# Patient Record
Sex: MALE | Race: WHITE | NOT HISPANIC OR LATINO | URBAN - METROPOLITAN AREA
[De-identification: names, ages, dates, MRNs, and addresses within clinical notes are randomized per-mention and may not be internally consistent; named-entity substitution may affect disease eponyms.]

---

## 2017-07-08 ENCOUNTER — EMERGENCY (EMERGENCY)
Facility: HOSPITAL | Age: 62
LOS: 1 days | Discharge: HOME | End: 2017-07-08
Attending: EMERGENCY MEDICINE | Admitting: EMERGENCY MEDICINE
Payer: COMMERCIAL

## 2017-07-08 VITALS
HEART RATE: 66 BPM | RESPIRATION RATE: 14 BRPM | OXYGEN SATURATION: 100 % | SYSTOLIC BLOOD PRESSURE: 145 MMHG | DIASTOLIC BLOOD PRESSURE: 83 MMHG | TEMPERATURE: 98 F

## 2017-07-08 VITALS
OXYGEN SATURATION: 98 % | RESPIRATION RATE: 16 BRPM | WEIGHT: 190.04 LBS | TEMPERATURE: 98 F | HEART RATE: 76 BPM | DIASTOLIC BLOOD PRESSURE: 90 MMHG | SYSTOLIC BLOOD PRESSURE: 150 MMHG | HEIGHT: 73 IN

## 2017-07-08 LAB
ALBUMIN SERPL ELPH-MCNC: 3.9 G/DL — SIGNIFICANT CHANGE UP (ref 3.3–5)
ALP SERPL-CCNC: 88 U/L — SIGNIFICANT CHANGE UP (ref 30–120)
ALT FLD-CCNC: 48 U/L DA — SIGNIFICANT CHANGE UP (ref 10–60)
ANION GAP SERPL CALC-SCNC: 10 MMOL/L — SIGNIFICANT CHANGE UP (ref 5–17)
AST SERPL-CCNC: 26 U/L — SIGNIFICANT CHANGE UP (ref 10–40)
BASOPHILS # BLD AUTO: 0.1 K/UL — SIGNIFICANT CHANGE UP (ref 0–0.2)
BASOPHILS NFR BLD AUTO: 1.2 % — SIGNIFICANT CHANGE UP (ref 0–2)
BILIRUB SERPL-MCNC: 0.5 MG/DL — SIGNIFICANT CHANGE UP (ref 0.2–1.2)
BUN SERPL-MCNC: 17 MG/DL — SIGNIFICANT CHANGE UP (ref 7–23)
CALCIUM SERPL-MCNC: 8.9 MG/DL — SIGNIFICANT CHANGE UP (ref 8.4–10.5)
CHLORIDE SERPL-SCNC: 104 MMOL/L — SIGNIFICANT CHANGE UP (ref 96–108)
CO2 SERPL-SCNC: 28 MMOL/L — SIGNIFICANT CHANGE UP (ref 22–31)
CREAT SERPL-MCNC: 1.15 MG/DL — SIGNIFICANT CHANGE UP (ref 0.5–1.3)
EOSINOPHIL # BLD AUTO: 0.3 K/UL — SIGNIFICANT CHANGE UP (ref 0–0.5)
EOSINOPHIL NFR BLD AUTO: 4.6 % — SIGNIFICANT CHANGE UP (ref 0–6)
GLUCOSE SERPL-MCNC: 149 MG/DL — HIGH (ref 70–99)
HCT VFR BLD CALC: 44.2 % — SIGNIFICANT CHANGE UP (ref 39–50)
HGB BLD-MCNC: 14.8 G/DL — SIGNIFICANT CHANGE UP (ref 13–17)
LACTATE SERPL-SCNC: 1.1 MMOL/L — SIGNIFICANT CHANGE UP (ref 0.7–2)
LYMPHOCYTES # BLD AUTO: 2.2 K/UL — SIGNIFICANT CHANGE UP (ref 1–3.3)
LYMPHOCYTES # BLD AUTO: 31.5 % — SIGNIFICANT CHANGE UP (ref 13–44)
MCHC RBC-ENTMCNC: 30.2 PG — SIGNIFICANT CHANGE UP (ref 27–34)
MCHC RBC-ENTMCNC: 33.5 GM/DL — SIGNIFICANT CHANGE UP (ref 32–36)
MCV RBC AUTO: 90 FL — SIGNIFICANT CHANGE UP (ref 80–100)
MONOCYTES # BLD AUTO: 0.6 K/UL — SIGNIFICANT CHANGE UP (ref 0–0.9)
MONOCYTES NFR BLD AUTO: 9.1 % — SIGNIFICANT CHANGE UP (ref 2–14)
NEUTROPHILS # BLD AUTO: 3.8 K/UL — SIGNIFICANT CHANGE UP (ref 1.8–7.4)
NEUTROPHILS NFR BLD AUTO: 53.6 % — SIGNIFICANT CHANGE UP (ref 43–77)
PLATELET # BLD AUTO: 265 K/UL — SIGNIFICANT CHANGE UP (ref 150–400)
POTASSIUM SERPL-MCNC: 4.3 MMOL/L — SIGNIFICANT CHANGE UP (ref 3.5–5.3)
POTASSIUM SERPL-SCNC: 4.3 MMOL/L — SIGNIFICANT CHANGE UP (ref 3.5–5.3)
PROT SERPL-MCNC: 7 G/DL — SIGNIFICANT CHANGE UP (ref 6–8.3)
RBC # BLD: 4.91 M/UL — SIGNIFICANT CHANGE UP (ref 4.2–5.8)
RBC # FLD: 11.6 % — SIGNIFICANT CHANGE UP (ref 10.3–14.5)
SODIUM SERPL-SCNC: 142 MMOL/L — SIGNIFICANT CHANGE UP (ref 135–145)
TROPONIN I SERPL-MCNC: 0 NG/ML — LOW (ref 0.02–0.06)
WBC # BLD: 7.1 K/UL — SIGNIFICANT CHANGE UP (ref 3.8–10.5)
WBC # FLD AUTO: 7.1 K/UL — SIGNIFICANT CHANGE UP (ref 3.8–10.5)

## 2017-07-08 PROCEDURE — 93010 ELECTROCARDIOGRAM REPORT: CPT

## 2017-07-08 PROCEDURE — 70450 CT HEAD/BRAIN W/O DYE: CPT

## 2017-07-08 PROCEDURE — 80053 COMPREHEN METABOLIC PANEL: CPT

## 2017-07-08 PROCEDURE — 83605 ASSAY OF LACTIC ACID: CPT

## 2017-07-08 PROCEDURE — 71020: CPT | Mod: 26

## 2017-07-08 PROCEDURE — 71046 X-RAY EXAM CHEST 2 VIEWS: CPT

## 2017-07-08 PROCEDURE — 70450 CT HEAD/BRAIN W/O DYE: CPT | Mod: 26

## 2017-07-08 PROCEDURE — 99284 EMERGENCY DEPT VISIT MOD MDM: CPT | Mod: 25

## 2017-07-08 PROCEDURE — 93005 ELECTROCARDIOGRAM TRACING: CPT

## 2017-07-08 PROCEDURE — 99284 EMERGENCY DEPT VISIT MOD MDM: CPT

## 2017-07-08 PROCEDURE — 84484 ASSAY OF TROPONIN QUANT: CPT

## 2017-07-08 PROCEDURE — 85027 COMPLETE CBC AUTOMATED: CPT

## 2017-07-08 RX ORDER — SODIUM CHLORIDE 9 MG/ML
1000 INJECTION INTRAMUSCULAR; INTRAVENOUS; SUBCUTANEOUS ONCE
Qty: 0 | Refills: 0 | Status: DISCONTINUED | OUTPATIENT
Start: 2017-07-08 | End: 2017-07-12

## 2017-07-08 NOTE — ED PROVIDER NOTE - OBJECTIVE STATEMENT
61 y/o M with c/o dizziness, myalgias and confusion prior to arrival while driving.  pt states he has had similar episodes in the past and was seen by cardiology with no known cause.  Pt states he was working in his yard this morning, went swimming and then was going to a BBQ when he felt "clammy" and became confused with his directions.  Pt currently asymptomatic, denies chest pain, SOB, nausea, vomiting, HA or weakness.

## 2017-07-08 NOTE — ED ADULT NURSE NOTE - OBJECTIVE STATEMENT
62 year old male presents to ED with CC of feeling disoriented while driving and just not feeling right  states he had to puppm over to the side of the road to regain his bearings.

## 2017-07-24 ENCOUNTER — TRANSCRIPTION ENCOUNTER (OUTPATIENT)
Age: 62
End: 2017-07-24

## 2017-10-02 ENCOUNTER — APPOINTMENT (OUTPATIENT)
Dept: NEUROLOGY | Facility: CLINIC | Age: 62
End: 2017-10-02
Payer: COMMERCIAL

## 2017-10-02 VITALS
BODY MASS INDEX: 28.89 KG/M2 | HEIGHT: 73 IN | WEIGHT: 218 LBS | SYSTOLIC BLOOD PRESSURE: 132 MMHG | HEART RATE: 57 BPM | DIASTOLIC BLOOD PRESSURE: 75 MMHG

## 2017-10-02 VITALS — HEART RATE: 58 BPM | DIASTOLIC BLOOD PRESSURE: 70 MMHG | SYSTOLIC BLOOD PRESSURE: 110 MMHG

## 2017-10-02 PROCEDURE — 99204 OFFICE O/P NEW MOD 45 MIN: CPT

## 2017-10-23 ENCOUNTER — OUTPATIENT (OUTPATIENT)
Dept: OUTPATIENT SERVICES | Facility: HOSPITAL | Age: 62
LOS: 1 days | End: 2017-10-23
Payer: COMMERCIAL

## 2017-10-23 ENCOUNTER — APPOINTMENT (OUTPATIENT)
Dept: MRI IMAGING | Facility: HOSPITAL | Age: 62
End: 2017-10-23
Payer: COMMERCIAL

## 2017-10-23 DIAGNOSIS — I62.00 NONTRAUMATIC SUBDURAL HEMORRHAGE, UNSPECIFIED: ICD-10-CM

## 2017-10-23 DIAGNOSIS — Z00.8 ENCOUNTER FOR OTHER GENERAL EXAMINATION: ICD-10-CM

## 2017-10-23 DIAGNOSIS — G93.89 OTHER SPECIFIED DISORDERS OF BRAIN: ICD-10-CM

## 2017-10-23 PROCEDURE — 70551 MRI BRAIN STEM W/O DYE: CPT | Mod: 26

## 2017-10-23 PROCEDURE — 70551 MRI BRAIN STEM W/O DYE: CPT

## 2017-11-08 ENCOUNTER — APPOINTMENT (OUTPATIENT)
Dept: NEUROLOGY | Facility: CLINIC | Age: 62
End: 2017-11-08
Payer: COMMERCIAL

## 2017-11-08 PROCEDURE — 95886 MUSC TEST DONE W/N TEST COMP: CPT

## 2017-11-08 PROCEDURE — 95911 NRV CNDJ TEST 9-10 STUDIES: CPT

## 2017-11-15 ENCOUNTER — APPOINTMENT (OUTPATIENT)
Dept: ORTHOPEDIC SURGERY | Facility: CLINIC | Age: 62
End: 2017-11-15
Payer: COMMERCIAL

## 2017-11-15 VITALS
SYSTOLIC BLOOD PRESSURE: 118 MMHG | HEIGHT: 73 IN | BODY MASS INDEX: 27.83 KG/M2 | DIASTOLIC BLOOD PRESSURE: 78 MMHG | WEIGHT: 210 LBS | HEART RATE: 62 BPM

## 2017-11-15 PROCEDURE — 72170 X-RAY EXAM OF PELVIS: CPT | Mod: 59

## 2017-11-15 PROCEDURE — 99214 OFFICE O/P EST MOD 30 MIN: CPT

## 2017-11-15 PROCEDURE — 72110 X-RAY EXAM L-2 SPINE 4/>VWS: CPT

## 2017-12-27 ENCOUNTER — APPOINTMENT (OUTPATIENT)
Dept: NEUROLOGY | Facility: CLINIC | Age: 62
End: 2017-12-27
Payer: COMMERCIAL

## 2017-12-27 VITALS
HEIGHT: 73 IN | SYSTOLIC BLOOD PRESSURE: 132 MMHG | DIASTOLIC BLOOD PRESSURE: 72 MMHG | BODY MASS INDEX: 28.49 KG/M2 | WEIGHT: 215 LBS | HEART RATE: 61 BPM

## 2017-12-27 VITALS — SYSTOLIC BLOOD PRESSURE: 120 MMHG | DIASTOLIC BLOOD PRESSURE: 70 MMHG

## 2017-12-27 DIAGNOSIS — R55 SYNCOPE AND COLLAPSE: ICD-10-CM

## 2017-12-27 PROCEDURE — 99214 OFFICE O/P EST MOD 30 MIN: CPT

## 2018-06-15 ENCOUNTER — RESULT REVIEW (OUTPATIENT)
Age: 63
End: 2018-06-15

## 2018-10-03 ENCOUNTER — EMERGENCY (EMERGENCY)
Facility: HOSPITAL | Age: 63
LOS: 1 days | Discharge: HOME | End: 2018-10-03
Attending: EMERGENCY MEDICINE | Admitting: EMERGENCY MEDICINE
Payer: COMMERCIAL

## 2018-10-03 VITALS
DIASTOLIC BLOOD PRESSURE: 69 MMHG | HEART RATE: 60 BPM | SYSTOLIC BLOOD PRESSURE: 121 MMHG | OXYGEN SATURATION: 95 % | TEMPERATURE: 99 F | RESPIRATION RATE: 18 BRPM

## 2018-10-03 VITALS
HEIGHT: 73 IN | WEIGHT: 212.97 LBS | RESPIRATION RATE: 16 BRPM | TEMPERATURE: 99 F | HEART RATE: 63 BPM | SYSTOLIC BLOOD PRESSURE: 157 MMHG | DIASTOLIC BLOOD PRESSURE: 87 MMHG | OXYGEN SATURATION: 100 %

## 2018-10-03 PROCEDURE — 71046 X-RAY EXAM CHEST 2 VIEWS: CPT | Mod: 26

## 2018-10-03 PROCEDURE — 99284 EMERGENCY DEPT VISIT MOD MDM: CPT | Mod: 25

## 2018-10-03 PROCEDURE — 96374 THER/PROPH/DIAG INJ IV PUSH: CPT

## 2018-10-03 PROCEDURE — 99284 EMERGENCY DEPT VISIT MOD MDM: CPT

## 2018-10-03 PROCEDURE — 94640 AIRWAY INHALATION TREATMENT: CPT

## 2018-10-03 PROCEDURE — 71046 X-RAY EXAM CHEST 2 VIEWS: CPT

## 2018-10-03 PROCEDURE — 96375 TX/PRO/DX INJ NEW DRUG ADDON: CPT

## 2018-10-03 RX ORDER — FAMOTIDINE 10 MG/ML
20 INJECTION INTRAVENOUS ONCE
Qty: 0 | Refills: 0 | Status: COMPLETED | OUTPATIENT
Start: 2018-10-03 | End: 2018-10-03

## 2018-10-03 RX ORDER — ATORVASTATIN CALCIUM 80 MG/1
0 TABLET, FILM COATED ORAL
Qty: 0 | Refills: 0 | COMMUNITY

## 2018-10-03 RX ORDER — IPRATROPIUM/ALBUTEROL SULFATE 18-103MCG
3 AEROSOL WITH ADAPTER (GRAM) INHALATION ONCE
Qty: 0 | Refills: 0 | Status: COMPLETED | OUTPATIENT
Start: 2018-10-03 | End: 2018-10-03

## 2018-10-03 RX ORDER — FAMOTIDINE 10 MG/ML
1 INJECTION INTRAVENOUS
Qty: 14 | Refills: 0
Start: 2018-10-03 | End: 2018-10-09

## 2018-10-03 RX ORDER — SODIUM CHLORIDE 9 MG/ML
1000 INJECTION INTRAMUSCULAR; INTRAVENOUS; SUBCUTANEOUS ONCE
Qty: 0 | Refills: 0 | Status: COMPLETED | OUTPATIENT
Start: 2018-10-03 | End: 2018-10-03

## 2018-10-03 RX ORDER — AZITHROMYCIN 500 MG/1
1 TABLET, FILM COATED ORAL
Qty: 5 | Refills: 0
Start: 2018-10-03 | End: 2018-10-07

## 2018-10-03 RX ORDER — SODIUM CHLORIDE 9 MG/ML
3 INJECTION INTRAMUSCULAR; INTRAVENOUS; SUBCUTANEOUS ONCE
Qty: 0 | Refills: 0 | Status: COMPLETED | OUTPATIENT
Start: 2018-10-03 | End: 2018-10-03

## 2018-10-03 RX ORDER — DIPHENHYDRAMINE HCL 50 MG
50 CAPSULE ORAL ONCE
Qty: 0 | Refills: 0 | Status: COMPLETED | OUTPATIENT
Start: 2018-10-03 | End: 2018-10-03

## 2018-10-03 RX ORDER — FLUTICASONE PROPIONATE 50 MCG
1 SPRAY, SUSPENSION NASAL
Qty: 1 | Refills: 0
Start: 2018-10-03 | End: 2018-11-01

## 2018-10-03 RX ORDER — CETIRIZINE HYDROCHLORIDE 10 MG/1
1 TABLET ORAL
Qty: 10 | Refills: 0
Start: 2018-10-03 | End: 2018-10-12

## 2018-10-03 RX ADMIN — Medication 125 MILLIGRAM(S): at 16:00

## 2018-10-03 RX ADMIN — FAMOTIDINE 20 MILLIGRAM(S): 10 INJECTION INTRAVENOUS at 16:00

## 2018-10-03 RX ADMIN — SODIUM CHLORIDE 3 MILLILITER(S): 9 INJECTION INTRAMUSCULAR; INTRAVENOUS; SUBCUTANEOUS at 15:50

## 2018-10-03 RX ADMIN — Medication 3 MILLILITER(S): at 16:29

## 2018-10-03 RX ADMIN — SODIUM CHLORIDE 1000 MILLILITER(S): 9 INJECTION INTRAMUSCULAR; INTRAVENOUS; SUBCUTANEOUS at 16:13

## 2018-10-03 RX ADMIN — Medication 50 MILLIGRAM(S): at 16:00

## 2018-10-03 RX ADMIN — SODIUM CHLORIDE 1000 MILLILITER(S): 9 INJECTION INTRAMUSCULAR; INTRAVENOUS; SUBCUTANEOUS at 17:13

## 2018-10-03 NOTE — ED PROVIDER NOTE - ENMT, MLM
Airway patent, Nasal mucosa clear. Mouth with normal mucosa. Throat has no vesicles, no oropharyngeal exudates and uvula is midline. No tongue swelling noted

## 2018-10-03 NOTE — ED PROVIDER NOTE - NS_ ATTENDINGSCRIBEDETAILS _ED_A_ED_FT
Pt is a 62 yo male who presents to the ED with a cc of allergic reaction.  Pt reports that he followed up with his PMD today for a regular appointment.  He states that he told him he had been experiencing nasal congestion and cough productive of yellow sputum x 2 weeks.  He was placed on po Levaquin. He reports that he took the first pill and went home.  Approx 30 min later he developed diffuse itching and noted hives. He then felt a tightness in his chest with SOB.  He called his PMD and was sent to the ED.  Pt did not take anything for the symptoms but had taken an OTC allergy medication prior

## 2018-10-03 NOTE — ED PROVIDER NOTE - MEDICAL DECISION MAKING DETAILS
Pt is a 62 yo male who presents to the ED with a cc of allergic reaction.  Pt reports that he took Levaquin for the first time for a possible sinus infection and broke out in generalized hives.  Will treat for allergic reaction and monitor.  No evidence of anaphylaxis at this time

## 2018-10-03 NOTE — ED PROVIDER NOTE - PROGRESS NOTE DETAILS
Pt with resolution of symptoms at this time and is stable for discharge home.  Results of chest x-ray provided, all questions answered.  Stable for discharge home with outpatient follow up

## 2018-10-03 NOTE — ED PROVIDER NOTE - OBJECTIVE STATEMENT
62 y/o M pt with hx of HLD presents to the ED c/o allergic reaction today s/p taking Levaquin. Pt states that he went to the pmd for an annual physical and had c/o productive cough (with yellowish sputum) and nasal congestion for 2 weeks. The pmd prescribed Levaquin and he took the Levaquin at home. Approximately 1 hour afterwards he started experiencing a pruritic rash starting in his axillary region, spreading to his upper extremities, chest and abd. Associated with throat closing up sensation and SOB. He took an antihistamine PTA, with mild relief of sx. He states that he has never taken Levaquin in the past, he usually takes Z-Pack. Pt states that other people at home have had similar sx and he has chronic allergy sx. Non-smoker. Allergic to penicillin. Denies fever, chills, nausea, vomiting, or chest pain. No other complaints at this time.

## 2018-10-03 NOTE — ED PROVIDER NOTE - PLAN OF CARE
Return to the ED for any new or worsening symptoms  Take your medication as prescribed  Stop the Levaquin  Pepcid 1 tab 2 times a day   Prednisone 1 tab daily   Zyrtec 1 tab daily   Flonase 1 spray to each nostril daily   Zithromax 1 tab daily   Follow up with your PMD in 2-3 days for a recheck   Advance activity as tolerated

## 2018-10-03 NOTE — ED PROVIDER NOTE - CARE PLAN
Principal Discharge DX:	Allergic reaction  Assessment and plan of treatment:	Return to the ED for any new or worsening symptoms  Take your medication as prescribed  Stop the Levaquin  Pepcid 1 tab 2 times a day   Prednisone 1 tab daily   Zyrtec 1 tab daily   Flonase 1 spray to each nostril daily   Zithromax 1 tab daily   Follow up with your PMD in 2-3 days for a recheck   Advance activity as tolerated  Secondary Diagnosis:	Sinusitis

## 2018-10-03 NOTE — ED ADULT NURSE NOTE - NSIMPLEMENTINTERV_GEN_ALL_ED
Implemented All Universal Safety Interventions:  [place] to call system. Call bell, personal items and telephone within reach. Instruct patient to call for assistance. Room bathroom lighting operational. Non-slip footwear when patient is off stretcher. Physically safe environment: no spills, clutter or unnecessary equipment. Stretcher in lowest position, wheels locked, appropriate side rails in place.

## 2018-10-03 NOTE — ED PROVIDER NOTE - SKIN, MLM
Skin normal color for race, warm, dry and intact. No evidence of rash. Scattered hives noted to upper ext and torso at this time

## 2018-10-03 NOTE — ED ADULT NURSE NOTE - OBJECTIVE STATEMENT
63 yr old male c/o allergic reaction today; pt states he took Levaquin and about 30 min later noticed hives on face, stomach, back, b/l upper extremities and b/l posterior thighs, states he felt like his throat was slightly closing. Pt states he has been feeling cold like symptoms x 2 weeks; say PMD and was placed on Levaquin; pt also states he had the flu shot earlier today as well. Pt states he has never taken Levaquin in the past.

## 2019-05-17 ENCOUNTER — TRANSCRIPTION ENCOUNTER (OUTPATIENT)
Age: 64
End: 2019-05-17

## 2019-07-09 PROBLEM — E78.5 HYPERLIPIDEMIA, UNSPECIFIED: Chronic | Status: ACTIVE | Noted: 2018-10-03

## 2019-07-22 ENCOUNTER — APPOINTMENT (OUTPATIENT)
Dept: ORTHOPEDIC SURGERY | Facility: CLINIC | Age: 64
End: 2019-07-22
Payer: COMMERCIAL

## 2019-07-22 VITALS — HEIGHT: 73 IN | BODY MASS INDEX: 28.49 KG/M2 | WEIGHT: 215 LBS

## 2019-07-22 PROCEDURE — 99213 OFFICE O/P EST LOW 20 MIN: CPT

## 2019-07-22 PROCEDURE — 73562 X-RAY EXAM OF KNEE 3: CPT | Mod: LT

## 2019-07-28 ENCOUNTER — FORM ENCOUNTER (OUTPATIENT)
Age: 64
End: 2019-07-28

## 2019-07-29 ENCOUNTER — APPOINTMENT (OUTPATIENT)
Dept: MRI IMAGING | Facility: CLINIC | Age: 64
End: 2019-07-29

## 2019-07-29 ENCOUNTER — OUTPATIENT (OUTPATIENT)
Dept: OUTPATIENT SERVICES | Facility: HOSPITAL | Age: 64
LOS: 1 days | End: 2019-07-29
Payer: COMMERCIAL

## 2019-07-29 DIAGNOSIS — Z00.8 ENCOUNTER FOR OTHER GENERAL EXAMINATION: ICD-10-CM

## 2019-07-29 PROCEDURE — 73721 MRI JNT OF LWR EXTRE W/O DYE: CPT | Mod: 26,LT

## 2019-07-29 PROCEDURE — 73721 MRI JNT OF LWR EXTRE W/O DYE: CPT

## 2019-07-31 ENCOUNTER — APPOINTMENT (OUTPATIENT)
Dept: ORTHOPEDIC SURGERY | Facility: CLINIC | Age: 64
End: 2019-07-31
Payer: COMMERCIAL

## 2019-07-31 VITALS — BODY MASS INDEX: 28.49 KG/M2 | HEIGHT: 73 IN | WEIGHT: 215 LBS

## 2019-07-31 PROCEDURE — 99213 OFFICE O/P EST LOW 20 MIN: CPT

## 2019-07-31 NOTE — PHYSICAL EXAM
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 [LE] : Sensory: Intact in bilateral lower extremities [Normal RLE] : Right Lower Extremity: No scars, rashes, lesions, ulcers, skin intact [Knee] : patellar 2+ and symmetric bilaterally [Normal LLE] : Left Lower Extremity: No scars, rashes, lesions, ulcers, skin intact [Normal Touch] : sensation intact for touch [Poor Appearance] : well-appearing [Obese] : not obese [Acute Distress] : not in acute distress [Poor Coordination] : normal coordination [Abl Affect] : with normal affect [Disorientation] : oriented x 3 [Normal] : Oriented to person, place, and time, insight and judgement were intact and the affect was normal [de-identified] : Moderate sized effusion is noted when compared to the right knee. [de-identified] : Upon ambulation patient is having difficulty with bearing weight to that left leg.\par Patient is unable to go to full extension of that left knee secondary to pain.\par Negative Fabers test.\par positive pivot of the left knee and positive Steinmann\par Positive palpable tenderness over medial joint space of the left knee. [de-identified] : decreased strength of the left quad. secondary to pain in the knee

## 2019-07-31 NOTE — HISTORY OF PRESENT ILLNESS
[de-identified] : The patient returns through the results of her recent MRI obtained the left knee on 729 2019. Vision states he has less pain is complaining only of some mild tightness behind the medial aspect of the knee. [Pain Location] : pain [] : left knee [___ days] : [unfilled] day(s) ago [Worsening] : worsening [9] : a minimum pain level of 9/10 [10] : a maximum pain level of 10/10 [Constant] : ~He/She~ states the symptoms seem to be constant [Bending] : worsened by bending [Walking] : worsened by walking [Standing] : worsened by standing [Ice] : relieved by ice [NSAIDs] : not relieved by nonsteroidal anti-inflammatory drugs [Rest] : relieved by rest

## 2019-07-31 NOTE — DISCUSSION/SUMMARY
[de-identified] : The patient has a trigger schedule to [place] and is feeling better. Have recommended resting the knee allowing the cartilage and bone contusions to heal. Patient will follow up with us in one month we'll consider either physical therapy or Visco supplementation her return visit.

## 2019-09-04 ENCOUNTER — APPOINTMENT (OUTPATIENT)
Dept: ORTHOPEDIC SURGERY | Facility: CLINIC | Age: 64
End: 2019-09-04
Payer: COMMERCIAL

## 2019-09-04 VITALS — SYSTOLIC BLOOD PRESSURE: 114 MMHG | HEART RATE: 55 BPM | DIASTOLIC BLOOD PRESSURE: 71 MMHG

## 2019-09-04 PROCEDURE — 99213 OFFICE O/P EST LOW 20 MIN: CPT

## 2019-11-01 ENCOUNTER — APPOINTMENT (OUTPATIENT)
Dept: PULMONOLOGY | Facility: CLINIC | Age: 64
End: 2019-11-01

## 2019-11-08 ENCOUNTER — APPOINTMENT (OUTPATIENT)
Dept: PULMONOLOGY | Facility: CLINIC | Age: 64
End: 2019-11-08

## 2020-01-06 ENCOUNTER — APPOINTMENT (OUTPATIENT)
Dept: ORTHOPEDIC SURGERY | Facility: CLINIC | Age: 65
End: 2020-01-06
Payer: COMMERCIAL

## 2020-01-06 VITALS
SYSTOLIC BLOOD PRESSURE: 136 MMHG | HEIGHT: 73 IN | BODY MASS INDEX: 28.49 KG/M2 | WEIGHT: 215 LBS | HEART RATE: 58 BPM | DIASTOLIC BLOOD PRESSURE: 74 MMHG

## 2020-01-06 PROCEDURE — 99213 OFFICE O/P EST LOW 20 MIN: CPT

## 2020-01-11 NOTE — ED PROVIDER NOTE - DR. NAME
report received from nite shift/ pt a&0 3/ family at bedside  offers no complaints at this time/ repeat trop sent off/
Karen

## 2020-05-21 ENCOUNTER — APPOINTMENT (OUTPATIENT)
Dept: GASTROENTEROLOGY | Facility: CLINIC | Age: 65
End: 2020-05-21

## 2020-07-13 ENCOUNTER — APPOINTMENT (OUTPATIENT)
Dept: ORTHOPEDIC SURGERY | Facility: CLINIC | Age: 65
End: 2020-07-13
Payer: COMMERCIAL

## 2020-07-13 VITALS — SYSTOLIC BLOOD PRESSURE: 124 MMHG | DIASTOLIC BLOOD PRESSURE: 77 MMHG | TEMPERATURE: 97.7 F | HEART RATE: 54 BPM

## 2020-07-13 DIAGNOSIS — S83.249A OTHER TEAR OF MEDIAL MENISCUS, CURRENT INJURY, UNSPECIFIED KNEE, INITIAL ENCOUNTER: ICD-10-CM

## 2020-07-13 PROCEDURE — 99213 OFFICE O/P EST LOW 20 MIN: CPT

## 2020-07-30 NOTE — ED PROVIDER NOTE - NS_ATTENDINGSCRIBE_ED_ALL_ED
PRESCRIPTION REFILL POLICY [facility] Statement to Patients April 1, 2014 In an effort to ensure the large volume of patient prescription refills is processed in the most efficient and expeditious manner, we are asking our patients to assist us by calling your Pharmacy for all prescription refills, this will include also your  Mail Order Pharmacy. The pharmacy will contact our office electronically to continue the refill process. Please do not wait until the last minute to call your pharmacy. We need at least 48 hours (2days) to fill prescriptions. We also encourage you to call your pharmacy before going to  your prescription to make sure it is ready. With regard to controlled substance prescription refill requests (narcotic refills) that need to be picked up at our office, we ask your cooperation by providing us with at least 72 hours (3days) notice that you will need a refill. We will not refill narcotic prescription refill requests after 4:00pm on any weekday, Monday through Thursday, or after 2:00pm on Fridays, or on the weekends. We encourage everyone to explore another way of getting your prescription refill request processed using Mobile Messenger, our patient web portal through our electronic medical record system. Mobile Messenger is an efficient and effective way to communicate your medication request directly to the office and  downloadable as an artur on your smart phone . Mobile Messenger also features a review functionality that allows you to view your medication list as well as leave messages for your physician. Are you ready to get connected? If so please review the attatched instructions or speak to any of our staff to get you set up right away! Thank you so much for your cooperation. Should you have any questions please contact our Practice Administrator. The Physicians and Staff,  [facility] I personally performed the service described in the documentation recorded by the scribe in my presence, and it accurately and completely records my words and actions.

## 2021-01-11 ENCOUNTER — APPOINTMENT (OUTPATIENT)
Dept: ORTHOPEDIC SURGERY | Facility: CLINIC | Age: 66
End: 2021-01-11

## 2021-01-18 ENCOUNTER — APPOINTMENT (OUTPATIENT)
Dept: ORTHOPEDIC SURGERY | Facility: CLINIC | Age: 66
End: 2021-01-18
Payer: COMMERCIAL

## 2021-01-18 VITALS — TEMPERATURE: 97.2 F | BODY MASS INDEX: 28.49 KG/M2 | WEIGHT: 215 LBS | HEIGHT: 73 IN

## 2021-01-18 PROCEDURE — 99214 OFFICE O/P EST MOD 30 MIN: CPT

## 2021-01-18 PROCEDURE — 99072 ADDL SUPL MATRL&STAF TM PHE: CPT

## 2021-01-18 PROCEDURE — 72110 X-RAY EXAM L-2 SPINE 4/>VWS: CPT

## 2021-01-27 ENCOUNTER — OUTPATIENT (OUTPATIENT)
Dept: OUTPATIENT SERVICES | Facility: HOSPITAL | Age: 66
LOS: 1 days | End: 2021-01-27
Payer: COMMERCIAL

## 2021-01-27 ENCOUNTER — APPOINTMENT (OUTPATIENT)
Dept: MRI IMAGING | Facility: HOSPITAL | Age: 66
End: 2021-01-27
Payer: COMMERCIAL

## 2021-01-27 DIAGNOSIS — Z00.8 ENCOUNTER FOR OTHER GENERAL EXAMINATION: ICD-10-CM

## 2021-01-27 DIAGNOSIS — M54.16 RADICULOPATHY, LUMBAR REGION: ICD-10-CM

## 2021-01-27 DIAGNOSIS — M51.36 OTHER INTERVERTEBRAL DISC DEGENERATION, LUMBAR REGION: ICD-10-CM

## 2021-01-27 PROCEDURE — 72148 MRI LUMBAR SPINE W/O DYE: CPT | Mod: 26

## 2021-01-27 PROCEDURE — 72148 MRI LUMBAR SPINE W/O DYE: CPT

## 2021-02-01 ENCOUNTER — APPOINTMENT (OUTPATIENT)
Dept: ORTHOPEDIC SURGERY | Facility: CLINIC | Age: 66
End: 2021-02-01

## 2021-02-03 ENCOUNTER — APPOINTMENT (OUTPATIENT)
Dept: ORTHOPEDIC SURGERY | Facility: CLINIC | Age: 66
End: 2021-02-03
Payer: COMMERCIAL

## 2021-02-03 VITALS — TEMPERATURE: 97.2 F

## 2021-02-03 DIAGNOSIS — S32.010A WEDGE COMPRESSION FRACTURE OF FIRST LUMBAR VERTEBRA, INITIAL ENCOUNTER FOR CLOSED FRACTURE: ICD-10-CM

## 2021-02-03 PROCEDURE — 99072 ADDL SUPL MATRL&STAF TM PHE: CPT

## 2021-02-03 PROCEDURE — 99214 OFFICE O/P EST MOD 30 MIN: CPT

## 2021-02-10 ENCOUNTER — NON-APPOINTMENT (OUTPATIENT)
Age: 66
End: 2021-02-10

## 2021-03-02 ENCOUNTER — OUTPATIENT (OUTPATIENT)
Dept: OUTPATIENT SERVICES | Facility: HOSPITAL | Age: 66
LOS: 1 days | End: 2021-03-02
Payer: COMMERCIAL

## 2021-03-02 ENCOUNTER — APPOINTMENT (OUTPATIENT)
Dept: RADIOLOGY | Facility: HOSPITAL | Age: 66
End: 2021-03-02
Payer: COMMERCIAL

## 2021-03-02 DIAGNOSIS — Z00.8 ENCOUNTER FOR OTHER GENERAL EXAMINATION: ICD-10-CM

## 2021-03-02 PROCEDURE — 77080 DXA BONE DENSITY AXIAL: CPT

## 2021-03-02 PROCEDURE — 77080 DXA BONE DENSITY AXIAL: CPT | Mod: 26

## 2021-03-03 ENCOUNTER — APPOINTMENT (OUTPATIENT)
Dept: ORTHOPEDIC SURGERY | Facility: CLINIC | Age: 66
End: 2021-03-03
Payer: COMMERCIAL

## 2021-03-03 PROCEDURE — 72070 X-RAY EXAM THORAC SPINE 2VWS: CPT

## 2021-03-03 PROCEDURE — 99213 OFFICE O/P EST LOW 20 MIN: CPT

## 2021-03-03 PROCEDURE — 99072 ADDL SUPL MATRL&STAF TM PHE: CPT

## 2021-03-30 ENCOUNTER — APPOINTMENT (OUTPATIENT)
Dept: GASTROENTEROLOGY | Facility: CLINIC | Age: 66
End: 2021-03-30
Payer: COMMERCIAL

## 2021-03-30 VITALS
OXYGEN SATURATION: 96 % | HEART RATE: 62 BPM | HEIGHT: 73 IN | WEIGHT: 210 LBS | BODY MASS INDEX: 27.83 KG/M2 | TEMPERATURE: 97.7 F | SYSTOLIC BLOOD PRESSURE: 110 MMHG | DIASTOLIC BLOOD PRESSURE: 70 MMHG

## 2021-03-30 DIAGNOSIS — D12.6 BENIGN NEOPLASM OF COLON, UNSPECIFIED: ICD-10-CM

## 2021-03-30 PROCEDURE — 99072 ADDL SUPL MATRL&STAF TM PHE: CPT

## 2021-03-30 PROCEDURE — 99203 OFFICE O/P NEW LOW 30 MIN: CPT

## 2021-03-30 NOTE — ASSESSMENT
[FreeTextEntry1] : Impression and plan\par \par Patient came to the office today to arrange for colonoscopy. The risks benefits alternatives and limitations were discussed. Further suggestions will follow.

## 2021-03-30 NOTE — HISTORY OF PRESENT ILLNESS
[FreeTextEntry1] : Patient came to the office today after an extended absence to arrange for colonoscopy the patient is feeling well and offers no present complaints.patient has a history of adenomatous polyps last examination was performed about 7 years ago. Patient currently denies nausea vomiting fever chills rectal bleeding or melena

## 2021-04-09 ENCOUNTER — OUTPATIENT (OUTPATIENT)
Dept: OUTPATIENT SERVICES | Facility: HOSPITAL | Age: 66
LOS: 1 days | Discharge: HOME | End: 2021-04-09

## 2021-04-09 DIAGNOSIS — R79.89 OTHER SPECIFIED ABNORMAL FINDINGS OF BLOOD CHEMISTRY: ICD-10-CM

## 2021-04-12 ENCOUNTER — APPOINTMENT (OUTPATIENT)
Dept: HEMATOLOGY ONCOLOGY | Facility: CLINIC | Age: 66
End: 2021-04-12
Payer: COMMERCIAL

## 2021-04-12 VITALS
DIASTOLIC BLOOD PRESSURE: 81 MMHG | HEART RATE: 61 BPM | HEIGHT: 71.97 IN | WEIGHT: 212.08 LBS | SYSTOLIC BLOOD PRESSURE: 129 MMHG | RESPIRATION RATE: 17 BRPM | BODY MASS INDEX: 28.73 KG/M2 | TEMPERATURE: 97.9 F | OXYGEN SATURATION: 99 %

## 2021-04-12 DIAGNOSIS — Z80.1 FAMILY HISTORY OF MALIGNANT NEOPLASM OF TRACHEA, BRONCHUS AND LUNG: ICD-10-CM

## 2021-04-12 DIAGNOSIS — G62.9 POLYNEUROPATHY, UNSPECIFIED: ICD-10-CM

## 2021-04-12 DIAGNOSIS — Z85.828 PERSONAL HISTORY OF OTHER MALIGNANT NEOPLASM OF SKIN: ICD-10-CM

## 2021-04-12 DIAGNOSIS — Z80.0 FAMILY HISTORY OF MALIGNANT NEOPLASM OF DIGESTIVE ORGANS: ICD-10-CM

## 2021-04-12 PROCEDURE — 99072 ADDL SUPL MATRL&STAF TM PHE: CPT

## 2021-04-12 PROCEDURE — 99205 OFFICE O/P NEW HI 60 MIN: CPT

## 2021-04-12 NOTE — REASON FOR VISIT
[Initial Consultation] : an initial consultation for [Spouse] : spouse [FreeTextEntry2] : abnormal SPEP Yes

## 2021-04-12 NOTE — CONSULT LETTER
[Dear  ___] : Dear  [unfilled], [Consult Letter:] : I had the pleasure of evaluating your patient, [unfilled]. [Please see my note below.] : Please see my note below. [Consult Closing:] : Thank you very much for allowing me to participate in the care of this patient.  If you have any questions, please do not hesitate to contact me. [Sincerely,] : Sincerely, [FreeTextEntry3] : Dyana Cheatham MD

## 2021-04-12 NOTE — RESULTS/DATA
[FreeTextEntry1] : 2/2021-MRI-L-spine-IMPRESSION:\par Acute to subacute uncomplicated benign senile appearing wedge compression fracture of T12. 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 IgG 340/ IgGA 23/ IgM 18/Kappa FLC 0.69, Lambda .99.\par ESR, creatinine, calcium WNL.\par UPEP-normal pattern. No B-J protein detected.

## 2021-04-12 NOTE — REVIEW OF SYSTEMS
[Patient Intake Form Reviewed] : Patient intake form was reviewed [Negative] : Allergic/Immunologic [FreeTextEntry2] : feels cold at times [FreeTextEntry9] : occasional back muscle spasms

## 2021-04-12 NOTE — HISTORY OF PRESENT ILLNESS
[de-identified] : 11/2020-Found to have T-12 compression fracture. Saw orthopedist Dr. Candelaria in 1/2021. Referred to endocrinologist Dr. Acosta by Dr. Candelaria, and lab work was done.\par 3/2021-Patient found to have 2 gamma-migrating paraproteins on SPEP.\par \par Now generally feels well-is active, working/golfing. No h/o recurrent fevers or infections. No abnormal bruising or bleeding. Good appetite.\par No pulmonary/GI complaints.\par Had 1/2 COVID vaccines (Moderna)-second is tomorrow.

## 2021-04-13 ENCOUNTER — APPOINTMENT (OUTPATIENT)
Dept: DISASTER EMERGENCY | Facility: OTHER | Age: 66
End: 2021-04-13
Payer: COMMERCIAL

## 2021-04-13 PROCEDURE — 0012A: CPT

## 2021-04-14 LAB
ALBUMIN SERPL ELPH-MCNC: 4.3 G/DL
ALP BLD-CCNC: 70 U/L
ALT SERPL-CCNC: 19 U/L
ANION GAP SERPL CALC-SCNC: 11 MMOL/L
AST SERPL-CCNC: 16 U/L
BASOPHILS # BLD AUTO: 0.03 K/UL
BASOPHILS NFR BLD AUTO: 0.4 %
BILIRUB SERPL-MCNC: 0.3 MG/DL
BUN SERPL-MCNC: 26 MG/DL
CALCIUM SERPL-MCNC: 9.9 MG/DL
CHLORIDE SERPL-SCNC: 104 MMOL/L
CO2 SERPL-SCNC: 27 MMOL/L
CREAT SERPL-MCNC: 1.25 MG/DL
DEPRECATED KAPPA LC FREE/LAMBDA SER: 0.01 RATIO
DEPRECATED KAPPA LC FREE/LAMBDA SER: 0.01 RATIO
EOSINOPHIL # BLD AUTO: 0.18 K/UL
EOSINOPHIL NFR BLD AUTO: 2.3 %
GLUCOSE SERPL-MCNC: 113 MG/DL
HCT VFR BLD CALC: 35.1 %
HGB BLD-MCNC: 11.8 G/DL
IGA SER QL IEP: 16 MG/DL
IGG SER QL IEP: 336 MG/DL
IGM SER QL IEP: 17 MG/DL
IMM GRANULOCYTES NFR BLD AUTO: 0.3 %
INR PPP: 1.02 RATIO
KAPPA LC CSF-MCNC: 61.5 MG/DL
KAPPA LC CSF-MCNC: 61.5 MG/DL
KAPPA LC SERPL-MCNC: 0.63 MG/DL
KAPPA LC SERPL-MCNC: 0.63 MG/DL
LDH SERPL-CCNC: 138 U/L
LYMPHOCYTES # BLD AUTO: 2.07 K/UL
LYMPHOCYTES NFR BLD AUTO: 26.5 %
MAN DIFF?: NORMAL
MCHC RBC-ENTMCNC: 31.7 PG
MCHC RBC-ENTMCNC: 33.6 GM/DL
MCV RBC AUTO: 94.4 FL
MONOCYTES # BLD AUTO: 0.54 K/UL
MONOCYTES NFR BLD AUTO: 6.9 %
NEUTROPHILS # BLD AUTO: 4.96 K/UL
NEUTROPHILS NFR BLD AUTO: 63.6 %
PLATELET # BLD AUTO: 223 K/UL
POTASSIUM SERPL-SCNC: 4.6 MMOL/L
PROT SERPL-MCNC: 6.8 G/DL
PT BLD: 12 SEC
RBC # BLD: 3.72 M/UL
RBC # FLD: 11.9 %
SARS-COV-2 N GENE NPH QL NAA+PROBE: NOT DETECTED
SODIUM SERPL-SCNC: 142 MMOL/L
WBC # FLD AUTO: 7.8 K/UL

## 2021-04-15 LAB
ALBUMIN MFR SERPL ELPH: 58.2 %
ALBUMIN SERPL-MCNC: 4 G/DL
ALBUMIN/GLOB SERPL: 1.4 RATIO
ALPHA1 GLOB MFR SERPL ELPH: 4 %
ALPHA1 GLOB SERPL ELPH-MCNC: 0.3 G/DL
ALPHA2 GLOB MFR SERPL ELPH: 9.7 %
ALPHA2 GLOB SERPL ELPH-MCNC: 0.7 G/DL
B-GLOBULIN MFR SERPL ELPH: 9.5 %
B-GLOBULIN SERPL ELPH-MCNC: 0.6 G/DL
GAMMA GLOB FLD ELPH-MCNC: 1.3 G/DL
GAMMA GLOB MFR SERPL ELPH: 18.6 %
INTERPRETATION SERPL IEP-IMP: NORMAL
M PROTEIN MFR SERPL ELPH: NORMAL
M PROTEIN SPEC IFE-MCNC: NORMAL
MONOCLON BAND OBS SERPL: NORMAL
PROT SERPL-MCNC: 6.8 G/DL

## 2021-04-21 ENCOUNTER — RESULT REVIEW (OUTPATIENT)
Age: 66
End: 2021-04-21

## 2021-04-21 ENCOUNTER — LABORATORY RESULT (OUTPATIENT)
Age: 66
End: 2021-04-21

## 2021-04-21 ENCOUNTER — APPOINTMENT (OUTPATIENT)
Dept: HEMATOLOGY ONCOLOGY | Facility: CLINIC | Age: 66
End: 2021-04-21
Payer: COMMERCIAL

## 2021-04-21 VITALS
TEMPERATURE: 97.7 F | RESPIRATION RATE: 16 BRPM | HEIGHT: 71.65 IN | HEART RATE: 60 BPM | DIASTOLIC BLOOD PRESSURE: 75 MMHG | BODY MASS INDEX: 28.86 KG/M2 | WEIGHT: 210.76 LBS | SYSTOLIC BLOOD PRESSURE: 127 MMHG | OXYGEN SATURATION: 96 %

## 2021-04-21 LAB
BASOPHILS # BLD AUTO: 0.05 K/UL — SIGNIFICANT CHANGE UP (ref 0–0.2)
BASOPHILS NFR BLD AUTO: 0.7 % — SIGNIFICANT CHANGE UP (ref 0–2)
EOSINOPHIL # BLD AUTO: 0.2 K/UL — SIGNIFICANT CHANGE UP (ref 0–0.5)
EOSINOPHIL NFR BLD AUTO: 2.9 % — SIGNIFICANT CHANGE UP (ref 0–6)
HCT VFR BLD CALC: 32.7 % — LOW (ref 39–50)
HGB BLD-MCNC: 11.6 G/DL — LOW (ref 13–17)
IMM GRANULOCYTES NFR BLD AUTO: 0.7 % — SIGNIFICANT CHANGE UP (ref 0–1.5)
LYMPHOCYTES # BLD AUTO: 2.27 K/UL — SIGNIFICANT CHANGE UP (ref 1–3.3)
LYMPHOCYTES # BLD AUTO: 32.8 % — SIGNIFICANT CHANGE UP (ref 13–44)
MCHC RBC-ENTMCNC: 32 PG — SIGNIFICANT CHANGE UP (ref 27–34)
MCHC RBC-ENTMCNC: 35.5 G/DL — SIGNIFICANT CHANGE UP (ref 32–36)
MCV RBC AUTO: 90.3 FL — SIGNIFICANT CHANGE UP (ref 80–100)
MONOCYTES # BLD AUTO: 0.42 K/UL — SIGNIFICANT CHANGE UP (ref 0–0.9)
MONOCYTES NFR BLD AUTO: 6.1 % — SIGNIFICANT CHANGE UP (ref 2–14)
NEUTROPHILS # BLD AUTO: 3.94 K/UL — SIGNIFICANT CHANGE UP (ref 1.8–7.4)
NEUTROPHILS NFR BLD AUTO: 56.8 % — SIGNIFICANT CHANGE UP (ref 43–77)
NRBC # BLD: 0 /100 WBCS — SIGNIFICANT CHANGE UP (ref 0–0)
PLATELET # BLD AUTO: 263 K/UL — SIGNIFICANT CHANGE UP (ref 150–400)
RBC # BLD: 3.62 M/UL — LOW (ref 4.2–5.8)
RBC # FLD: 12 % — SIGNIFICANT CHANGE UP (ref 10.3–14.5)
WBC # BLD: 6.93 K/UL — SIGNIFICANT CHANGE UP (ref 3.8–10.5)
WBC # FLD AUTO: 6.93 K/UL — SIGNIFICANT CHANGE UP (ref 3.8–10.5)

## 2021-04-21 PROCEDURE — 38222 DX BONE MARROW BX & ASPIR: CPT | Mod: LT

## 2021-04-21 PROCEDURE — 99072 ADDL SUPL MATRL&STAF TM PHE: CPT

## 2021-04-21 NOTE — PROCEDURE
[Bone Marrow Biopsy] : bone marrow biopsy [Bone Marrow Aspiration] : bone marrow aspiration  [Patient] : the patient [Verbal Consent Obtained] : verbal consent was obtained prior to the procedure [Patient identification verified] : patient identification verified [Procedure verified and consent obtained] : procedure verified and consent obtained [Laterality verified and correct site marked] : laterality verified and correct site marked [Left] : site: left [Correct positioning] : correct positioning [Prone] : prone [Superior iliac spine was identified] : the superior iliac spine was identified. [The left posterior iliac crest was prepped with betadine and draped, using sterile technique.] : The left posterior iliac crest was prepped with betadine and draped, using sterile technique. [Lidocaine was injected and into the periosteum overlying the site.] : Lidocaine was injected and into the periosteum overlying the site. [Aspirate] : aspirate [Cytogenetics] : cytogenetics [FISH] : FISH [Biopsy] : biopsy [Flow Cytometry] : flow cytometry [] : The patient was instructed to remove the bandage the following AM. The patient may bathe. Acetaminophen may be taken for discomfort, as per package directions.If there are any other problems, the patient was instructed to call the office. The patient verbalized understanding, and is aware of the office contact numbers. [FreeTextEntry1] : 2 migrating paraproteins on SPEP/further plasma cell dyscrasia [FreeTextEntry2] : 8 cc of lidocaine was used for the procedure.\par \par WBC: 6.93\par Hgb: 11.6\par Hct: 32.7\par Plts: 263\par \par Bone marrow aspiration and biopsy were done. Multiple myeloma panel and congo red stain sent

## 2021-04-21 NOTE — REASON FOR VISIT
[Bone Marrow Biopsy] : bone marrow biopsy [Bone Marrow Aspiration] : bone marrow aspiration [FreeTextEntry2] : 2 migrating paraproteins on SPEP/further plasma cell dyscrasia

## 2021-04-28 ENCOUNTER — NON-APPOINTMENT (OUTPATIENT)
Age: 66
End: 2021-04-28

## 2021-04-29 ENCOUNTER — NON-APPOINTMENT (OUTPATIENT)
Age: 66
End: 2021-04-29

## 2021-05-04 ENCOUNTER — APPOINTMENT (OUTPATIENT)
Dept: GASTROENTEROLOGY | Facility: AMBULATORY MEDICAL SERVICES | Age: 66
End: 2021-05-04
Payer: COMMERCIAL

## 2021-05-04 PROCEDURE — 45380 COLONOSCOPY AND BIOPSY: CPT

## 2021-05-05 ENCOUNTER — RESULT REVIEW (OUTPATIENT)
Age: 66
End: 2021-05-05

## 2021-05-05 ENCOUNTER — LABORATORY RESULT (OUTPATIENT)
Age: 66
End: 2021-05-05

## 2021-05-05 ENCOUNTER — APPOINTMENT (OUTPATIENT)
Dept: HEMATOLOGY ONCOLOGY | Facility: CLINIC | Age: 66
End: 2021-05-05
Payer: COMMERCIAL

## 2021-05-05 VITALS
OXYGEN SATURATION: 98 % | WEIGHT: 201.99 LBS | DIASTOLIC BLOOD PRESSURE: 77 MMHG | BODY MASS INDEX: 26.77 KG/M2 | HEART RATE: 62 BPM | SYSTOLIC BLOOD PRESSURE: 124 MMHG | RESPIRATION RATE: 16 BRPM | HEIGHT: 73 IN | TEMPERATURE: 97.2 F

## 2021-05-05 LAB
BASOPHILS # BLD AUTO: 0.04 K/UL — SIGNIFICANT CHANGE UP (ref 0–0.2)
BASOPHILS NFR BLD AUTO: 0.5 % — SIGNIFICANT CHANGE UP (ref 0–2)
EOSINOPHIL # BLD AUTO: 0.2 K/UL — SIGNIFICANT CHANGE UP (ref 0–0.5)
EOSINOPHIL NFR BLD AUTO: 2.6 % — SIGNIFICANT CHANGE UP (ref 0–6)
HCT VFR BLD CALC: 34.5 % — LOW (ref 39–50)
HGB BLD-MCNC: 11.5 G/DL — LOW (ref 13–17)
IMM GRANULOCYTES NFR BLD AUTO: 0.4 % — SIGNIFICANT CHANGE UP (ref 0–1.5)
LYMPHOCYTES # BLD AUTO: 2.46 K/UL — SIGNIFICANT CHANGE UP (ref 1–3.3)
LYMPHOCYTES # BLD AUTO: 32.2 % — SIGNIFICANT CHANGE UP (ref 13–44)
MCHC RBC-ENTMCNC: 31.2 PG — SIGNIFICANT CHANGE UP (ref 27–34)
MCHC RBC-ENTMCNC: 33.3 G/DL — SIGNIFICANT CHANGE UP (ref 32–36)
MCV RBC AUTO: 93.5 FL — SIGNIFICANT CHANGE UP (ref 80–100)
MONOCYTES # BLD AUTO: 0.45 K/UL — SIGNIFICANT CHANGE UP (ref 0–0.9)
MONOCYTES NFR BLD AUTO: 5.9 % — SIGNIFICANT CHANGE UP (ref 2–14)
NEUTROPHILS # BLD AUTO: 4.45 K/UL — SIGNIFICANT CHANGE UP (ref 1.8–7.4)
NEUTROPHILS NFR BLD AUTO: 58.4 % — SIGNIFICANT CHANGE UP (ref 43–77)
NRBC # BLD: 0 /100 WBCS — SIGNIFICANT CHANGE UP (ref 0–0)
PLATELET # BLD AUTO: 240 K/UL — SIGNIFICANT CHANGE UP (ref 150–400)
RBC # BLD: 3.69 M/UL — LOW (ref 4.2–5.8)
RBC # FLD: 12.1 % — SIGNIFICANT CHANGE UP (ref 10.3–14.5)
WBC # BLD: 7.63 K/UL — SIGNIFICANT CHANGE UP (ref 3.8–10.5)
WBC # FLD AUTO: 7.63 K/UL — SIGNIFICANT CHANGE UP (ref 3.8–10.5)

## 2021-05-05 PROCEDURE — 99072 ADDL SUPL MATRL&STAF TM PHE: CPT

## 2021-05-05 PROCEDURE — 99215 OFFICE O/P EST HI 40 MIN: CPT

## 2021-05-06 ENCOUNTER — APPOINTMENT (OUTPATIENT)
Dept: MRI IMAGING | Facility: HOSPITAL | Age: 66
End: 2021-05-06

## 2021-05-06 LAB
ALBUMIN MFR SERPL ELPH: 58 %
ALBUMIN SERPL ELPH-MCNC: 4.6 G/DL
ALBUMIN SERPL-MCNC: 4.2 G/DL
ALBUMIN/GLOB SERPL: 1.4 RATIO
ALP BLD-CCNC: 68 U/L
ALPHA1 GLOB MFR SERPL ELPH: 3.6 %
ALPHA1 GLOB SERPL ELPH-MCNC: 0.3 G/DL
ALPHA2 GLOB MFR SERPL ELPH: 9.2 %
ALPHA2 GLOB SERPL ELPH-MCNC: 0.7 G/DL
ALT SERPL-CCNC: 17 U/L
ANION GAP SERPL CALC-SCNC: 14 MMOL/L
AST SERPL-CCNC: 16 U/L
B-GLOBULIN MFR SERPL ELPH: 9.6 %
B-GLOBULIN SERPL ELPH-MCNC: 0.7 G/DL
B2 MICROGLOB SERPL-MCNC: 3.1 MG/L
BILIRUB SERPL-MCNC: 0.3 MG/DL
BUN SERPL-MCNC: 21 MG/DL
CALCIUM SERPL-MCNC: 9.8 MG/DL
CHLORIDE SERPL-SCNC: 104 MMOL/L
CO2 SERPL-SCNC: 23 MMOL/L
CREAT SERPL-MCNC: 1.1 MG/DL
DEPRECATED KAPPA LC FREE/LAMBDA SER: 0 RATIO
GAMMA GLOB FLD ELPH-MCNC: 1.4 G/DL
GAMMA GLOB MFR SERPL ELPH: 19.6 %
GLUCOSE SERPL-MCNC: 101 MG/DL
HAV IGM SER QL: NONREACTIVE
HBV CORE IGM SER QL: NONREACTIVE
HBV SURFACE AG SER QL: NONREACTIVE
HCV AB SER QL: NONREACTIVE
HCV S/CO RATIO: 0.05 S/CO
IGA SER QL IEP: 21 MG/DL
IGG SER QL IEP: 179 MG/DL
IGM SER QL IEP: 139 MG/DL
INTERPRETATION SERPL IEP-IMP: NORMAL
KAPPA LC CSF-MCNC: 121.04 MG/DL
KAPPA LC SERPL-MCNC: 0.58 MG/DL
LDH SERPL-CCNC: 162 U/L
M PROTEIN MFR SERPL ELPH: NORMAL
M PROTEIN SPEC IFE-MCNC: NORMAL
MONOCLON BAND OBS SERPL: NORMAL
POTASSIUM SERPL-SCNC: 4.7 MMOL/L
PROT SERPL-MCNC: 7.2 G/DL
SODIUM SERPL-SCNC: 141 MMOL/L

## 2021-05-06 NOTE — PHYSICAL EXAM
[Normal] : normal appearance, no rash, nodules, vesicles, ulcers, erythema [de-identified] : BMB site healed.

## 2021-05-06 NOTE — HISTORY OF PRESENT ILLNESS
[de-identified] : 11/2020-Found to have T-12 compression fracture. Saw orthopedist Dr. Candelaria in 1/2021. Referred to endocrinologist Dr. Acosta by Dr. 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  Serum immunofixation showed 1 IgD lambda band and free lambda light chains.  Urine immunofixation negative for monoclonal band.\par 4/29/2021–Bone marrow biopsy and bone marrow aspirate consistent with plasma cell myeloma (greater than 90% involvement).  Myeloma FISH studies showed CCN D1/IGH fusion (27%).  Flow cytometry positive for monotypic plasma cells.  Lymphocyte immunophenotypic findings showed no diagnostic abnormalities.  Cytogenetics with normal male karyotype.  Congo red stain negative.  Iron stains showed iron stores present.  No ring sideroblasts.\par \par \par  [de-identified] : S/P BMB.\par Had colonoscopy yesterday (Dr. Pandey)-[place]-has small polyp removed.\par Now generally feels well-is active, working. No h/o recurrent fevers or infections. No abnormal bruising or bleeding.No pulmonary/GI complaints.

## 2021-05-06 NOTE — CONSULT LETTER
[Dear  ___] : Dear  [unfilled], [Courtesy Letter:] : I had the pleasure of seeing your patient, [unfilled], in my office today. [Please see my note below.] : Please see my note below. [Sincerely,] : Sincerely, [DrVladislav  ___] : Dr. BELTRAN [FreeTextEntry3] : Dyana Cheatham MD

## 2021-05-06 NOTE — ASSESSMENT
[FreeTextEntry1] : Lab work, bone marrow pathology/flow cytometry/cytogenetics/FISH results reviewed.\par Multiple myeloma-2 G-nptwed-ZpA lambda, free lambda light chains.\par Await MRI studies of the spine/pelvis, beta-2 microglobulin, peripheral blood flow cytometry.\par \par With the information which we have, I have reviewed patient's diagnosis, prognosis and treatment options.  Discussed indications for treatment of plasma cell dyscrasias.  With greater than 90% bone marrow involvement, recommend initiation of systemic therapy–alternatives explained.  Patient gave verbal and written consent for VRd regimen–potential side effects explained including but not limited to nausea/vomiting/diarrhea, neurotoxicity, myelosuppression, fluid retention, venous thromboembolic disease, increased risk for infection, rash.\par \par Discussed potential benefits versus side effects (including jaw osteonecrosis) of Xgeva in this situation-patient will obtain dental clearance for this.\par \par Patient to take aspirin 81 mg p.o. daily along with acyclovir prophylaxis.\par \par Discussed potential role for hematopoietic stem cell transplant pending response to initial therapy.  Case discussed with Dr. Naylor (transplant team) today. \par \par Discussed with patient availability for him to obtain second opinion, consider clinical trial should he wish to pursue this.  Patient wishes to proceed as outlined above, at this time.\par \par Patient and his wife were given the opportunity to ask questions.  Their questions have been answered to their apparent satisfaction.

## 2021-05-09 LAB — IGA 24H UR QL IFE: NORMAL

## 2021-05-10 ENCOUNTER — NON-APPOINTMENT (OUTPATIENT)
Age: 66
End: 2021-05-10

## 2021-05-11 ENCOUNTER — OUTPATIENT (OUTPATIENT)
Dept: OUTPATIENT SERVICES | Facility: HOSPITAL | Age: 66
LOS: 1 days | Discharge: HOME | End: 2021-05-11

## 2021-05-11 ENCOUNTER — NON-APPOINTMENT (OUTPATIENT)
Age: 66
End: 2021-05-11

## 2021-05-11 DIAGNOSIS — Z98.890 OTHER SPECIFIED POSTPROCEDURAL STATES: Chronic | ICD-10-CM

## 2021-05-11 DIAGNOSIS — R79.89 OTHER SPECIFIED ABNORMAL FINDINGS OF BLOOD CHEMISTRY: ICD-10-CM

## 2021-05-12 PROBLEM — M51.26 OTHER INTERVERTEBRAL DISC DISPLACEMENT, LUMBAR REGION: Chronic | Status: ACTIVE | Noted: 2021-05-10

## 2021-05-12 PROBLEM — M54.16 RADICULOPATHY, LUMBAR REGION: Chronic | Status: ACTIVE | Noted: 2021-05-10

## 2021-05-12 PROBLEM — S22.080A WEDGE COMPRESSION FRACTURE OF T11-T12 VERTEBRA, INITIAL ENCOUNTER FOR CLOSED FRACTURE: Chronic | Status: ACTIVE | Noted: 2021-05-10

## 2021-05-12 PROBLEM — R06.02 SHORTNESS OF BREATH: Chronic | Status: ACTIVE | Noted: 2021-05-10

## 2021-05-12 PROBLEM — Z85.828 PERSONAL HISTORY OF OTHER MALIGNANT NEOPLASM OF SKIN: Chronic | Status: ACTIVE | Noted: 2021-05-10

## 2021-05-13 ENCOUNTER — APPOINTMENT (OUTPATIENT)
Dept: INFUSION THERAPY | Facility: HOSPITAL | Age: 66
End: 2021-05-13

## 2021-05-13 ENCOUNTER — RESULT REVIEW (OUTPATIENT)
Age: 66
End: 2021-05-13

## 2021-05-13 DIAGNOSIS — C90.00 MULTIPLE MYELOMA NOT HAVING ACHIEVED REMISSION: ICD-10-CM

## 2021-05-13 DIAGNOSIS — Z51.11 ENCOUNTER FOR ANTINEOPLASTIC CHEMOTHERAPY: ICD-10-CM

## 2021-05-13 LAB
BASOPHILS # BLD AUTO: 0.02 K/UL — SIGNIFICANT CHANGE UP (ref 0–0.2)
BASOPHILS NFR BLD AUTO: 0.2 % — SIGNIFICANT CHANGE UP (ref 0–2)
EOSINOPHIL # BLD AUTO: 0 K/UL — SIGNIFICANT CHANGE UP (ref 0–0.5)
EOSINOPHIL NFR BLD AUTO: 0 % — SIGNIFICANT CHANGE UP (ref 0–6)
HCT VFR BLD CALC: 33.8 % — LOW (ref 39–50)
HGB BLD-MCNC: 11.6 G/DL — LOW (ref 13–17)
IMM GRANULOCYTES NFR BLD AUTO: 1 % — SIGNIFICANT CHANGE UP (ref 0–1.5)
LYMPHOCYTES # BLD AUTO: 0.91 K/UL — LOW (ref 1–3.3)
LYMPHOCYTES # BLD AUTO: 11 % — LOW (ref 13–44)
MCHC RBC-ENTMCNC: 31.4 PG — SIGNIFICANT CHANGE UP (ref 27–34)
MCHC RBC-ENTMCNC: 34.3 G/DL — SIGNIFICANT CHANGE UP (ref 32–36)
MCV RBC AUTO: 91.6 FL — SIGNIFICANT CHANGE UP (ref 80–100)
MONOCYTES # BLD AUTO: 0.06 K/UL — SIGNIFICANT CHANGE UP (ref 0–0.9)
MONOCYTES NFR BLD AUTO: 0.7 % — LOW (ref 2–14)
NEUTROPHILS # BLD AUTO: 7.24 K/UL — SIGNIFICANT CHANGE UP (ref 1.8–7.4)
NEUTROPHILS NFR BLD AUTO: 87.1 % — HIGH (ref 43–77)
NRBC # BLD: 0 /100 WBCS — SIGNIFICANT CHANGE UP (ref 0–0)
PLATELET # BLD AUTO: 238 K/UL — SIGNIFICANT CHANGE UP (ref 150–400)
RBC # BLD: 3.69 M/UL — LOW (ref 4.2–5.8)
RBC # FLD: 12.2 % — SIGNIFICANT CHANGE UP (ref 10.3–14.5)
WBC # BLD: 8.31 K/UL — SIGNIFICANT CHANGE UP (ref 3.8–10.5)
WBC # FLD AUTO: 8.31 K/UL — SIGNIFICANT CHANGE UP (ref 3.8–10.5)

## 2021-05-20 ENCOUNTER — APPOINTMENT (OUTPATIENT)
Dept: INFUSION THERAPY | Facility: HOSPITAL | Age: 66
End: 2021-05-20

## 2021-05-20 ENCOUNTER — RESULT REVIEW (OUTPATIENT)
Age: 66
End: 2021-05-20

## 2021-05-20 ENCOUNTER — NON-APPOINTMENT (OUTPATIENT)
Age: 66
End: 2021-05-20

## 2021-05-20 DIAGNOSIS — C79.51 SECONDARY MALIGNANT NEOPLASM OF BONE: ICD-10-CM

## 2021-05-20 LAB
BASOPHILS # BLD AUTO: 0.01 K/UL — SIGNIFICANT CHANGE UP (ref 0–0.2)
BASOPHILS NFR BLD AUTO: 0.2 % — SIGNIFICANT CHANGE UP (ref 0–2)
EOSINOPHIL # BLD AUTO: 0.22 K/UL — SIGNIFICANT CHANGE UP (ref 0–0.5)
EOSINOPHIL NFR BLD AUTO: 4.4 % — SIGNIFICANT CHANGE UP (ref 0–6)
HCT VFR BLD CALC: 31.8 % — LOW (ref 39–50)
HGB BLD-MCNC: 11.1 G/DL — LOW (ref 13–17)
IMM GRANULOCYTES NFR BLD AUTO: 0.4 % — SIGNIFICANT CHANGE UP (ref 0–1.5)
LYMPHOCYTES # BLD AUTO: 1.52 K/UL — SIGNIFICANT CHANGE UP (ref 1–3.3)
LYMPHOCYTES # BLD AUTO: 30.4 % — SIGNIFICANT CHANGE UP (ref 13–44)
MCHC RBC-ENTMCNC: 32 PG — SIGNIFICANT CHANGE UP (ref 27–34)
MCHC RBC-ENTMCNC: 34.9 G/DL — SIGNIFICANT CHANGE UP (ref 32–36)
MCV RBC AUTO: 91.6 FL — SIGNIFICANT CHANGE UP (ref 80–100)
MONOCYTES # BLD AUTO: 0.34 K/UL — SIGNIFICANT CHANGE UP (ref 0–0.9)
MONOCYTES NFR BLD AUTO: 6.8 % — SIGNIFICANT CHANGE UP (ref 2–14)
NEUTROPHILS # BLD AUTO: 2.89 K/UL — SIGNIFICANT CHANGE UP (ref 1.8–7.4)
NEUTROPHILS NFR BLD AUTO: 57.8 % — SIGNIFICANT CHANGE UP (ref 43–77)
NRBC # BLD: 0 /100 WBCS — SIGNIFICANT CHANGE UP (ref 0–0)
PLATELET # BLD AUTO: 204 K/UL — SIGNIFICANT CHANGE UP (ref 150–400)
RBC # BLD: 3.47 M/UL — LOW (ref 4.2–5.8)
RBC # FLD: 12.2 % — SIGNIFICANT CHANGE UP (ref 10.3–14.5)
WBC # BLD: 5 K/UL — SIGNIFICANT CHANGE UP (ref 3.8–10.5)
WBC # FLD AUTO: 5 K/UL — SIGNIFICANT CHANGE UP (ref 3.8–10.5)

## 2021-05-25 ENCOUNTER — APPOINTMENT (OUTPATIENT)
Dept: HEMATOLOGY ONCOLOGY | Facility: CLINIC | Age: 66
End: 2021-05-25
Payer: COMMERCIAL

## 2021-05-25 VITALS
RESPIRATION RATE: 18 BRPM | BODY MASS INDEX: 27.23 KG/M2 | OXYGEN SATURATION: 98 % | DIASTOLIC BLOOD PRESSURE: 69 MMHG | SYSTOLIC BLOOD PRESSURE: 107 MMHG | HEIGHT: 73.39 IN | TEMPERATURE: 97.9 F | WEIGHT: 207.65 LBS | HEART RATE: 110 BPM

## 2021-05-25 PROCEDURE — 99214 OFFICE O/P EST MOD 30 MIN: CPT

## 2021-05-25 PROCEDURE — 99072 ADDL SUPL MATRL&STAF TM PHE: CPT

## 2021-05-25 NOTE — HISTORY OF PRESENT ILLNESS
[de-identified] : 11/2020-Found to have T-12 compression fracture. Saw orthopedist Dr. Candelaria in 1/2021. Referred to endocrinologist Dr. Acosta by Dr. 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  Serum immunofixation showed 1 IgD lambda band and free lambda light chains.  Urine immunofixation negative for monoclonal band.\par 4/29/2021–Bone marrow biopsy and bone marrow aspirate consistent with plasma cell myeloma (greater than 90% involvement).  Myeloma FISH studies showed CCN D1/IGH fusion (27%).  Flow cytometry positive for monotypic plasma cells.  Lymphocyte immunophenotypic findings showed no diagnostic abnormalities.  Cytogenetics with normal male karyotype.  Congo red stain negative.  Iron stains showed iron stores present.  No ring sideroblasts.\par \par \par  [de-identified] : Had transient nausea x 2 days post first chemo-resolved with medication. No problem with second injection.\par Recent transient back pain (post work in the garden) relieved with Tylenol/Naprosyn.\par This week has felt very well.\par Has 1 day left of cycle #1 Revlimid.\par Has appt. with Dr. Jae Tan 6/1/21 at [facility] for opinion. \par Has appt. with BMT MD Dr. Sorenson 6/7/21.\par No h/o recurrent fevers or infections. No abnormal bruising or bleeding.No pulmonary/GI complaints at this time.

## 2021-05-25 NOTE — ASSESSMENT
[FreeTextEntry1] : Multiple myeloma–patient consents to continue treatment as planned-VRd regimen, along with Xgeva-potential side effects reviewed. He will proceed with consultations with BMT physician as well as a second opinion at [facility] [place].\par \par Patient and his wife were given the opportunity to ask questions.  Their questions have been answered to their apparent satisfaction.

## 2021-05-27 ENCOUNTER — RESULT REVIEW (OUTPATIENT)
Age: 66
End: 2021-05-27

## 2021-05-27 ENCOUNTER — APPOINTMENT (OUTPATIENT)
Dept: INFUSION THERAPY | Facility: HOSPITAL | Age: 66
End: 2021-05-27

## 2021-05-27 LAB
BASOPHILS # BLD AUTO: 0.03 K/UL — SIGNIFICANT CHANGE UP (ref 0–0.2)
BASOPHILS NFR BLD AUTO: 0.5 % — SIGNIFICANT CHANGE UP (ref 0–2)
EOSINOPHIL # BLD AUTO: 0.32 K/UL — SIGNIFICANT CHANGE UP (ref 0–0.5)
EOSINOPHIL NFR BLD AUTO: 5.7 % — SIGNIFICANT CHANGE UP (ref 0–6)
HCT VFR BLD CALC: 29.9 % — LOW (ref 39–50)
HGB BLD-MCNC: 10.2 G/DL — LOW (ref 13–17)
IMM GRANULOCYTES NFR BLD AUTO: 1.3 % — SIGNIFICANT CHANGE UP (ref 0–1.5)
LYMPHOCYTES # BLD AUTO: 1.04 K/UL — SIGNIFICANT CHANGE UP (ref 1–3.3)
LYMPHOCYTES # BLD AUTO: 18.6 % — SIGNIFICANT CHANGE UP (ref 13–44)
MCHC RBC-ENTMCNC: 31.7 PG — SIGNIFICANT CHANGE UP (ref 27–34)
MCHC RBC-ENTMCNC: 34.1 G/DL — SIGNIFICANT CHANGE UP (ref 32–36)
MCV RBC AUTO: 92.9 FL — SIGNIFICANT CHANGE UP (ref 80–100)
MONOCYTES # BLD AUTO: 0.59 K/UL — SIGNIFICANT CHANGE UP (ref 0–0.9)
MONOCYTES NFR BLD AUTO: 10.5 % — SIGNIFICANT CHANGE UP (ref 2–14)
NEUTROPHILS # BLD AUTO: 3.55 K/UL — SIGNIFICANT CHANGE UP (ref 1.8–7.4)
NEUTROPHILS NFR BLD AUTO: 63.4 % — SIGNIFICANT CHANGE UP (ref 43–77)
NRBC # BLD: 0 /100 WBCS — SIGNIFICANT CHANGE UP (ref 0–0)
PLATELET # BLD AUTO: 194 K/UL — SIGNIFICANT CHANGE UP (ref 150–400)
RBC # BLD: 3.22 M/UL — LOW (ref 4.2–5.8)
RBC # FLD: 12.4 % — SIGNIFICANT CHANGE UP (ref 10.3–14.5)
WBC # BLD: 5.6 K/UL — SIGNIFICANT CHANGE UP (ref 3.8–10.5)
WBC # FLD AUTO: 5.6 K/UL — SIGNIFICANT CHANGE UP (ref 3.8–10.5)

## 2021-05-30 ENCOUNTER — APPOINTMENT (OUTPATIENT)
Dept: MRI IMAGING | Facility: CLINIC | Age: 66
End: 2021-05-30
Payer: COMMERCIAL

## 2021-05-30 ENCOUNTER — OUTPATIENT (OUTPATIENT)
Dept: OUTPATIENT SERVICES | Facility: HOSPITAL | Age: 66
LOS: 1 days | End: 2021-05-30
Payer: COMMERCIAL

## 2021-05-30 DIAGNOSIS — Z00.8 ENCOUNTER FOR OTHER GENERAL EXAMINATION: ICD-10-CM

## 2021-05-30 DIAGNOSIS — C90.00 MULTIPLE MYELOMA NOT HAVING ACHIEVED REMISSION: ICD-10-CM

## 2021-05-30 DIAGNOSIS — Z98.890 OTHER SPECIFIED POSTPROCEDURAL STATES: Chronic | ICD-10-CM

## 2021-05-30 PROCEDURE — 72156 MRI NECK SPINE W/O & W/DYE: CPT

## 2021-05-30 PROCEDURE — 72156 MRI NECK SPINE W/O & W/DYE: CPT | Mod: 26

## 2021-05-30 PROCEDURE — 72157 MRI CHEST SPINE W/O & W/DYE: CPT | Mod: 26

## 2021-05-30 PROCEDURE — A9585: CPT

## 2021-05-30 PROCEDURE — 72157 MRI CHEST SPINE W/O & W/DYE: CPT

## 2021-06-01 ENCOUNTER — OUTPATIENT (OUTPATIENT)
Dept: OUTPATIENT SERVICES | Facility: HOSPITAL | Age: 66
LOS: 1 days | End: 2021-06-01
Payer: MEDICARE

## 2021-06-01 ENCOUNTER — APPOINTMENT (OUTPATIENT)
Dept: MRI IMAGING | Facility: CLINIC | Age: 66
End: 2021-06-01
Payer: MEDICARE

## 2021-06-01 DIAGNOSIS — Z00.8 ENCOUNTER FOR OTHER GENERAL EXAMINATION: ICD-10-CM

## 2021-06-01 DIAGNOSIS — C90.00 MULTIPLE MYELOMA NOT HAVING ACHIEVED REMISSION: ICD-10-CM

## 2021-06-01 DIAGNOSIS — Z98.890 OTHER SPECIFIED POSTPROCEDURAL STATES: Chronic | ICD-10-CM

## 2021-06-01 PROCEDURE — A9585: CPT

## 2021-06-01 PROCEDURE — 72197 MRI PELVIS W/O & W/DYE: CPT | Mod: 26,MH

## 2021-06-01 PROCEDURE — 72197 MRI PELVIS W/O & W/DYE: CPT

## 2021-06-03 ENCOUNTER — LABORATORY RESULT (OUTPATIENT)
Age: 66
End: 2021-06-03

## 2021-06-03 ENCOUNTER — RESULT REVIEW (OUTPATIENT)
Age: 66
End: 2021-06-03

## 2021-06-03 ENCOUNTER — APPOINTMENT (OUTPATIENT)
Dept: INFUSION THERAPY | Facility: HOSPITAL | Age: 66
End: 2021-06-03

## 2021-06-03 LAB
BASOPHILS # BLD AUTO: 0.13 K/UL — SIGNIFICANT CHANGE UP (ref 0–0.2)
BASOPHILS NFR BLD AUTO: 2.3 % — HIGH (ref 0–2)
EOSINOPHIL # BLD AUTO: 0.29 K/UL — SIGNIFICANT CHANGE UP (ref 0–0.5)
EOSINOPHIL NFR BLD AUTO: 5.2 % — SIGNIFICANT CHANGE UP (ref 0–6)
HCT VFR BLD CALC: 33.8 % — LOW (ref 39–50)
HGB BLD-MCNC: 11.4 G/DL — LOW (ref 13–17)
IMM GRANULOCYTES NFR BLD AUTO: 0.4 % — SIGNIFICANT CHANGE UP (ref 0–1.5)
LYMPHOCYTES # BLD AUTO: 1.04 K/UL — SIGNIFICANT CHANGE UP (ref 1–3.3)
LYMPHOCYTES # BLD AUTO: 18.6 % — SIGNIFICANT CHANGE UP (ref 13–44)
MCHC RBC-ENTMCNC: 32.1 PG — SIGNIFICANT CHANGE UP (ref 27–34)
MCHC RBC-ENTMCNC: 33.7 G/DL — SIGNIFICANT CHANGE UP (ref 32–36)
MCV RBC AUTO: 95.2 FL — SIGNIFICANT CHANGE UP (ref 80–100)
MONOCYTES # BLD AUTO: 0.57 K/UL — SIGNIFICANT CHANGE UP (ref 0–0.9)
MONOCYTES NFR BLD AUTO: 10.2 % — SIGNIFICANT CHANGE UP (ref 2–14)
NEUTROPHILS # BLD AUTO: 3.53 K/UL — SIGNIFICANT CHANGE UP (ref 1.8–7.4)
NEUTROPHILS NFR BLD AUTO: 63.3 % — SIGNIFICANT CHANGE UP (ref 43–77)
NRBC # BLD: 0 /100 WBCS — SIGNIFICANT CHANGE UP (ref 0–0)
PLATELET # BLD AUTO: 275 K/UL — SIGNIFICANT CHANGE UP (ref 150–400)
RBC # BLD: 3.55 M/UL — LOW (ref 4.2–5.8)
RBC # FLD: 12.6 % — SIGNIFICANT CHANGE UP (ref 10.3–14.5)
WBC # BLD: 5.58 K/UL — SIGNIFICANT CHANGE UP (ref 3.8–10.5)
WBC # FLD AUTO: 5.58 K/UL — SIGNIFICANT CHANGE UP (ref 3.8–10.5)

## 2021-06-07 ENCOUNTER — RESULT REVIEW (OUTPATIENT)
Age: 66
End: 2021-06-07

## 2021-06-07 ENCOUNTER — APPOINTMENT (OUTPATIENT)
Dept: HEMATOLOGY ONCOLOGY | Facility: CLINIC | Age: 66
End: 2021-06-07
Payer: MEDICARE

## 2021-06-07 ENCOUNTER — NON-APPOINTMENT (OUTPATIENT)
Age: 66
End: 2021-06-07

## 2021-06-07 VITALS
WEIGHT: 211.64 LBS | HEART RATE: 58 BPM | RESPIRATION RATE: 16 BRPM | OXYGEN SATURATION: 97 % | TEMPERATURE: 97.9 F | HEIGHT: 73.39 IN | BODY MASS INDEX: 27.75 KG/M2 | DIASTOLIC BLOOD PRESSURE: 74 MMHG | SYSTOLIC BLOOD PRESSURE: 137 MMHG

## 2021-06-07 DIAGNOSIS — Z01.818 ENCOUNTER FOR OTHER PREPROCEDURAL EXAMINATION: ICD-10-CM

## 2021-06-07 LAB
BASOPHILS # BLD AUTO: 0.07 K/UL — SIGNIFICANT CHANGE UP (ref 0–0.2)
BASOPHILS NFR BLD AUTO: 1.1 % — SIGNIFICANT CHANGE UP (ref 0–2)
EOSINOPHIL # BLD AUTO: 0.28 K/UL — SIGNIFICANT CHANGE UP (ref 0–0.5)
EOSINOPHIL NFR BLD AUTO: 4.4 % — SIGNIFICANT CHANGE UP (ref 0–6)
HCT VFR BLD CALC: 31.4 % — LOW (ref 39–50)
HGB BLD-MCNC: 10.6 G/DL — LOW (ref 13–17)
IMM GRANULOCYTES NFR BLD AUTO: 0.5 % — SIGNIFICANT CHANGE UP (ref 0–1.5)
LYMPHOCYTES # BLD AUTO: 1.11 K/UL — SIGNIFICANT CHANGE UP (ref 1–3.3)
LYMPHOCYTES # BLD AUTO: 17.5 % — SIGNIFICANT CHANGE UP (ref 13–44)
MCHC RBC-ENTMCNC: 31.8 PG — SIGNIFICANT CHANGE UP (ref 27–34)
MCHC RBC-ENTMCNC: 33.8 G/DL — SIGNIFICANT CHANGE UP (ref 32–36)
MCV RBC AUTO: 94.3 FL — SIGNIFICANT CHANGE UP (ref 80–100)
MONOCYTES # BLD AUTO: 0.29 K/UL — SIGNIFICANT CHANGE UP (ref 0–0.9)
MONOCYTES NFR BLD AUTO: 4.6 % — SIGNIFICANT CHANGE UP (ref 2–14)
NEUTROPHILS # BLD AUTO: 4.57 K/UL — SIGNIFICANT CHANGE UP (ref 1.8–7.4)
NEUTROPHILS NFR BLD AUTO: 71.9 % — SIGNIFICANT CHANGE UP (ref 43–77)
NRBC # BLD: 0 /100 WBCS — SIGNIFICANT CHANGE UP (ref 0–0)
PLATELET # BLD AUTO: 225 K/UL — SIGNIFICANT CHANGE UP (ref 150–400)
RBC # BLD: 3.33 M/UL — LOW (ref 4.2–5.8)
RBC # FLD: 13.2 % — SIGNIFICANT CHANGE UP (ref 10.3–14.5)
WBC # BLD: 6.35 K/UL — SIGNIFICANT CHANGE UP (ref 3.8–10.5)
WBC # FLD AUTO: 6.35 K/UL — SIGNIFICANT CHANGE UP (ref 3.8–10.5)

## 2021-06-07 PROCEDURE — 99072 ADDL SUPL MATRL&STAF TM PHE: CPT

## 2021-06-07 PROCEDURE — 99417 PROLNG OP E/M EACH 15 MIN: CPT

## 2021-06-07 PROCEDURE — 99215 OFFICE O/P EST HI 40 MIN: CPT

## 2021-06-08 NOTE — REVIEW OF SYSTEMS
[Constipation] : constipation [Fever] : no fever [Chills] : no chills [Chest Pain] : no chest pain [Shortness Of Breath] : no shortness of breath [Cough] : no cough [Abdominal Pain] : no abdominal pain [Vomiting] : no vomiting [Diarrhea] : no diarrhea [Skin Rash] : no skin rash [Dizziness] : no dizziness [Fainting] : no fainting [Easy Bleeding] : no tendency for easy bleeding [Easy Bruising] : no tendency for easy bruising [Swollen Glands] : no swollen glands [FreeTextEntry2] : + mild fatigue [FreeTextEntry3] : + blurry vision  [FreeTextEntry4] : no sore throat [FreeTextEntry5] : occasional pedal edema [FreeTextEntry7] : no nausea [FreeTextEntry9] : no bone pain

## 2021-06-08 NOTE — CONSULT LETTER
[Dear  ___] : Dear  [unfilled], [Consult Letter:] : I had the pleasure of evaluating your patient, [unfilled]. [Please see my note below.] : Please see my note below. [Consult Closing:] : Thank you very much for allowing me to participate in the care of this patient.  If you have any questions, please do not hesitate to contact me. [Sincerely,] : Sincerely, [FreeTextEntry3] : \par Pao Sorenson M.D.\par \par  of Medicine\par [employer] [facility] [employer]\par [facility] \par [facility] [place]\par [address] [place] [address] \[place] [address] \par ph: [phone]\par fax: [phone] [FreeTextEntry2] : Dyana Cheatham M.D.\par [facility]\par [address]\par [place], [place]   [address]

## 2021-06-08 NOTE — ASSESSMENT
[FreeTextEntry1] : I had a very lengthy discussion with the patient regarding the diagnosis of multiple myeloma and that myeloma is not a curable disease with standard chemotherapy treatment.  I also discussed that high-dose chemotherapy followed by autologous peripheral stem cell transplant is considered one of the treatment options as part of standard of care for patients less than 70 years of age with newly diagnosed multiple myeloma. I discussed that event-free survival and overall survival are prolonged following high-dose chemotherapy followed by autologous peripheral stem cell transplantation compared with conventional chemotherapy treatment.  I did discuss that with the improved response rate with newer therapies for myeloma there is a question regarding timing of autologous stem cell transplant - early versus delayed.  I did discuss that early autologous peripheral stem cell transplantation may minimize total exposure to chemotherapy and may lead to an improvement in quality of life.  \par \par In addition, comprehensive discussion regarding acquisition of hematopoietic stem cells for autologous transplantation by apheresis following mobilization with Cytoxan chemotherapy plus growth factor versus leukocyte growth factor only took place. This was followed by discussion regarding hospitalization for high-dose chemotherapy with Melphalan followed by autologous stem cell transplantation.  Potential side effects and toxicities of Cytoxan chemotherapy and leukocyte growth factor for mobilization of stem cells as well as subsequent high-dose chemotherapy with Melphalan preparative regimen in the setting of transplant were discussed with review of specific consent forms taking place during consultation visit.  The patient states he has already made a decision to proceed with early autologous peripheral stem cell transplantation for his disease.  He will therefore be scheduled for standard pre-transplant testing.\par \par I have had an extensive discussion with the patient regarding the risks, benefits and alternatives to high-dose chemotherapy and autologous peripheral blood stem cell transplantation.   We discussed stem cell mobilization with Cytoxan 3 g/m2 followed by Neulasta 12 mg versus leukocyte growth factor only.  My goal would be to collect 5 to 10 x 10^6 CD34 cells per kilo.  This would be enough for two transplants if at some point a second transplant be deemed necessary.   A preparative regimen including Melphalan 200 mg/m2 was discussed.  Risks including but not limited to infection, bleeding, end organ damage, secondary malignancy, venoocclusive disease and mortality were discussed. Arrangements will be made for the patient to attend our transplant orientation meeting.   Venoocclusive disease and infection prophylaxis and the use of Kepivance to help prevent mucositis were discussed.  Literature and consents have been provided for review.   \par \par \par

## 2021-06-08 NOTE — HISTORY OF PRESENT ILLNESS
[de-identified] : 11/2020-Found to have T-12 compression fracture. Saw orthopedist Dr. Candelraia in 1/2021. Referred to endocrinologist Dr. Acosta by Dr. 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 Serum immunofixation showed 1 IgD lambda band and free lambda light chains. Urine immunofixation negative for monoclonal band.\par 4/29/2021–Bone marrow biopsy and bone marrow aspirate consistent with plasma cell myeloma (greater than 90% involvement). Myeloma FISH studies showed CCND1/IGH fusion (27%). Flow cytometry positive for monotypic plasma cells. Lymphocyte immunophenotypic findings showed no diagnostic abnormalities. Cytogenetics with normal male karyotype. Congo red stain negative. Iron stains showed iron stores present. No ring sideroblasts.\par 5/13/21- C1D1 RVd\par

## 2021-06-08 NOTE — REASON FOR VISIT
[Initial Consultation] : an initial consultation for [Spouse] : spouse [FreeTextEntry2] : multiple myeloma to discuss high-dose chemotherapy followed by autologous peripheral blood stem cell transplant

## 2021-06-08 NOTE — PHYSICAL EXAM
[Normal] : affect appropriate [de-identified] : anicteric [de-identified] : RRR, normal S1S2 [de-identified] : mild pedal edema [de-identified] : no cervical/SCV adenopathy  [de-identified] : no rash [de-identified] :  A & O x 4

## 2021-06-10 ENCOUNTER — RESULT REVIEW (OUTPATIENT)
Age: 66
End: 2021-06-10

## 2021-06-10 ENCOUNTER — APPOINTMENT (OUTPATIENT)
Dept: INFUSION THERAPY | Facility: HOSPITAL | Age: 66
End: 2021-06-10

## 2021-06-10 LAB
BASOPHILS # BLD AUTO: 0.07 K/UL — SIGNIFICANT CHANGE UP (ref 0–0.2)
BASOPHILS NFR BLD AUTO: 0.8 % — SIGNIFICANT CHANGE UP (ref 0–2)
EOSINOPHIL # BLD AUTO: 0.5 K/UL — SIGNIFICANT CHANGE UP (ref 0–0.5)
EOSINOPHIL NFR BLD AUTO: 5.7 % — SIGNIFICANT CHANGE UP (ref 0–6)
HCT VFR BLD CALC: 32.1 % — LOW (ref 39–50)
HGB BLD-MCNC: 10.9 G/DL — LOW (ref 13–17)
IMM GRANULOCYTES NFR BLD AUTO: 0.5 % — SIGNIFICANT CHANGE UP (ref 0–1.5)
LYMPHOCYTES # BLD AUTO: 0.7 K/UL — LOW (ref 1–3.3)
LYMPHOCYTES # BLD AUTO: 7.9 % — LOW (ref 13–44)
MCHC RBC-ENTMCNC: 31.8 PG — SIGNIFICANT CHANGE UP (ref 27–34)
MCHC RBC-ENTMCNC: 34 G/DL — SIGNIFICANT CHANGE UP (ref 32–36)
MCV RBC AUTO: 93.6 FL — SIGNIFICANT CHANGE UP (ref 80–100)
MONOCYTES # BLD AUTO: 0.28 K/UL — SIGNIFICANT CHANGE UP (ref 0–0.9)
MONOCYTES NFR BLD AUTO: 3.2 % — SIGNIFICANT CHANGE UP (ref 2–14)
NEUTROPHILS # BLD AUTO: 7.25 K/UL — SIGNIFICANT CHANGE UP (ref 1.8–7.4)
NEUTROPHILS NFR BLD AUTO: 81.9 % — HIGH (ref 43–77)
NRBC # BLD: 0 /100 WBCS — SIGNIFICANT CHANGE UP (ref 0–0)
PLATELET # BLD AUTO: 222 K/UL — SIGNIFICANT CHANGE UP (ref 150–400)
RBC # BLD: 3.43 M/UL — LOW (ref 4.2–5.8)
RBC # FLD: 12.7 % — SIGNIFICANT CHANGE UP (ref 10.3–14.5)
WBC # BLD: 8.84 K/UL — SIGNIFICANT CHANGE UP (ref 3.8–10.5)
WBC # FLD AUTO: 8.84 K/UL — SIGNIFICANT CHANGE UP (ref 3.8–10.5)

## 2021-06-16 ENCOUNTER — OUTPATIENT (OUTPATIENT)
Dept: OUTPATIENT SERVICES | Facility: HOSPITAL | Age: 66
LOS: 1 days | Discharge: HOME | End: 2021-06-16

## 2021-06-16 DIAGNOSIS — Z98.890 OTHER SPECIFIED POSTPROCEDURAL STATES: Chronic | ICD-10-CM

## 2021-06-16 DIAGNOSIS — R79.89 OTHER SPECIFIED ABNORMAL FINDINGS OF BLOOD CHEMISTRY: ICD-10-CM

## 2021-06-17 ENCOUNTER — RESULT REVIEW (OUTPATIENT)
Age: 66
End: 2021-06-17

## 2021-06-17 ENCOUNTER — APPOINTMENT (OUTPATIENT)
Dept: HEMATOLOGY ONCOLOGY | Facility: CLINIC | Age: 66
End: 2021-06-17
Payer: MEDICARE

## 2021-06-17 ENCOUNTER — APPOINTMENT (OUTPATIENT)
Dept: INFUSION THERAPY | Facility: HOSPITAL | Age: 66
End: 2021-06-17

## 2021-06-17 VITALS
HEART RATE: 60 BPM | BODY MASS INDEX: 27.77 KG/M2 | SYSTOLIC BLOOD PRESSURE: 120 MMHG | OXYGEN SATURATION: 98 % | HEIGHT: 72.05 IN | TEMPERATURE: 97.5 F | DIASTOLIC BLOOD PRESSURE: 81 MMHG | RESPIRATION RATE: 17 BRPM | WEIGHT: 205.03 LBS

## 2021-06-17 DIAGNOSIS — C79.51 SECONDARY MALIGNANT NEOPLASM OF BONE: ICD-10-CM

## 2021-06-17 DIAGNOSIS — C90.00 MULTIPLE MYELOMA NOT HAVING ACHIEVED REMISSION: ICD-10-CM

## 2021-06-17 LAB
BASOPHILS # BLD AUTO: 0.07 K/UL — SIGNIFICANT CHANGE UP (ref 0–0.2)
BASOPHILS NFR BLD AUTO: 0.9 % — SIGNIFICANT CHANGE UP (ref 0–2)
EOSINOPHIL # BLD AUTO: 0.32 K/UL — SIGNIFICANT CHANGE UP (ref 0–0.5)
EOSINOPHIL NFR BLD AUTO: 4.1 % — SIGNIFICANT CHANGE UP (ref 0–6)
HCT VFR BLD CALC: 32.9 % — LOW (ref 39–50)
HGB BLD-MCNC: 11.4 G/DL — LOW (ref 13–17)
IMM GRANULOCYTES NFR BLD AUTO: 0.6 % — SIGNIFICANT CHANGE UP (ref 0–1.5)
LYMPHOCYTES # BLD AUTO: 0.52 K/UL — LOW (ref 1–3.3)
LYMPHOCYTES # BLD AUTO: 6.7 % — LOW (ref 13–44)
MCHC RBC-ENTMCNC: 31.8 PG — SIGNIFICANT CHANGE UP (ref 27–34)
MCHC RBC-ENTMCNC: 34.7 G/DL — SIGNIFICANT CHANGE UP (ref 32–36)
MCV RBC AUTO: 91.9 FL — SIGNIFICANT CHANGE UP (ref 80–100)
MONOCYTES # BLD AUTO: 0.22 K/UL — SIGNIFICANT CHANGE UP (ref 0–0.9)
MONOCYTES NFR BLD AUTO: 2.8 % — SIGNIFICANT CHANGE UP (ref 2–14)
NEUTROPHILS # BLD AUTO: 6.6 K/UL — SIGNIFICANT CHANGE UP (ref 1.8–7.4)
NEUTROPHILS NFR BLD AUTO: 84.9 % — HIGH (ref 43–77)
NRBC # BLD: 0 /100 WBCS — SIGNIFICANT CHANGE UP (ref 0–0)
PLATELET # BLD AUTO: 210 K/UL — SIGNIFICANT CHANGE UP (ref 150–400)
RBC # BLD: 3.58 M/UL — LOW (ref 4.2–5.8)
RBC # FLD: 12.8 % — SIGNIFICANT CHANGE UP (ref 10.3–14.5)
WBC # BLD: 7.78 K/UL — SIGNIFICANT CHANGE UP (ref 3.8–10.5)
WBC # FLD AUTO: 7.78 K/UL — SIGNIFICANT CHANGE UP (ref 3.8–10.5)

## 2021-06-17 PROCEDURE — 99072 ADDL SUPL MATRL&STAF TM PHE: CPT

## 2021-06-17 PROCEDURE — 99214 OFFICE O/P EST MOD 30 MIN: CPT

## 2021-06-17 NOTE — HISTORY OF PRESENT ILLNESS
[de-identified] : 11/2020-Found to have T-12 compression fracture. Saw orthopedist Dr. Candelaria in 1/2021. Referred to endocrinologist Dr. Acosta by Dr. 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  Serum immunofixation showed 1 IgD lambda band and free lambda light chains.  Urine immunofixation negative for monoclonal band.\par 4/29/2021–Bone marrow biopsy and bone marrow aspirate consistent with plasma cell myeloma (greater than 90% involvement).  Myeloma FISH studies showed CCN D1/IGH fusion (27%).  Flow cytometry positive for monotypic plasma cells.  Lymphocyte immunophenotypic findings showed no diagnostic abnormalities.  Cytogenetics with normal male karyotype.  Congo red stain negative.  Iron stains showed iron stores present.  No ring sideroblasts.\par \par \par  [de-identified] : Wife fell off ladder and fractured a bone in her foot, so daughter Fallon is with patient today.\par Saw Dr. Sorenson-plans SCT after initial course of VRd.\par Completed Cycle #2 Revlimid yesterday-feels he is tolerating well overall, now.\par Occasional muscle spasm lower back. Though, is remaining active, golfing.\par No h/o recurrent fevers or infections. No abnormal bruising or bleeding.No pulmonary/GI complaints at this time.

## 2021-06-17 NOTE — ASSESSMENT
[FreeTextEntry1] : Lab work, MRI C/T spins and pelvis reviewed with patient/daughter (and they were given copy of MRI reports).\par Multiple myeloma–patient consents to continue treatment as planned-VRd regimen, along with Xgeva. He will f/u with BMT team/Dr. Sorenson, with plans for eventual SCT.\par Advised to call if increase in back pain/neurologic symptoms.  \par \par Mild groin LAD noted on MRI-?significance currently-t/c interval f/u imaging (possible PET/CT scan at that point).\par \par Patient and his daughter were given the opportunity to ask questions.  Their questions have been answered to their apparent satisfaction.

## 2021-06-24 ENCOUNTER — RESULT REVIEW (OUTPATIENT)
Age: 66
End: 2021-06-24

## 2021-06-24 ENCOUNTER — APPOINTMENT (OUTPATIENT)
Dept: INFUSION THERAPY | Facility: HOSPITAL | Age: 66
End: 2021-06-24

## 2021-06-24 DIAGNOSIS — Z51.11 ENCOUNTER FOR ANTINEOPLASTIC CHEMOTHERAPY: ICD-10-CM

## 2021-06-24 DIAGNOSIS — R11.2 NAUSEA WITH VOMITING, UNSPECIFIED: ICD-10-CM

## 2021-06-24 LAB
BASOPHILS # BLD AUTO: 0.07 K/UL — SIGNIFICANT CHANGE UP (ref 0–0.2)
BASOPHILS NFR BLD AUTO: 1 % — SIGNIFICANT CHANGE UP (ref 0–2)
EOSINOPHIL # BLD AUTO: 0.02 K/UL — SIGNIFICANT CHANGE UP (ref 0–0.5)
EOSINOPHIL NFR BLD AUTO: 0.3 % — SIGNIFICANT CHANGE UP (ref 0–6)
HCT VFR BLD CALC: 34 % — LOW (ref 39–50)
HGB BLD-MCNC: 11.6 G/DL — LOW (ref 13–17)
IMM GRANULOCYTES NFR BLD AUTO: 1 % — SIGNIFICANT CHANGE UP (ref 0–1.5)
LYMPHOCYTES # BLD AUTO: 0.59 K/UL — LOW (ref 1–3.3)
LYMPHOCYTES # BLD AUTO: 8.7 % — LOW (ref 13–44)
MCHC RBC-ENTMCNC: 31.7 PG — SIGNIFICANT CHANGE UP (ref 27–34)
MCHC RBC-ENTMCNC: 34.1 G/DL — SIGNIFICANT CHANGE UP (ref 32–36)
MCV RBC AUTO: 92.9 FL — SIGNIFICANT CHANGE UP (ref 80–100)
MONOCYTES # BLD AUTO: 0.26 K/UL — SIGNIFICANT CHANGE UP (ref 0–0.9)
MONOCYTES NFR BLD AUTO: 3.8 % — SIGNIFICANT CHANGE UP (ref 2–14)
NEUTROPHILS # BLD AUTO: 5.81 K/UL — SIGNIFICANT CHANGE UP (ref 1.8–7.4)
NEUTROPHILS NFR BLD AUTO: 85.2 % — HIGH (ref 43–77)
NRBC # BLD: 0 /100 WBCS — SIGNIFICANT CHANGE UP (ref 0–0)
PLATELET # BLD AUTO: 236 K/UL — SIGNIFICANT CHANGE UP (ref 150–400)
RBC # BLD: 3.66 M/UL — LOW (ref 4.2–5.8)
RBC # FLD: 13.2 % — SIGNIFICANT CHANGE UP (ref 10.3–14.5)
WBC # BLD: 6.82 K/UL — SIGNIFICANT CHANGE UP (ref 3.8–10.5)
WBC # FLD AUTO: 6.82 K/UL — SIGNIFICANT CHANGE UP (ref 3.8–10.5)

## 2021-07-01 ENCOUNTER — RESULT REVIEW (OUTPATIENT)
Age: 66
End: 2021-07-01

## 2021-07-01 ENCOUNTER — APPOINTMENT (OUTPATIENT)
Dept: INFUSION THERAPY | Facility: HOSPITAL | Age: 66
End: 2021-07-01

## 2021-07-01 LAB
BASOPHILS # BLD AUTO: 0 K/UL — SIGNIFICANT CHANGE UP (ref 0–0.2)
BASOPHILS NFR BLD AUTO: 0 % — SIGNIFICANT CHANGE UP (ref 0–2)
EOSINOPHIL # BLD AUTO: 0 K/UL — SIGNIFICANT CHANGE UP (ref 0–0.5)
EOSINOPHIL NFR BLD AUTO: 0 % — SIGNIFICANT CHANGE UP (ref 0–6)
HCT VFR BLD CALC: 36.9 % — LOW (ref 39–50)
HGB BLD-MCNC: 12.2 G/DL — LOW (ref 13–17)
LYMPHOCYTES # BLD AUTO: 0.66 K/UL — LOW (ref 1–3.3)
LYMPHOCYTES # BLD AUTO: 8 % — LOW (ref 13–44)
MCHC RBC-ENTMCNC: 31 PG — SIGNIFICANT CHANGE UP (ref 27–34)
MCHC RBC-ENTMCNC: 33.1 G/DL — SIGNIFICANT CHANGE UP (ref 32–36)
MCV RBC AUTO: 93.9 FL — SIGNIFICANT CHANGE UP (ref 80–100)
MONOCYTES # BLD AUTO: 0 K/UL — SIGNIFICANT CHANGE UP (ref 0–0.9)
MONOCYTES NFR BLD AUTO: 0 % — LOW (ref 2–14)
NEUTROPHILS # BLD AUTO: 7.63 K/UL — HIGH (ref 1.8–7.4)
NEUTROPHILS NFR BLD AUTO: 92 % — HIGH (ref 43–77)
NRBC # BLD: 0 /100 — SIGNIFICANT CHANGE UP (ref 0–0)
NRBC # BLD: SIGNIFICANT CHANGE UP /100 WBCS (ref 0–0)
PLAT MORPH BLD: NORMAL — SIGNIFICANT CHANGE UP
PLATELET # BLD AUTO: 209 K/UL — SIGNIFICANT CHANGE UP (ref 150–400)
RBC # BLD: 3.93 M/UL — LOW (ref 4.2–5.8)
RBC # FLD: 13.1 % — SIGNIFICANT CHANGE UP (ref 10.3–14.5)
RBC BLD AUTO: SIGNIFICANT CHANGE UP
WBC # BLD: 8.29 K/UL — SIGNIFICANT CHANGE UP (ref 3.8–10.5)
WBC # FLD AUTO: 8.29 K/UL — SIGNIFICANT CHANGE UP (ref 3.8–10.5)

## 2021-07-08 ENCOUNTER — RESULT REVIEW (OUTPATIENT)
Age: 66
End: 2021-07-08

## 2021-07-08 ENCOUNTER — APPOINTMENT (OUTPATIENT)
Dept: INFUSION THERAPY | Facility: HOSPITAL | Age: 66
End: 2021-07-08

## 2021-07-08 LAB
BASOPHILS # BLD AUTO: 0.11 K/UL — SIGNIFICANT CHANGE UP (ref 0–0.2)
BASOPHILS NFR BLD AUTO: 1.4 % — SIGNIFICANT CHANGE UP (ref 0–2)
EOSINOPHIL # BLD AUTO: 0.2 K/UL — SIGNIFICANT CHANGE UP (ref 0–0.5)
EOSINOPHIL NFR BLD AUTO: 2.5 % — SIGNIFICANT CHANGE UP (ref 0–6)
HCT VFR BLD CALC: 37.9 % — LOW (ref 39–50)
HGB BLD-MCNC: 12.4 G/DL — LOW (ref 13–17)
IMM GRANULOCYTES NFR BLD AUTO: 0.6 % — SIGNIFICANT CHANGE UP (ref 0–1.5)
LYMPHOCYTES # BLD AUTO: 0.53 K/UL — LOW (ref 1–3.3)
LYMPHOCYTES # BLD AUTO: 6.6 % — LOW (ref 13–44)
MCHC RBC-ENTMCNC: 30.9 PG — SIGNIFICANT CHANGE UP (ref 27–34)
MCHC RBC-ENTMCNC: 32.7 G/DL — SIGNIFICANT CHANGE UP (ref 32–36)
MCV RBC AUTO: 94.5 FL — SIGNIFICANT CHANGE UP (ref 80–100)
MONOCYTES # BLD AUTO: 0.27 K/UL — SIGNIFICANT CHANGE UP (ref 0–0.9)
MONOCYTES NFR BLD AUTO: 3.4 % — SIGNIFICANT CHANGE UP (ref 2–14)
NEUTROPHILS # BLD AUTO: 6.81 K/UL — SIGNIFICANT CHANGE UP (ref 1.8–7.4)
NEUTROPHILS NFR BLD AUTO: 85.5 % — HIGH (ref 43–77)
NRBC # BLD: 0 /100 WBCS — SIGNIFICANT CHANGE UP (ref 0–0)
PLATELET # BLD AUTO: 184 K/UL — SIGNIFICANT CHANGE UP (ref 150–400)
RBC # BLD: 4.01 M/UL — LOW (ref 4.2–5.8)
RBC # FLD: 13.2 % — SIGNIFICANT CHANGE UP (ref 10.3–14.5)
WBC # BLD: 7.97 K/UL — SIGNIFICANT CHANGE UP (ref 3.8–10.5)
WBC # FLD AUTO: 7.97 K/UL — SIGNIFICANT CHANGE UP (ref 3.8–10.5)

## 2021-07-09 ENCOUNTER — RX RENEWAL (OUTPATIENT)
Age: 66
End: 2021-07-09

## 2021-07-09 LAB
ALBUMIN SERPL ELPH-MCNC: 4.3 G/DL
ALP BLD-CCNC: 84 U/L
ALT SERPL-CCNC: 23 U/L
ANION GAP SERPL CALC-SCNC: 13 MMOL/L
AST SERPL-CCNC: 19 U/L
BILIRUB SERPL-MCNC: 0.4 MG/DL
BUN SERPL-MCNC: 23 MG/DL
CALCIUM SERPL-MCNC: 9.5 MG/DL
CHLORIDE SERPL-SCNC: 105 MMOL/L
CO2 SERPL-SCNC: 24 MMOL/L
CREAT SERPL-MCNC: 1.11 MG/DL
DEPRECATED KAPPA LC FREE/LAMBDA SER: 0.18 RATIO
DEPRECATED KAPPA LC FREE/LAMBDA SER: 0.18 RATIO
GLUCOSE SERPL-MCNC: 111 MG/DL
IGA SER QL IEP: 29 MG/DL
IGG SER QL IEP: 380 MG/DL
IGM SER QL IEP: 30 MG/DL
KAPPA LC CSF-MCNC: 4.95 MG/DL
KAPPA LC CSF-MCNC: 4.95 MG/DL
KAPPA LC SERPL-MCNC: 0.89 MG/DL
KAPPA LC SERPL-MCNC: 0.89 MG/DL
LDH SERPL-CCNC: 210 U/L
POTASSIUM SERPL-SCNC: 4.3 MMOL/L
PROT SERPL-MCNC: 5.9 G/DL
SODIUM SERPL-SCNC: 141 MMOL/L

## 2021-07-14 LAB
ALBUMIN MFR SERPL ELPH: 64.1 %
ALBUMIN SERPL-MCNC: 3.8 G/DL
ALBUMIN/GLOB SERPL: 1.8 RATIO
ALPHA1 GLOB MFR SERPL ELPH: 5 %
ALPHA1 GLOB SERPL ELPH-MCNC: 0.3 G/DL
ALPHA2 GLOB MFR SERPL ELPH: 12.6 %
ALPHA2 GLOB SERPL ELPH-MCNC: 0.7 G/DL
B-GLOBULIN MFR SERPL ELPH: 11.3 %
B-GLOBULIN SERPL ELPH-MCNC: 0.7 G/DL
GAMMA GLOB FLD ELPH-MCNC: 0.4 G/DL
GAMMA GLOB MFR SERPL ELPH: 7 %
INTERPRETATION SERPL IEP-IMP: NORMAL
M PROTEIN MFR SERPL ELPH: 2.4 %
M PROTEIN SPEC IFE-MCNC: NORMAL
MONOCLON BAND OBS SERPL: 0.1 G/DL
PROT SERPL-MCNC: 5.9 G/DL
PROT SERPL-MCNC: 5.9 G/DL

## 2021-07-15 ENCOUNTER — RESULT REVIEW (OUTPATIENT)
Age: 66
End: 2021-07-15

## 2021-07-15 ENCOUNTER — APPOINTMENT (OUTPATIENT)
Dept: INFUSION THERAPY | Facility: HOSPITAL | Age: 66
End: 2021-07-15

## 2021-07-15 LAB
BASOPHILS # BLD AUTO: 0.08 K/UL — SIGNIFICANT CHANGE UP (ref 0–0.2)
BASOPHILS NFR BLD AUTO: 1.2 % — SIGNIFICANT CHANGE UP (ref 0–2)
EOSINOPHIL # BLD AUTO: 0.05 K/UL — SIGNIFICANT CHANGE UP (ref 0–0.5)
EOSINOPHIL NFR BLD AUTO: 0.8 % — SIGNIFICANT CHANGE UP (ref 0–6)
HCT VFR BLD CALC: 37.7 % — LOW (ref 39–50)
HGB BLD-MCNC: 12.6 G/DL — LOW (ref 13–17)
IMM GRANULOCYTES NFR BLD AUTO: 0.6 % — SIGNIFICANT CHANGE UP (ref 0–1.5)
LYMPHOCYTES # BLD AUTO: 0.59 K/UL — LOW (ref 1–3.3)
LYMPHOCYTES # BLD AUTO: 9.1 % — LOW (ref 13–44)
MCHC RBC-ENTMCNC: 30.6 PG — SIGNIFICANT CHANGE UP (ref 27–34)
MCHC RBC-ENTMCNC: 33.4 G/DL — SIGNIFICANT CHANGE UP (ref 32–36)
MCV RBC AUTO: 91.5 FL — SIGNIFICANT CHANGE UP (ref 80–100)
MONOCYTES # BLD AUTO: 0.21 K/UL — SIGNIFICANT CHANGE UP (ref 0–0.9)
MONOCYTES NFR BLD AUTO: 3.2 % — SIGNIFICANT CHANGE UP (ref 2–14)
NEUTROPHILS # BLD AUTO: 5.52 K/UL — SIGNIFICANT CHANGE UP (ref 1.8–7.4)
NEUTROPHILS NFR BLD AUTO: 85.1 % — HIGH (ref 43–77)
NRBC # BLD: 0 /100 WBCS — SIGNIFICANT CHANGE UP (ref 0–0)
PLATELET # BLD AUTO: 238 K/UL — SIGNIFICANT CHANGE UP (ref 150–400)
RBC # BLD: 4.12 M/UL — LOW (ref 4.2–5.8)
RBC # FLD: 13.2 % — SIGNIFICANT CHANGE UP (ref 10.3–14.5)
WBC # BLD: 6.49 K/UL — SIGNIFICANT CHANGE UP (ref 3.8–10.5)
WBC # FLD AUTO: 6.49 K/UL — SIGNIFICANT CHANGE UP (ref 3.8–10.5)

## 2021-07-19 ENCOUNTER — OUTPATIENT (OUTPATIENT)
Dept: OUTPATIENT SERVICES | Facility: HOSPITAL | Age: 66
LOS: 1 days | Discharge: HOME | End: 2021-07-19

## 2021-07-19 DIAGNOSIS — Z98.890 OTHER SPECIFIED POSTPROCEDURAL STATES: Chronic | ICD-10-CM

## 2021-07-19 DIAGNOSIS — R79.89 OTHER SPECIFIED ABNORMAL FINDINGS OF BLOOD CHEMISTRY: ICD-10-CM

## 2021-07-22 ENCOUNTER — RESULT REVIEW (OUTPATIENT)
Age: 66
End: 2021-07-22

## 2021-07-22 ENCOUNTER — APPOINTMENT (OUTPATIENT)
Dept: INFUSION THERAPY | Facility: HOSPITAL | Age: 66
End: 2021-07-22

## 2021-07-22 ENCOUNTER — APPOINTMENT (OUTPATIENT)
Dept: HEMATOLOGY ONCOLOGY | Facility: CLINIC | Age: 66
End: 2021-07-22
Payer: MEDICARE

## 2021-07-22 VITALS
OXYGEN SATURATION: 98 % | SYSTOLIC BLOOD PRESSURE: 123 MMHG | DIASTOLIC BLOOD PRESSURE: 74 MMHG | TEMPERATURE: 97.5 F | WEIGHT: 207.23 LBS | HEIGHT: 72.09 IN | BODY MASS INDEX: 28.07 KG/M2 | RESPIRATION RATE: 16 BRPM | HEART RATE: 60 BPM

## 2021-07-22 DIAGNOSIS — Z51.11 ENCOUNTER FOR ANTINEOPLASTIC CHEMOTHERAPY: ICD-10-CM

## 2021-07-22 DIAGNOSIS — C90.00 MULTIPLE MYELOMA NOT HAVING ACHIEVED REMISSION: ICD-10-CM

## 2021-07-22 DIAGNOSIS — C79.51 SECONDARY MALIGNANT NEOPLASM OF BONE: ICD-10-CM

## 2021-07-22 LAB
BASOPHILS # BLD AUTO: 0.09 K/UL — SIGNIFICANT CHANGE UP (ref 0–0.2)
BASOPHILS NFR BLD AUTO: 1 % — SIGNIFICANT CHANGE UP (ref 0–2)
EOSINOPHIL # BLD AUTO: 0 K/UL — SIGNIFICANT CHANGE UP (ref 0–0.5)
EOSINOPHIL NFR BLD AUTO: 0 % — SIGNIFICANT CHANGE UP (ref 0–6)
HCT VFR BLD CALC: 37.5 % — LOW (ref 39–50)
HGB BLD-MCNC: 12.7 G/DL — LOW (ref 13–17)
LYMPHOCYTES # BLD AUTO: 0.52 K/UL — LOW (ref 1–3.3)
LYMPHOCYTES # BLD AUTO: 6 % — LOW (ref 13–44)
MCHC RBC-ENTMCNC: 30.8 PG — SIGNIFICANT CHANGE UP (ref 27–34)
MCHC RBC-ENTMCNC: 33.9 G/DL — SIGNIFICANT CHANGE UP (ref 32–36)
MCV RBC AUTO: 91 FL — SIGNIFICANT CHANGE UP (ref 80–100)
MONOCYTES # BLD AUTO: 0.17 K/UL — SIGNIFICANT CHANGE UP (ref 0–0.9)
MONOCYTES NFR BLD AUTO: 2 % — SIGNIFICANT CHANGE UP (ref 2–14)
NEUTROPHILS # BLD AUTO: 7.94 K/UL — HIGH (ref 1.8–7.4)
NEUTROPHILS NFR BLD AUTO: 91 % — HIGH (ref 43–77)
NRBC # BLD: 0 /100 — SIGNIFICANT CHANGE UP (ref 0–0)
NRBC # BLD: SIGNIFICANT CHANGE UP /100 WBCS (ref 0–0)
PLAT MORPH BLD: NORMAL — SIGNIFICANT CHANGE UP
PLATELET # BLD AUTO: 198 K/UL — SIGNIFICANT CHANGE UP (ref 150–400)
RBC # BLD: 4.12 M/UL — LOW (ref 4.2–5.8)
RBC # FLD: 13.2 % — SIGNIFICANT CHANGE UP (ref 10.3–14.5)
RBC BLD AUTO: SIGNIFICANT CHANGE UP
WBC # BLD: 8.73 K/UL — SIGNIFICANT CHANGE UP (ref 3.8–10.5)
WBC # FLD AUTO: 8.73 K/UL — SIGNIFICANT CHANGE UP (ref 3.8–10.5)

## 2021-07-22 PROCEDURE — 99072 ADDL SUPL MATRL&STAF TM PHE: CPT

## 2021-07-22 PROCEDURE — 99214 OFFICE O/P EST MOD 30 MIN: CPT

## 2021-07-22 NOTE — HISTORY OF PRESENT ILLNESS
[de-identified] : 11/2020-Found to have T-12 compression fracture. Saw orthopedist Dr. Candelaria in 1/2021. Referred to endocrinologist Dr. Acosta by Dr. 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  Serum immunofixation showed 1 IgD lambda band and free lambda light chains.  Urine immunofixation negative for monoclonal band.\par 4/29/2021–Bone marrow biopsy and bone marrow aspirate consistent with plasma cell myeloma (greater than 90% involvement).  Myeloma FISH studies showed CCN D1/IGH fusion (27%).  Flow cytometry positive for monotypic plasma cells.  Lymphocyte immunophenotypic findings showed no diagnostic abnormalities.  Cytogenetics with normal male karyotype.  Congo red stain negative.  Iron stains showed iron stores present.  No ring sideroblasts.\par \par 5/13/21-Began VRd.\par \par \par  [de-identified] : Feels very well currently.\par Completed Cycle #3 Revlimid-feels he is tolerating well overall, now.\par Is remaining active, golfing.\par No h/o recurrent fevers or infections. No abnormal bruising or bleeding.No pulmonary/GI complaints at this time.\par Considering SCT in the Fall (Dr. 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

## 2021-07-22 NOTE — ASSESSMENT
[FreeTextEntry1] : Lab work reviewed.\par Multiple myeloma–patient consents to continue treatment as planned-VRd regimen, along with Xgeva. He will f/u with BMT team/Dr. Sorenson, with plans for eventual SCT.\par \par Mild groin LAD noted on prior MRI-?significance currently-t/c interval f/u imaging (possible PET/CT scan at that point).\par \par Patient and his wife were given the opportunity to ask questions.  Their questions have been answered to their apparent satisfaction at this time.

## 2021-07-29 ENCOUNTER — RESULT REVIEW (OUTPATIENT)
Age: 66
End: 2021-07-29

## 2021-07-29 ENCOUNTER — APPOINTMENT (OUTPATIENT)
Dept: INFUSION THERAPY | Facility: HOSPITAL | Age: 66
End: 2021-07-29

## 2021-07-29 LAB
BASOPHILS # BLD AUTO: 0 K/UL — SIGNIFICANT CHANGE UP (ref 0–0.2)
BASOPHILS NFR BLD AUTO: 0 % — SIGNIFICANT CHANGE UP (ref 0–2)
EOSINOPHIL # BLD AUTO: 0.25 K/UL — SIGNIFICANT CHANGE UP (ref 0–0.5)
EOSINOPHIL NFR BLD AUTO: 3 % — SIGNIFICANT CHANGE UP (ref 0–6)
HCT VFR BLD CALC: 37.7 % — LOW (ref 39–50)
HGB BLD-MCNC: 12.6 G/DL — LOW (ref 13–17)
LYMPHOCYTES # BLD AUTO: 0.33 K/UL — LOW (ref 1–3.3)
LYMPHOCYTES # BLD AUTO: 4 % — LOW (ref 13–44)
MCHC RBC-ENTMCNC: 31.1 PG — SIGNIFICANT CHANGE UP (ref 27–34)
MCHC RBC-ENTMCNC: 33.4 G/DL — SIGNIFICANT CHANGE UP (ref 32–36)
MCV RBC AUTO: 93.1 FL — SIGNIFICANT CHANGE UP (ref 80–100)
MONOCYTES # BLD AUTO: 0.17 K/UL — SIGNIFICANT CHANGE UP (ref 0–0.9)
MONOCYTES NFR BLD AUTO: 2 % — SIGNIFICANT CHANGE UP (ref 2–14)
NEUTROPHILS # BLD AUTO: 7.57 K/UL — HIGH (ref 1.8–7.4)
NEUTROPHILS NFR BLD AUTO: 91 % — HIGH (ref 43–77)
NRBC # BLD: 0 /100 — SIGNIFICANT CHANGE UP (ref 0–0)
NRBC # BLD: SIGNIFICANT CHANGE UP /100 WBCS (ref 0–0)
PLAT MORPH BLD: NORMAL — SIGNIFICANT CHANGE UP
PLATELET # BLD AUTO: 172 K/UL — SIGNIFICANT CHANGE UP (ref 150–400)
RBC # BLD: 4.05 M/UL — LOW (ref 4.2–5.8)
RBC # FLD: 13.4 % — SIGNIFICANT CHANGE UP (ref 10.3–14.5)
RBC BLD AUTO: SIGNIFICANT CHANGE UP
WBC # BLD: 8.32 K/UL — SIGNIFICANT CHANGE UP (ref 3.8–10.5)
WBC # FLD AUTO: 8.32 K/UL — SIGNIFICANT CHANGE UP (ref 3.8–10.5)

## 2021-07-30 ENCOUNTER — RX RENEWAL (OUTPATIENT)
Age: 66
End: 2021-07-30

## 2021-08-05 ENCOUNTER — RESULT REVIEW (OUTPATIENT)
Age: 66
End: 2021-08-05

## 2021-08-05 ENCOUNTER — APPOINTMENT (OUTPATIENT)
Dept: INFUSION THERAPY | Facility: HOSPITAL | Age: 66
End: 2021-08-05

## 2021-08-05 LAB
BASOPHILS # BLD AUTO: 0.09 K/UL — SIGNIFICANT CHANGE UP (ref 0–0.2)
BASOPHILS NFR BLD AUTO: 1.2 % — SIGNIFICANT CHANGE UP (ref 0–2)
EOSINOPHIL # BLD AUTO: 0.04 K/UL — SIGNIFICANT CHANGE UP (ref 0–0.5)
EOSINOPHIL NFR BLD AUTO: 0.5 % — SIGNIFICANT CHANGE UP (ref 0–6)
HCT VFR BLD CALC: 37.6 % — LOW (ref 39–50)
HGB BLD-MCNC: 12.5 G/DL — LOW (ref 13–17)
IMM GRANULOCYTES NFR BLD AUTO: 0.5 % — SIGNIFICANT CHANGE UP (ref 0–1.5)
LYMPHOCYTES # BLD AUTO: 0.53 K/UL — LOW (ref 1–3.3)
LYMPHOCYTES # BLD AUTO: 7.1 % — LOW (ref 13–44)
MCHC RBC-ENTMCNC: 30.5 PG — SIGNIFICANT CHANGE UP (ref 27–34)
MCHC RBC-ENTMCNC: 33.2 G/DL — SIGNIFICANT CHANGE UP (ref 32–36)
MCV RBC AUTO: 91.7 FL — SIGNIFICANT CHANGE UP (ref 80–100)
MONOCYTES # BLD AUTO: 0.28 K/UL — SIGNIFICANT CHANGE UP (ref 0–0.9)
MONOCYTES NFR BLD AUTO: 3.7 % — SIGNIFICANT CHANGE UP (ref 2–14)
NEUTROPHILS # BLD AUTO: 6.51 K/UL — SIGNIFICANT CHANGE UP (ref 1.8–7.4)
NEUTROPHILS NFR BLD AUTO: 87 % — HIGH (ref 43–77)
NRBC # BLD: 0 /100 WBCS — SIGNIFICANT CHANGE UP (ref 0–0)
PLATELET # BLD AUTO: 231 K/UL — SIGNIFICANT CHANGE UP (ref 150–400)
RBC # BLD: 4.1 M/UL — LOW (ref 4.2–5.8)
RBC # FLD: 13.4 % — SIGNIFICANT CHANGE UP (ref 10.3–14.5)
WBC # BLD: 7.49 K/UL — SIGNIFICANT CHANGE UP (ref 3.8–10.5)
WBC # FLD AUTO: 7.49 K/UL — SIGNIFICANT CHANGE UP (ref 3.8–10.5)

## 2021-08-12 ENCOUNTER — RESULT REVIEW (OUTPATIENT)
Age: 66
End: 2021-08-12

## 2021-08-12 ENCOUNTER — APPOINTMENT (OUTPATIENT)
Dept: INFUSION THERAPY | Facility: HOSPITAL | Age: 66
End: 2021-08-12

## 2021-08-12 LAB
BASOPHILS # BLD AUTO: 0.05 K/UL — SIGNIFICANT CHANGE UP (ref 0–0.2)
BASOPHILS NFR BLD AUTO: 0.5 % — SIGNIFICANT CHANGE UP (ref 0–2)
EOSINOPHIL # BLD AUTO: 0.15 K/UL — SIGNIFICANT CHANGE UP (ref 0–0.5)
EOSINOPHIL NFR BLD AUTO: 1.6 % — SIGNIFICANT CHANGE UP (ref 0–6)
HCT VFR BLD CALC: 38.5 % — LOW (ref 39–50)
HGB BLD-MCNC: 12.8 G/DL — LOW (ref 13–17)
IMM GRANULOCYTES NFR BLD AUTO: 0.9 % — SIGNIFICANT CHANGE UP (ref 0–1.5)
LYMPHOCYTES # BLD AUTO: 0.56 K/UL — LOW (ref 1–3.3)
LYMPHOCYTES # BLD AUTO: 6 % — LOW (ref 13–44)
MCHC RBC-ENTMCNC: 30.4 PG — SIGNIFICANT CHANGE UP (ref 27–34)
MCHC RBC-ENTMCNC: 33.2 G/DL — SIGNIFICANT CHANGE UP (ref 32–36)
MCV RBC AUTO: 91.4 FL — SIGNIFICANT CHANGE UP (ref 80–100)
MONOCYTES # BLD AUTO: 0.17 K/UL — SIGNIFICANT CHANGE UP (ref 0–0.9)
MONOCYTES NFR BLD AUTO: 1.8 % — LOW (ref 2–14)
NEUTROPHILS # BLD AUTO: 8.36 K/UL — HIGH (ref 1.8–7.4)
NEUTROPHILS NFR BLD AUTO: 89.2 % — HIGH (ref 43–77)
NRBC # BLD: 0 /100 WBCS — SIGNIFICANT CHANGE UP (ref 0–0)
PLATELET # BLD AUTO: 190 K/UL — SIGNIFICANT CHANGE UP (ref 150–400)
RBC # BLD: 4.21 M/UL — SIGNIFICANT CHANGE UP (ref 4.2–5.8)
RBC # FLD: 13.7 % — SIGNIFICANT CHANGE UP (ref 10.3–14.5)
WBC # BLD: 9.37 K/UL — SIGNIFICANT CHANGE UP (ref 3.8–10.5)
WBC # FLD AUTO: 9.37 K/UL — SIGNIFICANT CHANGE UP (ref 3.8–10.5)

## 2021-08-17 ENCOUNTER — OUTPATIENT (OUTPATIENT)
Dept: OUTPATIENT SERVICES | Facility: HOSPITAL | Age: 66
LOS: 1 days | Discharge: HOME | End: 2021-08-17

## 2021-08-17 DIAGNOSIS — R79.89 OTHER SPECIFIED ABNORMAL FINDINGS OF BLOOD CHEMISTRY: ICD-10-CM

## 2021-08-17 DIAGNOSIS — Z98.890 OTHER SPECIFIED POSTPROCEDURAL STATES: Chronic | ICD-10-CM

## 2021-08-19 ENCOUNTER — RESULT REVIEW (OUTPATIENT)
Age: 66
End: 2021-08-19

## 2021-08-19 ENCOUNTER — APPOINTMENT (OUTPATIENT)
Dept: HEMATOLOGY ONCOLOGY | Facility: CLINIC | Age: 66
End: 2021-08-19
Payer: MEDICARE

## 2021-08-19 ENCOUNTER — LABORATORY RESULT (OUTPATIENT)
Age: 66
End: 2021-08-19

## 2021-08-19 ENCOUNTER — APPOINTMENT (OUTPATIENT)
Dept: INFUSION THERAPY | Facility: HOSPITAL | Age: 66
End: 2021-08-19

## 2021-08-19 VITALS
HEIGHT: 72.09 IN | WEIGHT: 207.01 LBS | OXYGEN SATURATION: 96 % | SYSTOLIC BLOOD PRESSURE: 120 MMHG | TEMPERATURE: 97 F | BODY MASS INDEX: 28.04 KG/M2 | HEART RATE: 64 BPM | DIASTOLIC BLOOD PRESSURE: 67 MMHG | RESPIRATION RATE: 16 BRPM

## 2021-08-19 DIAGNOSIS — Z51.11 ENCOUNTER FOR ANTINEOPLASTIC CHEMOTHERAPY: ICD-10-CM

## 2021-08-19 DIAGNOSIS — C90.00 MULTIPLE MYELOMA NOT HAVING ACHIEVED REMISSION: ICD-10-CM

## 2021-08-19 LAB
BASOPHILS # BLD AUTO: 0.03 K/UL — SIGNIFICANT CHANGE UP (ref 0–0.2)
BASOPHILS NFR BLD AUTO: 0.4 % — SIGNIFICANT CHANGE UP (ref 0–2)
EOSINOPHIL # BLD AUTO: 0.01 K/UL — SIGNIFICANT CHANGE UP (ref 0–0.5)
EOSINOPHIL NFR BLD AUTO: 0.1 % — SIGNIFICANT CHANGE UP (ref 0–6)
HCT VFR BLD CALC: 39.6 % — SIGNIFICANT CHANGE UP (ref 39–50)
HGB BLD-MCNC: 12.7 G/DL — LOW (ref 13–17)
IMM GRANULOCYTES NFR BLD AUTO: 0.7 % — SIGNIFICANT CHANGE UP (ref 0–1.5)
LYMPHOCYTES # BLD AUTO: 0.45 K/UL — LOW (ref 1–3.3)
LYMPHOCYTES # BLD AUTO: 5.5 % — LOW (ref 13–44)
MCHC RBC-ENTMCNC: 30.4 PG — SIGNIFICANT CHANGE UP (ref 27–34)
MCHC RBC-ENTMCNC: 32.1 G/DL — SIGNIFICANT CHANGE UP (ref 32–36)
MCV RBC AUTO: 94.7 FL — SIGNIFICANT CHANGE UP (ref 80–100)
MONOCYTES # BLD AUTO: 0.1 K/UL — SIGNIFICANT CHANGE UP (ref 0–0.9)
MONOCYTES NFR BLD AUTO: 1.2 % — LOW (ref 2–14)
NEUTROPHILS # BLD AUTO: 7.55 K/UL — HIGH (ref 1.8–7.4)
NEUTROPHILS NFR BLD AUTO: 92.1 % — HIGH (ref 43–77)
NRBC # BLD: 0 /100 WBCS — SIGNIFICANT CHANGE UP (ref 0–0)
PLATELET # BLD AUTO: 186 K/UL — SIGNIFICANT CHANGE UP (ref 150–400)
RBC # BLD: 4.18 M/UL — LOW (ref 4.2–5.8)
RBC # FLD: 13.6 % — SIGNIFICANT CHANGE UP (ref 10.3–14.5)
WBC # BLD: 8.2 K/UL — SIGNIFICANT CHANGE UP (ref 3.8–10.5)
WBC # FLD AUTO: 8.2 K/UL — SIGNIFICANT CHANGE UP (ref 3.8–10.5)

## 2021-08-19 PROCEDURE — 99214 OFFICE O/P EST MOD 30 MIN: CPT

## 2021-08-19 NOTE — HISTORY OF PRESENT ILLNESS
[de-identified] : 11/2020-Found to have T-12 compression fracture. Saw orthopedist Dr. Candelaria in 1/2021. Referred to endocrinologist Dr. Acosta by Dr. 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  Serum immunofixation showed 1 IgD lambda band and free lambda light chains.  Urine immunofixation negative for monoclonal band.\par 4/29/2021–Bone marrow biopsy and bone marrow aspirate consistent with plasma cell myeloma (greater than 90% involvement).  Myeloma FISH studies showed CCN D1/IGH fusion (27%).  Flow cytometry positive for monotypic plasma cells.  Lymphocyte immunophenotypic findings showed no diagnostic abnormalities.  Cytogenetics with normal male karyotype.  Congo red stain negative.  Iron stains showed iron stores present.  No ring sideroblasts.\par \par 5/13/21-Began VRd.\par \par \par  [de-identified] : +URI with congested cough with clear phlegm production. No associated fevers, SOB.\par Completed Cycle #4 Revlimid-feels he is tolerating well overall.\par Is remaining active, golfing.\par No abnormal bruising or bleeding.No GI/bony complaints at this time.\par Considering SCT in the Fall (Dr. Sorenson)-has f/u appt. planned with Dr. 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

## 2021-08-19 NOTE — ASSESSMENT
[FreeTextEntry1] : Lab work reviewed.\par Multiple myeloma–patient consents to continue treatment as planned-VRd regimen, along with Xgeva. He will f/u with BMT team/Dr. Sorenson, with plans for SCT.\par \par Mild groin LAD noted on prior MRI-?significance currently-t/c interval f/u imaging (possible PET/CT scan at that point), prior to SCT.\par \par Patient and his wife were given the opportunity to ask questions.  Their questions have been answered to their apparent satisfaction at this time.

## 2021-08-20 ENCOUNTER — RX RENEWAL (OUTPATIENT)
Age: 66
End: 2021-08-20

## 2021-08-20 LAB
ALBUMIN SERPL ELPH-MCNC: 4.2 G/DL
ALP BLD-CCNC: 82 U/L
ALT SERPL-CCNC: 26 U/L
ANION GAP SERPL CALC-SCNC: 9 MMOL/L
AST SERPL-CCNC: 16 U/L
BILIRUB SERPL-MCNC: 0.2 MG/DL
BUN SERPL-MCNC: 19 MG/DL
CALCIUM SERPL-MCNC: 9.4 MG/DL
CHLORIDE SERPL-SCNC: 105 MMOL/L
CO2 SERPL-SCNC: 26 MMOL/L
CREAT SERPL-MCNC: 0.92 MG/DL
DEPRECATED KAPPA LC FREE/LAMBDA SER: 0.31 RATIO
DEPRECATED KAPPA LC FREE/LAMBDA SER: 0.31 RATIO
GLUCOSE SERPL-MCNC: 166 MG/DL
IGA SER QL IEP: 38 MG/DL
IGG SER QL IEP: 379 MG/DL
IGM SER QL IEP: 30 MG/DL
KAPPA LC CSF-MCNC: 2.48 MG/DL
KAPPA LC CSF-MCNC: 2.48 MG/DL
KAPPA LC SERPL-MCNC: 0.78 MG/DL
KAPPA LC SERPL-MCNC: 0.78 MG/DL
LDH SERPL-CCNC: 197 U/L
POTASSIUM SERPL-SCNC: 4.7 MMOL/L
PROT SERPL-MCNC: 5.7 G/DL
SODIUM SERPL-SCNC: 141 MMOL/L

## 2021-08-26 ENCOUNTER — RESULT REVIEW (OUTPATIENT)
Age: 66
End: 2021-08-26

## 2021-08-26 ENCOUNTER — APPOINTMENT (OUTPATIENT)
Dept: INFUSION THERAPY | Facility: HOSPITAL | Age: 66
End: 2021-08-26

## 2021-08-26 LAB
BASOPHILS # BLD AUTO: 0.08 K/UL — SIGNIFICANT CHANGE UP (ref 0–0.2)
BASOPHILS NFR BLD AUTO: 1 % — SIGNIFICANT CHANGE UP (ref 0–2)
EOSINOPHIL # BLD AUTO: 0.08 K/UL — SIGNIFICANT CHANGE UP (ref 0–0.5)
EOSINOPHIL NFR BLD AUTO: 1 % — SIGNIFICANT CHANGE UP (ref 0–6)
HCT VFR BLD CALC: 39.1 % — SIGNIFICANT CHANGE UP (ref 39–50)
HGB BLD-MCNC: 13.1 G/DL — SIGNIFICANT CHANGE UP (ref 13–17)
IMM GRANULOCYTES NFR BLD AUTO: 0.6 % — SIGNIFICANT CHANGE UP (ref 0–1.5)
LYMPHOCYTES # BLD AUTO: 0.74 K/UL — LOW (ref 1–3.3)
LYMPHOCYTES # BLD AUTO: 9.1 % — LOW (ref 13–44)
MCHC RBC-ENTMCNC: 30.4 PG — SIGNIFICANT CHANGE UP (ref 27–34)
MCHC RBC-ENTMCNC: 33.5 G/DL — SIGNIFICANT CHANGE UP (ref 32–36)
MCV RBC AUTO: 90.7 FL — SIGNIFICANT CHANGE UP (ref 80–100)
MONOCYTES # BLD AUTO: 0.43 K/UL — SIGNIFICANT CHANGE UP (ref 0–0.9)
MONOCYTES NFR BLD AUTO: 5.3 % — SIGNIFICANT CHANGE UP (ref 2–14)
NEUTROPHILS # BLD AUTO: 6.73 K/UL — SIGNIFICANT CHANGE UP (ref 1.8–7.4)
NEUTROPHILS NFR BLD AUTO: 83 % — HIGH (ref 43–77)
NRBC # BLD: 0 /100 WBCS — SIGNIFICANT CHANGE UP (ref 0–0)
PLATELET # BLD AUTO: 209 K/UL — SIGNIFICANT CHANGE UP (ref 150–400)
RBC # BLD: 4.31 M/UL — SIGNIFICANT CHANGE UP (ref 4.2–5.8)
RBC # FLD: 13.9 % — SIGNIFICANT CHANGE UP (ref 10.3–14.5)
WBC # BLD: 8.11 K/UL — SIGNIFICANT CHANGE UP (ref 3.8–10.5)
WBC # FLD AUTO: 8.11 K/UL — SIGNIFICANT CHANGE UP (ref 3.8–10.5)

## 2021-08-27 LAB
ALBUMIN MFR SERPL ELPH: 63 %
ALBUMIN SERPL-MCNC: 3.6 G/DL
ALBUMIN/GLOB SERPL: 1.7 RATIO
ALPHA1 GLOB MFR SERPL ELPH: 5.7 %
ALPHA1 GLOB SERPL ELPH-MCNC: 0.3 G/DL
ALPHA2 GLOB MFR SERPL ELPH: 13.4 %
ALPHA2 GLOB SERPL ELPH-MCNC: 0.8 G/DL
B-GLOBULIN MFR SERPL ELPH: 11.8 %
B-GLOBULIN SERPL ELPH-MCNC: 0.7 G/DL
GAMMA GLOB FLD ELPH-MCNC: 0.3 G/DL
GAMMA GLOB MFR SERPL ELPH: 6.1 %
INTERPRETATION SERPL IEP-IMP: NORMAL
M PROTEIN MFR SERPL ELPH: NORMAL
MONOCLON BAND OBS SERPL: NORMAL
PROT SERPL-MCNC: 5.7 G/DL
PROT SERPL-MCNC: 5.7 G/DL

## 2021-08-30 ENCOUNTER — APPOINTMENT (OUTPATIENT)
Dept: HEMATOLOGY ONCOLOGY | Facility: CLINIC | Age: 66
End: 2021-08-30
Payer: MEDICARE

## 2021-08-30 DIAGNOSIS — Z01.818 ENCOUNTER FOR OTHER PREPROCEDURAL EXAMINATION: ICD-10-CM

## 2021-08-30 PROCEDURE — 99214 OFFICE O/P EST MOD 30 MIN: CPT | Mod: 95

## 2021-08-30 NOTE — ASSESSMENT
[FreeTextEntry1] : I previously had a very lengthy discussion with the patient regarding the diagnosis of multiple myeloma and that myeloma is not a curable disease with standard chemotherapy treatment.  I also discussed that high-dose chemotherapy followed by autologous peripheral stem cell transplant is considered one of the treatment options as part of standard of care for patients less than 70 years of age with newly diagnosed multiple myeloma. I discussed that event-free survival and overall survival are prolonged following high-dose chemotherapy followed by autologous peripheral stem cell transplantation compared with conventional chemotherapy treatment.  I did discuss that with the improved response rate with newer therapies for myeloma there is a question regarding timing of autologous stem cell transplant - early versus delayed.  I did discuss that early autologous peripheral stem cell transplantation may minimize total exposure to chemotherapy and may lead to an improvement in quality of life.  \par \par In addition, comprehensive discussion regarding acquisition of hematopoietic stem cells for autologous transplantation by apheresis following mobilization with Cytoxan chemotherapy plus growth factor versus leukocyte growth factor only took place. This was followed by discussion regarding hospitalization for high-dose chemotherapy with Melphalan followed by autologous stem cell transplantation.  Potential side effects and toxicities of Cytoxan chemotherapy and leukocyte growth factor for mobilization of stem cells as well as subsequent high-dose chemotherapy with Melphalan preparative regimen in the setting of transplant were discussed with review of specific consent forms taking place during consultation visit.  The patient states he has already made a decision to proceed with early autologous peripheral stem cell transplantation for his disease.  He will therefore be scheduled for standard pre-transplant testing now.  The patient is very concerned about the delta variant of COVID-19 and asked to discuss isolation/restriction policies post transplant, which was done.\par \par I have had an extensive discussion with the patient regarding the risks, benefits and alternatives to high-dose chemotherapy and autologous peripheral blood stem cell transplantation.   We discussed stem cell mobilization with Cytoxan 3 g/m2 followed by Neulasta 12 mg versus leukocyte growth factor only.  My goal would be to collect 5 to 10 x 10^6 CD34 cells per kilo.  This would be enough for two transplants if at some point a second transplant be deemed necessary.   A preparative regimen including Melphalan 200 mg/m2 was discussed.  Risks including but not limited to infection, bleeding, end organ damage, secondary malignancy, venoocclusive disease and mortality were discussed. Arrangements will be made for the patient to attend our transplant orientation meeting.   Venoocclusive disease and infection prophylaxis and the use of Kepivance to help prevent mucositis were discussed.  Literature and consents have been provided for review during initial consult visit.   \par \par I confirmed patient's name and  at beginning of consult visit. Verbal consent for Telehealth visit given on 21 at 11:54 am by Jony Julio, davis. Visit start time- 11:52 am- visit end time- 12:28 pm. Total visit time- 36 minutes. \par \par \par

## 2021-08-30 NOTE — REASON FOR VISIT
[Follow-Up Visit] : a follow-up visit for [FreeTextEntry2] : multiple myeloma to re-discuss high-dose chemotherapy followed by autologous peripheral blood stem cell transplant

## 2021-08-30 NOTE — CONSULT LETTER
[Dear  ___] : Dear  [unfilled], [Consult Letter:] : I had the pleasure of evaluating your patient, [unfilled]. [Please see my note below.] : Please see my note below. [Consult Closing:] : Thank you very much for allowing me to participate in the care of this patient.  If you have any questions, please do not hesitate to contact me. [Sincerely,] : Sincerely, [FreeTextEntry2] : Dyana Cheatham M.D.\par [facility]\par [address]\[place]   [address] [FreeTextEntry3] : \par Pao Sorenson M.D.\par \par  of Medicine\par [employer]\par [facility] \par [facility]\par [address] [place] [address] \[place] [address] \par ph: [phone]\par fax: [phone]

## 2021-08-30 NOTE — HISTORY OF PRESENT ILLNESS
[Home] : at home, [unfilled] , at the time of the visit. [Medical Office: ([facility])___] : at the medical office located in  [Spouse] : spouse [Verbal consent obtained from patient] : the patient, [unfilled] [de-identified] : 11/2020-Found to have T-12 compression fracture. Saw orthopedist Dr. Candelaria in 1/2021. Referred to endocrinologist Dr. Acosta by Dr. 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 Serum immunofixation showed 1 IgD lambda band and free lambda light chains. Urine immunofixation negative for monoclonal band.\par 4/29/2021–Bone marrow biopsy and bone marrow aspirate consistent with plasma cell myeloma (greater than 90% involvement). Myeloma FISH studies showed CCND1/IGH fusion (27%). Flow cytometry positive for monotypic plasma cells. Lymphocyte immunophenotypic findings showed no diagnostic abnormalities. Cytogenetics with normal male karyotype. Congo red stain negative. Iron stains showed iron stores present. No ring sideroblasts.\par 5/13/21- C1D1 RVd\par  [de-identified] : Since initial [facility] transplant consult visit on 6/7/21, he had recent hematology office visit on 8/19/21, Cycle #5 Day#15 Velcade/Decadron. Cycle #5 Revlimid as planned. He describes moderate fatigue, occasional blurry vision, insomnia with Decadron. He denies fever, chills, cough, dyspnea. He received the COVID-19 vaccine(Moderna) on 3/16, 4/13/21; he has booster vaccine scheduled for tomorrow. The patient is very concerned about the delta variant of COVID-19 and asked to discuss isolation/restriction policies.

## 2021-08-30 NOTE — REVIEW OF SYSTEMS
[Fever] : no fever [Chills] : no chills [Mucosal Pain] : no mucosal pain [Chest Pain] : no chest pain [Shortness Of Breath] : no shortness of breath [Cough] : no cough [Abdominal Pain] : no abdominal pain [Vomiting] : no vomiting [Constipation] : no constipation [Diarrhea] : no diarrhea [Skin Rash] : no skin rash [Dizziness] : no dizziness [Fainting] : no fainting [Easy Bleeding] : no tendency for easy bleeding [Easy Bruising] : no tendency for easy bruising [FreeTextEntry2] : + moderate fatigue [FreeTextEntry3] : + blurry vision(to see Optho in early September [FreeTextEntry4] : no sore throat [FreeTextEntry5] : occasional pedal edema [FreeTextEntry7] : no nausea [FreeTextEntry9] : no bone pain [de-identified] : no paresthesias

## 2021-09-02 ENCOUNTER — RESULT REVIEW (OUTPATIENT)
Age: 66
End: 2021-09-02

## 2021-09-02 ENCOUNTER — APPOINTMENT (OUTPATIENT)
Dept: INFUSION THERAPY | Facility: HOSPITAL | Age: 66
End: 2021-09-02

## 2021-09-02 LAB
BASOPHILS # BLD AUTO: 0.05 K/UL — SIGNIFICANT CHANGE UP (ref 0–0.2)
BASOPHILS NFR BLD AUTO: 0.6 % — SIGNIFICANT CHANGE UP (ref 0–2)
EOSINOPHIL # BLD AUTO: 0.12 K/UL — SIGNIFICANT CHANGE UP (ref 0–0.5)
EOSINOPHIL NFR BLD AUTO: 1.5 % — SIGNIFICANT CHANGE UP (ref 0–6)
HCT VFR BLD CALC: 39.5 % — SIGNIFICANT CHANGE UP (ref 39–50)
HGB BLD-MCNC: 13.2 G/DL — SIGNIFICANT CHANGE UP (ref 13–17)
IMM GRANULOCYTES NFR BLD AUTO: 1.5 % — SIGNIFICANT CHANGE UP (ref 0–1.5)
LYMPHOCYTES # BLD AUTO: 0.59 K/UL — LOW (ref 1–3.3)
LYMPHOCYTES # BLD AUTO: 7.6 % — LOW (ref 13–44)
MCHC RBC-ENTMCNC: 30.2 PG — SIGNIFICANT CHANGE UP (ref 27–34)
MCHC RBC-ENTMCNC: 33.4 G/DL — SIGNIFICANT CHANGE UP (ref 32–36)
MCV RBC AUTO: 90.4 FL — SIGNIFICANT CHANGE UP (ref 80–100)
MONOCYTES # BLD AUTO: 0.17 K/UL — SIGNIFICANT CHANGE UP (ref 0–0.9)
MONOCYTES NFR BLD AUTO: 2.2 % — SIGNIFICANT CHANGE UP (ref 2–14)
NEUTROPHILS # BLD AUTO: 6.75 K/UL — SIGNIFICANT CHANGE UP (ref 1.8–7.4)
NEUTROPHILS NFR BLD AUTO: 86.6 % — HIGH (ref 43–77)
NRBC # BLD: 0 /100 WBCS — SIGNIFICANT CHANGE UP (ref 0–0)
PLATELET # BLD AUTO: 185 K/UL — SIGNIFICANT CHANGE UP (ref 150–400)
RBC # BLD: 4.37 M/UL — SIGNIFICANT CHANGE UP (ref 4.2–5.8)
RBC # FLD: 13.9 % — SIGNIFICANT CHANGE UP (ref 10.3–14.5)
WBC # BLD: 7.8 K/UL — SIGNIFICANT CHANGE UP (ref 3.8–10.5)
WBC # FLD AUTO: 7.8 K/UL — SIGNIFICANT CHANGE UP (ref 3.8–10.5)

## 2021-09-09 ENCOUNTER — RESULT REVIEW (OUTPATIENT)
Age: 66
End: 2021-09-09

## 2021-09-09 ENCOUNTER — APPOINTMENT (OUTPATIENT)
Dept: INFUSION THERAPY | Facility: HOSPITAL | Age: 66
End: 2021-09-09

## 2021-09-09 LAB
BASOPHILS # BLD AUTO: 0.04 K/UL — SIGNIFICANT CHANGE UP (ref 0–0.2)
BASOPHILS NFR BLD AUTO: 0.5 % — SIGNIFICANT CHANGE UP (ref 0–2)
EOSINOPHIL # BLD AUTO: 0.11 K/UL — SIGNIFICANT CHANGE UP (ref 0–0.5)
EOSINOPHIL NFR BLD AUTO: 1.2 % — SIGNIFICANT CHANGE UP (ref 0–6)
HCT VFR BLD CALC: 39.5 % — SIGNIFICANT CHANGE UP (ref 39–50)
HGB BLD-MCNC: 13.2 G/DL — SIGNIFICANT CHANGE UP (ref 13–17)
IMM GRANULOCYTES NFR BLD AUTO: 1.5 % — SIGNIFICANT CHANGE UP (ref 0–1.5)
LYMPHOCYTES # BLD AUTO: 0.64 K/UL — LOW (ref 1–3.3)
LYMPHOCYTES # BLD AUTO: 7.3 % — LOW (ref 13–44)
MCHC RBC-ENTMCNC: 30.5 PG — SIGNIFICANT CHANGE UP (ref 27–34)
MCHC RBC-ENTMCNC: 33.4 G/DL — SIGNIFICANT CHANGE UP (ref 32–36)
MCV RBC AUTO: 91.2 FL — SIGNIFICANT CHANGE UP (ref 80–100)
MONOCYTES # BLD AUTO: 0.35 K/UL — SIGNIFICANT CHANGE UP (ref 0–0.9)
MONOCYTES NFR BLD AUTO: 4 % — SIGNIFICANT CHANGE UP (ref 2–14)
NEUTROPHILS # BLD AUTO: 7.55 K/UL — HIGH (ref 1.8–7.4)
NEUTROPHILS NFR BLD AUTO: 85.5 % — HIGH (ref 43–77)
NRBC # BLD: 0 /100 WBCS — SIGNIFICANT CHANGE UP (ref 0–0)
PLATELET # BLD AUTO: 168 K/UL — SIGNIFICANT CHANGE UP (ref 150–400)
RBC # BLD: 4.33 M/UL — SIGNIFICANT CHANGE UP (ref 4.2–5.8)
RBC # FLD: 14 % — SIGNIFICANT CHANGE UP (ref 10.3–14.5)
WBC # BLD: 8.82 K/UL — SIGNIFICANT CHANGE UP (ref 3.8–10.5)
WBC # FLD AUTO: 8.82 K/UL — SIGNIFICANT CHANGE UP (ref 3.8–10.5)

## 2021-09-10 ENCOUNTER — RX RENEWAL (OUTPATIENT)
Age: 66
End: 2021-09-10

## 2021-09-13 ENCOUNTER — APPOINTMENT (OUTPATIENT)
Dept: ORTHOPEDIC SURGERY | Facility: CLINIC | Age: 66
End: 2021-09-13
Payer: MEDICARE

## 2021-09-13 VITALS
DIASTOLIC BLOOD PRESSURE: 92 MMHG | HEART RATE: 54 BPM | HEIGHT: 72 IN | SYSTOLIC BLOOD PRESSURE: 172 MMHG | BODY MASS INDEX: 27.77 KG/M2 | WEIGHT: 205 LBS

## 2021-09-13 PROCEDURE — 99214 OFFICE O/P EST MOD 30 MIN: CPT

## 2021-09-16 ENCOUNTER — RESULT REVIEW (OUTPATIENT)
Age: 66
End: 2021-09-16

## 2021-09-16 ENCOUNTER — APPOINTMENT (OUTPATIENT)
Dept: HEMATOLOGY ONCOLOGY | Facility: CLINIC | Age: 66
End: 2021-09-16
Payer: MEDICARE

## 2021-09-16 ENCOUNTER — APPOINTMENT (OUTPATIENT)
Dept: INFUSION THERAPY | Facility: HOSPITAL | Age: 66
End: 2021-09-16

## 2021-09-16 VITALS
SYSTOLIC BLOOD PRESSURE: 123 MMHG | DIASTOLIC BLOOD PRESSURE: 73 MMHG | HEIGHT: 72 IN | RESPIRATION RATE: 16 BRPM | TEMPERATURE: 97.3 F | HEART RATE: 61 BPM | BODY MASS INDEX: 28.31 KG/M2 | WEIGHT: 209 LBS | OXYGEN SATURATION: 97 %

## 2021-09-16 DIAGNOSIS — C79.51 SECONDARY MALIGNANT NEOPLASM OF BONE: ICD-10-CM

## 2021-09-16 LAB
BASOPHILS # BLD AUTO: 0.01 K/UL — SIGNIFICANT CHANGE UP (ref 0–0.2)
BASOPHILS NFR BLD AUTO: 0.1 % — SIGNIFICANT CHANGE UP (ref 0–2)
EOSINOPHIL # BLD AUTO: 0 K/UL — SIGNIFICANT CHANGE UP (ref 0–0.5)
EOSINOPHIL NFR BLD AUTO: 0 % — SIGNIFICANT CHANGE UP (ref 0–6)
HCT VFR BLD CALC: 39.7 % — SIGNIFICANT CHANGE UP (ref 39–50)
HGB BLD-MCNC: 13.3 G/DL — SIGNIFICANT CHANGE UP (ref 13–17)
IMM GRANULOCYTES NFR BLD AUTO: 0.9 % — SIGNIFICANT CHANGE UP (ref 0–1.5)
LYMPHOCYTES # BLD AUTO: 0.47 K/UL — LOW (ref 1–3.3)
LYMPHOCYTES # BLD AUTO: 6.7 % — LOW (ref 13–44)
MCHC RBC-ENTMCNC: 30.3 PG — SIGNIFICANT CHANGE UP (ref 27–34)
MCHC RBC-ENTMCNC: 33.5 G/DL — SIGNIFICANT CHANGE UP (ref 32–36)
MCV RBC AUTO: 90.4 FL — SIGNIFICANT CHANGE UP (ref 80–100)
MONOCYTES # BLD AUTO: 0.2 K/UL — SIGNIFICANT CHANGE UP (ref 0–0.9)
MONOCYTES NFR BLD AUTO: 2.9 % — SIGNIFICANT CHANGE UP (ref 2–14)
NEUTROPHILS # BLD AUTO: 6.23 K/UL — SIGNIFICANT CHANGE UP (ref 1.8–7.4)
NEUTROPHILS NFR BLD AUTO: 89.4 % — HIGH (ref 43–77)
NRBC # BLD: 0 /100 WBCS — SIGNIFICANT CHANGE UP (ref 0–0)
PLATELET # BLD AUTO: 185 K/UL — SIGNIFICANT CHANGE UP (ref 150–400)
RBC # BLD: 4.39 M/UL — SIGNIFICANT CHANGE UP (ref 4.2–5.8)
RBC # FLD: 14.3 % — SIGNIFICANT CHANGE UP (ref 10.3–14.5)
WBC # BLD: 6.97 K/UL — SIGNIFICANT CHANGE UP (ref 3.8–10.5)
WBC # FLD AUTO: 6.97 K/UL — SIGNIFICANT CHANGE UP (ref 3.8–10.5)

## 2021-09-16 PROCEDURE — 99215 OFFICE O/P EST HI 40 MIN: CPT

## 2021-09-16 NOTE — HISTORY OF PRESENT ILLNESS
[de-identified] : 11/2020-Found to have T-12 compression fracture. Saw orthopedist Dr. Candelaria in 1/2021. Referred to endocrinologist Dr. Acosta by Dr. 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  Serum immunofixation showed 1 IgD lambda band and free lambda light chains.  Urine immunofixation negative for monoclonal band.\par 4/29/2021–Bone marrow biopsy and bone marrow aspirate consistent with plasma cell myeloma (greater than 90% involvement).  Myeloma FISH studies showed CCN D1/IGH fusion (27%).  Flow cytometry positive for monotypic plasma cells.  Lymphocyte immunophenotypic findings showed no diagnostic abnormalities.  Cytogenetics with normal male karyotype.  Congo red stain negative.  Iron stains showed iron stores present.  No ring sideroblasts.\par \par 5/13/21-Began VRd.\par \par \par  [de-identified] : Saw orthopedist in f/u for T 12 compression fracture-PT prescribed.\par Saw Dr. Sorenson-preparation in progress for PBSCT. Pre-testing set up for 9/28/21.\par Started Cycle #6 Revlimid.\par +Intermittent blurry vision and loss of depth perception. 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

## 2021-09-16 NOTE — PHYSICAL EXAM
[Normal] : affect appropriate [de-identified] : non-toxic appearing [de-identified] : no gross focal motor deficits

## 2021-09-16 NOTE — ASSESSMENT
[FreeTextEntry1] : Lab work, Dr. Candelaria's 9/13/21 note and Dr. Sorenson's 8/30/21 note reviewed.\par Multiple myeloma–patient consents to continue treatment as planned-VRd regimen, along with Xgeva. He will continue f/u with BMT team/Dr. Sorenson, with plans for SCT.\par Case d/w Dr. Sorenson today. We will hold further VRd post this cycle.\par \par Mild groin LAD noted on prior MRI-?significance currently-no current c/o. \par \par Intermittent visual symptoms-not clear to be ?med related. Recommend Brain MRI, and patient to see ophthalmologist as soon as possibe.\par \par Patient and his wife were given the opportunity to ask questions.  Their questions have been answered to their apparent satisfaction at this time.

## 2021-09-17 LAB
ALBUMIN SERPL ELPH-MCNC: 4.3 G/DL
ALP BLD-CCNC: 79 U/L
ALT SERPL-CCNC: 22 U/L
ANION GAP SERPL CALC-SCNC: 17 MMOL/L
AST SERPL-CCNC: 14 U/L
BILIRUB SERPL-MCNC: 0.4 MG/DL
BUN SERPL-MCNC: 18 MG/DL
CALCIUM SERPL-MCNC: 9.3 MG/DL
CHLORIDE SERPL-SCNC: 102 MMOL/L
CO2 SERPL-SCNC: 22 MMOL/L
CREAT SERPL-MCNC: 0.87 MG/DL
DEPRECATED KAPPA LC FREE/LAMBDA SER: 0.24 RATIO
DEPRECATED KAPPA LC FREE/LAMBDA SER: 0.24 RATIO
GLUCOSE SERPL-MCNC: 179 MG/DL
IGA SER QL IEP: 46 MG/DL
IGG SER QL IEP: 384 MG/DL
IGM SER QL IEP: 45 MG/DL
KAPPA LC CSF-MCNC: 2.25 MG/DL
KAPPA LC CSF-MCNC: 2.25 MG/DL
KAPPA LC SERPL-MCNC: 0.53 MG/DL
KAPPA LC SERPL-MCNC: 0.53 MG/DL
LDH SERPL-CCNC: 237 U/L
POTASSIUM SERPL-SCNC: 4.7 MMOL/L
PROT SERPL-MCNC: 6 G/DL
SODIUM SERPL-SCNC: 140 MMOL/L

## 2021-09-20 ENCOUNTER — OUTPATIENT (OUTPATIENT)
Dept: OUTPATIENT SERVICES | Facility: HOSPITAL | Age: 66
LOS: 1 days | Discharge: HOME | End: 2021-09-20

## 2021-09-20 DIAGNOSIS — Z98.890 OTHER SPECIFIED POSTPROCEDURAL STATES: Chronic | ICD-10-CM

## 2021-09-20 DIAGNOSIS — R79.89 OTHER SPECIFIED ABNORMAL FINDINGS OF BLOOD CHEMISTRY: ICD-10-CM

## 2021-09-23 ENCOUNTER — RESULT REVIEW (OUTPATIENT)
Age: 66
End: 2021-09-23

## 2021-09-23 ENCOUNTER — APPOINTMENT (OUTPATIENT)
Dept: INFUSION THERAPY | Facility: HOSPITAL | Age: 66
End: 2021-09-23

## 2021-09-23 DIAGNOSIS — C90.00 MULTIPLE MYELOMA NOT HAVING ACHIEVED REMISSION: ICD-10-CM

## 2021-09-23 DIAGNOSIS — Z51.11 ENCOUNTER FOR ANTINEOPLASTIC CHEMOTHERAPY: ICD-10-CM

## 2021-09-23 LAB
BASOPHILS # BLD AUTO: 0.08 K/UL — SIGNIFICANT CHANGE UP (ref 0–0.2)
BASOPHILS NFR BLD AUTO: 1.2 % — SIGNIFICANT CHANGE UP (ref 0–2)
EOSINOPHIL # BLD AUTO: 0.39 K/UL — SIGNIFICANT CHANGE UP (ref 0–0.5)
EOSINOPHIL NFR BLD AUTO: 5.8 % — SIGNIFICANT CHANGE UP (ref 0–6)
HCT VFR BLD CALC: 36.9 % — LOW (ref 39–50)
HGB BLD-MCNC: 12.5 G/DL — LOW (ref 13–17)
IMM GRANULOCYTES NFR BLD AUTO: 1.6 % — HIGH (ref 0–1.5)
LYMPHOCYTES # BLD AUTO: 1.05 K/UL — SIGNIFICANT CHANGE UP (ref 1–3.3)
LYMPHOCYTES # BLD AUTO: 15.6 % — SIGNIFICANT CHANGE UP (ref 13–44)
MCHC RBC-ENTMCNC: 30.6 PG — SIGNIFICANT CHANGE UP (ref 27–34)
MCHC RBC-ENTMCNC: 33.9 G/DL — SIGNIFICANT CHANGE UP (ref 32–36)
MCV RBC AUTO: 90.2 FL — SIGNIFICANT CHANGE UP (ref 80–100)
MONOCYTES # BLD AUTO: 0.47 K/UL — SIGNIFICANT CHANGE UP (ref 0–0.9)
MONOCYTES NFR BLD AUTO: 7 % — SIGNIFICANT CHANGE UP (ref 2–14)
NEUTROPHILS # BLD AUTO: 4.64 K/UL — SIGNIFICANT CHANGE UP (ref 1.8–7.4)
NEUTROPHILS NFR BLD AUTO: 68.8 % — SIGNIFICANT CHANGE UP (ref 43–77)
NRBC # BLD: 0 /100 WBCS — SIGNIFICANT CHANGE UP (ref 0–0)
PLATELET # BLD AUTO: 183 K/UL — SIGNIFICANT CHANGE UP (ref 150–400)
RBC # BLD: 4.09 M/UL — LOW (ref 4.2–5.8)
RBC # FLD: 14.6 % — HIGH (ref 10.3–14.5)
WBC # BLD: 6.74 K/UL — SIGNIFICANT CHANGE UP (ref 3.8–10.5)
WBC # FLD AUTO: 6.74 K/UL — SIGNIFICANT CHANGE UP (ref 3.8–10.5)

## 2021-09-24 LAB
ALBUMIN MFR SERPL ELPH: 64 %
ALBUMIN SERPL-MCNC: 3.8 G/DL
ALBUMIN/GLOB SERPL: 1.7 RATIO
ALPHA1 GLOB MFR SERPL ELPH: 5.1 %
ALPHA1 GLOB SERPL ELPH-MCNC: 0.3 G/DL
ALPHA2 GLOB MFR SERPL ELPH: 12.6 %
ALPHA2 GLOB SERPL ELPH-MCNC: 0.8 G/DL
B-GLOBULIN MFR SERPL ELPH: 12.1 %
B-GLOBULIN SERPL ELPH-MCNC: 0.7 G/DL
GAMMA GLOB FLD ELPH-MCNC: 0.4 G/DL
GAMMA GLOB MFR SERPL ELPH: 6.2 %
INTERPRETATION SERPL IEP-IMP: NORMAL
M PROTEIN MFR SERPL ELPH: NORMAL
M PROTEIN SPEC IFE-MCNC: NORMAL
MONOCLON BAND OBS SERPL: NORMAL
PROT SERPL-MCNC: 6 G/DL
PROT SERPL-MCNC: 6 G/DL

## 2021-09-27 ENCOUNTER — OUTPATIENT (OUTPATIENT)
Dept: OUTPATIENT SERVICES | Facility: HOSPITAL | Age: 66
LOS: 1 days | End: 2021-09-27

## 2021-09-27 DIAGNOSIS — C90.00 MULTIPLE MYELOMA NOT HAVING ACHIEVED REMISSION: ICD-10-CM

## 2021-09-27 DIAGNOSIS — Z98.890 OTHER SPECIFIED POSTPROCEDURAL STATES: Chronic | ICD-10-CM

## 2021-09-28 ENCOUNTER — LABORATORY RESULT (OUTPATIENT)
Age: 66
End: 2021-09-28

## 2021-09-28 ENCOUNTER — RESULT REVIEW (OUTPATIENT)
Age: 66
End: 2021-09-28

## 2021-09-28 ENCOUNTER — OUTPATIENT (OUTPATIENT)
Dept: OUTPATIENT SERVICES | Facility: HOSPITAL | Age: 66
LOS: 1 days | End: 2021-09-28
Payer: MEDICARE

## 2021-09-28 ENCOUNTER — APPOINTMENT (OUTPATIENT)
Dept: PULMONOLOGY | Facility: CLINIC | Age: 66
End: 2021-09-28
Payer: MEDICARE

## 2021-09-28 ENCOUNTER — APPOINTMENT (OUTPATIENT)
Dept: NUCLEAR MEDICINE | Facility: HOSPITAL | Age: 66
End: 2021-09-28

## 2021-09-28 ENCOUNTER — APPOINTMENT (OUTPATIENT)
Dept: CV DIAGNOSITCS | Facility: HOSPITAL | Age: 66
End: 2021-09-28

## 2021-09-28 ENCOUNTER — APPOINTMENT (OUTPATIENT)
Dept: HEMATOLOGY ONCOLOGY | Facility: CLINIC | Age: 66
End: 2021-09-28

## 2021-09-28 DIAGNOSIS — C90.00 MULTIPLE MYELOMA NOT HAVING ACHIEVED REMISSION: ICD-10-CM

## 2021-09-28 DIAGNOSIS — Z98.890 OTHER SPECIFIED POSTPROCEDURAL STATES: Chronic | ICD-10-CM

## 2021-09-28 LAB
BASOPHILS # BLD AUTO: 0.07 K/UL — SIGNIFICANT CHANGE UP (ref 0–0.2)
BASOPHILS NFR BLD AUTO: 0.9 % — SIGNIFICANT CHANGE UP (ref 0–2)
EOSINOPHIL # BLD AUTO: 0.23 K/UL — SIGNIFICANT CHANGE UP (ref 0–0.5)
EOSINOPHIL NFR BLD AUTO: 3 % — SIGNIFICANT CHANGE UP (ref 0–6)
HCT VFR BLD CALC: 38.6 % — LOW (ref 39–50)
HGB BLD-MCNC: 12.7 G/DL — LOW (ref 13–17)
IMM GRANULOCYTES NFR BLD AUTO: 0.8 % — SIGNIFICANT CHANGE UP (ref 0–1.5)
LYMPHOCYTES # BLD AUTO: 1.01 K/UL — SIGNIFICANT CHANGE UP (ref 1–3.3)
LYMPHOCYTES # BLD AUTO: 13.2 % — SIGNIFICANT CHANGE UP (ref 13–44)
MCHC RBC-ENTMCNC: 30.5 PG — SIGNIFICANT CHANGE UP (ref 27–34)
MCHC RBC-ENTMCNC: 32.9 G/DL — SIGNIFICANT CHANGE UP (ref 32–36)
MCV RBC AUTO: 92.6 FL — SIGNIFICANT CHANGE UP (ref 80–100)
MONOCYTES # BLD AUTO: 1.08 K/UL — HIGH (ref 0–0.9)
MONOCYTES NFR BLD AUTO: 14.1 % — HIGH (ref 2–14)
NEUTROPHILS # BLD AUTO: 5.22 K/UL — SIGNIFICANT CHANGE UP (ref 1.8–7.4)
NEUTROPHILS NFR BLD AUTO: 68 % — SIGNIFICANT CHANGE UP (ref 43–77)
NRBC # BLD: 0 /100 WBCS — SIGNIFICANT CHANGE UP (ref 0–0)
PLATELET # BLD AUTO: 155 K/UL — SIGNIFICANT CHANGE UP (ref 150–400)
RBC # BLD: 4.17 M/UL — LOW (ref 4.2–5.8)
RBC # FLD: 14.7 % — HIGH (ref 10.3–14.5)
SARS-COV-2 N GENE NPH QL NAA+PROBE: NOT DETECTED
WBC # BLD: 7.67 K/UL — SIGNIFICANT CHANGE UP (ref 3.8–10.5)
WBC # FLD AUTO: 7.67 K/UL — SIGNIFICANT CHANGE UP (ref 3.8–10.5)

## 2021-09-28 PROCEDURE — ZZZZZ: CPT

## 2021-09-28 PROCEDURE — 94726 PLETHYSMOGRAPHY LUNG VOLUMES: CPT

## 2021-09-28 PROCEDURE — 94010 BREATHING CAPACITY TEST: CPT

## 2021-09-28 PROCEDURE — 93306 TTE W/DOPPLER COMPLETE: CPT | Mod: 26

## 2021-09-28 PROCEDURE — 70220 X-RAY EXAM OF SINUSES: CPT | Mod: 26

## 2021-09-28 PROCEDURE — 82803 BLOOD GASES ANY COMBINATION: CPT

## 2021-09-28 PROCEDURE — 93356 MYOCRD STRAIN IMG SPCKL TRCK: CPT

## 2021-09-28 PROCEDURE — 94729 DIFFUSING CAPACITY: CPT

## 2021-09-28 PROCEDURE — 36600 WITHDRAWAL OF ARTERIAL BLOOD: CPT | Mod: 59

## 2021-09-28 PROCEDURE — 70220 X-RAY EXAM OF SINUSES: CPT

## 2021-09-28 PROCEDURE — 78472 GATED HEART PLANAR SINGLE: CPT

## 2021-09-28 PROCEDURE — A9560: CPT

## 2021-09-28 PROCEDURE — 93306 TTE W/DOPPLER COMPLETE: CPT

## 2021-09-28 PROCEDURE — 78472 GATED HEART PLANAR SINGLE: CPT | Mod: 26,MH

## 2021-09-30 ENCOUNTER — RESULT REVIEW (OUTPATIENT)
Age: 66
End: 2021-09-30

## 2021-09-30 ENCOUNTER — APPOINTMENT (OUTPATIENT)
Dept: INFUSION THERAPY | Facility: HOSPITAL | Age: 66
End: 2021-09-30

## 2021-09-30 ENCOUNTER — NON-APPOINTMENT (OUTPATIENT)
Age: 66
End: 2021-09-30

## 2021-09-30 LAB
BASOPHILS # BLD AUTO: 0.02 K/UL — SIGNIFICANT CHANGE UP (ref 0–0.2)
BASOPHILS NFR BLD AUTO: 0.3 % — SIGNIFICANT CHANGE UP (ref 0–2)
EOSINOPHIL # BLD AUTO: 0 K/UL — SIGNIFICANT CHANGE UP (ref 0–0.5)
EOSINOPHIL NFR BLD AUTO: 0 % — SIGNIFICANT CHANGE UP (ref 0–6)
HCT VFR BLD CALC: 39.8 % — SIGNIFICANT CHANGE UP (ref 39–50)
HGB BLD-MCNC: 13.5 G/DL — SIGNIFICANT CHANGE UP (ref 13–17)
IMM GRANULOCYTES NFR BLD AUTO: 1 % — SIGNIFICANT CHANGE UP (ref 0–1.5)
LYMPHOCYTES # BLD AUTO: 0.46 K/UL — LOW (ref 1–3.3)
LYMPHOCYTES # BLD AUTO: 5.9 % — LOW (ref 13–44)
MCHC RBC-ENTMCNC: 30.1 PG — SIGNIFICANT CHANGE UP (ref 27–34)
MCHC RBC-ENTMCNC: 33.9 G/DL — SIGNIFICANT CHANGE UP (ref 32–36)
MCV RBC AUTO: 88.8 FL — SIGNIFICANT CHANGE UP (ref 80–100)
MONOCYTES # BLD AUTO: 0.09 K/UL — SIGNIFICANT CHANGE UP (ref 0–0.9)
MONOCYTES NFR BLD AUTO: 1.2 % — LOW (ref 2–14)
NEUTROPHILS # BLD AUTO: 7.11 K/UL — SIGNIFICANT CHANGE UP (ref 1.8–7.4)
NEUTROPHILS NFR BLD AUTO: 91.6 % — HIGH (ref 43–77)
NRBC # BLD: 0 /100 WBCS — SIGNIFICANT CHANGE UP (ref 0–0)
PLATELET # BLD AUTO: 162 K/UL — SIGNIFICANT CHANGE UP (ref 150–400)
RBC # BLD: 4.48 M/UL — SIGNIFICANT CHANGE UP (ref 4.2–5.8)
RBC # FLD: 14.6 % — HIGH (ref 10.3–14.5)
WBC # BLD: 7.76 K/UL — SIGNIFICANT CHANGE UP (ref 3.8–10.5)
WBC # FLD AUTO: 7.76 K/UL — SIGNIFICANT CHANGE UP (ref 3.8–10.5)

## 2021-10-01 ENCOUNTER — APPOINTMENT (OUTPATIENT)
Dept: MRI IMAGING | Facility: CLINIC | Age: 66
End: 2021-10-01

## 2021-10-04 ENCOUNTER — NON-APPOINTMENT (OUTPATIENT)
Age: 66
End: 2021-10-04

## 2021-10-05 ENCOUNTER — RX RENEWAL (OUTPATIENT)
Age: 66
End: 2021-10-05

## 2021-10-06 ENCOUNTER — APPOINTMENT (OUTPATIENT)
Dept: HEMATOLOGY ONCOLOGY | Facility: CLINIC | Age: 66
End: 2021-10-06
Payer: MEDICARE

## 2021-10-06 VITALS
BODY MASS INDEX: 28.37 KG/M2 | WEIGHT: 209.44 LBS | TEMPERATURE: 97.1 F | DIASTOLIC BLOOD PRESSURE: 84 MMHG | SYSTOLIC BLOOD PRESSURE: 138 MMHG | OXYGEN SATURATION: 97 % | HEART RATE: 62 BPM | HEIGHT: 72.05 IN | RESPIRATION RATE: 16 BRPM

## 2021-10-06 DIAGNOSIS — R42 DIZZINESS AND GIDDINESS: ICD-10-CM

## 2021-10-06 DIAGNOSIS — Z87.898 PERSONAL HISTORY OF OTHER SPECIFIED CONDITIONS: ICD-10-CM

## 2021-10-06 DIAGNOSIS — R06.00 DYSPNEA, UNSPECIFIED: ICD-10-CM

## 2021-10-06 PROCEDURE — 99215 OFFICE O/P EST HI 40 MIN: CPT

## 2021-10-06 RX ORDER — METHYLPREDNISOLONE 4 MG/1
4 TABLET ORAL
Qty: 1 | Refills: 0 | Status: DISCONTINUED | COMMUNITY
Start: 2021-01-18 | End: 2021-10-06

## 2021-10-06 RX ORDER — LENALIDOMIDE 25 MG/1
25 CAPSULE ORAL
Qty: 14 | Refills: 0 | Status: DISCONTINUED | COMMUNITY
Start: 2021-05-11 | End: 2021-10-06

## 2021-10-06 RX ORDER — DICLOFENAC SODIUM 75 MG/1
75 TABLET, DELAYED RELEASE ORAL
Qty: 60 | Refills: 0 | Status: DISCONTINUED | COMMUNITY
Start: 2019-07-22 | End: 2021-10-06

## 2021-10-06 RX ORDER — DEXAMETHASONE 4 MG/1
4 TABLET ORAL
Qty: 40 | Refills: 3 | Status: DISCONTINUED | COMMUNITY
Start: 2021-05-05 | End: 2021-10-06

## 2021-10-06 RX ORDER — SODIUM SULFATE, POTASSIUM SULFATE, MAGNESIUM SULFATE 17.5; 3.13; 1.6 G/ML; G/ML; G/ML
17.5-3.13-1.6 SOLUTION, CONCENTRATE ORAL
Qty: 1 | Refills: 0 | Status: DISCONTINUED | COMMUNITY
Start: 2021-03-30 | End: 2021-10-06

## 2021-10-19 ENCOUNTER — APPOINTMENT (OUTPATIENT)
Dept: HEMATOLOGY ONCOLOGY | Facility: CLINIC | Age: 66
End: 2021-10-19
Payer: MEDICARE

## 2021-10-19 ENCOUNTER — RESULT REVIEW (OUTPATIENT)
Age: 66
End: 2021-10-19

## 2021-10-19 VITALS
HEART RATE: 59 BPM | DIASTOLIC BLOOD PRESSURE: 79 MMHG | BODY MASS INDEX: 28.82 KG/M2 | HEIGHT: 72.05 IN | RESPIRATION RATE: 17 BRPM | SYSTOLIC BLOOD PRESSURE: 133 MMHG | TEMPERATURE: 97.3 F | OXYGEN SATURATION: 98 % | WEIGHT: 212.75 LBS

## 2021-10-19 LAB
BASOPHILS # BLD AUTO: 0.08 K/UL — SIGNIFICANT CHANGE UP (ref 0–0.2)
BASOPHILS NFR BLD AUTO: 1.6 % — SIGNIFICANT CHANGE UP (ref 0–2)
EOSINOPHIL # BLD AUTO: 0.13 K/UL — SIGNIFICANT CHANGE UP (ref 0–0.5)
EOSINOPHIL NFR BLD AUTO: 2.5 % — SIGNIFICANT CHANGE UP (ref 0–6)
HCT VFR BLD CALC: 38.9 % — LOW (ref 39–50)
HGB BLD-MCNC: 13.2 G/DL — SIGNIFICANT CHANGE UP (ref 13–17)
IMM GRANULOCYTES NFR BLD AUTO: 0.4 % — SIGNIFICANT CHANGE UP (ref 0–1.5)
LYMPHOCYTES # BLD AUTO: 1.46 K/UL — SIGNIFICANT CHANGE UP (ref 1–3.3)
LYMPHOCYTES # BLD AUTO: 28.6 % — SIGNIFICANT CHANGE UP (ref 13–44)
MCHC RBC-ENTMCNC: 31 PG — SIGNIFICANT CHANGE UP (ref 27–34)
MCHC RBC-ENTMCNC: 33.9 G/DL — SIGNIFICANT CHANGE UP (ref 32–36)
MCV RBC AUTO: 91.3 FL — SIGNIFICANT CHANGE UP (ref 80–100)
MONOCYTES # BLD AUTO: 0.55 K/UL — SIGNIFICANT CHANGE UP (ref 0–0.9)
MONOCYTES NFR BLD AUTO: 10.8 % — SIGNIFICANT CHANGE UP (ref 2–14)
NEUTROPHILS # BLD AUTO: 2.86 K/UL — SIGNIFICANT CHANGE UP (ref 1.8–7.4)
NEUTROPHILS NFR BLD AUTO: 56.1 % — SIGNIFICANT CHANGE UP (ref 43–77)
NRBC # BLD: 0 /100 WBCS — SIGNIFICANT CHANGE UP (ref 0–0)
PLATELET # BLD AUTO: 205 K/UL — SIGNIFICANT CHANGE UP (ref 150–400)
RBC # BLD: 4.26 M/UL — SIGNIFICANT CHANGE UP (ref 4.2–5.8)
RBC # FLD: 14.6 % — HIGH (ref 10.3–14.5)
WBC # BLD: 5.1 K/UL — SIGNIFICANT CHANGE UP (ref 3.8–10.5)
WBC # FLD AUTO: 5.1 K/UL — SIGNIFICANT CHANGE UP (ref 3.8–10.5)

## 2021-10-19 PROCEDURE — 99213 OFFICE O/P EST LOW 20 MIN: CPT

## 2021-10-19 RX ORDER — SODIUM FLUORIDE 6 MG/ML
1.1 PASTE DENTAL
Qty: 100 | Refills: 0 | Status: ACTIVE | COMMUNITY
Start: 2021-10-06

## 2021-10-19 NOTE — PHYSICAL EXAM
[Normal] : affect appropriate [de-identified] : non-toxic appearing [de-identified] : no gross focal motor deficits

## 2021-10-19 NOTE — HISTORY OF PRESENT ILLNESS
[de-identified] : 11/2020-Found to have T-12 compression fracture. Saw orthopedist Dr. Candelaria in 1/2021. Referred to endocrinologist Dr. Acosta by Dr. 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  Serum immunofixation showed 1 IgD lambda band and free lambda light chains.  Urine immunofixation negative for monoclonal band.\par 4/29/2021–Bone marrow biopsy and bone marrow aspirate consistent with plasma cell myeloma (greater than 90% involvement).  Myeloma FISH studies showed CCN D1/IGH fusion (27%).  Flow cytometry positive for monotypic plasma cells.  Lymphocyte immunophenotypic findings showed no diagnostic abnormalities.  Cytogenetics with normal male karyotype.  Congo red stain negative.  Iron stains showed iron stores present.  No ring sideroblasts.\par \par VRd-5/13/21-9/30/21.\par \par \par  [de-identified] : Feeling very well.\par Saw Dr. Sorenson-preparation in progress for PBSCT.\par Completed Cycle #6 Revlimid.\par Going to [place] next week. 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

## 2021-10-19 NOTE — ASSESSMENT
[FreeTextEntry1] : Lab work and Dr. Sorenson's 10/6/21 note, brain MRI results reviewed.\par Multiple myeloma–s/p 6 cycles VRd regimen, along with Xgeva. He will continue f/u with BMT team/Dr. Sorenson, with plans for SCT.\par \par Mild groin LAD noted on prior MRI-?significance currently-no current c/o, and unable to appreciate LAD on exam today-follow clinically. \par \par Patient and his wife were given the opportunity to ask questions.  Their questions have been answered to their apparent satisfaction at this time. They are appreciative of care.

## 2021-10-20 LAB
ALBUMIN SERPL ELPH-MCNC: 4.3 G/DL
ALP BLD-CCNC: 69 U/L
ALT SERPL-CCNC: 24 U/L
ANION GAP SERPL CALC-SCNC: 11 MMOL/L
AST SERPL-CCNC: 16 U/L
BILIRUB SERPL-MCNC: 0.3 MG/DL
BUN SERPL-MCNC: 21 MG/DL
CALCIUM SERPL-MCNC: 9.8 MG/DL
CHLORIDE SERPL-SCNC: 105 MMOL/L
CO2 SERPL-SCNC: 25 MMOL/L
CREAT SERPL-MCNC: 0.99 MG/DL
DEPRECATED KAPPA LC FREE/LAMBDA SER: 0.16 RATIO
DEPRECATED KAPPA LC FREE/LAMBDA SER: 0.16 RATIO
GLUCOSE SERPL-MCNC: 101 MG/DL
IGA SER QL IEP: 43 MG/DL
IGG SER QL IEP: 370 MG/DL
IGM SER QL IEP: 50 MG/DL
KAPPA LC CSF-MCNC: 3.8 MG/DL
KAPPA LC CSF-MCNC: 3.8 MG/DL
KAPPA LC SERPL-MCNC: 0.61 MG/DL
KAPPA LC SERPL-MCNC: 0.61 MG/DL
POTASSIUM SERPL-SCNC: 4.5 MMOL/L
PROT SERPL-MCNC: 5.9 G/DL
SODIUM SERPL-SCNC: 141 MMOL/L

## 2021-10-21 ENCOUNTER — OUTPATIENT (OUTPATIENT)
Dept: OUTPATIENT SERVICES | Facility: HOSPITAL | Age: 66
LOS: 1 days | Discharge: HOME | End: 2021-10-21

## 2021-10-21 DIAGNOSIS — R79.89 OTHER SPECIFIED ABNORMAL FINDINGS OF BLOOD CHEMISTRY: ICD-10-CM

## 2021-10-21 DIAGNOSIS — Z98.890 OTHER SPECIFIED POSTPROCEDURAL STATES: Chronic | ICD-10-CM

## 2021-10-22 ENCOUNTER — APPOINTMENT (OUTPATIENT)
Dept: HEMATOLOGY ONCOLOGY | Facility: CLINIC | Age: 66
End: 2021-10-22
Payer: MEDICARE

## 2021-10-22 VITALS
DIASTOLIC BLOOD PRESSURE: 78 MMHG | HEIGHT: 72.05 IN | OXYGEN SATURATION: 96 % | RESPIRATION RATE: 16 BRPM | SYSTOLIC BLOOD PRESSURE: 129 MMHG | BODY MASS INDEX: 29.08 KG/M2 | TEMPERATURE: 97.2 F | HEART RATE: 57 BPM | WEIGHT: 214.73 LBS

## 2021-10-22 PROCEDURE — 99215 OFFICE O/P EST HI 40 MIN: CPT

## 2021-10-22 NOTE — PHYSICAL EXAM
[Normal] : no peripheral adenopathy appreciated [de-identified] : anicteric [de-identified] : RRR, normal S1S2 [de-identified] : mild edema bilateral ankles

## 2021-10-22 NOTE — REASON FOR VISIT
[Follow-Up Visit] : a follow-up visit for [Spouse] : spouse [FreeTextEntry2] : Pre stem cell transpalnt Zarxio teaching

## 2021-10-22 NOTE — CONSULT LETTER
[Dear  ___] : Dear  [unfilled], [Consult Letter:] : I had the pleasure of evaluating your patient, [unfilled]. [Please see my note below.] : Please see my note below. [Consult Closing:] : Thank you very much for allowing me to participate in the care of this patient.  If you have any questions, please do not hesitate to contact me. [Sincerely,] : Sincerely, [FreeTextEntry2] : Dyana Cheatham M.D.\par [facility]\par [address]\par [place]   [address] [FreeTextEntry3] : \par Pao Sorenson M.D.\par \par  of Medicine\par [employer] [employer]\par [facility] \par [facility]\par [address] [place] [address] \[place] [address] \par ph: [phone]\par fax: [phone]

## 2021-10-22 NOTE — ASSESSMENT
[FreeTextEntry1] : On 10/22//21,Mr. Julio was seen for a teaching visit pre stem cell Mobilization.\par Nori will receive Zarxio 960mcg subcutaneous daily 10/28/21-11/1/21\par Explained to patient how to administer Zarxio.Most common side effects explained.\par \par Shiley catheter and stem cell collection has been scheduled on 11/1/21 at [facility]\par Nori will have COVID -19 PCR testing on 10/30/21\par \par Recommended Nori  to take Claritin daily for 5 days starting 10/28/21\par May take Tylenol PRN for pain or fever\par Avoid Advil, Aleve or Aspirin\par Encouraged to increase calcium in diet over the next few days\par Questions and concerns addressed\par Reassurance provided\par \par

## 2021-10-22 NOTE — HISTORY OF PRESENT ILLNESS
[de-identified] : 11/2020-Found to have T-12 compression fracture. Saw orthopedist Dr. Candelaria in 1/2021. Referred to endocrinologist Dr. Acosta by Dr. 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 Serum immunofixation showed 1 IgD lambda band and free lambda light chains. Urine immunofixation negative for monoclonal band.\par 4/29/2021–Bone marrow biopsy and bone marrow aspirate consistent with plasma cell myeloma (greater than 90% involvement). Myeloma FISH studies showed CCND1/IGH fusion (27%). Flow cytometry positive for monotypic plasma cells. Lymphocyte immunophenotypic findings showed no diagnostic abnormalities. Cytogenetics with normal male karyotype. Congo red stain negative. Iron stains showed iron stores present. No ring sideroblasts.\par 5/13/21- C1D1 RVd\par  [de-identified] : Since initial HPC transplant consult visit on 6/7/21, he had recent hematology office visit on 8/19/21, Cycle #5 Day#15 Velcade/Decadron. Cycle #5 Revlimid as planned. He describes moderate fatigue, occasional blurry vision, insomnia with Decadron. He denies fever, chills, cough, dyspnea. He received the COVID-19 vaccine(Moderna) on 3/16, 4/13/21; he has booster vaccine on 8/31/21. The patient is very concerned about the delta variant of COVID-19 and asked to discuss isolation/restriction policies. \par \par 10/6/21:\par He completed cycle 6 RVD on 9/30/21. He has moderate fatigue and good appetite. He has occasional blurry vision and was evaluated by Optho with normal evaluation. He had MRI Brain with alton(9/29/21 at [facility])- negative. He denies fever,  chills, cough, shortness of breath. \par \par 10/22/21:\par Mr. Julio is here today pre Auto Stem Cell Transplant for [facility].He is accompanied by his spouse.Overall well with no acute concerns.Denies fever, chills, nausea, vomiting, diarrhea, chest pain, SOB or any signs of active bleeding.

## 2021-10-22 NOTE — REVIEW OF SYSTEMS
[Lower Ext Edema] : lower extremity edema [Vomiting] : vomiting [Diarrhea] : diarrhea [Dizziness] : dizziness [Negative] : Heme/Lymph [Fever] : no fever [Chills] : no chills [Night Sweats] : no night sweats [Eye Pain] : no eye pain [Recent Change In Weight] : ~T no recent weight change [Red Eyes] : eyes not red [Dry Eyes] : no dryness of the eyes [Mucosal Pain] : no mucosal pain [Chest Pain] : no chest pain [Palpitations] : no palpitations [Leg Claudication] : no intermittent leg claudication [FreeTextEntry2] : + moderate fatigue [FreeTextEntry3] : + blurry vision( seen by Ophthalmology on 10/5/21) [FreeTextEntry9] : no bone pain [FreeTextEntry5] : occasional ankle edema

## 2021-10-23 LAB
ALBUMIN MFR SERPL ELPH: 65.8 %
ALBUMIN SERPL-MCNC: 3.9 G/DL
ALBUMIN/GLOB SERPL: 2 RATIO
ALPHA1 GLOB MFR SERPL ELPH: 4.6 %
ALPHA1 GLOB SERPL ELPH-MCNC: 0.3 G/DL
ALPHA2 GLOB MFR SERPL ELPH: 11.5 %
ALPHA2 GLOB SERPL ELPH-MCNC: 0.7 G/DL
B-GLOBULIN MFR SERPL ELPH: 11.5 %
B-GLOBULIN SERPL ELPH-MCNC: 0.7 G/DL
GAMMA GLOB FLD ELPH-MCNC: 0.4 G/DL
GAMMA GLOB MFR SERPL ELPH: 6.6 %
INTERPRETATION SERPL IEP-IMP: NORMAL
M PROTEIN MFR SERPL ELPH: 2.4 %
M PROTEIN SPEC IFE-MCNC: NORMAL
MONOCLON BAND OBS SERPL: 0.1 G/DL
PROT SERPL-MCNC: 5.9 G/DL
PROT SERPL-MCNC: 5.9 G/DL

## 2021-10-25 NOTE — REASON FOR VISIT
[Follow-Up Visit] : a follow-up visit for [Spouse] : spouse [FreeTextEntry2] : multiple myeloma to re-discuss high-dose chemotherapy followed by autologous peripheral blood stem cell transplant

## 2021-10-25 NOTE — ASSESSMENT
[FreeTextEntry1] : I previously had a very lengthy discussion with the patient regarding the diagnosis of multiple myeloma and that myeloma is not a curable disease with standard chemotherapy treatment.  I also discussed that high-dose chemotherapy followed by autologous peripheral stem cell transplant is considered one of the treatment options as part of standard of care for patients less than 70 years of age with newly diagnosed multiple myeloma. I discussed that event-free survival and overall survival are prolonged following high-dose chemotherapy followed by autologous peripheral stem cell transplantation compared with conventional chemotherapy treatment.  I did discuss that with the improved response rate with newer therapies for myeloma there is a question regarding timing of autologous stem cell transplant - early versus delayed.  I did discuss that early autologous peripheral stem cell transplantation may minimize total exposure to chemotherapy and may lead to an improvement in quality of life.  \par \par In addition, comprehensive discussion regarding acquisition of hematopoietic stem cells for autologous transplantation by apheresis following mobilization with Cytoxan chemotherapy plus growth factor versus leukocyte growth factor only took place. This was followed by discussion regarding hospitalization for high-dose chemotherapy with Melphalan followed by autologous stem cell transplantation.  Potential side effects and toxicities of Cytoxan chemotherapy and leukocyte growth factor for mobilization of stem cells as well as subsequent high-dose chemotherapy with Melphalan preparative regimen in the setting of transplant were discussed with review of specific consent forms taking place during consultation visit.  The patient states he has already made a decision to proceed with early autologous peripheral stem cell transplantation for his disease.  He will therefore be scheduled for standard pre-transplant testing now.  The patient is very concerned about the delta variant of COVID-19 and asked to discuss isolation/restriction policies post transplant, which was done.\par \par I have had an extensive discussion with the patient regarding the risks, benefits and alternatives to high-dose chemotherapy and autologous peripheral blood stem cell transplantation.   We discussed stem cell mobilization with Cytoxan 3 g/m2 followed by Neulasta 12 mg versus leukocyte growth factor only.  My goal would be to collect 5 to 10 x 10^6 CD34 cells per kilo.  This would be enough for two transplants if at some point a second transplant be deemed necessary.   A preparative regimen including Melphalan 200 mg/m2 was discussed.  Risks including but not limited to infection, bleeding, end organ damage, secondary malignancy, venoocclusive disease and mortality were discussed. Arrangements will be made for the patient to attend our transplant orientation meeting.   Venoocclusive disease and infection prophylaxis and the use of Kepivance to help prevent mucositis were discussed.  Literature and consents have been provided for review during initial consult visit. \par \par 10/6/21:  \par I reviewed pre-transplant testing results(from 9/28/21) with patient today. \par Decision made to mobilize peripheral stem cells with leukocyte growth factor\par Begin Zarxio 960mcg subcutaneous daily 10/28/21-11/1/21\par Shiley catheter placement on 11/1/21\par Begin peripheral blood stem cell collection on 11/1/21\par Avoid aspirin and NSAID's starting 10/28/21\par Tylenol and Claritin for pain control during Zarxio, as directed\par Drink plenty of water daily\par Call the office for any questions or problems\par \par \par \par \par \par

## 2021-10-25 NOTE — CONSULT LETTER
[Dear  ___] : Dear  [unfilled], [Consult Letter:] : I had the pleasure of evaluating your patient, [unfilled]. [Please see my note below.] : Please see my note below. [Consult Closing:] : Thank you very much for allowing me to participate in the care of this patient.  If you have any questions, please do not hesitate to contact me. [Sincerely,] : Sincerely, [FreeTextEntry2] : Dyana Cheatham M.D.\par [facility]\par [address]\par [place], [place]   [address] [FreeTextEntry3] : \par Pao Sorenson M.D.\par \par  of Medicine\par [employer] [facility] [employer]\par [facility] \par [facility]\par [address] [place] [address] \[place] [address] \par ph: [phone]\par fax: [phone]

## 2021-10-25 NOTE — HISTORY OF PRESENT ILLNESS
[de-identified] : 11/2020-Found to have T-12 compression fracture. Saw orthopedist Dr. Candelaria in 1/2021. Referred to endocrinologist Dr. Acosta by Dr. 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 Serum immunofixation showed 1 IgD lambda band and free lambda light chains. Urine immunofixation negative for monoclonal band.\par 4/29/2021–Bone marrow biopsy and bone marrow aspirate consistent with plasma cell myeloma (greater than 90% involvement). Myeloma FISH studies showed CCND1/IGH fusion (27%). Flow cytometry positive for monotypic plasma cells. Lymphocyte immunophenotypic findings showed no diagnostic abnormalities. Cytogenetics with normal male karyotype. Congo red stain negative. Iron stains showed iron stores present. No ring sideroblasts.\par 5/13/21- C1D1 RVd\par  [de-identified] : Since initial HPC transplant consult visit on 6/7/21, he had recent hematology office visit on 8/19/21, Cycle #5 Day#15 Velcade/Decadron. Cycle #5 Revlimid as planned. He describes moderate fatigue, occasional blurry vision, insomnia with Decadron. He denies fever, chills, cough, dyspnea. He received the COVID-19 vaccine(Moderna) on 3/16, 4/13/21; he has booster vaccine on 8/31/21. The patient is very concerned about the delta variant of COVID-19 and asked to discuss isolation/restriction policies. \par \par 10/6/21:\par He completed cycle 6 RVD on 9/30/21. He has moderate fatigue and good appetite. He has occasional blurry vision and was evaluated by Optho with normal evaluation. He had MRI Brain with alton(9/29/21 at [facility])- negative. He denies fever,  chills, cough, shortness of breath. \par

## 2021-10-25 NOTE — REVIEW OF SYSTEMS
[Fever] : no fever [Chills] : no chills [Mucosal Pain] : no mucosal pain [Chest Pain] : no chest pain [Shortness Of Breath] : no shortness of breath [Cough] : no cough [Abdominal Pain] : no abdominal pain [Vomiting] : no vomiting [Constipation] : no constipation [Diarrhea] : no diarrhea [Skin Rash] : no skin rash [Dizziness] : no dizziness [Fainting] : no fainting [Easy Bleeding] : no tendency for easy bleeding [Easy Bruising] : no tendency for easy bruising [FreeTextEntry2] : + moderate fatigue [FreeTextEntry3] : + blurry vision( seen by Optho on 10/5/21) [FreeTextEntry4] : no sore throat [FreeTextEntry5] : occasional ankle edema [FreeTextEntry7] : no nausea [FreeTextEntry9] : no bone pain [de-identified] : no paresthesias

## 2021-10-25 NOTE — PHYSICAL EXAM
[Normal] : affect appropriate [de-identified] : anicteric [de-identified] : RRR, normal S1S2 [de-identified] : mild edema bilateral ankles [de-identified] : no cervical/SCV adenopathy [de-identified] : no rash [de-identified] : A & O x 4

## 2021-10-26 ENCOUNTER — NON-APPOINTMENT (OUTPATIENT)
Age: 66
End: 2021-10-26

## 2021-11-01 ENCOUNTER — RESULT REVIEW (OUTPATIENT)
Age: 66
End: 2021-11-01

## 2021-11-01 ENCOUNTER — APPOINTMENT (OUTPATIENT)
Dept: INFUSION THERAPY | Facility: HOSPITAL | Age: 66
End: 2021-11-01

## 2021-11-01 ENCOUNTER — OUTPATIENT (OUTPATIENT)
Dept: OUTPATIENT SERVICES | Facility: HOSPITAL | Age: 66
LOS: 1 days | End: 2021-11-01
Payer: MEDICARE

## 2021-11-01 VITALS — WEIGHT: 210.1 LBS | HEIGHT: 73 IN

## 2021-11-01 DIAGNOSIS — C90.00 MULTIPLE MYELOMA NOT HAVING ACHIEVED REMISSION: ICD-10-CM

## 2021-11-01 DIAGNOSIS — Z98.890 OTHER SPECIFIED POSTPROCEDURAL STATES: Chronic | ICD-10-CM

## 2021-11-01 DIAGNOSIS — Z01.818 ENCOUNTER FOR OTHER PREPROCEDURAL EXAMINATION: ICD-10-CM

## 2021-11-01 LAB
CA-I BLD-SCNC: 1.21 MMOL/L — SIGNIFICANT CHANGE UP (ref 1.15–1.33)
CD34 CELLS # FLD: 14 /UL — SIGNIFICANT CHANGE UP
CD34 VIABILITY: 94 % — SIGNIFICANT CHANGE UP
EOSINOPHIL NFR BLD AUTO: 3 — SIGNIFICANT CHANGE UP
HCT VFR BLD CALC: 39.8 % — SIGNIFICANT CHANGE UP (ref 39–50)
HGB BLD-MCNC: 13.2 G/DL — SIGNIFICANT CHANGE UP (ref 13–17)
LYMPHOCYTES # BLD AUTO: 13 % — SIGNIFICANT CHANGE UP (ref 13–44)
MCHC RBC-ENTMCNC: 30.2 PG — SIGNIFICANT CHANGE UP (ref 27–34)
MCHC RBC-ENTMCNC: 33.2 GM/DL — SIGNIFICANT CHANGE UP (ref 32–36)
MCV RBC AUTO: 91.1 FL — SIGNIFICANT CHANGE UP (ref 80–100)
METAMYELOCYTES # FLD: 2 % — HIGH (ref 0–0)
MONOCYTES NFR BLD AUTO: 5 % — SIGNIFICANT CHANGE UP (ref 2–14)
NEUTROPHILS NFR BLD AUTO: 71 % — SIGNIFICANT CHANGE UP (ref 43–77)
NEUTS BAND # BLD: 6 % — SIGNIFICANT CHANGE UP (ref 0–8)
NRBC # BLD: 0 /100 WBCS — SIGNIFICANT CHANGE UP (ref 0–0)
PLAT MORPH BLD: NORMAL — SIGNIFICANT CHANGE UP
PLATELET # BLD AUTO: 195 K/UL — SIGNIFICANT CHANGE UP (ref 150–400)
RBC # BLD: 4.37 M/UL — SIGNIFICANT CHANGE UP (ref 4.2–5.8)
RBC # FLD: 15.3 % — HIGH (ref 10.3–14.5)
RBC BLD AUTO: SIGNIFICANT CHANGE UP
SARS-COV-2 N GENE NPH QL NAA+PROBE: NOT DETECTED
WBC # BLD: 26.47 K/UL — HIGH (ref 3.8–10.5)
WBC # FLD AUTO: 26.47 K/UL — HIGH (ref 3.8–10.5)

## 2021-11-01 PROCEDURE — 76937 US GUIDE VASCULAR ACCESS: CPT | Mod: 26

## 2021-11-01 PROCEDURE — 36556 INSERT NON-TUNNEL CV CATH: CPT

## 2021-11-01 PROCEDURE — 77001 FLUOROGUIDE FOR VEIN DEVICE: CPT | Mod: 26

## 2021-11-01 PROCEDURE — 38206 HARVEST AUTO STEM CELLS: CPT

## 2021-11-01 NOTE — ASU DISCHARGE PLAN (ADULT/PEDIATRIC) - ASU DC SPECIAL INSTRUCTIONSFT
Discharge Instructions- Non-Tunneled Central Venous Catheter Placement:    - You have had a non-tunneled central venous catheter placed in your neck  - The catheter is ready for use.  - You may shower as long as the catheter and dressing remains dry.  -  No soaking or swimming until the catheter is removed or when the site is completely healed.  - Keep the area covered and dry for as long as the catheter remains in. It may be removed and changed by a nurse as needed.  - Do not perform any heavy lifting or put tension on the area for the next week or until the site is healed.  - You may resume your normal diet.  - You may resume your normal medications however you should wait 48 hours before restarting aspirin, plavix, or blood thinners.  - It is normal to experience some pain over the site for the next few days. You may take apply ice to the area (20 minutes on, 20 minutes off) and take Tylenol for that pain. Do not take more frequently than every 6 hours and do not exceed more than 3000mg of Tylenol in a 24 hour period.    Discharge Instructions for when the catheter gets removed:    - You have had a non-tunneled central venous catheter removed from your neck  - You may shower in 48 hours. No soaking or swimming until the site is completely healed.  - Keep the area covered and dry for the next 48 hours.  - Do not perform any heavy lifting for the next few days or until the site is healed.  - You may resume your normal diet.  - It is normal to experience some pain over the site for the next few days. You may take apply ice to the area (20 minutes on, 20 minutes off) and take Tylenol for that pain. Do not take more frequently than every 6 hours and do not exceed more than 3000mg of Tylenol in a 24 hour period.    For both placement and removal instructions:   Notify your primary physician and/or Interventional Radiology IMMEDIATELY if you experience any of the following       - Fever of 101F or 38C       - Chills or Rigors/ Shakes       - Swelling and/or Redness in the area around the port       - Worsening Pain       - Blood soaked bandages or worsening bleeding       - Lightheadedness and/or dizziness upon standing       - Chest Pain/ Tightness       - Shortness of Breath       - Difficulty walking    If you have a problem that you believe requires IMMEDIATE attention, please go to your NEAREST Emergency Room. If you believe your problem can safely wait until you speak to a physician, please call Interventional Radiology for any concerns.    During Normal Weekday Business Hours- You can contact the Interventional Radiology department during normal business hours via telephone.  During Evenings and Weekends- If you need to contact Interventional Radiology during off hours, do so by calling the hospital and requesting to be connected to the Interventional Radiologist on call.

## 2021-11-01 NOTE — PRE PROCEDURE NOTE - PRE PROCEDURE EVALUATION
Interventional Radiology    HPI: 66y Male with MM requiring venous access for SCT presents to IR for non-tunneled HD catheter placement.    Allergies:   Levaquin (Flushing; Rash; Hives)  Omnicef (Unknown)  penicillin (Hives)      Exam  General: No acute distress  Chest: Non labored breathing  Abdomen: Non-distended  Extremities: No swelling, warm    Plan: 66y Male presents for non-tunneled HD catheter placement  -Risks/Benefits/alternatives explained with the patient and witnessed informed consent obtained.

## 2021-11-01 NOTE — ASU DISCHARGE PLAN (ADULT/PEDIATRIC) - NURSING INSTRUCTIONS
Please feel free to contact us at [phone] if any questions or concerns arise. After 6PM on Monday through Friday (and weekends and holidays) please call [phone] and ask for the radiology resident on call to be paged..

## 2021-11-01 NOTE — ASU PATIENT PROFILE, ADULT - NSICDXPASTMEDICALHX_GEN_ALL_CORE_FT
PAST MEDICAL HISTORY:  Acute lumbar radiculopathy     History of basal cell carcinoma     Hyperlipidemia     Lumbar herniated disc     Shortness of breath on exertion     T12 compression fracture

## 2021-11-02 ENCOUNTER — OUTPATIENT (OUTPATIENT)
Dept: OUTPATIENT SERVICES | Facility: HOSPITAL | Age: 66
LOS: 1 days | End: 2021-11-02
Payer: MEDICARE

## 2021-11-02 ENCOUNTER — NON-APPOINTMENT (OUTPATIENT)
Age: 66
End: 2021-11-02

## 2021-11-02 ENCOUNTER — APPOINTMENT (OUTPATIENT)
Dept: INFUSION THERAPY | Facility: HOSPITAL | Age: 66
End: 2021-11-02

## 2021-11-02 DIAGNOSIS — C90.00 MULTIPLE MYELOMA NOT HAVING ACHIEVED REMISSION: ICD-10-CM

## 2021-11-02 DIAGNOSIS — Z98.890 OTHER SPECIFIED POSTPROCEDURAL STATES: Chronic | ICD-10-CM

## 2021-11-02 DIAGNOSIS — Z51.89 ENCOUNTER FOR OTHER SPECIFIED AFTERCARE: ICD-10-CM

## 2021-11-02 LAB
CA-I BLD-SCNC: 1.26 MMOL/L — SIGNIFICANT CHANGE UP (ref 1.15–1.33)
CD34 CELLS # FLD: 46 /UL — SIGNIFICANT CHANGE UP
CD34 VIABILITY: 91 % — SIGNIFICANT CHANGE UP
HCT VFR BLD CALC: 38 % — LOW (ref 39–50)
HGB BLD-MCNC: 12.8 G/DL — LOW (ref 13–17)
LYMPHOCYTES # BLD AUTO: 0.5 K/UL — LOW (ref 1–3.3)
LYMPHOCYTES # BLD AUTO: 1 % — LOW (ref 13–44)
MCHC RBC-ENTMCNC: 30.8 PG — SIGNIFICANT CHANGE UP (ref 27–34)
MCHC RBC-ENTMCNC: 33.7 GM/DL — SIGNIFICANT CHANGE UP (ref 32–36)
MCV RBC AUTO: 91.6 FL — SIGNIFICANT CHANGE UP (ref 80–100)
METAMYELOCYTES # FLD: 2 % — HIGH (ref 0–0)
MONOCYTES # BLD AUTO: 2.8 K/UL — HIGH (ref 0–0.9)
MONOCYTES NFR BLD AUTO: 6 % — SIGNIFICANT CHANGE UP (ref 2–14)
MYELOCYTES NFR BLD: 2 % — HIGH (ref 0–0)
NEUTROPHILS # BLD AUTO: 42.4 K/UL — HIGH (ref 1.8–7.4)
NEUTROPHILS NFR BLD AUTO: 79 % — HIGH (ref 43–77)
NEUTS BAND # BLD: 10 % — HIGH (ref 0–8)
NRBC # BLD: 0 /100 WBCS — SIGNIFICANT CHANGE UP (ref 0–0)
PLAT MORPH BLD: NORMAL — SIGNIFICANT CHANGE UP
PLATELET # BLD AUTO: 115 K/UL — LOW (ref 150–400)
RBC # BLD: 4.15 M/UL — LOW (ref 4.2–5.8)
RBC # FLD: 15.4 % — HIGH (ref 10.3–14.5)
RBC BLD AUTO: SIGNIFICANT CHANGE UP
WBC # BLD: 47.71 K/UL — CRITICAL HIGH (ref 3.8–10.5)
WBC # FLD AUTO: 47.71 K/UL — CRITICAL HIGH (ref 3.8–10.5)

## 2021-11-02 PROCEDURE — 85027 COMPLETE CBC AUTOMATED: CPT

## 2021-11-02 PROCEDURE — 38206 HARVEST AUTO STEM CELLS: CPT

## 2021-11-02 PROCEDURE — 85007 BL SMEAR W/DIFF WBC COUNT: CPT

## 2021-11-02 PROCEDURE — 86367 STEM CELLS TOTAL COUNT: CPT

## 2021-11-02 PROCEDURE — 82330 ASSAY OF CALCIUM: CPT

## 2021-11-03 ENCOUNTER — OUTPATIENT (OUTPATIENT)
Dept: OUTPATIENT SERVICES | Facility: HOSPITAL | Age: 66
LOS: 1 days | End: 2021-11-03
Payer: MEDICARE

## 2021-11-03 DIAGNOSIS — Z98.890 OTHER SPECIFIED POSTPROCEDURAL STATES: Chronic | ICD-10-CM

## 2021-11-03 DIAGNOSIS — C90.00 MULTIPLE MYELOMA NOT HAVING ACHIEVED REMISSION: ICD-10-CM

## 2021-11-03 LAB
BASOPHILS NFR BLD AUTO: 1 % — SIGNIFICANT CHANGE UP (ref 0–2)
CA-I BLD-SCNC: 1.18 MMOL/L — SIGNIFICANT CHANGE UP (ref 1.15–1.33)
CD34 CELLS # FLD: 30 /UL — SIGNIFICANT CHANGE UP
CD34 VIABILITY: 96 % — SIGNIFICANT CHANGE UP
EOSINOPHIL NFR BLD AUTO: 1 — SIGNIFICANT CHANGE UP
GRAM STN FLD: SIGNIFICANT CHANGE UP
GRAM STN FLD: SIGNIFICANT CHANGE UP
HCT VFR BLD CALC: 37.4 % — LOW (ref 39–50)
HGB BLD-MCNC: 12.7 G/DL — LOW (ref 13–17)
LYMPHOCYTES # BLD AUTO: 6 % — LOW (ref 13–44)
MCHC RBC-ENTMCNC: 31.1 PG — SIGNIFICANT CHANGE UP (ref 27–34)
MCHC RBC-ENTMCNC: 34 GM/DL — SIGNIFICANT CHANGE UP (ref 32–36)
MCV RBC AUTO: 91.7 FL — SIGNIFICANT CHANGE UP (ref 80–100)
MONOCYTES NFR BLD AUTO: 3 % — SIGNIFICANT CHANGE UP (ref 2–14)
NEUTROPHILS NFR BLD AUTO: 77 % — SIGNIFICANT CHANGE UP (ref 43–77)
NEUTS BAND # BLD: 12 % — HIGH (ref 0–8)
NRBC # BLD: 0 /100 WBCS — SIGNIFICANT CHANGE UP (ref 0–0)
PLAT MORPH BLD: NORMAL — SIGNIFICANT CHANGE UP
PLATELET # BLD AUTO: 90 K/UL — LOW (ref 150–400)
RBC # BLD: 4.08 M/UL — LOW (ref 4.2–5.8)
RBC # FLD: 15 % — HIGH (ref 10.3–14.5)
RBC BLD AUTO: SIGNIFICANT CHANGE UP
WBC # BLD: 56.26 K/UL — CRITICAL HIGH (ref 3.8–10.5)
WBC # FLD AUTO: 56.26 K/UL — CRITICAL HIGH (ref 3.8–10.5)

## 2021-11-03 PROCEDURE — 85007 BL SMEAR W/DIFF WBC COUNT: CPT

## 2021-11-03 PROCEDURE — P9047: CPT

## 2021-11-03 PROCEDURE — 82330 ASSAY OF CALCIUM: CPT

## 2021-11-03 PROCEDURE — 77001 FLUOROGUIDE FOR VEIN DEVICE: CPT

## 2021-11-03 PROCEDURE — 85027 COMPLETE CBC AUTOMATED: CPT

## 2021-11-03 PROCEDURE — 36556 INSERT NON-TUNNEL CV CATH: CPT

## 2021-11-03 PROCEDURE — 38206 HARVEST AUTO STEM CELLS: CPT

## 2021-11-03 PROCEDURE — 86900 BLOOD TYPING SEROLOGIC ABO: CPT

## 2021-11-03 PROCEDURE — 76937 US GUIDE VASCULAR ACCESS: CPT

## 2021-11-03 PROCEDURE — 86850 RBC ANTIBODY SCREEN: CPT

## 2021-11-03 PROCEDURE — C1752: CPT

## 2021-11-03 PROCEDURE — 86367 STEM CELLS TOTAL COUNT: CPT

## 2021-11-03 PROCEDURE — 86901 BLOOD TYPING SEROLOGIC RH(D): CPT

## 2021-11-03 PROCEDURE — C1769: CPT

## 2021-11-03 PROCEDURE — C1894: CPT

## 2021-11-04 ENCOUNTER — NON-APPOINTMENT (OUTPATIENT)
Age: 66
End: 2021-11-04

## 2021-11-09 ENCOUNTER — APPOINTMENT (OUTPATIENT)
Dept: HEMATOLOGY ONCOLOGY | Facility: CLINIC | Age: 66
End: 2021-11-09

## 2021-11-11 DIAGNOSIS — Z45.2 ENCOUNTER FOR ADJUSTMENT AND MANAGEMENT OF VASCULAR ACCESS DEVICE: ICD-10-CM

## 2021-11-11 DIAGNOSIS — C90.00 MULTIPLE MYELOMA NOT HAVING ACHIEVED REMISSION: ICD-10-CM

## 2021-11-12 ENCOUNTER — RESULT REVIEW (OUTPATIENT)
Age: 66
End: 2021-11-12

## 2021-11-12 ENCOUNTER — LABORATORY RESULT (OUTPATIENT)
Age: 66
End: 2021-11-12

## 2021-11-12 ENCOUNTER — APPOINTMENT (OUTPATIENT)
Dept: INFUSION THERAPY | Facility: HOSPITAL | Age: 66
End: 2021-11-12

## 2021-11-12 LAB
BASOPHILS # BLD AUTO: 0.06 K/UL — SIGNIFICANT CHANGE UP (ref 0–0.2)
BASOPHILS NFR BLD AUTO: 1.4 % — SIGNIFICANT CHANGE UP (ref 0–2)
EOSINOPHIL # BLD AUTO: 0.13 K/UL — SIGNIFICANT CHANGE UP (ref 0–0.5)
EOSINOPHIL NFR BLD AUTO: 3 % — SIGNIFICANT CHANGE UP (ref 0–6)
HCT VFR BLD CALC: 36.8 % — LOW (ref 39–50)
HGB BLD-MCNC: 12.3 G/DL — LOW (ref 13–17)
IMM GRANULOCYTES NFR BLD AUTO: 0.7 % — SIGNIFICANT CHANGE UP (ref 0–1.5)
LYMPHOCYTES # BLD AUTO: 0.69 K/UL — LOW (ref 1–3.3)
LYMPHOCYTES # BLD AUTO: 16 % — SIGNIFICANT CHANGE UP (ref 13–44)
MCHC RBC-ENTMCNC: 31.2 PG — SIGNIFICANT CHANGE UP (ref 27–34)
MCHC RBC-ENTMCNC: 33.4 G/DL — SIGNIFICANT CHANGE UP (ref 32–36)
MCV RBC AUTO: 93.4 FL — SIGNIFICANT CHANGE UP (ref 80–100)
MONOCYTES # BLD AUTO: 0.59 K/UL — SIGNIFICANT CHANGE UP (ref 0–0.9)
MONOCYTES NFR BLD AUTO: 13.7 % — SIGNIFICANT CHANGE UP (ref 2–14)
NEUTROPHILS # BLD AUTO: 2.82 K/UL — SIGNIFICANT CHANGE UP (ref 1.8–7.4)
NEUTROPHILS NFR BLD AUTO: 65.2 % — SIGNIFICANT CHANGE UP (ref 43–77)
NRBC # BLD: 0 /100 WBCS — SIGNIFICANT CHANGE UP (ref 0–0)
PLATELET # BLD AUTO: 292 K/UL — SIGNIFICANT CHANGE UP (ref 150–400)
RBC # BLD: 3.94 M/UL — LOW (ref 4.2–5.8)
RBC # FLD: 15.2 % — HIGH (ref 10.3–14.5)
WBC # BLD: 4.32 K/UL — SIGNIFICANT CHANGE UP (ref 3.8–10.5)
WBC # FLD AUTO: 4.32 K/UL — SIGNIFICANT CHANGE UP (ref 3.8–10.5)

## 2021-11-13 ENCOUNTER — APPOINTMENT (OUTPATIENT)
Dept: INFUSION THERAPY | Facility: HOSPITAL | Age: 66
End: 2021-11-13

## 2021-11-15 ENCOUNTER — NON-APPOINTMENT (OUTPATIENT)
Age: 66
End: 2021-11-15

## 2021-11-15 ENCOUNTER — INPATIENT (INPATIENT)
Facility: HOSPITAL | Age: 66
LOS: 21 days | Discharge: HOME HEALTH | DRG: 16 | End: 2021-12-07
Attending: INTERNAL MEDICINE | Admitting: INTERNAL MEDICINE
Payer: MEDICARE

## 2021-11-15 VITALS
SYSTOLIC BLOOD PRESSURE: 148 MMHG | OXYGEN SATURATION: 97 % | HEART RATE: 66 BPM | DIASTOLIC BLOOD PRESSURE: 81 MMHG | WEIGHT: 216.93 LBS | HEIGHT: 72.05 IN | TEMPERATURE: 99 F | RESPIRATION RATE: 20 BRPM

## 2021-11-15 DIAGNOSIS — Z94.84 STEM CELLS TRANSPLANT STATUS: ICD-10-CM

## 2021-11-15 DIAGNOSIS — C90.00 MULTIPLE MYELOMA NOT HAVING ACHIEVED REMISSION: ICD-10-CM

## 2021-11-15 DIAGNOSIS — Z98.890 OTHER SPECIFIED POSTPROCEDURAL STATES: Chronic | ICD-10-CM

## 2021-11-15 DIAGNOSIS — Z29.9 ENCOUNTER FOR PROPHYLACTIC MEASURES, UNSPECIFIED: ICD-10-CM

## 2021-11-15 LAB
ALBUMIN SERPL ELPH-MCNC: 4.2 G/DL — SIGNIFICANT CHANGE UP (ref 3.3–5)
ALP SERPL-CCNC: 85 U/L — SIGNIFICANT CHANGE UP (ref 40–120)
ALT FLD-CCNC: 19 U/L — SIGNIFICANT CHANGE UP (ref 10–45)
ANION GAP SERPL CALC-SCNC: 12 MMOL/L — SIGNIFICANT CHANGE UP (ref 5–17)
APPEARANCE UR: CLEAR — SIGNIFICANT CHANGE UP
APTT BLD: 33.7 SEC — SIGNIFICANT CHANGE UP (ref 27.5–35.5)
AST SERPL-CCNC: 16 U/L — SIGNIFICANT CHANGE UP (ref 10–40)
BASOPHILS # BLD AUTO: 0.06 K/UL — SIGNIFICANT CHANGE UP (ref 0–0.2)
BASOPHILS NFR BLD AUTO: 1.4 % — SIGNIFICANT CHANGE UP (ref 0–2)
BILIRUB DIRECT SERPL-MCNC: <0.1 MG/DL — SIGNIFICANT CHANGE UP (ref 0–0.2)
BILIRUB INDIRECT FLD-MCNC: >0.2 MG/DL — SIGNIFICANT CHANGE UP (ref 0.2–1)
BILIRUB SERPL-MCNC: 0.3 MG/DL — SIGNIFICANT CHANGE UP (ref 0.2–1.2)
BILIRUB UR-MCNC: NEGATIVE — SIGNIFICANT CHANGE UP
BLD GP AB SCN SERPL QL: NEGATIVE — SIGNIFICANT CHANGE UP
BUN SERPL-MCNC: 18 MG/DL — SIGNIFICANT CHANGE UP (ref 7–23)
CALCIUM SERPL-MCNC: 8.7 MG/DL — SIGNIFICANT CHANGE UP (ref 8.4–10.5)
CHLORIDE SERPL-SCNC: 107 MMOL/L — SIGNIFICANT CHANGE UP (ref 96–108)
CO2 SERPL-SCNC: 23 MMOL/L — SIGNIFICANT CHANGE UP (ref 22–31)
COLOR SPEC: SIGNIFICANT CHANGE UP
COVID-19 SPIKE DOMAIN AB INTERP: POSITIVE
COVID-19 SPIKE DOMAIN ANTIBODY RESULT: >250 U/ML — HIGH
CREAT SERPL-MCNC: 0.88 MG/DL — SIGNIFICANT CHANGE UP (ref 0.5–1.3)
CULTURE RESULTS: SIGNIFICANT CHANGE UP
DIFF PNL FLD: NEGATIVE — SIGNIFICANT CHANGE UP
EOSINOPHIL # BLD AUTO: 0.11 K/UL — SIGNIFICANT CHANGE UP (ref 0–0.5)
EOSINOPHIL NFR BLD AUTO: 2.6 % — SIGNIFICANT CHANGE UP (ref 0–6)
FIBRINOGEN PPP-MCNC: 412 MG/DL — SIGNIFICANT CHANGE UP (ref 290–520)
GLUCOSE SERPL-MCNC: 95 MG/DL — SIGNIFICANT CHANGE UP (ref 70–99)
GLUCOSE UR QL: NEGATIVE — SIGNIFICANT CHANGE UP
HCT VFR BLD CALC: 35.5 % — LOW (ref 39–50)
HGB BLD-MCNC: 11.8 G/DL — LOW (ref 13–17)
IMM GRANULOCYTES NFR BLD AUTO: 0.5 % — SIGNIFICANT CHANGE UP (ref 0–1.5)
INR BLD: 0.97 RATIO — SIGNIFICANT CHANGE UP (ref 0.88–1.16)
KETONES UR-MCNC: NEGATIVE — SIGNIFICANT CHANGE UP
LDH SERPL L TO P-CCNC: 166 U/L — SIGNIFICANT CHANGE UP (ref 50–242)
LEUKOCYTE ESTERASE UR-ACNC: NEGATIVE — SIGNIFICANT CHANGE UP
LYMPHOCYTES # BLD AUTO: 0.57 K/UL — LOW (ref 1–3.3)
LYMPHOCYTES # BLD AUTO: 13.3 % — SIGNIFICANT CHANGE UP (ref 13–44)
MAGNESIUM SERPL-MCNC: 1.9 MG/DL — SIGNIFICANT CHANGE UP (ref 1.6–2.6)
MCHC RBC-ENTMCNC: 31.7 PG — SIGNIFICANT CHANGE UP (ref 27–34)
MCHC RBC-ENTMCNC: 33.2 GM/DL — SIGNIFICANT CHANGE UP (ref 32–36)
MCV RBC AUTO: 95.4 FL — SIGNIFICANT CHANGE UP (ref 80–100)
MONOCYTES # BLD AUTO: 0.67 K/UL — SIGNIFICANT CHANGE UP (ref 0–0.9)
MONOCYTES NFR BLD AUTO: 15.7 % — HIGH (ref 2–14)
NEUTROPHILS # BLD AUTO: 2.85 K/UL — SIGNIFICANT CHANGE UP (ref 1.8–7.4)
NEUTROPHILS NFR BLD AUTO: 66.5 % — SIGNIFICANT CHANGE UP (ref 43–77)
NITRITE UR-MCNC: NEGATIVE — SIGNIFICANT CHANGE UP
NRBC # BLD: 0 /100 WBCS — SIGNIFICANT CHANGE UP (ref 0–0)
PH UR: 6.5 — SIGNIFICANT CHANGE UP (ref 5–8)
PHOSPHATE SERPL-MCNC: 2.1 MG/DL — LOW (ref 2.5–4.5)
PLATELET # BLD AUTO: 297 K/UL — SIGNIFICANT CHANGE UP (ref 150–400)
POTASSIUM SERPL-MCNC: 3.4 MMOL/L — LOW (ref 3.5–5.3)
POTASSIUM SERPL-SCNC: 3.4 MMOL/L — LOW (ref 3.5–5.3)
PROT SERPL-MCNC: 5.8 G/DL — LOW (ref 6–8.3)
PROT UR-MCNC: NEGATIVE — SIGNIFICANT CHANGE UP
PROTHROM AB SERPL-ACNC: 11.7 SEC — SIGNIFICANT CHANGE UP (ref 10.6–13.6)
RBC # BLD: 3.72 M/UL — LOW (ref 4.2–5.8)
RBC # BLD: 3.72 M/UL — LOW (ref 4.2–5.8)
RBC # FLD: 15.5 % — HIGH (ref 10.3–14.5)
RETICS #: 63.6 K/UL — SIGNIFICANT CHANGE UP (ref 25–125)
RETICS/RBC NFR: 1.7 % — SIGNIFICANT CHANGE UP (ref 0.5–2.5)
RH IG SCN BLD-IMP: NEGATIVE — SIGNIFICANT CHANGE UP
SARS-COV-2 IGG+IGM SERPL QL IA: >250 U/ML — HIGH
SARS-COV-2 IGG+IGM SERPL QL IA: POSITIVE
SODIUM SERPL-SCNC: 142 MMOL/L — SIGNIFICANT CHANGE UP (ref 135–145)
SP GR SPEC: 1.01 — SIGNIFICANT CHANGE UP (ref 1.01–1.02)
SPECIMEN SOURCE: SIGNIFICANT CHANGE UP
UROBILINOGEN FLD QL: NEGATIVE — SIGNIFICANT CHANGE UP
WBC # BLD: 4.28 K/UL — SIGNIFICANT CHANGE UP (ref 3.8–10.5)
WBC # FLD AUTO: 4.28 K/UL — SIGNIFICANT CHANGE UP (ref 3.8–10.5)

## 2021-11-15 PROCEDURE — 76937 US GUIDE VASCULAR ACCESS: CPT | Mod: 26

## 2021-11-15 PROCEDURE — 93010 ELECTROCARDIOGRAM REPORT: CPT

## 2021-11-15 PROCEDURE — 36556 INSERT NON-TUNNEL CV CATH: CPT

## 2021-11-15 PROCEDURE — 99291 CRITICAL CARE FIRST HOUR: CPT

## 2021-11-15 PROCEDURE — 77001 FLUOROGUIDE FOR VEIN DEVICE: CPT | Mod: 26

## 2021-11-15 RX ORDER — POTASSIUM CHLORIDE 20 MEQ
20 PACKET (EA) ORAL
Refills: 0 | Status: COMPLETED | OUTPATIENT
Start: 2021-11-15 | End: 2021-11-15

## 2021-11-15 RX ORDER — SODIUM CHLORIDE 9 MG/ML
1000 INJECTION INTRAMUSCULAR; INTRAVENOUS; SUBCUTANEOUS
Refills: 0 | Status: DISCONTINUED | OUTPATIENT
Start: 2021-11-15 | End: 2021-11-24

## 2021-11-15 RX ORDER — ONDANSETRON 8 MG/1
8 TABLET, FILM COATED ORAL EVERY 8 HOURS
Refills: 0 | Status: COMPLETED | OUTPATIENT
Start: 2021-11-15 | End: 2021-11-18

## 2021-11-15 RX ORDER — APREPITANT 80 MG/1
80 CAPSULE ORAL EVERY 24 HOURS
Refills: 0 | Status: COMPLETED | OUTPATIENT
Start: 2021-11-16 | End: 2021-11-19

## 2021-11-15 RX ORDER — FUROSEMIDE 40 MG
20 TABLET ORAL EVERY 24 HOURS
Refills: 0 | Status: DISCONTINUED | OUTPATIENT
Start: 2021-11-15 | End: 2021-11-25

## 2021-11-15 RX ORDER — METOCLOPRAMIDE HCL 10 MG
10 TABLET ORAL EVERY 6 HOURS
Refills: 0 | Status: DISCONTINUED | OUTPATIENT
Start: 2021-11-15 | End: 2021-11-30

## 2021-11-15 RX ORDER — MELPHALAN HYDROCHLORIDE 50 MG
204 KIT INTRAVENOUS DAILY
Refills: 0 | Status: COMPLETED | OUTPATIENT
Start: 2021-11-15 | End: 2021-11-16

## 2021-11-15 RX ORDER — FLUCONAZOLE 150 MG/1
400 TABLET ORAL DAILY
Refills: 0 | Status: DISCONTINUED | OUTPATIENT
Start: 2021-11-15 | End: 2021-11-23

## 2021-11-15 RX ORDER — ACETAMINOPHEN 500 MG
650 TABLET ORAL EVERY 6 HOURS
Refills: 0 | Status: DISCONTINUED | OUTPATIENT
Start: 2021-11-15 | End: 2021-11-23

## 2021-11-15 RX ORDER — APREPITANT 80 MG/1
125 CAPSULE ORAL ONCE
Refills: 0 | Status: COMPLETED | OUTPATIENT
Start: 2021-11-15 | End: 2021-11-15

## 2021-11-15 RX ORDER — BACITRACIN ZINC 500 UNIT/G
1 OINTMENT IN PACKET (EA) TOPICAL
Refills: 0 | Status: DISCONTINUED | OUTPATIENT
Start: 2021-11-15 | End: 2021-11-26

## 2021-11-15 RX ORDER — SALIVA SUBSTITUTE COMB NO.11 351 MG
5 POWDER IN PACKET (EA) MUCOUS MEMBRANE
Refills: 0 | Status: DISCONTINUED | OUTPATIENT
Start: 2021-11-15 | End: 2021-12-07

## 2021-11-15 RX ORDER — CHLORHEXIDINE GLUCONATE 213 G/1000ML
1 SOLUTION TOPICAL
Refills: 0 | Status: DISCONTINUED | OUTPATIENT
Start: 2021-11-15 | End: 2021-11-23

## 2021-11-15 RX ORDER — URSODIOL 250 MG/1
300 TABLET, FILM COATED ORAL
Refills: 0 | Status: DISCONTINUED | OUTPATIENT
Start: 2021-11-15 | End: 2021-11-23

## 2021-11-15 RX ORDER — SODIUM CHLORIDE 9 MG/ML
1000 INJECTION INTRAMUSCULAR; INTRAVENOUS; SUBCUTANEOUS
Refills: 0 | Status: DISCONTINUED | OUTPATIENT
Start: 2021-11-15 | End: 2021-11-20

## 2021-11-15 RX ORDER — CLOTRIMAZOLE 10 MG
1 TROCHE MUCOUS MEMBRANE
Refills: 0 | Status: DISCONTINUED | OUTPATIENT
Start: 2021-11-15 | End: 2021-12-07

## 2021-11-15 RX ORDER — PANTOPRAZOLE SODIUM 20 MG/1
40 TABLET, DELAYED RELEASE ORAL
Refills: 0 | Status: DISCONTINUED | OUTPATIENT
Start: 2021-11-15 | End: 2021-11-23

## 2021-11-15 RX ORDER — SENNA PLUS 8.6 MG/1
1 TABLET ORAL AT BEDTIME
Refills: 0 | Status: DISCONTINUED | OUTPATIENT
Start: 2021-11-15 | End: 2021-11-23

## 2021-11-15 RX ORDER — DEXAMETHASONE 0.5 MG/5ML
20 ELIXIR ORAL EVERY 24 HOURS
Refills: 0 | Status: COMPLETED | OUTPATIENT
Start: 2021-11-15 | End: 2021-11-17

## 2021-11-15 RX ORDER — SODIUM BICARBONATE 1 MEQ/ML
10 SYRINGE (ML) INTRAVENOUS
Refills: 0 | Status: DISCONTINUED | OUTPATIENT
Start: 2021-11-15 | End: 2021-11-26

## 2021-11-15 RX ORDER — HEPARIN SODIUM 5000 [USP'U]/ML
410 INJECTION INTRAVENOUS; SUBCUTANEOUS
Qty: 25000 | Refills: 0 | Status: DISCONTINUED | OUTPATIENT
Start: 2021-11-15 | End: 2021-11-24

## 2021-11-15 RX ORDER — SODIUM CHLORIDE 9 MG/ML
10 INJECTION INTRAMUSCULAR; INTRAVENOUS; SUBCUTANEOUS
Refills: 0 | Status: DISCONTINUED | OUTPATIENT
Start: 2021-11-15 | End: 2021-11-23

## 2021-11-15 RX ORDER — ACYCLOVIR SODIUM 500 MG
400 VIAL (EA) INTRAVENOUS EVERY 8 HOURS
Refills: 0 | Status: DISCONTINUED | OUTPATIENT
Start: 2021-11-18 | End: 2021-11-23

## 2021-11-15 RX ORDER — FOLIC ACID 0.8 MG
1 TABLET ORAL DAILY
Refills: 0 | Status: DISCONTINUED | OUTPATIENT
Start: 2021-11-15 | End: 2021-11-23

## 2021-11-15 RX ADMIN — Medication 1 MILLIGRAM(S): at 13:08

## 2021-11-15 RX ADMIN — Medication 1 TABLET(S): at 17:52

## 2021-11-15 RX ADMIN — Medication 110 MILLIGRAM(S): at 19:26

## 2021-11-15 RX ADMIN — Medication 5 MILLILITER(S): at 19:27

## 2021-11-15 RX ADMIN — Medication 1 LOZENGE: at 16:19

## 2021-11-15 RX ADMIN — Medication 50 MILLIEQUIVALENT(S): at 21:57

## 2021-11-15 RX ADMIN — Medication 10 MILLILITER(S): at 19:46

## 2021-11-15 RX ADMIN — Medication 10 MILLILITER(S): at 13:09

## 2021-11-15 RX ADMIN — Medication 5 MILLILITER(S): at 16:19

## 2021-11-15 RX ADMIN — APREPITANT 125 MILLIGRAM(S): 80 CAPSULE ORAL at 18:19

## 2021-11-15 RX ADMIN — SODIUM CHLORIDE 20 MILLILITER(S): 9 INJECTION INTRAMUSCULAR; INTRAVENOUS; SUBCUTANEOUS at 16:18

## 2021-11-15 RX ADMIN — Medication 1 APPLICATION(S): at 18:18

## 2021-11-15 RX ADMIN — Medication 1 LOZENGE: at 19:27

## 2021-11-15 RX ADMIN — Medication 1 LOZENGE: at 13:09

## 2021-11-15 RX ADMIN — Medication 1 TABLET(S): at 13:08

## 2021-11-15 RX ADMIN — Medication 50 MILLIEQUIVALENT(S): at 19:26

## 2021-11-15 RX ADMIN — FLUCONAZOLE 400 MILLIGRAM(S): 150 TABLET ORAL at 13:08

## 2021-11-15 RX ADMIN — URSODIOL 300 MILLIGRAM(S): 250 TABLET, FILM COATED ORAL at 17:52

## 2021-11-15 RX ADMIN — CHLORHEXIDINE GLUCONATE 1 APPLICATION(S): 213 SOLUTION TOPICAL at 19:46

## 2021-11-15 RX ADMIN — ONDANSETRON 8 MILLIGRAM(S): 8 TABLET, FILM COATED ORAL at 21:57

## 2021-11-15 RX ADMIN — Medication 50 MILLIEQUIVALENT(S): at 17:52

## 2021-11-15 RX ADMIN — HEPARIN SODIUM 4.1 UNIT(S)/HR: 5000 INJECTION INTRAVENOUS; SUBCUTANEOUS at 16:26

## 2021-11-15 RX ADMIN — PANTOPRAZOLE SODIUM 40 MILLIGRAM(S): 20 TABLET, DELAYED RELEASE ORAL at 13:09

## 2021-11-15 RX ADMIN — Medication 10 MILLILITER(S): at 16:19

## 2021-11-15 RX ADMIN — MELPHALAN HYDROCHLORIDE 1081.6 MILLIGRAM(S): KIT INTRAVENOUS at 21:17

## 2021-11-15 RX ADMIN — SODIUM CHLORIDE 200 MILLILITER(S): 9 INJECTION INTRAMUSCULAR; INTRAVENOUS; SUBCUTANEOUS at 16:19

## 2021-11-15 NOTE — H&P ADULT - HISTORY OF PRESENT ILLNESS
This is a 66 year old male with multiple myeloma admitted for an autologous pbsct with high dose melphalan prep regimen. Hematologic history as follows: 11/2020 was found to have a T-12 compression fracture. He saw orthopedics 1/2021, and was referred to endocrine. 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 Serum immunofixation showed 1 IgD lambda band and free lambda light chains. Urine immunofixation negative for monoclonal band. 4/29/21 a bone marrow biopsy and aspirate was consistent with plasma cell myeloma (> 90% involvement), flow cytometry positive for monotypic plasma cells. He started cycle 1 RVD on 5/13/21. He completed 6 cycles of RVD 9/30/21. During chemotherapy he reported occasional blurry vision (negative optho workup, negative MRI brain 9/29/21) and moderate fatigue. He has no complaints on admission other than facial erythema, likely related to kepivance.

## 2021-11-15 NOTE — H&P ADULT - ASSESSMENT
66 year old male with recently diagnosed plasma cell myeloma status post RVD x 6, admitted for autologous pbsct with high dose melphalan prep regimen.

## 2021-11-15 NOTE — H&P ADULT - REASON FOR ADMISSION
Autologous peripheral blood stem cell transplant with high dose Melphalan prep regimen for treatment of multiple myeloma

## 2021-11-15 NOTE — H&P ADULT - PROBLEM SELECTOR PLAN 1
1. Admit to BMTU   2. 3 hours prior to Melphalan start hydration: D5NS at 200ml /hr and continue 24 hours post Melphalan infusion  3. Day -4 & day -3- Melphalan 100mg/m2  IV   4. Strict I&O, daily weights, prn diuresis   5. Day -2, day -1 rest days. At 2200 on day – 1, start transplant hydration 1/2NS + 50mEq NaHCO3- (may substitute [id number] if bicarb not available)  6. Day 0 – infuse HPC product. 4 hours after infusion of cells start Zarxio 5 micrograms / kg (actual weight) . Continue through engraftment.   7. On day 0, + 1, + 2-give Kepivance 60micrograms / kg (actual weight)

## 2021-11-15 NOTE — PRE PROCEDURE NOTE - PRE PROCEDURE EVALUATION
Interventional Radiology    HPI: 66y Male with  MM here for non tunneled CVC placement    Allergies: Omnicef (Unknown)  penicillin (Hives)    Medications (Abx/Cardiac/Anticoagulation/Blood Products)    clotrimazole Lozenge: 1 Lozenge Oral (11-15 @ 13:09)  fluconAZOLE   Tablet: 400 milliGRAM(s) Oral (11-15 @ 13:08)    Data:  183  98.4  T(C): 37  HR: 61  BP: 118/72  RR: 18  SpO2: 96%    Exam  General: No acute distress  Chest: Non labored breathing  Abdomen: Non-distended  Extremities: No swelling, warm    -WBC 4.32 / HgB 12.3 / Hct 36.8 / Plt 292    Plan: 66y Male presents for non tunneled CVC placement  -Risks/Benefits/alternatives explained with the patient and/or healthcare proxy and witnessed informed consent obtained.

## 2021-11-16 LAB
ALBUMIN SERPL ELPH-MCNC: 3.9 G/DL — SIGNIFICANT CHANGE UP (ref 3.3–5)
ALP SERPL-CCNC: 80 U/L — SIGNIFICANT CHANGE UP (ref 40–120)
ALT FLD-CCNC: 20 U/L — SIGNIFICANT CHANGE UP (ref 10–45)
ANION GAP SERPL CALC-SCNC: 11 MMOL/L — SIGNIFICANT CHANGE UP (ref 5–17)
AST SERPL-CCNC: 12 U/L — SIGNIFICANT CHANGE UP (ref 10–40)
BASOPHILS # BLD AUTO: 0.03 K/UL — SIGNIFICANT CHANGE UP (ref 0–0.2)
BASOPHILS NFR BLD AUTO: 0.9 % — SIGNIFICANT CHANGE UP (ref 0–2)
BILIRUB SERPL-MCNC: 0.4 MG/DL — SIGNIFICANT CHANGE UP (ref 0.2–1.2)
BUN SERPL-MCNC: 15 MG/DL — SIGNIFICANT CHANGE UP (ref 7–23)
CALCIUM SERPL-MCNC: 8.4 MG/DL — SIGNIFICANT CHANGE UP (ref 8.4–10.5)
CHLORIDE SERPL-SCNC: 110 MMOL/L — HIGH (ref 96–108)
CO2 SERPL-SCNC: 20 MMOL/L — LOW (ref 22–31)
COVID-19 NUCLEOCAPSID GAM AB INTERP: NEGATIVE — SIGNIFICANT CHANGE UP
COVID-19 NUCLEOCAPSID TOTAL GAM ANTIBODY RESULT: 0.07 INDEX — SIGNIFICANT CHANGE UP
CREAT SERPL-MCNC: 0.89 MG/DL — SIGNIFICANT CHANGE UP (ref 0.5–1.3)
EOSINOPHIL # BLD AUTO: 0.02 K/UL — SIGNIFICANT CHANGE UP (ref 0–0.5)
EOSINOPHIL NFR BLD AUTO: 0.8 % — SIGNIFICANT CHANGE UP (ref 0–6)
GLUCOSE SERPL-MCNC: 179 MG/DL — HIGH (ref 70–99)
HCT VFR BLD CALC: 36.4 % — LOW (ref 39–50)
HCV AB S/CO SERPL IA: 0.04 S/CO — SIGNIFICANT CHANGE UP (ref 0–0.99)
HCV AB SERPL-IMP: SIGNIFICANT CHANGE UP
HGB BLD-MCNC: 11.9 G/DL — LOW (ref 13–17)
IGA FLD-MCNC: 51 MG/DL — LOW (ref 84–499)
IGD SER-MCNC: 21 MG/DL — HIGH
IGG FLD-MCNC: 379 MG/DL — LOW (ref 610–1660)
IGM SERPL-MCNC: 48 MG/DL — SIGNIFICANT CHANGE UP (ref 35–242)
KAPPA LC SER QL IFE: 0.62 MG/DL — SIGNIFICANT CHANGE UP (ref 0.33–1.94)
KAPPA/LAMBDA FREE LIGHT CHAIN RATIO, SERUM: 0.1 RATIO — LOW (ref 0.26–1.65)
LAMBDA LC SER QL IFE: 6.52 MG/DL — HIGH (ref 0.57–2.63)
LDH SERPL L TO P-CCNC: 153 U/L — SIGNIFICANT CHANGE UP (ref 50–242)
LYMPHOCYTES # BLD AUTO: 0.17 K/UL — LOW (ref 1–3.3)
LYMPHOCYTES # BLD AUTO: 6.1 % — LOW (ref 13–44)
MAGNESIUM SERPL-MCNC: 1.8 MG/DL — SIGNIFICANT CHANGE UP (ref 1.6–2.6)
MANUAL SMEAR VERIFICATION: SIGNIFICANT CHANGE UP
MCHC RBC-ENTMCNC: 31.2 PG — SIGNIFICANT CHANGE UP (ref 27–34)
MCHC RBC-ENTMCNC: 32.7 GM/DL — SIGNIFICANT CHANGE UP (ref 32–36)
MCV RBC AUTO: 95.5 FL — SIGNIFICANT CHANGE UP (ref 80–100)
MONOCYTES # BLD AUTO: 0 K/UL — SIGNIFICANT CHANGE UP (ref 0–0.9)
MONOCYTES NFR BLD AUTO: 0 % — LOW (ref 2–14)
NEUTROPHILS # BLD AUTO: 2.55 K/UL — SIGNIFICANT CHANGE UP (ref 1.8–7.4)
NEUTROPHILS NFR BLD AUTO: 90.4 % — HIGH (ref 43–77)
NEUTS BAND # BLD: 0.9 % — SIGNIFICANT CHANGE UP (ref 0–8)
PHOSPHATE SERPL-MCNC: 1.7 MG/DL — LOW (ref 2.5–4.5)
PLAT MORPH BLD: NORMAL — SIGNIFICANT CHANGE UP
PLATELET # BLD AUTO: 262 K/UL — SIGNIFICANT CHANGE UP (ref 150–400)
POTASSIUM SERPL-MCNC: 4.5 MMOL/L — SIGNIFICANT CHANGE UP (ref 3.5–5.3)
POTASSIUM SERPL-SCNC: 4.5 MMOL/L — SIGNIFICANT CHANGE UP (ref 3.5–5.3)
PROT SERPL-MCNC: 5.8 G/DL — LOW (ref 6–8.3)
RBC # BLD: 3.81 M/UL — LOW (ref 4.2–5.8)
RBC # FLD: 15.2 % — HIGH (ref 10.3–14.5)
RBC BLD AUTO: SIGNIFICANT CHANGE UP
SARS-COV-2 IGG+IGM SERPL QL IA: 0.07 INDEX — SIGNIFICANT CHANGE UP
SARS-COV-2 IGG+IGM SERPL QL IA: NEGATIVE — SIGNIFICANT CHANGE UP
SODIUM SERPL-SCNC: 141 MMOL/L — SIGNIFICANT CHANGE UP (ref 135–145)
VARIANT LYMPHS # BLD: 0.9 % — SIGNIFICANT CHANGE UP (ref 0–6)
WBC # BLD: 2.79 K/UL — LOW (ref 3.8–10.5)
WBC # FLD AUTO: 2.79 K/UL — LOW (ref 3.8–10.5)

## 2021-11-16 PROCEDURE — 99291 CRITICAL CARE FIRST HOUR: CPT

## 2021-11-16 PROCEDURE — 99232 SBSQ HOSP IP/OBS MODERATE 35: CPT

## 2021-11-16 RX ORDER — FUROSEMIDE 40 MG
40 TABLET ORAL ONCE
Refills: 0 | Status: COMPLETED | OUTPATIENT
Start: 2021-11-16 | End: 2021-11-16

## 2021-11-16 RX ORDER — POTASSIUM CHLORIDE 20 MEQ
20 PACKET (EA) ORAL
Refills: 0 | Status: COMPLETED | OUTPATIENT
Start: 2021-11-16 | End: 2021-11-16

## 2021-11-16 RX ORDER — ACETAMINOPHEN 500 MG
650 TABLET ORAL ONCE
Refills: 0 | Status: COMPLETED | OUTPATIENT
Start: 2021-11-19 | End: 2021-11-19

## 2021-11-16 RX ORDER — HYDROCORTISONE 1 %
1 OINTMENT (GRAM) TOPICAL
Refills: 0 | Status: DISCONTINUED | OUTPATIENT
Start: 2021-11-16 | End: 2021-11-26

## 2021-11-16 RX ORDER — SODIUM CHLORIDE 9 MG/ML
1000 INJECTION, SOLUTION INTRAVENOUS
Refills: 0 | Status: DISCONTINUED | OUTPATIENT
Start: 2021-11-18 | End: 2021-11-20

## 2021-11-16 RX ORDER — FILGRASTIM 480MCG/1.6
480 VIAL (ML) INJECTION EVERY 24 HOURS
Refills: 0 | Status: DISCONTINUED | OUTPATIENT
Start: 2021-11-19 | End: 2021-11-25

## 2021-11-16 RX ORDER — LIDOCAINE AND PRILOCAINE CREAM 25; 25 MG/G; MG/G
1 CREAM TOPICAL DAILY
Refills: 0 | Status: DISCONTINUED | OUTPATIENT
Start: 2021-11-19 | End: 2021-12-01

## 2021-11-16 RX ORDER — HYDROCORTISONE 20 MG
50 TABLET ORAL ONCE
Refills: 0 | Status: COMPLETED | OUTPATIENT
Start: 2021-11-19 | End: 2021-11-19

## 2021-11-16 RX ORDER — SODIUM,POTASSIUM PHOSPHATES 278-250MG
1 POWDER IN PACKET (EA) ORAL
Refills: 0 | Status: COMPLETED | OUTPATIENT
Start: 2021-11-16 | End: 2021-11-18

## 2021-11-16 RX ORDER — DIPHENHYDRAMINE HCL 50 MG
25 CAPSULE ORAL ONCE
Refills: 0 | Status: COMPLETED | OUTPATIENT
Start: 2021-11-19 | End: 2021-11-19

## 2021-11-16 RX ADMIN — Medication 10 MILLILITER(S): at 12:56

## 2021-11-16 RX ADMIN — Medication 1 TABLET(S): at 12:59

## 2021-11-16 RX ADMIN — Medication 10 MILLILITER(S): at 01:00

## 2021-11-16 RX ADMIN — Medication 1 APPLICATION(S): at 17:36

## 2021-11-16 RX ADMIN — Medication 50 MILLIEQUIVALENT(S): at 13:58

## 2021-11-16 RX ADMIN — Medication 5 MILLILITER(S): at 12:55

## 2021-11-16 RX ADMIN — Medication 10 MILLILITER(S): at 16:06

## 2021-11-16 RX ADMIN — Medication 1 APPLICATION(S): at 06:40

## 2021-11-16 RX ADMIN — CHLORHEXIDINE GLUCONATE 1 APPLICATION(S): 213 SOLUTION TOPICAL at 06:40

## 2021-11-16 RX ADMIN — Medication 1 LOZENGE: at 16:06

## 2021-11-16 RX ADMIN — URSODIOL 300 MILLIGRAM(S): 250 TABLET, FILM COATED ORAL at 17:35

## 2021-11-16 RX ADMIN — Medication 5 MILLILITER(S): at 19:49

## 2021-11-16 RX ADMIN — Medication 1 MILLIGRAM(S): at 12:57

## 2021-11-16 RX ADMIN — Medication 1 TABLET(S): at 06:37

## 2021-11-16 RX ADMIN — Medication 50 MILLIEQUIVALENT(S): at 10:54

## 2021-11-16 RX ADMIN — ONDANSETRON 8 MILLIGRAM(S): 8 TABLET, FILM COATED ORAL at 21:47

## 2021-11-16 RX ADMIN — ONDANSETRON 8 MILLIGRAM(S): 8 TABLET, FILM COATED ORAL at 06:38

## 2021-11-16 RX ADMIN — Medication 10 MILLILITER(S): at 08:26

## 2021-11-16 RX ADMIN — Medication 1 TABLET(S): at 12:58

## 2021-11-16 RX ADMIN — Medication 1 LOZENGE: at 00:12

## 2021-11-16 RX ADMIN — Medication 5 MILLILITER(S): at 16:05

## 2021-11-16 RX ADMIN — Medication 1 TABLET(S): at 17:36

## 2021-11-16 RX ADMIN — Medication 5 MILLILITER(S): at 00:12

## 2021-11-16 RX ADMIN — Medication 1 LOZENGE: at 19:50

## 2021-11-16 RX ADMIN — Medication 50 MILLIEQUIVALENT(S): at 08:53

## 2021-11-16 RX ADMIN — Medication 5 MILLILITER(S): at 08:33

## 2021-11-16 RX ADMIN — PANTOPRAZOLE SODIUM 40 MILLIGRAM(S): 20 TABLET, DELAYED RELEASE ORAL at 06:39

## 2021-11-16 RX ADMIN — URSODIOL 300 MILLIGRAM(S): 250 TABLET, FILM COATED ORAL at 06:38

## 2021-11-16 RX ADMIN — Medication 1 LOZENGE: at 12:56

## 2021-11-16 RX ADMIN — MELPHALAN HYDROCHLORIDE 1081.6 MILLIGRAM(S): KIT INTRAVENOUS at 20:57

## 2021-11-16 RX ADMIN — APREPITANT 80 MILLIGRAM(S): 80 CAPSULE ORAL at 12:55

## 2021-11-16 RX ADMIN — ONDANSETRON 8 MILLIGRAM(S): 8 TABLET, FILM COATED ORAL at 13:57

## 2021-11-16 RX ADMIN — FLUCONAZOLE 400 MILLIGRAM(S): 150 TABLET ORAL at 12:56

## 2021-11-16 RX ADMIN — Medication 40 MILLIGRAM(S): at 10:57

## 2021-11-16 RX ADMIN — Medication 110 MILLIGRAM(S): at 18:26

## 2021-11-16 RX ADMIN — Medication 40 MILLIGRAM(S): at 16:05

## 2021-11-16 RX ADMIN — Medication 1 TABLET(S): at 17:35

## 2021-11-16 RX ADMIN — Medication 85 MILLIMOLE(S): at 17:35

## 2021-11-16 RX ADMIN — Medication 10 MILLILITER(S): at 19:55

## 2021-11-16 RX ADMIN — Medication 1 LOZENGE: at 08:26

## 2021-11-16 RX ADMIN — Medication 250 MILLIMOLE(S): at 00:12

## 2021-11-16 NOTE — DIETITIAN INITIAL EVALUATION ADULT. - OTHER INFO
Pt reports decrease in appetite x 1 day, still with good PO intake, ~% of meals consumed. Oral nutritional supplement deferred at this time.    GI: Denies N/V. Last BM was yesterday (11/15), denies diarrhea/constipation. Bowel regimen: Senna.     pounds PTA, endorses weight gain x 6 months (202 pounds 05/2021). Current admission weight of 216.9 pounds (11/15), 222 (11/16). Of note, Pt receiving diuretics and IV fluids in-house - weight changes likely secondary to fluid shifts. Overall indicates Pt is wt stable at this time. RD to continue to monitor weight trends as available/able.

## 2021-11-16 NOTE — DIETITIAN INITIAL EVALUATION ADULT. - ADD RECOMMEND
1) Continue current diet as ordered/tolerated. 2) Continue Glutasolve 1x/day. 3) Continue micronutrient coverage: multivitamin and folic acid. 4) Encourage adequate consumption of meals/supplements to optimize protein-energy intake. 5) Monitor PO intake/tolerance, weights, labs, hydration status, skin integrity

## 2021-11-16 NOTE — PROGRESS NOTE ADULT - ATTENDING COMMENTS
66 year old male with recently diagnosed plasma cell myeloma status post RVD x 6, admitted for autologous pbsct with high dose melphalan prep regimen.   Days - 4 & -3 : 3 hours prior to Melphalan start hydration: D5NS at 200ml /hr and continue 24 hours post Melphalan infusion;   Melphalan 100mg/m2  IV   Strict I&O, daily weights, prn diuresis   Day -2, day -1 rest days. At 2200 on day – 1, start transplant hydration 1/2NS + 50mEq NaHCO3- (may substitute [id number] if bicarb not available)  Day 0 – infuse HPC product. 4 hours after infusion of cells start Zarxio 5 micrograms / kg (actual weight) . Continue through engraftment.   On day 0, + 1, + 2-give Kepivance 60micrograms / kg (actual weight).  VOD prophylaxis - low dose heparin gtt (dosed at 100 units / kg / day), glutamine supplementation, Actigall BID   PCP prophylaxis - Bactrim DS through day -2    Antiviral prophylaxis - Acyclovir 400mg po TID to start day -1   Antifungal prophylaxis- Diflucan 400 mg po daily.  GI prophylaxis - Protonix po QD   Antibacterial prophylaxis - when ANC < 1000, start Cipro 500mg po BID. If becomes febrile, pan cx, CXR and change Cipro to Cefepime 2g IV q 8 hours. Continue until count recovery  Mouth care and skin care as per protocol.

## 2021-11-16 NOTE — DIETITIAN INITIAL EVALUATION ADULT. - PERTINENT LABORATORY DATA
11-16 Na 141 mmol/L Glu 179 mg/dL<H> K+ 4.5 mmol/L Cr 0.89 mg/dL BUN 15 mg/dL Phos 1.7 mg/dL<L> Alb 3.9 g/dL PAB n/a   Hgb 11.9 g/dL<L> Hct 36.4 %<L> HgA1C n/a    Glucose, Serum: 179 mg/dL *H*   24Hr FS:

## 2021-11-16 NOTE — DIETITIAN INITIAL EVALUATION ADULT. - PERTINENT MEDS FT
MEDICATIONS  (STANDING):  acetaminophen     Tablet .. 650 milliGRAM(s) Oral every 6 hours  aprepitant 80 milliGRAM(s) Oral every 24 hours  BACItracin   Ointment 1 Application(s) Topical two times a day  Biotene Dry Mouth Oral Rinse 5 milliLiter(s) Swish and Spit five times a day  chlorhexidine 4% Liquid 1 Application(s) Topical <User Schedule>  clotrimazole Lozenge 1 Lozenge Oral five times a day  dexAMETHasone  IVPB 20 milliGRAM(s) IV Intermittent every 24 hours  fluconAZOLE   Tablet 400 milliGRAM(s) Oral daily  folic acid 1 milliGRAM(s) Oral daily  heparin  Infusion 410 Unit(s)/Hr (4.1 mL/Hr) IV Continuous <Continuous>  hydrocortisone 1% Cream 1 Application(s) Topical two times a day  LORazepam   Injectable 1 milliGRAM(s) IV Push every 24 hours  melphalan IVPB (eMAR) 204 milliGRAM(s) IV Intermittent daily  multivitamin 1 Tablet(s) Oral daily  ondansetron Injectable 8 milliGRAM(s) IV Push every 8 hours  pantoprazole    Tablet 40 milliGRAM(s) Oral before breakfast  potassium phosphate / sodium phosphate Tablet (K-PHOS No. 2) 1 Tablet(s) Oral four times a day  sodium bicarbonate Mouth Rinse 10 milliLiter(s) Swish and Spit five times a day  sodium chloride 0.9%. 1000 milliLiter(s) (20 mL/Hr) IV Continuous <Continuous>  sodium chloride 0.9%. 1000 milliLiter(s) (200 mL/Hr) IV Continuous <Continuous>  sodium phosphate IVPB 30 milliMole(s) IV Intermittent once  trimethoprim  160 mG/sulfamethoxazole 800 mG 1 Tablet(s) Oral every 12 hours  ursodiol Capsule 300 milliGRAM(s) Oral two times a day    MEDICATIONS  (PRN):  acetaminophen     Tablet .. 650 milliGRAM(s) Oral every 6 hours PRN Temp greater or equal to 38C (100.4F), Mild Pain (1 - 3)  furosemide   Injectable 20 milliGRAM(s) IV Push every 24 hours PRN if urine output < 100 ml/hr X 2 hours  LORazepam   Injectable 1 milliGRAM(s) IV Push every 6 hours PRN anxiety / nausea / vomiting  metoclopramide Injectable 10 milliGRAM(s) IV Push every 6 hours PRN Nausea and/or Vomiting  senna 1 Tablet(s) Oral at bedtime PRN Constipation  sodium chloride 0.9% lock flush 10 milliLiter(s) IV Push every 1 hour PRN Pre/post blood products, medications, blood draw, and to maintain line patency

## 2021-11-16 NOTE — DIETITIAN INITIAL EVALUATION ADULT. - CHIEF COMPLAINT
None
Per chart "66 year old male with a history of multiple myeloma s/p RVD x 6, Autologous PBSCT day - 3"

## 2021-11-16 NOTE — DIETITIAN INITIAL EVALUATION ADULT. - ORAL INTAKE PTA/DIET HISTORY
Pt reports good appetite/PO intake, ~% of estimated needs per diet recall. No therapeutic restrictions. NKFA/intolerances reported. Micronutrient supplementation: vitamin D3. Protein-energy supplementation: whey protein 1x/day (stopped PTA). No difficulty chewing/swallowing reported.

## 2021-11-16 NOTE — DIETITIAN INITIAL EVALUATION ADULT. - PERSON TAUGHT/METHOD
Discussed importance of adequate consumption of meals/supplements to optimize protein-energy intake. Encouraged small/frequent meals, nutrient dense snacks, prioritizing protein foods at meal time. Pt advised RD remains available to address GI symptoms./verbal instruction/individual instruction/patient instructed/teach back - (Patient repeats in own words)

## 2021-11-16 NOTE — DIETITIAN INITIAL EVALUATION ADULT. - PROBLEM SELECTOR PLAN 1
1. Admit to BMTU   2. 3 hours prior to Melphalan start hydration: D5NS at 200ml /hr and continue 24 hours post Melphalan infusion  3. Day -4 & day -3- Melphalan 100mg/m2  IV   4. Strict I&O, daily weights, prn diuresis   5. Day -2, day -1 rest days. At 2200 on day – 1, start transplant hydration 1/2NS + 50mEq NaHCO3- (may substitute [id number] if bicarb not available)  6. Day 0 – infuse HPC product. 4 hours after infusion of cells start Zarxio 5 micrograms / kg (actual weight) . Continue through engraftment.   7. On day 0, + 1, + 2-give Kepivance 60micrograms / kg (actual weight)

## 2021-11-16 NOTE — PROGRESS NOTE ADULT - SUBJECTIVE AND OBJECTIVE BOX
HPC Transplant Team                                                      Critical / Counseling Time Provided: 30 minutes                                                                                                                                                        Chief Complaint: Autologous peripheral blood stem cell transplant with high dose Melphalan prep regimen for treatment of multiple myeloma    S: Patient seen and examined with HPC Transplant Team:     All other ROS negative     O: Vitals:   Vital Signs Last 24 Hrs  T(C): 36.6 (2021 06:00), Max: 37.2 (15 Nov 2021 15:45)  T(F): 97.9 (2021 06:00), Max: 99 (15 Nov 2021 15:45)  HR: 62 (2021 06:00) (56 - 66)  BP: 132/80 (2021 06:00) (118/72 - 159/81)  BP(mean): --  RR: 18 (2021 06:00) (18 - 20)  SpO2: 96% (2021 06:00) (95% - 97%)    Admit weight:   Daily Height in cm: 183 (15 Nov 2021 10:28)    Daily Weight in k.4 (15 Nov 2021 09:36)    Intake / Output:   -15 @ 07:01  -  -16 @ 07:00  --------------------------------------------------------  IN: 4476 mL / OUT: 3625 mL / NET: 851 mL          PE:   Oropharynx: no erythema or ulcerations   Oral Mucositis:              -                                          Grade: n/a   CVS: S1, S2 RRR   Lungs: CTA throughout bilaterally   Abdomen: + BS x 4, soft, NT, ND   Extremities: no edema   Gastric Mucositis:       -                                           Grade: n/a  Intestinal Mucositis:     -                                         Grade: n/a   Skin: patchy, erythematous macular rash to face, neck and upper chest post kepivance   TLC: CDI   Neuro: A&O x 3   Pain: 0    Labs:   CBC Full  -  ( 2021 06:53 )  WBC Count : 2.79 K/uL  Hemoglobin : 11.9 g/dL  Hematocrit : 36.4 %  Platelet Count - Automated : 262 K/uL  Mean Cell Volume : 95.5 fl  Mean Cell Hemoglobin : 31.2 pg  Mean Cell Hemoglobin Concentration : 32.7 gm/dL  Auto Neutrophil # : x  Auto Lymphocyte # : x  Auto Monocyte # : x  Auto Eosinophil # : x  Auto Basophil # : x  Auto Neutrophil % : x  Auto Lymphocyte % : x  Auto Monocyte % : x  Auto Eosinophil % : x  Auto Basophil % : x                          11.9   2.79  )-----------( 262      ( 2021 06:53 )             36.4         Meds:   Antimicrobials:   clotrimazole Lozenge 1 Lozenge Oral five times a day  fluconAZOLE   Tablet 400 milliGRAM(s) Oral daily  trimethoprim  160 mG/sulfamethoxazole 800 mG 1 Tablet(s) Oral every 12 hours      Heme / Onc:   heparin  Infusion 410 Unit(s)/Hr IV Continuous <Continuous>  melphalan IVPB (eMAR) 204 milliGRAM(s) IV Intermittent daily      GI:  pantoprazole    Tablet 40 milliGRAM(s) Oral before breakfast  senna 1 Tablet(s) Oral at bedtime PRN  sodium bicarbonate Mouth Rinse 10 milliLiter(s) Swish and Spit five times a day  ursodiol Capsule 300 milliGRAM(s) Oral two times a day      Cardiovascular:   furosemide   Injectable 40 milliGRAM(s) IV Push once  furosemide   Injectable 40 milliGRAM(s) IV Push once  furosemide   Injectable 20 milliGRAM(s) IV Push every 24 hours PRN      Immunologic:       Other medications:   acetaminophen     Tablet .. 650 milliGRAM(s) Oral every 6 hours  aprepitant 80 milliGRAM(s) Oral every 24 hours  BACItracin   Ointment 1 Application(s) Topical two times a day  Biotene Dry Mouth Oral Rinse 5 milliLiter(s) Swish and Spit five times a day  chlorhexidine 4% Liquid 1 Application(s) Topical <User Schedule>  dexAMETHasone  IVPB 20 milliGRAM(s) IV Intermittent every 24 hours  folic acid 1 milliGRAM(s) Oral daily  LORazepam   Injectable 1 milliGRAM(s) IV Push every 24 hours  multivitamin 1 Tablet(s) Oral daily  ondansetron Injectable 8 milliGRAM(s) IV Push every 8 hours  potassium chloride  20 mEq/100 mL IVPB 20 milliEquivalent(s) IV Intermittent every 2 hours  sodium chloride 0.9%. 1000 milliLiter(s) IV Continuous <Continuous>  sodium chloride 0.9%. 1000 milliLiter(s) IV Continuous <Continuous>      PRN:   acetaminophen     Tablet .. 650 milliGRAM(s) Oral every 6 hours PRN  furosemide   Injectable 20 milliGRAM(s) IV Push every 24 hours PRN  LORazepam   Injectable 1 milliGRAM(s) IV Push every 6 hours PRN  metoclopramide Injectable 10 milliGRAM(s) IV Push every 6 hours PRN  senna 1 Tablet(s) Oral at bedtime PRN  sodium chloride 0.9% lock flush 10 milliLiter(s) IV Push every 1 hour PRN      A/P:   66 year old male with a history of multiple myeloma s/p RVD x 6   Pre :  Autologous PBSCT day - 3  - melphalan ; continue melphalan hydration for 24 hours post infusion of last dose   Strict I&O, daily weights, prn diuresis   [  1. Infectious Disease:   clotrimazole Lozenge 1 Lozenge Oral five times a day  fluconAZOLE   Tablet 400 milliGRAM(s) Oral daily  trimethoprim  160 mG/sulfamethoxazole 800 mG 1 Tablet(s) Oral every 12 hours - give through day - 2   Start Acyclovir 400mg po TID on day -1   When ANC < 500, start cipro 500mg po BID     2. VOD Prophylaxis: Actigall, Glutamine, Heparin (dosed at 100 units / kg / day)     3. GI Prophylaxis:    pantoprazole    Tablet 40 milliGRAM(s) Oral before breakfast    4. Mouthcare - NS / NaHCO3 rinses, Mycelex, Biotene; Skin care     5. GVHD prophylaxis - n/a     6. Transfuse & replete electrolytes prn   potassium chloride  20 mEq/100 mL IVPB 20 milliEquivalent(s) IV Intermittent every 2 hours    7. IV hydration, daily weights, strict I&O, prn diuresis   Lasix 40mg Iv x 1 0730  Lasix 40mg IV x 1 1600    8. PO intake as tolerated, nutrition follow up as needed, MVI, folic acid     9. Antiemetics, anti-diarrhea medications:   LORazepam   Injectable 1 milliGRAM(s) IV Push every 6 hours PRN  metoclopramide Injectable 10 milliGRAM(s) IV Push every 6 hours PRN  ondansetron Injectable 8 milliGRAM(s) IV Push every 8 hours  dexAMETHasone  IVPB 20 milliGRAM(s) IV Intermittent every 24 hours  aprepitant 80 milliGRAM(s) Oral every 24 hours    10. OOB as tolerated, physical therapy consult if needed     11. Monitor coags / fibrinogen 2x week, vitamin K as needed     12. Monitor closely for clinical changes, monitor for fevers     13. Emotional support provided, plan of care discussed and questions addressed     14. Patient education done regarding plan of care, restrictions and discharge planning     15. Continue regular social work input     I have written the above note for Dr. Naylor who performed service with me in the room.   Terrie Barr NP-C ([phone])    I have seen and examined patient with NP, I agree with above note as scribed.                    HPC Transplant Team                                                      Critical / Counseling Time Provided: 30 minutes                                                                                                                                                        Chief Complaint: Autologous peripheral blood stem cell transplant with high dose Melphalan prep regimen for treatment of multiple myeloma    S: Patient seen and examined with HPC Transplant Team:     All other ROS negative     O: Vitals:   Vital Signs Last 24 Hrs  T(C): 36.6 (2021 06:00), Max: 37.2 (15 Nov 2021 15:45)  T(F): 97.9 (2021 06:00), Max: 99 (15 Nov 2021 15:45)  HR: 62 (2021 06:00) (56 - 66)  BP: 132/80 (2021 06:00) (118/72 - 159/81)  BP(mean): --  RR: 18 (2021 06:00) (18 - 20)  SpO2: 96% (2021 06:00) (95% - 97%)    Admit weight:   Daily Height in cm: 183 (15 Nov 2021 10:28)    Daily Weight in k.4 (15 Nov 2021 09:36)    Intake / Output:   -15 @ 07:01  -  -16 @ 07:00  --------------------------------------------------------  IN: 4476 mL / OUT: 3625 mL / NET: 851 mL          PE:   Oropharynx: no erythema or ulcerations   Oral Mucositis:              -                                          Grade: n/a   CVS: S1, S2 RRR   Lungs: CTA throughout bilaterally   Abdomen: + BS x 4, soft, NT, ND   Extremities: no edema   Gastric Mucositis:       -                                           Grade: n/a  Intestinal Mucositis:     -                                         Grade: n/a   Skin: patchy, erythematous macular rash to face, neck and upper chest post kepivance   TLC: CDI   Neuro: A&O x 3   Pain: 0    Labs:   CBC Full  -  ( 2021 06:53 )  WBC Count : 2.79 K/uL  Hemoglobin : 11.9 g/dL  Hematocrit : 36.4 %  Platelet Count - Automated : 262 K/uL  Mean Cell Volume : 95.5 fl  Mean Cell Hemoglobin : 31.2 pg  Mean Cell Hemoglobin Concentration : 32.7 gm/dL  Auto Neutrophil # : x  Auto Lymphocyte # : x  Auto Monocyte # : x  Auto Eosinophil # : x  Auto Basophil # : x  Auto Neutrophil % : x  Auto Lymphocyte % : x  Auto Monocyte % : x  Auto Eosinophil % : x  Auto Basophil % : x                          11.9   2.79  )-----------( 262      ( 2021 06:53 )             36.4     -16    141  |  110<H>  |  15  ----------------------------<  179<H>  4.5   |  20<L>  |  0.89    Ca    8.4      2021 06:53  Phos  1.7       Mg     1.8         TPro  5.8<L>  /  Alb  3.9  /  TBili  0.4  /  DBili  x   /  AST  12  /  ALT  20  /  AlkPhos  80          Meds:   Antimicrobials:   clotrimazole Lozenge 1 Lozenge Oral five times a day  fluconAZOLE   Tablet 400 milliGRAM(s) Oral daily  trimethoprim  160 mG/sulfamethoxazole 800 mG 1 Tablet(s) Oral every 12 hours      Heme / Onc:   heparin  Infusion 410 Unit(s)/Hr IV Continuous <Continuous>  melphalan IVPB (eMAR) 204 milliGRAM(s) IV Intermittent daily      GI:  pantoprazole    Tablet 40 milliGRAM(s) Oral before breakfast  senna 1 Tablet(s) Oral at bedtime PRN  sodium bicarbonate Mouth Rinse 10 milliLiter(s) Swish and Spit five times a day  ursodiol Capsule 300 milliGRAM(s) Oral two times a day      Cardiovascular:   furosemide   Injectable 40 milliGRAM(s) IV Push once  furosemide   Injectable 40 milliGRAM(s) IV Push once  furosemide   Injectable 20 milliGRAM(s) IV Push every 24 hours PRN      Immunologic:       Other medications:   acetaminophen     Tablet .. 650 milliGRAM(s) Oral every 6 hours  aprepitant 80 milliGRAM(s) Oral every 24 hours  BACItracin   Ointment 1 Application(s) Topical two times a day  Biotene Dry Mouth Oral Rinse 5 milliLiter(s) Swish and Spit five times a day  chlorhexidine 4% Liquid 1 Application(s) Topical <User Schedule>  dexAMETHasone  IVPB 20 milliGRAM(s) IV Intermittent every 24 hours  folic acid 1 milliGRAM(s) Oral daily  LORazepam   Injectable 1 milliGRAM(s) IV Push every 24 hours  multivitamin 1 Tablet(s) Oral daily  ondansetron Injectable 8 milliGRAM(s) IV Push every 8 hours  potassium chloride  20 mEq/100 mL IVPB 20 milliEquivalent(s) IV Intermittent every 2 hours  sodium chloride 0.9%. 1000 milliLiter(s) IV Continuous <Continuous>  sodium chloride 0.9%. 1000 milliLiter(s) IV Continuous <Continuous>      PRN:   acetaminophen     Tablet .. 650 milliGRAM(s) Oral every 6 hours PRN  furosemide   Injectable 20 milliGRAM(s) IV Push every 24 hours PRN  LORazepam   Injectable 1 milliGRAM(s) IV Push every 6 hours PRN  metoclopramide Injectable 10 milliGRAM(s) IV Push every 6 hours PRN  senna 1 Tablet(s) Oral at bedtime PRN  sodium chloride 0.9% lock flush 10 milliLiter(s) IV Push every 1 hour PRN      A/P:   66 year old male with a history of multiple myeloma s/p RVD x 6   Pre :  Autologous PBSCT day - 3  - melphalan /; continue melphalan hydration for 24 hours post infusion of last dose   Strict I&O, daily weights, prn diuresis   [  1. Infectious Disease:   clotrimazole Lozenge 1 Lozenge Oral five times a day  fluconAZOLE   Tablet 400 milliGRAM(s) Oral daily  trimethoprim  160 mG/sulfamethoxazole 800 mG 1 Tablet(s) Oral every 12 hours - give through day - 2   Start Acyclovir 400mg po TID on day -1   When ANC < 500, start cipro 500mg po BID     2. VOD Prophylaxis: Actigall, Glutamine, Heparin (dosed at 100 units / kg / day)     3. GI Prophylaxis:    pantoprazole    Tablet 40 milliGRAM(s) Oral before breakfast    4. Mouthcare - NS / NaHCO3 rinses, Mycelex, Biotene; Skin care     5. GVHD prophylaxis - n/a     6. Transfuse & replete electrolytes prn   potassium chloride  20 mEq/100 mL IVPB 20 milliEquivalent(s) IV Intermittent every 2 hours  sodium phos 30mmol IV x 1   kphos #2 1 tab 4x / day x 8 doses     7. IV hydration, daily weights, strict I&O, prn diuresis   Lasix 40mg Iv x 1 0730  Lasix 40mg IV x 1 1600    8. PO intake as tolerated, nutrition follow up as needed, MVI, folic acid     9. Antiemetics, anti-diarrhea medications:   LORazepam   Injectable 1 milliGRAM(s) IV Push every 6 hours PRN  metoclopramide Injectable 10 milliGRAM(s) IV Push every 6 hours PRN  ondansetron Injectable 8 milliGRAM(s) IV Push every 8 hours  dexAMETHasone  IVPB 20 milliGRAM(s) IV Intermittent every 24 hours  aprepitant 80 milliGRAM(s) Oral every 24 hours    10. OOB as tolerated, physical therapy consult if needed     11. Monitor coags / fibrinogen 2x week, vitamin K as needed     12. Monitor closely for clinical changes, monitor for fevers     13. Emotional support provided, plan of care discussed and questions addressed     14. Patient education done regarding plan of care, restrictions and discharge planning     15. Continue regular social work input     I have written the above note for Dr. Naylor who performed service with me in the room.   Terrie Barr NP-C ([phone])    I have seen and examined patient with NP, I agree with above note as scribed.                    [facility] Transplant Team                                                      Critical / Counseling Time Provided: 30 minutes                                                                                                                                                        Chief Complaint: Autologous peripheral blood stem cell transplant with high dose Melphalan prep regimen for treatment of multiple myeloma    S: Patient seen and examined with HPC Transplant Team:     All other ROS negative     O: Vitals:   Vital Signs Last 24 Hrs  T(C): 36.6 (2021 06:00), Max: 37.2 (15 Nov 2021 15:45)  T(F): 97.9 (2021 06:00), Max: 99 (15 Nov 2021 15:45)  HR: 62 (2021 06:00) (56 - 66)  BP: 132/80 (2021 06:00) (118/72 - 159/81)  BP(mean): --  RR: 18 (2021 06:00) (18 - 20)  SpO2: 96% (2021 06:00) (95% - 97%)    Admit weight:   Daily Height in cm: 183 (15 Nov 2021 10:28)    Daily Weight in k.4 (15 Nov 2021 09:36)    Intake / Output:   -15 @ 07:01  -  -16 @ 07:00  --------------------------------------------------------  IN: 4476 mL / OUT: 3625 mL / NET: 851 mL          PE:   Oropharynx: no erythema or ulcerations   Oral Mucositis:              -                                          Grade: n/a   CVS: S1, S2 RRR   Lungs: CTA throughout bilaterally   Abdomen: + BS x 4, soft, NT, ND   Extremities: no edema   Gastric Mucositis:       -                                           Grade: n/a  Intestinal Mucositis:     -                                         Grade: n/a   Skin: patchy, erythematous macular rash to face, neck and upper chest post kepivance   TLC: CDI   Neuro: A&O x 3   Pain: 0    Labs:   CBC Full  -  ( 2021 06:53 )  WBC Count : 2.79 K/uL  Hemoglobin : 11.9 g/dL  Hematocrit : 36.4 %  Platelet Count - Automated : 262 K/uL  Mean Cell Volume : 95.5 fl  Mean Cell Hemoglobin : 31.2 pg  Mean Cell Hemoglobin Concentration : 32.7 gm/dL  Auto Neutrophil # : x  Auto Lymphocyte # : x  Auto Monocyte # : x  Auto Eosinophil # : x  Auto Basophil # : x  Auto Neutrophil % : x  Auto Lymphocyte % : x  Auto Monocyte % : x  Auto Eosinophil % : x  Auto Basophil % : x                          11.9   2.79  )-----------( 262      ( 2021 06:53 )             36.4     -16    141  |  110<H>  |  15  ----------------------------<  179<H>  4.5   |  20<L>  |  0.89    Ca    8.4      2021 06:53  Phos  1.7       Mg     1.8         TPro  5.8<L>  /  Alb  3.9  /  TBili  0.4  /  DBili  x   /  AST  12  /  ALT  20  /  AlkPhos  80          Meds:   Antimicrobials:   clotrimazole Lozenge 1 Lozenge Oral five times a day  fluconAZOLE   Tablet 400 milliGRAM(s) Oral daily  trimethoprim  160 mG/sulfamethoxazole 800 mG 1 Tablet(s) Oral every 12 hours      Heme / Onc:   heparin  Infusion 410 Unit(s)/Hr IV Continuous <Continuous>  melphalan IVPB (eMAR) 204 milliGRAM(s) IV Intermittent daily      GI:  pantoprazole    Tablet 40 milliGRAM(s) Oral before breakfast  senna 1 Tablet(s) Oral at bedtime PRN  sodium bicarbonate Mouth Rinse 10 milliLiter(s) Swish and Spit five times a day  ursodiol Capsule 300 milliGRAM(s) Oral two times a day      Cardiovascular:   furosemide   Injectable 40 milliGRAM(s) IV Push once  furosemide   Injectable 40 milliGRAM(s) IV Push once  furosemide   Injectable 20 milliGRAM(s) IV Push every 24 hours PRN      Immunologic:       Other medications:   acetaminophen     Tablet .. 650 milliGRAM(s) Oral every 6 hours  aprepitant 80 milliGRAM(s) Oral every 24 hours  BACItracin   Ointment 1 Application(s) Topical two times a day  Biotene Dry Mouth Oral Rinse 5 milliLiter(s) Swish and Spit five times a day  chlorhexidine 4% Liquid 1 Application(s) Topical <User Schedule>  dexAMETHasone  IVPB 20 milliGRAM(s) IV Intermittent every 24 hours  folic acid 1 milliGRAM(s) Oral daily  LORazepam   Injectable 1 milliGRAM(s) IV Push every 24 hours  multivitamin 1 Tablet(s) Oral daily  ondansetron Injectable 8 milliGRAM(s) IV Push every 8 hours  potassium chloride  20 mEq/100 mL IVPB 20 milliEquivalent(s) IV Intermittent every 2 hours  sodium chloride 0.9%. 1000 milliLiter(s) IV Continuous <Continuous>  sodium chloride 0.9%. 1000 milliLiter(s) IV Continuous <Continuous>      PRN:   acetaminophen     Tablet .. 650 milliGRAM(s) Oral every 6 hours PRN  furosemide   Injectable 20 milliGRAM(s) IV Push every 24 hours PRN  LORazepam   Injectable 1 milliGRAM(s) IV Push every 6 hours PRN  metoclopramide Injectable 10 milliGRAM(s) IV Push every 6 hours PRN  senna 1 Tablet(s) Oral at bedtime PRN  sodium chloride 0.9% lock flush 10 milliLiter(s) IV Push every 1 hour PRN      A/P:   66 year old male with a history of multiple myeloma s/p RVD x 6   Pre :  Autologous PBSCT day - 3  - melphalan /; continue melphalan hydration for 24 hours post infusion of last dose   Strict I&O, daily weights, prn diuresis     1. Infectious Disease:   clotrimazole Lozenge 1 Lozenge Oral five times a day  fluconAZOLE   Tablet 400 milliGRAM(s) Oral daily  trimethoprim  160 mG/sulfamethoxazole 800 mG 1 Tablet(s) Oral every 12 hours - give through day - 2   Start Acyclovir 400mg po TID on day -1   When ANC < 500, start cipro 500mg po BID     2. VOD Prophylaxis: Actigall, Glutamine, Heparin (dosed at 100 units / kg / day)     3. GI Prophylaxis:    pantoprazole    Tablet 40 milliGRAM(s) Oral before breakfast    4. Mouthcare - NS / NaHCO3 rinses, Mycelex, Biotene; Skin care     5. GVHD prophylaxis - n/a     6. Transfuse & replete electrolytes prn   potassium chloride  20 mEq/100 mL IVPB 20 milliEquivalent(s) IV Intermittent every 2 hours  sodium phos 30mmol IV x 1   kphos #2 1 tab 4x / day x 8 doses     7. IV hydration, daily weights, strict I&O, prn diuresis   Lasix 40mg IV x 1 0730  Lasix 40mg IV x 1 1600    8. PO intake as tolerated, nutrition follow up as needed, MVI, folic acid     9. Antiemetics, anti-diarrhea medications:   LORazepam   Injectable 1 milliGRAM(s) IV Push every 6 hours PRN  metoclopramide Injectable 10 milliGRAM(s) IV Push every 6 hours PRN  ondansetron Injectable 8 milliGRAM(s) IV Push every 8 hours  dexAMETHasone  IVPB 20 milliGRAM(s) IV Intermittent every 24 hours  aprepitant 80 milliGRAM(s) Oral every 24 hours    10. OOB as tolerated, physical therapy consult if needed     11. Monitor coags / fibrinogen 2x week, vitamin K as needed     12. Monitor closely for clinical changes, monitor for fevers     13. Emotional support provided, plan of care discussed and questions addressed     14. Patient education done regarding plan of care, restrictions and discharge planning     15. Continue regular social work input     I have written the above note for Dr. Naylor who performed service with me in the room.   Terrie Barr NP-C ([phone])    I have seen and examined patient with NP, I agree with above note as scribed.                    [facility]PC Transplant Team                                                      Critical / Counseling Time Provided: 30 minutes                                                                                                                                                        Chief Complaint: Autologous peripheral blood stem cell transplant with high dose Melphalan prep regimen for treatment of multiple myeloma    S: Patient seen and examined with HPC Transplant Team:   +generalized weakness     All other ROS negative     O: Vitals:   Vital Signs Last 24 Hrs  T(C): 36.6 (16 Nov 2021 06:00), Max: 37.2 (15 Nov 2021 15:45)  T(F): 97.9 (16 Nov 2021 06:00), Max: 99 (15 Nov 2021 15:45)  HR: 62 (16 Nov 2021 06:00) (56 - 66)  BP: 132/80 (16 Nov 2021 06:00) (118/72 - 159/81)  BP(mean): --  RR: 18 (16 Nov 2021 06:00) (18 - 20)  SpO2: 96% (16 Nov 2021 06:00) (95% - 97%)    Admit weight: 98 kg   Daily Height in cm: 183 (15 Nov 2021 10:28)    Today's weight 100.7 kg     Intake / Output:   11-15 @ 07:01  -  11-16 @ 07:00  --------------------------------------------------------  IN: 4476 mL / OUT: 3625 mL / NET: 851 mL          PE:   Oropharynx: no erythema or ulcerations   Oral Mucositis:              -                                          Grade: n/a   CVS: S1, S2 RRR   Lungs: CTA throughout bilaterally   Abdomen: + BS x 4, soft, NT, ND   Extremities: no edema   Gastric Mucositis:       -                                           Grade: n/a  Intestinal Mucositis:     -                                         Grade: n/a   Skin: patchy, erythematous macular rash to face, neck and upper chest post kepivance   TLC: CDI   Neuro: A&O x 3   Pain: 0    Labs:   CBC Full  -  ( 16 Nov 2021 06:53 )  WBC Count : 2.79 K/uL  Hemoglobin : 11.9 g/dL  Hematocrit : 36.4 %  Platelet Count - Automated : 262 K/uL  Mean Cell Volume : 95.5 fl  Mean Cell Hemoglobin : 31.2 pg  Mean Cell Hemoglobin Concentration : 32.7 gm/dL  Auto Neutrophil # : x  Auto Lymphocyte # : x  Auto Monocyte # : x  Auto Eosinophil # : x  Auto Basophil # : x  Auto Neutrophil % : x  Auto Lymphocyte % : x  Auto Monocyte % : x  Auto Eosinophil % : x  Auto Basophil % : x                          11.9   2.79  )-----------( 262      ( 16 Nov 2021 06:53 )             36.4     11-16    141  |  110<H>  |  15  ----------------------------<  179<H>  4.5   |  20<L>  |  0.89    Ca    8.4      16 Nov 2021 06:53  Phos  1.7     11-16  Mg     1.8     11-16    TPro  5.8<L>  /  Alb  3.9  /  TBili  0.4  /  DBili  x   /  AST  12  /  ALT  20  /  AlkPhos  80  11-16        Meds:   Antimicrobials:   clotrimazole Lozenge 1 Lozenge Oral five times a day  fluconAZOLE   Tablet 400 milliGRAM(s) Oral daily  trimethoprim  160 mG/sulfamethoxazole 800 mG 1 Tablet(s) Oral every 12 hours      Heme / Onc:   heparin  Infusion 410 Unit(s)/Hr IV Continuous <Continuous>  melphalan IVPB (eMAR) 204 milliGRAM(s) IV Intermittent daily      GI:  pantoprazole    Tablet 40 milliGRAM(s) Oral before breakfast  senna 1 Tablet(s) Oral at bedtime PRN  sodium bicarbonate Mouth Rinse 10 milliLiter(s) Swish and Spit five times a day  ursodiol Capsule 300 milliGRAM(s) Oral two times a day      Cardiovascular:   furosemide   Injectable 40 milliGRAM(s) IV Push once  furosemide   Injectable 40 milliGRAM(s) IV Push once  furosemide   Injectable 20 milliGRAM(s) IV Push every 24 hours PRN      Immunologic:       Other medications:   acetaminophen     Tablet .. 650 milliGRAM(s) Oral every 6 hours  aprepitant 80 milliGRAM(s) Oral every 24 hours  BACItracin   Ointment 1 Application(s) Topical two times a day  Biotene Dry Mouth Oral Rinse 5 milliLiter(s) Swish and Spit five times a day  chlorhexidine 4% Liquid 1 Application(s) Topical <User Schedule>  dexAMETHasone  IVPB 20 milliGRAM(s) IV Intermittent every 24 hours  folic acid 1 milliGRAM(s) Oral daily  LORazepam   Injectable 1 milliGRAM(s) IV Push every 24 hours  multivitamin 1 Tablet(s) Oral daily  ondansetron Injectable 8 milliGRAM(s) IV Push every 8 hours  potassium chloride  20 mEq/100 mL IVPB 20 milliEquivalent(s) IV Intermittent every 2 hours  sodium chloride 0.9%. 1000 milliLiter(s) IV Continuous <Continuous>  sodium chloride 0.9%. 1000 milliLiter(s) IV Continuous <Continuous>      PRN:   acetaminophen     Tablet .. 650 milliGRAM(s) Oral every 6 hours PRN  furosemide   Injectable 20 milliGRAM(s) IV Push every 24 hours PRN  LORazepam   Injectable 1 milliGRAM(s) IV Push every 6 hours PRN  metoclopramide Injectable 10 milliGRAM(s) IV Push every 6 hours PRN  senna 1 Tablet(s) Oral at bedtime PRN  sodium chloride 0.9% lock flush 10 milliLiter(s) IV Push every 1 hour PRN      A/P:   66 year old male with a history of multiple myeloma s/p RVD x 6   Pre :  Autologous PBSCT day - 3  11/16- melphalan 2/2; continue melphalan hydration for 24 hours post infusion of last dose   Strict I&O, daily weights, prn diuresis     1. Infectious Disease:   clotrimazole Lozenge 1 Lozenge Oral five times a day  fluconAZOLE   Tablet 400 milliGRAM(s) Oral daily  trimethoprim  160 mG/sulfamethoxazole 800 mG 1 Tablet(s) Oral every 12 hours - give through day - 2   Start Acyclovir 400mg po TID on day -1   When ANC < 500, start cipro 500mg po BID     2. VOD Prophylaxis: Actigall, Glutamine, Heparin (dosed at 100 units / kg / day)     3. GI Prophylaxis:    pantoprazole    Tablet 40 milliGRAM(s) Oral before breakfast    4. Mouthcare - NS / NaHCO3 rinses, Mycelex, Biotene; Skin care     5. GVHD prophylaxis - n/a     6. Transfuse & replete electrolytes prn   potassium chloride  20 mEq/100 mL IVPB 20 milliEquivalent(s) IV Intermittent every 2 hours  sodium phos 30mmol IV x 1   kphos #2 1 tab 4x / day x 8 doses     7. IV hydration, daily weights, strict I&O, prn diuresis   Lasix 40mg IV x 1 0730  Lasix 40mg IV x 1 1600    8. PO intake as tolerated, nutrition follow up as needed, MVI, folic acid     9. Antiemetics, anti-diarrhea medications:   LORazepam   Injectable 1 milliGRAM(s) IV Push every 6 hours PRN  metoclopramide Injectable 10 milliGRAM(s) IV Push every 6 hours PRN  ondansetron Injectable 8 milliGRAM(s) IV Push every 8 hours  dexAMETHasone  IVPB 20 milliGRAM(s) IV Intermittent every 24 hours  aprepitant 80 milliGRAM(s) Oral every 24 hours    10. OOB as tolerated, physical therapy consult if needed     11. Monitor coags / fibrinogen 2x week, vitamin K as needed     12. Monitor closely for clinical changes, monitor for fevers     13. Emotional support provided, plan of care discussed and questions addressed     14. Patient education done regarding plan of care, restrictions and discharge planning     15. Continue regular social work input     I have written the above note for Dr. Naylor who performed service with me in the room.   Terrie Barr NP-C ([phone])    I have seen and examined patient with NP, I agree with above note as scribed.

## 2021-11-17 LAB
ALBUMIN SERPL ELPH-MCNC: 3.6 G/DL — SIGNIFICANT CHANGE UP (ref 3.3–5)
ALP SERPL-CCNC: 68 U/L — SIGNIFICANT CHANGE UP (ref 40–120)
ALT FLD-CCNC: 18 U/L — SIGNIFICANT CHANGE UP (ref 10–45)
ANION GAP SERPL CALC-SCNC: 12 MMOL/L — SIGNIFICANT CHANGE UP (ref 5–17)
AST SERPL-CCNC: 11 U/L — SIGNIFICANT CHANGE UP (ref 10–40)
BASOPHILS # BLD AUTO: 0 K/UL — SIGNIFICANT CHANGE UP (ref 0–0.2)
BASOPHILS NFR BLD AUTO: 0 % — SIGNIFICANT CHANGE UP (ref 0–2)
BILIRUB DIRECT SERPL-MCNC: 0.1 MG/DL — SIGNIFICANT CHANGE UP (ref 0–0.2)
BILIRUB INDIRECT FLD-MCNC: 0.6 MG/DL — SIGNIFICANT CHANGE UP (ref 0.2–1)
BILIRUB SERPL-MCNC: 0.7 MG/DL — SIGNIFICANT CHANGE UP (ref 0.2–1.2)
BLD GP AB SCN SERPL QL: NEGATIVE — SIGNIFICANT CHANGE UP
BUN SERPL-MCNC: 22 MG/DL — SIGNIFICANT CHANGE UP (ref 7–23)
CALCIUM SERPL-MCNC: 7.6 MG/DL — LOW (ref 8.4–10.5)
CHLORIDE SERPL-SCNC: 108 MMOL/L — SIGNIFICANT CHANGE UP (ref 96–108)
CO2 SERPL-SCNC: 20 MMOL/L — LOW (ref 22–31)
CREAT SERPL-MCNC: 0.99 MG/DL — SIGNIFICANT CHANGE UP (ref 0.5–1.3)
EOSINOPHIL # BLD AUTO: 0 K/UL — SIGNIFICANT CHANGE UP (ref 0–0.5)
EOSINOPHIL NFR BLD AUTO: 0 % — SIGNIFICANT CHANGE UP (ref 0–6)
GLUCOSE SERPL-MCNC: 155 MG/DL — HIGH (ref 70–99)
HCT VFR BLD CALC: 34.3 % — LOW (ref 39–50)
HGB BLD-MCNC: 11.1 G/DL — LOW (ref 13–17)
IMM GRANULOCYTES NFR BLD AUTO: 0.4 % — SIGNIFICANT CHANGE UP (ref 0–1.5)
LDH SERPL L TO P-CCNC: 145 U/L — SIGNIFICANT CHANGE UP (ref 50–242)
LYMPHOCYTES # BLD AUTO: 0.16 K/UL — LOW (ref 1–3.3)
LYMPHOCYTES # BLD AUTO: 3.4 % — LOW (ref 13–44)
MAGNESIUM SERPL-MCNC: 1.7 MG/DL — SIGNIFICANT CHANGE UP (ref 1.6–2.6)
MCHC RBC-ENTMCNC: 30.8 PG — SIGNIFICANT CHANGE UP (ref 27–34)
MCHC RBC-ENTMCNC: 32.4 GM/DL — SIGNIFICANT CHANGE UP (ref 32–36)
MCV RBC AUTO: 95.3 FL — SIGNIFICANT CHANGE UP (ref 80–100)
MONOCYTES # BLD AUTO: 0.16 K/UL — SIGNIFICANT CHANGE UP (ref 0–0.9)
MONOCYTES NFR BLD AUTO: 3.4 % — SIGNIFICANT CHANGE UP (ref 2–14)
NEUTROPHILS # BLD AUTO: 4.35 K/UL — SIGNIFICANT CHANGE UP (ref 1.8–7.4)
NEUTROPHILS NFR BLD AUTO: 92.8 % — HIGH (ref 43–77)
NRBC # BLD: 0 /100 WBCS — SIGNIFICANT CHANGE UP (ref 0–0)
PHOSPHATE SERPL-MCNC: 3 MG/DL — SIGNIFICANT CHANGE UP (ref 2.5–4.5)
PLATELET # BLD AUTO: 234 K/UL — SIGNIFICANT CHANGE UP (ref 150–400)
POTASSIUM SERPL-MCNC: 4 MMOL/L — SIGNIFICANT CHANGE UP (ref 3.5–5.3)
POTASSIUM SERPL-SCNC: 4 MMOL/L — SIGNIFICANT CHANGE UP (ref 3.5–5.3)
PROT SERPL-MCNC: 5.3 G/DL — LOW (ref 6–8.3)
RBC # BLD: 3.6 M/UL — LOW (ref 4.2–5.8)
RBC # FLD: 15.3 % — HIGH (ref 10.3–14.5)
RH IG SCN BLD-IMP: NEGATIVE — SIGNIFICANT CHANGE UP
SODIUM SERPL-SCNC: 140 MMOL/L — SIGNIFICANT CHANGE UP (ref 135–145)
WBC # BLD: 4.69 K/UL — SIGNIFICANT CHANGE UP (ref 3.8–10.5)
WBC # FLD AUTO: 4.69 K/UL — SIGNIFICANT CHANGE UP (ref 3.8–10.5)

## 2021-11-17 PROCEDURE — 99291 CRITICAL CARE FIRST HOUR: CPT

## 2021-11-17 PROCEDURE — 99232 SBSQ HOSP IP/OBS MODERATE 35: CPT

## 2021-11-17 RX ORDER — FUROSEMIDE 40 MG
40 TABLET ORAL ONCE
Refills: 0 | Status: COMPLETED | OUTPATIENT
Start: 2021-11-17 | End: 2021-11-17

## 2021-11-17 RX ORDER — POLYETHYLENE GLYCOL 3350 17 G/17G
17 POWDER, FOR SOLUTION ORAL DAILY
Refills: 0 | Status: DISCONTINUED | OUTPATIENT
Start: 2021-11-17 | End: 2021-11-23

## 2021-11-17 RX ADMIN — Medication 650 MILLIGRAM(S): at 06:50

## 2021-11-17 RX ADMIN — Medication 10 MILLILITER(S): at 20:39

## 2021-11-17 RX ADMIN — Medication 1 LOZENGE: at 08:28

## 2021-11-17 RX ADMIN — Medication 1 LOZENGE: at 11:59

## 2021-11-17 RX ADMIN — CHLORHEXIDINE GLUCONATE 1 APPLICATION(S): 213 SOLUTION TOPICAL at 06:06

## 2021-11-17 RX ADMIN — Medication 5 MILLILITER(S): at 11:59

## 2021-11-17 RX ADMIN — ONDANSETRON 8 MILLIGRAM(S): 8 TABLET, FILM COATED ORAL at 13:52

## 2021-11-17 RX ADMIN — Medication 5 MILLILITER(S): at 16:41

## 2021-11-17 RX ADMIN — URSODIOL 300 MILLIGRAM(S): 250 TABLET, FILM COATED ORAL at 06:49

## 2021-11-17 RX ADMIN — ONDANSETRON 8 MILLIGRAM(S): 8 TABLET, FILM COATED ORAL at 07:12

## 2021-11-17 RX ADMIN — ONDANSETRON 8 MILLIGRAM(S): 8 TABLET, FILM COATED ORAL at 21:48

## 2021-11-17 RX ADMIN — Medication 1 TABLET(S): at 17:03

## 2021-11-17 RX ADMIN — SENNA PLUS 1 TABLET(S): 8.6 TABLET ORAL at 09:12

## 2021-11-17 RX ADMIN — Medication 10 MILLILITER(S): at 11:59

## 2021-11-17 RX ADMIN — Medication 1 APPLICATION(S): at 17:03

## 2021-11-17 RX ADMIN — Medication 1 LOZENGE: at 20:15

## 2021-11-17 RX ADMIN — Medication 1 TABLET(S): at 06:49

## 2021-11-17 RX ADMIN — Medication 10 MILLILITER(S): at 16:41

## 2021-11-17 RX ADMIN — Medication 1 MILLIGRAM(S): at 11:58

## 2021-11-17 RX ADMIN — Medication 10 MILLILITER(S): at 01:35

## 2021-11-17 RX ADMIN — Medication 1 MILLIGRAM(S): at 21:47

## 2021-11-17 RX ADMIN — Medication 1 TABLET(S): at 01:35

## 2021-11-17 RX ADMIN — Medication 5 MILLILITER(S): at 00:08

## 2021-11-17 RX ADMIN — Medication 40 MILLIGRAM(S): at 08:28

## 2021-11-17 RX ADMIN — Medication 5 MILLILITER(S): at 08:28

## 2021-11-17 RX ADMIN — Medication 1 APPLICATION(S): at 06:51

## 2021-11-17 RX ADMIN — Medication 1 LOZENGE: at 16:41

## 2021-11-17 RX ADMIN — Medication 5 MILLILITER(S): at 20:15

## 2021-11-17 RX ADMIN — FLUCONAZOLE 400 MILLIGRAM(S): 150 TABLET ORAL at 11:58

## 2021-11-17 RX ADMIN — Medication 10 MILLILITER(S): at 08:28

## 2021-11-17 RX ADMIN — Medication 1 TABLET(S): at 06:50

## 2021-11-17 RX ADMIN — PANTOPRAZOLE SODIUM 40 MILLIGRAM(S): 20 TABLET, DELAYED RELEASE ORAL at 06:49

## 2021-11-17 RX ADMIN — Medication 1 APPLICATION(S): at 06:06

## 2021-11-17 RX ADMIN — APREPITANT 80 MILLIGRAM(S): 80 CAPSULE ORAL at 12:00

## 2021-11-17 RX ADMIN — Medication 1 LOZENGE: at 00:08

## 2021-11-17 RX ADMIN — URSODIOL 300 MILLIGRAM(S): 250 TABLET, FILM COATED ORAL at 17:03

## 2021-11-17 RX ADMIN — Medication 110 MILLIGRAM(S): at 17:05

## 2021-11-17 RX ADMIN — Medication 1 APPLICATION(S): at 17:04

## 2021-11-17 RX ADMIN — Medication 1 TABLET(S): at 11:58

## 2021-11-17 RX ADMIN — Medication 650 MILLIGRAM(S): at 07:07

## 2021-11-17 RX ADMIN — Medication 1 TABLET(S): at 12:02

## 2021-11-17 NOTE — PROGRESS NOTE ADULT - SUBJECTIVE AND OBJECTIVE BOX
[facility] Transplant Team                                                      Critical / Counseling Time Provided: 30 minutes                                                                                                                                                        Chief Complaint: Autologous peripheral blood stem cell transplant with high dose Melphalan prep regimen for treatment of multiple myeloma    S: Patient seen and examined with [facility] Transplant Team:       O: Vitals:   Vital Signs Last 24 Hrs  T(C): 36.6 (2021 06:18), Max: 37 (2021 09:46)  T(F): 97.9 (2021 06:18), Max: 98.6 (2021 09:46)  HR: 76 (2021 06:18) (73 - 100)  BP: 136/56 (2021 06:18) (125/88 - 148/77)  BP(mean): --  RR: 18 (2021 06:18) (18 - 18)  SpO2: 96% (2021 06:18) (95% - 96%)    Admit weight: 98.4kg   Daily Weight in k.7 (2021 09:46)  Today's weight:     Intake / Output:   -16 @ 07:01  -  17 @ 07:00  --------------------------------------------------------  IN: 5686 mL / OUT: 7500 mL / NET: -1814 mL      PE:   Oropharynx: no erythema or ulcerations   Oral Mucositis:              -                                          Grade: n/a   CVS: S1, S2 RRR   Lungs: CTA throughout bilaterally   Abdomen: + BS x 4, soft, NT, ND   Extremities: no edema   Gastric Mucositis:       -                                           Grade: n/a  Intestinal Mucositis:     -                                         Grade: n/a   Skin: patchy, erythematous macular rash to face, neck and upper chest post kepivance   TLC: CDI   Neuro: A&O x 3   Pain: 0      Labs:       Meds:   Antimicrobials:   clotrimazole Lozenge 1 Lozenge Oral five times a day  fluconAZOLE   Tablet 400 milliGRAM(s) Oral daily  trimethoprim  160 mG/sulfamethoxazole 800 mG 1 Tablet(s) Oral every 12 hours      Heme / Onc:   heparin  Infusion 410 Unit(s)/Hr IV Continuous <Continuous>      GI:  pantoprazole    Tablet 40 milliGRAM(s) Oral before breakfast  senna 1 Tablet(s) Oral at bedtime PRN  sodium bicarbonate Mouth Rinse 10 milliLiter(s) Swish and Spit five times a day  ursodiol Capsule 300 milliGRAM(s) Oral two times a day      Cardiovascular:   furosemide   Injectable 20 milliGRAM(s) IV Push every 24 hours PRN  furosemide   Injectable 40 milliGRAM(s) IV Push once      Immunologic:       Other medications:   acetaminophen     Tablet .. 650 milliGRAM(s) Oral every 6 hours  aprepitant 80 milliGRAM(s) Oral every 24 hours  BACItracin   Ointment 1 Application(s) Topical two times a day  Biotene Dry Mouth Oral Rinse 5 milliLiter(s) Swish and Spit five times a day  chlorhexidine 4% Liquid 1 Application(s) Topical <User Schedule>  dexAMETHasone  IVPB 20 milliGRAM(s) IV Intermittent every 24 hours  folic acid 1 milliGRAM(s) Oral daily  hydrocortisone 1% Cream 1 Application(s) Topical two times a day  LORazepam   Injectable 1 milliGRAM(s) IV Push every 24 hours  multivitamin 1 Tablet(s) Oral daily  ondansetron Injectable 8 milliGRAM(s) IV Push every 8 hours  potassium phosphate / sodium phosphate Tablet (K-PHOS No. 2) 1 Tablet(s) Oral four times a day  sodium chloride 0.9%. 1000 milliLiter(s) IV Continuous <Continuous>  sodium chloride 0.9%. 1000 milliLiter(s) IV Continuous <Continuous>      PRN:   acetaminophen     Tablet .. 650 milliGRAM(s) Oral every 6 hours PRN  furosemide   Injectable 20 milliGRAM(s) IV Push every 24 hours PRN  LORazepam   Injectable 1 milliGRAM(s) IV Push every 6 hours PRN  metoclopramide Injectable 10 milliGRAM(s) IV Push every 6 hours PRN  senna 1 Tablet(s) Oral at bedtime PRN  sodium chloride 0.9% lock flush 10 milliLiter(s) IV Push every 1 hour PRN    A/P:   66 year old male with a history of multiple myeloma s/p RVD x 6   Pre :  Autologous PBSCT day - 2  - melphalan ; continue melphalan hydration for 24 hours post infusion of last dose   Strict I&O, daily weights, prn diuresis   - rest day today     1. Infectious Disease:   clotrimazole Lozenge 1 Lozenge Oral five times a day  fluconAZOLE   Tablet 400 milliGRAM(s) Oral daily  trimethoprim  160 mG/sulfamethoxazole 800 mG 1 Tablet(s) Oral every 12 hours    2. VOD Prophylaxis: Actigall, Glutamine, Heparin (dosed at 100 units / kg / day)     3. GI Prophylaxis: pantoprazole    Tablet 40 milliGRAM(s) Oral before breakfast    4. Mouthcare - NS / NaHCO3 rinses, Mycelex, Biotene; Skin care     5. GVHD prophylaxis - n/a     6. Transfuse & replete electrolytes prn   potassium phosphate / sodium phosphate Tablet (K-PHOS No. 2) 1 Tablet(s) Oral four times a day    7. IV hydration, daily weights, strict I&O, prn diuresis   Lasix 40mg IV x 1     8. PO intake as tolerated, nutrition follow up as needed, MVI, folic acid     9. Antiemetics, anti-diarrhea medications:   LORazepam   Injectable 1 milliGRAM(s) IV Push every 6 hours PRN  metoclopramide Injectable 10 milliGRAM(s) IV Push every 6 hours PRN  ondansetron Injectable 8 milliGRAM(s) IV Push every 8 hours  aprepitant 80 milliGRAM(s) Oral every 24 hours  BACItracin   Ointment 1 Application(s) Topical two times a day  dexAMETHasone  IVPB 20 milliGRAM(s) IV Intermittent every 24 hours    10. OOB as tolerated, physical therapy consult if needed     11. Monitor coags / fibrinogen 2x week, vitamin K as needed     12. Monitor closely for clinical changes, monitor for fevers     13. Emotional support provided, plan of care discussed and questions addressed     14. Patient education done regarding plan of care, restrictions and discharge planning     15. Continue regular social work input     I have written the above note for Dr. Naylor who performed service with me in the room.   Terrie Barr NP-C ([phone])    I have seen and examined patient with NP, I agree with above note as scribed.                    [facility] Transplant Team                                                      Critical / Counseling Time Provided: 30 minutes                                                                                                                                                        Chief Complaint: Autologous peripheral blood stem cell transplant with high dose Melphalan prep regimen for treatment of multiple myeloma    S: Patient seen and examined with [facility] Transplant Team:       O: Vitals:   Vital Signs Last 24 Hrs  T(C): 36.6 (2021 06:18), Max: 37 (2021 09:46)  T(F): 97.9 (2021 06:18), Max: 98.6 (2021 09:46)  HR: 76 (2021 06:18) (73 - 100)  BP: 136/56 (2021 06:18) (125/88 - 148/77)  BP(mean): --  RR: 18 (2021 06:18) (18 - 18)  SpO2: 96% (2021 06:18) (95% - 96%)    Admit weight: 98.4kg   Daily Weight in k.7 (2021 09:46)  Today's weight:     Intake / Output:   16 @ 07:01  -   @ 07:00  --------------------------------------------------------  IN: 5686 mL / OUT: 7500 mL / NET: -1814 mL      PE:   Oropharynx: no erythema or ulcerations   Oral Mucositis:              -                                          Grade: n/a   CVS: S1, S2 RRR   Lungs: CTA throughout bilaterally   Abdomen: + BS x 4, soft, NT, ND   Extremities: no edema   Gastric Mucositis:       -                                           Grade: n/a  Intestinal Mucositis:     -                                         Grade: n/a   Skin: patchy, erythematous macular rash to face, neck and upper chest post kepivance   TLC: CDI   Neuro: A&O x 3   Pain: 0      Labs:                         11.1   4.69  )-----------( 234      ( 2021 07:26 )             34.3         140  |  108  |  22  ----------------------------<  155<H>  4.0   |  20<L>  |  0.99    Ca    7.6<L>      2021 07:23  Phos  3.0     11-17  Mg     1.7         TPro  5.3<L>  /  Alb  3.6  /  TBili  0.7  /  DBili  0.1  /  AST  11  /  ALT  18  /  AlkPhos  68  -        Meds:   Antimicrobials:   clotrimazole Lozenge 1 Lozenge Oral five times a day  fluconAZOLE   Tablet 400 milliGRAM(s) Oral daily  trimethoprim  160 mG/sulfamethoxazole 800 mG 1 Tablet(s) Oral every 12 hours      Heme / Onc:   heparin  Infusion 410 Unit(s)/Hr IV Continuous <Continuous>      GI:  pantoprazole    Tablet 40 milliGRAM(s) Oral before breakfast  senna 1 Tablet(s) Oral at bedtime PRN  sodium bicarbonate Mouth Rinse 10 milliLiter(s) Swish and Spit five times a day  ursodiol Capsule 300 milliGRAM(s) Oral two times a day      Cardiovascular:   furosemide   Injectable 20 milliGRAM(s) IV Push every 24 hours PRN  furosemide   Injectable 40 milliGRAM(s) IV Push once      Immunologic:       Other medications:   acetaminophen     Tablet .. 650 milliGRAM(s) Oral every 6 hours  aprepitant 80 milliGRAM(s) Oral every 24 hours  BACItracin   Ointment 1 Application(s) Topical two times a day  Biotene Dry Mouth Oral Rinse 5 milliLiter(s) Swish and Spit five times a day  chlorhexidine 4% Liquid 1 Application(s) Topical <User Schedule>  dexAMETHasone  IVPB 20 milliGRAM(s) IV Intermittent every 24 hours  folic acid 1 milliGRAM(s) Oral daily  hydrocortisone 1% Cream 1 Application(s) Topical two times a day  LORazepam   Injectable 1 milliGRAM(s) IV Push every 24 hours  multivitamin 1 Tablet(s) Oral daily  ondansetron Injectable 8 milliGRAM(s) IV Push every 8 hours  potassium phosphate / sodium phosphate Tablet (K-PHOS No. 2) 1 Tablet(s) Oral four times a day  sodium chloride 0.9%. 1000 milliLiter(s) IV Continuous <Continuous>  sodium chloride 0.9%. 1000 milliLiter(s) IV Continuous <Continuous>      PRN:   acetaminophen     Tablet .. 650 milliGRAM(s) Oral every 6 hours PRN  furosemide   Injectable 20 milliGRAM(s) IV Push every 24 hours PRN  LORazepam   Injectable 1 milliGRAM(s) IV Push every 6 hours PRN  metoclopramide Injectable 10 milliGRAM(s) IV Push every 6 hours PRN  senna 1 Tablet(s) Oral at bedtime PRN  sodium chloride 0.9% lock flush 10 milliLiter(s) IV Push every 1 hour PRN    A/P:   66 year old male with a history of multiple myeloma s/p RVD x 6   Pre :  Autologous PBSCT day - 2  - melphalan ; continue melphalan hydration for 24 hours post infusion of last dose   Strict I&O, daily weights, prn diuresis   - rest day today     1. Infectious Disease:   clotrimazole Lozenge 1 Lozenge Oral five times a day  fluconAZOLE   Tablet 400 milliGRAM(s) Oral daily  trimethoprim  160 mG/sulfamethoxazole 800 mG 1 Tablet(s) Oral every 12 hours    2. VOD Prophylaxis: Actigall, Glutamine, Heparin (dosed at 100 units / kg / day)     3. GI Prophylaxis: pantoprazole    Tablet 40 milliGRAM(s) Oral before breakfast    4. Mouthcare - NS / NaHCO3 rinses, Mycelex, Biotene; Skin care     5. GVHD prophylaxis - n/a     6. Transfuse & replete electrolytes prn   potassium phosphate / sodium phosphate Tablet (K-PHOS No. 2) 1 Tablet(s) Oral four times a day    7. IV hydration, daily weights, strict I&O, prn diuresis   Lasix 40mg IV x 1     8. PO intake as tolerated, nutrition follow up as needed, MVI, folic acid     9. Antiemetics, anti-diarrhea medications:   LORazepam   Injectable 1 milliGRAM(s) IV Push every 6 hours PRN  metoclopramide Injectable 10 milliGRAM(s) IV Push every 6 hours PRN  ondansetron Injectable 8 milliGRAM(s) IV Push every 8 hours  aprepitant 80 milliGRAM(s) Oral every 24 hours  BACItracin   Ointment 1 Application(s) Topical two times a day  dexAMETHasone  IVPB 20 milliGRAM(s) IV Intermittent every 24 hours    10. OOB as tolerated, physical therapy consult if needed     11. Monitor coags / fibrinogen 2x week, vitamin K as needed     12. Monitor closely for clinical changes, monitor for fevers     13. Emotional support provided, plan of care discussed and questions addressed     14. Patient education done regarding plan of care, restrictions and discharge planning     15. Continue regular social work input     I have written the above note for Dr. Naylor who performed service with me in the room.   Terrie Barr NP-C ([phone])    I have seen and examined patient with NP, I agree with above note as scribed.                    [facility] Transplant Team                                                      Critical / Counseling Time Provided: 30 minutes                                                                                                                                                        Chief Complaint: Autologous peripheral blood stem cell transplant with high dose Melphalan prep regimen for treatment of multiple myeloma    S: Patient seen and examined with [facility] Transplant Team:   +generalized weakness  +    O: Vitals:   Vital Signs Last 24 Hrs  T(C): 36.6 (2021 06:18), Max: 37 (2021 09:46)  T(F): 97.9 (2021 06:18), Max: 98.6 (2021 09:46)  HR: 76 (2021 06:18) (73 - 100)  BP: 136/56 (2021 06:18) (125/88 - 148/77)  BP(mean): --  RR: 18 (2021 06:18) (18 - 18)  SpO2: 96% (2021 06:18) (95% - 96%)    Admit weight: 98.4kg   Daily Weight in k.7 (2021 09:46)  Today's weight:     Intake / Output:    @ 07:01  -   @ 07:00  --------------------------------------------------------  IN: 5686 mL / OUT: 7500 mL / NET: -1814 mL      PE:   Oropharynx: no erythema or ulcerations   Oral Mucositis:              -                                          Grade: n/a   CVS: S1, S2 RRR   Lungs: CTA throughout bilaterally   Abdomen: + BS x 4, soft, NT, ND   Extremities: no edema   Gastric Mucositis:       -                                           Grade: n/a  Intestinal Mucositis:     -                                         Grade: n/a   Skin: patchy, erythematous macular rash to face, neck and upper chest post kepivance   TLC: CDI   Neuro: A&O x 3   Pain: 0      Labs:                         11.1   4.69  )-----------( 234      ( 2021 07:26 )             34.3         140  |  108  |  22  ----------------------------<  155<H>  4.0   |  20<L>  |  0.99    Ca    7.6<L>      2021 07:23  Phos  3.0     11-  Mg     1.7         TPro  5.3<L>  /  Alb  3.6  /  TBili  0.7  /  DBili  0.1  /  AST  11  /  ALT  18  /  AlkPhos  68  -        Meds:   Antimicrobials:   clotrimazole Lozenge 1 Lozenge Oral five times a day  fluconAZOLE   Tablet 400 milliGRAM(s) Oral daily  trimethoprim  160 mG/sulfamethoxazole 800 mG 1 Tablet(s) Oral every 12 hours      Heme / Onc:   heparin  Infusion 410 Unit(s)/Hr IV Continuous <Continuous>      GI:  pantoprazole    Tablet 40 milliGRAM(s) Oral before breakfast  senna 1 Tablet(s) Oral at bedtime PRN  sodium bicarbonate Mouth Rinse 10 milliLiter(s) Swish and Spit five times a day  ursodiol Capsule 300 milliGRAM(s) Oral two times a day      Cardiovascular:   furosemide   Injectable 20 milliGRAM(s) IV Push every 24 hours PRN  furosemide   Injectable 40 milliGRAM(s) IV Push once      Immunologic:       Other medications:   acetaminophen     Tablet .. 650 milliGRAM(s) Oral every 6 hours  aprepitant 80 milliGRAM(s) Oral every 24 hours  BACItracin   Ointment 1 Application(s) Topical two times a day  Biotene Dry Mouth Oral Rinse 5 milliLiter(s) Swish and Spit five times a day  chlorhexidine 4% Liquid 1 Application(s) Topical <User Schedule>  dexAMETHasone  IVPB 20 milliGRAM(s) IV Intermittent every 24 hours  folic acid 1 milliGRAM(s) Oral daily  hydrocortisone 1% Cream 1 Application(s) Topical two times a day  LORazepam   Injectable 1 milliGRAM(s) IV Push every 24 hours  multivitamin 1 Tablet(s) Oral daily  ondansetron Injectable 8 milliGRAM(s) IV Push every 8 hours  potassium phosphate / sodium phosphate Tablet (K-PHOS No. 2) 1 Tablet(s) Oral four times a day  sodium chloride 0.9%. 1000 milliLiter(s) IV Continuous <Continuous>  sodium chloride 0.9%. 1000 milliLiter(s) IV Continuous <Continuous>      PRN:   acetaminophen     Tablet .. 650 milliGRAM(s) Oral every 6 hours PRN  furosemide   Injectable 20 milliGRAM(s) IV Push every 24 hours PRN  LORazepam   Injectable 1 milliGRAM(s) IV Push every 6 hours PRN  metoclopramide Injectable 10 milliGRAM(s) IV Push every 6 hours PRN  senna 1 Tablet(s) Oral at bedtime PRN  sodium chloride 0.9% lock flush 10 milliLiter(s) IV Push every 1 hour PRN    A/P:   66 year old male with a history of multiple myeloma s/p RVD x 6   Pre :  Autologous PBSCT day - 2  - melphalan ; continue melphalan hydration for 24 hours post infusion of last dose   Strict I&O, daily weights, prn diuresis   - rest day today     1. Infectious Disease:   clotrimazole Lozenge 1 Lozenge Oral five times a day  fluconAZOLE   Tablet 400 milliGRAM(s) Oral daily  trimethoprim  160 mG/sulfamethoxazole 800 mG 1 Tablet(s) Oral every 12 hours    2. VOD Prophylaxis: Actigall, Glutamine, Heparin (dosed at 100 units / kg / day)     3. GI Prophylaxis: pantoprazole    Tablet 40 milliGRAM(s) Oral before breakfast    4. Mouthcare - NS / NaHCO3 rinses, Mycelex, Biotene; Skin care     5. GVHD prophylaxis - n/a     6. Transfuse & replete electrolytes prn   potassium phosphate / sodium phosphate Tablet (K-PHOS No. 2) 1 Tablet(s) Oral four times a day    7. IV hydration, daily weights, strict I&O, prn diuresis   Lasix 40mg IV x 1     8. PO intake as tolerated, nutrition follow up as needed, MVI, folic acid     9. Antiemetics, anti-diarrhea medications:   LORazepam   Injectable 1 milliGRAM(s) IV Push every 6 hours PRN  metoclopramide Injectable 10 milliGRAM(s) IV Push every 6 hours PRN  ondansetron Injectable 8 milliGRAM(s) IV Push every 8 hours  aprepitant 80 milliGRAM(s) Oral every 24 hours  BACItracin   Ointment 1 Application(s) Topical two times a day  dexAMETHasone  IVPB 20 milliGRAM(s) IV Intermittent every 24 hours    10. OOB as tolerated, physical therapy consult if needed     11. Monitor coags / fibrinogen 2x week, vitamin K as needed     12. Monitor closely for clinical changes, monitor for fevers     13. Emotional support provided, plan of care discussed and questions addressed     14. Patient education done regarding plan of care, restrictions and discharge planning     15. Continue regular social work input     I have written the above note for Dr. Naylor who performed service with me in the room.   Terrie Barr NP-C ([phone])    I have seen and examined patient with NP, I agree with above note as scribed.                    [facility] Transplant Team                                                      Critical / Counseling Time Provided: 30 minutes                                                                                                                                                        Chief Complaint: Autologous peripheral blood stem cell transplant with high dose Melphalan prep regimen for treatment of multiple myeloma    S: Patient seen and examined with [facility] Transplant Team:   +generalized weakness  +DI rash     O: Vitals:   Vital Signs Last 24 Hrs  T(C): 36.6 (2021 06:18), Max: 37 (2021 09:46)  T(F): 97.9 (2021 06:18), Max: 98.6 (2021 09:46)  HR: 76 (2021 06:18) (73 - 100)  BP: 136/56 (2021 06:18) (125/88 - 148/77)  BP(mean): --  RR: 18 (2021 06:18) (18 - 18)  SpO2: 96% (2021 06:18) (95% - 96%)    Admit weight: 98.4kg   Daily Weight in k.7 (2021 09:46)  Today's weight:     Intake / Output:   16 @ 07:01  -   @ 07:00  --------------------------------------------------------  IN: 5686 mL / OUT: 7500 mL / NET: -1814 mL      PE:   Oropharynx: no erythema or ulcerations   Oral Mucositis:              -                                          Grade: n/a   CVS: S1, S2 RRR   Lungs: CTA throughout bilaterally   Abdomen: + BS x 4, soft, NT, ND   Extremities: no edema   Gastric Mucositis:       -                                           Grade: n/a  Intestinal Mucositis:     -                                         Grade: n/a   Skin: patchy, erythematous macular rash to face, neck and upper chest post kepivance   TLC: CDI   Neuro: A&O x 3   Pain: 0      Labs:                         11.1   4.69  )-----------( 234      ( 2021 07:26 )             34.3         140  |  108  |  22  ----------------------------<  155<H>  4.0   |  20<L>  |  0.99    Ca    7.6<L>      2021 07:23  Phos  3.0     11-  Mg     1.7         TPro  5.3<L>  /  Alb  3.6  /  TBili  0.7  /  DBili  0.1  /  AST  11  /  ALT  18  /  AlkPhos  68  -        Meds:   Antimicrobials:   clotrimazole Lozenge 1 Lozenge Oral five times a day  fluconAZOLE   Tablet 400 milliGRAM(s) Oral daily  trimethoprim  160 mG/sulfamethoxazole 800 mG 1 Tablet(s) Oral every 12 hours      Heme / Onc:   heparin  Infusion 410 Unit(s)/Hr IV Continuous <Continuous>      GI:  pantoprazole    Tablet 40 milliGRAM(s) Oral before breakfast  senna 1 Tablet(s) Oral at bedtime PRN  sodium bicarbonate Mouth Rinse 10 milliLiter(s) Swish and Spit five times a day  ursodiol Capsule 300 milliGRAM(s) Oral two times a day      Cardiovascular:   furosemide   Injectable 20 milliGRAM(s) IV Push every 24 hours PRN  furosemide   Injectable 40 milliGRAM(s) IV Push once      Immunologic:       Other medications:   acetaminophen     Tablet .. 650 milliGRAM(s) Oral every 6 hours  aprepitant 80 milliGRAM(s) Oral every 24 hours  BACItracin   Ointment 1 Application(s) Topical two times a day  Biotene Dry Mouth Oral Rinse 5 milliLiter(s) Swish and Spit five times a day  chlorhexidine 4% Liquid 1 Application(s) Topical <User Schedule>  dexAMETHasone  IVPB 20 milliGRAM(s) IV Intermittent every 24 hours  folic acid 1 milliGRAM(s) Oral daily  hydrocortisone 1% Cream 1 Application(s) Topical two times a day  LORazepam   Injectable 1 milliGRAM(s) IV Push every 24 hours  multivitamin 1 Tablet(s) Oral daily  ondansetron Injectable 8 milliGRAM(s) IV Push every 8 hours  potassium phosphate / sodium phosphate Tablet (K-PHOS No. 2) 1 Tablet(s) Oral four times a day  sodium chloride 0.9%. 1000 milliLiter(s) IV Continuous <Continuous>  sodium chloride 0.9%. 1000 milliLiter(s) IV Continuous <Continuous>      PRN:   acetaminophen     Tablet .. 650 milliGRAM(s) Oral every 6 hours PRN  furosemide   Injectable 20 milliGRAM(s) IV Push every 24 hours PRN  LORazepam   Injectable 1 milliGRAM(s) IV Push every 6 hours PRN  metoclopramide Injectable 10 milliGRAM(s) IV Push every 6 hours PRN  senna 1 Tablet(s) Oral at bedtime PRN  sodium chloride 0.9% lock flush 10 milliLiter(s) IV Push every 1 hour PRN    A/P:   66 year old male with a history of multiple myeloma s/p RVD x 6   Pre :  Autologous PBSCT day - 2  - melphalan ; continue melphalan hydration for 24 hours post infusion of last dose   Strict I&O, daily weights, prn diuresis   - rest day today     1. Infectious Disease:   clotrimazole Lozenge 1 Lozenge Oral five times a day  fluconAZOLE   Tablet 400 milliGRAM(s) Oral daily  trimethoprim  160 mG/sulfamethoxazole 800 mG 1 Tablet(s) Oral every 12 hours    2. VOD Prophylaxis: Actigall, Glutamine, Heparin (dosed at 100 units / kg / day)     3. GI Prophylaxis: pantoprazole    Tablet 40 milliGRAM(s) Oral before breakfast    4. Mouthcare - NS / NaHCO3 rinses, Mycelex, Biotene; Skin care     5. GVHD prophylaxis - n/a     6. Transfuse & replete electrolytes prn   potassium phosphate / sodium phosphate Tablet (K-PHOS No. 2) 1 Tablet(s) Oral four times a day    7. IV hydration, daily weights, strict I&O, prn diuresis   Lasix 40mg IV x 1     8. PO intake as tolerated, nutrition follow up as needed, MVI, folic acid     9. Antiemetics, anti-diarrhea medications:   LORazepam   Injectable 1 milliGRAM(s) IV Push every 6 hours PRN  metoclopramide Injectable 10 milliGRAM(s) IV Push every 6 hours PRN  ondansetron Injectable 8 milliGRAM(s) IV Push every 8 hours  aprepitant 80 milliGRAM(s) Oral every 24 hours  BACItracin   Ointment 1 Application(s) Topical two times a day  dexAMETHasone  IVPB 20 milliGRAM(s) IV Intermittent every 24 hours    10. OOB as tolerated, physical therapy consult if needed     11. Monitor coags / fibrinogen 2x week, vitamin K as needed     12. Monitor closely for clinical changes, monitor for fevers     13. Emotional support provided, plan of care discussed and questions addressed     14. Patient education done regarding plan of care, restrictions and discharge planning     15. Continue regular social work input     I have written the above note for Dr. Naylor who performed service with me in the room.   Terrie Barr NP-C ([phone])    I have seen and examined patient with NP, I agree with above note as scribed.

## 2021-11-17 NOTE — PROGRESS NOTE ADULT - ATTENDING COMMENTS
66 year old male with recently diagnosed plasma cell myeloma status post RVD x 6, admitted for autologous pbsct with high dose melphalan prep regimen.   Days - 4 & -3 : 3 hours prior to Melphalan start hydration: D5NS at 200ml /hr and continue 24 hours post Melphalan infusion;   Melphalan 100mg/m2  IV   Strict I&O, daily weights, prn diuresis   Day -2, day -1 rest days. At 2200 on day – 1, start transplant hydration 1/2NS + 50mEq NaHCO3- (may substitute [id number] if bicarb not available)  Day 0 – infuse HPC product. 4 hours after infusion of cells start Zarxio 5 micrograms / kg (actual weight) . Continue through engraftment.   On day 0, + 1, + 2-give Kepivance 60micrograms / kg (actual weight).  VOD prophylaxis - low dose heparin gtt (dosed at 100 units / kg / day), glutamine supplementation, Actigall BID   PCP prophylaxis - Bactrim DS through day -2    Antiviral prophylaxis - Acyclovir 400mg po TID to start day -1   Antifungal prophylaxis- Diflucan 400 mg po daily.  GI prophylaxis - Protonix po QD   Antibacterial prophylaxis - when ANC < 1000, start Cipro 500mg po BID. If becomes febrile, pan cx, CXR and change Cipro to Cefepime 2g IV q 8 hours. Continue until count recovery  Mouth care and skin care as per protocol.

## 2021-11-18 LAB
ALBUMIN SERPL ELPH-MCNC: 3.9 G/DL — SIGNIFICANT CHANGE UP (ref 3.3–5)
ALP SERPL-CCNC: 60 U/L — SIGNIFICANT CHANGE UP (ref 40–120)
ALT FLD-CCNC: 15 U/L — SIGNIFICANT CHANGE UP (ref 10–45)
ANION GAP SERPL CALC-SCNC: 12 MMOL/L — SIGNIFICANT CHANGE UP (ref 5–17)
AST SERPL-CCNC: 12 U/L — SIGNIFICANT CHANGE UP (ref 10–40)
BASOPHILS # BLD AUTO: 0 K/UL — SIGNIFICANT CHANGE UP (ref 0–0.2)
BASOPHILS NFR BLD AUTO: 0 % — SIGNIFICANT CHANGE UP (ref 0–2)
BILIRUB SERPL-MCNC: 0.8 MG/DL — SIGNIFICANT CHANGE UP (ref 0.2–1.2)
BUN SERPL-MCNC: 26 MG/DL — HIGH (ref 7–23)
CALCIUM SERPL-MCNC: 7.4 MG/DL — LOW (ref 8.4–10.5)
CHLORIDE SERPL-SCNC: 109 MMOL/L — HIGH (ref 96–108)
CO2 SERPL-SCNC: 19 MMOL/L — LOW (ref 22–31)
CREAT SERPL-MCNC: 1.02 MG/DL — SIGNIFICANT CHANGE UP (ref 0.5–1.3)
CULTURE RESULTS: SIGNIFICANT CHANGE UP
CULTURE RESULTS: SIGNIFICANT CHANGE UP
EOSINOPHIL # BLD AUTO: 0 K/UL — SIGNIFICANT CHANGE UP (ref 0–0.5)
EOSINOPHIL NFR BLD AUTO: 0 % — SIGNIFICANT CHANGE UP (ref 0–6)
G6PD RBC-CCNC: 15.4 U/G HGB — SIGNIFICANT CHANGE UP (ref 7–20.5)
GLUCOSE SERPL-MCNC: 152 MG/DL — HIGH (ref 70–99)
HCT VFR BLD CALC: 33.2 % — LOW (ref 39–50)
HGB BLD-MCNC: 11.1 G/DL — LOW (ref 13–17)
IGE SERPL-ACNC: 10 KU/L — SIGNIFICANT CHANGE UP
IMM GRANULOCYTES NFR BLD AUTO: 0.9 % — SIGNIFICANT CHANGE UP (ref 0–1.5)
LDH SERPL L TO P-CCNC: 142 U/L — SIGNIFICANT CHANGE UP (ref 50–242)
LYMPHOCYTES # BLD AUTO: 0.07 K/UL — LOW (ref 1–3.3)
LYMPHOCYTES # BLD AUTO: 1.2 % — LOW (ref 13–44)
MAGNESIUM SERPL-MCNC: 1.9 MG/DL — SIGNIFICANT CHANGE UP (ref 1.6–2.6)
MCHC RBC-ENTMCNC: 31.4 PG — SIGNIFICANT CHANGE UP (ref 27–34)
MCHC RBC-ENTMCNC: 33.4 GM/DL — SIGNIFICANT CHANGE UP (ref 32–36)
MCV RBC AUTO: 94.1 FL — SIGNIFICANT CHANGE UP (ref 80–100)
MONOCYTES # BLD AUTO: 0.12 K/UL — SIGNIFICANT CHANGE UP (ref 0–0.9)
MONOCYTES NFR BLD AUTO: 2.1 % — SIGNIFICANT CHANGE UP (ref 2–14)
NEUTROPHILS # BLD AUTO: 5.61 K/UL — SIGNIFICANT CHANGE UP (ref 1.8–7.4)
NEUTROPHILS NFR BLD AUTO: 95.8 % — HIGH (ref 43–77)
NRBC # BLD: 0 /100 WBCS — SIGNIFICANT CHANGE UP (ref 0–0)
PHOSPHATE SERPL-MCNC: 3.1 MG/DL — SIGNIFICANT CHANGE UP (ref 2.5–4.5)
PLATELET # BLD AUTO: 219 K/UL — SIGNIFICANT CHANGE UP (ref 150–400)
POTASSIUM SERPL-MCNC: 4 MMOL/L — SIGNIFICANT CHANGE UP (ref 3.5–5.3)
POTASSIUM SERPL-SCNC: 4 MMOL/L — SIGNIFICANT CHANGE UP (ref 3.5–5.3)
PROT SERPL-MCNC: 5.2 G/DL — LOW (ref 6–8.3)
RBC # BLD: 3.53 M/UL — LOW (ref 4.2–5.8)
RBC # FLD: 15.5 % — HIGH (ref 10.3–14.5)
SODIUM SERPL-SCNC: 140 MMOL/L — SIGNIFICANT CHANGE UP (ref 135–145)
SPECIMEN SOURCE: SIGNIFICANT CHANGE UP
SPECIMEN SOURCE: SIGNIFICANT CHANGE UP
WBC # BLD: 5.85 K/UL — SIGNIFICANT CHANGE UP (ref 3.8–10.5)
WBC # FLD AUTO: 5.85 K/UL — SIGNIFICANT CHANGE UP (ref 3.8–10.5)

## 2021-11-18 PROCEDURE — 99232 SBSQ HOSP IP/OBS MODERATE 35: CPT

## 2021-11-18 PROCEDURE — 99291 CRITICAL CARE FIRST HOUR: CPT

## 2021-11-18 RX ORDER — CALCIUM GLUCONATE 100 MG/ML
2 VIAL (ML) INTRAVENOUS ONCE
Refills: 0 | Status: COMPLETED | OUTPATIENT
Start: 2021-11-18 | End: 2021-11-18

## 2021-11-18 RX ADMIN — CHLORHEXIDINE GLUCONATE 1 APPLICATION(S): 213 SOLUTION TOPICAL at 06:55

## 2021-11-18 RX ADMIN — Medication 1 TABLET(S): at 00:06

## 2021-11-18 RX ADMIN — Medication 10 MILLILITER(S): at 20:11

## 2021-11-18 RX ADMIN — Medication 10 MILLILITER(S): at 00:06

## 2021-11-18 RX ADMIN — Medication 400 MILLIGRAM(S): at 06:29

## 2021-11-18 RX ADMIN — SENNA PLUS 1 TABLET(S): 8.6 TABLET ORAL at 17:42

## 2021-11-18 RX ADMIN — Medication 1 TABLET(S): at 06:29

## 2021-11-18 RX ADMIN — Medication 400 MILLIGRAM(S): at 13:03

## 2021-11-18 RX ADMIN — ONDANSETRON 8 MILLIGRAM(S): 8 TABLET, FILM COATED ORAL at 13:03

## 2021-11-18 RX ADMIN — Medication 1 MILLIGRAM(S): at 11:06

## 2021-11-18 RX ADMIN — POLYETHYLENE GLYCOL 3350 17 GRAM(S): 17 POWDER, FOR SOLUTION ORAL at 11:06

## 2021-11-18 RX ADMIN — URSODIOL 300 MILLIGRAM(S): 250 TABLET, FILM COATED ORAL at 17:35

## 2021-11-18 RX ADMIN — Medication 5 MILLILITER(S): at 15:16

## 2021-11-18 RX ADMIN — Medication 5 MILLILITER(S): at 20:11

## 2021-11-18 RX ADMIN — Medication 200 GRAM(S): at 07:50

## 2021-11-18 RX ADMIN — Medication 5 MILLILITER(S): at 07:50

## 2021-11-18 RX ADMIN — Medication 400 MILLIGRAM(S): at 21:34

## 2021-11-18 RX ADMIN — Medication 1 APPLICATION(S): at 06:55

## 2021-11-18 RX ADMIN — Medication 1 APPLICATION(S): at 17:35

## 2021-11-18 RX ADMIN — SODIUM CHLORIDE 150 MILLILITER(S): 9 INJECTION, SOLUTION INTRAVENOUS at 22:06

## 2021-11-18 RX ADMIN — Medication 1 LOZENGE: at 15:16

## 2021-11-18 RX ADMIN — Medication 5 MILLILITER(S): at 00:05

## 2021-11-18 RX ADMIN — Medication 1 TABLET(S): at 11:06

## 2021-11-18 RX ADMIN — PANTOPRAZOLE SODIUM 40 MILLIGRAM(S): 20 TABLET, DELAYED RELEASE ORAL at 06:29

## 2021-11-18 RX ADMIN — Medication 1 LOZENGE: at 20:11

## 2021-11-18 RX ADMIN — ONDANSETRON 8 MILLIGRAM(S): 8 TABLET, FILM COATED ORAL at 06:29

## 2021-11-18 RX ADMIN — APREPITANT 80 MILLIGRAM(S): 80 CAPSULE ORAL at 11:12

## 2021-11-18 RX ADMIN — FLUCONAZOLE 400 MILLIGRAM(S): 150 TABLET ORAL at 11:06

## 2021-11-18 RX ADMIN — Medication 10 MILLILITER(S): at 15:16

## 2021-11-18 RX ADMIN — URSODIOL 300 MILLIGRAM(S): 250 TABLET, FILM COATED ORAL at 06:29

## 2021-11-18 RX ADMIN — Medication 5 MILLILITER(S): at 11:13

## 2021-11-18 RX ADMIN — Medication 1 LOZENGE: at 11:13

## 2021-11-18 RX ADMIN — Medication 10 MILLILITER(S): at 11:13

## 2021-11-18 RX ADMIN — Medication 10 MILLILITER(S): at 07:50

## 2021-11-18 RX ADMIN — Medication 1 LOZENGE: at 07:50

## 2021-11-18 RX ADMIN — Medication 1 LOZENGE: at 00:05

## 2021-11-18 NOTE — PROGRESS NOTE ADULT - SUBJECTIVE AND OBJECTIVE BOX
HPC Transplant Team                                                      Critical / Counseling Time Provided: 30 minutes                                                                                                                                                        Chief Complaint: Autologous peripheral blood stem cell transplant with high dose Melphalan prep regimen for treatment of multiple myeloma    S: Patient seen and examined with HPC Transplant Team:       O: Vitals:   Vital Signs Last 24 Hrs  T(C): 36.8 (2021 06:00), Max: 36.8 (2021 06:00)  T(F): 98.2 (2021 06:00), Max: 98.2 (2021 06:00)  HR: 72 (2021 06:00) (70 - 88)  BP: 136/78 (2021 06:00) (136/78 - 152/86)  BP(mean): --  RR: 18 (2021 06:00) (18 - 18)  SpO2: 95% (2021 06:00) (94% - 96%)    Admit weight: 98.4kg   Daily Weight in k.3 (2021 10:06)  Today's weight:     Intake / Output:   -17 @ 07:01  -  -18 @ 07:00  --------------------------------------------------------  IN: 3609.3 mL / OUT: 4350 mL / NET: -740.7 mL      PE:   Oropharynx: no erythema or ulcerations   Oral Mucositis:              -                                          Grade: n/a   CVS: S1, S2 RRR   Lungs: CTA throughout bilaterally   Abdomen: + BS x 4, soft, NT, ND   Extremities: no edema   Gastric Mucositis:       -                                           Grade: n/a  Intestinal Mucositis:     -                                         Grade: n/a   Skin: patchy, erythematous macular rash to face, neck and upper chest post kepivance   TLC: CDI   Neuro: A&O x 3   Pain: 0        Labs:   CBC Full  -  ( 2021 06:40 )  WBC Count : 5.85 K/uL  Hemoglobin : 11.1 g/dL  Hematocrit : 33.2 %  Platelet Count - Automated : 219 K/uL  Mean Cell Volume : 94.1 fl  Mean Cell Hemoglobin : 31.4 pg  Mean Cell Hemoglobin Concentration : 33.4 gm/dL  Auto Neutrophil # : 5.61 K/uL  Auto Lymphocyte # : 0.07 K/uL  Auto Monocyte # : 0.12 K/uL  Auto Eosinophil # : 0.00 K/uL  Auto Basophil # : 0.00 K/uL  Auto Neutrophil % : 95.8 %  Auto Lymphocyte % : 1.2 %  Auto Monocyte % : 2.1 %  Auto Eosinophil % : 0.0 %  Auto Basophil % : 0.0 %                          11.1   5.85  )-----------( 219      ( 2021 06:40 )             33.2         140  |  109<H>  |  26<H>  ----------------------------<  152<H>  4.0   |  19<L>  |  1.02    Ca    7.4<L>      2021 06:40  Phos  3.1       Mg     1.9         TPro  5.2<L>  /  Alb  3.9  /  TBili  0.8  /  DBili  x   /  AST  12  /  ALT  15  /  AlkPhos  60        LIVER FUNCTIONS - ( 2021 06:40 )  Alb: 3.9 g/dL / Pro: 5.2 g/dL / ALK PHOS: 60 U/L / ALT: 15 U/L / AST: 12 U/L / GGT: x           Lactate Dehydrogenase, Serum: 142 U/L ( @ 06:40)      Meds:   Antimicrobials:   acyclovir   Oral Tab/Cap 400 milliGRAM(s) Oral every 8 hours  clotrimazole Lozenge 1 Lozenge Oral five times a day  fluconAZOLE   Tablet 400 milliGRAM(s) Oral daily      Heme / Onc:   heparin  Infusion 410 Unit(s)/Hr IV Continuous <Continuous>      GI:  pantoprazole    Tablet 40 milliGRAM(s) Oral before breakfast  polyethylene glycol 3350 17 Gram(s) Oral daily PRN  senna 1 Tablet(s) Oral at bedtime PRN  sodium bicarbonate Mouth Rinse 10 milliLiter(s) Swish and Spit five times a day  ursodiol Capsule 300 milliGRAM(s) Oral two times a day      Cardiovascular:   furosemide   Injectable 20 milliGRAM(s) IV Push every 24 hours PRN      Immunologic:       Other medications:   acetaminophen     Tablet .. 650 milliGRAM(s) Oral every 6 hours  aprepitant 80 milliGRAM(s) Oral every 24 hours  BACItracin   Ointment 1 Application(s) Topical two times a day  Biotene Dry Mouth Oral Rinse 5 milliLiter(s) Swish and Spit five times a day  calcium gluconate IVPB 2 Gram(s) IV Intermittent once  chlorhexidine 4% Liquid 1 Application(s) Topical <User Schedule>  folic acid 1 milliGRAM(s) Oral daily  hydrocortisone 1% Cream 1 Application(s) Topical two times a day  LORazepam   Injectable 1 milliGRAM(s) IV Push every 24 hours  multivitamin 1 Tablet(s) Oral daily  ondansetron Injectable 8 milliGRAM(s) IV Push every 8 hours  sodium chloride 0.45% 1000 milliLiter(s) IV Continuous <Continuous>  sodium chloride 0.9%. 1000 milliLiter(s) IV Continuous <Continuous>  sodium chloride 0.9%. 1000 milliLiter(s) IV Continuous <Continuous>      PRN:   acetaminophen     Tablet .. 650 milliGRAM(s) Oral every 6 hours PRN  furosemide   Injectable 20 milliGRAM(s) IV Push every 24 hours PRN  LORazepam   Injectable 1 milliGRAM(s) IV Push every 6 hours PRN  metoclopramide Injectable 10 milliGRAM(s) IV Push every 6 hours PRN  polyethylene glycol 3350 17 Gram(s) Oral daily PRN  senna 1 Tablet(s) Oral at bedtime PRN  sodium chloride 0.9% lock flush 10 milliLiter(s) IV Push every 1 hour PRN    A/P:   66 year old male with a history of multiple myeloma s/p RVD x 6   Pre :  Autologous PBSCT day -  1  - melphalan ; continue melphalan hydration for 24 hours post infusion of last dose   Strict I&O, daily weights, prn diuresis   - rest day today   - rest day today     1. Infectious Disease:   acyclovir   Oral Tab/Cap 400 milliGRAM(s) Oral every 8 hours  clotrimazole Lozenge 1 Lozenge Oral five times a day  fluconAZOLE   Tablet 400 milliGRAM(s) Oral daily    2. VOD Prophylaxis: Actigall, Glutamine, Heparin (dosed at 100 units / kg / day)     3. GI Prophylaxis:   pantoprazole    Tablet 40 milliGRAM(s) Oral before breakfast    4. Mouthcare - NS / NaHCO3 rinses, Mycelex, Biotene; Skin care     5. GVHD prophylaxis - n/a     6. Transfuse & replete electrolytes prn   calcium gluconate 2g IV x 1 - (corrected serum Ca 7.5)    7. IV hydration, daily weights, strict I&O, prn diuresis     8. PO intake as tolerated, nutrition follow up as needed, MVI, folic acid     9. Antiemetics, anti-diarrhea medications:   LORazepam   Injectable 1 milliGRAM(s) IV Push every 6 hours PRN  metoclopramide Injectable 10 milliGRAM(s) IV Push every 6 hours PRN  ondansetron Injectable 8 milliGRAM(s) IV Push every 8 hours  aprepitant 80 milliGRAM(s) Oral every 24 hours    10. OOB as tolerated, physical therapy consult if needed     11. Monitor coags / fibrinogen 2x week, vitamin K as needed     12. Monitor closely for clinical changes, monitor for fevers     13. Emotional support provided, plan of care discussed and questions addressed     14. Patient education done regarding plan of care, restrictions and discharge planning     15. Continue regular social work input     I have written the above note for Dr. Naylor who performed service with me in the room.   Terrie Barr NP-C ([phone])    I have seen and examined patient with NP, I agree with above note as scribed.                    HPC Transplant Team                                                      Critical / Counseling Time Provided: 30 minutes                                                                                                                                                        Chief Complaint: Autologous peripheral blood stem cell transplant with high dose Melphalan prep regimen for treatment of multiple myeloma    S: Patient seen and examined with HPC Transplant Team:   +generalized weakness  + poor appetite       O: Vitals:   Vital Signs Last 24 Hrs  T(C): 36.8 (2021 06:00), Max: 36.8 (2021 06:00)  T(F): 98.2 (2021 06:00), Max: 98.2 (2021 06:00)  HR: 72 (2021 06:00) (70 - 88)  BP: 136/78 (2021 06:00) (136/78 - 152/86)  BP(mean): --  RR: 18 (2021 06:00) (18 - 18)  SpO2: 95% (2021 06:00) (94% - 96%)    Admit weight: 98.4kg   Daily Weight in k.3 (2021 10:06)  Today's weight: 99 kg     Intake / Output:    @ 07:01  -   @ 07:00  --------------------------------------------------------  IN: 3609.3 mL / OUT: 4350 mL / NET: -740.7 mL      PE:   Oropharynx: no erythema or ulcerations   Oral Mucositis:              -                                          Grade: n/a   CVS: S1, S2 RRR   Lungs: CTA throughout bilaterally   Abdomen: + BS x 4, soft, NT, ND   Extremities: no edema   Gastric Mucositis:       -                                           Grade: n/a  Intestinal Mucositis:     -                                         Grade: n/a   Skin: patchy, erythematous macular rash to face, neck and upper chest post kepivance   TLC: CDI   Neuro: A&O x 3   Pain: 0        Labs:   CBC Full  -  ( 2021 06:40 )  WBC Count : 5.85 K/uL  Hemoglobin : 11.1 g/dL  Hematocrit : 33.2 %  Platelet Count - Automated : 219 K/uL  Mean Cell Volume : 94.1 fl  Mean Cell Hemoglobin : 31.4 pg  Mean Cell Hemoglobin Concentration : 33.4 gm/dL  Auto Neutrophil # : 5.61 K/uL  Auto Lymphocyte # : 0.07 K/uL  Auto Monocyte # : 0.12 K/uL  Auto Eosinophil # : 0.00 K/uL  Auto Basophil # : 0.00 K/uL  Auto Neutrophil % : 95.8 %  Auto Lymphocyte % : 1.2 %  Auto Monocyte % : 2.1 %  Auto Eosinophil % : 0.0 %  Auto Basophil % : 0.0 %                          11.1   5.85  )-----------( 219      ( 2021 06:40 )             33.2         140  |  109<H>  |  26<H>  ----------------------------<  152<H>  4.0   |  19<L>  |  1.02    Ca    7.4<L>      2021 06:40  Phos  3.1     -  Mg     1.9         TPro  5.2<L>  /  Alb  3.9  /  TBili  0.8  /  DBili  x   /  AST  12  /  ALT  15  /  AlkPhos  60        LIVER FUNCTIONS - ( 2021 06:40 )  Alb: 3.9 g/dL / Pro: 5.2 g/dL / ALK PHOS: 60 U/L / ALT: 15 U/L / AST: 12 U/L / GGT: x           Lactate Dehydrogenase, Serum: 142 U/L ( @ 06:40)      Meds:   Antimicrobials:   acyclovir   Oral Tab/Cap 400 milliGRAM(s) Oral every 8 hours  clotrimazole Lozenge 1 Lozenge Oral five times a day  fluconAZOLE   Tablet 400 milliGRAM(s) Oral daily      Heme / Onc:   heparin  Infusion 410 Unit(s)/Hr IV Continuous <Continuous>      GI:  pantoprazole    Tablet 40 milliGRAM(s) Oral before breakfast  polyethylene glycol 3350 17 Gram(s) Oral daily PRN  senna 1 Tablet(s) Oral at bedtime PRN  sodium bicarbonate Mouth Rinse 10 milliLiter(s) Swish and Spit five times a day  ursodiol Capsule 300 milliGRAM(s) Oral two times a day      Cardiovascular:   furosemide   Injectable 20 milliGRAM(s) IV Push every 24 hours PRN      Immunologic:       Other medications:   acetaminophen     Tablet .. 650 milliGRAM(s) Oral every 6 hours  aprepitant 80 milliGRAM(s) Oral every 24 hours  BACItracin   Ointment 1 Application(s) Topical two times a day  Biotene Dry Mouth Oral Rinse 5 milliLiter(s) Swish and Spit five times a day  calcium gluconate IVPB 2 Gram(s) IV Intermittent once  chlorhexidine 4% Liquid 1 Application(s) Topical <User Schedule>  folic acid 1 milliGRAM(s) Oral daily  hydrocortisone 1% Cream 1 Application(s) Topical two times a day  LORazepam   Injectable 1 milliGRAM(s) IV Push every 24 hours  multivitamin 1 Tablet(s) Oral daily  ondansetron Injectable 8 milliGRAM(s) IV Push every 8 hours  sodium chloride 0.45% 1000 milliLiter(s) IV Continuous <Continuous>  sodium chloride 0.9%. 1000 milliLiter(s) IV Continuous <Continuous>  sodium chloride 0.9%. 1000 milliLiter(s) IV Continuous <Continuous>      PRN:   acetaminophen     Tablet .. 650 milliGRAM(s) Oral every 6 hours PRN  furosemide   Injectable 20 milliGRAM(s) IV Push every 24 hours PRN  LORazepam   Injectable 1 milliGRAM(s) IV Push every 6 hours PRN  metoclopramide Injectable 10 milliGRAM(s) IV Push every 6 hours PRN  polyethylene glycol 3350 17 Gram(s) Oral daily PRN  senna 1 Tablet(s) Oral at bedtime PRN  sodium chloride 0.9% lock flush 10 milliLiter(s) IV Push every 1 hour PRN    A/P:   66 year old male with a history of multiple myeloma s/p RVD x 6   Pre :  Autologous PBSCT day -  1  - melphalan ; continue melphalan hydration for 24 hours post infusion of last dose   Strict I&O, daily weights, prn diuresis   - rest day today   - rest day today     1. Infectious Disease:   acyclovir   Oral Tab/Cap 400 milliGRAM(s) Oral every 8 hours  clotrimazole Lozenge 1 Lozenge Oral five times a day  fluconAZOLE   Tablet 400 milliGRAM(s) Oral daily    2. VOD Prophylaxis: Actigall, Glutamine, Heparin (dosed at 100 units / kg / day)     3. GI Prophylaxis:   pantoprazole    Tablet 40 milliGRAM(s) Oral before breakfast    4. Mouthcare - NS / NaHCO3 rinses, Mycelex, Biotene; Skin care     5. GVHD prophylaxis - n/a     6. Transfuse & replete electrolytes prn   calcium gluconate 2g IV x 1 - (corrected serum Ca 7.5)    7. IV hydration, daily weights, strict I&O, prn diuresis     8. PO intake as tolerated, nutrition follow up as needed, MVI, folic acid     9. Antiemetics, anti-diarrhea medications:   LORazepam   Injectable 1 milliGRAM(s) IV Push every 6 hours PRN  metoclopramide Injectable 10 milliGRAM(s) IV Push every 6 hours PRN  ondansetron Injectable 8 milliGRAM(s) IV Push every 8 hours  aprepitant 80 milliGRAM(s) Oral every 24 hours    10. OOB as tolerated, physical therapy consult if needed     11. Monitor coags / fibrinogen 2x week, vitamin K as needed     12. Monitor closely for clinical changes, monitor for fevers     13. Emotional support provided, plan of care discussed and questions addressed     14. Patient education done regarding plan of care, restrictions and discharge planning     15. Continue regular social work input     I have written the above note for Dr. Naylor who performed service with me in the room.   Terrie Barr NP-C ([phone])    I have seen and examined patient with NP, I agree with above note as scribed.

## 2021-11-18 NOTE — PROGRESS NOTE ADULT - ATTENDING COMMENTS
66 year old male with recently diagnosed plasma cell myeloma status post RVD x 6, admitted for autologous pbsct with high dose melphalan prep regimen.   Days - 4 & -3 : 3 hours prior to Melphalan start hydration: D5NS at 200ml /hr and continue 24 hours post Melphalan infusion;   Melphalan 100mg/m2  IV   Strict I&O, daily weights, prn diuresis   Day -2, day -1 rest days. At 2200 on day – 1, start transplant hydration 1/2NS + 50mEq NaHCO3- (may substitute [id number] if bicarb not available)  Day 0 – infuse HPC product. 4 hours after infusion of cells start Zarxio 5 micrograms / kg (actual weight) . Continue through engraftment.   On day 0, + 1, + 2-give Kepivance 60micrograms / kg (actual weight).  VOD prophylaxis - low dose heparin gtt (dosed at 100 units / kg / day), glutamine supplementation, Actigall BID   PCP prophylaxis - Bactrim DS through day -2    Antiviral prophylaxis - Acyclovir 400mg po TID to start day -1   Antifungal prophylaxis- Diflucan 400 mg po daily.  GI prophylaxis - Protonix po QD   Antibacterial prophylaxis - when ANC < 1000, start Cipro 500mg po BID. If becomes febrile, pan cx, CXR and change Cipro to Cefepime 2g IV q 8 hours. Continue until count recovery  Mouth care and skin care as per protocol. 66 year old male with recently diagnosed plasma cell myeloma status post RVD x 6, admitted for autologous pbsct with high dose melphalan prep regimen.  Day -1 today  Days - 4 & -3 : 3 hours prior to Melphalan start hydration: D5NS at 200ml /hr and continue 24 hours post Melphalan infusion;   Melphalan 100mg/m2  IV   Strict I&O, daily weights, prn diuresis   Day -2, day -1 rest days. At 2200 on day – 1, start transplant hydration 1/2NS + 50mEq NaHCO3- (may substitute [id number] if bicarb not available)  Day 0 – infuse HPC product. 4 hours after infusion of cells start Zarxio 5 micrograms / kg (actual weight) . Continue through engraftment.   On day 0, + 1, + 2-give Kepivance 60micrograms / kg (actual weight).  VOD prophylaxis - low dose heparin gtt (dosed at 100 units / kg / day), glutamine supplementation, Actigall BID   PCP prophylaxis - Bactrim DS through day -2    Antiviral prophylaxis - Acyclovir 400mg po TID to start day -1   Antifungal prophylaxis- Diflucan 400 mg po daily.  GI prophylaxis - Protonix po QD   Antibacterial prophylaxis - when ANC < 1000, start Cipro 500mg po BID. If becomes febrile, pan cx, CXR and change Cipro to Cefepime 2g IV q 8 hours. Continue until count recovery  Mouth care and skin care as per protocol.

## 2021-11-19 DIAGNOSIS — Z51.5 ENCOUNTER FOR PALLIATIVE CARE: ICD-10-CM

## 2021-11-19 DIAGNOSIS — R53.81 OTHER MALAISE: ICD-10-CM

## 2021-11-19 DIAGNOSIS — C90.00 MULTIPLE MYELOMA NOT HAVING ACHIEVED REMISSION: ICD-10-CM

## 2021-11-19 DIAGNOSIS — R63.0 ANOREXIA: ICD-10-CM

## 2021-11-19 DIAGNOSIS — R43.2 PARAGEUSIA: ICD-10-CM

## 2021-11-19 LAB
ALBUMIN SERPL ELPH-MCNC: 3.6 G/DL — SIGNIFICANT CHANGE UP (ref 3.3–5)
ALP SERPL-CCNC: 57 U/L — SIGNIFICANT CHANGE UP (ref 40–120)
ALT FLD-CCNC: 12 U/L — SIGNIFICANT CHANGE UP (ref 10–45)
ANION GAP SERPL CALC-SCNC: 10 MMOL/L — SIGNIFICANT CHANGE UP (ref 5–17)
APPEARANCE UR: CLEAR — SIGNIFICANT CHANGE UP
AST SERPL-CCNC: 10 U/L — SIGNIFICANT CHANGE UP (ref 10–40)
BACTERIA # UR AUTO: NEGATIVE — SIGNIFICANT CHANGE UP
BASOPHILS # BLD AUTO: 0.01 K/UL — SIGNIFICANT CHANGE UP (ref 0–0.2)
BASOPHILS NFR BLD AUTO: 0.1 % — SIGNIFICANT CHANGE UP (ref 0–2)
BILIRUB DIRECT SERPL-MCNC: 0.2 MG/DL — SIGNIFICANT CHANGE UP (ref 0–0.2)
BILIRUB INDIRECT FLD-MCNC: 0.7 MG/DL — SIGNIFICANT CHANGE UP (ref 0.2–1)
BILIRUB SERPL-MCNC: 0.9 MG/DL — SIGNIFICANT CHANGE UP (ref 0.2–1.2)
BILIRUB UR-MCNC: NEGATIVE — SIGNIFICANT CHANGE UP
BLD GP AB SCN SERPL QL: NEGATIVE — SIGNIFICANT CHANGE UP
BUN SERPL-MCNC: 22 MG/DL — SIGNIFICANT CHANGE UP (ref 7–23)
CALCIUM SERPL-MCNC: 7.6 MG/DL — LOW (ref 8.4–10.5)
CHLORIDE SERPL-SCNC: 107 MMOL/L — SIGNIFICANT CHANGE UP (ref 96–108)
CO2 SERPL-SCNC: 21 MMOL/L — LOW (ref 22–31)
COLOR SPEC: SIGNIFICANT CHANGE UP
CREAT SERPL-MCNC: 0.89 MG/DL — SIGNIFICANT CHANGE UP (ref 0.5–1.3)
DIFF PNL FLD: NEGATIVE — SIGNIFICANT CHANGE UP
EOSINOPHIL # BLD AUTO: 0 K/UL — SIGNIFICANT CHANGE UP (ref 0–0.5)
EOSINOPHIL NFR BLD AUTO: 0 % — SIGNIFICANT CHANGE UP (ref 0–6)
EPI CELLS # UR: 2 /HPF — SIGNIFICANT CHANGE UP
GLUCOSE BLDC GLUCOMTR-MCNC: 138 MG/DL — HIGH (ref 70–99)
GLUCOSE SERPL-MCNC: 114 MG/DL — HIGH (ref 70–99)
GLUCOSE UR QL: NEGATIVE — SIGNIFICANT CHANGE UP
HCT VFR BLD CALC: 31.9 % — LOW (ref 39–50)
HGB BLD-MCNC: 10.6 G/DL — LOW (ref 13–17)
HYALINE CASTS # UR AUTO: 1 /LPF — SIGNIFICANT CHANGE UP (ref 0–2)
IMM GRANULOCYTES NFR BLD AUTO: 1.1 % — SIGNIFICANT CHANGE UP (ref 0–1.5)
KETONES UR-MCNC: NEGATIVE — SIGNIFICANT CHANGE UP
LDH SERPL L TO P-CCNC: 137 U/L — SIGNIFICANT CHANGE UP (ref 50–242)
LEUKOCYTE ESTERASE UR-ACNC: NEGATIVE — SIGNIFICANT CHANGE UP
LYMPHOCYTES # BLD AUTO: 0.03 K/UL — LOW (ref 1–3.3)
LYMPHOCYTES # BLD AUTO: 0.2 % — LOW (ref 13–44)
MAGNESIUM SERPL-MCNC: 1.8 MG/DL — SIGNIFICANT CHANGE UP (ref 1.6–2.6)
MCHC RBC-ENTMCNC: 31.5 PG — SIGNIFICANT CHANGE UP (ref 27–34)
MCHC RBC-ENTMCNC: 33.2 GM/DL — SIGNIFICANT CHANGE UP (ref 32–36)
MCV RBC AUTO: 94.7 FL — SIGNIFICANT CHANGE UP (ref 80–100)
MONOCYTES # BLD AUTO: 0.06 K/UL — SIGNIFICANT CHANGE UP (ref 0–0.9)
MONOCYTES NFR BLD AUTO: 0.5 % — LOW (ref 2–14)
NEUTROPHILS # BLD AUTO: 12 K/UL — HIGH (ref 1.8–7.4)
NEUTROPHILS NFR BLD AUTO: 98.1 % — HIGH (ref 43–77)
NITRITE UR-MCNC: NEGATIVE — SIGNIFICANT CHANGE UP
NRBC # BLD: 0 /100 WBCS — SIGNIFICANT CHANGE UP (ref 0–0)
PH UR: 6.5 — SIGNIFICANT CHANGE UP (ref 5–8)
PHOSPHATE SERPL-MCNC: 2.3 MG/DL — LOW (ref 2.5–4.5)
PLATELET # BLD AUTO: 183 K/UL — SIGNIFICANT CHANGE UP (ref 150–400)
POTASSIUM SERPL-MCNC: 3.8 MMOL/L — SIGNIFICANT CHANGE UP (ref 3.5–5.3)
POTASSIUM SERPL-SCNC: 3.8 MMOL/L — SIGNIFICANT CHANGE UP (ref 3.5–5.3)
PROT SERPL-MCNC: 5 G/DL — LOW (ref 6–8.3)
PROT UR-MCNC: ABNORMAL
RBC # BLD: 3.37 M/UL — LOW (ref 4.2–5.8)
RBC # FLD: 15.5 % — HIGH (ref 10.3–14.5)
RBC CASTS # UR COMP ASSIST: 2 /HPF — SIGNIFICANT CHANGE UP (ref 0–4)
RH IG SCN BLD-IMP: NEGATIVE — SIGNIFICANT CHANGE UP
SODIUM SERPL-SCNC: 138 MMOL/L — SIGNIFICANT CHANGE UP (ref 135–145)
SP GR SPEC: 1.02 — SIGNIFICANT CHANGE UP (ref 1.01–1.02)
UROBILINOGEN FLD QL: NEGATIVE — SIGNIFICANT CHANGE UP
WBC # BLD: 12.23 K/UL — HIGH (ref 3.8–10.5)
WBC # FLD AUTO: 12.23 K/UL — HIGH (ref 3.8–10.5)
WBC UR QL: 5 /HPF — SIGNIFICANT CHANGE UP (ref 0–5)

## 2021-11-19 PROCEDURE — 74018 RADEX ABDOMEN 1 VIEW: CPT | Mod: 26

## 2021-11-19 PROCEDURE — 99223 1ST HOSP IP/OBS HIGH 75: CPT

## 2021-11-19 PROCEDURE — 38241 TRANSPLT AUTOL HCT/DONOR: CPT

## 2021-11-19 PROCEDURE — 93010 ELECTROCARDIOGRAM REPORT: CPT

## 2021-11-19 RX ORDER — FUROSEMIDE 40 MG
40 TABLET ORAL ONCE
Refills: 0 | Status: DISCONTINUED | OUTPATIENT
Start: 2021-11-19 | End: 2021-11-19

## 2021-11-19 RX ORDER — POTASSIUM CHLORIDE 20 MEQ
20 PACKET (EA) ORAL
Refills: 0 | Status: COMPLETED | OUTPATIENT
Start: 2021-11-19 | End: 2021-11-19

## 2021-11-19 RX ORDER — PALIFERMIN 6.25 MG
5880 VIAL (EA) INTRAVENOUS EVERY 24 HOURS
Refills: 0 | Status: COMPLETED | OUTPATIENT
Start: 2021-11-19 | End: 2021-11-21

## 2021-11-19 RX ORDER — POTASSIUM PHOSPHATE, MONOBASIC POTASSIUM PHOSPHATE, DIBASIC 236; 224 MG/ML; MG/ML
15 INJECTION, SOLUTION INTRAVENOUS ONCE
Refills: 0 | Status: COMPLETED | OUTPATIENT
Start: 2021-11-19 | End: 2021-11-19

## 2021-11-19 RX ADMIN — Medication 1 MILLIGRAM(S): at 11:44

## 2021-11-19 RX ADMIN — Medication 400 MILLIGRAM(S): at 05:34

## 2021-11-19 RX ADMIN — SODIUM CHLORIDE 150 MILLILITER(S): 9 INJECTION, SOLUTION INTRAVENOUS at 05:33

## 2021-11-19 RX ADMIN — Medication 1 APPLICATION(S): at 05:35

## 2021-11-19 RX ADMIN — Medication 650 MILLIGRAM(S): at 12:57

## 2021-11-19 RX ADMIN — Medication 1 APPLICATION(S): at 18:07

## 2021-11-19 RX ADMIN — Medication 50 MILLIGRAM(S): at 12:57

## 2021-11-19 RX ADMIN — URSODIOL 300 MILLIGRAM(S): 250 TABLET, FILM COATED ORAL at 17:45

## 2021-11-19 RX ADMIN — Medication 10 MILLIGRAM(S): at 13:08

## 2021-11-19 RX ADMIN — Medication 5 MILLILITER(S): at 08:35

## 2021-11-19 RX ADMIN — Medication 10 MILLILITER(S): at 11:43

## 2021-11-19 RX ADMIN — Medication 50 MILLIEQUIVALENT(S): at 12:58

## 2021-11-19 RX ADMIN — Medication 400 MILLIGRAM(S): at 21:59

## 2021-11-19 RX ADMIN — Medication 1 APPLICATION(S): at 17:48

## 2021-11-19 RX ADMIN — Medication 1 LOZENGE: at 15:13

## 2021-11-19 RX ADMIN — Medication 10 MILLILITER(S): at 08:35

## 2021-11-19 RX ADMIN — HEPARIN SODIUM 4.1 UNIT(S)/HR: 5000 INJECTION INTRAVENOUS; SUBCUTANEOUS at 12:55

## 2021-11-19 RX ADMIN — Medication 10 MILLILITER(S): at 00:56

## 2021-11-19 RX ADMIN — CHLORHEXIDINE GLUCONATE 1 APPLICATION(S): 213 SOLUTION TOPICAL at 11:43

## 2021-11-19 RX ADMIN — PANTOPRAZOLE SODIUM 40 MILLIGRAM(S): 20 TABLET, DELAYED RELEASE ORAL at 08:43

## 2021-11-19 RX ADMIN — APREPITANT 80 MILLIGRAM(S): 80 CAPSULE ORAL at 11:46

## 2021-11-19 RX ADMIN — Medication 50 MILLIEQUIVALENT(S): at 15:15

## 2021-11-19 RX ADMIN — HEPARIN SODIUM 4.1 UNIT(S)/HR: 5000 INJECTION INTRAVENOUS; SUBCUTANEOUS at 05:35

## 2021-11-19 RX ADMIN — Medication 1 LOZENGE: at 11:44

## 2021-11-19 RX ADMIN — URSODIOL 300 MILLIGRAM(S): 250 TABLET, FILM COATED ORAL at 05:34

## 2021-11-19 RX ADMIN — LIDOCAINE AND PRILOCAINE CREAM 1 APPLICATION(S): 25; 25 CREAM TOPICAL at 13:30

## 2021-11-19 RX ADMIN — Medication 480 MICROGRAM(S): at 17:43

## 2021-11-19 RX ADMIN — Medication 10 MILLIGRAM(S): at 14:19

## 2021-11-19 RX ADMIN — Medication 10 MILLILITER(S): at 20:45

## 2021-11-19 RX ADMIN — Medication 50 MILLIEQUIVALENT(S): at 11:43

## 2021-11-19 RX ADMIN — POTASSIUM PHOSPHATE, MONOBASIC POTASSIUM PHOSPHATE, DIBASIC 62.5 MILLIMOLE(S): 236; 224 INJECTION, SOLUTION INTRAVENOUS at 08:56

## 2021-11-19 RX ADMIN — Medication 5 MILLILITER(S): at 15:13

## 2021-11-19 RX ADMIN — Medication 400 MILLIGRAM(S): at 13:15

## 2021-11-19 RX ADMIN — Medication 10 MILLILITER(S): at 15:13

## 2021-11-19 RX ADMIN — Medication 5 MILLILITER(S): at 20:45

## 2021-11-19 RX ADMIN — Medication 5880 MICROGRAM(S): at 17:43

## 2021-11-19 RX ADMIN — Medication 1 LOZENGE: at 08:34

## 2021-11-19 RX ADMIN — Medication 5 MILLILITER(S): at 11:42

## 2021-11-19 RX ADMIN — Medication 5 MILLILITER(S): at 00:56

## 2021-11-19 RX ADMIN — Medication 1 TABLET(S): at 11:44

## 2021-11-19 RX ADMIN — Medication 1 LOZENGE: at 20:45

## 2021-11-19 RX ADMIN — FLUCONAZOLE 400 MILLIGRAM(S): 150 TABLET ORAL at 11:44

## 2021-11-19 RX ADMIN — Medication 1 LOZENGE: at 00:56

## 2021-11-19 RX ADMIN — SODIUM CHLORIDE 20 MILLILITER(S): 9 INJECTION INTRAMUSCULAR; INTRAVENOUS; SUBCUTANEOUS at 05:35

## 2021-11-19 NOTE — CONSULT NOTE ADULT - NSCONSULTADDITIONALINFOA_GEN_ALL_CORE
Palliative Global Assessment  A review of the paper chart has been conducted  HCP form present:               Yes and valid []               Yes but invalid []                No []   MOLST present:                   Yes and valid []               Yes but invalid []                No []  Incapacity form present:   Yes and valid []                Yes but invalid []                No []                 N/A []  Living Will:                            Yes and valid []                Yes but invalid []                No []      Family Health Care Decision Act Surrogate Decision Maker Hierarchy  [facility] Guardian-->Spouse or domestic partner--> Adult child-->Parent-->Sibling--> Close friend      Contacts listed in the paper or electronic chart  Name  Relationship  Number(s)  Translation Required:  Spouse or Partner:  Family unit:  Family history of hematologic malignancy:  Residence: [ ] Apartment   [] House  [ ] Other  Degree of functionality in the home:  Performance Status (PPSv2):  BMI:BMI (kg/m2): 29.4 (11-15-21 @ 10:28)  Engagement in the home:  Employment: [ ] Currently employed [ ] Exited workforce due to illness  [ ] Retired prior to illness  [ ] No/distant history of employment   Support network (degree):  ---Family:        [ ] Low  [ ] Moderate  [ ] High  ---Neighbors: [ ] Low  [ ] Moderate  [ ] High  ---Social:         [ ] Low  [ ] Moderate  [ ] High  ---Spiritual:    [ ] Low  [ ] Moderate  [ ] High  Financial concerns:  Coping Strategies:  Caregiver burden/fatigue/needs:  Grief identified: [] Yes [] No  Medical communication and decision making preferences:

## 2021-11-19 NOTE — CHART NOTE - NSCHARTNOTEFT_GEN_A_CORE
After pre-medication  received 251 mL of, Autologous mobilized, plasma reduced, pooled, thawed, washes HCP apheresis for  over approximately 1 hr. Cell counts as follows  Total MNC( x10^8/kg)=7.22  CD34+cells ( x10^6 kg)=4.58  Cell Viability (%)= 90  Mr. Julio tolerated the infusion well with no adverse resections noted.    Vale Ugarte PA-C  Stem cell transplant   [phone]

## 2021-11-19 NOTE — CONSULT NOTE ADULT - PROBLEM SELECTOR RECOMMENDATION 9
Condition:  Previous transplant:  Active treatment:  Transplant Type:  Transplant Day:  Clinical impact on complexity: Likely due to chemo  Nutrition involvement  Faring well with ensure

## 2021-11-19 NOTE — CONSULT NOTE ADULT - SUBJECTIVE AND OBJECTIVE BOX
HPI:  This is a 66 year old male with multiple myeloma admitted for an autologous pbsct with high dose melphalan prep regimen. Hematologic history as follows: 2020 was found to have a T-12 compression fracture. He saw orthopedics 2021, and was referred to endocrine. 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 Serum immunofixation showed 1 IgD lambda band and free lambda light chains. Urine immunofixation negative for monoclonal band. 21 a bone marrow biopsy and aspirate was consistent with plasma cell myeloma (> 90% involvement), flow cytometry positive for monotypic plasma cells. He started cycle 1 RVD on 21. He completed 6 cycles of RVD 21. During chemotherapy he reported occasional blurry vision (negative optho workup, negative MRI brain 21) and moderate fatigue. He has no complaints on admission other than facial erythema, likely related to kepivance.  (15 Nov 2021 13:09)    PERTINENT PM/SXH:   No pertinent past medical history    Hyperlipidemia    Shortness of breath on exertion    Lumbar herniated disc    T12 compression fracture    Acute lumbar radiculopathy    History of basal cell carcinoma      No significant past surgical history    History of surgery on wrist      FAMILY HISTORY:  No pertinent family history in first degree relatives      ITEMS NOT CHECKED ARE NOT PRESENT    SOCIAL HISTORY:   Significant other/partner[ ]  Children[ ]  Rastafarian/Spirituality:  Substance hx:  [ ]   Tobacco hx:  [ ]   Alcohol hx: [ ]   Home Opioid hx:  [ ] I-Stop Reference No:  Living Situation: [ ]Home  [ ]Long term care  [ ]Rehab [ ]Other    ADVANCE DIRECTIVES:    DNR  MOLST  [ ]  Living Will  [ ]   DECISION MAKER(s):  [ ] Health Care Proxy(s)  [ ] Surrogate(s)  [ ] Guardian           Name(s): Phone Number(s):    BASELINE (I)ADL(s) (prior to admission):  Boyd: [ ]Total  [ ] Moderate [ ]Dependent    Allergies    Omnicef (Unknown)  penicillin (Hives)    Intolerances    MEDICATIONS  (STANDING):  acetaminophen     Tablet .. 650 milliGRAM(s) Oral every 6 hours  acetaminophen     Tablet .. 650 milliGRAM(s) Oral once  acyclovir   Oral Tab/Cap 400 milliGRAM(s) Oral every 8 hours  aprepitant 80 milliGRAM(s) Oral every 24 hours  BACItracin   Ointment 1 Application(s) Topical two times a day  Biotene Dry Mouth Oral Rinse 5 milliLiter(s) Swish and Spit five times a day  chlorhexidine 4% Liquid 1 Application(s) Topical <User Schedule>  clotrimazole Lozenge 1 Lozenge Oral five times a day  diphenhydrAMINE Injectable 25 milliGRAM(s) IV Push once  filgrastim-sndz (ZARXIO) Injectable 480 MICROGram(s) SubCutaneous every 24 hours  fluconAZOLE   Tablet 400 milliGRAM(s) Oral daily  folic acid 1 milliGRAM(s) Oral daily  furosemide   Injectable 40 milliGRAM(s) IV Push once  furosemide   Injectable 40 milliGRAM(s) IV Push once  heparin  Infusion 410 Unit(s)/Hr (4.1 mL/Hr) IV Continuous <Continuous>  hydrocortisone 1% Cream 1 Application(s) Topical two times a day  hydrocortisone sodium succinate Injectable 50 milliGRAM(s) IV Push once  lidocaine/prilocaine Cream 1 Application(s) Topical daily  LORazepam   Injectable 1 milliGRAM(s) IV Push every 24 hours  multivitamin 1 Tablet(s) Oral daily  palifermin Injectable  (eMAR) 5880 MICROGram(s) IV Push every 24 hours  pantoprazole    Tablet 40 milliGRAM(s) Oral before breakfast  potassium chloride  20 mEq/100 mL IVPB 20 milliEquivalent(s) IV Intermittent every 2 hours  sodium bicarbonate Mouth Rinse 10 milliLiter(s) Swish and Spit five times a day  sodium chloride 0.45% 1000 milliLiter(s) (150 mL/Hr) IV Continuous <Continuous>  sodium chloride 0.9%. 1000 milliLiter(s) (20 mL/Hr) IV Continuous <Continuous>  sodium chloride 0.9%. 1000 milliLiter(s) (200 mL/Hr) IV Continuous <Continuous>  ursodiol Capsule 300 milliGRAM(s) Oral two times a day    MEDICATIONS  (PRN):  acetaminophen     Tablet .. 650 milliGRAM(s) Oral every 6 hours PRN Temp greater or equal to 38C (100.4F), Mild Pain (1 - 3)  furosemide   Injectable 20 milliGRAM(s) IV Push every 24 hours PRN if urine output < 100 ml/hr X 2 hours  LORazepam   Injectable 1 milliGRAM(s) IV Push every 6 hours PRN anxiety / nausea / vomiting  metoclopramide Injectable 10 milliGRAM(s) IV Push every 6 hours PRN Nausea and/or Vomiting  polyethylene glycol 3350 17 Gram(s) Oral daily PRN Constipation  senna 1 Tablet(s) Oral at bedtime PRN Constipation  sodium chloride 0.9% lock flush 10 milliLiter(s) IV Push every 1 hour PRN Pre/post blood products, medications, blood draw, and to maintain line patency    PRESENT SYMPTOMS: [ ]Unable to obtain due to poor mentation   Source if other than patient:  [ ]Family   [ ]Team [ ] Inferred/Assessed    Pain: [ ]yes [ ]no  QOL impact -   Location -                    Aggravating factors -  Quality -  Radiation -  Timing-  Severity (0-10 scale):  Minimal acceptable level (0-10 scale):     PAIN AD Score:     [email or web address] (press ctrl +  left click to view)    [ ] Inferred Symptoms    Fatigue:                             [ ] None  [ ]Mild [ ]Moderate [ ]Severe  Drowsiness:                      [ ] None  [ ]Mild [ ]Moderate [ ]Severe  Nausea:                             [ ] None  [ ]Mild [ ]Moderate [ ]Severe  Dysgeusia:                         [ ] None  [ ]Mild [ ]Moderate [ ]Severe  Loss of appetite:              [ ] None  [ ]Mild [ ]Moderate [ ]Severe  Constipation:                    [ ] None  [ ]Mild [ ]Moderate [ ]Severe  Dyspnea:                           [ ] None  [ ]Mild [ ]Moderate [ ]Severe  Anxiety:                             [ ] None  [ ]Mild [ ]Moderate [ ]Severe  Depression:                      [ ] None  [ ]Mild [ ]Moderate [ ]Severe  Cognitive changes:          [ ] None  [ ]Mild [ ]Moderate [ ]Severe  Insomnia      :                    [ ] None  [ ]Mild [ ]Moderate [ ]Severe  Isolation:                           [ ] None  [ ]Mild [ ]Moderate [ ]Severe  Loneliness:                        [ ] None  [ ]Mild [ ]Moderate [ ]Severe  Lack of   Wellbeing:                        [ ] None  [ ]Mild [ ]Moderate [ ]Severe    Other Symptoms:  [ ]All other review of systems negative     Palliative Performance Status Version 2:         %    http://npcrc.org/files/news/palliative_performance_scale_ppsv2.pdf  PHYSICAL EXAM:  Vital Signs Last 24 Hrs  T(C): 36.7 (2021 06:00), Max: 37 (2021 19:30)  T(F): 98 (2021 06:00), Max: 98.6 (2021 19:30)  HR: 70 (2021 06:00) (69 - 78)  BP: 146/81 (2021 06:00) (146/76 - 153/83)  BP(mean): --  RR: 18 (2021 06:00) (18 - 18)  SpO2: 97% (2021 06:00) (95% - 98%) I&O's Summary    2021 07:01  -  2021 07:00  --------------------------------------------------------  IN: 2691.2 mL / OUT: 2500 mL / NET: 191.2 mL    2021 07:01  -  2021 09:23  --------------------------------------------------------  IN: 556.7 mL / OUT: 0 mL / NET: 556.7 mL      GENERAL:  [ ]Alert  [ ]Oriented x   [ ]Lethargic  [ ]Cachexia  [ ]Unarousable  [ ]Verbal  [ ]Non-Verbal [ ] Engaging   [  ] Confused  [  ] No distress  Behavioral:   [ ] Anxiety  [ ] Delirium [ ] Agitation [ ] Calm   [  ] Restless [ ] Other  HEENT:  [ ]Normal   [ ]Dry mouth   [ ]ET Tube/Trach  [ ]Oral lesions   [ ] NGT  [ ] Mucositis [ ] Oral  Bleeding  PULMONARY:   [ ]Clear [ ]Tachypnea  [ ]Audible excessive secretions [  ] No tachypnea  [ ]Rhonchi        [ ]Right [ ]Left [ ]Bilateral  [ ]Crackles        [ ]Right [ ]Left [ ]Bilateral  [ ]Wheezing     [ ]Right [ ]Left [ ]Bilateral  [ ]Diminished breath sounds [ ]right [ ]left [ ]bilateral  CARDIOVASCULAR:    [ ]Regular [ ]Irregular [ ]Tachy  [ ]Jose [ ]Murmur [ ] JVD  GASTROINTESTINAL:  [ ]Soft  [ ]Distended   [ ]+BS  [ ]Non tender [ ]Tender  [ ]PEG [ ]OGT/ NGT  Last BM:   GENITOURINARY:  [ ]Normal [ ] Incontinent   [ ]Oliguria/Anuria   [ ]Ortega  MUSCULOSKELETAL:   [ ]Normal   [ ]Weakness  [ ]Bed/Wheelchair bound [ ]Edema  NEUROLOGIC:   [ ]No focal deficits  [ ]Cognitive impairment  [ ]Dysphagia [ ]Dysarthria [ ]Paresis [ ]Other   SKIN:   [ ]Normal    [ ]Rash  [ ]Pressure ulcer(s)   [  ] Lesion   [    ]  Wounds       Present on admission [ ]y [ ]n    CRITICAL CARE:  [ ] Shock Present  [ ]Septic [ ]Cardiogenic [ ]Neurologic [ ]Hypovolemic  [ ]  Vasopressors [ ]  Inotropes   [ ]Respiratory failure present [ ]Mechanical ventilation [ ]Non-invasive ventilatory support [ ]High flow  [ ]Acute  [ ]Chronic [ ]Hypoxic  [ ]Hypercarbic [ ]Other  [ ]Other organ failure     LABS:                        10.6   12.23 )-----------( 183      ( 2021 07:21 )             31.9       138  |  107  |  22  ----------------------------<  114<H>  3.8   |  21<L>  |  0.89    Ca    7.6<L>      2021 07:21  Phos  2.3       Mg     1.8         TPro  5.0<L>  /  Alb  3.6  /  TBili  0.9  /  DBili  0.2  /  AST  10  /  ALT  12  /  AlkPhos  57        Urinalysis Basic - ( 2021 01:08 )    Color: Light Yellow / Appearance: Clear / S.024 / pH: x  Gluc: x / Ketone: Negative  / Bili: Negative / Urobili: Negative   Blood: x / Protein: Trace / Nitrite: Negative   Leuk Esterase: Negative / RBC: 2 /hpf / WBC 5 /HPF   Sq Epi: x / Non Sq Epi: 2 /hpf / Bacteria: Negative      RADIOLOGY & ADDITIONAL STUDIES:    PROTEIN CALORIE MALNUTRITION PRESENT: [ ]mild [ ]moderate [ ]severe [ ]underweight [ ]morbid obesity  https://www.andeal.org/vault/8611/web/files/ONC/Table_Clinical%20Characteristics%20to%20Document%20Malnutrition-White%20JV%20et%20al%502305.pdf    Height (cm): 183 (11-15-21 @ 10:28), 183 (21 @ 16:35), 185.4 (21 @ 08:43)  Weight (kg): 98.4 (11-15-21 @ 10:28), 94.8 (21 @ 16:35), 95.3 (21 @ :43)  BMI (kg/m2): 29.4 (11-15-21 @ 10:28), 28.3 (21 @ 16:35), 27.7 (21 @ :43)    [ ]PPSV2 < or = to 30% [ ]significant weight loss  [ ]poor nutritional intake  [ ]anasarca      [ ]Artificial Nutrition      REFERRALS:   [ ]Chaplaincy  [ ]Hospice  [ ]Child Life  [ ]Social Work  [ ]Case management [ ]Holistic Therapy     Goals of Care Document:  HPI:  This is a 66 year old male with multiple myeloma admitted for an autologous pbsct with high dose melphalan prep regimen. Hematologic history as follows: 2020 was found to have a T-12 compression fracture. He saw orthopedics 2021, and was referred to endocrine. 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 Serum immunofixation showed 1 IgD lambda band and free lambda light chains. Urine immunofixation negative for monoclonal band. 21 a bone marrow biopsy and aspirate was consistent with plasma cell myeloma (> 90% involvement), flow cytometry positive for monotypic plasma cells. He started cycle 1 RVD on 21. He completed 6 cycles of RVD 21. During chemotherapy he reported occasional blurry vision (negative optho workup, negative MRI brain 21) and moderate fatigue. He has no complaints on admission other than facial erythema, likely related to kepivance.  (15 Nov 2021 13:09)    Syncopal episode prior to my arrival      PERTINENT PM/SXH:   No pertinent past medical history    Hyperlipidemia    Shortness of breath on exertion    Lumbar herniated disc    T12 compression fracture    Acute lumbar radiculopathy    History of basal cell carcinoma      No significant past surgical history    History of surgery on wrist      FAMILY HISTORY:  No pertinent family history in first degree relatives      ITEMS NOT CHECKED ARE NOT PRESENT    SOCIAL HISTORY:   Significant other/partner[ ]  Children[x ]  Islam/Spirituality:  Substance hx:  [ ]   Tobacco hx:  [ ]   Alcohol hx: [ ]   Home Opioid hx:  [ ] I-Stop Reference No:  Living Situation: [ x]Home  [ ]Long term care  [ ]Rehab [ ]Other    ADVANCE DIRECTIVES:    DNR  MOLST  [ ]  Living Will  [ ]   DECISION MAKER(s):  [ ] Health Care Proxy(s)  [x ] Surrogate(s)  [ ] Guardian           Name(s): Phone Number(s):    BASELINE (I)ADL(s) (prior to admission):  Murray: [ ]Total  [ ] Moderate [ ]Dependent    Allergies    Omnicef (Unknown)  penicillin (Hives)    Intolerances    MEDICATIONS  (STANDING):  acetaminophen     Tablet .. 650 milliGRAM(s) Oral every 6 hours  acyclovir   Oral Tab/Cap 400 milliGRAM(s) Oral every 8 hours  BACItracin   Ointment 1 Application(s) Topical two times a day  Biotene Dry Mouth Oral Rinse 5 milliLiter(s) Swish and Spit five times a day  chlorhexidine 4% Liquid 1 Application(s) Topical <User Schedule>  clotrimazole Lozenge 1 Lozenge Oral five times a day  diphenhydrAMINE Injectable 25 milliGRAM(s) IV Push once  filgrastim-sndz (ZARXIO) Injectable 480 MICROGram(s) SubCutaneous every 24 hours  fluconAZOLE   Tablet 400 milliGRAM(s) Oral daily  folic acid 1 milliGRAM(s) Oral daily  heparin  Infusion 410 Unit(s)/Hr (4.1 mL/Hr) IV Continuous <Continuous>  hydrocortisone 1% Cream 1 Application(s) Topical two times a day  lidocaine/prilocaine Cream 1 Application(s) Topical daily  LORazepam   Injectable 1 milliGRAM(s) IV Push every 24 hours  multivitamin 1 Tablet(s) Oral daily  palifermin Injectable  (eMAR) 5880 MICROGram(s) IV Push every 24 hours  pantoprazole    Tablet 40 milliGRAM(s) Oral before breakfast  potassium chloride  20 mEq/100 mL IVPB 20 milliEquivalent(s) IV Intermittent every 2 hours  potassium phosphate IVPB 15 milliMole(s) IV Intermittent once  sodium bicarbonate Mouth Rinse 10 milliLiter(s) Swish and Spit five times a day  sodium chloride 0.45% 1000 milliLiter(s) (150 mL/Hr) IV Continuous <Continuous>  sodium chloride 0.9%. 1000 milliLiter(s) (20 mL/Hr) IV Continuous <Continuous>  sodium chloride 0.9%. 1000 milliLiter(s) (200 mL/Hr) IV Continuous <Continuous>  ursodiol Capsule 300 milliGRAM(s) Oral two times a day    MEDICATIONS  (PRN):  acetaminophen     Tablet .. 650 milliGRAM(s) Oral every 6 hours PRN Temp greater or equal to 38C (100.4F), Mild Pain (1 - 3)  furosemide   Injectable 20 milliGRAM(s) IV Push every 24 hours PRN if urine output < 100 ml/hr X 2 hours  LORazepam   Injectable 1 milliGRAM(s) IV Push every 6 hours PRN anxiety / nausea / vomiting  metoclopramide Injectable 10 milliGRAM(s) IV Push every 6 hours PRN Nausea and/or Vomiting  polyethylene glycol 3350 17 Gram(s) Oral daily PRN Constipation  senna 1 Tablet(s) Oral at bedtime PRN Constipation  sodium chloride 0.9% lock flush 10 milliLiter(s) IV Push every 1 hour PRN Pre/post blood products, medications, blood draw, and to maintain line patency    PRESENT SYMPTOMS: [ ]Unable to obtain due to poor mentation   Source if other than patient:  [ ]Family   [ ]Team     Pain: [ ]yes [ x]no  QOL impact -   Location -                    Aggravating factors -  Quality -  Radiation -  Timing-  Severity (0-10 scale):  Minimal acceptable level (0-10 scale):     PAIN AD Score:     [email or web address] (press ctrl +  left click to view)    Dyspnea:                           [ ]Mild [ ]Moderate [ ]Severe  Anxiety:                             [ ]Mild [ ]Moderate [ ]Severe  Fatigue:                             [ ]Mild [ ]Moderate [ ]Severe  Nausea:                             [ ]Mild [ ]Moderate [ ]Severe  Loss of appetite:              [x ]Mild [ ]Moderate [ ]Severe  Constipation:                    [x ]xMild [ ]Moderate [ ]Severe    Other Symptoms:  [x ]All other review of systems negative     Palliative Performance Status Version 2:      40   %    http://npcrc.org/files/news/palliative_performance_scale_ppsv2.pdf  PHYSICAL EXAM:  Vital Signs Last 24 Hrs  T(C): 37.5 (2021 14:20), Max: 37.5 (2021 14:20)  T(F): 99.5 (2021 14:20), Max: 99.5 (2021 14:20)  HR: 62 (2021 14:20) (57 - 78)  BP: 127/73 (2021 14:20) (97/50 - 153/83)  BP(mean): --  RR: 18 (2021 14:20) (16 - 18)  SpO2: 98% (2021 14:20) (95% - 98%) I&O's Summary    2021 07:01  -  2021 07:00  --------------------------------------------------------  IN: 2691.2 mL / OUT: 2500 mL / NET: 191.2 mL    2021 07:01  -  2021 14:25  --------------------------------------------------------  IN: 2301.3 mL / OUT: 2500 mL / NET: -198.7 mL      GENERAL:  [x ]Alert  [x ]Oriented x  3  [ ]Lethargic  [ ]Cachexia  [ ]Unarousable  [ ]Verbal  [ ]Non-Verbal  Behavioral:   [ ] Anxiety  [ ] Delirium [ ] Agitation [x ] Other Calm  HEENT:  [x ]Normal   [ ]Dry mouth   [ ]ET Tube/Trach  [ ]Oral lesions  PULMONARY:   [x ]Clear [ ]Tachypnea  [ ]Audible excessive secretions   [ ]Rhonchi        [ ]Right [ ]Left [ ]Bilateral  [ ]Crackles        [ ]Right [ ]Left [ ]Bilateral  [ ]Wheezing     [ ]Right [ ]Left [ ]Bilatera  [ ]Diminished breath sounds [ ]right [ ]left [ ]bilateral  CARDIOVASCULAR:    [ x]Regular [ ]Irregular [ ]Tachy  [ ]Jose [ ]Murmur [ ]Other  GASTROINTESTINAL:  [ x]Soft  [ ]Distended   [ ]+BS  [x ]Non tender [ ]Tender  [ ]PEG [ ]OGT/ NGT  Last BM:   GENITOURINARY:  [ x]Normal [ ] Incontinent   [ ]Oliguria/Anuria   [ ]Ortega  MUSCULOSKELETAL:   [x ]Normal   [ ]Weakness  [ ]Bed/Wheelchair bound [ ]Edema  NEUROLOGIC:   [ x]No focal deficits  [ ]Cognitive impairment  [ ]Dysphagia [ ]Dysarthria [ ]Paresis [ ]Other   SKIN:   [x ]Normal    [ ]Rash  [ ]Pressure ulcer(s)       Present on admission [ ]y [ ]n    CRITICAL CARE:  [ ] Shock Present  [ ]Septic [ ]Cardiogenic [ ]Neurologic [ ]Hypovolemic  [ ]  Vasopressors [ ]  Inotropes   [ ]Respiratory failure present [ ]Mechanical ventilation [ ]Non-invasive ventilatory support [ ]High flow  [ ]Acute  [ ]Chronic [ ]Hypoxic  [ ]Hypercarbic [ ]Other  [ ]Other organ failure     LABS:                        10.6   12.23 )-----------( 183      ( 2021 07:21 )             31.9       138  |  107  |  22  ----------------------------<  114<H>  3.8   |  21<L>  |  0.89    Ca    7.6<L>      2021 07:21  Phos  2.3       Mg     1.8         TPro  5.0<L>  /  Alb  3.6  /  TBili  0.9  /  DBili  0.2  /  AST  10  /  ALT  12  /  AlkPhos  57        Urinalysis Basic - ( 2021 01:08 )    Color: Light Yellow / Appearance: Clear / S.024 / pH: x  Gluc: x / Ketone: Negative  / Bili: Negative / Urobili: Negative   Blood: x / Protein: Trace / Nitrite: Negative   Leuk Esterase: Negative / RBC: 2 /hpf / WBC 5 /HPF   Sq Epi: x / Non Sq Epi: 2 /hpf / Bacteria: Negative      RADIOLOGY & ADDITIONAL STUDIES:    PROTEIN CALORIE MALNUTRITION PRESENT: [ ]mild [ ]moderate [ ]severe [ ]underweight [ ]morbid obesity  https://www.andeal.org/vault/2440/web/files/ONC/Table_Clinical%20Characteristics%20to%20Document%20Malnutrition-White%20JV%20et%20al%769764.pdf    Height (cm): 183 (11-15-21 @ 10:28), 183 (21 @ 16:35), 185.4 (21 @ 08:43)  Weight (kg): 98.4 (11-15-21 @ 10:28), 94.8 (21 @ 16:35), 95.3 (21 @ 08:43)  BMI (kg/m2): 29.4 (11-15-21 @ 10:28), 28.3 (21 @ 16:35), 27.7 (21 @ 08:43)    [ ]PPSV2 < or = to 30% [ ]significant weight loss  [ ]poor nutritional intake  [ ]anasarca      [ ]Artificial Nutrition      REFERRALS:   [ ]Chaplaincy  [ ]Hospice  [ ]Child Life  [ ]Social Work  [ ]Case management [ ]Holistic Therapy     Goals of Care Document:

## 2021-11-19 NOTE — PROGRESS NOTE ADULT - SUBJECTIVE AND OBJECTIVE BOX
HPC Transplant Team                                                      Critical / Counseling Time Provided: 30 minutes                                                                                                                                                        Chief Complaint: Autologous peripheral blood stem cell transplant with high dose Melphalan prep regimen for treatment of multiple myeloma    S: Patient seen and examined with HPC Transplant Team:       O: Vitals:   Vital Signs Last 24 Hrs  T(C): 36.7 (2021 06:00), Max: 37 (2021 19:30)  T(F): 98 (2021 06:00), Max: 98.6 (2021 19:30)  HR: 70 (2021 06:00) (69 - 78)  BP: 146/81 (2021 06:00) (146/76 - 153/83)  BP(mean): --  RR: 18 (2021 06:00) (18 - 18)  SpO2: 97% (2021 06:00) (95% - 98%)    Admit weight: 98.4kg   Daily Weight in k.2 (2021 09:25)  Today's weight:     Intake / Output:   -18 @ 07:01  -   @ 07:00  --------------------------------------------------------  IN: 2691.2 mL / OUT: 2500 mL / NET: 191.2 mL    PE:   Oropharynx: no erythema or ulcerations   Oral Mucositis:              -                                          Grade: n/a   CVS: S1, S2 RRR   Lungs: CTA throughout bilaterally   Abdomen: + BS x 4, soft, NT, ND   Extremities: no edema   Gastric Mucositis:       -                                           Grade: n/a  Intestinal Mucositis:     -                                         Grade: n/a   Skin: patchy, erythematous macular rash to face, neck and upper chest post kepivance   TLC: CDI   Neuro: A&O x 3   Pain: 0      Labs:   CBC Full  -  ( 2021 07:21 )  WBC Count : 12.23 K/uL  Hemoglobin : 10.6 g/dL  Hematocrit : 31.9 %  Platelet Count - Automated : 183 K/uL  Mean Cell Volume : 94.7 fl  Mean Cell Hemoglobin : 31.5 pg  Mean Cell Hemoglobin Concentration : 33.2 gm/dL  Auto Neutrophil # : 12.00 K/uL  Auto Lymphocyte # : 0.03 K/uL  Auto Monocyte # : 0.06 K/uL  Auto Eosinophil # : 0.00 K/uL  Auto Basophil # : 0.01 K/uL  Auto Neutrophil % : 98.1 %  Auto Lymphocyte % : 0.2 %  Auto Monocyte % : 0.5 %  Auto Eosinophil % : 0.0 %  Auto Basophil % : 0.1 %                          10.6   12.23 )-----------( 183      ( 2021 07:21 )             31.9         138  |  107  |  22  ----------------------------<  114<H>  3.8   |  21<L>  |  0.89    Ca    7.6<L>      2021 07:21  Phos  2.3       Mg     1.8         TPro  5.0<L>  /  Alb  3.6  /  TBili  0.9  /  DBili  0.2  /  AST  10  /  ALT  12  /  AlkPhos  57        LIVER FUNCTIONS - ( 2021 07:21 )  Alb: 3.6 g/dL / Pro: 5.0 g/dL / ALK PHOS: 57 U/L / ALT: 12 U/L / AST: 10 U/L / GGT: x           Lactate Dehydrogenase, Serum: 137 U/L ( @ 07:21)      Meds:   Antimicrobials:   acyclovir   Oral Tab/Cap 400 milliGRAM(s) Oral every 8 hours  clotrimazole Lozenge 1 Lozenge Oral five times a day  fluconAZOLE   Tablet 400 milliGRAM(s) Oral daily      Heme / Onc:   heparin  Infusion 410 Unit(s)/Hr IV Continuous <Continuous>      GI:  pantoprazole    Tablet 40 milliGRAM(s) Oral before breakfast  polyethylene glycol 3350 17 Gram(s) Oral daily PRN  senna 1 Tablet(s) Oral at bedtime PRN  sodium bicarbonate Mouth Rinse 10 milliLiter(s) Swish and Spit five times a day  ursodiol Capsule 300 milliGRAM(s) Oral two times a day      Cardiovascular:   furosemide   Injectable 20 milliGRAM(s) IV Push every 24 hours PRN      Immunologic:   filgrastim-sndz (ZARXIO) Injectable 480 MICROGram(s) SubCutaneous every 24 hours      Other medications:   acetaminophen     Tablet .. 650 milliGRAM(s) Oral every 6 hours  acetaminophen     Tablet .. 650 milliGRAM(s) Oral once  aprepitant 80 milliGRAM(s) Oral every 24 hours  BACItracin   Ointment 1 Application(s) Topical two times a day  Biotene Dry Mouth Oral Rinse 5 milliLiter(s) Swish and Spit five times a day  chlorhexidine 4% Liquid 1 Application(s) Topical <User Schedule>  diphenhydrAMINE Injectable 25 milliGRAM(s) IV Push once  folic acid 1 milliGRAM(s) Oral daily  hydrocortisone 1% Cream 1 Application(s) Topical two times a day  hydrocortisone sodium succinate Injectable 50 milliGRAM(s) IV Push once  lidocaine/prilocaine Cream 1 Application(s) Topical daily  LORazepam   Injectable 1 milliGRAM(s) IV Push every 24 hours  multivitamin 1 Tablet(s) Oral daily  potassium phosphate IVPB 15 milliMole(s) IV Intermittent once  sodium chloride 0.45% 1000 milliLiter(s) IV Continuous <Continuous>  sodium chloride 0.9%. 1000 milliLiter(s) IV Continuous <Continuous>  sodium chloride 0.9%. 1000 milliLiter(s) IV Continuous <Continuous>      PRN:   acetaminophen     Tablet .. 650 milliGRAM(s) Oral every 6 hours PRN  furosemide   Injectable 20 milliGRAM(s) IV Push every 24 hours PRN  LORazepam   Injectable 1 milliGRAM(s) IV Push every 6 hours PRN  metoclopramide Injectable 10 milliGRAM(s) IV Push every 6 hours PRN  polyethylene glycol 3350 17 Gram(s) Oral daily PRN  senna 1 Tablet(s) Oral at bedtime PRN  sodium chloride 0.9% lock flush 10 milliLiter(s) IV Push every 1 hour PRN    A/P:   66 year old male with a history of multiple myeloma s/p RVD x 6   Pre :  Autologous PBSCT day 0  - melphalan /; continue melphalan hydration for 24 hours post infusion of last dose   Strict I&O, daily weights, prn diuresis   - rest day today   - rest day today   - HPC transplant today; continue transplant hydration for 24 hours post infusion of cells. Suprapubic pain this am. UA pending, will send culture, BK virus. _    1. Infectious Disease:   acyclovir   Oral Tab/Cap 400 milliGRAM(s) Oral every 8 hours  clotrimazole Lozenge 1 Lozenge Oral five times a day  fluconAZOLE   Tablet 400 milliGRAM(s) Oral daily    2. VOD Prophylaxis: Actigall, Glutamine, Heparin (dosed at 100 units / kg / day)     3. GI Prophylaxis:   pantoprazole    Tablet 40 milliGRAM(s) Oral before breakfast    4. Mouthcare - NS / NaHCO3 rinses, Mycelex, Biotene; Skin care     5. GVHD prophylaxis - n/a     6. Transfuse & replete electrolytes prn   potassium phosphate IVPB 15 milliMole(s) IV Intermittent once  KCl 20mEq IV x 3     7. IV hydration, daily weights, strict I&O, prn diuresis   Lasix 40mg IV x 1 0900  Lasix 40mg IV x 1 1600    8. PO intake as tolerated, nutrition follow up as needed, MVI, folic acid     9. Antiemetics, anti-diarrhea medications:   LORazepam   Injectable 1 milliGRAM(s) IV Push every 6 hours PRN  metoclopramide Injectable 10 milliGRAM(s) IV Push every 6 hours PRN  aprepitant 80 milliGRAM(s) Oral every 24 hours    10. OOB as tolerated, physical therapy consult if needed     11. Monitor coags / fibrinogen 2x week, vitamin K as needed     12. Monitor closely for clinical changes, monitor for fevers     13. Emotional support provided, plan of care discussed and questions addressed     14. Patient education done regarding chemotherapy prep, plan of care, restrictions and discharge planning     15. Continue regular social work input     I have written the above note for Dr. Naylor who performed service with me in the room.   Terrie Barr NP-C ([phone])    I have seen and examined patient with NP, I agree with above note as scribed.                    HPC Transplant Team                                                      Critical / Counseling Time Provided: 30 minutes                                                                                                                                                        Chief Complaint: Autologous peripheral blood stem cell transplant with high dose Melphalan prep regimen for treatment of multiple myeloma    S: Patient seen and examined with HPC Transplant Team:   +constipation  +generalized weakness  +poor po intake  +episode of syncope in AM      O: Vitals:   Vital Signs Last 24 Hrs  T(C): 36.7 (2021 06:00), Max: 37 (2021 19:30)  T(F): 98 (2021 06:00), Max: 98.6 (2021 19:30)  HR: 70 (2021 06:00) (69 - 78)  BP: 146/81 (2021 06:00) (146/76 - 153/83)  BP(mean): --  RR: 18 (2021 06:00) (18 - 18)  SpO2: 97% (2021 06:00) (95% - 98%)    Admit weight: 98.4kg   Daily Weight in k.2 (2021 09:25)  Today's weight: 98.6 kg     Intake / Output:    @ 07:01  -   @ 07:00  --------------------------------------------------------  IN: 2691.2 mL / OUT: 2500 mL / NET: 191.2 mL    PE:   Oropharynx: no erythema or ulcerations   Oral Mucositis:              -                                          Grade: n/a   CVS: S1, S2 RRR   Lungs: CTA throughout bilaterally   Abdomen: + BS x 4, soft, NT, ND   Extremities: no edema   Gastric Mucositis:       -                                           Grade: n/a  Intestinal Mucositis:     -                                         Grade: n/a   Skin: patchy, erythematous macular rash to face, neck and upper chest post kepivance   TLC: CDI   Neuro: A&O x 3   Pain: 0      Labs:   CBC Full  -  ( 2021 07:21 )  WBC Count : 12.23 K/uL  Hemoglobin : 10.6 g/dL  Hematocrit : 31.9 %  Platelet Count - Automated : 183 K/uL  Mean Cell Volume : 94.7 fl  Mean Cell Hemoglobin : 31.5 pg  Mean Cell Hemoglobin Concentration : 33.2 gm/dL  Auto Neutrophil # : 12.00 K/uL  Auto Lymphocyte # : 0.03 K/uL  Auto Monocyte # : 0.06 K/uL  Auto Eosinophil # : 0.00 K/uL  Auto Basophil # : 0.01 K/uL  Auto Neutrophil % : 98.1 %  Auto Lymphocyte % : 0.2 %  Auto Monocyte % : 0.5 %  Auto Eosinophil % : 0.0 %  Auto Basophil % : 0.1 %                          10.6   12.23 )-----------( 183      ( 2021 07:21 )             31.9         138  |  107  |  22  ----------------------------<  114<H>  3.8   |  21<L>  |  0.89    Ca    7.6<L>      2021 07:21  Phos  2.3       Mg     1.8         TPro  5.0<L>  /  Alb  3.6  /  TBili  0.9  /  DBili  0.2  /  AST  10  /  ALT  12  /  AlkPhos  57        LIVER FUNCTIONS - ( 2021 07:21 )  Alb: 3.6 g/dL / Pro: 5.0 g/dL / ALK PHOS: 57 U/L / ALT: 12 U/L / AST: 10 U/L / GGT: x           Lactate Dehydrogenase, Serum: 137 U/L ( @ 07:21)      Meds:   Antimicrobials:   acyclovir   Oral Tab/Cap 400 milliGRAM(s) Oral every 8 hours  clotrimazole Lozenge 1 Lozenge Oral five times a day  fluconAZOLE   Tablet 400 milliGRAM(s) Oral daily      Heme / Onc:   heparin  Infusion 410 Unit(s)/Hr IV Continuous <Continuous>      GI:  pantoprazole    Tablet 40 milliGRAM(s) Oral before breakfast  polyethylene glycol 3350 17 Gram(s) Oral daily PRN  senna 1 Tablet(s) Oral at bedtime PRN  sodium bicarbonate Mouth Rinse 10 milliLiter(s) Swish and Spit five times a day  ursodiol Capsule 300 milliGRAM(s) Oral two times a day      Cardiovascular:   furosemide   Injectable 20 milliGRAM(s) IV Push every 24 hours PRN      Immunologic:   filgrastim-sndz (ZARXIO) Injectable 480 MICROGram(s) SubCutaneous every 24 hours      Other medications:   acetaminophen     Tablet .. 650 milliGRAM(s) Oral every 6 hours  acetaminophen     Tablet .. 650 milliGRAM(s) Oral once  aprepitant 80 milliGRAM(s) Oral every 24 hours  BACItracin   Ointment 1 Application(s) Topical two times a day  Biotene Dry Mouth Oral Rinse 5 milliLiter(s) Swish and Spit five times a day  chlorhexidine 4% Liquid 1 Application(s) Topical <User Schedule>  diphenhydrAMINE Injectable 25 milliGRAM(s) IV Push once  folic acid 1 milliGRAM(s) Oral daily  hydrocortisone 1% Cream 1 Application(s) Topical two times a day  hydrocortisone sodium succinate Injectable 50 milliGRAM(s) IV Push once  lidocaine/prilocaine Cream 1 Application(s) Topical daily  LORazepam   Injectable 1 milliGRAM(s) IV Push every 24 hours  multivitamin 1 Tablet(s) Oral daily  potassium phosphate IVPB 15 milliMole(s) IV Intermittent once  sodium chloride 0.45% 1000 milliLiter(s) IV Continuous <Continuous>  sodium chloride 0.9%. 1000 milliLiter(s) IV Continuous <Continuous>  sodium chloride 0.9%. 1000 milliLiter(s) IV Continuous <Continuous>      PRN:   acetaminophen     Tablet .. 650 milliGRAM(s) Oral every 6 hours PRN  furosemide   Injectable 20 milliGRAM(s) IV Push every 24 hours PRN  LORazepam   Injectable 1 milliGRAM(s) IV Push every 6 hours PRN  metoclopramide Injectable 10 milliGRAM(s) IV Push every 6 hours PRN  polyethylene glycol 3350 17 Gram(s) Oral daily PRN  senna 1 Tablet(s) Oral at bedtime PRN  sodium chloride 0.9% lock flush 10 milliLiter(s) IV Push every 1 hour PRN    A/P:   66 year old male with a history of multiple myeloma s/p RVD x 6   Pre :  Autologous PBSCT day 0  - melphalan /; continue melphalan hydration for 24 hours post infusion of last dose   Strict I&O, daily weights, prn diuresis   - rest day today   - rest day today   - HPC transplant today; continue transplant hydration for 24 hours post infusion of cells. Suprapubic pain this am.  pending, will send culture, BK virus. AXR   -vasovegal episode in AM: will monitor tele for 24hrs     1. Infectious Disease:   acyclovir   Oral Tab/Cap 400 milliGRAM(s) Oral every 8 hours  clotrimazole Lozenge 1 Lozenge Oral five times a day  fluconAZOLE   Tablet 400 milliGRAM(s) Oral daily    2. VOD Prophylaxis: Actigall, Glutamine, Heparin (dosed at 100 units / kg / day)     3. GI Prophylaxis:   pantoprazole    Tablet 40 milliGRAM(s) Oral before breakfast    4. Mouthcare - NS / NaHCO3 rinses, Mycelex, Biotene; Skin care     5. GVHD prophylaxis - n/a     6. Transfuse & replete electrolytes prn   potassium phosphate IVPB 15 milliMole(s) IV Intermittent once  KCl 20mEq IV x 3     7. IV hydration, daily weights, strict I&O, prn diuresis   Lasix 40mg IV x 1 0900    8. PO intake as tolerated, nutrition follow up as needed, MVI, folic acid     9. Antiemetics, anti-diarrhea medications:   LORazepam   Injectable 1 milliGRAM(s) IV Push every 6 hours PRN  metoclopramide Injectable 10 milliGRAM(s) IV Push every 6 hours PRN  aprepitant 80 milliGRAM(s) Oral every 24 hours    10. OOB as tolerated, physical therapy consult if needed     11. Monitor coags / fibrinogen 2x week, vitamin K as needed     12. Monitor closely for clinical changes, monitor for fevers     13. Emotional support provided, plan of care discussed and questions addressed     14. Patient education done regarding chemotherapy prep, plan of care, restrictions and discharge planning     15. Continue regular social work input     I have written the above note for Dr. Naylor who performed service with me in the room.   Terrie Barr NP-C ([phone])    I have seen and examined patient with NP, I agree with above note as scribed.                    HPC Transplant Team                                                      Critical / Counseling Time Provided: 30 minutes                                                                                                                                                        Chief Complaint: Autologous peripheral blood stem cell transplant with high dose Melphalan prep regimen for treatment of multiple myeloma    S: Patient seen and examined with HPC Transplant Team:   +constipation  +generalized weakness  +poor po intake  +episode of syncope in AM      O: Vitals:   Vital Signs Last 24 Hrs  T(C): 36.7 (2021 06:00), Max: 37 (2021 19:30)  T(F): 98 (2021 06:00), Max: 98.6 (2021 19:30)  HR: 70 (2021 06:00) (69 - 78)  BP: 146/81 (2021 06:00) (146/76 - 153/83)  BP(mean): --  RR: 18 (2021 06:00) (18 - 18)  SpO2: 97% (2021 06:00) (95% - 98%)    Admit weight: 98.4kg   Daily Weight in k.2 (2021 09:25)  Today's weight: 98.6 kg     Intake / Output:    @ 07:01  -   @ 07:00  --------------------------------------------------------  IN: 2691.2 mL / OUT: 2500 mL / NET: 191.2 mL    PE:   Oropharynx: no erythema or ulcerations   Oral Mucositis:              -                                          Grade: n/a   CVS: S1, S2 RRR   Lungs: CTA throughout bilaterally   Abdomen: + BS x 4, soft, ND +mild TTP RLQ  Extremities: no edema   Gastric Mucositis:       -                                           Grade: n/a  Intestinal Mucositis:     -                                         Grade: n/a   Skin: patchy, erythematous macular rash to face, neck and upper chest post kepivance   TLC: CDI   Neuro: A&O x 3   Pain: 0      Labs:   CBC Full  -  ( 2021 07:21 )  WBC Count : 12.23 K/uL  Hemoglobin : 10.6 g/dL  Hematocrit : 31.9 %  Platelet Count - Automated : 183 K/uL  Mean Cell Volume : 94.7 fl  Mean Cell Hemoglobin : 31.5 pg  Mean Cell Hemoglobin Concentration : 33.2 gm/dL  Auto Neutrophil # : 12.00 K/uL  Auto Lymphocyte # : 0.03 K/uL  Auto Monocyte # : 0.06 K/uL  Auto Eosinophil # : 0.00 K/uL  Auto Basophil # : 0.01 K/uL  Auto Neutrophil % : 98.1 %  Auto Lymphocyte % : 0.2 %  Auto Monocyte % : 0.5 %  Auto Eosinophil % : 0.0 %  Auto Basophil % : 0.1 %                          10.6   12.23 )-----------( 183      ( 2021 07:21 )             31.9         138  |  107  |  22  ----------------------------<  114<H>  3.8   |  21<L>  |  0.89    Ca    7.6<L>      2021 07:21  Phos  2.3       Mg     1.8         TPro  5.0<L>  /  Alb  3.6  /  TBili  0.9  /  DBili  0.2  /  AST  10  /  ALT  12  /  AlkPhos  57        LIVER FUNCTIONS - ( 2021 07:21 )  Alb: 3.6 g/dL / Pro: 5.0 g/dL / ALK PHOS: 57 U/L / ALT: 12 U/L / AST: 10 U/L / GGT: x           Lactate Dehydrogenase, Serum: 137 U/L ( @ 07:21)      Meds:   Antimicrobials:   acyclovir   Oral Tab/Cap 400 milliGRAM(s) Oral every 8 hours  clotrimazole Lozenge 1 Lozenge Oral five times a day  fluconAZOLE   Tablet 400 milliGRAM(s) Oral daily      Heme / Onc:   heparin  Infusion 410 Unit(s)/Hr IV Continuous <Continuous>      GI:  pantoprazole    Tablet 40 milliGRAM(s) Oral before breakfast  polyethylene glycol 3350 17 Gram(s) Oral daily PRN  senna 1 Tablet(s) Oral at bedtime PRN  sodium bicarbonate Mouth Rinse 10 milliLiter(s) Swish and Spit five times a day  ursodiol Capsule 300 milliGRAM(s) Oral two times a day      Cardiovascular:   furosemide   Injectable 20 milliGRAM(s) IV Push every 24 hours PRN      Immunologic:   filgrastim-sndz (ZARXIO) Injectable 480 MICROGram(s) SubCutaneous every 24 hours      Other medications:   acetaminophen     Tablet .. 650 milliGRAM(s) Oral every 6 hours  acetaminophen     Tablet .. 650 milliGRAM(s) Oral once  aprepitant 80 milliGRAM(s) Oral every 24 hours  BACItracin   Ointment 1 Application(s) Topical two times a day  Biotene Dry Mouth Oral Rinse 5 milliLiter(s) Swish and Spit five times a day  chlorhexidine 4% Liquid 1 Application(s) Topical <User Schedule>  diphenhydrAMINE Injectable 25 milliGRAM(s) IV Push once  folic acid 1 milliGRAM(s) Oral daily  hydrocortisone 1% Cream 1 Application(s) Topical two times a day  hydrocortisone sodium succinate Injectable 50 milliGRAM(s) IV Push once  lidocaine/prilocaine Cream 1 Application(s) Topical daily  LORazepam   Injectable 1 milliGRAM(s) IV Push every 24 hours  multivitamin 1 Tablet(s) Oral daily  potassium phosphate IVPB 15 milliMole(s) IV Intermittent once  sodium chloride 0.45% 1000 milliLiter(s) IV Continuous <Continuous>  sodium chloride 0.9%. 1000 milliLiter(s) IV Continuous <Continuous>  sodium chloride 0.9%. 1000 milliLiter(s) IV Continuous <Continuous>      PRN:   acetaminophen     Tablet .. 650 milliGRAM(s) Oral every 6 hours PRN  furosemide   Injectable 20 milliGRAM(s) IV Push every 24 hours PRN  LORazepam   Injectable 1 milliGRAM(s) IV Push every 6 hours PRN  metoclopramide Injectable 10 milliGRAM(s) IV Push every 6 hours PRN  polyethylene glycol 3350 17 Gram(s) Oral daily PRN  senna 1 Tablet(s) Oral at bedtime PRN  sodium chloride 0.9% lock flush 10 milliLiter(s) IV Push every 1 hour PRN    A/P:   66 year old male with a history of multiple myeloma s/p RVD x 6   Pre :  Autologous PBSCT day 0  - melphalan 2/2; continue melphalan hydration for 24 hours post infusion of last dose   Strict I&O, daily weights, prn diuresis   - rest day today   - rest day today   - HPC transplant today; continue transplant hydration for 24 hours post infusion of cells. Suprapubic pain this am. UA pending, will send culture, BK virus. AXR   -vasovegal episode in AM: will monitor tele for 24hrs     1. Infectious Disease:   acyclovir   Oral Tab/Cap 400 milliGRAM(s) Oral every 8 hours  clotrimazole Lozenge 1 Lozenge Oral five times a day  fluconAZOLE   Tablet 400 milliGRAM(s) Oral daily    2. VOD Prophylaxis: Actigall, Glutamine, Heparin (dosed at 100 units / kg / day)     3. GI Prophylaxis:   pantoprazole    Tablet 40 milliGRAM(s) Oral before breakfast    4. Mouthcare - NS / NaHCO3 rinses, Mycelex, Biotene; Skin care     5. GVHD prophylaxis - n/a     6. Transfuse & replete electrolytes prn   potassium phosphate IVPB 15 milliMole(s) IV Intermittent once  KCl 20mEq IV x 3     7. IV hydration, daily weights, strict I&O, prn diuresis   Lasix 40mg IV x 1 0900    8. PO intake as tolerated, nutrition follow up as needed, MVI, folic acid     9. Antiemetics, anti-diarrhea medications:   LORazepam   Injectable 1 milliGRAM(s) IV Push every 6 hours PRN  metoclopramide Injectable 10 milliGRAM(s) IV Push every 6 hours PRN  aprepitant 80 milliGRAM(s) Oral every 24 hours    10. OOB as tolerated, physical therapy consult if needed     11. Monitor coags / fibrinogen 2x week, vitamin K as needed     12. Monitor closely for clinical changes, monitor for fevers     13. Emotional support provided, plan of care discussed and questions addressed     14. Patient education done regarding chemotherapy prep, plan of care, restrictions and discharge planning     15. Continue regular social work input     I have written the above note for Dr. Naylor who performed service with me in the room.   Terrie Barr NP-C ([phone])    I have seen and examined patient with NP, I agree with above note as scribed.

## 2021-11-19 NOTE — CONSULT NOTE ADULT - PROBLEM SELECTOR RECOMMENDATION 2
PPSv2 prior to admission:  Current functional PPSv2:  Nursing care required:  Code status documented:  A review of the paper chart has been conducted  HCP form present:               Yes and valid []               Yes but invalid []                No []   MOLST present:                   Yes and valid []               Yes but invalid []                No []  Incapacity form present:   Yes and valid []                Yes but invalid []                No []                 N/A []  Living Will:                            Yes and valid []                Yes but invalid []                No []      Family Health Care Decision Act (FHCDA) Surrogate Decision Maker Hierarchy in the absence of a health care agent  [facility] Article 81 Guardian-->Spouse or domestic partner--> Adult child-->Parent-->Sibling--> Close friend Related to changes in taste  Encouraged to maximize caloric input

## 2021-11-19 NOTE — CONSULT NOTE ADULT - PROBLEM SELECTOR RECOMMENDATION 4
Actions:  [] Rapport building     [] Symptom assessment    [] Eliciting preferences of goals   [] Prognostic understanding    [] Emotional Support  [] Coping skill development  []  Other  Interdisciplinary Referrals:  Communication:  Documentation Review: [] Primary Team [] Consultants [] Interdisciplinary team  Content: PPSv2 prior to admission:70   Current functional PPSv2: 70  Nursing care required: Assistance with ADLs  Code status documented: Full code  A review of the paper chart has been conducted  HCP form present:               Yes and valid []               Yes but invalid []                No [x]   MOLST present:                   Yes and valid []               Yes but invalid []                No [x]  Incapacity form present:   Yes and valid []                Yes but invalid []                No []                 N/A [x]  Living Will:                            Yes and valid []                Yes but invalid []                No [x]      Family Health Care Decision Act (FHCDA) Surrogate Decision Maker Hierarchy in the absence of a health care agent  [facility] Article 81 Guardian-->Spouse or domestic partner--> Adult child-->Parent-->Sibling--> Close friend

## 2021-11-19 NOTE — CONSULT NOTE ADULT - PROBLEM SELECTOR RECOMMENDATION 5
IV chemotherapy as part of stem cell transplant process  Continue to monitor for known adverse effects/symptoms  Risk of infectious complications given anticipated impact on hematopoietic lineages and resultant immune compromise  PPx/Tx per primary team  Relevant factors:

## 2021-11-19 NOTE — CONSULT NOTE ADULT - PROBLEM SELECTOR RECOMMENDATION 6
Actions:  [x] Rapport building     [x] Symptom assessment    [] Eliciting preferences of goals   [] Prognostic understanding    [] Emotional Support  [] Coping skill development  []  Other  Interdisciplinary Referrals:   Communication: d/w team  Documentation Review: [x] Primary Team [] Consultants [] Interdisciplinary team

## 2021-11-19 NOTE — PROGRESS NOTE ADULT - ATTENDING COMMENTS
66 year old male with recently diagnosed plasma cell myeloma status post RVD x 6, admitted for autologous pbsct with high dose melphalan prep regimen.  Day 0   Days - 4 & -3 : 3 hours prior to Melphalan start hydration: D5NS at 200ml /hr and continue 24 hours post Melphalan infusion;   Melphalan 100mg/m2  IV   Strict I&O, daily weights, prn diuresis   Day -2, day -1 rest days. At 2200 on day – 1, start transplant hydration 1/2NS + 50mEq NaHCO3- (may substitute [id number] if bicarb not available)  Day 0 – infuse HPC product. 4 hours after infusion of cells start Zarxio 5 micrograms / kg (actual weight) . Continue through engraftment.   On day 0, + 1, + 2-give Kepivance 60micrograms / kg (actual weight).  VOD prophylaxis - low dose heparin gtt (dosed at 100 units / kg / day), glutamine supplementation, Actigall BID   PCP prophylaxis - Bactrim DS through day -2    Antiviral prophylaxis - Acyclovir 400mg po TID to start day -1   Antifungal prophylaxis- Diflucan 400 mg po daily.  GI prophylaxis - Protonix po QD   Antibacterial prophylaxis - when ANC < 1000, start Cipro 500mg po BID. If becomes febrile, pan cx, CXR and change Cipro to Cefepime 2g IV q 8 hours. Continue until count recovery  Mouth care and skin care as per protocol. 66 year old male with recently diagnosed plasma cell myeloma status post RVD x 6, admitted for autologous pbsct with high dose melphalan prep regimen.  Day 0 ..pt had vasovagal episode this am...not eating well....  Days - 4 & -3 : 3 hours prior to Melphalan start hydration: D5NS at 200ml /hr and continue 24 hours post Melphalan infusion;   Melphalan 100mg/m2  IV   Strict I&O, daily weights, prn diuresis ...hold diuresis for now...tele for 24 hrs  Day -2, day -1 rest days. At 2200 on day – 1, start transplant hydration 1/2NS + 50mEq NaHCO3- (may substitute [id number] if bicarb not available)  Day 0 – infuse HPC product. 4 hours after infusion of cells start Zarxio 5 micrograms / kg (actual weight) . Continue through engraftment.   On day 0, + 1, + 2-give Kepivance 60micrograms / kg (actual weight).  VOD prophylaxis - low dose heparin gtt (dosed at 100 units / kg / day), glutamine supplementation, Actigall BID   PCP prophylaxis - Bactrim DS through day -2    Antiviral prophylaxis - Acyclovir 400mg po TID to start day -1   Antifungal prophylaxis- Diflucan 400 mg po daily.  GI prophylaxis - Protonix po QD   Antibacterial prophylaxis - when ANC < 1000, start Cipro 500mg po BID. If becomes febrile, pan cx, CXR and change Cipro to Cefepime 2g IV q 8 hours. Continue until count recovery  Mouth care and skin care as per protocol.

## 2021-11-19 NOTE — CONSULT NOTE ADULT - PROBLEM SELECTOR RECOMMENDATION 3
IV chemotherapy as part of stem cell transplant process  Continue to monitor for known adverse effects/symptoms  Risk of infectious complications given anticipated impact on hematopoietic lineages and resultant immune compromise  PPx/Tx per primary team  Relevant factors: Condition: Multiple Myeloma  Previous transplant: No  Active treatment: high dose Melphalan prep regimen   Transplant Type: autologous  Transplant Day: 0  Clinical impact on complexity: High

## 2021-11-20 LAB
ALBUMIN SERPL ELPH-MCNC: 3.3 G/DL — SIGNIFICANT CHANGE UP (ref 3.3–5)
ALP SERPL-CCNC: 57 U/L — SIGNIFICANT CHANGE UP (ref 40–120)
ALT FLD-CCNC: 10 U/L — SIGNIFICANT CHANGE UP (ref 10–45)
ANION GAP SERPL CALC-SCNC: 10 MMOL/L — SIGNIFICANT CHANGE UP (ref 5–17)
AST SERPL-CCNC: 12 U/L — SIGNIFICANT CHANGE UP (ref 10–40)
BASOPHILS # BLD AUTO: 0 K/UL — SIGNIFICANT CHANGE UP (ref 0–0.2)
BASOPHILS NFR BLD AUTO: 0 % — SIGNIFICANT CHANGE UP (ref 0–2)
BILIRUB SERPL-MCNC: 0.5 MG/DL — SIGNIFICANT CHANGE UP (ref 0.2–1.2)
BUN SERPL-MCNC: 19 MG/DL — SIGNIFICANT CHANGE UP (ref 7–23)
CALCIUM SERPL-MCNC: 7.6 MG/DL — LOW (ref 8.4–10.5)
CHLORIDE SERPL-SCNC: 106 MMOL/L — SIGNIFICANT CHANGE UP (ref 96–108)
CO2 SERPL-SCNC: 23 MMOL/L — SIGNIFICANT CHANGE UP (ref 22–31)
CREAT SERPL-MCNC: 0.93 MG/DL — SIGNIFICANT CHANGE UP (ref 0.5–1.3)
CULTURE RESULTS: NO GROWTH — SIGNIFICANT CHANGE UP
EOSINOPHIL # BLD AUTO: 0 K/UL — SIGNIFICANT CHANGE UP (ref 0–0.5)
EOSINOPHIL NFR BLD AUTO: 0 % — SIGNIFICANT CHANGE UP (ref 0–6)
GLUCOSE SERPL-MCNC: 109 MG/DL — HIGH (ref 70–99)
HCT VFR BLD CALC: 31.7 % — LOW (ref 39–50)
HGB BLD-MCNC: 10.5 G/DL — LOW (ref 13–17)
LDH SERPL L TO P-CCNC: 203 U/L — SIGNIFICANT CHANGE UP (ref 50–242)
LYMPHOCYTES # BLD AUTO: 0.09 K/UL — LOW (ref 1–3.3)
LYMPHOCYTES # BLD AUTO: 0.9 % — LOW (ref 13–44)
MAGNESIUM SERPL-MCNC: 1.9 MG/DL — SIGNIFICANT CHANGE UP (ref 1.6–2.6)
MANUAL SMEAR VERIFICATION: SIGNIFICANT CHANGE UP
MCHC RBC-ENTMCNC: 31.6 PG — SIGNIFICANT CHANGE UP (ref 27–34)
MCHC RBC-ENTMCNC: 33.1 GM/DL — SIGNIFICANT CHANGE UP (ref 32–36)
MCV RBC AUTO: 95.5 FL — SIGNIFICANT CHANGE UP (ref 80–100)
MONOCYTES # BLD AUTO: 0.09 K/UL — SIGNIFICANT CHANGE UP (ref 0–0.9)
MONOCYTES NFR BLD AUTO: 0.9 % — LOW (ref 2–14)
NEUTROPHILS # BLD AUTO: 9.34 K/UL — HIGH (ref 1.8–7.4)
NEUTROPHILS NFR BLD AUTO: 97.3 % — HIGH (ref 43–77)
NEUTS BAND # BLD: 0.9 % — SIGNIFICANT CHANGE UP (ref 0–8)
PHOSPHATE SERPL-MCNC: 2.1 MG/DL — LOW (ref 2.5–4.5)
PLAT MORPH BLD: NORMAL — SIGNIFICANT CHANGE UP
PLATELET # BLD AUTO: 123 K/UL — LOW (ref 150–400)
POTASSIUM SERPL-MCNC: 3.6 MMOL/L — SIGNIFICANT CHANGE UP (ref 3.5–5.3)
POTASSIUM SERPL-SCNC: 3.6 MMOL/L — SIGNIFICANT CHANGE UP (ref 3.5–5.3)
PROT SERPL-MCNC: 5 G/DL — LOW (ref 6–8.3)
RBC # BLD: 3.32 M/UL — LOW (ref 4.2–5.8)
RBC # FLD: 15.4 % — HIGH (ref 10.3–14.5)
RBC BLD AUTO: SIGNIFICANT CHANGE UP
SODIUM SERPL-SCNC: 139 MMOL/L — SIGNIFICANT CHANGE UP (ref 135–145)
SPECIMEN SOURCE: SIGNIFICANT CHANGE UP
WBC # BLD: 9.51 K/UL — SIGNIFICANT CHANGE UP (ref 3.8–10.5)
WBC # FLD AUTO: 9.51 K/UL — SIGNIFICANT CHANGE UP (ref 3.8–10.5)

## 2021-11-20 PROCEDURE — 99291 CRITICAL CARE FIRST HOUR: CPT

## 2021-11-20 RX ORDER — CALCIUM GLUCONATE 100 MG/ML
1 VIAL (ML) INTRAVENOUS ONCE
Refills: 0 | Status: COMPLETED | OUTPATIENT
Start: 2021-11-20 | End: 2021-11-20

## 2021-11-20 RX ORDER — SIMETHICONE 80 MG/1
80 TABLET, CHEWABLE ORAL EVERY 8 HOURS
Refills: 0 | Status: DISCONTINUED | OUTPATIENT
Start: 2021-11-20 | End: 2021-11-23

## 2021-11-20 RX ORDER — POTASSIUM PHOSPHATE, MONOBASIC POTASSIUM PHOSPHATE, DIBASIC 236; 224 MG/ML; MG/ML
15 INJECTION, SOLUTION INTRAVENOUS ONCE
Refills: 0 | Status: COMPLETED | OUTPATIENT
Start: 2021-11-20 | End: 2021-11-20

## 2021-11-20 RX ORDER — FUROSEMIDE 40 MG
40 TABLET ORAL ONCE
Refills: 0 | Status: COMPLETED | OUTPATIENT
Start: 2021-11-20 | End: 2021-11-20

## 2021-11-20 RX ADMIN — Medication 10 MILLILITER(S): at 20:50

## 2021-11-20 RX ADMIN — Medication 10 MILLILITER(S): at 12:02

## 2021-11-20 RX ADMIN — POTASSIUM PHOSPHATE, MONOBASIC POTASSIUM PHOSPHATE, DIBASIC 62.5 MILLIMOLE(S): 236; 224 INJECTION, SOLUTION INTRAVENOUS at 11:16

## 2021-11-20 RX ADMIN — SODIUM CHLORIDE 20 MILLILITER(S): 9 INJECTION INTRAMUSCULAR; INTRAVENOUS; SUBCUTANEOUS at 05:19

## 2021-11-20 RX ADMIN — Medication 5 MILLILITER(S): at 16:06

## 2021-11-20 RX ADMIN — Medication 400 MILLIGRAM(S): at 21:12

## 2021-11-20 RX ADMIN — Medication 40 MILLIGRAM(S): at 16:42

## 2021-11-20 RX ADMIN — Medication 5 MILLILITER(S): at 20:51

## 2021-11-20 RX ADMIN — URSODIOL 300 MILLIGRAM(S): 250 TABLET, FILM COATED ORAL at 17:51

## 2021-11-20 RX ADMIN — FLUCONAZOLE 400 MILLIGRAM(S): 150 TABLET ORAL at 12:02

## 2021-11-20 RX ADMIN — Medication 400 MILLIGRAM(S): at 05:21

## 2021-11-20 RX ADMIN — Medication 10 MILLILITER(S): at 00:26

## 2021-11-20 RX ADMIN — Medication 480 MICROGRAM(S): at 16:09

## 2021-11-20 RX ADMIN — PANTOPRAZOLE SODIUM 40 MILLIGRAM(S): 20 TABLET, DELAYED RELEASE ORAL at 05:21

## 2021-11-20 RX ADMIN — Medication 1 APPLICATION(S): at 05:20

## 2021-11-20 RX ADMIN — Medication 5 MILLILITER(S): at 08:00

## 2021-11-20 RX ADMIN — CHLORHEXIDINE GLUCONATE 1 APPLICATION(S): 213 SOLUTION TOPICAL at 07:59

## 2021-11-20 RX ADMIN — Medication 1 APPLICATION(S): at 17:51

## 2021-11-20 RX ADMIN — Medication 1 LOZENGE: at 20:51

## 2021-11-20 RX ADMIN — Medication 1 LOZENGE: at 16:06

## 2021-11-20 RX ADMIN — Medication 1 LOZENGE: at 12:01

## 2021-11-20 RX ADMIN — Medication 400 MILLIGRAM(S): at 14:17

## 2021-11-20 RX ADMIN — Medication 1 APPLICATION(S): at 05:33

## 2021-11-20 RX ADMIN — HEPARIN SODIUM 4.1 UNIT(S)/HR: 5000 INJECTION INTRAVENOUS; SUBCUTANEOUS at 17:52

## 2021-11-20 RX ADMIN — Medication 10 MILLILITER(S): at 16:06

## 2021-11-20 RX ADMIN — SODIUM CHLORIDE 20 MILLILITER(S): 9 INJECTION INTRAMUSCULAR; INTRAVENOUS; SUBCUTANEOUS at 17:53

## 2021-11-20 RX ADMIN — Medication 1 LOZENGE: at 08:00

## 2021-11-20 RX ADMIN — Medication 5880 MICROGRAM(S): at 16:05

## 2021-11-20 RX ADMIN — SODIUM CHLORIDE 150 MILLILITER(S): 9 INJECTION, SOLUTION INTRAVENOUS at 05:19

## 2021-11-20 RX ADMIN — Medication 5 MILLILITER(S): at 00:26

## 2021-11-20 RX ADMIN — URSODIOL 300 MILLIGRAM(S): 250 TABLET, FILM COATED ORAL at 05:21

## 2021-11-20 RX ADMIN — Medication 1 LOZENGE: at 00:26

## 2021-11-20 RX ADMIN — Medication 100 GRAM(S): at 10:32

## 2021-11-20 RX ADMIN — SIMETHICONE 80 MILLIGRAM(S): 80 TABLET, CHEWABLE ORAL at 21:12

## 2021-11-20 RX ADMIN — Medication 1 MILLIGRAM(S): at 12:02

## 2021-11-20 RX ADMIN — Medication 10 MILLILITER(S): at 08:00

## 2021-11-20 RX ADMIN — Medication 1 TABLET(S): at 12:03

## 2021-11-20 RX ADMIN — Medication 5 MILLILITER(S): at 12:00

## 2021-11-20 NOTE — PROGRESS NOTE ADULT - ATTENDING COMMENTS
66 year old male with recently diagnosed plasma cell myeloma status post RVD x 6, admitted for autologous pbsct with high dose melphalan prep regimen.  Day 0 ..pt had vasovagal episode this am...not eating well....  Days - 4 & -3 : 3 hours prior to Melphalan start hydration: D5NS at 200ml /hr and continue 24 hours post Melphalan infusion;   Melphalan 100mg/m2  IV   Strict I&O, daily weights, prn diuresis ...hold diuresis for now...tele for 24 hrs  Day -2, day -1 rest days. At 2200 on day – 1, start transplant hydration 1/2NS + 50mEq NaHCO3- (may substitute [id number] if bicarb not available)  Day 0 – infuse HPC product. 4 hours after infusion of cells start Zarxio 5 micrograms / kg (actual weight) . Continue through engraftment.   On day 0, + 1, + 2-give Kepivance 60micrograms / kg (actual weight).  VOD prophylaxis - low dose heparin gtt (dosed at 100 units / kg / day), glutamine supplementation, Actigall BID   PCP prophylaxis - Bactrim DS through day -2    Antiviral prophylaxis - Acyclovir 400mg po TID to start day -1   Antifungal prophylaxis- Diflucan 400 mg po daily.  GI prophylaxis - Protonix po QD   Antibacterial prophylaxis - when ANC < 1000, start Cipro 500mg po BID. If becomes febrile, pan cx, CXR and change Cipro to Cefepime 2g IV q 8 hours. Continue until count recovery  Mouth care and skin care as per protocol. 66 year old male with recently diagnosed plasma cell myeloma status post RVD x 6, admitted for autologous pbsct with high dose melphalan prep regimen.  Day 0(11/19) ..pt had vasovagal episode this am...not eating well....  Days - 4 & -3 : 3 hours prior to Melphalan start hydration: D5NS at 200ml /hr and continue 24 hours post Melphalan infusion;   Melphalan 100mg/m2  IV   Strict I&O, daily weights, prn diuresis ...hold diuresis for now...tele for 24 hrs  Day -2, day -1 rest days. At 2200 on day – 1, start transplant hydration 1/2NS + 50mEq NaHCO3- (may substitute [id number] if bicarb not available)  Day 0 – infuse HPC product. 4 hours after infusion of cells start Zarxio 5 micrograms / kg (actual weight) . Continue through engraftment.   On day 0, + 1, + 2-give Kepivance 60micrograms / kg (actual weight).  VOD prophylaxis - low dose heparin gtt (dosed at 100 units / kg / day), glutamine supplementation, Actigall BID   PCP prophylaxis - Bactrim DS through day -2    Antiviral prophylaxis - Acyclovir 400mg po TID to start day -1   Antifungal prophylaxis- Diflucan 400 mg po daily.  GI prophylaxis - Protonix po QD   Antibacterial prophylaxis - when ANC < 1000, start Cipro 500mg po BID. If becomes febrile, pan cx, CXR and change Cipro to Cefepime 2g IV q 8 hours. Continue until count recovery  Mouth care and skin care as per protocol.    11/20- persistent abd pain > RLQ, was constipated on bowel regimen. Had 2 episodes diarrhea this morning. Monitor abd exam, will order CT A/P if pain worsens.

## 2021-11-20 NOTE — PROGRESS NOTE ADULT - SUBJECTIVE AND OBJECTIVE BOX
[facility] Transplant Team                                                      Critical / Counseling Time Provided: 30 minutes                                                                                                                                                        Chief Complaint: Autologous peripheral blood stem cell transplant with high dose Melphalan prep regimen for treatment of multiple myeloma    S: Patient seen and examined with [facility] Transplant Team:   +constipation  +generalized weakness  +poor po intake    O: Vitals:   Vital Signs Last 24 Hrs  T(C): 37.4 (2021 05:15), Max: 37.8 (2021 21:55)  T(F): 99.3 (2021 05:15), Max: 100 (2021 21:55)  HR: 82 (2021 05:15) (57 - 86)  BP: 138/79 (2021 05:15) (97/50 - 148/87)  RR: 16 (2021 05:15) (16 - 18)  SpO2: 99% (2021 05:15) (94% - 99%)    Admit weight: 98.4 kg  Daily     Daily Weight in k.6 (2021 10:06)    Intake / Output:   - @ 07:01  -   @ 07:00  --------------------------------------------------------  IN: 5261.2 mL / OUT: 4950 mL / NET: 311.2 mL      PE:   Oropharynx: no erythema or ulcerations   Oral Mucositis:              -                                          Grade: n/a   CVS: S1, S2 RRR   Lungs: CTA throughout bilaterally   Abdomen: + BS x 4, soft, ND +mild TTP RLQ  Extremities: no edema   Gastric Mucositis:       -                                           Grade: n/a  Intestinal Mucositis:     -                                         Grade: n/a   Skin: patchy, erythematous macular rash to face, neck and upper chest post kepivance   TLC: CDI   Neuro: A&O x 3   Pain: 0    Labs:   CBC Full  -  ( 2021 06:53 )  WBC Count : x  Hemoglobin : 10.5 g/dL  Hematocrit : 31.7 %  Platelet Count - Automated : 123 K/uL  Mean Cell Volume : 95.5 fl  Mean Cell Hemoglobin : 31.6 pg  Mean Cell Hemoglobin Concentration : 33.1 gm/dL  Auto Neutrophil # : x  Auto Lymphocyte # : x  Auto Monocyte # : x  Auto Eosinophil # : x  Auto Basophil # : x  Auto Neutrophil % : x  Auto Lymphocyte % : x  Auto Monocyte % : x  Auto Eosinophil % : x  Auto Basophil % : x                          10.5   x     )-----------( 123      ( 2021 06:53 )             31.7         138  |  107  |  22  ----------------------------<  114<H>  3.8   |  21<L>  |  0.89    Ca    7.6<L>      2021 07:21  Phos  2.3       Mg     1.8         TPro  5.0<L>  /  Alb  3.6  /  TBili  0.9  /  DBili  0.2  /  AST  10  /  ALT  12  /  AlkPhos  57        LIVER FUNCTIONS - ( 2021 07:21 )  Alb: 3.6 g/dL / Pro: 5.0 g/dL / ALK PHOS: 57 U/L / ALT: 12 U/L / AST: 10 U/L / GGT: x           Lactate Dehydrogenase, Serum: 137 U/L ( @ 07:21)        Cultures:         Radiology:       Meds:   Antimicrobials:   acyclovir   Oral Tab/Cap 400 milliGRAM(s) Oral every 8 hours  clotrimazole Lozenge 1 Lozenge Oral five times a day  fluconAZOLE   Tablet 400 milliGRAM(s) Oral daily      Heme / Onc:   heparin  Infusion 410 Unit(s)/Hr IV Continuous <Continuous>      GI:  pantoprazole    Tablet 40 milliGRAM(s) Oral before breakfast  polyethylene glycol 3350 17 Gram(s) Oral daily PRN  senna 1 Tablet(s) Oral at bedtime PRN  sodium bicarbonate Mouth Rinse 10 milliLiter(s) Swish and Spit five times a day  ursodiol Capsule 300 milliGRAM(s) Oral two times a day      Cardiovascular:   furosemide   Injectable 20 milliGRAM(s) IV Push every 24 hours PRN      Immunologic:   filgrastim-sndz (ZARXIO) Injectable 480 MICROGram(s) SubCutaneous every 24 hours      Other medications:   acetaminophen     Tablet .. 650 milliGRAM(s) Oral every 6 hours  BACItracin   Ointment 1 Application(s) Topical two times a day  Biotene Dry Mouth Oral Rinse 5 milliLiter(s) Swish and Spit five times a day  chlorhexidine 4% Liquid 1 Application(s) Topical <User Schedule>  folic acid 1 milliGRAM(s) Oral daily  hydrocortisone 1% Cream 1 Application(s) Topical two times a day  lidocaine/prilocaine Cream 1 Application(s) Topical daily  LORazepam   Injectable 1 milliGRAM(s) IV Push every 24 hours  multivitamin 1 Tablet(s) Oral daily  palifermin Injectable  (eMAR) 5880 MICROGram(s) IV Push every 24 hours  sodium chloride 0.45% 1000 milliLiter(s) IV Continuous <Continuous>  sodium chloride 0.9%. 1000 milliLiter(s) IV Continuous <Continuous>  sodium chloride 0.9%. 1000 milliLiter(s) IV Continuous <Continuous>      PRN:   acetaminophen     Tablet .. 650 milliGRAM(s) Oral every 6 hours PRN  furosemide   Injectable 20 milliGRAM(s) IV Push every 24 hours PRN  LORazepam   Injectable 1 milliGRAM(s) IV Push every 6 hours PRN  metoclopramide Injectable 10 milliGRAM(s) IV Push every 6 hours PRN  polyethylene glycol 3350 17 Gram(s) Oral daily PRN  senna 1 Tablet(s) Oral at bedtime PRN  sodium chloride 0.9% lock flush 10 milliLiter(s) IV Push every 1 hour PRN      A/P:   66 year old male with a history of multiple myeloma s/p RVD x 6   Post Autologous PBSCT day +1  - melphalan ; continue melphalan hydration for 24 hours post infusion of last dose   Strict I&O, daily weights, prn diuresis   - rest day   - rest day   - HPC transplant today; continue transplant hydration for 24 hours post infusion of cells. Suprapubic pain this am. UA pending, will send culture, BK virus. AXR   -vasovegal episode in AM: will monitor tele for 24hrs     1. Infectious Disease:   acyclovir   Oral Tab/Cap 400 milliGRAM(s) Oral every 8 hours  clotrimazole Lozenge 1 Lozenge Oral five times a day  fluconAZOLE   Tablet 400 milliGRAM(s) Oral daily    2. VOD Prophylaxis: Actigall, Glutamine, Heparin (dosed at 100 units / kg / day)     3. GI Prophylaxis:   pantoprazole    Tablet 40 milliGRAM(s) Oral before breakfast    4. Mouthcare - NS / NaHCO3 rinses, Mycelex, Biotene; Skin care     5. GVHD prophylaxis - n/a     6. Transfuse & replete electrolytes prn   potassium phosphate IVPB 15 milliMole(s) IV Intermittent once  KCl 20mEq IV x 3     7. IV hydration, daily weights, strict I&O, prn diuresis   Lasix 40mg IV x 1 0900    8. PO intake as tolerated, nutrition follow up as needed, MVI, folic acid     9. Antiemetics, anti-diarrhea medications:   LORazepam   Injectable 1 milliGRAM(s) IV Push every 6 hours PRN  metoclopramide Injectable 10 milliGRAM(s) IV Push every 6 hours PRN  aprepitant 80 milliGRAM(s) Oral every 24 hours    10. OOB as tolerated, physical therapy consult if needed     11. Monitor coags / fibrinogen 2x week, vitamin K as needed     12. Monitor closely for clinical changes, monitor for fevers     13. Emotional support provided, plan of care discussed and questions addressed     14. Patient education done regarding chemotherapy prep, plan of care, restrictions and discharge planning     15. Continue regular social work input     I have written the above note for Dr. Sorenson who performed service with me in the room.   Elton Antonio PA-C ([phone])    I have seen and examined patient with PA, I agree with above note as scribed.                    [facility] Transplant Team                                                      Critical / Counseling Time Provided: 30 minutes                                                                                                                                                        Chief Complaint: Autologous peripheral blood stem cell transplant with high dose Melphalan prep regimen for treatment of multiple myeloma    S: Patient seen and examined with [facility] Transplant Team:   + abdominal pain  + previously constipated, now x2 watery diarrhea  + gas pains, RLQ  + rash (kepivance)    O: Vitals:   Vital Signs Last 24 Hrs  T(C): 37.4 (2021 05:15), Max: 37.8 (2021 21:55)  T(F): 99.3 (2021 05:15), Max: 100 (2021 21:55)  HR: 82 (2021 05:15) (57 - 86)  BP: 138/79 (2021 05:15) (97/50 - 148/87)  RR: 16 (2021 05:15) (16 - 18)  SpO2: 99% (2021 05:15) (94% - 99%)    Admit weight: 98.4 kg  Daily Weight in k.6 (2021 10:06)  Daily Weight in k.2 (2021 11:20)    Intake / Output:    @ 07:01  -   @ 07:00  --------------------------------------------------------  IN: 5261.2 mL / OUT: 4950 mL / NET: 311.2 mL      PE:   Oropharynx: no erythema or ulcerations   Oral Mucositis:              -                                          Grade: n/a   CVS: S1, S2 RRR   Lungs: CTA throughout bilaterally   Abdomen: + BS x 4, soft, ND +mild TTP RLQ  Extremities: no edema   Gastric Mucositis:       -                                           Grade: n/a  Intestinal Mucositis:     -                                         Grade: n/a   Skin: patchy, erythematous macular rash to face, neck and upper chest post kepivance   TLC: CDI   Neuro: A&O x 3   Pain: 0    Labs:   CBC Full  -  ( 2021 06:53 )  WBC Count : x  Hemoglobin : 10.5 g/dL  Hematocrit : 31.7 %  Platelet Count - Automated : 123 K/uL  Mean Cell Volume : 95.5 fl  Mean Cell Hemoglobin : 31.6 pg  Mean Cell Hemoglobin Concentration : 33.1 gm/dL  Auto Neutrophil # : x  Auto Lymphocyte # : x  Auto Monocyte # : x  Auto Eosinophil # : x  Auto Basophil # : x  Auto Neutrophil % : x  Auto Lymphocyte % : x  Auto Monocyte % : x  Auto Eosinophil % : x  Auto Basophil % : x                          10.5   9.51   )-----------( 123      ( 2021 06:53 )             31.7     2021 06:51    139    |  106    |  19     ----------------------------<  109    3.6     |  23     |  0.93     Ca    7.6        2021 06:51  Phos  2.1       2021 06:51  Mg     1.9       2021 06:51    TPro  5.0    /  Alb  3.3    /  TBili  0.5    /  DBili  x      /  AST  12     /  ALT  10     /  AlkPhos  57     2021 06:51      LIVER FUNCTIONS - ( 2021 06:51 )  Alb: 3.3 g/dL / Pro: 5.0 g/dL / ALK PHOS: 57 U/L / ALT: 10 U/L / AST: 12 U/L / GGT: x           Urinalysis Basic - ( 2021 01:08 )    Color: Light Yellow / Appearance: Clear / S.024 / pH: x  Gluc: x / Ketone: Negative  / Bili: Negative / Urobili: Negative   Blood: x / Protein: Trace / Nitrite: Negative   Leuk Esterase: Negative / RBC: 2 /hpf / WBC 5 /HPF   Sq Epi: x / Non Sq Epi: 2 /hpf / Bacteria: Negative    Meds:   Antimicrobials:   acyclovir   Oral Tab/Cap 400 milliGRAM(s) Oral every 8 hours  clotrimazole Lozenge 1 Lozenge Oral five times a day  fluconAZOLE   Tablet 400 milliGRAM(s) Oral daily      Heme / Onc:   heparin  Infusion 410 Unit(s)/Hr IV Continuous <Continuous>      GI:  pantoprazole    Tablet 40 milliGRAM(s) Oral before breakfast  polyethylene glycol 3350 17 Gram(s) Oral daily PRN  senna 1 Tablet(s) Oral at bedtime PRN  sodium bicarbonate Mouth Rinse 10 milliLiter(s) Swish and Spit five times a day  ursodiol Capsule 300 milliGRAM(s) Oral two times a day      Cardiovascular:   furosemide   Injectable 20 milliGRAM(s) IV Push every 24 hours PRN      Immunologic:   filgrastim-sndz (ZARXIO) Injectable 480 MICROGram(s) SubCutaneous every 24 hours      Other medications:   acetaminophen     Tablet .. 650 milliGRAM(s) Oral every 6 hours  BACItracin   Ointment 1 Application(s) Topical two times a day  Biotene Dry Mouth Oral Rinse 5 milliLiter(s) Swish and Spit five times a day  chlorhexidine 4% Liquid 1 Application(s) Topical <User Schedule>  folic acid 1 milliGRAM(s) Oral daily  hydrocortisone 1% Cream 1 Application(s) Topical two times a day  lidocaine/prilocaine Cream 1 Application(s) Topical daily  LORazepam   Injectable 1 milliGRAM(s) IV Push every 24 hours  multivitamin 1 Tablet(s) Oral daily  palifermin Injectable  (eMAR) 5880 MICROGram(s) IV Push every 24 hours  sodium chloride 0.45% 1000 milliLiter(s) IV Continuous <Continuous>  sodium chloride 0.9%. 1000 milliLiter(s) IV Continuous <Continuous>  sodium chloride 0.9%. 1000 milliLiter(s) IV Continuous <Continuous>      PRN:   acetaminophen     Tablet .. 650 milliGRAM(s) Oral every 6 hours PRN  furosemide   Injectable 20 milliGRAM(s) IV Push every 24 hours PRN  LORazepam   Injectable 1 milliGRAM(s) IV Push every 6 hours PRN  metoclopramide Injectable 10 milliGRAM(s) IV Push every 6 hours PRN  polyethylene glycol 3350 17 Gram(s) Oral daily PRN  senna 1 Tablet(s) Oral at bedtime PRN  sodium chloride 0.9% lock flush 10 milliLiter(s) IV Push every 1 hour PRN      A/P:   66 year old male with a history of multiple myeloma s/p RVD x 6   Post Autologous PBSCT day +1  - melphalan ; continue melphalan hydration for 24 hours post infusion of last dose   Strict I&O, daily weights, prn diuresis   - rest day   - rest day   - HPC transplant today; continue transplant hydration for 24 hours post infusion of cells. Suprapubic pain this am. UA pending, will send culture, BK virus. AXR   -vasovegal episode in AM: will monitor tele for 24hrs    d/c telemetry. Lasix 40mg IV x today, follow up I&Os, daily weight. monitor rash, receiving 2nd dose Kepivance today    1. Infectious Disease:   acyclovir   Oral Tab/Cap 400 milliGRAM(s) Oral every 8 hours  clotrimazole Lozenge 1 Lozenge Oral five times a day  fluconAZOLE   Tablet 400 milliGRAM(s) Oral daily    2. VOD Prophylaxis: Actigall, Glutamine, Heparin (dosed at 100 units / kg / day)     3. GI Prophylaxis:   pantoprazole    Tablet 40 milliGRAM(s) Oral before breakfast    4. Mouthcare - NS / NaHCO3 rinses, Mycelex, Biotene; Skin care     5. GVHD prophylaxis - n/a     6. Transfuse & replete electrolytes prn   potassium phosphate IVPB 15 milliMole(s) IV Intermittent once  KCl 20mEq IV x 3     7. IV hydration, daily weights, strict I&O, prn diuresis   Lasix 40mg IV x 1 0900    8. PO intake as tolerated, nutrition follow up as needed, MVI, folic acid     9. Antiemetics, anti-diarrhea medications:   LORazepam   Injectable 1 milliGRAM(s) IV Push every 6 hours PRN  metoclopramide Injectable 10 milliGRAM(s) IV Push every 6 hours PRN  aprepitant 80 milliGRAM(s) Oral every 24 hours    10. OOB as tolerated, physical therapy consult if needed     11. Monitor coags / fibrinogen 2x week, vitamin K as needed     12. Monitor closely for clinical changes, monitor for fevers     13. Emotional support provided, plan of care discussed and questions addressed     14. Patient education done regarding chemotherapy prep, plan of care, restrictions and discharge planning     15. Continue regular social work input     I have written the above note for Dr. Sorenson who performed service with me in the room.   Elton Antonio PA-C ([phone])    I have seen and examined patient with PA, I agree with above note as scribed.                    [facility]                                                      Critical / Counseling Time Provided: 30 minutes                                                                                                                                                        Chief Complaint: Autologous peripheral blood stem cell transplant with high dose Melphalan prep regimen for treatment of multiple myeloma    S: Patient seen and examined with [facility] Transplant Team:   + abdominal pain  + previously constipated, now x2 watery diarrhea  + gas pains, RLQ  + rash (kepivance)    O: Vitals:   Vital Signs Last 24 Hrs  T(C): 37.4 (2021 05:15), Max: 37.8 (2021 21:55)  T(F): 99.3 (2021 05:15), Max: 100 (2021 21:55)  HR: 82 (2021 05:15) (57 - 86)  BP: 138/79 (2021 05:15) (97/50 - 148/87)  RR: 16 (2021 05:15) (16 - 18)  SpO2: 99% (2021 05:15) (94% - 99%)    Admit weight: 98.4 kg  Daily Weight in k.6 (2021 10:06)  Daily Weight in k.2 (2021 11:20)    Intake / Output:    @ 07:01  -   @ 07:00  --------------------------------------------------------  IN: 5261.2 mL / OUT: 4950 mL / NET: 311.2 mL      PE:   Oropharynx: no erythema or ulcerations   Oral Mucositis:              -                                          Grade: n/a   CVS: S1, S2 RRR   Lungs: CTA throughout bilaterally   Abdomen: + BS x 4, soft, ND +mild TTP RLQ  Extremities: no edema   Gastric Mucositis:       -                                           Grade: n/a  Intestinal Mucositis:     -                                         Grade: n/a   Skin: patchy, erythematous macular rash to face, neck and upper chest post kepivance   TLC: CDI   Neuro: A&O x 3   Pain: 0    Labs:   CBC Full  -  ( 2021 06:53 )  WBC Count : x  Hemoglobin : 10.5 g/dL  Hematocrit : 31.7 %  Platelet Count - Automated : 123 K/uL  Mean Cell Volume : 95.5 fl  Mean Cell Hemoglobin : 31.6 pg  Mean Cell Hemoglobin Concentration : 33.1 gm/dL  Auto Neutrophil # : x  Auto Lymphocyte # : x  Auto Monocyte # : x  Auto Eosinophil # : x  Auto Basophil # : x  Auto Neutrophil % : x  Auto Lymphocyte % : x  Auto Monocyte % : x  Auto Eosinophil % : x  Auto Basophil % : x                          10.5   9.51   )-----------( 123      ( 2021 06:53 )             31.7     2021 06:51    139    |  106    |  19     ----------------------------<  109    3.6     |  23     |  0.93     Ca    7.6        2021 06:51  Phos  2.1       2021 06:51  Mg     1.9       2021 06:51    TPro  5.0    /  Alb  3.3    /  TBili  0.5    /  DBili  x      /  AST  12     /  ALT  10     /  AlkPhos  57     2021 06:51      LIVER FUNCTIONS - ( 2021 06:51 )  Alb: 3.3 g/dL / Pro: 5.0 g/dL / ALK PHOS: 57 U/L / ALT: 10 U/L / AST: 12 U/L / GGT: x           Urinalysis Basic - ( 2021 01:08 )    Color: Light Yellow / Appearance: Clear / S.024 / pH: x  Gluc: x / Ketone: Negative  / Bili: Negative / Urobili: Negative   Blood: x / Protein: Trace / Nitrite: Negative   Leuk Esterase: Negative / RBC: 2 /hpf / WBC 5 /HPF   Sq Epi: x / Non Sq Epi: 2 /hpf / Bacteria: Negative    Meds:   Antimicrobials:   acyclovir   Oral Tab/Cap 400 milliGRAM(s) Oral every 8 hours  clotrimazole Lozenge 1 Lozenge Oral five times a day  fluconAZOLE   Tablet 400 milliGRAM(s) Oral daily      Heme / Onc:   heparin  Infusion 410 Unit(s)/Hr IV Continuous <Continuous>      GI:  pantoprazole    Tablet 40 milliGRAM(s) Oral before breakfast  polyethylene glycol 3350 17 Gram(s) Oral daily PRN  senna 1 Tablet(s) Oral at bedtime PRN  sodium bicarbonate Mouth Rinse 10 milliLiter(s) Swish and Spit five times a day  ursodiol Capsule 300 milliGRAM(s) Oral two times a day      Cardiovascular:   furosemide   Injectable 20 milliGRAM(s) IV Push every 24 hours PRN      Immunologic:   filgrastim-sndz (ZARXIO) Injectable 480 MICROGram(s) SubCutaneous every 24 hours      Other medications:   acetaminophen     Tablet .. 650 milliGRAM(s) Oral every 6 hours  BACItracin   Ointment 1 Application(s) Topical two times a day  Biotene Dry Mouth Oral Rinse 5 milliLiter(s) Swish and Spit five times a day  chlorhexidine 4% Liquid 1 Application(s) Topical <User Schedule>  folic acid 1 milliGRAM(s) Oral daily  hydrocortisone 1% Cream 1 Application(s) Topical two times a day  lidocaine/prilocaine Cream 1 Application(s) Topical daily  LORazepam   Injectable 1 milliGRAM(s) IV Push every 24 hours  multivitamin 1 Tablet(s) Oral daily  palifermin Injectable  (eMAR) 5880 MICROGram(s) IV Push every 24 hours  sodium chloride 0.45% 1000 milliLiter(s) IV Continuous <Continuous>  sodium chloride 0.9%. 1000 milliLiter(s) IV Continuous <Continuous>  sodium chloride 0.9%. 1000 milliLiter(s) IV Continuous <Continuous>      PRN:   acetaminophen     Tablet .. 650 milliGRAM(s) Oral every 6 hours PRN  furosemide   Injectable 20 milliGRAM(s) IV Push every 24 hours PRN  LORazepam   Injectable 1 milliGRAM(s) IV Push every 6 hours PRN  metoclopramide Injectable 10 milliGRAM(s) IV Push every 6 hours PRN  polyethylene glycol 3350 17 Gram(s) Oral daily PRN  senna 1 Tablet(s) Oral at bedtime PRN  sodium chloride 0.9% lock flush 10 milliLiter(s) IV Push every 1 hour PRN      A/P:   66 year old male with a history of multiple myeloma s/p RVD x 6   Post Autologous PBSCT day +1  - melphalan ; s/p melphalan hydration for 24 hours post infusion of last dose   Strict I&O, daily weights, prn diuresis   - rest day   - rest day   - s/p HPC transplant; completed transplant hydration for 24 hours post infusion of cells. Suprapubic pain in am. UA pending, follow up culture, BK virus. AXR   -vasovegal episode in AM: will monitor tele for 24hrs    d/c telemetry. Lasix 40mg IV x today, follow up I&Os, daily weight. monitor rash, receiving 2nd dose Kepivance today    1. Infectious Disease:   acyclovir   Oral Tab/Cap 400 milliGRAM(s) Oral every 8 hours  clotrimazole Lozenge 1 Lozenge Oral five times a day  fluconAZOLE   Tablet 400 milliGRAM(s) Oral daily    2. VOD Prophylaxis: Actigall, Glutamine, Heparin (dosed at 100 units / kg / day)     3. GI Prophylaxis:   pantoprazole    Tablet 40 milliGRAM(s) Oral before breakfast    4. Mouthcare - NS / NaHCO3 rinses, Mycelex, Biotene; Skin care     5. GVHD prophylaxis - n/a     6. Transfuse & replete electrolytes prn   potassium phosphate IVPB 15 milliMole(s) IV Intermittent once  KCl 20mEq IV x 3     7. IV hydration, daily weights, strict I&O, prn diuresis   Lasix 40mg IV x 1 0900    8. PO intake as tolerated, nutrition follow up as needed, MVI, folic acid     9. Antiemetics, anti-diarrhea medications:   LORazepam   Injectable 1 milliGRAM(s) IV Push every 6 hours PRN  metoclopramide Injectable 10 milliGRAM(s) IV Push every 6 hours PRN  aprepitant 80 milliGRAM(s) Oral every 24 hours    10. OOB as tolerated, physical therapy consult if needed     11. Monitor coags / fibrinogen 2x week, vitamin K as needed     12. Monitor closely for clinical changes, monitor for fevers     13. Emotional support provided, plan of care discussed and questions addressed     14. Patient education done regarding chemotherapy prep, plan of care, restrictions and discharge planning     15. Continue regular social work input     I have written the above note for Dr. Sorenson who performed service with me in the room.   Elton Antonio PA-C ([phone])    I have seen and examined patient with PA, I agree with above note as scribed.

## 2021-11-21 LAB
ALBUMIN SERPL ELPH-MCNC: 3.3 G/DL — SIGNIFICANT CHANGE UP (ref 3.3–5)
ALP SERPL-CCNC: 68 U/L — SIGNIFICANT CHANGE UP (ref 40–120)
ALT FLD-CCNC: 7 U/L — LOW (ref 10–45)
ANION GAP SERPL CALC-SCNC: 10 MMOL/L — SIGNIFICANT CHANGE UP (ref 5–17)
AST SERPL-CCNC: 7 U/L — LOW (ref 10–40)
BASOPHILS # BLD AUTO: 0 K/UL — SIGNIFICANT CHANGE UP (ref 0–0.2)
BASOPHILS NFR BLD AUTO: 0 % — SIGNIFICANT CHANGE UP (ref 0–2)
BILIRUB SERPL-MCNC: 0.6 MG/DL — SIGNIFICANT CHANGE UP (ref 0.2–1.2)
BUN SERPL-MCNC: 19 MG/DL — SIGNIFICANT CHANGE UP (ref 7–23)
CALCIUM SERPL-MCNC: 7.8 MG/DL — LOW (ref 8.4–10.5)
CHLORIDE SERPL-SCNC: 102 MMOL/L — SIGNIFICANT CHANGE UP (ref 96–108)
CO2 SERPL-SCNC: 25 MMOL/L — SIGNIFICANT CHANGE UP (ref 22–31)
CREAT SERPL-MCNC: 0.87 MG/DL — SIGNIFICANT CHANGE UP (ref 0.5–1.3)
EOSINOPHIL # BLD AUTO: 0 K/UL — SIGNIFICANT CHANGE UP (ref 0–0.5)
EOSINOPHIL NFR BLD AUTO: 0 % — SIGNIFICANT CHANGE UP (ref 0–6)
GLUCOSE SERPL-MCNC: 127 MG/DL — HIGH (ref 70–99)
HCT VFR BLD CALC: 33.3 % — LOW (ref 39–50)
HGB BLD-MCNC: 10.8 G/DL — LOW (ref 13–17)
LDH SERPL L TO P-CCNC: 161 U/L — SIGNIFICANT CHANGE UP (ref 50–242)
LYMPHOCYTES # BLD AUTO: 0.07 K/UL — LOW (ref 1–3.3)
LYMPHOCYTES # BLD AUTO: 1.7 % — LOW (ref 13–44)
MAGNESIUM SERPL-MCNC: 2 MG/DL — SIGNIFICANT CHANGE UP (ref 1.6–2.6)
MANUAL SMEAR VERIFICATION: SIGNIFICANT CHANGE UP
MCHC RBC-ENTMCNC: 31 PG — SIGNIFICANT CHANGE UP (ref 27–34)
MCHC RBC-ENTMCNC: 32.4 GM/DL — SIGNIFICANT CHANGE UP (ref 32–36)
MCV RBC AUTO: 95.7 FL — SIGNIFICANT CHANGE UP (ref 80–100)
MONOCYTES # BLD AUTO: 0 K/UL — SIGNIFICANT CHANGE UP (ref 0–0.9)
MONOCYTES NFR BLD AUTO: 0 % — LOW (ref 2–14)
NEUTROPHILS # BLD AUTO: 4 K/UL — SIGNIFICANT CHANGE UP (ref 1.8–7.4)
NEUTROPHILS NFR BLD AUTO: 98.3 % — HIGH (ref 43–77)
PHOSPHATE SERPL-MCNC: 2.9 MG/DL — SIGNIFICANT CHANGE UP (ref 2.5–4.5)
PLAT MORPH BLD: NORMAL — SIGNIFICANT CHANGE UP
PLATELET # BLD AUTO: 105 K/UL — LOW (ref 150–400)
POTASSIUM SERPL-MCNC: 3.5 MMOL/L — SIGNIFICANT CHANGE UP (ref 3.5–5.3)
POTASSIUM SERPL-SCNC: 3.5 MMOL/L — SIGNIFICANT CHANGE UP (ref 3.5–5.3)
PROT SERPL-MCNC: 5.4 G/DL — LOW (ref 6–8.3)
RBC # BLD: 3.48 M/UL — LOW (ref 4.2–5.8)
RBC # FLD: 15.4 % — HIGH (ref 10.3–14.5)
RBC BLD AUTO: SIGNIFICANT CHANGE UP
SODIUM SERPL-SCNC: 137 MMOL/L — SIGNIFICANT CHANGE UP (ref 135–145)
WBC # BLD: 4.07 K/UL — SIGNIFICANT CHANGE UP (ref 3.8–10.5)
WBC # FLD AUTO: 4.07 K/UL — SIGNIFICANT CHANGE UP (ref 3.8–10.5)

## 2021-11-21 PROCEDURE — 99291 CRITICAL CARE FIRST HOUR: CPT

## 2021-11-21 RX ORDER — ONDANSETRON 8 MG/1
8 TABLET, FILM COATED ORAL EVERY 8 HOURS
Refills: 0 | Status: COMPLETED | OUTPATIENT
Start: 2021-11-21 | End: 2021-11-28

## 2021-11-21 RX ORDER — POTASSIUM CHLORIDE 20 MEQ
20 PACKET (EA) ORAL
Refills: 0 | Status: COMPLETED | OUTPATIENT
Start: 2021-11-21 | End: 2021-11-21

## 2021-11-21 RX ORDER — APREPITANT 80 MG/1
80 CAPSULE ORAL DAILY
Refills: 0 | Status: COMPLETED | OUTPATIENT
Start: 2021-11-21 | End: 2021-11-23

## 2021-11-21 RX ADMIN — URSODIOL 300 MILLIGRAM(S): 250 TABLET, FILM COATED ORAL at 17:57

## 2021-11-21 RX ADMIN — SIMETHICONE 80 MILLIGRAM(S): 80 TABLET, CHEWABLE ORAL at 21:55

## 2021-11-21 RX ADMIN — SIMETHICONE 80 MILLIGRAM(S): 80 TABLET, CHEWABLE ORAL at 14:06

## 2021-11-21 RX ADMIN — Medication 5 MILLILITER(S): at 16:11

## 2021-11-21 RX ADMIN — Medication 5 MILLILITER(S): at 00:30

## 2021-11-21 RX ADMIN — Medication 5880 MICROGRAM(S): at 16:10

## 2021-11-21 RX ADMIN — SIMETHICONE 80 MILLIGRAM(S): 80 TABLET, CHEWABLE ORAL at 05:33

## 2021-11-21 RX ADMIN — Medication 5 MILLILITER(S): at 12:00

## 2021-11-21 RX ADMIN — Medication 50 MILLIEQUIVALENT(S): at 14:05

## 2021-11-21 RX ADMIN — Medication 1 LOZENGE: at 08:08

## 2021-11-21 RX ADMIN — Medication 10 MILLILITER(S): at 11:59

## 2021-11-21 RX ADMIN — Medication 480 MICROGRAM(S): at 16:10

## 2021-11-21 RX ADMIN — URSODIOL 300 MILLIGRAM(S): 250 TABLET, FILM COATED ORAL at 05:33

## 2021-11-21 RX ADMIN — HEPARIN SODIUM 4.1 UNIT(S)/HR: 5000 INJECTION INTRAVENOUS; SUBCUTANEOUS at 17:56

## 2021-11-21 RX ADMIN — Medication 1 TABLET(S): at 11:58

## 2021-11-21 RX ADMIN — PANTOPRAZOLE SODIUM 40 MILLIGRAM(S): 20 TABLET, DELAYED RELEASE ORAL at 05:33

## 2021-11-21 RX ADMIN — Medication 10 MILLILITER(S): at 20:29

## 2021-11-21 RX ADMIN — Medication 1 LOZENGE: at 00:30

## 2021-11-21 RX ADMIN — Medication 10 MILLILITER(S): at 00:30

## 2021-11-21 RX ADMIN — Medication 1 APPLICATION(S): at 05:32

## 2021-11-21 RX ADMIN — Medication 1 MILLIGRAM(S): at 11:59

## 2021-11-21 RX ADMIN — Medication 50 MILLIEQUIVALENT(S): at 11:58

## 2021-11-21 RX ADMIN — APREPITANT 80 MILLIGRAM(S): 80 CAPSULE ORAL at 12:01

## 2021-11-21 RX ADMIN — Medication 1 LOZENGE: at 20:29

## 2021-11-21 RX ADMIN — Medication 5 MILLILITER(S): at 20:32

## 2021-11-21 RX ADMIN — Medication 400 MILLIGRAM(S): at 14:06

## 2021-11-21 RX ADMIN — Medication 400 MILLIGRAM(S): at 05:33

## 2021-11-21 RX ADMIN — Medication 1 APPLICATION(S): at 17:56

## 2021-11-21 RX ADMIN — Medication 400 MILLIGRAM(S): at 21:55

## 2021-11-21 RX ADMIN — Medication 10 MILLILITER(S): at 08:08

## 2021-11-21 RX ADMIN — Medication 1 LOZENGE: at 16:11

## 2021-11-21 RX ADMIN — Medication 50 MILLIEQUIVALENT(S): at 16:10

## 2021-11-21 RX ADMIN — SODIUM CHLORIDE 20 MILLILITER(S): 9 INJECTION INTRAMUSCULAR; INTRAVENOUS; SUBCUTANEOUS at 05:31

## 2021-11-21 RX ADMIN — Medication 10 MILLILITER(S): at 16:11

## 2021-11-21 RX ADMIN — Medication 650 MILLIGRAM(S): at 12:06

## 2021-11-21 RX ADMIN — Medication 5 MILLILITER(S): at 08:08

## 2021-11-21 RX ADMIN — Medication 1 APPLICATION(S): at 17:57

## 2021-11-21 RX ADMIN — Medication 650 MILLIGRAM(S): at 12:35

## 2021-11-21 RX ADMIN — Medication 1 LOZENGE: at 12:00

## 2021-11-21 RX ADMIN — FLUCONAZOLE 400 MILLIGRAM(S): 150 TABLET ORAL at 11:59

## 2021-11-21 RX ADMIN — ONDANSETRON 8 MILLIGRAM(S): 8 TABLET, FILM COATED ORAL at 21:54

## 2021-11-21 RX ADMIN — SODIUM CHLORIDE 20 MILLILITER(S): 9 INJECTION INTRAMUSCULAR; INTRAVENOUS; SUBCUTANEOUS at 18:05

## 2021-11-21 RX ADMIN — Medication 1 APPLICATION(S): at 06:18

## 2021-11-21 RX ADMIN — ONDANSETRON 8 MILLIGRAM(S): 8 TABLET, FILM COATED ORAL at 14:05

## 2021-11-21 RX ADMIN — CHLORHEXIDINE GLUCONATE 1 APPLICATION(S): 213 SOLUTION TOPICAL at 08:08

## 2021-11-21 NOTE — PROGRESS NOTE ADULT - ATTENDING COMMENTS
66 year old male with recently diagnosed plasma cell myeloma status post RVD x 6, admitted for autologous pbsct with high dose melphalan prep regimen.  Day 0 ..pt had vasovagal episode this am...not eating well....  Days - 4 & -3 : 3 hours prior to Melphalan start hydration: D5NS at 200ml /hr and continue 24 hours post Melphalan infusion;   Melphalan 100mg/m2  IV   Strict I&O, daily weights, prn diuresis ...hold diuresis for now...tele for 24 hrs  Day -2, day -1 rest days. At 2200 on day – 1, start transplant hydration 1/2NS + 50mEq NaHCO3- (may substitute [id number] if bicarb not available)  Day 0 – infuse HPC product. 4 hours after infusion of cells start Zarxio 5 micrograms / kg (actual weight) . Continue through engraftment.   On day 0, + 1, + 2-give Kepivance 60micrograms / kg (actual weight).  VOD prophylaxis - low dose heparin gtt (dosed at 100 units / kg / day), glutamine supplementation, Actigall BID   PCP prophylaxis - Bactrim DS through day -2    Antiviral prophylaxis - Acyclovir 400mg po TID to start day -1   Antifungal prophylaxis- Diflucan 400 mg po daily.  GI prophylaxis - Protonix po QD   Antibacterial prophylaxis - when ANC < 1000, start Cipro 500mg po BID. If becomes febrile, pan cx, CXR and change Cipro to Cefepime 2g IV q 8 hours. Continue until count recovery  Mouth care and skin care as per protocol. 66 year old male with recently diagnosed plasma cell myeloma status post RVD x 6, admitted for autologous pbsct with high dose melphalan prep regimen.  Day 0(11/19) ..pt had vasovagal episode this am...not eating well....  Days - 4 & -3 : 3 hours prior to Melphalan start hydration: D5NS at 200ml /hr and continue 24 hours post Melphalan infusion;   Melphalan 100mg/m2  IV   Strict I&O, daily weights, prn diuresis ...hold diuresis for now...tele for 24 hrs  Day -2, day -1 rest days. At 2200 on day – 1, start transplant hydration 1/2NS + 50mEq NaHCO3- (may substitute [id number] if bicarb not available)  Day 0 – infuse HPC product. 4 hours after infusion of cells start Zarxio 5 micrograms / kg (actual weight) . Continue through engraftment.   On day 0, + 1, + 2-give Kepivance 60micrograms / kg (actual weight).  VOD prophylaxis - low dose heparin gtt (dosed at 100 units / kg / day), glutamine supplementation, Actigall BID   PCP prophylaxis - Bactrim DS through day -2    Antiviral prophylaxis - Acyclovir 400mg po TID to start day -1   Antifungal prophylaxis- Diflucan 400 mg po daily.  GI prophylaxis - Protonix po QD   Antibacterial prophylaxis - when ANC < 1000, start Cipro 500mg po BID. If becomes febrile, pan cx, CXR and change Cipro to Cefepime 2g IV q 8 hours. Continue until count recovery  Mouth care and skin care as per protocol.    11/20- persistent abd pain > RLQ, was constipated on bowel regimen. Had 2 episodes diarrhea this morning. Monitor abd exam, will order CT A/P if pain worsens. 66 year old male with recently diagnosed plasma cell myeloma status post RVD x 6, admitted for autologous pbsct with high dose melphalan prep regimen.  Day 0(11/19) ..pt had vasovagal episode this am...not eating well....  Days - 4 & -3 : 3 hours prior to Melphalan start hydration: D5NS at 200ml /hr and continue 24 hours post Melphalan infusion;   Melphalan 100mg/m2  IV   Strict I&O, daily weights, prn diuresis ...hold diuresis for now...tele for 24 hrs  Day -2, day -1 rest days. At 2200 on day – 1, start transplant hydration 1/2NS + 50mEq NaHCO3- (may substitute [id number] if bicarb not available)  Day 0 – infuse HPC product. 4 hours after infusion of cells start Zarxio 5 micrograms / kg (actual weight) . Continue through engraftment.   On day 0, + 1, + 2-give Kepivance 60micrograms / kg (actual weight).  VOD prophylaxis - low dose heparin gtt (dosed at 100 units / kg / day), glutamine supplementation, Actigall BID   PCP prophylaxis - Bactrim DS through day -2    Antiviral prophylaxis - Acyclovir 400mg po TID to start day -1   Antifungal prophylaxis- Diflucan 400 mg po daily.  GI prophylaxis - Protonix po QD   Antibacterial prophylaxis - when ANC < 500, start Cipro 500mg po BID. If becomes febrile, pan cx, CXR and change Cipro to Cefepime 2g IV q 8 hours. Continue until count recovery  Mouth care and skin care as per protocol.    11/20- persistent abd pain > RLQ, was constipated on bowel regimen. Had 2 episodes diarrhea this morning. Monitor abd exam, will order CT A/P if pain worsens.

## 2021-11-21 NOTE — PROGRESS NOTE ADULT - SUBJECTIVE AND OBJECTIVE BOX
HPC Transplant Team                                                      Critical / Counseling Time Provided: 30 minutes                                                                                                                                                        Chief Complaint: Autologous peripheral blood stem cell transplant with high dose Melphalan prep regimen for treatment of multiple myeloma    S: Patient seen and examined with \[facility]\"" Transplant Team:   + abdominal pain  + previously constipated, now x2 watery diarrhea  + gas pains, RLQ  + rash (kepivance)    O: Vitals:   Vital Signs Last 24 Hrs  T(C): 36.9 (2021 05:09), Max: 37.4 (2021 13:00)  T(F): 98.5 (2021 05:09), Max: 99.4 (2021 13:00)  HR: 85 (2021 05:09) (77 - 87)  BP: 129/78 (2021 05:09) (128/71 - 150/86)  BP(mean): --  RR: 16 (2021 05:09) (16 - 18)  SpO2: 97% (2021 05:09) (95% - 98%)    Admit weight: 98.4 kg  Daily     Daily Weight in k.2 (2021 11:20)    Intake / Output:    @ 07:01  -   @ 07:00  --------------------------------------------------------  IN: 2515.8 mL / OUT: 3150 mL / NET: -634.2 mL    PE:   Oropharynx: no erythema or ulcerations   Oral Mucositis:              -                                          Grade: n/a   CVS: S1, S2 RRR   Lungs: CTA throughout bilaterally   Abdomen: + BS x 4, soft, ND +mild TTP RLQ  Extremities: no edema   Gastric Mucositis:       -                                           Grade: n/a  Intestinal Mucositis:     -                                         Grade: n/a   Skin: patchy, erythematous macular rash to face, neck and upper chest post kepivance   TLC: CDI   Neuro: A&O x 3   Pain: 0        Labs:   CBC Full  -  ( 2021 06:38 )  WBC Count : 4.07 K/uL  Hemoglobin : 10.8 g/dL  Hematocrit : 33.3 %  Platelet Count - Automated : 105 K/uL  Mean Cell Volume : 95.7 fl  Mean Cell Hemoglobin : 31.0 pg  Mean Cell Hemoglobin Concentration : 32.4 gm/dL  Auto Neutrophil # : x  Auto Lymphocyte # : x  Auto Monocyte # : x  Auto Eosinophil # : x  Auto Basophil # : x  Auto Neutrophil % : x  Auto Lymphocyte % : x  Auto Monocyte % : x  Auto Eosinophil % : x  Auto Basophil % : x                          10.8   4.07  )-----------( 105      ( 2021 06:38 )             33.3         137  |  102  |  19  ----------------------------<  127<H>  3.5   |  25  |  0.87    Ca    7.8<L>      2021 06:38  Phos  2.9       Mg     2.0         TPro  5.4<L>  /  Alb  3.3  /  TBili  0.6  /  DBili  x   /  AST  7<L>  /  ALT  7<L>  /  AlkPhos  68        LIVER FUNCTIONS - ( 2021 06:38 )  Alb: 3.3 g/dL / Pro: 5.4 g/dL / ALK PHOS: 68 U/L / ALT: 7 U/L / AST: 7 U/L / GGT: x           Lactate Dehydrogenase, Serum: 161 U/L ( @ 06:38)        Meds:   Antimicrobials:   acyclovir   Oral Tab/Cap 400 milliGRAM(s) Oral every 8 hours  clotrimazole Lozenge 1 Lozenge Oral five times a day  fluconAZOLE   Tablet 400 milliGRAM(s) Oral daily      Heme / Onc:   heparin  Infusion 410 Unit(s)/Hr IV Continuous <Continuous>      GI:  pantoprazole    Tablet 40 milliGRAM(s) Oral before breakfast  polyethylene glycol 3350 17 Gram(s) Oral daily PRN  senna 1 Tablet(s) Oral at bedtime PRN  simethicone 80 milliGRAM(s) Chew every 8 hours  sodium bicarbonate Mouth Rinse 10 milliLiter(s) Swish and Spit five times a day  ursodiol Capsule 300 milliGRAM(s) Oral two times a day      Cardiovascular:   furosemide   Injectable 20 milliGRAM(s) IV Push every 24 hours PRN      Immunologic:   filgrastim-sndz (ZARXIO) Injectable 480 MICROGram(s) SubCutaneous every 24 hours      Other medications:   acetaminophen     Tablet .. 650 milliGRAM(s) Oral every 6 hours  BACItracin   Ointment 1 Application(s) Topical two times a day  Biotene Dry Mouth Oral Rinse 5 milliLiter(s) Swish and Spit five times a day  chlorhexidine 4% Liquid 1 Application(s) Topical <User Schedule>  folic acid 1 milliGRAM(s) Oral daily  hydrocortisone 1% Cream 1 Application(s) Topical two times a day  lidocaine/prilocaine Cream 1 Application(s) Topical daily  LORazepam   Injectable 1 milliGRAM(s) IV Push every 24 hours  multivitamin 1 Tablet(s) Oral daily  palifermin Injectable  (eMAR) 5880 MICROGram(s) IV Push every 24 hours  sodium chloride 0.9%. 1000 milliLiter(s) IV Continuous <Continuous>      PRN:   acetaminophen     Tablet .. 650 milliGRAM(s) Oral every 6 hours PRN  furosemide   Injectable 20 milliGRAM(s) IV Push every 24 hours PRN  LORazepam   Injectable 1 milliGRAM(s) IV Push every 6 hours PRN  metoclopramide Injectable 10 milliGRAM(s) IV Push every 6 hours PRN  polyethylene glycol 3350 17 Gram(s) Oral daily PRN  senna 1 Tablet(s) Oral at bedtime PRN  sodium chloride 0.9% lock flush 10 milliLiter(s) IV Push every 1 hour PRN      A/P:   66 year old male with a history of multiple myeloma s/p RVD x 6   Post Autologous PBSCT day +2  - melphalan ; s/p melphalan hydration for 24 hours post infusion of last dose   Strict I&O, daily weights, prn diuresis   - rest day   - rest day   - s/p HPC transplant; completed transplant hydration for 24 hours post infusion of cells. Suprapubic pain in am. UA pending, follow up culture, BK virus. AXR   -vasovegal episode in AM: will monitor tele for 24hrs    d/c telemetry. Lasix 40mg IV x today, follow up I&Os, daily weight. monitor rash, receiving 2nd dose Kepivance today    1. Infectious Disease:   acyclovir   Oral Tab/Cap 400 milliGRAM(s) Oral every 8 hours  clotrimazole Lozenge 1 Lozenge Oral five times a day  fluconAZOLE   Tablet 400 milliGRAM(s) Oral daily    2. VOD Prophylaxis: Actigall, Glutamine, Heparin (dosed at 100 units / kg / day)     3. GI Prophylaxis:   pantoprazole    Tablet 40 milliGRAM(s) Oral before breakfast    4. Mouthcare - NS / NaHCO3 rinses, Mycelex, Biotene; Skin care     5. GVHD prophylaxis - n/a     6. Transfuse & replete electrolytes prn   potassium phosphate IVPB 15 milliMole(s) IV Intermittent once  KCl 20mEq IV x 3     7. IV hydration, daily weights, strict I&O, prn diuresis   Lasix 40mg IV x 1 0900    8. PO intake as tolerated, nutrition follow up as needed, MVI, folic acid     9. Antiemetics, anti-diarrhea medications:   LORazepam   Injectable 1 milliGRAM(s) IV Push every 6 hours PRN  metoclopramide Injectable 10 milliGRAM(s) IV Push every 6 hours PRN  aprepitant 80 milliGRAM(s) Oral every 24 hours    10. OOB as tolerated, physical therapy consult if needed     11. Monitor coags / fibrinogen 2x week, vitamin K as needed     12. Monitor closely for clinical changes, monitor for fevers     13. Emotional support provided, plan of care discussed and questions addressed     14. Patient education done regarding chemotherapy prep, plan of care, restrictions and discharge planning     15. Continue regular social work input     I have written the above note for Dr. Sorenson who performed service with me in the room.   Ashley Pappas NP ([phone])    I have seen and examined patient with PA, I agree with above note as scribed.      HPC Transplant Team                                                      Critical / Counseling Time Provided: 30 minutes                                                                                                                                                        Chief Complaint: Autologous peripheral blood stem cell transplant with high dose Melphalan prep regimen for treatment of multiple myeloma    S: Patient seen and examined with [facility] Transplant Team:   + abdominal pressure RLQ  + intermittent nausea  watery diarrhea x2.   + rash (kepivance)    O: Vitals:   Vital Signs Last 24 Hrs  T(C): 36.9 (2021 05:09), Max: 37.4 (2021 13:00)  T(F): 98.5 (2021 05:09), Max: 99.4 (2021 13:00)  HR: 85 (2021 05:09) (77 - 87)  BP: 129/78 (2021 05:09) (128/71 - 150/86)  BP(mean): --  RR: 16 (2021 05:09) (16 - 18)  SpO2: 97% (2021 05:09) (95% - 98%)    Admit weight: 98.4 kg  Daily     Daily Weight in k.2 (2021 11:20)    Intake / Output:    @ 07:01  -   @ 07:00  --------------------------------------------------------  IN: 2515.8 mL / OUT: 3150 mL / NET: -634.2 mL    PE:   Oropharynx: no erythema or ulcerations   Oral Mucositis:              -                                          Grade: n/a   CVS: S1, S2 RRR   Lungs: CTA throughout bilaterally   Abdomen: + BS x 4, soft, ND mild tenderness with palpation RLQ.   Extremities: no edema   Gastric Mucositis:       -                                           Grade: n/a  Intestinal Mucositis:     -                                         Grade: n/a   Skin: patchy, erythematous macular rash to face, neck and upper chest post kepivance   TLC: CDI   Neuro: A&O x 3   Pain: 2/10 right side abd.         Labs:   CBC Full  -  ( 2021 06:38 )  WBC Count : 4.07 K/uL  Hemoglobin : 10.8 g/dL  Hematocrit : 33.3 %  Platelet Count - Automated : 105 K/uL  Mean Cell Volume : 95.7 fl  Mean Cell Hemoglobin : 31.0 pg  Mean Cell Hemoglobin Concentration : 32.4 gm/dL  Auto Neutrophil # : x  Auto Lymphocyte # : x  Auto Monocyte # : x  Auto Eosinophil # : x  Auto Basophil # : x  Auto Neutrophil % : x  Auto Lymphocyte % : x  Auto Monocyte % : x  Auto Eosinophil % : x  Auto Basophil % : x                          10.8   4.07  )-----------( 105      ( 2021 06:38 )             33.3         137  |  102  |  19  ----------------------------<  127<H>  3.5   |  25  |  0.87    Ca    7.8<L>      2021 06:38  Phos  2.9       Mg     2.0         TPro  5.4<L>  /  Alb  3.3  /  TBili  0.6  /  DBili  x   /  AST  7<L>  /  ALT  7<L>  /  AlkPhos  68        LIVER FUNCTIONS - ( 2021 06:38 )  Alb: 3.3 g/dL / Pro: 5.4 g/dL / ALK PHOS: 68 U/L / ALT: 7 U/L / AST: 7 U/L / GGT: x           Lactate Dehydrogenase, Serum: 161 U/L ( @ 06:38)        Meds:   Antimicrobials:   acyclovir   Oral Tab/Cap 400 milliGRAM(s) Oral every 8 hours  clotrimazole Lozenge 1 Lozenge Oral five times a day  fluconAZOLE   Tablet 400 milliGRAM(s) Oral daily      Heme / Onc:   heparin  Infusion 410 Unit(s)/Hr IV Continuous <Continuous>      GI:  pantoprazole    Tablet 40 milliGRAM(s) Oral before breakfast  polyethylene glycol 3350 17 Gram(s) Oral daily PRN  senna 1 Tablet(s) Oral at bedtime PRN  simethicone 80 milliGRAM(s) Chew every 8 hours  sodium bicarbonate Mouth Rinse 10 milliLiter(s) Swish and Spit five times a day  ursodiol Capsule 300 milliGRAM(s) Oral two times a day      Cardiovascular:   furosemide   Injectable 20 milliGRAM(s) IV Push every 24 hours PRN      Immunologic:   filgrastim-sndz (ZARXIO) Injectable 480 MICROGram(s) SubCutaneous every 24 hours      Other medications:   acetaminophen     Tablet .. 650 milliGRAM(s) Oral every 6 hours  BACItracin   Ointment 1 Application(s) Topical two times a day  Biotene Dry Mouth Oral Rinse 5 milliLiter(s) Swish and Spit five times a day  chlorhexidine 4% Liquid 1 Application(s) Topical <User Schedule>  folic acid 1 milliGRAM(s) Oral daily  hydrocortisone 1% Cream 1 Application(s) Topical two times a day  lidocaine/prilocaine Cream 1 Application(s) Topical daily  LORazepam   Injectable 1 milliGRAM(s) IV Push every 24 hours  multivitamin 1 Tablet(s) Oral daily  palifermin Injectable  (eMAR) 5880 MICROGram(s) IV Push every 24 hours  sodium chloride 0.9%. 1000 milliLiter(s) IV Continuous <Continuous>      PRN:   acetaminophen     Tablet .. 650 milliGRAM(s) Oral every 6 hours PRN  furosemide   Injectable 20 milliGRAM(s) IV Push every 24 hours PRN  LORazepam   Injectable 1 milliGRAM(s) IV Push every 6 hours PRN  metoclopramide Injectable 10 milliGRAM(s) IV Push every 6 hours PRN  polyethylene glycol 3350 17 Gram(s) Oral daily PRN  senna 1 Tablet(s) Oral at bedtime PRN  sodium chloride 0.9% lock flush 10 milliLiter(s) IV Push every 1 hour PRN      A/P:   66 year old male with a history of multiple myeloma s/p RVD x 6   Post Autologous PBSCT day +2  - melphalan ; s/p melphalan hydration for 24 hours post infusion of last dose   Strict I&O, daily weights, prn diuresis   - rest day   - rest day   - s/p HPC transplant; completed transplant hydration for 24 hours post infusion of cells. Suprapubic pain in am. UA pending, follow up culture, BK virus. AXR   -vasovegal episode in AM: will monitor tele for 24hrs    d/c telemetry. Lasix 40mg IV x today, follow up I&Os, daily weight. monitor rash, receiving 2nd dose Kepivance today   add Emend for 3 days and change zofran ATC. If worsening abd pain consider CT a/p oral contrast only.       1. Infectious Disease:   acyclovir   Oral Tab/Cap 400 milliGRAM(s) Oral every 8 hours  clotrimazole Lozenge 1 Lozenge Oral five times a day  fluconAZOLE   Tablet 400 milliGRAM(s) Oral daily  Add Cipro when ANC <1000.     2. VOD Prophylaxis: Actigall, Glutamine, Heparin (dosed at 100 units / kg / day)     3. GI Prophylaxis:   pantoprazole    Tablet 40 milliGRAM(s) Oral before breakfast    4. Mouthcare - NS / NaHCO3 rinses, Mycelex, Biotene; Skin care     5. GVHD prophylaxis - n/a     6. Transfuse & replete electrolytes prn   hypokalemia-replace K    7. IV hydration, daily weights, strict I&O, prn diuresis       8. PO intake as tolerated, nutrition follow up as needed, MVI, folic acid     9. Antiemetics, anti-diarrhea medications:   LORazepam   Injectable 1 milliGRAM(s) IV Push every 6 hours PRN  metoclopramide Injectable 10 milliGRAM(s) IV Push every 6 hours PRN  aprepitant 80 milliGRAM(s) Oral every 24 hours    10. OOB as tolerated, physical therapy consult if needed     11. Monitor coags / fibrinogen 2x week, vitamin K as needed     12. Monitor closely for clinical changes, monitor for fevers     13. Emotional support provided, plan of care discussed and questions addressed     14. Patient education done regarding chemotherapy prep, plan of care, restrictions and discharge planning     15. Continue regular social work input     I have written the above note for Dr. Sorenson who performed service with me in the room.   Ashley Pappas NP ([phone])    I have seen and examined patient with PA, I agree with above note as scribed.      HPC Transplant Team                                                      Critical / Counseling Time Provided: 30 minutes                                                                                                                                                        Chief Complaint: Autologous peripheral blood stem cell transplant with high dose Melphalan prep regimen for treatment of multiple myeloma    S: Patient seen and examined with [facility] Transplant Team:   + abdominal pressure RLQ  + intermittent nausea  watery diarrhea x2.   + rash (kepivance)    O: Vitals:   Vital Signs Last 24 Hrs  T(C): 36.9 (2021 05:09), Max: 37.4 (2021 13:00)  T(F): 98.5 (2021 05:09), Max: 99.4 (2021 13:00)  HR: 85 (2021 05:09) (77 - 87)  BP: 129/78 (2021 05:09) (128/71 - 150/86)  BP(mean): --  RR: 16 (2021 05:09) (16 - 18)  SpO2: 97% (2021 05:09) (95% - 98%)    Admit weight: 98.4 kg  Daily   96.9kg  Daily Weight in k.2 (2021 11:20)    Intake / Output:    @ 07:01  -   @ 07:00  --------------------------------------------------------  IN: 2515.8 mL / OUT: 3150 mL / NET: -634.2 mL    PE:   Oropharynx: no erythema or ulcerations   Oral Mucositis:              -                                          Grade: n/a   CVS: S1, S2 RRR   Lungs: CTA throughout bilaterally   Abdomen: + BS x 4, soft, ND mild tenderness with palpation RLQ.   Extremities: no edema   Gastric Mucositis:       -                                           Grade: n/a  Intestinal Mucositis:     -                                         Grade: n/a   Skin: patchy, erythematous macular rash to face, neck and upper chest post kepivance   TLC: CDI   Neuro: A&O x 3   Pain: 2/10 right side abd.         Labs:   CBC Full  -  ( 2021 06:38 )  WBC Count : 4.07 K/uL  Hemoglobin : 10.8 g/dL  Hematocrit : 33.3 %  Platelet Count - Automated : 105 K/uL  Mean Cell Volume : 95.7 fl  Mean Cell Hemoglobin : 31.0 pg  Mean Cell Hemoglobin Concentration : 32.4 gm/dL  Auto Neutrophil # : x  Auto Lymphocyte # : x  Auto Monocyte # : x  Auto Eosinophil # : x  Auto Basophil # : x  Auto Neutrophil % : x  Auto Lymphocyte % : x  Auto Monocyte % : x  Auto Eosinophil % : x  Auto Basophil % : x                          10.8   4.07  )-----------( 105      ( 2021 06:38 )             33.3         137  |  102  |  19  ----------------------------<  127<H>  3.5   |  25  |  0.87    Ca    7.8<L>      2021 06:38  Phos  2.9       Mg     2.0         TPro  5.4<L>  /  Alb  3.3  /  TBili  0.6  /  DBili  x   /  AST  7<L>  /  ALT  7<L>  /  AlkPhos  68        LIVER FUNCTIONS - ( 2021 06:38 )  Alb: 3.3 g/dL / Pro: 5.4 g/dL / ALK PHOS: 68 U/L / ALT: 7 U/L / AST: 7 U/L / GGT: x           Lactate Dehydrogenase, Serum: 161 U/L ( @ 06:38)        Meds:   Antimicrobials:   acyclovir   Oral Tab/Cap 400 milliGRAM(s) Oral every 8 hours  clotrimazole Lozenge 1 Lozenge Oral five times a day  fluconAZOLE   Tablet 400 milliGRAM(s) Oral daily      Heme / Onc:   heparin  Infusion 410 Unit(s)/Hr IV Continuous <Continuous>      GI:  pantoprazole    Tablet 40 milliGRAM(s) Oral before breakfast  polyethylene glycol 3350 17 Gram(s) Oral daily PRN  senna 1 Tablet(s) Oral at bedtime PRN  simethicone 80 milliGRAM(s) Chew every 8 hours  sodium bicarbonate Mouth Rinse 10 milliLiter(s) Swish and Spit five times a day  ursodiol Capsule 300 milliGRAM(s) Oral two times a day      Cardiovascular:   furosemide   Injectable 20 milliGRAM(s) IV Push every 24 hours PRN      Immunologic:   filgrastim-sndz (ZARXIO) Injectable 480 MICROGram(s) SubCutaneous every 24 hours      Other medications:   acetaminophen     Tablet .. 650 milliGRAM(s) Oral every 6 hours  BACItracin   Ointment 1 Application(s) Topical two times a day  Biotene Dry Mouth Oral Rinse 5 milliLiter(s) Swish and Spit five times a day  chlorhexidine 4% Liquid 1 Application(s) Topical <User Schedule>  folic acid 1 milliGRAM(s) Oral daily  hydrocortisone 1% Cream 1 Application(s) Topical two times a day  lidocaine/prilocaine Cream 1 Application(s) Topical daily  LORazepam   Injectable 1 milliGRAM(s) IV Push every 24 hours  multivitamin 1 Tablet(s) Oral daily  palifermin Injectable  (eMAR) 5880 MICROGram(s) IV Push every 24 hours  sodium chloride 0.9%. 1000 milliLiter(s) IV Continuous <Continuous>      PRN:   acetaminophen     Tablet .. 650 milliGRAM(s) Oral every 6 hours PRN  furosemide   Injectable 20 milliGRAM(s) IV Push every 24 hours PRN  LORazepam   Injectable 1 milliGRAM(s) IV Push every 6 hours PRN  metoclopramide Injectable 10 milliGRAM(s) IV Push every 6 hours PRN  polyethylene glycol 3350 17 Gram(s) Oral daily PRN  senna 1 Tablet(s) Oral at bedtime PRN  sodium chloride 0.9% lock flush 10 milliLiter(s) IV Push every 1 hour PRN      A/P:   66 year old male with a history of multiple myeloma s/p RVD x 6   Post Autologous PBSCT day +2  - melphalan ; s/p melphalan hydration for 24 hours post infusion of last dose   Strict I&O, daily weights, prn diuresis   - rest day   - rest day   - s/p HPC transplant; completed transplant hydration for 24 hours post infusion of cells. Suprapubic pain in am. UA pending, follow up culture, BK virus. AXR   -vasovegal episode in AM: will monitor tele for 24hrs    d/c telemetry. Lasix 40mg IV x today, follow up I&Os, daily weight. monitor rash, receiving 2nd dose Kepivance today   add Emend for 3 days and change zofran ATC. If worsening abd pain consider CT a/p oral contrast only.       1. Infectious Disease:   acyclovir   Oral Tab/Cap 400 milliGRAM(s) Oral every 8 hours  clotrimazole Lozenge 1 Lozenge Oral five times a day  fluconAZOLE   Tablet 400 milliGRAM(s) Oral daily  Add Cipro when ANC <1000.     2. VOD Prophylaxis: Actigall, Glutamine, Heparin (dosed at 100 units / kg / day)     3. GI Prophylaxis:   pantoprazole    Tablet 40 milliGRAM(s) Oral before breakfast    4. Mouthcare - NS / NaHCO3 rinses, Mycelex, Biotene; Skin care     5. GVHD prophylaxis - n/a     6. Transfuse & replete electrolytes prn   hypokalemia-replace K    7. IV hydration, daily weights, strict I&O, prn diuresis       8. PO intake as tolerated, nutrition follow up as needed, MVI, folic acid     9. Antiemetics, anti-diarrhea medications:   LORazepam   Injectable 1 milliGRAM(s) IV Push every 6 hours PRN  metoclopramide Injectable 10 milliGRAM(s) IV Push every 6 hours PRN  aprepitant 80 milliGRAM(s) Oral every 24 hours    10. OOB as tolerated, physical therapy consult if needed     11. Monitor coags / fibrinogen 2x week, vitamin K as needed     12. Monitor closely for clinical changes, monitor for fevers     13. Emotional support provided, plan of care discussed and questions addressed     14. Patient education done regarding chemotherapy prep, plan of care, restrictions and discharge planning     15. Continue regular social work input     I have written the above note for Dr. Sorenson who performed service with me in the room.   Ashley Pappas NP ([phone])    I have seen and examined patient with PA, I agree with above note as scribed.      HPC Transplant Team                                                      Critical / Counseling Time Provided: 30 minutes                                                                                                                                                        Chief Complaint: Autologous peripheral blood stem cell transplant with high dose Melphalan prep regimen for treatment of multiple myeloma    S: Patient seen and examined with \[facility]\"" Transplant Team:   + abdominal pressure RLQ  + intermittent nausea  watery diarrhea x2.   + rash (kepivance)    O: Vitals:   Vital Signs Last 24 Hrs  T(C): 36.9 (2021 05:09), Max: 37.4 (2021 13:00)  T(F): 98.5 (2021 05:09), Max: 99.4 (2021 13:00)  HR: 85 (2021 05:09) (77 - 87)  BP: 129/78 (2021 05:09) (128/71 - 150/86)  BP(mean): --  RR: 16 (2021 05:09) (16 - 18)  SpO2: 97% (2021 05:09) (95% - 98%)    Admit weight: 98.4 kg  Daily   96.9kg  Daily Weight in k.2 (2021 11:20)    Intake / Output:    @ 07:01  -   @ 07:00  --------------------------------------------------------  IN: 2515.8 mL / OUT: 3150 mL / NET: -634.2 mL    PE:   Oropharynx: no erythema or ulcerations   Oral Mucositis:              -                                          Grade: n/a   CVS: S1, S2 RRR   Lungs: CTA throughout bilaterally   Abdomen: + BS x 4, soft, ND mild tenderness with palpation RLQ.   Extremities: no edema   Gastric Mucositis:       -                                           Grade: n/a  Intestinal Mucositis:     -                                         Grade: n/a   Skin: patchy, erythematous maculopapular rash to face, neck and upper chest and back post Kepivance   TLC: CDI   Neuro: A&O x 3   Pain: 2/10 right side abd.         Labs:   CBC Full  -  ( 2021 06:38 )  WBC Count : 4.07 K/uL  Hemoglobin : 10.8 g/dL  Hematocrit : 33.3 %  Platelet Count - Automated : 105 K/uL  Mean Cell Volume : 95.7 fl  Mean Cell Hemoglobin : 31.0 pg  Mean Cell Hemoglobin Concentration : 32.4 gm/dL  Auto Neutrophil # : x  Auto Lymphocyte # : x  Auto Monocyte # : x  Auto Eosinophil # : x  Auto Basophil # : x  Auto Neutrophil % : x  Auto Lymphocyte % : x  Auto Monocyte % : x  Auto Eosinophil % : x  Auto Basophil % : x                          10.8   4.07  )-----------( 105      ( 2021 06:38 )             33.3         137  |  102  |  19  ----------------------------<  127<H>  3.5   |  25  |  0.87    Ca    7.8<L>      2021 06:38  Phos  2.9       Mg     2.0         TPro  5.4<L>  /  Alb  3.3  /  TBili  0.6  /  DBili  x   /  AST  7<L>  /  ALT  7<L>  /  AlkPhos  68        LIVER FUNCTIONS - ( 2021 06:38 )  Alb: 3.3 g/dL / Pro: 5.4 g/dL / ALK PHOS: 68 U/L / ALT: 7 U/L / AST: 7 U/L / GGT: x           Lactate Dehydrogenase, Serum: 161 U/L ( @ 06:38)        Meds:   Antimicrobials:   acyclovir   Oral Tab/Cap 400 milliGRAM(s) Oral every 8 hours  clotrimazole Lozenge 1 Lozenge Oral five times a day  fluconAZOLE   Tablet 400 milliGRAM(s) Oral daily      Heme / Onc:   heparin  Infusion 410 Unit(s)/Hr IV Continuous <Continuous>      GI:  pantoprazole    Tablet 40 milliGRAM(s) Oral before breakfast  polyethylene glycol 3350 17 Gram(s) Oral daily PRN  senna 1 Tablet(s) Oral at bedtime PRN  simethicone 80 milliGRAM(s) Chew every 8 hours  sodium bicarbonate Mouth Rinse 10 milliLiter(s) Swish and Spit five times a day  ursodiol Capsule 300 milliGRAM(s) Oral two times a day      Cardiovascular:   furosemide   Injectable 20 milliGRAM(s) IV Push every 24 hours PRN      Immunologic:   filgrastim-sndz (ZARXIO) Injectable 480 MICROGram(s) SubCutaneous every 24 hours      Other medications:   acetaminophen     Tablet .. 650 milliGRAM(s) Oral every 6 hours  BACItracin   Ointment 1 Application(s) Topical two times a day  Biotene Dry Mouth Oral Rinse 5 milliLiter(s) Swish and Spit five times a day  chlorhexidine 4% Liquid 1 Application(s) Topical <User Schedule>  folic acid 1 milliGRAM(s) Oral daily  hydrocortisone 1% Cream 1 Application(s) Topical two times a day  lidocaine/prilocaine Cream 1 Application(s) Topical daily  LORazepam   Injectable 1 milliGRAM(s) IV Push every 24 hours  multivitamin 1 Tablet(s) Oral daily  palifermin Injectable  (eMAR) 5880 MICROGram(s) IV Push every 24 hours  sodium chloride 0.9%. 1000 milliLiter(s) IV Continuous <Continuous>      PRN:   acetaminophen     Tablet .. 650 milliGRAM(s) Oral every 6 hours PRN  furosemide   Injectable 20 milliGRAM(s) IV Push every 24 hours PRN  LORazepam   Injectable 1 milliGRAM(s) IV Push every 6 hours PRN  metoclopramide Injectable 10 milliGRAM(s) IV Push every 6 hours PRN  polyethylene glycol 3350 17 Gram(s) Oral daily PRN  senna 1 Tablet(s) Oral at bedtime PRN  sodium chloride 0.9% lock flush 10 milliLiter(s) IV Push every 1 hour PRN      A/P:   66 year old male with a history of multiple myeloma s/p RVD x 6   Post Autologous PBSCT day +2  - melphalan ; s/p melphalan hydration for 24 hours post infusion of last dose   Strict I&O, daily weights, prn diuresis   - rest day   - rest day   - s/p HPC transplant; completed transplant hydration for 24 hours post infusion of cells. Suprapubic pain in am. UA pending, follow up culture, BK virus. AXR   -vasovegal episode in AM: will monitor tele for 24hrs    d/c telemetry. Lasix 40mg IV x today, follow up I&Os, daily weight. monitor rash, receiving 2nd dose Kepivance today   add Emend for 3 days and change zofran ATC. If worsening abd pain consider CT a/p oral contrast only.       1. Infectious Disease:   acyclovir   Oral Tab/Cap 400 milliGRAM(s) Oral every 8 hours  clotrimazole Lozenge 1 Lozenge Oral five times a day  fluconAZOLE   Tablet 400 milliGRAM(s) Oral daily  Add Cipro when ANC <1000.     2. VOD Prophylaxis: Actigall, Glutamine, Heparin (dosed at 100 units / kg / day)     3. GI Prophylaxis:   pantoprazole    Tablet 40 milliGRAM(s) Oral before breakfast    4. Mouthcare - NS / NaHCO3 rinses, Mycelex, Biotene; Skin care     5. GVHD prophylaxis - n/a     6. Transfuse & replete electrolytes prn   hypokalemia-replace K    7. IV hydration, daily weights, strict I&O, prn diuresis       8. PO intake as tolerated, nutrition follow up as needed, MVI, folic acid     9. Antiemetics, anti-diarrhea medications:   LORazepam   Injectable 1 milliGRAM(s) IV Push every 6 hours PRN  metoclopramide Injectable 10 milliGRAM(s) IV Push every 6 hours PRN  aprepitant 80 milliGRAM(s) Oral every 24 hours    10. OOB as tolerated, physical therapy consult if needed     11. Monitor coags / fibrinogen 2x week, vitamin K as needed     12. Monitor closely for clinical changes, monitor for fevers     13. Emotional support provided, plan of care discussed and questions addressed     14. Patient education done regarding chemotherapy prep, plan of care, restrictions and discharge planning     15. Continue regular social work input     I have written the above note for Dr. Sorenson who performed service with me in the room.   Ashley Pappas NP ([phone])    I have seen and examined patient with PA, I agree with above note as scribed.

## 2021-11-22 LAB
ALBUMIN SERPL ELPH-MCNC: 3.2 G/DL — LOW (ref 3.3–5)
ALP SERPL-CCNC: 69 U/L — SIGNIFICANT CHANGE UP (ref 40–120)
ALT FLD-CCNC: 6 U/L — LOW (ref 10–45)
ANION GAP SERPL CALC-SCNC: 10 MMOL/L — SIGNIFICANT CHANGE UP (ref 5–17)
AST SERPL-CCNC: 7 U/L — LOW (ref 10–40)
BILIRUB DIRECT SERPL-MCNC: 0.2 MG/DL — SIGNIFICANT CHANGE UP (ref 0–0.2)
BILIRUB INDIRECT FLD-MCNC: 0.4 MG/DL — SIGNIFICANT CHANGE UP (ref 0.2–1)
BILIRUB SERPL-MCNC: 0.6 MG/DL — SIGNIFICANT CHANGE UP (ref 0.2–1.2)
BLD GP AB SCN SERPL QL: NEGATIVE — SIGNIFICANT CHANGE UP
BUN SERPL-MCNC: 22 MG/DL — SIGNIFICANT CHANGE UP (ref 7–23)
CALCIUM SERPL-MCNC: 7.9 MG/DL — LOW (ref 8.4–10.5)
CHLORIDE SERPL-SCNC: 106 MMOL/L — SIGNIFICANT CHANGE UP (ref 96–108)
CO2 SERPL-SCNC: 22 MMOL/L — SIGNIFICANT CHANGE UP (ref 22–31)
CREAT SERPL-MCNC: 0.8 MG/DL — SIGNIFICANT CHANGE UP (ref 0.5–1.3)
GLUCOSE SERPL-MCNC: 119 MG/DL — HIGH (ref 70–99)
HCT VFR BLD CALC: 32.6 % — LOW (ref 39–50)
HGB BLD-MCNC: 10.7 G/DL — LOW (ref 13–17)
LDH SERPL L TO P-CCNC: 136 U/L — SIGNIFICANT CHANGE UP (ref 50–242)
MAGNESIUM SERPL-MCNC: 2.1 MG/DL — SIGNIFICANT CHANGE UP (ref 1.6–2.6)
MCHC RBC-ENTMCNC: 31.5 PG — SIGNIFICANT CHANGE UP (ref 27–34)
MCHC RBC-ENTMCNC: 32.8 GM/DL — SIGNIFICANT CHANGE UP (ref 32–36)
MCV RBC AUTO: 95.9 FL — SIGNIFICANT CHANGE UP (ref 80–100)
NRBC # BLD: 0 /100 WBCS — SIGNIFICANT CHANGE UP (ref 0–0)
PHOSPHATE SERPL-MCNC: 2.2 MG/DL — LOW (ref 2.5–4.5)
PLATELET # BLD AUTO: 65 K/UL — LOW (ref 150–400)
POTASSIUM SERPL-MCNC: 3.9 MMOL/L — SIGNIFICANT CHANGE UP (ref 3.5–5.3)
POTASSIUM SERPL-SCNC: 3.9 MMOL/L — SIGNIFICANT CHANGE UP (ref 3.5–5.3)
PROT SERPL-MCNC: 5.4 G/DL — LOW (ref 6–8.3)
RBC # BLD: 3.4 M/UL — LOW (ref 4.2–5.8)
RBC # FLD: 15.2 % — HIGH (ref 10.3–14.5)
RH IG SCN BLD-IMP: NEGATIVE — SIGNIFICANT CHANGE UP
SODIUM SERPL-SCNC: 138 MMOL/L — SIGNIFICANT CHANGE UP (ref 135–145)
WBC # BLD: 0.49 K/UL — CRITICAL LOW (ref 3.8–10.5)
WBC # FLD AUTO: 0.49 K/UL — CRITICAL LOW (ref 3.8–10.5)

## 2021-11-22 PROCEDURE — 38240 TRANSPLT ALLO HCT/DONOR: CPT

## 2021-11-22 PROCEDURE — 99233 SBSQ HOSP IP/OBS HIGH 50: CPT

## 2021-11-22 RX ORDER — CIPROFLOXACIN LACTATE 400MG/40ML
500 VIAL (ML) INTRAVENOUS EVERY 12 HOURS
Refills: 0 | Status: DISCONTINUED | OUTPATIENT
Start: 2021-11-22 | End: 2021-11-23

## 2021-11-22 RX ORDER — POTASSIUM PHOSPHATE, MONOBASIC POTASSIUM PHOSPHATE, DIBASIC 236; 224 MG/ML; MG/ML
15 INJECTION, SOLUTION INTRAVENOUS ONCE
Refills: 0 | Status: COMPLETED | OUTPATIENT
Start: 2021-11-22 | End: 2021-11-22

## 2021-11-22 RX ORDER — DRONABINOL 2.5 MG
5 CAPSULE ORAL
Refills: 0 | Status: DISCONTINUED | OUTPATIENT
Start: 2021-11-22 | End: 2021-11-23

## 2021-11-22 RX ADMIN — Medication 1 TABLET(S): at 11:40

## 2021-11-22 RX ADMIN — ONDANSETRON 8 MILLIGRAM(S): 8 TABLET, FILM COATED ORAL at 05:16

## 2021-11-22 RX ADMIN — Medication 1 MILLIGRAM(S): at 11:41

## 2021-11-22 RX ADMIN — SIMETHICONE 80 MILLIGRAM(S): 80 TABLET, CHEWABLE ORAL at 05:15

## 2021-11-22 RX ADMIN — Medication 10 MILLILITER(S): at 17:22

## 2021-11-22 RX ADMIN — URSODIOL 300 MILLIGRAM(S): 250 TABLET, FILM COATED ORAL at 17:25

## 2021-11-22 RX ADMIN — Medication 5 MILLILITER(S): at 17:22

## 2021-11-22 RX ADMIN — Medication 400 MILLIGRAM(S): at 05:16

## 2021-11-22 RX ADMIN — Medication 10 MILLILITER(S): at 08:20

## 2021-11-22 RX ADMIN — URSODIOL 300 MILLIGRAM(S): 250 TABLET, FILM COATED ORAL at 05:16

## 2021-11-22 RX ADMIN — Medication 5 MILLILITER(S): at 00:54

## 2021-11-22 RX ADMIN — Medication 1 LOZENGE: at 17:23

## 2021-11-22 RX ADMIN — Medication 5 MILLILITER(S): at 08:19

## 2021-11-22 RX ADMIN — Medication 400 MILLIGRAM(S): at 22:36

## 2021-11-22 RX ADMIN — Medication 400 MILLIGRAM(S): at 14:21

## 2021-11-22 RX ADMIN — Medication 1 APPLICATION(S): at 17:27

## 2021-11-22 RX ADMIN — Medication 1 LOZENGE: at 21:13

## 2021-11-22 RX ADMIN — Medication 10 MILLILITER(S): at 21:13

## 2021-11-22 RX ADMIN — Medication 1 APPLICATION(S): at 05:15

## 2021-11-22 RX ADMIN — SIMETHICONE 80 MILLIGRAM(S): 80 TABLET, CHEWABLE ORAL at 14:21

## 2021-11-22 RX ADMIN — Medication 1 LOZENGE: at 00:56

## 2021-11-22 RX ADMIN — ONDANSETRON 8 MILLIGRAM(S): 8 TABLET, FILM COATED ORAL at 22:36

## 2021-11-22 RX ADMIN — HEPARIN SODIUM 4.1 UNIT(S)/HR: 5000 INJECTION INTRAVENOUS; SUBCUTANEOUS at 17:23

## 2021-11-22 RX ADMIN — SODIUM CHLORIDE 20 MILLILITER(S): 9 INJECTION INTRAMUSCULAR; INTRAVENOUS; SUBCUTANEOUS at 05:16

## 2021-11-22 RX ADMIN — SIMETHICONE 80 MILLIGRAM(S): 80 TABLET, CHEWABLE ORAL at 22:36

## 2021-11-22 RX ADMIN — Medication 500 MILLIGRAM(S): at 17:26

## 2021-11-22 RX ADMIN — Medication 1 LOZENGE: at 11:41

## 2021-11-22 RX ADMIN — Medication 10 MILLILITER(S): at 00:54

## 2021-11-22 RX ADMIN — Medication 5 MILLILITER(S): at 11:41

## 2021-11-22 RX ADMIN — SODIUM CHLORIDE 20 MILLILITER(S): 9 INJECTION INTRAMUSCULAR; INTRAVENOUS; SUBCUTANEOUS at 17:27

## 2021-11-22 RX ADMIN — CHLORHEXIDINE GLUCONATE 1 APPLICATION(S): 213 SOLUTION TOPICAL at 17:28

## 2021-11-22 RX ADMIN — ONDANSETRON 8 MILLIGRAM(S): 8 TABLET, FILM COATED ORAL at 14:14

## 2021-11-22 RX ADMIN — Medication 10 MILLILITER(S): at 11:41

## 2021-11-22 RX ADMIN — POTASSIUM PHOSPHATE, MONOBASIC POTASSIUM PHOSPHATE, DIBASIC 62.5 MILLIMOLE(S): 236; 224 INJECTION, SOLUTION INTRAVENOUS at 10:18

## 2021-11-22 RX ADMIN — Medication 5 MILLIGRAM(S): at 17:26

## 2021-11-22 RX ADMIN — PANTOPRAZOLE SODIUM 40 MILLIGRAM(S): 20 TABLET, DELAYED RELEASE ORAL at 05:16

## 2021-11-22 RX ADMIN — FLUCONAZOLE 400 MILLIGRAM(S): 150 TABLET ORAL at 11:41

## 2021-11-22 RX ADMIN — Medication 480 MICROGRAM(S): at 17:24

## 2021-11-22 RX ADMIN — Medication 1 LOZENGE: at 08:20

## 2021-11-22 RX ADMIN — Medication 5 MILLILITER(S): at 21:13

## 2021-11-22 RX ADMIN — APREPITANT 80 MILLIGRAM(S): 80 CAPSULE ORAL at 11:40

## 2021-11-22 RX ADMIN — Medication 1 APPLICATION(S): at 17:24

## 2021-11-22 NOTE — PROGRESS NOTE ADULT - PROBLEM SELECTOR PLAN 4
Recommendation: PPSv2 prior to admission:70   Current functional PPSv2: 70  Nursing care required: Assistance with ADLs  Code status documented: Full code  A review of the paper chart has been conducted  HCP form present:               Yes and valid []               Yes but invalid []                No [x]   MOLST present:                   Yes and valid []               Yes but invalid []                No [x]  Incapacity form present:   Yes and valid []                Yes but invalid []                No []                 N/A [x]  Living Will:                            Yes and valid []                Yes but invalid []                No [x]      Family Health Care Decision Act (FHCDA) Surrogate Decision Maker Hierarchy in the absence of a health care agent  [facility] Article 81 Guardian-->Spouse or domestic partner--> Adult child-->Parent-->Sibling--> Close friend.

## 2021-11-22 NOTE — PROGRESS NOTE ADULT - SUBJECTIVE AND OBJECTIVE BOX
HPC Transplant Team                                                      Critical / Counseling Time Provided: 30 minutes                                                                                                                                                        Chief Complaint: Autologous peripheral blood stem cell transplant with high dose Melphalan prep regimen for treatment of multiple myeloma    S: Patient seen and examined with [facility] Transplant Team:   + abdominal pressure RLQ  + intermittent nausea  watery diarrhea x2.   + rash (kepivance)    O: Vitals:   Vital Signs Last 24 Hrs  T(C): 37 (2021 08:23), Max: 37.3 (2021 21:07)  T(F): 98.6 (2021 08:23), Max: 99.2 (2021 21:07)  HR: 85 (2021 08:23) (69 - 85)  BP: 121/78 (2021 08:23) (117/75 - 145/86)  BP(mean): --  RR: 18 (2021 08:23) (16 - 18)  SpO2: 95% (2021 08:23) (95% - 99%)    Admit weight: 98.4 kg  Daily     Daily Weight in k.9 (2021 10:00)    Intake / Output:    @ 07:  -   @ 07:00  --------------------------------------------------------  IN: 1101.1 mL / OUT: 1700 mL / NET: -598.9 mL     @ 07: @ 09:08  --------------------------------------------------------  IN: 70 mL / OUT: 200 mL / NET: -130 mL      PE:   Oropharynx: no erythema or ulcerations   Oral Mucositis:              -                                          Grade: n/a   CVS: S1, S2 RRR   Lungs: CTA throughout bilaterally   Abdomen: + BS x 4, soft, ND mild tenderness with palpation RLQ.   Extremities: no edema   Gastric Mucositis:       -                                           Grade: n/a  Intestinal Mucositis:     -                                         Grade: n/a   Skin: patchy, erythematous maculopapular rash to face, neck and upper chest and back post Kepivance   TLC: CDI   Neuro: A&O x 3   Pain: 2/10 right side abd.           Labs:   CBC Full  -  ( 2021 07:24 )  WBC Count : 0.49 K/uL  Hemoglobin : 10.7 g/dL  Hematocrit : 32.6 %  Platelet Count - Automated : 65 K/uL  Mean Cell Volume : 95.9 fl  Mean Cell Hemoglobin : 31.5 pg  Mean Cell Hemoglobin Concentration : 32.8 gm/dL  Auto Neutrophil # : x  Auto Lymphocyte # : x  Auto Monocyte # : x  Auto Eosinophil # : x  Auto Basophil # : x  Auto Neutrophil % : x  Auto Lymphocyte % : x  Auto Monocyte % : x  Auto Eosinophil % : x  Auto Basophil % : x                          10.7   0.49  )-----------( 65       ( 2021 07:24 )             32.6         138  |  106  |  22  ----------------------------<  119<H>  3.9   |  22  |  0.80    Ca    7.9<L>      2021 07:24  Phos  2.2       Mg     2.1         TPro  5.4<L>  /  Alb  3.2<L>  /  TBili  0.6  /  DBili  0.2  /  AST  7<L>  /  ALT  6<L>  /  AlkPhos  69        LIVER FUNCTIONS - ( 2021 07:24 )  Alb: 3.2 g/dL / Pro: 5.4 g/dL / ALK PHOS: 69 U/L / ALT: 6 U/L / AST: 7 U/L / GGT: x           Lactate Dehydrogenase, Serum: 136 U/L ( @ 07:24)          Radiology:     < from: Xray Abdomen 1 View PORTABLE -Urgent (Xray Abdomen 1 View PORTABLE -Urgent .) (21 @ 11:02) >  IMPRESSION:    Nonobstructive bowel gas pattern without evidence of free air.    < end of copied text >    Meds:   Antimicrobials:   acyclovir   Oral Tab/Cap 400 milliGRAM(s) Oral every 8 hours  ciprofloxacin     Tablet 500 milliGRAM(s) Oral every 12 hours  clotrimazole Lozenge 1 Lozenge Oral five times a day  fluconAZOLE   Tablet 400 milliGRAM(s) Oral daily      Heme / Onc:   heparin  Infusion 410 Unit(s)/Hr IV Continuous <Continuous>      GI:  pantoprazole    Tablet 40 milliGRAM(s) Oral before breakfast  polyethylene glycol 3350 17 Gram(s) Oral daily PRN  senna 1 Tablet(s) Oral at bedtime PRN  simethicone 80 milliGRAM(s) Chew every 8 hours  sodium bicarbonate Mouth Rinse 10 milliLiter(s) Swish and Spit five times a day  ursodiol Capsule 300 milliGRAM(s) Oral two times a day      Cardiovascular:   furosemide   Injectable 20 milliGRAM(s) IV Push every 24 hours PRN      Immunologic:   filgrastim-sndz (ZARXIO) Injectable 480 MICROGram(s) SubCutaneous every 24 hours      Other medications:   acetaminophen     Tablet .. 650 milliGRAM(s) Oral every 6 hours  aprepitant 80 milliGRAM(s) Oral daily  BACItracin   Ointment 1 Application(s) Topical two times a day  Biotene Dry Mouth Oral Rinse 5 milliLiter(s) Swish and Spit five times a day  chlorhexidine 4% Liquid 1 Application(s) Topical <User Schedule>  folic acid 1 milliGRAM(s) Oral daily  hydrocortisone 1% Cream 1 Application(s) Topical two times a day  lidocaine/prilocaine Cream 1 Application(s) Topical daily  LORazepam   Injectable 1 milliGRAM(s) IV Push every 24 hours  multivitamin 1 Tablet(s) Oral daily  ondansetron Injectable 8 milliGRAM(s) IV Push every 8 hours  potassium phosphate IVPB 15 milliMole(s) IV Intermittent once  sodium chloride 0.9%. 1000 milliLiter(s) IV Continuous <Continuous>      PRN:   acetaminophen     Tablet .. 650 milliGRAM(s) Oral every 6 hours PRN  furosemide   Injectable 20 milliGRAM(s) IV Push every 24 hours PRN  LORazepam   Injectable 1 milliGRAM(s) IV Push every 6 hours PRN  metoclopramide Injectable 10 milliGRAM(s) IV Push every 6 hours PRN  polyethylene glycol 3350 17 Gram(s) Oral daily PRN  senna 1 Tablet(s) Oral at bedtime PRN  sodium chloride 0.9% lock flush 10 milliLiter(s) IV Push every 1 hour PRN        A/P:   66 year old male with a history of multiple myeloma s/p RVD x 6   Post Autologous PBSCT day +3  - melphalan /; s/p melphalan hydration for 24 hours post infusion of last dose   Strict I&O, daily weights, prn diuresis   - rest day   - rest day   - s/p HPC transplant; completed transplant hydration for 24 hours post infusion of cells. Suprapubic pain in am. UA pending, follow up culture, BK virus. AXR   -vasovegal episode in AM: will monitor tele for 24hrs    d/c telemetry. Lasix 40mg IV x today, follow up I&Os, daily weight. monitor rash, receiving 2nd dose Kepivance today   add Emend for 3 days and change zofran ATC. If worsening abd pain consider CT a/p oral contrast only.   - Neutropenic started on cipor once febrile will pancx and switch cipro to cefepime 2gQ8       1. Infectious Disease:   acyclovir   Oral Tab/Cap 400 milliGRAM(s) Oral every 8 hours  clotrimazole Lozenge 1 Lozenge Oral five times a day  fluconAZOLE   Tablet 400 milliGRAM(s) Oral daily  Add Cipro when ANC <1000.     2. VOD Prophylaxis: Actigall, Glutamine, Heparin (dosed at 100 units / kg / day)     3. GI Prophylaxis:   pantoprazole    Tablet 40 milliGRAM(s) Oral before breakfast    4. Mouthcare - NS / NaHCO3 rinses, Mycelex, Biotene; Skin care     5. GVHD prophylaxis - n/a     6. Transfuse & replete electrolytes prn   KPhose 15 millimols x1     7. IV hydration, daily weights, strict I&O, prn diuresis       8. PO intake as tolerated, nutrition follow up as needed, MVI, folic acid     9. Antiemetics, anti-diarrhea medications:   LORazepam   Injectable 1 milliGRAM(s) IV Push every 6 hours PRN  metoclopramide Injectable 10 milliGRAM(s) IV Push every 6 hours PRN  aprepitant 80 milliGRAM(s) Oral every 24 hours    10. OOB as tolerated, physical therapy consult if needed     11. Monitor coags / fibrinogen 2x week, vitamin K as needed     12. Monitor closely for clinical changes, monitor for fevers     13. Emotional support provided, plan of care discussed and questions addressed     14. Patient education done regarding chemotherapy prep, plan of care, restrictions and discharge planning     15. Continue regular social work input     I have written the above note for Dr. Lozada who performed service with me in the room.   Vale Ugarte PA-C ([phone])    I have seen and examined patient with PA, I agree with above note as scribed.                    HPC Transplant Team                                                      Critical / Counseling Time Provided: 30 minutes                                                                                                                                                        Chief Complaint: Autologous peripheral blood stem cell transplant with high dose Melphalan prep regimen for treatment of multiple myeloma    S: Patient seen and examined with [facility] Transplant Team:   + abdominal pressure RLQ  + intermittent nausea  + rash (kepivance)    O: Vitals:   Vital Signs Last 24 Hrs  T(C): 37 (2021 08:23), Max: 37.3 (2021 21:07)  T(F): 98.6 (2021 08:23), Max: 99.2 (2021 21:07)  HR: 85 (2021 08:23) (69 - 85)  BP: 121/78 (2021 08:23) (117/75 - 145/86)  BP(mean): --  RR: 18 (2021 08:23) (16 - 18)  SpO2: 95% (2021 08:23) (95% - 99%)    Admit weight: 98.4 kg  Daily : 95 kg   Daily Weight in k.9 (2021 10:00)    Intake / Output:    @ 07:  -   @ 07:00  --------------------------------------------------------  IN: 1101.1 mL / OUT: 1700 mL / NET: -598.9 mL     @ 07: @ 09:08  --------------------------------------------------------  IN: 70 mL / OUT: 200 mL / NET: -130 mL      PE:   Oropharynx: no erythema or ulcerations   Oral Mucositis:              -                                          Grade: n/a   CVS: S1, S2 RRR   Lungs: CTA throughout bilaterally   Abdomen: + BS x 4, soft, ND mild tenderness with palpation RLQ.   Extremities: no edema   Gastric Mucositis:       -                                           Grade: n/a  Intestinal Mucositis:     -                                         Grade: n/a   Skin: patchy, erythematous maculopapular rash to face, neck and upper chest and back post Kepivance   TLC: CDI   Neuro: A&O x 3   Pain: 2/10 right side abd.           Labs:   CBC Full  -  ( 2021 07:24 )  WBC Count : 0.49 K/uL  Hemoglobin : 10.7 g/dL  Hematocrit : 32.6 %  Platelet Count - Automated : 65 K/uL  Mean Cell Volume : 95.9 fl  Mean Cell Hemoglobin : 31.5 pg  Mean Cell Hemoglobin Concentration : 32.8 gm/dL  Auto Neutrophil # : x  Auto Lymphocyte # : x  Auto Monocyte # : x  Auto Eosinophil # : x  Auto Basophil # : x  Auto Neutrophil % : x  Auto Lymphocyte % : x  Auto Monocyte % : x  Auto Eosinophil % : x  Auto Basophil % : x                          10.7   0.49  )-----------( 65       ( 2021 07:24 )             32.6         138  |  106  |  22  ----------------------------<  119<H>  3.9   |  22  |  0.80    Ca    7.9<L>      2021 07:24  Phos  2.2       Mg     2.1         TPro  5.4<L>  /  Alb  3.2<L>  /  TBili  0.6  /  DBili  0.2  /  AST  7<L>  /  ALT  6<L>  /  AlkPhos  69        LIVER FUNCTIONS - ( 2021 07:24 )  Alb: 3.2 g/dL / Pro: 5.4 g/dL / ALK PHOS: 69 U/L / ALT: 6 U/L / AST: 7 U/L / GGT: x           Lactate Dehydrogenase, Serum: 136 U/L ( @ 07:24)          Radiology:     < from: Xray Abdomen 1 View PORTABLE -Urgent (Xray Abdomen 1 View PORTABLE -Urgent .) (21 @ 11:02) >  IMPRESSION:    Nonobstructive bowel gas pattern without evidence of free air.    < end of copied text >    Meds:   Antimicrobials:   acyclovir   Oral Tab/Cap 400 milliGRAM(s) Oral every 8 hours  ciprofloxacin     Tablet 500 milliGRAM(s) Oral every 12 hours  clotrimazole Lozenge 1 Lozenge Oral five times a day  fluconAZOLE   Tablet 400 milliGRAM(s) Oral daily      Heme / Onc:   heparin  Infusion 410 Unit(s)/Hr IV Continuous <Continuous>      GI:  pantoprazole    Tablet 40 milliGRAM(s) Oral before breakfast  polyethylene glycol 3350 17 Gram(s) Oral daily PRN  senna 1 Tablet(s) Oral at bedtime PRN  simethicone 80 milliGRAM(s) Chew every 8 hours  sodium bicarbonate Mouth Rinse 10 milliLiter(s) Swish and Spit five times a day  ursodiol Capsule 300 milliGRAM(s) Oral two times a day      Cardiovascular:   furosemide   Injectable 20 milliGRAM(s) IV Push every 24 hours PRN      Immunologic:   filgrastim-sndz (ZARXIO) Injectable 480 MICROGram(s) SubCutaneous every 24 hours      Other medications:   acetaminophen     Tablet .. 650 milliGRAM(s) Oral every 6 hours  aprepitant 80 milliGRAM(s) Oral daily  BACItracin   Ointment 1 Application(s) Topical two times a day  Biotene Dry Mouth Oral Rinse 5 milliLiter(s) Swish and Spit five times a day  chlorhexidine 4% Liquid 1 Application(s) Topical <User Schedule>  folic acid 1 milliGRAM(s) Oral daily  hydrocortisone 1% Cream 1 Application(s) Topical two times a day  lidocaine/prilocaine Cream 1 Application(s) Topical daily  LORazepam   Injectable 1 milliGRAM(s) IV Push every 24 hours  multivitamin 1 Tablet(s) Oral daily  ondansetron Injectable 8 milliGRAM(s) IV Push every 8 hours  potassium phosphate IVPB 15 milliMole(s) IV Intermittent once  sodium chloride 0.9%. 1000 milliLiter(s) IV Continuous <Continuous>      PRN:   acetaminophen     Tablet .. 650 milliGRAM(s) Oral every 6 hours PRN  furosemide   Injectable 20 milliGRAM(s) IV Push every 24 hours PRN  LORazepam   Injectable 1 milliGRAM(s) IV Push every 6 hours PRN  metoclopramide Injectable 10 milliGRAM(s) IV Push every 6 hours PRN  polyethylene glycol 3350 17 Gram(s) Oral daily PRN  senna 1 Tablet(s) Oral at bedtime PRN  sodium chloride 0.9% lock flush 10 milliLiter(s) IV Push every 1 hour PRN        A/P:   66 year old male with a history of multiple myeloma s/p RVD x 6   Post Autologous PBSCT day +3  - melphalan 2/2; s/p melphalan hydration for 24 hours post infusion of last dose   Strict I&O, daily weights, prn diuresis   - rest day   - rest day   - s/p HPC transplant; completed transplant hydration for 24 hours post infusion of cells. Suprapubic pain in am. UA pending, follow up culture, BK virus. AXR   -vasovegal episode in AM: will monitor tele for 24hrs    d/c telemetry. Lasix 40mg IV x today, follow up I&Os, daily weight. monitor rash, receiving 2nd dose Kepivance today   add Emend for 3 days and change zofran ATC. If worsening abd pain consider CT a/p oral contrast only.   - Neutropenic started on cipor once febrile will pancx and switch cipro to  Aztronam 2g Q8       1. Infectious Disease:   acyclovir   Oral Tab/Cap 400 milliGRAM(s) Oral every 8 hours  clotrimazole Lozenge 1 Lozenge Oral five times a day  fluconAZOLE   Tablet 400 milliGRAM(s) Oral daily      2. VOD Prophylaxis: Actigall, Glutamine, Heparin (dosed at 100 units / kg / day)     3. GI Prophylaxis:   pantoprazole    Tablet 40 milliGRAM(s) Oral before breakfast    4. Mouthcare - NS / NaHCO3 rinses, Mycelex, Biotene; Skin care     5. GVHD prophylaxis - n/a     6. Transfuse & replete electrolytes prn   KPhose 15 millimols x1     7. IV hydration, daily weights, strict I&O, prn diuresis       8. PO intake as tolerated, nutrition follow up as needed, MVI, folic acid     9. Antiemetics, anti-diarrhea medications:   LORazepam   Injectable 1 milliGRAM(s) IV Push every 6 hours PRN  metoclopramide Injectable 10 milliGRAM(s) IV Push every 6 hours PRN  aprepitant 80 milliGRAM(s) Oral every 24 hours    10. OOB as tolerated, physical therapy consult if needed     11. Monitor coags / fibrinogen 2x week, vitamin K as needed     12. Monitor closely for clinical changes, monitor for fevers     13. Emotional support provided, plan of care discussed and questions addressed     14. Patient education done regarding chemotherapy prep, plan of care, restrictions and discharge planning     15. Continue regular social work input     I have written the above note for Dr. Lozada who performed service with me in the room.   Vale Ugarte PA-C ([phone])    I have seen and examined patient with PA, I agree with above note as scribed.

## 2021-11-22 NOTE — PROGRESS NOTE ADULT - ATTENDING COMMENTS
66 year old male with recently diagnosed plasma cell myeloma status post RVD x 6, admitted for autologous pbsct with high dose melphalan prep regimen.  Day 0(11/19) ..pt had vasovagal episode this am...not eating well....  Days - 4 & -3 : 3 hours prior to Melphalan start hydration: D5NS at 200ml /hr and continue 24 hours post Melphalan infusion;   Melphalan 100mg/m2  IV   Strict I&O, daily weights, prn diuresis ...hold diuresis for now...tele for 24 hrs  Day -2, day -1 rest days. At 2200 on day – 1, start transplant hydration 1/2NS + 50mEq NaHCO3- (may substitute [id number] if bicarb not available)  Day 0 – infuse HPC product. 4 hours after infusion of cells start Zarxio 5 micrograms / kg (actual weight) . Continue through engraftment.   On day 0, + 1, + 2-give Kepivance 60micrograms / kg (actual weight).  VOD prophylaxis - low dose heparin gtt (dosed at 100 units / kg / day), glutamine supplementation, Actigall BID   PCP prophylaxis - Bactrim DS through day -2    Antiviral prophylaxis - Acyclovir 400mg po TID to start day -1   Antifungal prophylaxis- Diflucan 400 mg po daily.  GI prophylaxis - Protonix po QD   Antibacterial prophylaxis - when ANC < 500, start Cipro 500mg po BID. If becomes febrile, pan cx, CXR and change Cipro to Cefepime 2g IV q 8 hours. Continue until count recovery  Mouth care and skin care as per protocol.    11/20- persistent abd pain > RLQ, was constipated on bowel regimen. Had 2 episodes diarrhea this morning. Monitor abd exam, will order CT A/P if pain worsens. 66 year old male with recently diagnosed plasma cell myeloma status post RVD x 6, admitted for autologous pbsct with high dose melphalan prep regimen. Transplant day 11/19/21. Complicated by vasovagal episode, now resolved.   Days - 4 & -3 : 3 hours prior to Melphalan start hydration: D5NS at 200ml /hr and continue 24 hours post Melphalan infusion;   Melphalan 100mg/m2  IV   Strict I&O, daily weights, prn diuresis ...hold diuresis for now...tele for 24 hrs  Day -2, day -1 rest days. At 2200 on day – 1, start transplant hydration 1/2NS + 50mEq NaHCO3- (may substitute [id number] if bicarb not available)  Day 0 – infuse HPC product. 4 hours after infusion of cells start Zarxio 5 micrograms / kg (actual weight) . Continue through engraftment.   On day 0, + 1, + 2-give Kepivance 60micrograms / kg (actual weight).  VOD prophylaxis - low dose heparin gtt (dosed at 100 units / kg / day), glutamine supplementation, Actigall BID   PCP prophylaxis - Bactrim DS through day -2    Antiviral prophylaxis - Acyclovir 400mg po TID to start day -1   Antifungal prophylaxis- Diflucan 400 mg po daily.  GI prophylaxis - Protonix po QD   Antibacterial prophylaxis - when ANC < 500, start Cipro 500mg po BID. If becomes febrile, pan cx, CXR and change Cipro to Cefepime 2g IV q 8 hours. Continue until count recovery  Mouth care and skin care as per protocol.    11/22- Today is day +3, doing okay, counts dropping.

## 2021-11-22 NOTE — PROGRESS NOTE ADULT - SUBJECTIVE AND OBJECTIVE BOX
HPI:  This is a 66 year old male with multiple myeloma admitted for an autologous pbsct with high dose melphalan prep regimen. Hematologic history as follows: 2020 was found to have a T-12 compression fracture. He saw orthopedics 2021, and was referred to endocrine. 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 Serum immunofixation showed 1 IgD lambda band and free lambda light chains. Urine immunofixation negative for monoclonal band. 21 a bone marrow biopsy and aspirate was consistent with plasma cell myeloma (> 90% involvement), flow cytometry positive for monotypic plasma cells. He started cycle 1 RVD on 21. He completed 6 cycles of RVD 21. During chemotherapy he reported occasional blurry vision (negative optho workup, negative MRI brain 21) and moderate fatigue. He has no complaints on admission other than facial erythema, likely related to kepivance.  (15 Nov 2021 13:09)      Overnight events: No further syncope events  Staff reports: n/a  Patient: He feels worsening of dysgeusia and anorexia, which is likely due to kepivance. 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 Rash from Kepivance+  PRN use: n/a        RECENT VITALS/LABS/MEDICATIONS/ASSAYS     21 @ 15:56    T(C): 37.1 (21 @ 13:20), Max: 37.3 (21 @ 21:07)  HR: 86 (21 @ 13:20) (78 - 86)  BP: 113/61 (21 @ 13:20) (113/61 - 145/86)  RR: 18 (21 @ 13:20) (16 - 18)  SpO2: 96% (21 @ 13:20) (95% - 99%)  Wt(kg): --      2021 07:01  -  2021 07:00  --------------------------------------------------------  IN:    Heparin: 93.1 mL    Oral Fluid: 360 mL    sodium chloride 0.9%: 648 mL  Total IN: 1101.1 mL    OUT:    Voided (mL): 1700 mL  Total OUT: 1700 mL    Total NET: -598.9 mL      2021 07:  -  2021 15:56  --------------------------------------------------------  IN:    Heparin: 35 mL    Oral Fluid: 240 mL    sodium chloride 0.9%: 325 mL  Total IN: 600 mL    OUT:    Voided (mL): 200 mL  Total OUT: 200 mL    Total NET: 400 mL           @ 07: @ 07:00  --------------------------------------------------------  IN: 1101.1 mL / OUT: 1700 mL / NET: -598.9 mL     @ 07: @ 15:56  --------------------------------------------------------  IN: 600 mL / OUT: 200 mL / NET: 400 mL      CAPILLARY BLOOD GLUCOSE            acetaminophen     Tablet .. 650 milliGRAM(s) Oral every 6 hours PRN  acetaminophen     Tablet .. 650 milliGRAM(s) Oral every 6 hours  acyclovir   Oral Tab/Cap 400 milliGRAM(s) Oral every 8 hours  aprepitant 80 milliGRAM(s) Oral daily  BACItracin   Ointment 1 Application(s) Topical two times a day  Biotene Dry Mouth Oral Rinse 5 milliLiter(s) Swish and Spit five times a day  chlorhexidine 4% Liquid 1 Application(s) Topical <User Schedule>  ciprofloxacin     Tablet 500 milliGRAM(s) Oral every 12 hours  clotrimazole Lozenge 1 Lozenge Oral five times a day  filgrastim-sndz (ZARXIO) Injectable 480 MICROGram(s) SubCutaneous every 24 hours  fluconAZOLE   Tablet 400 milliGRAM(s) Oral daily  folic acid 1 milliGRAM(s) Oral daily  furosemide   Injectable 20 milliGRAM(s) IV Push every 24 hours PRN  heparin  Infusion 410 Unit(s)/Hr IV Continuous <Continuous>  hydrocortisone 1% Cream 1 Application(s) Topical two times a day  lidocaine/prilocaine Cream 1 Application(s) Topical daily  LORazepam   Injectable 1 milliGRAM(s) IV Push every 24 hours  LORazepam   Injectable 1 milliGRAM(s) IV Push every 6 hours PRN  metoclopramide Injectable 10 milliGRAM(s) IV Push every 6 hours PRN  multivitamin 1 Tablet(s) Oral daily  ondansetron Injectable 8 milliGRAM(s) IV Push every 8 hours  pantoprazole    Tablet 40 milliGRAM(s) Oral before breakfast  polyethylene glycol 3350 17 Gram(s) Oral daily PRN  senna 1 Tablet(s) Oral at bedtime PRN  simethicone 80 milliGRAM(s) Chew every 8 hours  sodium bicarbonate Mouth Rinse 10 milliLiter(s) Swish and Spit five times a day  sodium chloride 0.9% lock flush 10 milliLiter(s) IV Push every 1 hour PRN  sodium chloride 0.9%. 1000 milliLiter(s) IV Continuous <Continuous>  ursodiol Capsule 300 milliGRAM(s) Oral two times a day              138  |  106  |  22  ----------------------------<  119<H>  3.9   |  22  |  0.80    Ca    7.9<L>      2021 07:24  Phos  2.2       Mg     2.1         TPro  5.4<L>  /  Alb  3.2<L>  /  TBili  0.6  /  DBili  0.2  /  AST  7<L>  /  ALT  6<L>  /  AlkPhos  69        Procalc  BNP  ABG                          10.7   0.49  )-----------( 65       ( 2021 07:24 )             32.6           blood and urine cultures            PERTINENT PM/SXH:   No pertinent past medical history    Hyperlipidemia    Shortness of breath on exertion    Lumbar herniated disc    T12 compression fracture    Acute lumbar radiculopathy    History of basal cell carcinoma      No significant past surgical history    History of surgery on wrist      FAMILY HISTORY:  No pertinent family history in first degree relatives      ITEMS NOT CHECKED ARE NOT PRESENT    SOCIAL HISTORY:   Significant other/partner[ ]  Children[x ]  Cheondoism/Spirituality:  Substance hx:  [ ]   Tobacco hx:  [ ]   Alcohol hx: [ ]   Home Opioid hx:  [ ] I-Stop Reference No:  Living Situation: [ x]Home  [ ]Long term care  [ ]Rehab [ ]Other    ADVANCE DIRECTIVES:    DNR  MOLST  [ ]  Living Will  [ ]   DECISION MAKER(s):  [ ] Health Care Proxy(s)  [x ] Surrogate(s)  [ ] Guardian           Name(s): Phone Number(s):    BASELINE (I)ADL(s) (prior to admission):  Kimberly: [ ]Total  [ ] Moderate [ ]Dependent    Allergies    Omnicef (Unknown)  penicillin (Hives)    Intolerances    MEDICATIONS  (STANDING):   sodium chloride 0.9% lock flush 10 milliLiter(s) IV Push every 1 hour PRN Pre/post blood products, medications, blood draw, and to maintain line patency    PRESENT SYMPTOMS: [ ]Unable to obtain due to poor mentation   Source if other than patient:  [ ]Family   [ ]Team     Pain: [ ]yes [ x]no  QOL impact -   Location -                    Aggravating factors -  Quality -  Radiation -  Timing-  Severity (0-10 scale):  Minimal acceptable level (0-10 scale):     PAIN AD Score:     http://geriatrictoolkit.[email or web address]/[email or web address] (press ctrl +  left click to view)    Dyspnea:                           [ ]Mild [ ]Moderate [ ]Severe  Anxiety:                             [ ]Mild [ ]Moderate [ ]Severe  Fatigue:                             [ ]Mild [ ]Moderate [ ]Severe  Nausea:                             [ ]Mild [x ]Moderate [ ]Severe  Loss of appetite:              [  ]Mild [x ]Moderate [ ]Severe  Constipation:                    [   Mild [ ]Moderate [ ]Severe    Other Symptoms:  [x ]All other review of systems negative     Palliative Performance Status Version 2:      40   %    http://npcrc.org/files/news/palliative_performance_scale_ppsv2.pdf  PHYSICAL EXAM:       GENERAL:  [x ]Alert  [x ]Oriented x  3  [ ]Lethargic  [ ]Cachexia  [ ]Unarousable  [ ]Verbal  [ ]Non-Verbal  Behavioral:   [ ] Anxiety  [ ] Delirium [ ] Agitation [x ] Other Calm  HEENT:  [x ]Normal   [ ]Dry mouth   [ ]ET Tube/Trach  [ ]Oral lesions  PULMONARY:   [x ]Clear [ ]Tachypnea  [ ]Audible excessive secretions   [ ]Rhonchi        [ ]Right [ ]Left [ ]Bilateral  [ ]Crackles        [ ]Right [ ]Left [ ]Bilateral  [ ]Wheezing     [ ]Right [ ]Left [ ]Bilatera  [ ]Diminished breath sounds [ ]right [ ]left [ ]bilateral  CARDIOVASCULAR:    [ x]Regular [ ]Irregular [ ]Tachy  [ ]Jose [ ]Murmur [ ]Other  GASTROINTESTINAL:  [ x]Soft  [ ]Distended   [ ]+BS  [x ]Non tender [ ]Tender  [ ]PEG [ ]OGT/ NGT  Last BM:   GENITOURINARY:  [ x]Normal [ ] Incontinent   [ ]Oliguria/Anuria   [ ]Ortega  MUSCULOSKELETAL:   [x ]Normal   [ ]Weakness  [ ]Bed/Wheelchair bound [ ]Edema  NEUROLOGIC:   [ x]No focal deficits  [ ]Cognitive impairment  [ ]Dysphagia [ ]Dysarthria [ ]Paresis [ ]Other   SKIN:   [x ]Normal    [ ]Rash  [ ]Pressure ulcer(s)       Present on admission [ ]y [ ]n    CRITICAL CARE:  [ ] Shock Present  [ ]Septic [ ]Cardiogenic [ ]Neurologic [ ]Hypovolemic  [ ]  Vasopressors [ ]  Inotropes   [ ]Respiratory failure present [ ]Mechanical ventilation [ ]Non-invasive ventilatory support [ ]High flow  [ ]Acute  [ ]Chronic [ ]Hypoxic  [ ]Hypercarbic [ ]Other  [ ]Other organ failure     LABS:                        10.6   12.23 )-----------( 183      ( 2021 07:21 )             31.9       138  |  107  |  22  ----------------------------<  114<H>  3.8   |  21<L>  |  0.89    Ca    7.6<L>      2021 07:21  Phos  2.3       Mg     1.8         TPro  5.0<L>  /  Alb  3.6  /  TBili  0.9  /  DBili  0.2  /  AST  10  /  ALT  12  /  AlkPhos  57        Urinalysis Basic - ( 2021 01:08 )    Color: Light Yellow / Appearance: Clear / S.024 / pH: x  Gluc: x / Ketone: Negative  / Bili: Negative / Urobili: Negative   Blood: x / Protein: Trace / Nitrite: Negative   Leuk Esterase: Negative / RBC: 2 /hpf / WBC 5 /HPF   Sq Epi: x / Non Sq Epi: 2 /hpf / Bacteria: Negative      RADIOLOGY & ADDITIONAL STUDIES:    PROTEIN CALORIE MALNUTRITION PRESENT: [ ]mild [ ]moderate [ ]severe [ ]underweight [ ]morbid obesity  https://www.andeal.org/vault/2440/web/files/ONC/Table_Clinical%20Characteristics%20to%20Document%20Malnutrition-White%20JV%20et%20al%2020.pdf    Height (cm): 183 (11-15-21 @ 10:28), 183 (21 @ 16:35), 185.4 (21 @ 08:43)  Weight (kg): 98.4 (11-15-21 @ 10:28), 94.8 (21 @ 16:35), 95.3 (21 @ 08:43)  BMI (kg/m2): 29.4 (11-15-21 @ 10:28), 28.3 (21 @ 16:35), 27.7 (21 @ :43)    [ ]PPSV2 < or = to 30% [ ]significant weight loss  [ ]poor nutritional intake  [ ]anasarca      [ ]Artificial Nutrition      REFERRALS:   [ ]Chaplaincy  [ ]Hospice  [ ]Child Life  [ ]Social Work  [ ]Case management [ ]Holistic Therapy     Goals of Care Document:

## 2021-11-22 NOTE — PROGRESS NOTE ADULT - PROBLEM SELECTOR PLAN 5
Recommendation: IV chemotherapy as part of stem cell transplant process  Continue to monitor for known adverse effects/symptoms  Risk of infectious complications given anticipated impact on hematopoietic lineages and resultant immune compromise  PPx/Tx per primary team  Relevant factors: pancytopenia

## 2021-11-22 NOTE — PROGRESS NOTE ADULT - PROBLEM SELECTOR PLAN 3
Multiple myeloma.  Condition: Multiple Myeloma  Previous transplant: No  Active treatment: high dose Melphalan prep regimen   Transplant Type: autologous  Transplant Day: + 3  Clinical impact on complexity: High.

## 2021-11-22 NOTE — PROGRESS NOTE ADULT - PROBLEM SELECTOR PLAN 2
Likely due to changes in taste +/- constipation  BM yesterday, monitor stool count  Encouraged to maximize caloric input but will try to augment with dronabinol 5 mg bid

## 2021-11-22 NOTE — PROGRESS NOTE ADULT - PROBLEM SELECTOR PLAN 6
Recommendation: Actions:  [x] Rapport building     [x] Symptom assessment    [] Eliciting preferences of goals   [] Prognostic understanding    [] Emotional Support  [] Coping skill development  []  Other  Interdisciplinary Referrals:   Communication: d/w team  Documentation Review: [x] Primary Team [] Consultants [] Interdisciplinary team.

## 2021-11-23 LAB
ALBUMIN SERPL ELPH-MCNC: 3 G/DL — LOW (ref 3.3–5)
ALBUMIN SERPL ELPH-MCNC: 3.3 G/DL — SIGNIFICANT CHANGE UP (ref 3.3–5)
ALP SERPL-CCNC: 73 U/L — SIGNIFICANT CHANGE UP (ref 40–120)
ALP SERPL-CCNC: 79 U/L — SIGNIFICANT CHANGE UP (ref 40–120)
ALT FLD-CCNC: 19 U/L — SIGNIFICANT CHANGE UP (ref 10–45)
ALT FLD-CCNC: 6 U/L — LOW (ref 10–45)
ANION GAP SERPL CALC-SCNC: 11 MMOL/L — SIGNIFICANT CHANGE UP (ref 5–17)
ANION GAP SERPL CALC-SCNC: 11 MMOL/L — SIGNIFICANT CHANGE UP (ref 5–17)
APTT BLD: 36.4 SEC — HIGH (ref 27.5–35.5)
AST SERPL-CCNC: 18 U/L — SIGNIFICANT CHANGE UP (ref 10–40)
AST SERPL-CCNC: 7 U/L — LOW (ref 10–40)
BASE EXCESS BLDV CALC-SCNC: -6.7 MMOL/L — LOW (ref -2–2)
BILIRUB SERPL-MCNC: 0.4 MG/DL — SIGNIFICANT CHANGE UP (ref 0.2–1.2)
BILIRUB SERPL-MCNC: 0.6 MG/DL — SIGNIFICANT CHANGE UP (ref 0.2–1.2)
BUN SERPL-MCNC: 24 MG/DL — HIGH (ref 7–23)
BUN SERPL-MCNC: 25 MG/DL — HIGH (ref 7–23)
CA-I SERPL-SCNC: 1.15 MMOL/L — SIGNIFICANT CHANGE UP (ref 1.15–1.33)
CALCIUM SERPL-MCNC: 7.9 MG/DL — LOW (ref 8.4–10.5)
CALCIUM SERPL-MCNC: 8.5 MG/DL — SIGNIFICANT CHANGE UP (ref 8.4–10.5)
CHLORIDE BLDV-SCNC: 104 MMOL/L — SIGNIFICANT CHANGE UP (ref 96–108)
CHLORIDE SERPL-SCNC: 102 MMOL/L — SIGNIFICANT CHANGE UP (ref 96–108)
CHLORIDE SERPL-SCNC: 106 MMOL/L — SIGNIFICANT CHANGE UP (ref 96–108)
CK MB CFR SERPL CALC: 1 NG/ML — SIGNIFICANT CHANGE UP (ref 0–6.7)
CK MB CFR SERPL CALC: 6.8 NG/ML — HIGH (ref 0–6.7)
CK SERPL-CCNC: 45 U/L — SIGNIFICANT CHANGE UP (ref 30–200)
CO2 BLDV-SCNC: 21 MMOL/L — LOW (ref 22–26)
CO2 SERPL-SCNC: 19 MMOL/L — LOW (ref 22–31)
CO2 SERPL-SCNC: 23 MMOL/L — SIGNIFICANT CHANGE UP (ref 22–31)
CREAT SERPL-MCNC: 0.79 MG/DL — SIGNIFICANT CHANGE UP (ref 0.5–1.3)
CREAT SERPL-MCNC: 0.85 MG/DL — SIGNIFICANT CHANGE UP (ref 0.5–1.3)
GAS PNL BLDV: 135 MMOL/L — LOW (ref 136–145)
GAS PNL BLDV: SIGNIFICANT CHANGE UP
GAS PNL BLDV: SIGNIFICANT CHANGE UP
GLUCOSE BLDC GLUCOMTR-MCNC: 244 MG/DL — HIGH (ref 70–99)
GLUCOSE BLDV-MCNC: 308 MG/DL — HIGH (ref 70–99)
GLUCOSE SERPL-MCNC: 130 MG/DL — HIGH (ref 70–99)
GLUCOSE SERPL-MCNC: 132 MG/DL — HIGH (ref 70–99)
HCO3 BLDV-SCNC: 20 MMOL/L — LOW (ref 22–29)
HCT VFR BLD CALC: 29.8 % — LOW (ref 39–50)
HCT VFR BLD CALC: 30.8 % — LOW (ref 39–50)
HCT VFR BLDA CALC: 31 % — LOW (ref 39–51)
HGB BLD CALC-MCNC: 10.3 G/DL — LOW (ref 12.6–17.4)
HGB BLD-MCNC: 10.1 G/DL — LOW (ref 13–17)
HGB BLD-MCNC: 9.7 G/DL — LOW (ref 13–17)
INR BLD: 1.12 RATIO — SIGNIFICANT CHANGE UP (ref 0.88–1.16)
LACTATE BLDV-MCNC: 6 MMOL/L — CRITICAL HIGH (ref 0.7–2)
LACTATE SERPL-SCNC: 0.9 MMOL/L — SIGNIFICANT CHANGE UP (ref 0.7–2)
LACTATE SERPL-SCNC: 1.5 MMOL/L — SIGNIFICANT CHANGE UP (ref 0.7–2)
LDH SERPL L TO P-CCNC: 128 U/L — SIGNIFICANT CHANGE UP (ref 50–242)
MAGNESIUM SERPL-MCNC: 1.9 MG/DL — SIGNIFICANT CHANGE UP (ref 1.6–2.6)
MAGNESIUM SERPL-MCNC: 1.9 MG/DL — SIGNIFICANT CHANGE UP (ref 1.6–2.6)
MCHC RBC-ENTMCNC: 31.2 PG — SIGNIFICANT CHANGE UP (ref 27–34)
MCHC RBC-ENTMCNC: 31.5 PG — SIGNIFICANT CHANGE UP (ref 27–34)
MCHC RBC-ENTMCNC: 32.6 GM/DL — SIGNIFICANT CHANGE UP (ref 32–36)
MCHC RBC-ENTMCNC: 32.8 GM/DL — SIGNIFICANT CHANGE UP (ref 32–36)
MCV RBC AUTO: 95.8 FL — SIGNIFICANT CHANGE UP (ref 80–100)
MCV RBC AUTO: 96 FL — SIGNIFICANT CHANGE UP (ref 80–100)
MISCELLANEOUS TEST NAME: SIGNIFICANT CHANGE UP
MISCELLANEOUS, NORMALS: SIGNIFICANT CHANGE UP
MISCELLANEOUS, RESULT: SIGNIFICANT CHANGE UP
MISCELLANEOUS, RESULT: SIGNIFICANT CHANGE UP
NRBC # BLD: 0 /100 WBCS — SIGNIFICANT CHANGE UP (ref 0–0)
NRBC # BLD: 33 /100 WBCS — HIGH (ref 0–0)
OTHER CELLS CSF MANUAL: 9 ML/DL — LOW (ref 18–22)
PCO2 BLDV: 42 MMHG — SIGNIFICANT CHANGE UP (ref 42–55)
PH BLDV: 7.28 — LOW (ref 7.32–7.43)
PHOSPHATE SERPL-MCNC: 2.3 MG/DL — LOW (ref 2.5–4.5)
PHOSPHATE SERPL-MCNC: 2.8 MG/DL — SIGNIFICANT CHANGE UP (ref 2.5–4.5)
PLATELET # BLD AUTO: 29 K/UL — LOW (ref 150–400)
PLATELET # BLD AUTO: 37 K/UL — LOW (ref 150–400)
PO2 BLDV: 41 MMHG — SIGNIFICANT CHANGE UP (ref 25–45)
POTASSIUM BLDV-SCNC: 3.7 MMOL/L — SIGNIFICANT CHANGE UP (ref 3.5–5.1)
POTASSIUM SERPL-MCNC: 3.8 MMOL/L — SIGNIFICANT CHANGE UP (ref 3.5–5.3)
POTASSIUM SERPL-MCNC: 3.9 MMOL/L — SIGNIFICANT CHANGE UP (ref 3.5–5.3)
POTASSIUM SERPL-SCNC: 3.8 MMOL/L — SIGNIFICANT CHANGE UP (ref 3.5–5.3)
POTASSIUM SERPL-SCNC: 3.9 MMOL/L — SIGNIFICANT CHANGE UP (ref 3.5–5.3)
PROT SERPL-MCNC: 5.3 G/DL — LOW (ref 6–8.3)
PROT SERPL-MCNC: 5.4 G/DL — LOW (ref 6–8.3)
PROTHROM AB SERPL-ACNC: 13.4 SEC — SIGNIFICANT CHANGE UP (ref 10.6–13.6)
RBC # BLD: 3.11 M/UL — LOW (ref 4.2–5.8)
RBC # BLD: 3.21 M/UL — LOW (ref 4.2–5.8)
RBC # FLD: 14.7 % — HIGH (ref 10.3–14.5)
RBC # FLD: 14.8 % — HIGH (ref 10.3–14.5)
SAO2 % BLDV: 63.8 % — LOW (ref 67–88)
SODIUM SERPL-SCNC: 136 MMOL/L — SIGNIFICANT CHANGE UP (ref 135–145)
SODIUM SERPL-SCNC: 136 MMOL/L — SIGNIFICANT CHANGE UP (ref 135–145)
TROPONIN T, HIGH SENSITIVITY RESULT: 246 NG/L — HIGH (ref 0–51)
TROPONIN T, HIGH SENSITIVITY RESULT: 346 NG/L — HIGH (ref 0–51)
TROPONIN T, HIGH SENSITIVITY RESULT: 8 NG/L — SIGNIFICANT CHANGE UP (ref 0–51)
WBC # BLD: <0.1 K/UL — CRITICAL LOW (ref 3.8–10.5)
WBC # BLD: <0.1 K/UL — CRITICAL LOW (ref 3.8–10.5)
WBC # FLD AUTO: <0.1 K/UL — CRITICAL LOW (ref 3.8–10.5)
WBC # FLD AUTO: <0.1 K/UL — CRITICAL LOW (ref 3.8–10.5)

## 2021-11-23 PROCEDURE — 93010 ELECTROCARDIOGRAM REPORT: CPT

## 2021-11-23 PROCEDURE — 93306 TTE W/DOPPLER COMPLETE: CPT | Mod: 26

## 2021-11-23 PROCEDURE — 99223 1ST HOSP IP/OBS HIGH 75: CPT

## 2021-11-23 PROCEDURE — 74177 CT ABD & PELVIS W/CONTRAST: CPT | Mod: 26

## 2021-11-23 PROCEDURE — 71045 X-RAY EXAM CHEST 1 VIEW: CPT | Mod: 26

## 2021-11-23 PROCEDURE — 99291 CRITICAL CARE FIRST HOUR: CPT | Mod: 25

## 2021-11-23 PROCEDURE — 38240 TRANSPLT ALLO HCT/DONOR: CPT

## 2021-11-23 PROCEDURE — 93010 ELECTROCARDIOGRAM REPORT: CPT | Mod: 76,77

## 2021-11-23 RX ORDER — FLUCONAZOLE 150 MG/1
400 TABLET ORAL EVERY 24 HOURS
Refills: 0 | Status: DISCONTINUED | OUTPATIENT
Start: 2021-11-23 | End: 2021-12-01

## 2021-11-23 RX ORDER — ASPIRIN/CALCIUM CARB/MAGNESIUM 324 MG
162 TABLET ORAL ONCE
Refills: 0 | Status: COMPLETED | OUTPATIENT
Start: 2021-11-23 | End: 2021-11-23

## 2021-11-23 RX ORDER — AZTREONAM 2 G
VIAL (EA) INJECTION
Refills: 0 | Status: DISCONTINUED | OUTPATIENT
Start: 2021-11-23 | End: 2021-11-23

## 2021-11-23 RX ORDER — MAGNESIUM SULFATE 500 MG/ML
2 VIAL (ML) INJECTION ONCE
Refills: 0 | Status: COMPLETED | OUTPATIENT
Start: 2021-11-23 | End: 2021-11-23

## 2021-11-23 RX ORDER — AZTREONAM 2 G
2000 VIAL (EA) INJECTION ONCE
Refills: 0 | Status: DISCONTINUED | OUTPATIENT
Start: 2021-11-23 | End: 2021-11-23

## 2021-11-23 RX ORDER — METOPROLOL TARTRATE 50 MG
2.5 TABLET ORAL EVERY 6 HOURS
Refills: 0 | Status: DISCONTINUED | OUTPATIENT
Start: 2021-11-23 | End: 2021-11-23

## 2021-11-23 RX ORDER — VANCOMYCIN HCL 1 G
1000 VIAL (EA) INTRAVENOUS ONCE
Refills: 0 | Status: COMPLETED | OUTPATIENT
Start: 2021-11-23 | End: 2021-11-23

## 2021-11-23 RX ORDER — METRONIDAZOLE 500 MG
500 TABLET ORAL EVERY 8 HOURS
Refills: 0 | Status: DISCONTINUED | OUTPATIENT
Start: 2021-11-23 | End: 2021-11-23

## 2021-11-23 RX ORDER — AZTREONAM 2 G
2000 VIAL (EA) INJECTION EVERY 8 HOURS
Refills: 0 | Status: DISCONTINUED | OUTPATIENT
Start: 2021-11-23 | End: 2021-11-23

## 2021-11-23 RX ORDER — MEROPENEM 1 G/30ML
1000 INJECTION INTRAVENOUS EVERY 8 HOURS
Refills: 0 | Status: COMPLETED | OUTPATIENT
Start: 2021-11-23 | End: 2021-11-30

## 2021-11-23 RX ORDER — METOPROLOL TARTRATE 50 MG
2.5 TABLET ORAL EVERY 6 HOURS
Refills: 0 | Status: DISCONTINUED | OUTPATIENT
Start: 2021-11-24 | End: 2021-11-28

## 2021-11-23 RX ORDER — ACYCLOVIR SODIUM 500 MG
490 VIAL (EA) INTRAVENOUS EVERY 8 HOURS
Refills: 0 | Status: DISCONTINUED | OUTPATIENT
Start: 2021-11-23 | End: 2021-12-01

## 2021-11-23 RX ORDER — ACETAMINOPHEN 500 MG
650 TABLET ORAL EVERY 6 HOURS
Refills: 0 | Status: DISCONTINUED | OUTPATIENT
Start: 2021-11-23 | End: 2021-11-25

## 2021-11-23 RX ORDER — POTASSIUM CHLORIDE 20 MEQ
20 PACKET (EA) ORAL ONCE
Refills: 0 | Status: COMPLETED | OUTPATIENT
Start: 2021-11-23 | End: 2021-11-24

## 2021-11-23 RX ORDER — CHLORHEXIDINE GLUCONATE 213 G/1000ML
1 SOLUTION TOPICAL
Refills: 0 | Status: DISCONTINUED | OUTPATIENT
Start: 2021-11-24 | End: 2021-12-07

## 2021-11-23 RX ORDER — AZTREONAM 2 G
2000 VIAL (EA) INJECTION ONCE
Refills: 0 | Status: COMPLETED | OUTPATIENT
Start: 2021-11-23 | End: 2021-11-23

## 2021-11-23 RX ORDER — PANTOPRAZOLE SODIUM 20 MG/1
40 TABLET, DELAYED RELEASE ORAL DAILY
Refills: 0 | Status: DISCONTINUED | OUTPATIENT
Start: 2021-11-23 | End: 2021-11-28

## 2021-11-23 RX ORDER — SODIUM CHLORIDE 9 MG/ML
1000 INJECTION INTRAMUSCULAR; INTRAVENOUS; SUBCUTANEOUS
Refills: 0 | Status: COMPLETED | OUTPATIENT
Start: 2021-11-23 | End: 2021-11-23

## 2021-11-23 RX ORDER — ACETAMINOPHEN 500 MG
1000 TABLET ORAL ONCE
Refills: 0 | Status: COMPLETED | OUTPATIENT
Start: 2021-11-23 | End: 2021-11-23

## 2021-11-23 RX ADMIN — HEPARIN SODIUM 4.1 UNIT(S)/HR: 5000 INJECTION INTRAVENOUS; SUBCUTANEOUS at 21:46

## 2021-11-23 RX ADMIN — MEROPENEM 100 MILLIGRAM(S): 1 INJECTION INTRAVENOUS at 16:47

## 2021-11-23 RX ADMIN — ONDANSETRON 8 MILLIGRAM(S): 8 TABLET, FILM COATED ORAL at 21:32

## 2021-11-23 RX ADMIN — SIMETHICONE 80 MILLIGRAM(S): 80 TABLET, CHEWABLE ORAL at 05:53

## 2021-11-23 RX ADMIN — SODIUM CHLORIDE 2000 MILLILITER(S): 9 INJECTION INTRAMUSCULAR; INTRAVENOUS; SUBCUTANEOUS at 08:47

## 2021-11-23 RX ADMIN — SODIUM CHLORIDE 75 MILLILITER(S): 9 INJECTION INTRAMUSCULAR; INTRAVENOUS; SUBCUTANEOUS at 08:37

## 2021-11-23 RX ADMIN — FLUCONAZOLE 100 MILLIGRAM(S): 150 TABLET ORAL at 16:47

## 2021-11-23 RX ADMIN — URSODIOL 300 MILLIGRAM(S): 250 TABLET, FILM COATED ORAL at 05:53

## 2021-11-23 RX ADMIN — SODIUM CHLORIDE 2000 MILLILITER(S): 9 INJECTION INTRAMUSCULAR; INTRAVENOUS; SUBCUTANEOUS at 07:15

## 2021-11-23 RX ADMIN — HEPARIN SODIUM 4.1 UNIT(S)/HR: 5000 INJECTION INTRAVENOUS; SUBCUTANEOUS at 16:49

## 2021-11-23 RX ADMIN — Medication 5 MILLILITER(S): at 11:54

## 2021-11-23 RX ADMIN — SODIUM CHLORIDE 75 MILLILITER(S): 9 INJECTION INTRAMUSCULAR; INTRAVENOUS; SUBCUTANEOUS at 16:49

## 2021-11-23 RX ADMIN — SODIUM CHLORIDE 75 MILLILITER(S): 9 INJECTION INTRAMUSCULAR; INTRAVENOUS; SUBCUTANEOUS at 21:39

## 2021-11-23 RX ADMIN — Medication 10 MILLILITER(S): at 00:52

## 2021-11-23 RX ADMIN — ONDANSETRON 8 MILLIGRAM(S): 8 TABLET, FILM COATED ORAL at 05:53

## 2021-11-23 RX ADMIN — Medication 100 MILLIGRAM(S): at 16:47

## 2021-11-23 RX ADMIN — APREPITANT 80 MILLIGRAM(S): 80 CAPSULE ORAL at 11:55

## 2021-11-23 RX ADMIN — Medication 100 MILLIGRAM(S): at 08:29

## 2021-11-23 RX ADMIN — Medication 5 MILLIGRAM(S): at 05:52

## 2021-11-23 RX ADMIN — Medication 10 MILLILITER(S): at 17:48

## 2021-11-23 RX ADMIN — Medication 10 MILLILITER(S): at 11:54

## 2021-11-23 RX ADMIN — Medication 1 APPLICATION(S): at 05:52

## 2021-11-23 RX ADMIN — Medication 1 LOZENGE: at 00:53

## 2021-11-23 RX ADMIN — Medication 400 MILLIGRAM(S): at 07:30

## 2021-11-23 RX ADMIN — CHLORHEXIDINE GLUCONATE 1 APPLICATION(S): 213 SOLUTION TOPICAL at 11:55

## 2021-11-23 RX ADMIN — Medication 1 LOZENGE: at 21:56

## 2021-11-23 RX ADMIN — Medication 1 MILLIGRAM(S): at 11:54

## 2021-11-23 RX ADMIN — LIDOCAINE AND PRILOCAINE CREAM 1 APPLICATION(S): 25; 25 CREAM TOPICAL at 21:37

## 2021-11-23 RX ADMIN — Medication 1 LOZENGE: at 11:54

## 2021-11-23 RX ADMIN — Medication 50 GRAM(S): at 21:36

## 2021-11-23 RX ADMIN — Medication 400 MILLIGRAM(S): at 05:53

## 2021-11-23 RX ADMIN — Medication 1 APPLICATION(S): at 16:57

## 2021-11-23 RX ADMIN — Medication 5 MILLILITER(S): at 00:53

## 2021-11-23 RX ADMIN — Medication 10 MILLILITER(S): at 21:54

## 2021-11-23 RX ADMIN — SODIUM CHLORIDE 2000 MILLILITER(S): 9 INJECTION INTRAMUSCULAR; INTRAVENOUS; SUBCUTANEOUS at 07:45

## 2021-11-23 RX ADMIN — Medication 1 APPLICATION(S): at 17:48

## 2021-11-23 RX ADMIN — Medication 5 MILLILITER(S): at 21:53

## 2021-11-23 RX ADMIN — PANTOPRAZOLE SODIUM 40 MILLIGRAM(S): 20 TABLET, DELAYED RELEASE ORAL at 05:53

## 2021-11-23 RX ADMIN — FLUCONAZOLE 400 MILLIGRAM(S): 150 TABLET ORAL at 11:55

## 2021-11-23 RX ADMIN — ONDANSETRON 8 MILLIGRAM(S): 8 TABLET, FILM COATED ORAL at 15:17

## 2021-11-23 RX ADMIN — Medication 250 MILLIGRAM(S): at 07:30

## 2021-11-23 RX ADMIN — Medication 500 MILLIGRAM(S): at 05:53

## 2021-11-23 RX ADMIN — Medication 5 MILLILITER(S): at 08:12

## 2021-11-23 RX ADMIN — Medication 100 MILLIGRAM(S): at 08:41

## 2021-11-23 RX ADMIN — Medication 1 TABLET(S): at 11:55

## 2021-11-23 RX ADMIN — Medication 162 MILLIGRAM(S): at 21:37

## 2021-11-23 RX ADMIN — Medication 5 MILLILITER(S): at 17:48

## 2021-11-23 RX ADMIN — Medication 109.8 MILLIGRAM(S): at 21:50

## 2021-11-23 RX ADMIN — Medication 480 MICROGRAM(S): at 21:33

## 2021-11-23 NOTE — CONSULT NOTE ADULT - ASSESSMENT
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 On AM of 11/23 patient developed neutropenic sepsis with RRT for vasovagal episode had CTAP showing pneumoperitoneum and terminal ileitis    Plan:  - transfer to SICU under Dr. Alaniz for serial abdominal exams and resuscitation  - patient non peritoneal, however unreliable in the setting of immunosupression  - patient high risk to undergo surgery, will monitor clinically in ICU   - IV antibiotics    discussed with Dr. Corbin Suarez, PGY2  Acute Care Surgery  [phone]

## 2021-11-23 NOTE — CONSULT NOTE ADULT - ASSESSMENT
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 On AM of 11/23 patient developed neutropenic sepsis with RRT for vasovagal episode had CTAP showing pneumoperitoneum and terminal ileitis. Admitted to SICU for HD monitoring and serial abdominal exams.    PLAN:    NEURO:  Pain control with acetaminophen, dilaudid PRN  No other active issues    RESPIRATORY:   No active issues  Saturating well on 2L NC    CARDIOVASCULAR:  Troponemia 8-->346  Cardiology c/s  f/u TTE  lopressor 2.5 q6h    GI/NUTRITION:  Pneumoperitoneum and terminal ileitis  nonop management per sx  NPO  serial abdominal exams    GENITOURINARY/RENAL:  No active issues  Monitor UOP  F/u UA    HEMATOLOGIC:  MM s/p autologous SCT  zarxio qd per heme  appreciate heme/onc recs  low dose hep gtt for VOD ppx s/p SCT per heme/onc    INFECTIOUS DISEASE:  neutropenic  rodrigo 1g q8h empirically for suspected intrabdominal infx i/s/o pneumoperitoneum  fluc/acyclovir for ppx s/p SCT  ID following    ENDOCRINE:  No active issues    DISPO:  SICU

## 2021-11-23 NOTE — CONSULT NOTE ADULT - SUBJECTIVE AND OBJECTIVE BOX
Patient is a 66y old  Male who presents with a chief complaint of Autologous peripheral blood stem cell transplant with high dose Melphalan prep regimen for treatment of multiple myeloma (23 Nov 2021 09:58)      HPI:  This is a 66 year old male with multiple myeloma admitted for an autologous pbsct with high dose melphalan prep regimen. Hematologic history as follows: 11/2020 was found to have a T-12 compression fracture. He saw orthopedics 1/2021, and was referred to endocrine. 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 Serum immunofixation showed 1 IgD lambda band and free lambda light chains. Urine immunofixation negative for monoclonal band. 4/29/21 a bone marrow biopsy and aspirate was consistent with plasma cell myeloma (> 90% involvement), flow cytometry positive for monotypic plasma cells. He started cycle 1 RVD on 5/13/21. He completed 6 cycles of RVD 9/30/21. During chemotherapy he reported occasional blurry vision (negative optho workup, negative MRI brain 9/29/21) and moderate fatigue. He has no complaints on admission other than facial erythema, likely related to kepivance.  (15 Nov 2021 13:09)     Antibiotics:  Fluconazole: 11/15 -->   PO Bactrim: 11/15 --> 11/17  Ciprofloxacin: 11/22 --> 11/23  Vancomycin: 11/23  Metronidazole: 11/23  Aztreonam: 11/23      prior hospital charts reviewed [  ]  primary team notes reviewed [  ]  other consultant notes reviewed [  ]    PAST MEDICAL & SURGICAL HISTORY:  Hyperlipidemia    Shortness of breath on exertion    Lumbar herniated disc    T12 compression fracture    Acute lumbar radiculopathy    History of basal cell carcinoma    History of surgery on wrist        Allergies  Omnicef (Unknown)  penicillin (Hives)    ANTIMICROBIALS (past 90 days)  MEDICATIONS  (STANDING):  acyclovir   Oral Tab/Cap   400 milliGRAM(s) Oral (11-23-21 @ 05:53)   400 milliGRAM(s) Oral (11-22-21 @ 22:36)   400 milliGRAM(s) Oral (11-22-21 @ 14:21)   400 milliGRAM(s) Oral (11-22-21 @ 05:16)   400 milliGRAM(s) Oral (11-21-21 @ 21:55)   400 milliGRAM(s) Oral (11-21-21 @ 14:06)   400 milliGRAM(s) Oral (11-21-21 @ 05:33)   400 milliGRAM(s) Oral (11-20-21 @ 21:12)   400 milliGRAM(s) Oral (11-20-21 @ 14:17)   400 milliGRAM(s) Oral (11-20-21 @ 05:21)   400 milliGRAM(s) Oral (11-19-21 @ 21:59)   400 milliGRAM(s) Oral (11-19-21 @ 13:15)   400 milliGRAM(s) Oral (11-19-21 @ 05:34)   400 milliGRAM(s) Oral (11-18-21 @ 21:34)   400 milliGRAM(s) Oral (11-18-21 @ 13:03)   400 milliGRAM(s) Oral (11-18-21 @ 06:29)    aztreonam  IVPB   100 mL/Hr IV Intermittent (11-23-21 @ 08:29)    ciprofloxacin     Tablet   500 milliGRAM(s) Oral (11-23-21 @ 05:53)   500 milliGRAM(s) Oral (11-22-21 @ 17:26)    clotrimazole Lozenge   1 Lozenge Oral (11-23-21 @ 11:54)   1 Lozenge Oral (11-23-21 @ 00:53)   1 Lozenge Oral (11-22-21 @ 21:13)   1 Lozenge Oral (11-22-21 @ 17:23)   1 Lozenge Oral (11-22-21 @ 11:41)   1 Lozenge Oral (11-22-21 @ 08:20)   1 Lozenge Oral (11-22-21 @ 00:56)   1 Lozenge Oral (11-21-21 @ 20:29)   1 Lozenge Oral (11-21-21 @ 16:11)   1 Lozenge Oral (11-21-21 @ 12:00)   1 Lozenge Oral (11-21-21 @ 08:08)   1 Lozenge Oral (11-21-21 @ 00:30)   1 Lozenge Oral (11-20-21 @ 20:51)   1 Lozenge Oral (11-20-21 @ 16:06)   1 Lozenge Oral (11-20-21 @ 12:01)   1 Lozenge Oral (11-20-21 @ 08:00)   1 Lozenge Oral (11-20-21 @ 00:26)   1 Lozenge Oral (11-19-21 @ 20:45)   1 Lozenge Oral (11-19-21 @ 15:13)   1 Lozenge Oral (11-19-21 @ 11:44)   1 Lozenge Oral (11-19-21 @ 08:34)   1 Lozenge Oral (11-19-21 @ 00:56)   1 Lozenge Oral (11-18-21 @ 20:11)   1 Lozenge Oral (11-18-21 @ 15:16)   1 Lozenge Oral (11-18-21 @ 11:13)   1 Lozenge Oral (11-18-21 @ 07:50)   1 Lozenge Oral (11-18-21 @ 00:05)   1 Lozenge Oral (11-17-21 @ 20:15)   1 Lozenge Oral (11-17-21 @ 16:41)   1 Lozenge Oral (11-17-21 @ 11:59)   1 Lozenge Oral (11-17-21 @ 08:28)   1 Lozenge Oral (11-17-21 @ 00:08)   1 Lozenge Oral (11-16-21 @ 19:50)   1 Lozenge Oral (11-16-21 @ 16:06)   1 Lozenge Oral (11-16-21 @ 12:56)   1 Lozenge Oral (11-16-21 @ 08:26)   1 Lozenge Oral (11-16-21 @ 00:12)   1 Lozenge Oral (11-15-21 @ 19:27)   1 Lozenge Oral (11-15-21 @ 16:19)   1 Lozenge Oral (11-15-21 @ 13:09)    fluconAZOLE   Tablet   400 milliGRAM(s) Oral (11-23-21 @ 11:55)   400 milliGRAM(s) Oral (11-22-21 @ 11:41)   400 milliGRAM(s) Oral (11-21-21 @ 11:59)   400 milliGRAM(s) Oral (11-20-21 @ 12:02)   400 milliGRAM(s) Oral (11-19-21 @ 11:44)   400 milliGRAM(s) Oral (11-18-21 @ 11:06)   400 milliGRAM(s) Oral (11-17-21 @ 11:58)   400 milliGRAM(s) Oral (11-16-21 @ 12:56)   400 milliGRAM(s) Oral (11-15-21 @ 13:08)    metroNIDAZOLE  IVPB   100 mL/Hr IV Intermittent (11-23-21 @ 08:41)    trimethoprim  160 mG/sulfamethoxazole 800 mG   1 Tablet(s) Oral (11-17-21 @ 17:03)   1 Tablet(s) Oral (11-17-21 @ 06:49)   1 Tablet(s) Oral (11-16-21 @ 17:35)   1 Tablet(s) Oral (11-16-21 @ 06:37)   1 Tablet(s) Oral (11-15-21 @ 17:52)    vancomycin  IVPB   250 mL/Hr IV Intermittent (11-23-21 @ 07:30)      ANTIMICROBIALS:    acyclovir IVPB 490 every 8 hours  clotrimazole Lozenge 1 five times a day  fluconAZOLE IVPB 400 every 24 hours  meropenem  IVPB 1000 every 8 hours  metroNIDAZOLE  IVPB 500 every 8 hours    OTHER MEDS: MEDICATIONS  (STANDING):  acetaminophen     Tablet .. 650 every 6 hours PRN  acetaminophen     Tablet .. 650 every 6 hours  dronabinol 5 two times a day  filgrastim-sndz (ZARXIO) Injectable 480 every 24 hours  furosemide   Injectable 20 every 24 hours PRN  heparin  Infusion 410 <Continuous>  LORazepam   Injectable 1 every 24 hours  metoclopramide Injectable 10 every 6 hours PRN  ondansetron Injectable 8 every 8 hours  pantoprazole  Injectable 40 daily  polyethylene glycol 3350 17 daily PRN  senna 1 at bedtime PRN  simethicone 80 every 8 hours  sodium bicarbonate Mouth Rinse 10 five times a day  ursodiol Capsule 300 two times a day    SOCIAL HISTORY:   hx smoking  non-smoker    FAMILY HISTORY:  No pertinent family history in first degree relatives      REVIEW OF SYSTEMS  [  ] ROS unobtainable because:    [  ] All other systems negative except as noted below:	    Constitutional:  [ ] fever [ ] chills  [ ] weight loss  [ ] weakness  Skin:  [ ] rash [ ] phlebitis	  Eyes: [ ] icterus [ ] pain  [ ] discharge	  ENMT: [ ] sore throat  [ ] thrush [ ] ulcers [ ] exudates  Respiratory: [ ] dyspnea [ ] hemoptysis [ ] cough [ ] sputum	  Cardiovascular:  [ ] chest pain [ ] palpitations [ ] edema	  Gastrointestinal:  [ ] nausea [ ] vomiting [ ] diarrhea [ ] constipation [ ] pain	  Genitourinary:  [ ] dysuria [ ] frequency [ ] hematuria [ ] discharge [ ] flank pain  [ ] incontinence  Musculoskeletal:  [ ] myalgias [ ] arthralgias [ ] arthritis  [ ] back pain  Neurological:  [ ] headache [ ] seizures  [ ] confusion/altered mental status  Psychiatric:  [ ] anxiety [ ] depression	  Hematology/Lymphatics:  [ ] lymphadenopathy  Endocrine:  [ ] adrenal [ ] thyroid  Allergic/Immunologic:	 [ ] transplant [ ] seasonal    Vital Signs Last 24 Hrs  T(F): 99.2 (11-23-21 @ 16:00), Max: 100 (11-19-21 @ 21:55)  Vital Signs Last 24 Hrs  HR: 103 (11-23-21 @ 16:00) (84 - 106)  BP: 136/74 (11-23-21 @ 16:00) (88/55 - 151/94)  RR: 17 (11-23-21 @ 16:00)  SpO2: 97% (11-23-21 @ 16:00) (96% - 100%)  Wt(kg): --    PHYSICAL EXAMINATION:  General: Alert and Awake, NAD  HEENT: PERRL, EOMI, No subconjunctival hemorrhages, Oropharynx Clear, MMM  Neck: Supple, No ALEXX  Cardiac: RRR, No M/R/G  Resp: CTAB, No Wh/Rh/Ra  Abdomen: NBS, NT/ND, No HSM, No rigidity or guarding  MSK: No LE edema. No stigmata of IE. No evidence of phlebitis. No evidence of synovitis.  : No haile  Skin: No rashes or lesions. Skin is warm and dry to the touch.   Neuro: Alert and Awake. CN 2-12 Grossly intact. Moves all four extremities spontaneously.  Psych: Calm, Pleasant, Cooperative                          10.1   <0.10 )-----------( 37       ( 23 Nov 2021 07:21 )             30.8     11-23    136  |  102  |  25<H>  ----------------------------<  130<H>  3.9   |  23  |  0.79    Ca    8.5      23 Nov 2021 07:20  Phos  2.8     11-23  Mg     1.9     11-23    TPro  5.3<L>  /  Alb  3.3  /  TBili  0.6  /  DBili  x   /  AST  7<L>  /  ALT  6<L>  /  AlkPhos  73  11-23    MICROBIOLOGY:  Culture - Sterility Check (collected 19 Nov 2021 16:21)  Source: STER [id number]  Preliminary Report (21 Nov 2021 18:52):    No growth    Culture - Urine (collected 19 Nov 2021 12:26)  Source: Clean Catch Clean Catch (Midstream)  Final Report (20 Nov 2021 08:41):    No growth                    RADIOLOGY:    <The imaging below has been reviewed and visualized by me independently. Findings as detailed in report below>    EXAM:  CT ABDOMEN AND PELVIS IC                        PROCEDURE DATE:  11/23/2021    Terminal ileitis with associated ileus versus low-grade obstruction. Pockets of free air in the right lower quadrant adjacent to the terminal ileum concerning for bowel perforation. 2.6 cm loculated fluid adjacent to the terminal ileum and surrounding peritoneal enhancement suggestive of developing peritonitis. Distant free air in the right upper quadrant.    Sigmoid colon adjacent to the thickened terminal ileum is also markedly inflamed, with also notable diverticulosis. Sigmoid colitis/diverticulitis can also be considered on the differential versus primary terminal ileitis with reactive inflammation of the sigmoid colon.    Mild inflammation of the right colon and appendix may be reactive in nature. The appendix at the base and mid segment is nondistended. The tip of the appendix is not distinctly visualized adjacent to the inflamed terminal ileum.     Patient is a 66y old  Male who presents with a chief complaint of Autologous peripheral blood stem cell transplant with high dose Melphalan prep regimen for treatment of multiple myeloma (23 Nov 2021 09:58)      HPI:    66 year old male with multiple myeloma admitted for an autologous pbsct with high dose melphalan prep regimen. Hematologic history as follows: 11/2020 was found to have a T-12 compression fracture. He saw orthopedics 1/2021, and was referred to endocrine. 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 Serum immunofixation showed 1 IgD lambda band and free lambda light chains. Urine immunofixation negative for monoclonal band. 4/29/21 a bone marrow biopsy and aspirate was consistent with plasma cell myeloma (> 90% involvement), flow cytometry positive for monotypic plasma cells. He started cycle 1 RVD on 5/13/21. He completed 6 cycles of RVD 9/30/21. During chemotherapy he reported occasional blurry vision (negative optho workup, negative MRI brain 9/29/21) and moderate fatigue. He has no complaints on admission other than facial erythema, likely related to kepivance.  (15 Nov 2021 13:09)    Noted to be hypotensive and encephalopathic on AM of 11/23. CT A/P (11/23) with findings concerning for ileitis and Pockets of free air in the right lower quadrant adjacent to the terminal ileum concerning for bowel perforation and 2.6 cm loculated fluid adjacent to the terminal ileum and surrounding peritoneal enhancement suggestive of developing peritonitis.     Antibiotics:  Fluconazole: 11/15 -->   PO Bactrim: 11/15 --> 11/17  Ciprofloxacin: 11/22 --> 11/23  Vancomycin: 11/23  Metronidazole: 11/23  Aztreonam: 11/23      prior hospital charts reviewed [  x]  primary team notes reviewed [ x ]  other consultant notes reviewed [ x ]    PAST MEDICAL & SURGICAL HISTORY:  Hyperlipidemia    Shortness of breath on exertion    Lumbar herniated disc    T12 compression fracture    Acute lumbar radiculopathy    History of basal cell carcinoma    History of surgery on wrist        Allergies  Omnicef (Unknown)  penicillin (Hives)    ANTIMICROBIALS (past 90 days)  MEDICATIONS  (STANDING):  acyclovir   Oral Tab/Cap   400 milliGRAM(s) Oral (11-23-21 @ 05:53)   400 milliGRAM(s) Oral (11-22-21 @ 22:36)   400 milliGRAM(s) Oral (11-22-21 @ 14:21)   400 milliGRAM(s) Oral (11-22-21 @ 05:16)   400 milliGRAM(s) Oral (11-21-21 @ 21:55)   400 milliGRAM(s) Oral (11-21-21 @ 14:06)   400 milliGRAM(s) Oral (11-21-21 @ 05:33)   400 milliGRAM(s) Oral (11-20-21 @ 21:12)   400 milliGRAM(s) Oral (11-20-21 @ 14:17)   400 milliGRAM(s) Oral (11-20-21 @ 05:21)   400 milliGRAM(s) Oral (11-19-21 @ 21:59)   400 milliGRAM(s) Oral (11-19-21 @ 13:15)   400 milliGRAM(s) Oral (11-19-21 @ 05:34)   400 milliGRAM(s) Oral (11-18-21 @ 21:34)   400 milliGRAM(s) Oral (11-18-21 @ 13:03)   400 milliGRAM(s) Oral (11-18-21 @ 06:29)    aztreonam  IVPB   100 mL/Hr IV Intermittent (11-23-21 @ 08:29)    ciprofloxacin     Tablet   500 milliGRAM(s) Oral (11-23-21 @ 05:53)   500 milliGRAM(s) Oral (11-22-21 @ 17:26)    clotrimazole Lozenge   1 Lozenge Oral (11-23-21 @ 11:54)   1 Lozenge Oral (11-23-21 @ 00:53)   1 Lozenge Oral (11-22-21 @ 21:13)   1 Lozenge Oral (11-22-21 @ 17:23)   1 Lozenge Oral (11-22-21 @ 11:41)   1 Lozenge Oral (11-22-21 @ 08:20)   1 Lozenge Oral (11-22-21 @ 00:56)   1 Lozenge Oral (11-21-21 @ 20:29)   1 Lozenge Oral (11-21-21 @ 16:11)   1 Lozenge Oral (11-21-21 @ 12:00)   1 Lozenge Oral (11-21-21 @ 08:08)   1 Lozenge Oral (11-21-21 @ 00:30)   1 Lozenge Oral (11-20-21 @ 20:51)   1 Lozenge Oral (11-20-21 @ 16:06)   1 Lozenge Oral (11-20-21 @ 12:01)   1 Lozenge Oral (11-20-21 @ 08:00)   1 Lozenge Oral (11-20-21 @ 00:26)   1 Lozenge Oral (11-19-21 @ 20:45)   1 Lozenge Oral (11-19-21 @ 15:13)   1 Lozenge Oral (11-19-21 @ 11:44)   1 Lozenge Oral (11-19-21 @ 08:34)   1 Lozenge Oral (11-19-21 @ 00:56)   1 Lozenge Oral (11-18-21 @ 20:11)   1 Lozenge Oral (11-18-21 @ 15:16)   1 Lozenge Oral (11-18-21 @ 11:13)   1 Lozenge Oral (11-18-21 @ 07:50)   1 Lozenge Oral (11-18-21 @ 00:05)   1 Lozenge Oral (11-17-21 @ 20:15)   1 Lozenge Oral (11-17-21 @ 16:41)   1 Lozenge Oral (11-17-21 @ 11:59)   1 Lozenge Oral (11-17-21 @ 08:28)   1 Lozenge Oral (11-17-21 @ 00:08)   1 Lozenge Oral (11-16-21 @ 19:50)   1 Lozenge Oral (11-16-21 @ 16:06)   1 Lozenge Oral (11-16-21 @ 12:56)   1 Lozenge Oral (11-16-21 @ 08:26)   1 Lozenge Oral (11-16-21 @ 00:12)   1 Lozenge Oral (11-15-21 @ 19:27)   1 Lozenge Oral (11-15-21 @ 16:19)   1 Lozenge Oral (11-15-21 @ 13:09)    fluconAZOLE   Tablet   400 milliGRAM(s) Oral (11-23-21 @ 11:55)   400 milliGRAM(s) Oral (11-22-21 @ 11:41)   400 milliGRAM(s) Oral (11-21-21 @ 11:59)   400 milliGRAM(s) Oral (11-20-21 @ 12:02)   400 milliGRAM(s) Oral (11-19-21 @ 11:44)   400 milliGRAM(s) Oral (11-18-21 @ 11:06)   400 milliGRAM(s) Oral (11-17-21 @ 11:58)   400 milliGRAM(s) Oral (11-16-21 @ 12:56)   400 milliGRAM(s) Oral (11-15-21 @ 13:08)    metroNIDAZOLE  IVPB   100 mL/Hr IV Intermittent (11-23-21 @ 08:41)    trimethoprim  160 mG/sulfamethoxazole 800 mG   1 Tablet(s) Oral (11-17-21 @ 17:03)   1 Tablet(s) Oral (11-17-21 @ 06:49)   1 Tablet(s) Oral (11-16-21 @ 17:35)   1 Tablet(s) Oral (11-16-21 @ 06:37)   1 Tablet(s) Oral (11-15-21 @ 17:52)    vancomycin  IVPB   250 mL/Hr IV Intermittent (11-23-21 @ 07:30)      ANTIMICROBIALS:    acyclovir IVPB 490 every 8 hours  clotrimazole Lozenge 1 five times a day  fluconAZOLE IVPB 400 every 24 hours  meropenem  IVPB 1000 every 8 hours  metroNIDAZOLE  IVPB 500 every 8 hours    OTHER MEDS: MEDICATIONS  (STANDING):  acetaminophen     Tablet .. 650 every 6 hours PRN  acetaminophen     Tablet .. 650 every 6 hours  dronabinol 5 two times a day  filgrastim-sndz (ZARXIO) Injectable 480 every 24 hours  furosemide   Injectable 20 every 24 hours PRN  heparin  Infusion 410 <Continuous>  LORazepam   Injectable 1 every 24 hours  metoclopramide Injectable 10 every 6 hours PRN  ondansetron Injectable 8 every 8 hours  pantoprazole  Injectable 40 daily  polyethylene glycol 3350 17 daily PRN  senna 1 at bedtime PRN  simethicone 80 every 8 hours  sodium bicarbonate Mouth Rinse 10 five times a day  ursodiol Capsule 300 two times a day    SOCIAL HISTORY:  No smoking. No EtOH use.     FAMILY HISTORY:  No pertinent family history in first degree relatives      REVIEW OF SYSTEMS  [  ] ROS unobtainable because:    [ x] All other systems negative except as noted below:	    Constitutional:  [ ] fever [ ] chills  [ ] weight loss  [ ] weakness  Skin:  [ ] rash [ ] phlebitis	  Eyes: [ ] icterus [ ] pain  [ ] discharge	  ENMT: [ ] sore throat  [ ] thrush [ ] ulcers [ ] exudates  Respiratory: [ ] dyspnea [ ] hemoptysis [ ] cough [ ] sputum	  Cardiovascular:  [ ] chest pain [ ] palpitations [ ] edema	  Gastrointestinal:  [ ] nausea [ ] vomiting [ ] diarrhea [ ] constipation [x ] pain	  Genitourinary:  [ ] dysuria [ ] frequency [ ] hematuria [ ] discharge [ ] flank pain  [ ] incontinence  Musculoskeletal:  [ ] myalgias [ ] arthralgias [ ] arthritis  [ ] back pain  Neurological:  [ ] headache [ ] seizures  [ ] confusion/altered mental status  Psychiatric:  [ ] anxiety [ ] depression	  Hematology/Lymphatics:  [ ] lymphadenopathy  Endocrine:  [ ] adrenal [ ] thyroid  Allergic/Immunologic:	 [ ] transplant [ ] seasonal    Vital Signs Last 24 Hrs  T(F): 99.2 (11-23-21 @ 16:00), Max: 100 (11-19-21 @ 21:55)  Vital Signs Last 24 Hrs  HR: 103 (11-23-21 @ 16:00) (84 - 106)  BP: 136/74 (11-23-21 @ 16:00) (88/55 - 151/94)  RR: 17 (11-23-21 @ 16:00)  SpO2: 97% (11-23-21 @ 16:00) (96% - 100%)  Wt(kg): --    PHYSICAL EXAMINATION:  General: Alert and Awake, NAD  HEENT: PERRL, EOMI  Neck: Supple, No ALEXX  Cardiac: RRR, No M/R/G  Resp: CTAB, No Wh/Rh/Ra  Abdomen: NBS, NT/ND, No HSM, No rigidity or guarding  MSK: No LE edema. No stigmata of IE. No evidence of phlebitis. No evidence of synovitis.  : No haile  Skin: No rashes or lesions. Skin is warm and dry to the touch.   Neuro: Alert and Awake. CN 2-12 Grossly intact. Moves all four extremities spontaneously.  Psych: Calm, Pleasant, Cooperative  Chest: R Chest Central Line (No surrounding erythema, drainage or tenderness to palpation)                          10.1   <0.10 )-----------( 37       ( 23 Nov 2021 07:21 )             30.8     11-23    136  |  102  |  25<H>  ----------------------------<  130<H>  3.9   |  23  |  0.79    Ca    8.5      23 Nov 2021 07:20  Phos  2.8     11-23  Mg     1.9     11-23    TPro  5.3<L>  /  Alb  3.3  /  TBili  0.6  /  DBili  x   /  AST  7<L>  /  ALT  6<L>  /  AlkPhos  73  11-23    MICROBIOLOGY:  Culture - Sterility Check (collected 19 Nov 2021 16:21)  Source: STER [id number]  Preliminary Report (21 Nov 2021 18:52):    No growth    Culture - Urine (collected 19 Nov 2021 12:26)  Source: Clean Catch Clean Catch (Midstream)  Final Report (20 Nov 2021 08:41):    No growth      RADIOLOGY:    <The imaging below has been reviewed and visualized by me independently. Findings as detailed in report below>    EXAM:  CT ABDOMEN AND PELVIS IC                        PROCEDURE DATE:  11/23/2021    Terminal ileitis with associated ileus versus low-grade obstruction. Pockets of free air in the right lower quadrant adjacent to the terminal ileum concerning for bowel perforation. 2.6 cm loculated fluid adjacent to the terminal ileum and surrounding peritoneal enhancement suggestive of developing peritonitis. Distant free air in the right upper quadrant.    Sigmoid colon adjacent to the thickened terminal ileum is also markedly inflamed, with also notable diverticulosis. Sigmoid colitis/diverticulitis can also be considered on the differential versus primary terminal ileitis with reactive inflammation of the sigmoid colon.    Mild inflammation of the right colon and appendix may be reactive in nature. The appendix at the base and mid segment is nondistended. The tip of the appendix is not distinctly visualized adjacent to the inflamed terminal ileum.

## 2021-11-23 NOTE — CONSULT NOTE ADULT - ASSESSMENT
66 year old male with multiple myeloma admitted for an autologous pbsct with high dose melphalan prep regimen.     s/p [facility]PC Transplant on 11/19    Noted to be hypotensive and encephalopathic on AM of 11/23.     CT A/P (11/23) with findings concerning for ileitis and Pockets of free air in the right lower quadrant adjacent to the terminal ileum concerning for bowel perforation and 2.6 cm loculated fluid adjacent to the terminal ileum and surrounding peritoneal enhancement suggestive of developing peritonitis.    Antibiotics:  Fluconazole: 11/15 -->   PO Bactrim: 11/15 --> 11/17  Ciprofloxacin: 11/22 --> 11/23  Vancomycin: 11/23  Metronidazole: 11/23  Aztreonam: 11/23    #Bowel Perforation, Peritonitis, Abdominal Fluid Collection, Hypotension, Pancytopenia, Penicillin Allergy  --Recommend discontinuing Metronidazole (do not need double anaerobic coverage)  --Recommend Surgery Evaluation  --Continue Meropenem 1g IV Q8H (monitor for s/s of allergic reaction but low suspicion for reaction)  --Continue Fluconazole 400 mg PO/IV Q24H (treatment given small bowel perforation and for prophylaxis for SCT)  --Continue to follow CBC with diff  --Continue to follow transaminases  --Continue to follow temperature curve  --Follow up on preliminary blood cultures    #s/p Stem Cell Transplant  --Continue Acyclovir prophylaxis; transitioned to IV - would maintain IVF hydration to decrease risk of crystal induced kidney injury  --Will require PCP prophylaxis - can monitor off for now. If PO access not possible may require IV Bactrim    I will continue to follow. Please feel free to contact me with any further questions.    Kris Rothman M.D.  [facility] Division of Infectious Disease  8AM-5PM: Pager Number [phone]  After Hours (or if no response): Please contact the Infectious Diseases Office at [phone]     The above assessment and plan were discussed with [id number] team

## 2021-11-23 NOTE — CONSULT NOTE ADULT - SUBJECTIVE AND OBJECTIVE BOX
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 On AM of 11/23 patient developed neutropenic sepsis with RRT for vasovagal episode had CTAP showing pneumoperitoneum and terminal ileitis. Admitted to SICU for HD monitoring and serial abdominal exams.    PAST MEDICAL HISTORY: No pertinent past medical history    Hyperlipidemia    Shortness of breath on exertion    Lumbar herniated disc    T12 compression fracture    Acute lumbar radiculopathy    History of basal cell carcinoma        PAST SURGICAL HISTORY: No significant past surgical history    History of surgery on wrist        FAMILY HISTORY: No pertinent family history in first degree relatives        SOCIAL HISTORY:    CODE STATUS:     HOME MEDICATIONS:    ALLERGIES: Omnicef (Unknown)  penicillin (Hives)      VITAL SIGNS:  ICU Vital Signs Last 24 Hrs  T(C): 37.3 (23 Nov 2021 16:00), Max: 37.3 (22 Nov 2021 17:18)  T(F): 99.2 (23 Nov 2021 16:00), Max: 99.2 (22 Nov 2021 17:18)  HR: 103 (23 Nov 2021 16:00) (84 - 106)  BP: 136/74 (23 Nov 2021 16:00) (88/55 - 151/94)  BP(mean): 94 (23 Nov 2021 16:00) (66 - 94)  ABP: --  ABP(mean): --  RR: 17 (23 Nov 2021 16:00) (17 - 22)  SpO2: 97% (23 Nov 2021 16:00) (96% - 100%)      NEURO  Exam: Alert and oriented x4  acetaminophen     Tablet .. 650 milliGRAM(s) Oral every 6 hours PRN Temp greater or equal to 38C (100.4F), Mild Pain (1 - 3)  acetaminophen     Tablet .. 650 milliGRAM(s) Oral every 6 hours  dronabinol 5 milliGRAM(s) Oral two times a day  LORazepam   Injectable 1 milliGRAM(s) IV Push every 24 hours  metoclopramide Injectable 10 milliGRAM(s) IV Push every 6 hours PRN Nausea and/or Vomiting  ondansetron Injectable 8 milliGRAM(s) IV Push every 8 hours      RESPIRATORY  Saturating well on 2L NC, no increased WOB, CTAB  ABG - ( 23 Nov 2021 14:49 )  pH: x     /  pCO2: x     /  pO2: x     / HCO3: x     / Base Excess: x     /  SaO2: x       Lactate: 0.9      CARDIOVASCULAR  VBG - ( 23 Nov 2021 07:29 )  pH: 7.28  /  pCO2: 42    /  pO2: 41    / HCO3: 20    / Base Excess: -6.7  /  SaO2: 63.8   Lactate: 6.0      Exam: Exam: Regular rate and rhythm, no M/R/G  furosemide   Injectable 20 milliGRAM(s) IV Push every 24 hours PRN if urine output < 100 ml/hr X 2 hours      GI/NUTRITION  Exam: mildly distended abdomen, TTP in RLQ, no rebound or guarding  Diet: NPO  pantoprazole  Injectable 40 milliGRAM(s) IV Push daily  polyethylene glycol 3350 17 Gram(s) Oral daily PRN Constipation  senna 1 Tablet(s) Oral at bedtime PRN Constipation  simethicone 80 milliGRAM(s) Chew every 8 hours  sodium bicarbonate Mouth Rinse 10 milliLiter(s) Swish and Spit five times a day  ursodiol Capsule 300 milliGRAM(s) Oral two times a day      GENITOURINARY/RENAL  folic acid 1 milliGRAM(s) Oral daily  multivitamin 1 Tablet(s) Oral daily  sodium chloride 0.9% lock flush 10 milliLiter(s) IV Push every 1 hour PRN Pre/post blood products, medications, blood draw, and to maintain line patency  sodium chloride 0.9%. 1000 milliLiter(s) IV Continuous <Continuous>      11-22 @ 07:01  -  11-23 @ 07:00  --------------------------------------------------------  IN:    Heparin: 99.4 mL    Oral Fluid: 660 mL    sodium chloride 0.9%: 622 mL  Total IN: 1381.4 mL    OUT:    Voided (mL): 500 mL  Total OUT: 500 mL    Total NET: 881.4 mL      11-23 @ 07:01  -  11-23 @ 16:40  --------------------------------------------------------  IN:    Heparin: 12.3 mL    Oral Fluid: 180 mL    sodium chloride 0.9%: 1948 mL    Sodium Chloride 0.9% Bolus: 2000 mL  Total IN: 4140.3 mL    OUT:    Voided (mL): 375 mL  Total OUT: 375 mL    Total NET: 3765.3 mL    11-23    136  |  102  |  25<H>  ----------------------------<  130<H>  3.9   |  23  |  0.79    Ca    8.5      23 Nov 2021 07:20  Phos  2.8     11-23  Mg     1.9     11-23    TPro  5.3<L>  /  Alb  3.3  /  TBili  0.6  /  DBili  x   /  AST  7<L>  /  ALT  6<L>  /  AlkPhos  73  11-23    [ ] Ortega catheter, indication: urine output monitoring in critically ill patient    HEMATOLOGIC  [ ] VTE Prophylaxis:  heparin  Infusion 410 Unit(s)/Hr IV Continuous <Continuous>                          10.1   <0.10 )-----------( 37       ( 23 Nov 2021 07:21 )             30.8       Transfusion: [ ] PRBC	[ ] Platelets	[ ] FFP	[ ] Cryoprecipitate      INFECTIOUS DISEASES  acyclovir IVPB 490 milliGRAM(s) IV Intermittent every 8 hours  clotrimazole Lozenge 1 Lozenge Oral five times a day  filgrastim-sndz (ZARXIO) Injectable 480 MICROGram(s) SubCutaneous every 24 hours  fluconAZOLE IVPB 400 milliGRAM(s) IV Intermittent every 24 hours  meropenem  IVPB 1000 milliGRAM(s) IV Intermittent every 8 hours  metroNIDAZOLE  IVPB 500 milliGRAM(s) IV Intermittent every 8 hours    RECENT CULTURES:  Specimen Source: STER [id number]  Date/Time: 11-19 @ 16:21  Culture Results:   No growth  Gram Stain: --  Organism: --  Specimen Source: Clean Catch Clean Catch (Midstream)  Date/Time: 11-19 @ 12:26  Culture Results:   No growth  Gram Stain: --  Organism: --    ENDOCRINE    CAPILLARY BLOOD GLUCOSE    POCT Blood Glucose.: 244 mg/dL (23 Nov 2021 07:13)    PATIENT CARE ACCESS DEVICES:  [ ] Peripheral IV  [ ] Central Venous Line	[ ] R	[ ] L	[ ] IJ	[ ] Fem	[ ] SC	Placed:   [ ] Arterial Line		[ ] R	[ ] L	[ ] Fem	[ ] Rad	[ ] Ax	Placed:   [ ] PICC:					[ ] Mediport  [ ] Urinary Catheter, Date Placed:   [x] Necessity of urinary, arterial, and venous catheters discussed    OTHER MEDICATIONS: BACItracin   Ointment 1 Application(s) Topical two times a day  Biotene Dry Mouth Oral Rinse 5 milliLiter(s) Swish and Spit five times a day  chlorhexidine 4% Liquid 1 Application(s) Topical <User Schedule>  hydrocortisone 1% Cream 1 Application(s) Topical two times a day  lidocaine/prilocaine Cream 1 Application(s) Topical daily      IMAGING STUDIES:

## 2021-11-23 NOTE — SEPSIS NOTE - NSSUBJFT_GEN_A_CORE
66 year old male with a history of multiple myeloma s/p RVD x 6.Post Autologous PBSCT day +4. RRT called for AMS and hypotension. SBP 88, tachypneic, satting well on NRB, tachycardic 100-110s, axillary temp 98.7 upon arrival.

## 2021-11-23 NOTE — CONSULT NOTE ADULT - SUBJECTIVE AND OBJECTIVE BOX
Raffaele Anderson MD  Cardiology Fellow  [phone]  All Cardiology service information can be found 24/7 on [email or web address], password: [email or web address]    Patient seen and evaluated at bedside    Chief Complaint:    HPI:  This is a 66 year old male with multiple myeloma admitted for an autologous pbsct with high dose melphalan prep regimen. Hematologic history as follows: 11/2020 was found to have a T-12 compression fracture. He saw orthopedics 1/2021, and was referred to endocrine. 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 Serum immunofixation showed 1 IgD lambda band and free lambda light chains. Urine immunofixation negative for monoclonal band. 4/29/21 a bone marrow biopsy and aspirate was consistent with plasma cell myeloma (> 90% involvement), flow cytometry positive for monotypic plasma cells. He started cycle 1 RVD on 5/13/21. He completed 6 cycles of RVD 9/30/21. During chemotherapy he reported occasional blurry vision (negative optho workup, negative MRI brain 9/29/21) and moderate fatigue. He has no complaints on admission other than facial erythema, likely related to kepivance.  (15 Nov 2021 13:09)    Summary from heme onc:   Post Autologous PBSCT day +4  11/16- melphalan 2/2; s/p melphalan hydration for 24 hours post infusion of last dose   Strict I&O, daily weights, prn diuresis   11/17- rest day   11/18- rest day   11/19- s/p HPC transplant; completed transplant hydration for 24 hours post infusion of cells. Suprapubic pain in am. UA pending, follow up culture, BK virus. AXR   11/19-vasovegal episode in AM: will monitor tele for 24hrs   11/20 d/c telemetry. Lasix 40mg IV x today, follow up I&Os, daily weight. monitor rash, receiving 2nd dose Kepivance today  11/21 add Emend for 3 days and change zofran ATC. If worsening abd pain consider CT a/p oral contrast only.   11/22- Neutropenic started on cipro once febrile will pancx and switch cipro to  Aztronam 2g Q8   11/23- AMS this am, agonal breathing, diaphoretic, cool and clammy - responsive to sternal rub. Hypotensive, hypoxic. Placed on NRB, RRT called. Labs sent. Lactate send. PCN allergy - started on Aztreonam 2g IV q 8 hoursm Flagyl 500mg IV TID for anaerobic coverage given abdominal pain and distension, Vancomycin 1g IV x 1. CT A/P pending for 12:30 11/23/21. CE with new TWI in V2-V6 - likely demand ischemia. Repeat CE and lactate at 2pm.   11/23- CT A/P with evidence of free air. Surgical team consulted- transferred to 8ICU. ID consult called as per Dr. Sorenson     Cardiac history:   11/23: Patient had a rapid called for AMS, hypotension, tachycardia to 100-110s, had ekg found to have new t-wave inversions. Patient noted to have diarrhea that day as well. Patient did have improvement of mental status with fluids. Labs significant for elevated Lactate of 6. Received total of 3L of fluids. Patient does have neutropenia and is currently on ppx. Patient transferred to sicu for peumoperitonem and terminal ileitis. Patient had new troponin elevation for which cardiology was consulted.       PMHx:   No pertinent past medical history    Hyperlipidemia    Shortness of breath on exertion    Lumbar herniated disc    T12 compression fracture    Acute lumbar radiculopathy    History of basal cell carcinoma        PSHx:   No significant past surgical history    History of surgery on wrist        Allergies:  Omnicef (Unknown)  penicillin (Hives)      Home Meds:  · 	atorvastatin 20 mg oral tablet: 1 tab(s) orally once a day  · 	Vascepa 1 g oral capsule: 2 cap(s) orally 2 times a day  · 	acyclovir 400 mg oral tablet: 1 tab(s) orally 2 times a day  · 	atorvastatin 20 mg oral tablet: 1 tab(s) orally once a day      Current Medications:   acetaminophen     Tablet .. 650 milliGRAM(s) Oral every 6 hours PRN  acetaminophen     Tablet .. 650 milliGRAM(s) Oral every 6 hours  acyclovir IVPB 490 milliGRAM(s) IV Intermittent every 8 hours  BACItracin   Ointment 1 Application(s) Topical two times a day  Biotene Dry Mouth Oral Rinse 5 milliLiter(s) Swish and Spit five times a day  chlorhexidine 4% Liquid 1 Application(s) Topical <User Schedule>  clotrimazole Lozenge 1 Lozenge Oral five times a day  dronabinol 5 milliGRAM(s) Oral two times a day  filgrastim-sndz (ZARXIO) Injectable 480 MICROGram(s) SubCutaneous every 24 hours  fluconAZOLE IVPB 400 milliGRAM(s) IV Intermittent every 24 hours  folic acid 1 milliGRAM(s) Oral daily  furosemide   Injectable 20 milliGRAM(s) IV Push every 24 hours PRN  heparin  Infusion 410 Unit(s)/Hr IV Continuous <Continuous>  hydrocortisone 1% Cream 1 Application(s) Topical two times a day  lidocaine/prilocaine Cream 1 Application(s) Topical daily  meropenem  IVPB 1000 milliGRAM(s) IV Intermittent every 8 hours  metoclopramide Injectable 10 milliGRAM(s) IV Push every 6 hours PRN  multivitamin 1 Tablet(s) Oral daily  ondansetron Injectable 8 milliGRAM(s) IV Push every 8 hours  pantoprazole  Injectable 40 milliGRAM(s) IV Push daily  polyethylene glycol 3350 17 Gram(s) Oral daily PRN  senna 1 Tablet(s) Oral at bedtime PRN  simethicone 80 milliGRAM(s) Chew every 8 hours  sodium bicarbonate Mouth Rinse 10 milliLiter(s) Swish and Spit five times a day  sodium chloride 0.9% lock flush 10 milliLiter(s) IV Push every 1 hour PRN  sodium chloride 0.9%. 1000 milliLiter(s) IV Continuous <Continuous>  ursodiol Capsule 300 milliGRAM(s) Oral two times a day      FAMILY HISTORY:  No pertinent family history in first degree relatives        Social History:  Non smoker, non drinker     REVIEW OF SYSTEMS:  CONSTITUTIONAL: No weakness, fevers or chills  EYES/ENT: No visual changes;  No dysphagia  NECK: No pain or stiffness  RESPIRATORY: No cough, wheezing, hemoptysis; No shortness of breath  CARDIOVASCULAR: No chest pain or palpitations; No lower extremity edema  GASTROINTESTINAL: No abdominal or epigastric pain. No nausea, vomiting, or hematemesis; No diarrhea or constipation. No melena or hematochezia.  BACK: No back pain  GENITOURINARY: No dysuria, frequency or hematuria  NEUROLOGICAL: No numbness or weakness  SKIN: No itching, burning, rashes, or lesions   All other review of systems is negative unless indicated above.    Physical Exam:  T(F): 99.2 (11-23), Max: 99.2 (11-23)  HR: 105 (11-23) (84 - 106)  BP: 112/66 (11-23) (88/55 - 136/74)  RR: 14 (11-23)  SpO2: 94% (11-23)  GENERAL: No acute distress, well-developed  HEAD:  Atraumatic, Normocephalic  ENT: EOMI, PERRLA, conjunctiva and sclera clear, Neck supple, No JVD, moist mucosa  CHEST/LUNG: Clear to auscultation bilaterally; No wheeze, equal breath sounds bilaterally   BACK: No spinal tenderness  HEART: Regular rate and rhythm; No murmurs, rubs, or gallops  ABDOMEN: Soft, Nontender, Nondistended; Bowel sounds present  EXTREMITIES:  No clubbing, cyanosis, or edema  PSYCH: Nl behavior, nl affect  NEUROLOGY: AAOx3, non-focal, cranial nerves intact  SKIN: Normal color, No rashes or lesions  LINES:    Cardiovascular Diagnostic Testing:    ECG: Personally reviewed:    Echo:   9/2021  Conclusions:  1. Normal left ventricular systolic function. No segmental  wall motion abnormalities.  2. Normal diastolic function  3. Normal right ventricular size and function.  4. Estimated pulmonary artery systolic pressure equals 37  mm Hg, assuming right atrial pressure equals 8 mm Hg,  consistent with borderline pulmonary pressures.  ------------------------------------------------------------------------  Confirmed on  9/28/2021 - 14:02:33 by JANNIE Waddell  ------------------------------------------------------------------------      Stress Testing:    Cath:    Imaging:    EXAM:  CT ABDOMEN AND PELVIS IC                        PROCEDURE DATE:  11/23/2021    Terminal ileitis with associated ileus versus low-grade obstruction. Pockets of free air in the right lower quadrant adjacent to the terminal ileum concerning for bowel perforation. 2.6 cm loculated fluid adjacent to the terminal ileum and surrounding peritoneal enhancement suggestive of developing peritonitis. Distant free air in the right upper quadrant.    Sigmoid colon adjacent to the thickened terminal ileum is also markedly inflamed, with also notable diverticulosis. Sigmoid colitis/diverticulitis can also be considered on the differential versus primary terminal ileitis with reactive inflammation of the sigmoid colon.    Mild inflammation of the right colon and appendix may be reactive in nature. The appendix at the base and mid segment is nondistended. The tip of the appendix is not distinctly visualized adjacent to the inflamed terminal ileum.    CXR: Personally reviewed    Labs: Personally reviewed                        10.1   <0.10 )-----------( 37       ( 23 Nov 2021 07:21 )             30.8     11-23    136  |  102  |  25<H>  ----------------------------<  130<H>  3.9   |  23  |  0.79    Ca    8.5      23 Nov 2021 07:20  Phos  2.8     11-23  Mg     1.9     11-23    TPro  5.3<L>  /  Alb  3.3  /  TBili  0.6  /  DBili  x   /  AST  7<L>  /  ALT  6<L>  /  AlkPhos  73  11-23        CARDIAC MARKERS ( 23 Nov 2021 14:49 )  346 ng/L / x     / x     / 45 U/L / x     / 6.8 ng/mL  CARDIAC MARKERS ( 23 Nov 2021 07:20 )  8 ng/L / x     / x     / x     / x     / 1.0 ng/mL

## 2021-11-23 NOTE — SEPSIS NOTE - ASSESSMENT
TO BE COMPLETED WITHIN 6 HOURS OF INITIAL ASSESSMENT:    For use in patients that have 2 sepsis criteria and new organ dysfunction   •	New or increased oxygen requirement  •	Creatinine >2mg/dL  •	Bilirubin>2mg/dL  •	Platelet <100,000/mm3  •	INR >1.5, PTT>60  •	Lactate >2    If patient persistent hypotension (SBP<90) or any lactate >4 then provider evaluation (Physician/PA/NP) within 30 minutes of bolus completion is required.    Vital Signs Last 24 Hrs  T(C): 37.3 (23 Nov 2021 05:30), Max: 37.3 (22 Nov 2021 17:18)  T(F): 99.1 (23 Nov 2021 05:30), Max: 99.2 (22 Nov 2021 17:18)  HR: 86 (23 Nov 2021 05:30) (84 - 87)  BP: 121/78 (23 Nov 2021 05:30) (113/61 - 151/94)  BP(mean): --  RR: 20 (23 Nov 2021 05:30) (18 - 20)  SpO2: 99% (23 Nov 2021 05:30) (95% - 99%)    11-22-21 @ 07:01  -  11-23-21 @ 07:00  --------------------------------------------------------  IN: 1381.4 mL / OUT: 500 mL / NET: 881.4 mL    11-23-21 @ 07:01  -  11-23-21 @ 08:06  --------------------------------------------------------  IN: 0 mL / OUT: 375 mL / NET: -375 mL      Labs:  23 Nov 2021 07:20    136    |  102    |  25     ----------------------------<  130    3.9     |  23     |  0.79     Ca    8.5        23 Nov 2021 07:20  Phos  2.8       23 Nov 2021 07:20  Mg     1.9       23 Nov 2021 07:20    TPro  5.3    /  Alb  3.3    /  TBili  0.6    /  DBili  x      /  AST  7      /  ALT  6      /  AlkPhos  73     23 Nov 2021 07:20                          10.1   <0.10 )-----------( 37       ( 23 Nov 2021 07:21 )             30.8       Lactate:    [X] I have re-evaluated the patient's fluid status and reviewed vital signs. Clinical evaluation demonstrates an appropriate response to fluid resuscitation, with subsequent plan as follows:    Patient received : 1L during the rapid and will additional 2L then    Plan (orders must be placed in EMR):     [X  ]  Check Repeat Lactate   [  ]  No change in current plan  [  ]  Start Vasopressors:  [  ]  Repeat Fluid Bolus:  [ X ] other: repeat EKG    Care Discussed with Consultants/Other Providers [X ] YES  [ ] NO

## 2021-11-23 NOTE — RAPID RESPONSE TEAM SUMMARY - NSSITUATIONBACKGROUNDRRT_GEN_ALL_CORE
66 year old male with a history of multiple myeloma s/p RVD x 6.Post Autologous PBSCT day +4. RRT called for AMS and hypotension. SBP 88, tachypneic, satting well on NRB, tachycardic 100-110s, axillary temp 98.7 upon arrival. EKG showing new T wave inversion in anterior and lateral leads, new from prior. Per primary team at bedside, patient was not responding earlier. IV fluid 1L bolus. IV tylenol given neutropenic. Mental status slowly improving. c/o abd pain. Pt also has diarrhea. VBG, lactate, cardiac enzyme, cbc, cmp, cultures were sent. Lactate 6. Vanc and aztronam x1 ordered. SBP improved to 95, mapping 72. Mental status improved and more responsive and answering quesitons appropriately. Unclear if patient was ever hypoxic. Oxygen was able to slowing to wean down to baseline. Primary team at bedside. F/u labs. Will need a total of 3L fluid per sepsis protocol and to repeat EKG to eval for resolution of T wave inversions. Consider c.diff, CT A&P to eval for infection.

## 2021-11-23 NOTE — PROGRESS NOTE ADULT - ATTENDING COMMENTS
66 year old male with recently diagnosed plasma cell myeloma status post RVD x 6, admitted for autologous pbsct with high dose melphalan prep regimen. Transplant day 11/19/21. Complicated by vasovagal episode, now resolved.   Days - 4 & -3 : 3 hours prior to Melphalan start hydration: D5NS at 200ml /hr and continue 24 hours post Melphalan infusion;   Melphalan 100mg/m2  IV   Strict I&O, daily weights, prn diuresis ...hold diuresis for now...tele for 24 hrs  Day -2, day -1 rest days. At 2200 on day – 1, start transplant hydration 1/2NS + 50mEq NaHCO3- (may substitute [id number] if bicarb not available)  Day 0 – infuse HPC product. 4 hours after infusion of cells start Zarxio 5 micrograms / kg (actual weight) . Continue through engraftment.   On day 0, + 1, + 2-give Kepivance 60micrograms / kg (actual weight).  VOD prophylaxis - low dose heparin gtt (dosed at 100 units / kg / day), glutamine supplementation, Actigall BID   PCP prophylaxis - Bactrim DS through day -2    Antiviral prophylaxis - Acyclovir 400mg po TID to start day -1   Antifungal prophylaxis- Diflucan 400 mg po daily.  GI prophylaxis - Protonix po QD   Antibacterial prophylaxis - when ANC < 500, start Cipro 500mg po BID. If becomes febrile, pan cx, CXR and change Cipro to Cefepime 2g IV q 8 hours. Continue until count recovery  Mouth care and skin care as per protocol.    11/22- Today is day +3, doing okay, counts dropping. 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 He is now admitted for an autoSCT with high dose melphalan prep regimen. Transplant day 11/19/21. Hospital course has been complicated by vasovagal episodes on 11/19/21 and 11/23/21. On 11/23/21 developed neutropenic sepsis, improved with fluid resuscitation.     PE:   VSS  Gen: A/O x 3, NAD, tired  RESP: NC on 2L, CTA, no wheeze no rhonchi  CV: S1, S2 RRR  Abd: +RLQ pain on palp, soft, mildly distended, + hyperactive BS  LE: no edema  Neuro: CNII-XII intact, no focal deficits  Psych: appropriate    MM  -dx 4/2021  -s/p RVD x 6 (5/2021 - 9/2021) -->   -admitted for autoSCT with Ivett 100mg/M2 on 11/19/21  - 4 hours after infusion of cells start Zarxio 5 micrograms / kg (actual weight) . Continue through engraftment.   Today is day + 4    ID  BACTERIAL:  Neutropenic sepsis (11/23/21) started on aztreonam (11/23 - ) and vanco (11/23 - )  -f/u cultures, CT abd, IVF, monitor lactate    VIRAL: c/w Acyclovir 400mg po TID  FUNGAL: c/w Diflucan 400 mg po daily.  PCP prophylaxis: c/w Bactrim DS     HEME  -keep Hb >7  -keep plt >10    GI:  Abd pain - RLQ - f/u CT abd/pelvis with IV contrast  Diarrhea - f/u stool cx  VOD prophylaxis - low dose heparin gtt (dosed at 100 units / kg / day), glutamine supplementation, Actigall BID   GI prophylaxis - Protonix po QD   Mouth care and skin care as per protocol.    The time was used discussed with patient, family, primary team, consultants, and acute management.

## 2021-11-23 NOTE — CHART NOTE - NSCHARTNOTEFT_GEN_A_CORE
Brief Chart note for elevated troponin, patient with private cardiologist     This is a 66 year old male with multiple myeloma admitted for an autologous pbsct with high dose melphalan prep regimen. Hematologic history as follows: 11/2020 was found to have a T-12 compression fracture. He saw orthopedics 1/2021, and was referred to endocrine. 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 Serum immunofixation showed 1 IgD lambda band and free lambda light chains. Urine immunofixation negative for monoclonal band. 4/29/21 a bone marrow biopsy and aspirate was consistent with plasma cell myeloma (> 90% involvement), flow cytometry positive for monotypic plasma cells. He started cycle 1 RVD on 5/13/21. He completed 6 cycles of RVD 9/30/21. During chemotherapy he reported occasional blurry vision (negative optho workup, negative MRI brain 9/29/21) and moderate fatigue. He has no complaints on admission other than facial erythema, likely related to kepivance.  (15 Nov 2021 13:09)    Summary from heme onc:   Post Autologous PBSCT day +4  11/16- melphalan 2/2; s/p melphalan hydration for 24 hours post infusion of last dose   Strict I&O, daily weights, prn diuresis   11/17- rest day   11/18- rest day   11/19- s/p HPC transplant; completed transplant hydration for 24 hours post infusion of cells. Suprapubic pain in am. UA pending, follow up culture, BK virus. AXR   11/19-vasovegal episode in AM: will monitor tele for 24hrs   11/20 d/c telemetry. Lasix 40mg IV x today, follow up I&Os, daily weight. monitor rash, receiving 2nd dose Kepivance today  11/21 add Emend for 3 days and change zofran ATC. If worsening abd pain consider CT a/p oral contrast only.   11/22- Neutropenic started on cipro once febrile will pancx and switch cipro to  Aztronam 2g Q8   11/23- AMS this am, agonal breathing, diaphoretic, cool and clammy - responsive to sternal rub. Hypotensive, hypoxic. Placed on NRB, RRT called. Labs sent. Lactate send. PCN allergy - started on Aztreonam 2g IV q 8 hoursm Flagyl 500mg IV TID for anaerobic coverage given abdominal pain and distension, Vancomycin 1g IV x 1. CT A/P pending for 12:30 11/23/21. CE with new TWI in V2-V6 - likely demand ischemia. Repeat CE and lactate at 2pm.   11/23- CT A/P with evidence of free air. Surgical team consulted- transferred to 8ICU. ID consult called as per Dr. Sorenson     Cardiac history:   11/23: Patient had a rapid called for AMS, hypotension, tachycardia to 100-110s, had ekg found to have new t-wave inversions. Patient noted to have diarrhea that day as well. Patient did have improvement of mental status with fluids. Labs significant for elevated Lactate of 6. Received total of 3L of fluids. Patient does have neutropenia and is currently on ppx. Patient transferred to [facility] for peumoperitonem and terminal ileitis. Patient had new troponin elevation for which cardiology was consulted. Patient reports no chest pain, palpitations, just flush feeling and then passing out (similar to his history of syncope). Reports no chest pain at this time. Patient was using exercise bike his first 2 days of hospitalization.    Massiel;s  106  107/63 (79)  95%     Physical:  Gen: NAD   Cardiac: RRR, No murmurs/ rubs or gallops, does have JVD, No LE edema  Respiratory: CTAB           Recent echo:       Echo:   9/2021  Conclusions:  1. Normal left ventricular systolic function. No segmental  wall motion abnormalities.  2. Normal diastolic function  3. Normal right ventricular size and function.  4. Estimated pulmonary artery systolic pressure equals 37  mm Hg, assuming right atrial pressure equals 8 mm Hg,  consistent with borderline pulmonary pressures.  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 On AM of 11/23 patient developed neutropenic sepsis with RRT for vasovagal episode had CTAP showing pneumoperitoneum and terminal ileitis. Admitted to SICU for HD monitoring and serial abdominal exams. Patient noted to have elevated troponin with new ekg changes in this setting, so significant cardiac history or family history. Does follow with cardiology for history of syncope (non cardiac), stress test ~1 year ago was normal per patient. No chest Pain. Patient now with likely Type II NSTEMI in setting of primary process  (infection, pneumoperitoneum) with hypotension, neutropenia, and lactic acidosis. OF note patient with thrombocytopenia.     Plan:   -Would ideally treat with Heparin and DAPT, but given low platelets would discuss with heme prior to initiating any treatment   -Agree with treating for sepsis   -Agree with trending cardiac enzymes,  EKG, and obtaining echo   -Would contact patient's cardiologist to see patient in house Brief Chart note for elevated troponin, patient with private cardiologist     This is a 66 year old male with multiple myeloma admitted for an autologous pbsct with high dose melphalan prep regimen. Hematologic history as follows: 11/2020 was found to have a T-12 compression fracture. He saw orthopedics 1/2021, and was referred to endocrine. 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 Serum immunofixation showed 1 IgD lambda band and free lambda light chains. Urine immunofixation negative for monoclonal band. 4/29/21 a bone marrow biopsy and aspirate was consistent with plasma cell myeloma (> 90% involvement), flow cytometry positive for monotypic plasma cells. He started cycle 1 RVD on 5/13/21. He completed 6 cycles of RVD 9/30/21. During chemotherapy he reported occasional blurry vision (negative optho workup, negative MRI brain 9/29/21) and moderate fatigue. He has no complaints on admission other than facial erythema, likely related to kepivance.  (15 Nov 2021 13:09)    Summary from heme onc:   Post Autologous PBSCT day +4  11/16- melphalan 2/2; s/p melphalan hydration for 24 hours post infusion of last dose   Strict I&O, daily weights, prn diuresis   11/17- rest day   11/18- rest day   11/19- s/p HPC transplant; completed transplant hydration for 24 hours post infusion of cells. Suprapubic pain in am. UA pending, follow up culture, BK virus. AXR   11/19-vasovegal episode in AM: will monitor tele for 24hrs   11/20 d/c telemetry. Lasix 40mg IV x today, follow up I&Os, daily weight. monitor rash, receiving 2nd dose Kepivance today  11/21 add Emend for 3 days and change zofran ATC. If worsening abd pain consider CT a/p oral contrast only.   11/22- Neutropenic started on cipro once febrile will pancx and switch cipro to  Aztronam 2g Q8   11/23- AMS this am, agonal breathing, diaphoretic, cool and clammy - responsive to sternal rub. Hypotensive, hypoxic. Placed on NRB, RRT called. Labs sent. Lactate send. PCN allergy - started on Aztreonam 2g IV q 8 hoursm Flagyl 500mg IV TID for anaerobic coverage given abdominal pain and distension, Vancomycin 1g IV x 1. CT A/P pending for 12:30 11/23/21. CE with new TWI in V2-V6 - likely demand ischemia. Repeat CE and lactate at 2pm.   11/23- CT A/P with evidence of free air. Surgical team consulted- transferred to 8ICU. ID consult called as per Dr. Sorenson     Cardiac history:   11/23: Patient had a rapid called for AMS, hypotension, tachycardia to 100-110s, had ekg found to have new t-wave inversions. Patient noted to have diarrhea that day as well. Patient did have improvement of mental status with fluids. Labs significant for elevated Lactate of 6. Received total of 3L of fluids. Patient does have neutropenia and is currently on ppx. Patient transferred to [facility] for peumoperitonem and terminal ileitis. Patient had new troponin elevation for which cardiology was consulted. Patient reports no chest pain, palpitations, just flush feeling and then passing out (similar to his history of syncope). Reports no chest pain at this time. Patient was using exercise bike his first 2 days of hospitalization.    Massiel;s  106  107/63 (79)  95%     Physical:  Gen: NAD   Cardiac: RRR, No murmurs/ rubs or gallops, does have JVD, No LE edema  Respiratory: CTAB           Recent echo:       Echo:   9/2021  Conclusions:  1. Normal left ventricular systolic function. No segmental  wall motion abnormalities.  2. Normal diastolic function  3. Normal right ventricular size and function.  4. Estimated pulmonary artery systolic pressure equals 37  mm Hg, assuming right atrial pressure equals 8 mm Hg,  consistent with borderline pulmonary pressures.  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 On AM of 11/23 patient developed neutropenic sepsis with RRT for vasovagal episode had CTAP showing pneumoperitoneum and terminal ileitis. Admitted to SICU for HD monitoring and serial abdominal exams. Patient noted to have elevated troponin with new ekg changes in this setting, so significant cardiac history or family history. Does follow with cardiology for history of syncope (non cardiac), stress test ~1 year ago was normal per patient. No chest Pain. Patient now with likely Type II NSTEMI in setting of primary process  (infection, pneumoperitoneum) with hypotension, neutropenia, and lactic acidosis. OF note patient with thrombocytopenia.     Plan:   -Would ideally treat with Heparin and DAPT, but given low platelets would discuss with heme and patient' cardiologist prior to initiating any treatment   -Agree with treating for sepsis   -Agree with trending cardiac enzymes,  EKG, and obtaining echo   -Would contact patient's cardiologist to see patient in house Brief Chart note for elevated troponin, patient with private cardiologist     This is a 66 year old male with multiple myeloma admitted for an autologous pbsct with high dose melphalan prep regimen. Hematologic history as follows: 11/2020 was found to have a T-12 compression fracture. He saw orthopedics 1/2021, and was referred to endocrine. 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 Serum immunofixation showed 1 IgD lambda band and free lambda light chains. Urine immunofixation negative for monoclonal band. 4/29/21 a bone marrow biopsy and aspirate was consistent with plasma cell myeloma (> 90% involvement), flow cytometry positive for monotypic plasma cells. He started cycle 1 RVD on 5/13/21. He completed 6 cycles of RVD 9/30/21. During chemotherapy he reported occasional blurry vision (negative optho workup, negative MRI brain 9/29/21) and moderate fatigue. He has no complaints on admission other than facial erythema, likely related to kepivance.  (15 Nov 2021 13:09)    Summary from heme onc:   Post Autologous PBSCT day +4  11/16- melphalan 2/2; s/p melphalan hydration for 24 hours post infusion of last dose   Strict I&O, daily weights, prn diuresis   11/17- rest day   11/18- rest day   11/19- s/p HPC transplant; completed transplant hydration for 24 hours post infusion of cells. Suprapubic pain in am. UA pending, follow up culture, BK virus. AXR   11/19-vasovegal episode in AM: will monitor tele for 24hrs   11/20 d/c telemetry. Lasix 40mg IV x today, follow up I&Os, daily weight. monitor rash, receiving 2nd dose Kepivance today  11/21 add Emend for 3 days and change zofran ATC. If worsening abd pain consider CT a/p oral contrast only.   11/22- Neutropenic started on cipro once febrile will pancx and switch cipro to  Aztronam 2g Q8   11/23- AMS this am, agonal breathing, diaphoretic, cool and clammy - responsive to sternal rub. Hypotensive, hypoxic. Placed on NRB, RRT called. Labs sent. Lactate send. PCN allergy - started on Aztreonam 2g IV q 8 hoursm Flagyl 500mg IV TID for anaerobic coverage given abdominal pain and distension, Vancomycin 1g IV x 1. CT A/P pending for 12:30 11/23/21. CE with new TWI in V2-V6 - likely demand ischemia. Repeat CE and lactate at 2pm.   11/23- CT A/P with evidence of free air. Surgical team consulted- transferred to 8ICU. ID consult called as per Dr. Sorenson     Cardiac history:   11/23: Patient had a rapid called for AMS, hypotension, tachycardia to 100-110s, had ekg found to have new t-wave inversions. Patient noted to have diarrhea that day as well. Patient did have improvement of mental status with fluids. Labs significant for elevated Lactate of 6. Received total of 3L of fluids. Patient does have neutropenia and is currently on ppx. Patient transferred to [facility] for peumoperitonem and terminal ileitis. Patient had new troponin elevation for which cardiology was consulted. Patient reports no chest pain, palpitations, just flush feeling and then passing out (similar to his history of syncope). Reports no chest pain at this time. Patient was using exercise bike his first 2 days of hospitalization.    Massiel;s  106  107/63 (79)  95%     Physical:  Gen: NAD   Cardiac: RRR, No murmurs/ rubs or gallops, does have JVD, No LE edema  Respiratory: CTAB           Recent echo:       Echo:   9/2021  Conclusions:  1. Normal left ventricular systolic function. No segmental  wall motion abnormalities.  2. Normal diastolic function  3. Normal right ventricular size and function.  4. Estimated pulmonary artery systolic pressure equals 37  mm Hg, assuming right atrial pressure equals 8 mm Hg,  consistent with borderline pulmonary pressures.  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 On AM of 11/23 patient developed neutropenic sepsis with RRT for vasovagal episode had CTAP showing pneumoperitoneum and terminal ileitis. Admitted to SICU for HD monitoring and serial abdominal exams. Patient noted to have elevated troponin with new ekg changes in this setting, so significant cardiac history or family history. Does follow with cardiology for history of syncope (non cardiac), stress test ~1 year ago was normal per patient. No chest Pain. Patient now with likely Type II NSTEMI in setting of primary process  (infection, pneumoperitoneum) with hypotension, neutropenia, and lactic acidosis. OF note patient with thrombocytopenia.     Plan:   -Would ideally treat with Heparin and DAPT, but given low platelets would discuss with heme and patient' cardiologist prior to initiating any treatment (if would give more platelets, etc..)  -Agree with treating for sepsis   -Agree with trending cardiac enzymes,  EKG, and obtaining echo   -Would contact patient's cardiologist to see patient in house Brief Chart note for elevated troponin, patient with private cardiologist     This is a 66 year old male with multiple myeloma admitted for an autologous pbsct with high dose melphalan prep regimen. Hematologic history as follows: 11/2020 was found to have a T-12 compression fracture. He saw orthopedics 1/2021, and was referred to endocrine. 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 Serum immunofixation showed 1 IgD lambda band and free lambda light chains. Urine immunofixation negative for monoclonal band. 4/29/21 a bone marrow biopsy and aspirate was consistent with plasma cell myeloma (> 90% involvement), flow cytometry positive for monotypic plasma cells. He started cycle 1 RVD on 5/13/21. He completed 6 cycles of RVD 9/30/21. During chemotherapy he reported occasional blurry vision (negative optho workup, negative MRI brain 9/29/21) and moderate fatigue. He has no complaints on admission other than facial erythema, likely related to kepivance.  (15 Nov 2021 13:09)    Summary from heme onc:   Post Autologous PBSCT day +4  11/16- melphalan 2/2; s/p melphalan hydration for 24 hours post infusion of last dose   Strict I&O, daily weights, prn diuresis   11/17- rest day   11/18- rest day   11/19- s/p HPC transplant; completed transplant hydration for 24 hours post infusion of cells. Suprapubic pain in am. UA pending, follow up culture, BK virus. AXR   11/19-vasovegal episode in AM: will monitor tele for 24hrs   11/20 d/c telemetry. Lasix 40mg IV x today, follow up I&Os, daily weight. monitor rash, receiving 2nd dose Kepivance today  11/21 add Emend for 3 days and change zofran ATC. If worsening abd pain consider CT a/p oral contrast only.   11/22- Neutropenic started on cipro once febrile will pancx and switch cipro to  Aztronam 2g Q8   11/23- AMS this am, agonal breathing, diaphoretic, cool and clammy - responsive to sternal rub. Hypotensive, hypoxic. Placed on NRB, RRT called. Labs sent. Lactate send. PCN allergy - started on Aztreonam 2g IV q 8 hoursm Flagyl 500mg IV TID for anaerobic coverage given abdominal pain and distension, Vancomycin 1g IV x 1. CT A/P pending for 12:30 11/23/21. CE with new TWI in V2-V6 - likely demand ischemia. Repeat CE and lactate at 2pm.   11/23- CT A/P with evidence of free air. Surgical team consulted- transferred to 8ICU. ID consult called as per Dr. Sorenson     Cardiac history:   11/23: Patient had a rapid called for AMS, hypotension, tachycardia to 100-110s, had ekg found to have new t-wave inversions. Patient noted to have diarrhea that day as well. Patient did have improvement of mental status with fluids. Labs significant for elevated Lactate of 6. Received total of 3L of fluids. Patient does have neutropenia and is currently on ppx. Patient transferred to [facility] for peumoperitonem and terminal ileitis. Patient had new troponin elevation for which cardiology was consulted. Patient reports no chest pain, palpitations, just flush feeling and then passing out (similar to his history of syncope). Reports no chest pain at this time. Patient was using exercise bike his first 2 days of hospitalization.    Massiel;ls  106  107/63 (79)  95%     Physical:  Gen: NAD   Cardiac: RRR, No murmurs/ rubs or gallops, does have JVD, No LE edema  Respiratory: CTAB           Recent echo:       Echo:   9/2021  Conclusions:  1. Normal left ventricular systolic function. No segmental  wall motion abnormalities.  2. Normal diastolic function  3. Normal right ventricular size and function.  4. Estimated pulmonary artery systolic pressure equals 37  mm Hg, assuming right atrial pressure equals 8 mm Hg,  consistent with borderline pulmonary pressures.  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 On AM of 11/23 patient developed neutropenic sepsis with RRT for vasovagal episode had CTAP showing pneumoperitoneum and terminal ileitis. Admitted to SICU for HD monitoring and serial abdominal exams. Patient noted to have elevated troponin with new ekg changes in this setting, so significant cardiac history or family history. Does follow with cardiology for history of syncope (non cardiac), stress test ~1 year ago was normal per patient. No chest Pain. Patient now with likely Type II NSTEMI v in setting of primary process  (infection, pneumoperitoneum) with hypotension, neutropenia, and lactic acidosis vs PE. OF note patient with severe thrombocytopenia.     Plan:   -Would ideally treat with Heparin and DAPT vs. just heparin, but given low platelets would discuss with heme and patient' cardiologist prior to initiating any treatment (if would give more platelets, etc..)  -Agree with treating for sepsis   -Agree with trending cardiac enzymes,  EKG, and obtaining echo   -Would contact patient's cardiologist to see patient in house

## 2021-11-23 NOTE — CONSULT NOTE ADULT - SUBJECTIVE AND OBJECTIVE BOX
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 On AM of 11/23 patient developed neutropenic sepsis with RRT for vasovagal episode had CTAP showing pneumoperitoneum and terminal ileitis. Patient currently denies headache, dizziness, weakness, fevers, chills, shortness of breath, chest pain, abdominal pain, or nausea/vomiting.    PAST MEDICAL HISTORY: No pertinent past medical history    Hyperlipidemia    Shortness of breath on exertion    Lumbar herniated disc    T12 compression fracture    Acute lumbar radiculopathy    History of basal cell carcinoma        PAST SURGICAL HISTORY: No significant past surgical history    History of surgery on wrist        HOME MEDICATIONS:    ALLERGIES: Omnicef (Unknown)  penicillin (Hives)      FAMILY HISTORY: No pertinent family history in first degree relatives        SOCIAL HISTORY:    REVIEW OF SYSTEMS:    VITAL SIGNS:  ICU Vital Signs Last 24 Hrs  T(C): 37.3 (23 Nov 2021 16:00), Max: 37.3 (23 Nov 2021 05:30)  T(F): 99.2 (23 Nov 2021 16:00), Max: 99.2 (23 Nov 2021 16:00)  HR: 109 (23 Nov 2021 18:00) (84 - 109)  BP: 95/67 (23 Nov 2021 18:00) (88/55 - 136/74)  BP(mean): 78 (23 Nov 2021 18:00) (66 - 94)  ABP: --  ABP(mean): --  RR: 18 (23 Nov 2021 18:00) (14 - 22)  SpO2: 95% (23 Nov 2021 18:00) (93% - 100%)      PHYSICAL EXAMINATION:  General - ill appearing  Neuro - awake, alert, oriented x4, no acute focal deficits  HEENT - normocephalic, PERRL, moist mucous membranes  Lungs - breathing comfortably on room air  Heart - pulse regular  Abdomen - softly distended, nontender  Extremities - all four extremities are warm & pink    LABS:                          9.7    <0.10 )-----------( 29       ( 23 Nov 2021 18:26 )             29.8       11-23    136  |  106  |  24<H>  ----------------------------<  132<H>  3.8   |  19<L>  |  0.85    Ca    7.9<L>      23 Nov 2021 18:26  Phos  2.3     11-23  Mg     1.9     11-23    TPro  5.4<L>  /  Alb  3.0<L>  /  TBili  0.4  /  DBili  x   /  AST  18  /  ALT  19  /  AlkPhos  79  11-23      PT/INR - ( 23 Nov 2021 18:26 )   PT: 13.4 sec;   INR: 1.12 ratio         PTT - ( 23 Nov 2021 18:26 )  PTT:36.4 sec    ABG - ( 23 Nov 2021 18:26 )  pH: x     /  pCO2: x     /  pO2: x     / HCO3: x     / Base Excess: x     /  SaO2: x       Lactate: 1.5                VBG - ( 23 Nov 2021 07:29 )  pH: 7.28  /  pCO2: 42    /  pO2: 41    / HCO3: 20    / Base Excess: -6.7  /  SaO2: 63.8   Lactate: 6.0                RECENT CULTURES:  Specimen Source: STER [id number]  Date/Time: 11-19 @ 16:21  Culture Results:   No growth  Gram Stain: --  Organism: --  Specimen Source: Clean Catch Clean Catch (Midstream)  Date/Time: 11-19 @ 12:26  Culture Results:   No growth  Gram Stain: --  Organism: --      CAPILLARY BLOOD GLUCOSE      POCT Blood Glucose.: 244 mg/dL (23 Nov 2021 07:13)      IMAGING STUDIES:  < from: CT Abdomen and Pelvis w/ IV Cont (11.23.21 @ 13:05) >  INTERPRETATION:  CLINICAL INFORMATION: RLQ pain. Sepsis. Rule out typhlitis. Elevated lactate. Multiple myeloma.    COMPARISON: Abdominal x-ray 11/19/2021. MRI pelvis 6/1/2021. MRI thoracic spine 5/30/2021.    CONTRAST/COMPLICATIONS:  IV Contrast: Omnipaque 350  90 cc administered   10 cc discarded  Oral Contrast: NONE  Complications: None reported at time of study completion    PROCEDURE:  CT of the Abdomen and Pelvis was performed.  Sagittal and coronal reformats were performed.    FINDINGS:    LOWER CHEST: Peripheral reticulation and scarring of the lower thorax. No pleural effusion the visualized thorax. Suggestion of right coronary artery calcification.    LIVER: Enlarged measuring 20 cm in craniocaudal dimension. Main portal vein is patent.  BILE DUCTS: Normal caliber.  GALLBLADDER: Cholelithiasis.  SPLEEN: Normal size.  PANCREAS: No main pancreatic ductal dilatation. No peripancreatic inflammation.  ADRENALS: Unremarkable.  KIDNEYS/URETERS: No hydronephrosis. Left renal cysts, largest of the lower pole measuring 6.7 cm. Additional bilateral renal hypoattenuating foci too small to characterize.    BLADDER: Minimally distended withslight inflammation.  REPRODUCTIVE ORGANS: Slightly heterogeneous attenuation of the prostate with small focal coarse calcification. Prostate measures 4.7 cm transverse in dimension.    BOWEL and PERITONEUM: Limited evaluation due to lack of oral contrast. Stomach is underdistended. There is mid to distal small bowel distention with gradual transition in the right lower quadrant at the terminal ileum. The terminal ileum is markedly thickened and hyperemic in appearance. Small locules of free air are seen adjacent to the terminal ileum, image 115 series 3 with small adjacent loculated fluid measuring 2.6 cm in associated peritoneal enhancement. Distant free air is also noted in the right upper quadrant.    The colon is overall underdistended. Mild inflammation is noted of the right colon. The sigmoid colon with several diverticula is also markedly inflamed particularly adjacent to the terminal ileum, image 115 series 2. Peritoneal enhancement is also seen adjacent to the sigmoid colon, forexample on image 53 series 5.    The appendix is nondistended along the base and mid segment, however demonstrating mild inflammation, possibly reactive in nature. The tip of the appendix is not distinctly visualized adjacent to the inflamed terminalileum.    Additional small fluid in the pelvis.    VESSELS: No aneurysm of the abdominal aorta. Atheromatous change. Central vein patency is not adequately assessed due to timing of contrast.  RETROPERITONEUM/LYMPH NODES: Small volume nodes.  ABDOMINAL WALL: Tiny fat-containing umbilical and inguinal hernias.  BONES: Left pelvis fixation hardware is noted. Heterogeneous appearance of the bones with scattered lytic foci consistent with history of multiple myeloma, better evaluated on prior MRI imaging. Similar involvement of T12 vertebral body with height loss is noted, again better seen on prior thoracic spine MRI from 5/30/2021. Bilateral pars interarticularis defects at L5-S1 with grade 2 anterolisthesis which was noted on prior pelvic MRI from 6/1/2021. Age-indeterminate left lateral eighth rib pathologic fracture. Atrophy of left-sided adductor musculature.    IMPRESSION:    Terminal ileitis with associated ileus versus low-grade obstruction. Pockets of free air in the right lower quadrant adjacent to the terminal ileum concerning for bowel perforation. 2.6 cm loculated fluid adjacent to the terminal ileum and surrounding peritoneal enhancement suggestive of developing peritonitis. Distant free air in the right upper quadrant.    Sigmoid colon adjacent to the thickened terminal ileum is also markedly inflamed, with also notable diverticulosis. Sigmoid colitis/diverticulitis can also be considered on the differential versus primary terminal ileitis with reactive inflammation ofthe sigmoid colon.    Mild inflammation of the right colon and appendix may be reactive in nature. The appendix at the base and mid segment is nondistended. The tip of the appendix is not distinctly visualized adjacent to the inflamed terminal ileum.       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 On AM of 11/23 patient developed neutropenic sepsis with RRT for vasovagal episode had CTAP showing pneumoperitoneum and terminal ileitis. Patient currently denies headache, dizziness, weakness, fevers, chills, shortness of breath, chest pain, abdominal pain, or nausea/vomiting.    PAST MEDICAL HISTORY:    Hyperlipidemia    Shortness of breath on exertion    Lumbar herniated disc    T12 compression fracture    Acute lumbar radiculopathy    History of basal cell carcinoma    Multiple myeloma s/p BMT.        PAST SURGICAL HISTORY: No significant past surgical history    History of surgery on wrist            HOME MEDICATIONS:    ALLERGIES: Omnicef (Unknown)  penicillin (Hives)      FAMILY HISTORY: No pertinent family history in first degree relatives        SOCIAL HISTORY: No history of drug abuse.    REVIEW OF SYSTEMS:    VITAL SIGNS:  ICU Vital Signs Last 24 Hrs  T(C): 37.3 (23 Nov 2021 16:00), Max: 37.3 (23 Nov 2021 05:30)  T(F): 99.2 (23 Nov 2021 16:00), Max: 99.2 (23 Nov 2021 16:00)  HR: 109 (23 Nov 2021 18:00) (84 - 109)  BP: 95/67 (23 Nov 2021 18:00) (88/55 - 136/74)  BP(mean): 78 (23 Nov 2021 18:00) (66 - 94)  ABP: --  ABP(mean): --  RR: 18 (23 Nov 2021 18:00) (14 - 22)  SpO2: 95% (23 Nov 2021 18:00) (93% - 100%)      PHYSICAL EXAMINATION:  General - ill appearing  Neuro - awake, alert, oriented x4, no acute focal deficits  HEENT - normocephalic, PERRL, moist mucous membranes  Lungs - breathing comfortably on room air  Heart - pulse regular  Abdomen - softly distended, nontender  Extremities - all four extremities are warm & pink  Lymphatic hematologic - No bruising.  Skin - No discoloration.    LABS:                          9.7    <0.10 )-----------( 29       ( 23 Nov 2021 18:26 )             29.8       11-23    136  |  106  |  24<H>  ----------------------------<  132<H>  3.8   |  19<L>  |  0.85    Ca    7.9<L>      23 Nov 2021 18:26  Phos  2.3     11-23  Mg     1.9     11-23    TPro  5.4<L>  /  Alb  3.0<L>  /  TBili  0.4  /  DBili  x   /  AST  18  /  ALT  19  /  AlkPhos  79  11-23      PT/INR - ( 23 Nov 2021 18:26 )   PT: 13.4 sec;   INR: 1.12 ratio         PTT - ( 23 Nov 2021 18:26 )  PTT:36.4 sec    ABG - ( 23 Nov 2021 18:26 )  pH: x     /  pCO2: x     /  pO2: x     / HCO3: x     / Base Excess: x     /  SaO2: x       Lactate: 1.5                VBG - ( 23 Nov 2021 07:29 )  pH: 7.28  /  pCO2: 42    /  pO2: 41    / HCO3: 20    / Base Excess: -6.7  /  SaO2: 63.8   Lactate: 6.0                RECENT CULTURES:  Specimen Source: STER [id number]  Date/Time: 11-19 @ 16:21  Culture Results:   No growth  Gram Stain: --  Organism: --  Specimen Source: Clean Catch Clean Catch (Midstream)  Date/Time: 11-19 @ 12:26  Culture Results:   No growth  Gram Stain: --  Organism: --      CAPILLARY BLOOD GLUCOSE      POCT Blood Glucose.: 244 mg/dL (23 Nov 2021 07:13)      IMAGING STUDIES:  < from: CT Abdomen and Pelvis w/ IV Cont (11.23.21 @ 13:05) >  INTERPRETATION:  CLINICAL INFORMATION: RLQ pain. Sepsis. Rule out typhlitis. Elevated lactate. Multiple myeloma.    COMPARISON: Abdominal x-ray 11/19/2021. MRI pelvis 6/1/2021. MRI thoracic spine 5/30/2021.    CONTRAST/COMPLICATIONS:  IV Contrast: Omnipaque 350  90 cc administered   10 cc discarded  Oral Contrast: NONE  Complications: None reported at time of study completion    PROCEDURE:  CT of the Abdomen and Pelvis was performed.  Sagittal and coronal reformats were performed.    FINDINGS:    LOWER CHEST: Peripheral reticulation and scarring of the lower thorax. No pleural effusion the visualized thorax. Suggestion of right coronary artery calcification.    LIVER: Enlarged measuring 20 cm in craniocaudal dimension. Main portal vein is patent.  BILE DUCTS: Normal caliber.  GALLBLADDER: Cholelithiasis.  SPLEEN: Normal size.  PANCREAS: No main pancreatic ductal dilatation. No peripancreatic inflammation.  ADRENALS: Unremarkable.  KIDNEYS/URETERS: No hydronephrosis. Left renal cysts, largest of the lower pole measuring 6.7 cm. Additional bilateral renal hypoattenuating foci too small to characterize.    BLADDER: Minimally distended withslight inflammation.  REPRODUCTIVE ORGANS: Slightly heterogeneous attenuation of the prostate with small focal coarse calcification. Prostate measures 4.7 cm transverse in dimension.    BOWEL and PERITONEUM: Limited evaluation due to lack of oral contrast. Stomach is underdistended. There is mid to distal small bowel distention with gradual transition in the right lower quadrant at the terminal ileum. The terminal ileum is markedly thickened and hyperemic in appearance. Small locules of free air are seen adjacent to the terminal ileum, image 115 series 3 with small adjacent loculated fluid measuring 2.6 cm in associated peritoneal enhancement. Distant free air is also noted in the right upper quadrant.    The colon is overall underdistended. Mild inflammation is noted of the right colon. The sigmoid colon with several diverticula is also markedly inflamed particularly adjacent to the terminal ileum, image 115 series 2. Peritoneal enhancement is also seen adjacent to the sigmoid colon, forexample on image 53 series 5.    The appendix is nondistended along the base and mid segment, however demonstrating mild inflammation, possibly reactive in nature. The tip of the appendix is not distinctly visualized adjacent to the inflamed terminalileum.    Additional small fluid in the pelvis.    VESSELS: No aneurysm of the abdominal aorta. Atheromatous change. Central vein patency is not adequately assessed due to timing of contrast.  RETROPERITONEUM/LYMPH NODES: Small volume nodes.  ABDOMINAL WALL: Tiny fat-containing umbilical and inguinal hernias.  BONES: Left pelvis fixation hardware is noted. Heterogeneous appearance of the bones with scattered lytic foci consistent with history of multiple myeloma, better evaluated on prior MRI imaging. Similar involvement of T12 vertebral body with height loss is noted, again better seen on prior thoracic spine MRI from 5/30/2021. Bilateral pars interarticularis defects at L5-S1 with grade 2 anterolisthesis which was noted on prior pelvic MRI from 6/1/2021. Age-indeterminate left lateral eighth rib pathologic fracture. Atrophy of left-sided adductor musculature.    IMPRESSION:    Terminal ileitis with associated ileus versus low-grade obstruction. Pockets of free air in the right lower quadrant adjacent to the terminal ileum concerning for bowel perforation. 2.6 cm loculated fluid adjacent to the terminal ileum and surrounding peritoneal enhancement suggestive of developing peritonitis. Distant free air in the right upper quadrant.    Sigmoid colon adjacent to the thickened terminal ileum is also markedly inflamed, with also notable diverticulosis. Sigmoid colitis/diverticulitis can also be considered on the differential versus primary terminal ileitis with reactive inflammation ofthe sigmoid colon.    Mild inflammation of the right colon and appendix may be reactive in nature. The appendix at the base and mid segment is nondistended. The tip of the appendix is not distinctly visualized adjacent to the inflamed terminal ileum.

## 2021-11-23 NOTE — PROGRESS NOTE ADULT - SUBJECTIVE AND OBJECTIVE BOX
HPC Transplant Team                                                      Critical / Counseling Time Provided: 30 minutes                                                                                                                                                        Chief Complaint: Autologous peripheral blood stem cell transplant with high dose Melphalan prep regimen for treatment of multiple myeloma    S: Patient seen and examined with HPC Transplant Team:       All other ROS negative     O: Vitals:   Vital Signs Last 24 Hrs  T(C): 36.5 (23 Nov 2021 09:50), Max: 37.3 (22 Nov 2021 17:18)  T(F): 97.7 (23 Nov 2021 09:50), Max: 99.2 (22 Nov 2021 17:18)  HR: 106 (23 Nov 2021 09:50) (84 - 106)  BP: 107/70 (23 Nov 2021 09:50) (88/55 - 151/94)  BP(mean): 66 (23 Nov 2021 08:04) (66 - 66)  RR: 18 (23 Nov 2021 09:50) (18 - 22)  SpO2: 98% (23 Nov 2021 09:50) (96% - 100%)    Admit weight: 98.4kg   Today's weight:    Intake / Output:   11-22 @ 07:01  -  11-23 @ 07:00  --------------------------------------------------------  IN: 1381.4 mL / OUT: 500 mL / NET: 881.4 mL    11-23 @ 07:01 - 11-23 @ 09:59  --------------------------------------------------------  IN: 2468 mL / OUT: 375 mL / NET: 2093 mL    PE:   Oropharynx: no erythema or ulcerations   Oral Mucositis:              -                                          Grade: n/a   CVS: S1, S2 RRR   Lungs: CTA throughout bilaterally   Abdomen: + BS x 4, soft, ND mild tenderness with palpation RLQ.   Extremities: no edema   Gastric Mucositis:       -                                           Grade: n/a  Intestinal Mucositis:     -                                         Grade: n/a   Skin: patchy, erythematous maculopapular rash to face, neck and upper chest and back post Kepivance   TLC: CDI   Neuro: A&O x 3   Pain: 2/10 right side abd.       Labs:   CBC Full  -  ( 23 Nov 2021 07:21 )  WBC Count : <0.10 K/uL  Hemoglobin : 10.1 g/dL  Hematocrit : 30.8 %  Platelet Count - Automated : 37 K/uL  Mean Cell Volume : 96.0 fl  Mean Cell Hemoglobin : 31.5 pg  Mean Cell Hemoglobin Concentration : 32.8 gm/dL  Auto Neutrophil # : x  Auto Lymphocyte # : x  Auto Monocyte # : x  Auto Eosinophil # : x  Auto Basophil # : x  Auto Neutrophil % : x  Auto Lymphocyte % : x  Auto Monocyte % : x  Auto Eosinophil % : x  Auto Basophil % : x                          10.1   <0.10 )-----------( 37       ( 23 Nov 2021 07:21 )             30.8     11-23    136  |  102  |  25<H>  ----------------------------<  130<H>  3.9   |  23  |  0.79    Ca    8.5      23 Nov 2021 07:20  Phos  2.8     11-23  Mg     1.9     11-23    TPro  5.3<L>  /  Alb  3.3  /  TBili  0.6  /  DBili  x   /  AST  7<L>  /  ALT  6<L>  /  AlkPhos  73  11-23      LIVER FUNCTIONS - ( 23 Nov 2021 07:20 )  Alb: 3.3 g/dL / Pro: 5.3 g/dL / ALK PHOS: 73 U/L / ALT: 6 U/L / AST: 7 U/L / GGT: x           Lactate Dehydrogenase, Serum: 128 U/L (11-23 @ 07:20)      Cultures:   pending from 11/23/21      Radiology:   CT A/P pending       Meds:   Antimicrobials:   acyclovir   Oral Tab/Cap 400 milliGRAM(s) Oral every 8 hours  aztreonam  IVPB 2000 milliGRAM(s) IV Intermittent every 8 hours  aztreonam  IVPB      clotrimazole Lozenge 1 Lozenge Oral five times a day  fluconAZOLE   Tablet 400 milliGRAM(s) Oral daily  metroNIDAZOLE  IVPB 500 milliGRAM(s) IV Intermittent every 8 hours      Heme / Onc:   heparin  Infusion 410 Unit(s)/Hr IV Continuous <Continuous>      GI:  pantoprazole    Tablet 40 milliGRAM(s) Oral before breakfast  polyethylene glycol 3350 17 Gram(s) Oral daily PRN  senna 1 Tablet(s) Oral at bedtime PRN  simethicone 80 milliGRAM(s) Chew every 8 hours  sodium bicarbonate Mouth Rinse 10 milliLiter(s) Swish and Spit five times a day  ursodiol Capsule 300 milliGRAM(s) Oral two times a day      Cardiovascular:   furosemide   Injectable 20 milliGRAM(s) IV Push every 24 hours PRN      Immunologic:   filgrastim-sndz (ZARXIO) Injectable 480 MICROGram(s) SubCutaneous every 24 hours      Other medications:   acetaminophen     Tablet .. 650 milliGRAM(s) Oral every 6 hours  aprepitant 80 milliGRAM(s) Oral daily  BACItracin   Ointment 1 Application(s) Topical two times a day  Biotene Dry Mouth Oral Rinse 5 milliLiter(s) Swish and Spit five times a day  chlorhexidine 4% Liquid 1 Application(s) Topical <User Schedule>  dronabinol 5 milliGRAM(s) Oral two times a day  folic acid 1 milliGRAM(s) Oral daily  hydrocortisone 1% Cream 1 Application(s) Topical two times a day  lidocaine/prilocaine Cream 1 Application(s) Topical daily  LORazepam   Injectable 1 milliGRAM(s) IV Push every 24 hours  multivitamin 1 Tablet(s) Oral daily  ondansetron Injectable 8 milliGRAM(s) IV Push every 8 hours  sodium chloride 0.9%. 1000 milliLiter(s) IV Continuous <Continuous>      PRN:   acetaminophen     Tablet .. 650 milliGRAM(s) Oral every 6 hours PRN  furosemide   Injectable 20 milliGRAM(s) IV Push every 24 hours PRN  metoclopramide Injectable 10 milliGRAM(s) IV Push every 6 hours PRN  polyethylene glycol 3350 17 Gram(s) Oral daily PRN  senna 1 Tablet(s) Oral at bedtime PRN  sodium chloride 0.9% lock flush 10 milliLiter(s) IV Push every 1 hour PRN    A/P:   66 year old male with a history of multiple myeloma s/p RVD x 6   Post Autologous PBSCT day +4  11/16- melphalan 2/2; s/p melphalan hydration for 24 hours post infusion of last dose   Strict I&O, daily weights, prn diuresis   11/17- rest day   11/18- rest day   11/19- s/p HPC transplant; completed transplant hydration for 24 hours post infusion of cells. Suprapubic pain in am. UA pending, follow up culture, BK virus. AXR   11/19-vasovegal episode in AM: will monitor tele for 24hrs   11/20 d/c telemetry. Lasix 40mg IV x today, follow up I&Os, daily weight. monitor rash, receiving 2nd dose Kepivance today  11/21 add Emend for 3 days and change zofran ATC. If worsening abd pain consider CT a/p oral contrast only.   11/22- Neutropenic started on cipro once febrile will pancx and switch cipro to  Aztronam 2g Q8   11/23- AMS this am, agonal breathing, diaphoretic, cool and clammy - responsive to sternal rub. Hypotensive, hypoxic. Placed on NRB, RRT called. Labs sent. Lactate send. PCN allergy - started on Aztreonam 2g IV q 8 hoursm Flagyl 500mg IV TID for anaerobic coverage given abdominal pain and distension, Vancomycin 1g IV x 1. CT A/P pending for 12:30 12/23/21. CE with new TWI in V2-V6 - likely demand ischemia. Repeat CE and lactate at 2pm.     1. Infectious Disease:   acyclovir   Oral Tab/Cap 400 milliGRAM(s) Oral every 8 hours  aztreonam  IVPB 2000 milliGRAM(s) IV Intermittent every 8 hours  aztreonam  IVPB      clotrimazole Lozenge 1 Lozenge Oral five times a day  fluconAZOLE   Tablet 400 milliGRAM(s) Oral daily  metroNIDAZOLE  IVPB 500 milliGRAM(s) IV Intermittent every 8 hours    2. VOD Prophylaxis: Actigall, Glutamine, Heparin (dosed at 100 units / kg / day)     3. GI Prophylaxis:    pantoprazole    Tablet 40 milliGRAM(s) Oral before breakfast    4. Mouthcare - NS / NaHCO3 rinses, Mycelex, Biotene; Skin care     5. GVHD prophylaxis - n/a     6. Transfuse & replete electrolytes prn     7. IV hydration, daily weights, strict I&O, prn diuresis     8. PO intake as tolerated, nutrition follow up as needed, MVI, folic acid     9. Antiemetics, anti-diarrhea medications:   LORazepam   Injectable 1 milliGRAM(s) IV Push every 24 hours  ondansetron Injectable 8 milliGRAM(s) IV Push every 8 hours  metoclopramide Injectable 10 milliGRAM(s) IV Push every 6 hours PRN  aprepitant 80 milliGRAM(s) Oral daily    10. OOB as tolerated, physical therapy consult if needed     11. Monitor coags / fibrinogen 2x week, vitamin K as needed     12. Monitor closely for clinical changes, monitor for fevers     13. Emotional support provided, plan of care discussed and questions addressed     14. Patient education done regarding plan of care, restrictions and discharge planning     15. Continue regular social work input     I have written the above note for Dr. Naylor who performed service with me in the room.   Terrie Barr NP-C ([phone])    I have seen and examined patient with NP, I agree with above note as scribed.                    [facility] Transplant Team                                                      Critical / Counseling Time Provided: 30 minutes                                                                                                                                                        Chief Complaint: Autologous peripheral blood stem cell transplant with high dose Melphalan prep regimen for treatment of multiple myeloma    S: Patient seen and examined with [facility] Transplant Team:   +generalized weakness  +abdominal discomfort (RLQ)     All other ROS negative     O: Vitals:   Vital Signs Last 24 Hrs  T(C): 36.5 (23 Nov 2021 09:50), Max: 37.3 (22 Nov 2021 17:18)  T(F): 97.7 (23 Nov 2021 09:50), Max: 99.2 (22 Nov 2021 17:18)  HR: 106 (23 Nov 2021 09:50) (84 - 106)  BP: 107/70 (23 Nov 2021 09:50) (88/55 - 151/94)  BP(mean): 66 (23 Nov 2021 08:04) (66 - 66)  RR: 18 (23 Nov 2021 09:50) (18 - 22)  SpO2: 98% (23 Nov 2021 09:50) (96% - 100%)    Admit weight: 98.4kg   Today's weight: pending     Intake / Output:   11-22 @ 07:01 - 11-23 @ 07:00  --------------------------------------------------------  IN: 1381.4 mL / OUT: 500 mL / NET: 881.4 mL    11-23 @ 07:01 - 11-23 @ 09:59  --------------------------------------------------------  IN: 2468 mL / OUT: 375 mL / NET: 2093 mL    PE:   Oropharynx: no erythema or ulcerations   Oral Mucositis:              -                                          Grade: n/a   CVS: S1, S2 RRR   Lungs: CTA throughout bilaterally   Abdomen: + BS x 4, soft, ND mild tenderness with palpation RLQ.   Extremities: no edema   Gastric Mucositis:       -                                           Grade: n/a  Intestinal Mucositis:     -                                         Grade: n/a   Skin: patchy, erythematous maculopapular rash to face, neck and upper chest and back post Kepivance   TLC: CDI   Neuro: A&O x 3   Pain: 2/10 right side abd.       Labs:   CBC Full  -  ( 23 Nov 2021 07:21 )  WBC Count : <0.10 K/uL  Hemoglobin : 10.1 g/dL  Hematocrit : 30.8 %  Platelet Count - Automated : 37 K/uL  Mean Cell Volume : 96.0 fl  Mean Cell Hemoglobin : 31.5 pg  Mean Cell Hemoglobin Concentration : 32.8 gm/dL  Auto Neutrophil # : x  Auto Lymphocyte # : x  Auto Monocyte # : x  Auto Eosinophil # : x  Auto Basophil # : x  Auto Neutrophil % : x  Auto Lymphocyte % : x  Auto Monocyte % : x  Auto Eosinophil % : x  Auto Basophil % : x                          10.1   <0.10 )-----------( 37       ( 23 Nov 2021 07:21 )             30.8     11-23    136  |  102  |  25<H>  ----------------------------<  130<H>  3.9   |  23  |  0.79    Ca    8.5      23 Nov 2021 07:20  Phos  2.8     11-23  Mg     1.9     11-23    TPro  5.3<L>  /  Alb  3.3  /  TBili  0.6  /  DBili  x   /  AST  7<L>  /  ALT  6<L>  /  AlkPhos  73  11-23      LIVER FUNCTIONS - ( 23 Nov 2021 07:20 )  Alb: 3.3 g/dL / Pro: 5.3 g/dL / ALK PHOS: 73 U/L / ALT: 6 U/L / AST: 7 U/L / GGT: x           Lactate Dehydrogenase, Serum: 128 U/L (11-23 @ 07:20)      Cultures:   pending from 11/23/21      Radiology:   CT A/P pending       Meds:   Antimicrobials:   acyclovir   Oral Tab/Cap 400 milliGRAM(s) Oral every 8 hours  aztreonam  IVPB 2000 milliGRAM(s) IV Intermittent every 8 hours  aztreonam  IVPB      clotrimazole Lozenge 1 Lozenge Oral five times a day  fluconAZOLE   Tablet 400 milliGRAM(s) Oral daily  metroNIDAZOLE  IVPB 500 milliGRAM(s) IV Intermittent every 8 hours      Heme / Onc:   heparin  Infusion 410 Unit(s)/Hr IV Continuous <Continuous>      GI:  pantoprazole    Tablet 40 milliGRAM(s) Oral before breakfast  polyethylene glycol 3350 17 Gram(s) Oral daily PRN  senna 1 Tablet(s) Oral at bedtime PRN  simethicone 80 milliGRAM(s) Chew every 8 hours  sodium bicarbonate Mouth Rinse 10 milliLiter(s) Swish and Spit five times a day  ursodiol Capsule 300 milliGRAM(s) Oral two times a day      Cardiovascular:   furosemide   Injectable 20 milliGRAM(s) IV Push every 24 hours PRN      Immunologic:   filgrastim-sndz (ZARXIO) Injectable 480 MICROGram(s) SubCutaneous every 24 hours      Other medications:   acetaminophen     Tablet .. 650 milliGRAM(s) Oral every 6 hours  aprepitant 80 milliGRAM(s) Oral daily  BACItracin   Ointment 1 Application(s) Topical two times a day  Biotene Dry Mouth Oral Rinse 5 milliLiter(s) Swish and Spit five times a day  chlorhexidine 4% Liquid 1 Application(s) Topical <User Schedule>  dronabinol 5 milliGRAM(s) Oral two times a day  folic acid 1 milliGRAM(s) Oral daily  hydrocortisone 1% Cream 1 Application(s) Topical two times a day  lidocaine/prilocaine Cream 1 Application(s) Topical daily  LORazepam   Injectable 1 milliGRAM(s) IV Push every 24 hours  multivitamin 1 Tablet(s) Oral daily  ondansetron Injectable 8 milliGRAM(s) IV Push every 8 hours  sodium chloride 0.9%. 1000 milliLiter(s) IV Continuous <Continuous>      PRN:   acetaminophen     Tablet .. 650 milliGRAM(s) Oral every 6 hours PRN  furosemide   Injectable 20 milliGRAM(s) IV Push every 24 hours PRN  metoclopramide Injectable 10 milliGRAM(s) IV Push every 6 hours PRN  polyethylene glycol 3350 17 Gram(s) Oral daily PRN  senna 1 Tablet(s) Oral at bedtime PRN  sodium chloride 0.9% lock flush 10 milliLiter(s) IV Push every 1 hour PRN    A/P:   66 year old male with a history of multiple myeloma s/p RVD x 6   Post Autologous PBSCT day +4  11/16- melphalan 2/2; s/p melphalan hydration for 24 hours post infusion of last dose   Strict I&O, daily weights, prn diuresis   11/17- rest day   11/18- rest day   11/19- s/p HPC transplant; completed transplant hydration for 24 hours post infusion of cells. Suprapubic pain in am. UA pending, follow up culture, BK virus. AXR   11/19-vasovegal episode in AM: will monitor tele for 24hrs   11/20 d/c telemetry. Lasix 40mg IV x today, follow up I&Os, daily weight. monitor rash, receiving 2nd dose Kepivance today  11/21 add Emend for 3 days and change zofran ATC. If worsening abd pain consider CT a/p oral contrast only.   11/22- Neutropenic started on cipro once febrile will pancx and switch cipro to  Aztronam 2g Q8   11/23- AMS this am, agonal breathing, diaphoretic, cool and clammy - responsive to sternal rub. Hypotensive, hypoxic. Placed on NRB, RRT called. Labs sent. Lactate send. PCN allergy - started on Aztreonam 2g IV q 8 hoursm Flagyl 500mg IV TID for anaerobic coverage given abdominal pain and distension, Vancomycin 1g IV x 1. CT A/P pending for 12:30 12/23/21. CE with new TWI in V2-V6 - likely demand ischemia. Repeat CE and lactate at 2pm.     1. Infectious Disease:   acyclovir   Oral Tab/Cap 400 milliGRAM(s) Oral every 8 hours  aztreonam  IVPB 2000 milliGRAM(s) IV Intermittent every 8 hours  aztreonam  IVPB      clotrimazole Lozenge 1 Lozenge Oral five times a day  fluconAZOLE   Tablet 400 milliGRAM(s) Oral daily  metroNIDAZOLE  IVPB 500 milliGRAM(s) IV Intermittent every 8 hours    2. VOD Prophylaxis: Actigall, Glutamine, Heparin (dosed at 100 units / kg / day)     3. GI Prophylaxis:    pantoprazole    Tablet 40 milliGRAM(s) Oral before breakfast    4. Mouthcare - NS / NaHCO3 rinses, Mycelex, Biotene; Skin care     5. GVHD prophylaxis - n/a     6. Transfuse & replete electrolytes prn     7. IV hydration, daily weights, strict I&O, prn diuresis     8. PO intake as tolerated, nutrition follow up as needed, MVI, folic acid     9. Antiemetics, anti-diarrhea medications:   LORazepam   Injectable 1 milliGRAM(s) IV Push every 24 hours  ondansetron Injectable 8 milliGRAM(s) IV Push every 8 hours  metoclopramide Injectable 10 milliGRAM(s) IV Push every 6 hours PRN  aprepitant 80 milliGRAM(s) Oral daily    10. OOB as tolerated, physical therapy consult if needed     11. Monitor coags / fibrinogen 2x week, vitamin K as needed     12. Monitor closely for clinical changes, monitor for fevers     13. Emotional support provided, plan of care discussed and questions addressed     14. Patient education done regarding plan of care, restrictions and discharge planning     15. Continue regular social work input     I have written the above note for Dr. Naylor who performed service with me in the room.   Terrie Barr NP-C ([phone])    I have seen and examined patient with NP, I agree with above note as scribed.                    [facility] Transplant Team                                                      Critical / Counseling Time Provided: 30 minutes                                                                                                                                                        Chief Complaint: Autologous peripheral blood stem cell transplant with high dose Melphalan prep regimen for treatment of multiple myeloma    S: Patient seen and examined with [facility] Transplant Team:   +generalized weakness  +abdominal discomfort (RLQ)     All other ROS negative     O: Vitals:   Vital Signs Last 24 Hrs  T(C): 36.5 (23 Nov 2021 09:50), Max: 37.3 (22 Nov 2021 17:18)  T(F): 97.7 (23 Nov 2021 09:50), Max: 99.2 (22 Nov 2021 17:18)  HR: 106 (23 Nov 2021 09:50) (84 - 106)  BP: 107/70 (23 Nov 2021 09:50) (88/55 - 151/94)  BP(mean): 66 (23 Nov 2021 08:04) (66 - 66)  RR: 18 (23 Nov 2021 09:50) (18 - 22)  SpO2: 98% (23 Nov 2021 09:50) (96% - 100%)    Admit weight: 98.4kg   Today's weight: pending     Intake / Output:   11-22 @ 07:01 - 11-23 @ 07:00  --------------------------------------------------------  IN: 1381.4 mL / OUT: 500 mL / NET: 881.4 mL    11-23 @ 07:01 - 11-23 @ 09:59  --------------------------------------------------------  IN: 2468 mL / OUT: 375 mL / NET: 2093 mL    PE:   Oropharynx: no erythema or ulcerations   Oral Mucositis:              -                                          Grade: n/a   CVS: S1, S2 RRR   Lungs: CTA throughout bilaterally   Abdomen: + BS x 4, soft, ND mild tenderness with palpation RLQ.   Extremities: no edema   Gastric Mucositis:       -                                           Grade: n/a  Intestinal Mucositis:     -                                         Grade: n/a   Skin: patchy, erythematous maculopapular rash to face, neck and upper chest and back post Kepivance   TLC: CDI   Neuro: A&O x 3   Pain: 2/10 right side abd.       Labs:   CBC Full  -  ( 23 Nov 2021 07:21 )  WBC Count : <0.10 K/uL  Hemoglobin : 10.1 g/dL  Hematocrit : 30.8 %  Platelet Count - Automated : 37 K/uL  Mean Cell Volume : 96.0 fl  Mean Cell Hemoglobin : 31.5 pg  Mean Cell Hemoglobin Concentration : 32.8 gm/dL  Auto Neutrophil # : x  Auto Lymphocyte # : x  Auto Monocyte # : x  Auto Eosinophil # : x  Auto Basophil # : x  Auto Neutrophil % : x  Auto Lymphocyte % : x  Auto Monocyte % : x  Auto Eosinophil % : x  Auto Basophil % : x                          10.1   <0.10 )-----------( 37       ( 23 Nov 2021 07:21 )             30.8     11-23    136  |  102  |  25<H>  ----------------------------<  130<H>  3.9   |  23  |  0.79    Ca    8.5      23 Nov 2021 07:20  Phos  2.8     11-23  Mg     1.9     11-23    TPro  5.3<L>  /  Alb  3.3  /  TBili  0.6  /  DBili  x   /  AST  7<L>  /  ALT  6<L>  /  AlkPhos  73  11-23      LIVER FUNCTIONS - ( 23 Nov 2021 07:20 )  Alb: 3.3 g/dL / Pro: 5.3 g/dL / ALK PHOS: 73 U/L / ALT: 6 U/L / AST: 7 U/L / GGT: x           Lactate Dehydrogenase, Serum: 128 U/L (11-23 @ 07:20)      Cultures:   pending from 11/23/21      Radiology:   CT A/P pending       Meds:   Antimicrobials:   acyclovir   Oral Tab/Cap 400 milliGRAM(s) Oral every 8 hours  aztreonam  IVPB 2000 milliGRAM(s) IV Intermittent every 8 hours  aztreonam  IVPB      clotrimazole Lozenge 1 Lozenge Oral five times a day  fluconAZOLE   Tablet 400 milliGRAM(s) Oral daily  metroNIDAZOLE  IVPB 500 milliGRAM(s) IV Intermittent every 8 hours      Heme / Onc:   heparin  Infusion 410 Unit(s)/Hr IV Continuous <Continuous>      GI:  pantoprazole    Tablet 40 milliGRAM(s) Oral before breakfast  polyethylene glycol 3350 17 Gram(s) Oral daily PRN  senna 1 Tablet(s) Oral at bedtime PRN  simethicone 80 milliGRAM(s) Chew every 8 hours  sodium bicarbonate Mouth Rinse 10 milliLiter(s) Swish and Spit five times a day  ursodiol Capsule 300 milliGRAM(s) Oral two times a day      Cardiovascular:   furosemide   Injectable 20 milliGRAM(s) IV Push every 24 hours PRN      Immunologic:   filgrastim-sndz (ZARXIO) Injectable 480 MICROGram(s) SubCutaneous every 24 hours      Other medications:   acetaminophen     Tablet .. 650 milliGRAM(s) Oral every 6 hours  aprepitant 80 milliGRAM(s) Oral daily  BACItracin   Ointment 1 Application(s) Topical two times a day  Biotene Dry Mouth Oral Rinse 5 milliLiter(s) Swish and Spit five times a day  chlorhexidine 4% Liquid 1 Application(s) Topical <User Schedule>  dronabinol 5 milliGRAM(s) Oral two times a day  folic acid 1 milliGRAM(s) Oral daily  hydrocortisone 1% Cream 1 Application(s) Topical two times a day  lidocaine/prilocaine Cream 1 Application(s) Topical daily  LORazepam   Injectable 1 milliGRAM(s) IV Push every 24 hours  multivitamin 1 Tablet(s) Oral daily  ondansetron Injectable 8 milliGRAM(s) IV Push every 8 hours  sodium chloride 0.9%. 1000 milliLiter(s) IV Continuous <Continuous>      PRN:   acetaminophen     Tablet .. 650 milliGRAM(s) Oral every 6 hours PRN  furosemide   Injectable 20 milliGRAM(s) IV Push every 24 hours PRN  metoclopramide Injectable 10 milliGRAM(s) IV Push every 6 hours PRN  polyethylene glycol 3350 17 Gram(s) Oral daily PRN  senna 1 Tablet(s) Oral at bedtime PRN  sodium chloride 0.9% lock flush 10 milliLiter(s) IV Push every 1 hour PRN    A/P:   66 year old male with a history of multiple myeloma s/p RVD x 6   Post Autologous PBSCT day +4  11/16- melphalan 2/2; s/p melphalan hydration for 24 hours post infusion of last dose   Strict I&O, daily weights, prn diuresis   11/17- rest day   11/18- rest day   11/19- s/p HPC transplant; completed transplant hydration for 24 hours post infusion of cells. Suprapubic pain in am. UA pending, follow up culture, BK virus. AXR   11/19-vasovegal episode in AM: will monitor tele for 24hrs   11/20 d/c telemetry. Lasix 40mg IV x today, follow up I&Os, daily weight. monitor rash, receiving 2nd dose Kepivance today  11/21 add Emend for 3 days and change zofran ATC. If worsening abd pain consider CT a/p oral contrast only.   11/22- Neutropenic started on cipro once febrile will pancx and switch cipro to  Aztronam 2g Q8   11/23- AMS this am, agonal breathing, diaphoretic, cool and clammy - responsive to sternal rub. Hypotensive, hypoxic. Placed on NRB, RRT called. Labs sent. Lactate send. PCN allergy - started on Aztreonam 2g IV q 8 hoursm Flagyl 500mg IV TID for anaerobic coverage given abdominal pain and distension, Vancomycin 1g IV x 1. CT A/P pending for 12:30 11/23/21. CE with new TWI in V2-V6 - likely demand ischemia. Repeat CE and lactate at 2pm.   11/23- CT A/P with evidence of free air. Surgical team consulted- transferred to 8ICU. ID consult called as per Dr. Sorenson       1. Infectious Disease:   acyclovir   Oral Tab/Cap 400 milliGRAM(s) Oral every 8 hours  aztreonam  IVPB 2000 milliGRAM(s) IV Intermittent every 8 hours  aztreonam  IVPB      clotrimazole Lozenge 1 Lozenge Oral five times a day  fluconAZOLE   Tablet 400 milliGRAM(s) Oral daily  metroNIDAZOLE  IVPB 500 milliGRAM(s) IV Intermittent every 8 hours    2. VOD Prophylaxis: Actigall, Glutamine, Heparin (dosed at 100 units / kg / day)     3. GI Prophylaxis:    pantoprazole    Tablet 40 milliGRAM(s) Oral before breakfast    4. Mouthcare - NS / NaHCO3 rinses, Mycelex, Biotene; Skin care     5. GVHD prophylaxis - n/a     6. Transfuse & replete electrolytes prn     7. IV hydration, daily weights, strict I&O, prn diuresis     8. PO intake as tolerated, nutrition follow up as needed, MVI, folic acid     9. Antiemetics, anti-diarrhea medications:   LORazepam   Injectable 1 milliGRAM(s) IV Push every 24 hours  ondansetron Injectable 8 milliGRAM(s) IV Push every 8 hours  metoclopramide Injectable 10 milliGRAM(s) IV Push every 6 hours PRN  aprepitant 80 milliGRAM(s) Oral daily    10. OOB as tolerated, physical therapy consult if needed     11. Monitor coags / fibrinogen 2x week, vitamin K as needed     12. Monitor closely for clinical changes, monitor for fevers     13. Emotional support provided, plan of care discussed and questions addressed     14. Patient education done regarding plan of care, restrictions and discharge planning     15. Continue regular social work input     I have written the above note for Dr. Naylor who performed service with me in the room.   Terrie Barr NP-C ([phone])    I have seen and examined patient with NP, I agree with above note as scribed.

## 2021-11-23 NOTE — CHART NOTE - NSCHARTNOTEFT_GEN_A_CORE
Dr. Mcadams (Attending Physician)  Submassive PE - after syncope, ECG had new RBBB that resolved and new TWI v3-v5. Echo Revealed RV strain. CTA revealed saddle pulmonary embolism. Was given aspirin for increased trop and started on hep gtt for possible ACS earlier, now increased to therapeutic for PE. PERT team consulted. Trop downtrending, BNP increased slightly. Thrombocytopenic due to recent stem cell transplant so no plans for TPA or other intervention tonight.  Terminal Ileitis, Small Free Air concerning for bowel perforation - monitoring closely, abd soft nontender. on BS abx.  Recent stem cell transplant, leukopenic. on ppx abx.    This patient was critically ill with one or more vital organ systems at a high probability of imminent or life threatening deterioration. Complex decision making was required to assess and treat single or multiple vital organ system failure and/or prevent further life threatening deterioration of the patient’s condition.  All recent labwork and imaging was reviewed.  Total Critical Care Time 45 min

## 2021-11-23 NOTE — CONSULT NOTE ADULT - ATTENDING COMMENTS
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 On AM of 11/23 patient developed neutropenic sepsis with RRT for vasovagal episode had CTAP showing pneumoperitoneum and terminal ileitis.    Serial abd exam  NPO, IVF D5LR  Vasopressor support as needed  Abx changed from Aztreonam to Meropenem, pt tolerated first dose well  Will send stool for c dif  Monitor neutropenia and thrombocytopenia  Ppx abx acyclovir Bactrim and Fluconazole    Discussed with surgery oncology

## 2021-11-23 NOTE — CONSULT NOTE ADULT - ATTENDING COMMENTS
65 yo male with very complex medical history relevant for multiple myeloma s/p autologous stem cell transplant.  - Had a rapid response in the floor, found to have pneumoperitoneum around the terminal ileum and a couple of air droplets around the liver.  - No signs of overt peritonitis, this could be masked due to leukopenia and overall immunosuppression.  - All findings consistent with neutropenic enterocolitis with contained perforation. This is a life threatening condition that requires further evaluation and management in SICU.  - SICU consult for IVF resuscitation and serial labs and abdominal exams.  - Surgery at this point would entail at minimum a right hemicolectomy that has a high mortality rate in this patient.  - I discussed these findings with Mr Julio and his wife. Mr Julio is not interested in any surgical procedures at this time. He will think about it if his condition deteriorates. 65 yo male with very complex medical history relevant for multiple myeloma s/p autologous stem cell transplant.  - Had a rapid response in the floor, found to have pneumoperitoneum around the terminal ileum and a couple of air droplets around the liver.  - No signs of overt peritonitis, this could be masked due to leukopenia and overall immunosuppression.  - All findings consistent with neutropenic enterocolitis with contained perforation. This is a life threatening condition that requires further evaluation and management in SICU. Transfer to ACS service.  - SICU consult for IVF resuscitation and serial labs and abdominal exams.  - Surgery at this point would entail at minimum a right hemicolectomy that has a high mortality rate in this patient.  - I discussed these findings with Mr Julio and his wife. Mr Julio is not interested in any surgical procedures at this time. He will think about it if his condition deteriorates.

## 2021-11-24 LAB
ALBUMIN SERPL ELPH-MCNC: 2.6 G/DL — LOW (ref 3.3–5)
ALP SERPL-CCNC: 70 U/L — SIGNIFICANT CHANGE UP (ref 40–120)
ALT FLD-CCNC: 16 U/L — SIGNIFICANT CHANGE UP (ref 10–45)
ANION GAP SERPL CALC-SCNC: 10 MMOL/L — SIGNIFICANT CHANGE UP (ref 5–17)
ANION GAP SERPL CALC-SCNC: 9 MMOL/L — SIGNIFICANT CHANGE UP (ref 5–17)
APTT BLD: 29.6 SEC — SIGNIFICANT CHANGE UP (ref 27.5–35.5)
APTT BLD: 32.8 SEC — SIGNIFICANT CHANGE UP (ref 27.5–35.5)
APTT BLD: 38.9 SEC — HIGH (ref 27.5–35.5)
AST SERPL-CCNC: 14 U/L — SIGNIFICANT CHANGE UP (ref 10–40)
BILIRUB DIRECT SERPL-MCNC: 0.1 MG/DL — SIGNIFICANT CHANGE UP (ref 0–0.3)
BILIRUB INDIRECT FLD-MCNC: 0.2 MG/DL — SIGNIFICANT CHANGE UP (ref 0.2–1)
BILIRUB SERPL-MCNC: 0.3 MG/DL — SIGNIFICANT CHANGE UP (ref 0.2–1.2)
BKV DNA UR QL NAA+PROBE: SIGNIFICANT CHANGE UP
BUN SERPL-MCNC: 22 MG/DL — SIGNIFICANT CHANGE UP (ref 7–23)
BUN SERPL-MCNC: 24 MG/DL — HIGH (ref 7–23)
CALCIUM SERPL-MCNC: 7.6 MG/DL — LOW (ref 8.4–10.5)
CALCIUM SERPL-MCNC: 7.6 MG/DL — LOW (ref 8.4–10.5)
CHLORIDE SERPL-SCNC: 108 MMOL/L — SIGNIFICANT CHANGE UP (ref 96–108)
CHLORIDE SERPL-SCNC: 109 MMOL/L — HIGH (ref 96–108)
CO2 SERPL-SCNC: 18 MMOL/L — LOW (ref 22–31)
CO2 SERPL-SCNC: 20 MMOL/L — LOW (ref 22–31)
CREAT SERPL-MCNC: 0.83 MG/DL — SIGNIFICANT CHANGE UP (ref 0.5–1.3)
CREAT SERPL-MCNC: 0.87 MG/DL — SIGNIFICANT CHANGE UP (ref 0.5–1.3)
D DIMER BLD IA.RAPID-MCNC: 5533 NG/ML DDU — HIGH
GAS PNL BLDV: SIGNIFICANT CHANGE UP
GLUCOSE SERPL-MCNC: 132 MG/DL — HIGH (ref 70–99)
GLUCOSE SERPL-MCNC: 132 MG/DL — HIGH (ref 70–99)
HCT VFR BLD CALC: 25.6 % — LOW (ref 39–50)
HCT VFR BLD CALC: 28.2 % — LOW (ref 39–50)
HCT VFR BLD CALC: 28.5 % — LOW (ref 39–50)
HEPARINASE TEG R TIME: 9.7 MIN (ref 4.3–8.3)
HGB BLD-MCNC: 8.3 G/DL — LOW (ref 13–17)
HGB BLD-MCNC: 8.9 G/DL — LOW (ref 13–17)
HGB BLD-MCNC: 9.1 G/DL — LOW (ref 13–17)
INR BLD: 1.13 RATIO — SIGNIFICANT CHANGE UP (ref 0.88–1.16)
LDH SERPL L TO P-CCNC: 146 U/L — SIGNIFICANT CHANGE UP (ref 50–242)
LOG10 BK QUANTITATION PCR URINE: 3.87 — SIGNIFICANT CHANGE UP
MAGNESIUM SERPL-MCNC: 2.3 MG/DL — SIGNIFICANT CHANGE UP (ref 1.6–2.6)
MAGNESIUM SERPL-MCNC: 2.3 MG/DL — SIGNIFICANT CHANGE UP (ref 1.6–2.6)
MCHC RBC-ENTMCNC: 31.4 PG — SIGNIFICANT CHANGE UP (ref 27–34)
MCHC RBC-ENTMCNC: 31.6 GM/DL — LOW (ref 32–36)
MCHC RBC-ENTMCNC: 31.6 PG — SIGNIFICANT CHANGE UP (ref 27–34)
MCHC RBC-ENTMCNC: 31.6 PG — SIGNIFICANT CHANGE UP (ref 27–34)
MCHC RBC-ENTMCNC: 31.9 GM/DL — LOW (ref 32–36)
MCHC RBC-ENTMCNC: 32.4 GM/DL — SIGNIFICANT CHANGE UP (ref 32–36)
MCV RBC AUTO: 100 FL — SIGNIFICANT CHANGE UP (ref 80–100)
MCV RBC AUTO: 97.3 FL — SIGNIFICANT CHANGE UP (ref 80–100)
MCV RBC AUTO: 98.3 FL — SIGNIFICANT CHANGE UP (ref 80–100)
NRBC # BLD: 0 /100 WBCS — SIGNIFICANT CHANGE UP (ref 0–0)
NRBC # BLD: 20 /100 WBCS — HIGH (ref 0–0)
NRBC # BLD: 33 /100 WBCS — HIGH (ref 0–0)
NT-PROBNP SERPL-SCNC: 1270 PG/ML — HIGH (ref 0–300)
NT-PROBNP SERPL-SCNC: 2695 PG/ML — HIGH (ref 0–300)
PHOSPHATE SERPL-MCNC: 2.4 MG/DL — LOW (ref 2.5–4.5)
PHOSPHATE SERPL-MCNC: 2.6 MG/DL — SIGNIFICANT CHANGE UP (ref 2.5–4.5)
PLATELET # BLD AUTO: 19 K/UL — CRITICAL LOW (ref 150–400)
PLATELET # BLD AUTO: 22 K/UL — LOW (ref 150–400)
PLATELET # BLD AUTO: 41 K/UL — LOW (ref 150–400)
PLATELET MAPPING ACTF MAX AMPLITUDE: 21.2 MM (ref 2–19)
PLATELET MAPPING ADP MAXIMUM AMPLITUDE: 37.6 MM (ref 45–69)
PLATELET MAPPING ADP PERCENT INHIBITION: 40.4 % (ref 0–17)
PLATELET MAPPING HKH MAXIMUM AMPLITUDE: 48.7 MM (ref 53–68)
POTASSIUM SERPL-MCNC: 3.9 MMOL/L — SIGNIFICANT CHANGE UP (ref 3.5–5.3)
POTASSIUM SERPL-MCNC: 3.9 MMOL/L — SIGNIFICANT CHANGE UP (ref 3.5–5.3)
POTASSIUM SERPL-SCNC: 3.9 MMOL/L — SIGNIFICANT CHANGE UP (ref 3.5–5.3)
POTASSIUM SERPL-SCNC: 3.9 MMOL/L — SIGNIFICANT CHANGE UP (ref 3.5–5.3)
PROT SERPL-MCNC: 4.9 G/DL — LOW (ref 6–8.3)
PROTHROM AB SERPL-ACNC: 13.5 SEC — SIGNIFICANT CHANGE UP (ref 10.6–13.6)
RAPIDTEG MAXIMUM AMPLITUDE: 41.1 MM (ref 52–70)
RBC # BLD: 2.63 M/UL — LOW (ref 4.2–5.8)
RBC # BLD: 2.82 M/UL — LOW (ref 4.2–5.8)
RBC # BLD: 2.9 M/UL — LOW (ref 4.2–5.8)
RBC # FLD: 14.7 % — HIGH (ref 10.3–14.5)
RBC # FLD: 14.8 % — HIGH (ref 10.3–14.5)
RBC # FLD: 14.8 % — HIGH (ref 10.3–14.5)
SODIUM SERPL-SCNC: 137 MMOL/L — SIGNIFICANT CHANGE UP (ref 135–145)
SODIUM SERPL-SCNC: 137 MMOL/L — SIGNIFICANT CHANGE UP (ref 135–145)
T3 SERPL-MCNC: 87 NG/DL — SIGNIFICANT CHANGE UP (ref 80–200)
T4 AB SER-ACNC: 6.8 UG/DL — SIGNIFICANT CHANGE UP (ref 4.6–12)
TEG FUNCTIONAL FIBRINOGEN: 23.3 MM — SIGNIFICANT CHANGE UP (ref 15–32)
TEG MAXIMUM AMPLITUDE: 41.9 MM (ref 52–69)
TEG REACTION TIME: >17 MIN (ref 4.6–9.1)
TROPONIN T, HIGH SENSITIVITY RESULT: 152 NG/L — HIGH (ref 0–51)
TSH SERPL-MCNC: 0.61 UIU/ML — SIGNIFICANT CHANGE UP (ref 0.27–4.2)
WBC # BLD: <0.1 K/UL — CRITICAL LOW (ref 3.8–10.5)
WBC # FLD AUTO: <0.1 K/UL — CRITICAL LOW (ref 3.8–10.5)

## 2021-11-24 PROCEDURE — 93970 EXTREMITY STUDY: CPT | Mod: 26,76

## 2021-11-24 PROCEDURE — 71275 CT ANGIOGRAPHY CHEST: CPT | Mod: 26

## 2021-11-24 PROCEDURE — 37185 PRIM ART M-THRMBC SBSQ VSL: CPT

## 2021-11-24 PROCEDURE — 37184 PRIM ART M-THRMBC 1ST VSL: CPT | Mod: 50

## 2021-11-24 PROCEDURE — 99221 1ST HOSP IP/OBS SF/LOW 40: CPT

## 2021-11-24 PROCEDURE — 36014 PLACE CATHETER IN ARTERY: CPT

## 2021-11-24 PROCEDURE — 38240 TRANSPLT ALLO HCT/DONOR: CPT

## 2021-11-24 PROCEDURE — 51703 INSERT BLADDER CATH COMPLEX: CPT

## 2021-11-24 PROCEDURE — 99223 1ST HOSP IP/OBS HIGH 75: CPT | Mod: 25

## 2021-11-24 PROCEDURE — 75743 ARTERY X-RAYS LUNGS: CPT | Mod: 26,XU

## 2021-11-24 PROCEDURE — ZZZZZ: CPT

## 2021-11-24 RX ORDER — SODIUM CHLORIDE 9 MG/ML
1000 INJECTION, SOLUTION INTRAVENOUS
Refills: 0 | Status: DISCONTINUED | OUTPATIENT
Start: 2021-11-24 | End: 2021-11-29

## 2021-11-24 RX ORDER — HEPARIN SODIUM 5000 [USP'U]/ML
800 INJECTION INTRAVENOUS; SUBCUTANEOUS
Qty: 25000 | Refills: 0 | Status: DISCONTINUED | OUTPATIENT
Start: 2021-11-24 | End: 2021-11-24

## 2021-11-24 RX ORDER — HEPARIN SODIUM 5000 [USP'U]/ML
800 INJECTION INTRAVENOUS; SUBCUTANEOUS
Qty: 25000 | Refills: 0 | Status: DISCONTINUED | OUTPATIENT
Start: 2021-11-24 | End: 2021-11-26

## 2021-11-24 RX ADMIN — Medication 109.8 MILLIGRAM(S): at 13:10

## 2021-11-24 RX ADMIN — CHLORHEXIDINE GLUCONATE 1 APPLICATION(S): 213 SOLUTION TOPICAL at 05:50

## 2021-11-24 RX ADMIN — MEROPENEM 100 MILLIGRAM(S): 1 INJECTION INTRAVENOUS at 18:41

## 2021-11-24 RX ADMIN — Medication 5 MILLILITER(S): at 10:06

## 2021-11-24 RX ADMIN — Medication 2.5 MILLIGRAM(S): at 18:28

## 2021-11-24 RX ADMIN — Medication 250 MILLIMOLE(S): at 01:00

## 2021-11-24 RX ADMIN — HEPARIN SODIUM 13 UNIT(S)/HR: 5000 INJECTION INTRAVENOUS; SUBCUTANEOUS at 20:00

## 2021-11-24 RX ADMIN — Medication 109.8 MILLIGRAM(S): at 22:01

## 2021-11-24 RX ADMIN — ONDANSETRON 8 MILLIGRAM(S): 8 TABLET, FILM COATED ORAL at 05:32

## 2021-11-24 RX ADMIN — Medication 2.5 MILLIGRAM(S): at 23:21

## 2021-11-24 RX ADMIN — Medication 1 LOZENGE: at 05:52

## 2021-11-24 RX ADMIN — Medication 1 APPLICATION(S): at 18:38

## 2021-11-24 RX ADMIN — SODIUM CHLORIDE 75 MILLILITER(S): 9 INJECTION INTRAMUSCULAR; INTRAVENOUS; SUBCUTANEOUS at 08:00

## 2021-11-24 RX ADMIN — Medication 100 MILLIEQUIVALENT(S): at 01:00

## 2021-11-24 RX ADMIN — Medication 2.5 MILLIGRAM(S): at 05:47

## 2021-11-24 RX ADMIN — Medication 1 APPLICATION(S): at 05:49

## 2021-11-24 RX ADMIN — Medication 10 MILLILITER(S): at 10:06

## 2021-11-24 RX ADMIN — SODIUM CHLORIDE 75 MILLILITER(S): 9 INJECTION, SOLUTION INTRAVENOUS at 11:46

## 2021-11-24 RX ADMIN — Medication 250 MILLIMOLE(S): at 02:00

## 2021-11-24 RX ADMIN — HEPARIN SODIUM 13 UNIT(S)/HR: 5000 INJECTION INTRAVENOUS; SUBCUTANEOUS at 13:16

## 2021-11-24 RX ADMIN — ONDANSETRON 8 MILLIGRAM(S): 8 TABLET, FILM COATED ORAL at 13:10

## 2021-11-24 RX ADMIN — Medication 480 MICROGRAM(S): at 18:41

## 2021-11-24 RX ADMIN — MEROPENEM 100 MILLIGRAM(S): 1 INJECTION INTRAVENOUS at 02:00

## 2021-11-24 RX ADMIN — ONDANSETRON 8 MILLIGRAM(S): 8 TABLET, FILM COATED ORAL at 22:01

## 2021-11-24 RX ADMIN — Medication 1 LOZENGE: at 18:28

## 2021-11-24 RX ADMIN — PANTOPRAZOLE SODIUM 40 MILLIGRAM(S): 20 TABLET, DELAYED RELEASE ORAL at 11:46

## 2021-11-24 RX ADMIN — Medication 2.5 MILLIGRAM(S): at 11:46

## 2021-11-24 RX ADMIN — Medication 10 MILLILITER(S): at 05:50

## 2021-11-24 RX ADMIN — Medication 1 APPLICATION(S): at 19:13

## 2021-11-24 RX ADMIN — MEROPENEM 100 MILLIGRAM(S): 1 INJECTION INTRAVENOUS at 08:14

## 2021-11-24 RX ADMIN — FLUCONAZOLE 100 MILLIGRAM(S): 150 TABLET ORAL at 18:41

## 2021-11-24 RX ADMIN — Medication 5 MILLILITER(S): at 05:52

## 2021-11-24 RX ADMIN — HEPARIN SODIUM 11 UNIT(S)/HR: 5000 INJECTION INTRAVENOUS; SUBCUTANEOUS at 08:00

## 2021-11-24 RX ADMIN — Medication 1 LOZENGE: at 10:12

## 2021-11-24 RX ADMIN — Medication 109.8 MILLIGRAM(S): at 05:34

## 2021-11-24 NOTE — PROGRESS NOTE ADULT - ASSESSMENT
66y M with a PMH of IgD lambda MM (t11;14) s/p autologous SCT on 11/19/21 c/b submassive pulmonary embolus and neutropenic terminal ileitis/sigmoid colitis with small pneumoperitoneum concerning for bowel perforation. Sigmoid colitis on imaging likely reactive secondary nearby typhilitis vs. primary sigmoid diverticulitis. Patient clinically stable with distension but otherwise benign abdominal exam, lactate wnl. Patient is high risk operative candidate for any surgical intervention given neutropenia and cardiopulmonary compromise from submassive PE, agree with conservative management.    - No urgent intervention indicated from colorectal standpoint  - NPO  - Agree with IV abx  - Serial abdominal exams  - Care per primary team and SICU    please do not hesitate to reach out with any additional questions or concerns    discussed with fellow on behalf of attending    Deysi Suarez, PGY2  [facility]  [phone]

## 2021-11-24 NOTE — CONSULT NOTE ADULT - ASSESSMENT
66y M with a PMH of IgD lambda MM (t11;14) s/p autologous SCT on 11/19/21 c/b submassive pulmonary embolus and neutropenic terminal ileitis/sigmoid colitis with pneumoperitoneum concerning for bowel perforation. Sigmoid colitis on ex    Patient to be discussed with colorectal fellow, Dr. Staples, on behalf of attending,  ___    Valery Arellano MD  PGY2 Consult Resident  [facility]  [phone]   66y M with a PMH of IgD lambda MM (t11;14) s/p autologous SCT on 11/19/21 c/b submassive pulmonary embolus and neutropenic terminal ileitis/sigmoid colitis with small pneumoperitoneum concerning for bowel perforation. Sigmoid colitis on imaging likely reactive secondary nearby typhilitis vs. primary sigmoid diverticulitis. Patient clinically stable with distension but otherwise benign abdominal exam, lactate wnl. Patient is high risk operative candidate for any surgical intervention given neutropenia and cardiopulmonary compromise from submassive PE, agree with conservative management.    - No urgent intervention indicated from colorectal standpoint  - NPO  - Agree with IV abx  - Serial abdominal exams  - Will continue to follow     Patient discussed with colorectal fellow, Dr. Staples, on behalf of attending, Dr. Lindsey.    Valery Arellano MD  PGY2 Consult Resident  [facility]  [phone]

## 2021-11-24 NOTE — CONSULT NOTE ADULT - ATTENDING COMMENTS
pt denies abd pain    aaox3  abd soft, NT/ND, no peritonitis    WBC <0.1    CT with terminal ileitis and associated cecal and sigmoid inflammation    Typhlitis in setting of neutropenia  no acute colorectal surgical intervention indicated at this point.   NPO, IV abx  care per gen surg and SICU

## 2021-11-24 NOTE — PROGRESS NOTE ADULT - ATTENDING COMMENTS
Events that lead to the patients hospitalization was gone over along with his hospital course.  Patient has had two syncopal events during his hospitalization and was found to have a submassive saddle pulmonary embolism.  He is a high risk individual with increased risk for decompensation.  Findings of TTE, venous dopplers and CTA was gone over with the patient and his family (wife/son who were called on the phone).  Reviewed with the patient and his family what is a submassive segmental pulmonary embolism.  The short and long term complications were gone over.  There is concern that the patient due to concomitant acute issues if he was to become unstable would be at higher risk do to his saddle pulmonary embolism.  The different forms of treatment were gone over that include medical therapy (TPA, heparin), catheter based therapies (mechanical thrombectomy, lytic catheters) and surgery.  The pros/cons and the risks/benefits of the various treatment options were gone over.  The patients clinical condition, imaging studies and prognosis was gone over with the PERT team and the SICU team.  It is recommended that the patient undergo mechanical thrombectomy.  Indications and details of the procedure were reviewed with the patient and his family.  Benefits and risks were gone over.  Risks include but are not limited to infection, bleeding, arrhythmia, TIA/stroke, vascular injury, worsening renal insuffiency, need for urgent surgery and death.  The patient is agreeable to proceed.  Plan for platelets to be given prior to the procedure.      All questions and concerns of the patient and his family were addressed.    EKG, laboratory studies and imaging studies were personally reviewed.    Findings and plan discussed with PERT team (critical care, cardiac surgery and vascular cardiology)

## 2021-11-24 NOTE — PROGRESS NOTE ADULT - SUBJECTIVE AND OBJECTIVE BOX
SURGERY PROGRESS NOTE  Hospital Day #9    SUBJECTIVE  Pt seen and examined at bedside. No complaints.  No acute events overnight.       OBJECTIVE:    PHYSICAL EXAM   General Appearance: Appears well, NAD  Resp: Patent airway, non-labored breathing  CV: RRR  Abdomen: Soft, Nontender, Nondistended    Vital Signs Last 24 Hrs  T(C): 37.2 (24 Nov 2021 11:00), Max: 37.5 (23 Nov 2021 19:00)  T(F): 99 (24 Nov 2021 11:00), Max: 99.5 (23 Nov 2021 19:00)  HR: 95 (24 Nov 2021 14:00) (84 - 109)  BP: 144/66 (24 Nov 2021 14:00) (95/67 - 144/66)  BP(mean): 101 (24 Nov 2021 14:00) (78 - 102)  RR: 15 (24 Nov 2021 14:00) (12 - 20)  SpO2: 97% (24 Nov 2021 14:00) (92% - 100%)    I's & O's    11-23-21 @ 07:01  -  11-24-21 @ 07:00  --------------------------------------------------------  IN:    Heparin: 45.1 mL    Heparin: 56 mL    IV PiggyBack: 900 mL    IV PiggyBack: 350 mL    Oral Fluid: 180 mL    sodium chloride 0.9%: 3073 mL    Sodium Chloride 0.9% Bolus: 2000 mL  Total IN: 6604.1 mL    OUT:    Indwelling Catheter - Urethral (mL): 1000 mL    Voided (mL): 595 mL  Total OUT: 1595 mL    Total NET: 5009.1 mL      11-24-21 @ 07:01  -  11-24-21 @ 15:23  --------------------------------------------------------  IN:    dextrose 5% + lactated ringers: 375 mL    Heparin: 78 mL    IV PiggyBack: 150 mL    Platelets - Single Donor: 225 mL    sodium chloride 0.9%: 150 mL  Total IN: 978 mL    OUT:    Indwelling Catheter - Urethral (mL): 395 mL  Total OUT: 395 mL    Total NET: 583 mL        MEDICATIONS:  DVT PROPHYLAXIS: heparin  Infusion 800 Unit(s)/Hr  GI PROPHYLAXIS: pantoprazole  Injectable 40 milliGRAM(s)     ANTIBIOTICS:   ·	acyclovir IVPB 490 milliGRAM(s)  ·	clotrimazole Lozenge 1 Lozenge  ·	fluconAZOLE IVPB 400 milliGRAM(s)  ·	meropenem  IVPB 1000 milliGRAM(s)      LAB/STUDIES:                        9.1    <0.10 )-----------( 19       ( 24 Nov 2021 06:28 )             28.5       137  |  108  |  22  ----------------------------<  132<H>  3.9   |  20<L>  |  0.83    Ca    7.6<L>      24 Nov 2021 06:28  Phos  2.6     11-24  Mg     2.3     11-24    TPro  4.9<L>  /  Alb  2.6<L>  /  TBili  0.3  /  DBili  0.1  /  AST  14  /  ALT  16  /  AlkPhos  70  11-24    PT/INR - ( 24 Nov 2021 00:32 )   PT: 13.5 sec;   INR: 1.13 ratio    PTT - ( 24 Nov 2021 12:07 )  PTT:38.9 sec    LIVER FUNCTIONS - ( 24 Nov 2021 00:32 )  Alb: 2.6 g/dL / Pro: 4.9 g/dL / ALK PHOS: 70 U/L / ALT: 16 U/L / AST: 14 U/L / GGT: x               MICRO/PATH      Culture - Blood (collected 23 Nov 2021 12:32)  Source: .Blood Blood  Preliminary Report (24 Nov 2021 13:02):    No growth to date.        IMAGING:      CT IMPRESSION:    Terminal ileitis with associated ileus versus low-grade obstruction. Pockets of free air in the right lower quadrant adjacent to the terminal ileum concerning for bowel perforation. 2.6 cm loculated fluid adjacent to the terminal ileum and surrounding peritoneal enhancement suggestive of developing peritonitis. Distant free air in the right upper quadrant.    Sigmoid colon adjacent to the thickened terminal ileum is also markedly inflamed, with also notable diverticulosis. Sigmoid colitis/diverticulitis can also be considered on the differential versus primary terminal ileitis with reactive inflammation ofthe sigmoid colon.    Mild inflammation of the right colon and appendix may be reactive in nature. The appendix at the base and mid segment is nondistended. The tip of the appendix is not distinctly visualized adjacent to the inflamed terminal ileum.

## 2021-11-24 NOTE — PROGRESS NOTE ADULT - SUBJECTIVE AND OBJECTIVE BOX
HISTORY:  This is a 66 year old male with multiple myeloma admitted for an autologous pbsct with high dose melphalan prep regimen. Hematologic history as follows: 11/2020 was found to have a T-12 compression fracture. He saw orthopedics 1/2021, and was referred to endocrine. 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 Serum immunofixation showed 1 IgD lambda band and free lambda light chains. Urine immunofixation negative for monoclonal band. 4/29/21 a bone marrow biopsy and aspirate was consistent with plasma cell myeloma (> 90% involvement), flow cytometry positive for monotypic plasma cells. He started cycle 1 RVD on 5/13/21. He completed 6 cycles of RVD 9/30/21. During chemotherapy he reported occasional blurry vision (negative optho workup, negative MRI brain 9/29/21) and moderate fatigue. He has no complaints on admission other than facial erythema, likely related to kepivance.  (15 Nov 2021 13:09)    24 HOUR EVENTS:  -B/L PE   -Already on heparin drip  NEURO  	  Exam: AxOx3 moving all extremities spontaneously   Meds: acetaminophen     Tablet .. 650 milliGRAM(s) Oral every 6 hours PRN Temp greater or equal to 38C (100.4F), Mild Pain (1 - 3)  metoclopramide Injectable 10 milliGRAM(s) IV Push every 6 hours PRN Nausea and/or Vomiting  ondansetron Injectable 8 milliGRAM(s) IV Push every 8 hours      RESPIRATORY  RR: 14 (11-23-21 @ 23:00) (14 - 22)  SpO2: 92% (11-23-21 @ 23:00) (92% - 100%)  Wt(kg): --  Exam: CTA B/L, no signs of increase work of breathing such as belly breathing   ABG - ( 23 Nov 2021 18:26 )  pH: x     /  pCO2: x     /  pO2: x     / HCO3: x     / Base Excess: x     /  SaO2: x       Lactate: 1.5        CARDIOVASCULAR  HR: 105 (11-23-21 @ 23:00) (84 - 109)  BP: 119/70 (11-23-21 @ 23:00) (88/55 - 136/74)  BP(mean): 89 (11-23-21 @ 23:00) (66 - 94)  ABP: --  ABP(mean): --  Wt(kg): --  CVP(cm H2O): --  VBG - ( 23 Nov 2021 07:29 )  pH: 7.28  /  pCO2: 42    /  pO2: 41    / HCO3: 20    / Base Excess: -6.7  /  SaO2: 63.8   Lactate: 6.0              Lactate, Blood: 1.5 mmol/L (11-23 @ 18:26)    Exam:   Cardiac Rhythm:   Perfusion     [x ]Adequate   [ ]Inadequate  Mentation   [x ]Normal       [ ]Reduced  Extremities  [x ]Warm         [ ]Cool  Volume Status [ ]Hypervolemic [x ]Euvolemic [ ]Hypovolemic  Meds: furosemide   Injectable 20 milliGRAM(s) IV Push every 24 hours PRN if urine output < 100 ml/hr X 2 hours  metoprolol tartrate Injectable 2.5 milliGRAM(s) IV Push every 6 hours      GI/NUTRITION  Exam:  Abdomen non tender, no rebound, no guarding   Diet: NPO  Meds: pantoprazole  Injectable 40 milliGRAM(s) IV Push daily  sodium bicarbonate Mouth Rinse 10 milliLiter(s) Swish and Spit five times a day      GENITOURINARY  I&O's Detail    11-22 @ 07:01  -  11-23 @ 07:00  --------------------------------------------------------  IN:    Heparin: 99.4 mL    Oral Fluid: 660 mL    sodium chloride 0.9%: 622 mL  Total IN: 1381.4 mL    OUT:    Voided (mL): 500 mL  Total OUT: 500 mL    Total NET: 881.4 mL      11-23 @ 07:01  -  11-24 @ 00:29  --------------------------------------------------------  IN:    Heparin: 41 mL    IV PiggyBack: 200 mL    IV PiggyBack: 350 mL    Oral Fluid: 180 mL    sodium chloride 0.9%: 2473 mL    Sodium Chloride 0.9% Bolus: 2000 mL  Total IN: 5244 mL    OUT:    Voided (mL): 595 mL  Total OUT: 595 mL    Total NET: 4649 mL          11-23    136  |  106  |  24<H>  ----------------------------<  132<H>  3.8   |  19<L>  |  0.85    Ca    7.9<L>      23 Nov 2021 18:26  Phos  2.3     11-23  Mg     1.9     11-23    TPro  5.4<L>  /  Alb  3.0<L>  /  TBili  0.4  /  DBili  x   /  AST  18  /  ALT  19  /  AlkPhos  79  11-23    Meds: potassium chloride  20 mEq/100 mL IVPB 20 milliEquivalent(s) IV Intermittent once  sodium chloride 0.9%. 1000 milliLiter(s) IV Continuous <Continuous>  sodium phosphate IVPB 15 milliMole(s) IV Intermittent every 1 hour      HEMATOLOGIC  Meds: heparin  Infusion 410 Unit(s)/Hr IV Continuous <Continuous>                          9.7    <0.10 )-----------( 29       ( 23 Nov 2021 18:26 )             29.8     PT/INR - ( 23 Nov 2021 18:26 )   PT: 13.4 sec;   INR: 1.12 ratio         PTT - ( 23 Nov 2021 18:26 )  PTT:36.4 sec    INFECTIOUS DISEASES  T(C): 37.5 (11-23-21 @ 19:00), Max: 37.5 (11-23-21 @ 19:00)  Wt(kg): --  WBC Count: <0.10 K/uL (11-23 @ 18:26)  WBC Count: <0.10 K/uL (11-23 @ 07:21)    Recent Cultures:  Specimen Source: STER [id number], 11-19 @ 16:21; Results   No growth; Gram Stain: --; Organism: --  Specimen Source: Clean Catch Clean Catch (Midstream), 11-19 @ 12:26; Results   No growth; Gram Stain: --; Organism: --    Meds: acyclovir IVPB 490 milliGRAM(s) IV Intermittent every 8 hours  clotrimazole Lozenge 1 Lozenge Oral five times a day  filgrastim-sndz (ZARXIO) Injectable 480 MICROGram(s) SubCutaneous every 24 hours  fluconAZOLE IVPB 400 milliGRAM(s) IV Intermittent every 24 hours  meropenem  IVPB 1000 milliGRAM(s) IV Intermittent every 8 hours      ENDOCRINE  Capillary Blood Glucose    Meds:     ACCESS DEVICES:  [x ] Peripheral IV  [ ] Central Venous Line		[ ] R	[ ] L	[ ] IJ	[ ] Fem	[ ] SC	Placed:   [ ] Arterial Line			[ ] R	[ ] L	[ ] Fem	[ ] Rad	[ ] Ax	Placed:   [ ] PICC:					[ ] Mediport  [ ] Urinary Catheter, Date Placed:   [ ] Necessity of urinary, arterial, and venous catheters discussed    OTHER MEDICATIONS:  BACItracin   Ointment 1 Application(s) Topical two times a day  Biotene Dry Mouth Oral Rinse 5 milliLiter(s) Swish and Spit five times a day  chlorhexidine 2% Cloths 1 Application(s) Topical <User Schedule>  hydrocortisone 1% Cream 1 Application(s) Topical two times a day  lidocaine/prilocaine Cream 1 Application(s) Topical daily      IMAGING: HISTORY:  This is a 66 year old male with multiple myeloma admitted for an autologous pbsct with high dose melphalan prep regimen. Hematologic history as follows: 11/2020 was found to have a T-12 compression fracture. He saw orthopedics 1/2021, and was referred to endocrine. 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 Serum immunofixation showed 1 IgD lambda band and free lambda light chains. Urine immunofixation negative for monoclonal band. 4/29/21 a bone marrow biopsy and aspirate was consistent with plasma cell myeloma (> 90% involvement), flow cytometry positive for monotypic plasma cells. He started cycle 1 RVD on 5/13/21. He completed 6 cycles of RVD 9/30/21. During chemotherapy he reported occasional blurry vision (negative optho workup, negative MRI brain 9/29/21) and moderate fatigue. He has no complaints on admission other than facial erythema, likely related to kepivance.  (15 Nov 2021 13:09)    24 HOUR EVENTS:  - R heart strain noted on Echo  - CTA with saddle PE -> heparin drip increased to therapeutic level  - [facility] team consulted: no emergent intervention at this time  - 800cc on bladder scan, Ortega inserted by Urology  - CRS consulted    NEURO  Exam: AxOx3 moving all extremities spontaneously   Meds: acetaminophen     Tablet .. 650 milliGRAM(s) Oral every 6 hours PRN Temp greater or equal to 38C (100.4F), Mild Pain (1 - 3)  metoclopramide Injectable 10 milliGRAM(s) IV Push every 6 hours PRN Nausea and/or Vomiting  ondansetron Injectable 8 milliGRAM(s) IV Push every 8 hours      RESPIRATORY  RR: 14 (11-23-21 @ 23:00) (14 - 22)  SpO2: 92% (11-23-21 @ 23:00) (92% - 100%)  Wt(kg): --  Exam: CTA B/L, no signs of increase work of breathing such as belly breathing   ABG - ( 23 Nov 2021 18:26 )  pH: x     /  pCO2: x     /  pO2: x     / HCO3: x     / Base Excess: x     /  SaO2: x       Lactate: 1.5        CARDIOVASCULAR  HR: 105 (11-23-21 @ 23:00) (84 - 109)  BP: 119/70 (11-23-21 @ 23:00) (88/55 - 136/74)  BP(mean): 89 (11-23-21 @ 23:00) (66 - 94)  ABP: --  ABP(mean): --  Wt(kg): --  CVP(cm H2O): --  VBG - ( 23 Nov 2021 07:29 )  pH: 7.28  /  pCO2: 42    /  pO2: 41    / HCO3: 20    / Base Excess: -6.7  /  SaO2: 63.8   Lactate: 6.0            Lactate, Blood: 1.5 mmol/L (11-23 @ 18:26)    Exam: regular rhythm   Cardiac Rhythm: sinus  Perfusion     [x]Adequate   [ ]Inadequate  Mentation   [x]Normal       [ ]Reduced  Extremities  [x]Warm         [ ]Cool  Volume Status [ ]Hypervolemic [x]Euvolemic [ ]Hypovolemic  Meds: furosemide   Injectable 20 milliGRAM(s) IV Push every 24 hours PRN if urine output < 100 ml/hr X 2 hours  metoprolol tartrate Injectable 2.5 milliGRAM(s) IV Push every 6 hours      GI/NUTRITION  Exam:  Abdomen non-tender, no rebound, no guarding   Diet: NPO  Meds: pantoprazole  Injectable 40 milliGRAM(s) IV Push daily  sodium bicarbonate Mouth Rinse 10 milliLiter(s) Swish and Spit five times a day      GENITOURINARY  I&O's Detail    11-22 @ 07:01  -  11-23 @ 07:00  --------------------------------------------------------  IN:    Heparin: 99.4 mL    Oral Fluid: 660 mL    sodium chloride 0.9%: 622 mL  Total IN: 1381.4 mL    OUT:    Voided (mL): 500 mL  Total OUT: 500 mL    Total NET: 881.4 mL      11-23 @ 07:01  -  11-24 @ 00:29  --------------------------------------------------------  IN:    Heparin: 41 mL    IV PiggyBack: 200 mL    IV PiggyBack: 350 mL    Oral Fluid: 180 mL    sodium chloride 0.9%: 2473 mL    Sodium Chloride 0.9% Bolus: 2000 mL  Total IN: 5244 mL    OUT:    Voided (mL): 595 mL  Total OUT: 595 mL    Total NET: 4649 mL          11-23    136  |  106  |  24<H>  ----------------------------<  132<H>  3.8   |  19<L>  |  0.85    Ca    7.9<L>      23 Nov 2021 18:26  Phos  2.3     11-23  Mg     1.9     11-23    TPro  5.4<L>  /  Alb  3.0<L>  /  TBili  0.4  /  DBili  x   /  AST  18  /  ALT  19  /  AlkPhos  79  11-23    Meds: potassium chloride  20 mEq/100 mL IVPB 20 milliEquivalent(s) IV Intermittent once  sodium chloride 0.9%. 1000 milliLiter(s) IV Continuous <Continuous>  sodium phosphate IVPB 15 milliMole(s) IV Intermittent every 1 hour      HEMATOLOGIC  Meds: heparin  Infusion 410 Unit(s)/Hr IV Continuous <Continuous>                          9.7    <0.10 )-----------( 29       ( 23 Nov 2021 18:26 )             29.8     PT/INR - ( 23 Nov 2021 18:26 )   PT: 13.4 sec;   INR: 1.12 ratio         PTT - ( 23 Nov 2021 18:26 )  PTT:36.4 sec    INFECTIOUS DISEASES  T(C): 37.5 (11-23-21 @ 19:00), Max: 37.5 (11-23-21 @ 19:00)  Wt(kg): --  WBC Count: <0.10 K/uL (11-23 @ 18:26)  WBC Count: <0.10 K/uL (11-23 @ 07:21)    Recent Cultures:  Specimen Source: STER [id number], 11-19 @ 16:21; Results   No growth; Gram Stain: --; Organism: --  Specimen Source: Clean Catch Clean Catch (Midstream), 11-19 @ 12:26; Results   No growth; Gram Stain: --; Organism: --    Meds: acyclovir IVPB 490 milliGRAM(s) IV Intermittent every 8 hours  clotrimazole Lozenge 1 Lozenge Oral five times a day  filgrastim-sndz (ZARXIO) Injectable 480 MICROGram(s) SubCutaneous every 24 hours  fluconAZOLE IVPB 400 milliGRAM(s) IV Intermittent every 24 hours  meropenem  IVPB 1000 milliGRAM(s) IV Intermittent every 8 hours      ENDOCRINE  Capillary Blood Glucose        ACCESS DEVICES:  [x] Peripheral IV  [ ] Central Venous Line		[ ] R	[ ] L	[ ] IJ	[ ] Fem	[ ] SC	Placed:   [ ] Arterial Line			[ ] R	[ ] L	[ ] Fem	[ ] Rad	[ ] Ax	Placed:   [ ] PICC:					[ ] Mediport  [ ] Urinary Catheter, Date Placed:   [x] Necessity of urinary, arterial, and venous catheters discussed    OTHER MEDICATIONS:  BACItracin   Ointment 1 Application(s) Topical two times a day  Biotene Dry Mouth Oral Rinse 5 milliLiter(s) Swish and Spit five times a day  chlorhexidine 2% Cloths 1 Application(s) Topical <User Schedule>  hydrocortisone 1% Cream 1 Application(s) Topical two times a day  lidocaine/prilocaine Cream 1 Application(s) Topical daily      IMAGING:  < from: CT Angio Chest PE Protocol w/ IV Cont (11.24.21 @ 00:02) >  ******PRELIMINARY REPORT******          INTERPRETATION:  Extensive pulmonary embolism invovling the right and left pulmonary arteries with extention into the lobar and segmental branches.  There is evidence of right heart strain.    < end of copied text >    < from: Transthoracic Echocardiogram (11.23.21 @ 21:09) >  Conclusions:  1. Normal mitral valve. Mild mitral regurgitation.  2. Septal flattening consistent with right ventricular  overload. Grossly normal left ventricular systolic  function. No segmental wall motion abnormalities.  3. Moderate right atrial enlargement.  4. Right ventricular enlargement with mildly decreased  right ventricular systolic function.  5. Normal tricuspid valve. Moderate tricuspid  regurgitation.  Discussed findings with On-call cardiology Fellow.  *** Compared with echocardiogram of 9/28/2021, no  significant changes noted.    < end of copied text >

## 2021-11-24 NOTE — PROGRESS NOTE ADULT - ATTENDING COMMENTS
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 He is now admitted for an autoSCT with high dose melphalan prep regimen. Transplant day 11/19/21. Hospital course has been complicated by vasovagal episodes on 11/19/21 and 11/23/21. On 11/23/21 developed neutropenic sepsis, improved with fluid resuscitation.     PE:   VSS  Gen: A/O x 3, NAD, tired  RESP: NC on 2L, CTA, no wheeze no rhonchi  CV: S1, S2 RRR  Abd: +RLQ pain on palp, soft, mildly distended, + hyperactive BS  LE: no edema  Neuro: CNII-XII intact, no focal deficits  Psych: appropriate    MM  -dx 4/2021  -s/p RVD x 6 (5/2021 - 9/2021) -->   -admitted for autoSCT with Ivett 100mg/M2 on 11/19/21  - 4 hours after infusion of cells start Zarxio 5 micrograms / kg (actual weight) . Continue through engraftment.   Today is day + 5    ID  BACTERIAL:  Neutropenic sepsis (11/23/21) started on aztreonam (11/23 - ) and vanco (11/23 - )  -f/u cultures, CT abd, IVF, monitor lactate    VIRAL: c/w Acyclovir 400mg po TID  FUNGAL: c/w Diflucan 400 mg po daily.  PCP prophylaxis: c/w Bactrim DS     HEME  -keep Hb >7  -keep plt >10    GI:  Abd pain - RLQ - f/u CT abd/pelvis with IV contrast  Diarrhea - f/u stool cx  VOD prophylaxis - low dose heparin gtt (dosed at 100 units / kg / day), glutamine supplementation, Actigall BID   GI prophylaxis - Protonix po QD   Mouth care and skin care as per protocol.    The time was used discussed with patient, family, primary team, consultants, and acute management. 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 He is now admitted for an autoSCT with high dose melphalan prep regimen. Transplant day 11/19/21. Hospital course has been complicated by vasovagal episodes on 11/19/21 and 11/23/21. On 11/23/21 developed neutropenic sepsis, improved with fluid resuscitation. CT on 11/23/21 showed Terminal ileitis with associated ileus versus low-grade obstruction with free air and possible bowel perforation, surgery consulted and transferred to SICU, managed conservatively for now. Patient was also found to have increasing troponins, echo showed heart strain and CTA chest on 11/24/21 showed a saddle PE. Also LE doppler on 11/24/24 showed b/l DVT. Patient is hemodynamically stable, mentating well.     PE:   VSS  Gen: A/O x 3, NAD, tired  RESP: NC on 2L, CTA, no wheeze no rhonchi  CV: S1, S2 RRR  Abd: +RLQ pain on palp, soft, mildly distended, + hyperactive BS  LE: no edema  Neuro: CNII-XII intact, no focal deficits  Psych: flat affect    MM  -dx 4/2021  -s/p RVD x 6 (5/2021 - 9/2021) -->   -admitted for autoSCT with Ivett 100mg/M2 on 11/19/21  -hold Zarxio for now given acute events   Today is day + 5    ID  BACTERIAL:  Neutropenic sepsis (11/23/21) started on merem (11/23 - ) and vanco x1 (11/23)  VIRAL: c/w Acyclovir 400mg po TID  FUNGAL: c/w Diflucan 400 mg po daily.  PCP prophylaxis: hold for now    HEME  Saddle PE and b/l LE DVT (dx 11/24/21) - start therapeutic anticoagulation  -keep Hb >7  -keep plt >20; keep plt >50 for procedures    GI:  Abd pain / bowel perforation on CT on 11/23/21 - NPO, management as per SICU team   Diarrhea - f/u stool cx  VOD prophylaxis - low dose heparin gtt (dosed at 100 units / kg / day), glutamine supplementation, Actigall BID   GI prophylaxis - Protonix po QD   Mouth care and skin care as per protocol.    The time was used discussed with patient, family, primary team, consultants, and acute management. 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 He is now admitted for an autoSCT with high dose melphalan prep regimen. Transplant day 11/19/21. Hospital course has been complicated by vasovagal episodes on 11/19/21 and 11/23/21. On 11/23/21 developed neutropenic sepsis, improved with fluid resuscitation. CT on 11/23/21 showed Terminal ileitis with associated ileus versus low-grade obstruction with free air and possible bowel perforation, surgery consulted and transferred to SICU, managed conservatively for now. Patient was also found to have increasing troponins, echo showed heart strain and CTA chest on 11/24/21 showed a saddle PE. Also LE doppler on 11/24/24 showed b/l DVT. Patient is hemodynamically stable, mentating well.     PE:   VSS  Gen: A/O x 3, NAD, tired  RESP: NC on 2L, CTA, no wheeze no rhonchi  CV: S1, S2 RRR  Abd: +RLQ pain on palp, soft, mildly distended, + hyperactive BS  LE: no edema  Neuro: CNII-XII intact, no focal deficits  Psych: flat affect    MM  -dx 4/2021  -s/p RVD x 6 (5/2021 - 9/2021) -->   -admitted for autoSCT with Ivett 100mg/M2 on 11/19/21  -hold Zarxio for now given acute events / acute abdomen  Today is day + 5    ID  BACTERIAL:  Neutropenic sepsis (11/23/21) started on merem (11/23 - ) and vanco x1 (11/23)  VIRAL: c/w Acyclovir 400mg po TID  FUNGAL: c/w Diflucan 400 mg po daily.  PCP prophylaxis: hold for now    HEME  Saddle PE and b/l LE DVT (dx 11/24/21) - start therapeutic anticoagulation with heparin IV  -keep Hb >7  -keep plt >20; keep plt >50 for procedures    GI:  Abd pain / bowel perforation on CT on 11/23/21 - NPO, management as per SICU team   Diarrhea - f/u stool cx  VOD prophylaxis - low dose heparin gtt (dosed at 100 units / kg / day), glutamine supplementation, Actigall BID   GI prophylaxis - Protonix po QD   Mouth care and skin care as per protocol.    The time was used discussed with patient, family, primary team, consultants, and acute management.

## 2021-11-24 NOTE — PROGRESS NOTE ADULT - TIME BILLING
education, assessment and coordination fo care.
Total time spent including the following  [x]chart review and documentation  []review of medical literature related to pathogenesis, disease/illness progress, treatment and/or prognosis  []care coordination:  []discussion with the primary team:  []discussion with floor staff:  []discussion with consultant(s):  [x]discussion with the patient, surrogate decision maker, or family: Pt  Time spent: 35 >  min

## 2021-11-24 NOTE — PROCEDURE NOTE - ADDITIONAL PROCEDURE DETAILS
Called by primary team for difficult haile placement in the setting of retention; . Multiple unsuccessful attempts by primary team prior to my arrival.     Using aseptic technique, 20F coude was placed with ease. (+) return of urine. 10cc of sterile water inserted into the balloon. 900cc of clear yellow urine drained. Patient tolerated well.     Haile plan per primary team.

## 2021-11-24 NOTE — CONSULT NOTE ADULT - ASSESSMENT
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 On AM of 11/23 patient developed neutropenic sepsis with RRT for vasovagal episode had CTAP showing pneumoperitoneum and terminal ileitis. Admitted to SICU for HD monitoring and serial abdominal exams. Patient noted to have elevated troponin with new ekg changes in this setting, no significant cardiac history or family history of cardiac disease. Does follow with cardiology for history of syncope (thought to be non cardiac), stress test ~1 year ago was normal per patient. No chest Pain. Patient found to have PE with lactate elevation (now resolved), troponin elevation, tachycardia. Of note he has been hemodynamically stable, on 2L NC saturating 99, HR in low 100s. Hemoglobin and platelets have been steadily declining. Patient also with neutropenia and pneumoperitoneum as well on broad spectrum abx.       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 On AM of 11/23 patient developed neutropenic sepsis with RRT for vasovagal episode had CTAP showing pneumoperitoneum and terminal ileitis. Admitted to SICU for HD monitoring and serial abdominal exams. Patient noted to have elevated troponin with new ekg changes in this setting, no significant cardiac history or family history of cardiac disease. Does follow with cardiology for history of syncope (thought to be non cardiac), stress test ~1 year ago was normal per patient. No chest Pain. Patient found to have PE with lactate elevation (now resolved), troponin elevation, tachycardia. Of note he has been hemodynamically stable, on 2L NC saturating 99, HR in low 100s. Hemoglobin and platelets have been steadily declining. Patient also with neutropenia and pneumoperitoneum as well on broad spectrum abx.       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 On AM of 11/23 patient developed neutropenic sepsis with RRT for vasovagal episode had CTAP showing pneumoperitoneum and terminal ileitis. Admitted to SICU for HD monitoring and serial abdominal exams. Patient noted to have elevated troponin with new ekg changes in this setting, no significant cardiac history or family history of cardiac disease. Does follow with cardiology for history of syncope (thought to be non cardiac in etiology), stress test ~1 year ago was normal per patient. No chest Pain. Patient found to have PE with lactate elevation (now resolved), troponin elevation, tachycardia. Of note he has been hemodynamically stable, on 2L NC saturating 99, HR in low 100s. Hemoglobin and platelets have been steadily declining. Patient also with neutropenia and pneumoperitoneum as well, on broad spectrum abx.       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 On AM of 11/23 patient developed neutropenic sepsis with RRT for vasovagal episode had CTAP showing pneumoperitoneum and terminal ileitis. Admitted to SICU for HD monitoring and serial abdominal exams. Patient noted to have elevated troponin with new ekg changes in this setting, no significant cardiac history or family history of cardiac disease. Does follow with cardiology for history of syncope (thought to be non cardiac in etiology), stress test ~1 year ago was normal per patient. No chest Pain. Patient found to have PE with lactate elevation (now resolved), troponin elevation, tachycardia. Of note he has been hemodynamically stable, on 2L NC saturating 99, HR in low 100s. Hemoglobin and platelets have been steadily declining. Patient also with neutropenia and pneumoperitoneum as well, on broad spectrum abx.       #Submassive Saddle PE: PESI Class IV-V   -Case discussed with PERT team (currently reviewing images/case)   -Active T&S, have platelets on hold   -Trend cardiac enzymes, lactate, BNP  -Patient placed on heparin (would discuss with heme)  -UE/LE duplex (may be candidate for IVC filter pending findings)   -Otherwise agree with broad spectrum abx  -If any change in clinical status please call     Post-round addendum:  Plan:  1. Continue heparin gtt for now, will plan for mechanical thrombectomy to reduce clot burden, possible IVC filter, consent obtained from patient  2. Have active T&S, platelets on hold for possible procedure  3. Repeat lactate, proBNP, hsTroponin  4. Please discuss with Heme/Onc regarding platelet goals with use of therapeutic anticoagulation, with expected pancytopenia in setting of MM/SCT  5. Close telemetry monitoring for changes in vital signs, patient at high risk of decompensation  6. Continue excellent SICU care

## 2021-11-24 NOTE — PROGRESS NOTE ADULT - ASSESSMENT
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 On AM of 11/23 patient developed neutropenic sepsis with RRT for vasovagal episode had CTAP showing pneumoperitoneum and terminal ileitis. Admitted to SICU for HD monitoring and serial abdominal exams.    PLAN:    NEURO:  Pain control with acetaminophen, dilaudid PRN  No other active issues    RESPIRATORY:   B/L PE - Therapeutic heparin   Saturating well on 2L NC    CARDIOVASCULAR:  Troponemia 8-->346  Cardiology c/s  TTE no major changes when compared to prior, no major abnormalities   lopressor 2.5 q6h    GI/NUTRITION:  Pneumoperitoneum and terminal ileitis  nonop management per sx  NPO  serial abdominal exams    GENITOURINARY/RENAL:  No active issues  Monitor UOP  F/u UA    HEMATOLOGIC:  MM s/p autologous SCT  zarxio qd per heme  appreciate heme/onc recs  Therapeutic hep gtt for VOD ppx s/p SCT per heme/onc    INFECTIOUS DISEASE:  neutropenic  rodrigo 1g q8h empirically for suspected intrabdominal infx i/s/o pneumoperitoneum  fluc/acyclovir for ppx s/p SCT  ID following    ENDOCRINE:  No active issues    DISPO:  SICU   ASSESSMENT:  67 YO M with a 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 On AM of 11/23 patient developed neutropenic sepsis with RRT for vasovagal episode had CTAP showing pneumoperitoneum and terminal ileitis. Admitted to SICU for HD monitoring and serial abdominal exams.    PLAN:  NEURO: AOx4  - Pain control with acetaminophen, dilaudid PRN    RESPIRATORY: submassive pulmonary embolus with R heart stain  - Saturating well on 2L NC  - Full strength heparin gtt  - Daily CXR    CARDIOVASCULAR: Type 2 NSTEMI, troponins downtrending. R heart strain on TTE  - 11/23 TTE: EF 70-75%, septal flattening c/w RV overload, RV enlargement and mildly decreased systolic function  - Appreciate Cardiology recs: therapeutic heparin if cleared by Heme/Onc  - Trend troponins, cardiac enzymes per PERC team  - F/u home cardiologist recs  - Lopressor 2.5 Q6H    GI/NUTRITION: Pneumoperitoneum and terminal ileitis  - No urgent surgical intervention per ACS  - NPO  - serial abdominal exams  - f/u CRS recs    GENITOURINARY/RENAL: Urinary retention s/p Ortega insertion by Urology  - Continue Ortega  - Monitor UOP  - Daily BMPs    HEMATOLOGIC: Hx MM s/p autologous SCT 11/19  - Appreciate heme/onc recs: continue Zarizo daily  - Heparin gtt for PE and VOD ppx  - Trend daily CBCs    INFECTIOUS DISEASE: Neutropenia s/p SCT, suspected intrabdominal infxn i/s/o pneumoperitoneum  - Meropenem 1g Q8H empirically  - Continue prophylactic fluconazole/acyclovir per Heme  - f/u Blood Cx  - Appreciate ID recs     ENDOCRINE: No active issues  - f/u TFTs    DISPO: SICU  Code Status: Full Code

## 2021-11-24 NOTE — CONSULT NOTE ADULT - CONSULT REQUESTED DATE/TIME
23-Nov-2021 16:39
23-Nov-2021 20:05
24-Nov-2021 00:42
23-Nov-2021 16:44
23-Nov-2021 17:26
24-Nov-2021 01:22
19-Nov-2021

## 2021-11-24 NOTE — PROGRESS NOTE ADULT - SUBJECTIVE AND OBJECTIVE BOX
Surgery Progress Note    SUBJECTIVE: Pt seen and examined at bedside. Patient comfortable and in no-apparent distress. Now on therapeutic heparin gtt for new saddle PE. Patient reports feeling better this AM. He continues to deny abdominal pain, nausea or vomiting.     Vital Signs Last 24 Hrs  T(C): 37.1 (24 Nov 2021 07:00), Max: 37.5 (23 Nov 2021 19:00)  T(F): 98.7 (24 Nov 2021 07:00), Max: 99.5 (23 Nov 2021 19:00)  HR: 90 (24 Nov 2021 09:00) (84 - 109)  BP: 115/81 (24 Nov 2021 09:00) (95/67 - 138/80)  BP(mean): 95 (24 Nov 2021 09:00) (78 - 102)  RR: 13 (24 Nov 2021 09:00) (12 - 20)  SpO2: 99% (24 Nov 2021 09:00) (92% - 100%)    Physical Exam:  General - appears well, NAD  Neuro - awake, alert, oriented x4, no acute focal deficits  HEENT - normocephalic, PERRL, moist mucous membranes  Lungs - breathing comfortably on room air  Heart - pulse regular  Abdomen - softly distended, nontender  Extremities - all four extremities are warm & pink  Ortega in place draining clear urine    LABS:                        9.1    <0.10 )-----------( 19       ( 24 Nov 2021 06:28 )             28.5     11-24    137  |  108  |  22  ----------------------------<  132<H>  3.9   |  20<L>  |  0.83    Ca    7.6<L>      24 Nov 2021 06:28  Phos  2.6     11-24  Mg     2.3     11-24    TPro  4.9<L>  /  Alb  2.6<L>  /  TBili  0.3  /  DBili  0.1  /  AST  14  /  ALT  16  /  AlkPhos  70  11-24    PT/INR - ( 24 Nov 2021 00:32 )   PT: 13.5 sec;   INR: 1.13 ratio         PTT - ( 24 Nov 2021 06:28 )  PTT:32.8 sec      INs and OUTs:    11-23-21 @ 07:01  -  11-24-21 @ 07:00  --------------------------------------------------------  IN: 6604.1 mL / OUT: 1595 mL / NET: 5009.1 mL    11-24-21 @ 07:01  -  11-24-21 @ 10:12  --------------------------------------------------------  IN: 305 mL / OUT: 110 mL / NET: 195 mL

## 2021-11-24 NOTE — PROGRESS NOTE ADULT - SUBJECTIVE AND OBJECTIVE BOX
HPC Transplant Team                                                      Critical / Counseling Time Provided: 30 minutes                                                                                                                                                        Chief Complaint: Autologous peripheral blood stem cell transplant with high dose Melphalan prep regimen for treatment of multiple myeloma    S: Patient seen and examined with [facility] Transplant Team:   +generalized weakness    O: Vitals:   Vital Signs Last 24 Hrs  T(C): 37.1 (2021 07:00), Max: 37.5 (2021 19:00)  T(F): 98.7 (2021 07:00), Max: 99.5 (2021 19:00)  HR: 91 (2021 10:00) (84 - 109)  BP: 120/74 (2021 10:00) (95/67 - 138/80)  BP(mean): 90 (2021 10:00) (78 - 102)  RR: 14 (2021 10:00) (12 - 20)  SpO2: 97% (2021 10:00) (92% - 100%)    Admit weight: 98.4kg   Daily Weight in k.1 (2021 06:00)    Intake / Output:    @ 07:  -   @ 07:00  --------------------------------------------------------  IN: 6604.1 mL / OUT: 1595 mL / NET: 5009.1 mL     @ 07: @ 11:53  --------------------------------------------------------  IN: 391 mL / OUT: 175 mL / NET: 216 mL        PE:   Oropharynx: + erythema and post Kepivance coating no ulcerations   Oral Mucositis:              +                                         ndGndrndanddndend:nd nd2nd CVS: S1, S2 RRR   Lungs: CTA throughout bilaterally   Abdomen: + BS x 4, soft, ND mild tenderness with palpation RLQ.   Extremities: no edema   Gastric Mucositis:       -                                           Grade: n/a  Intestinal Mucositis:     -                                         Grade: n/a   Skin: patchy, erythematous maculopapular rash to face, neck and upper chest and back post Kepivance   TLC: CDI   Neuro: A&O x 3       Labs:   CBC Full  -  ( 2021 06:28 )  WBC Count : <0.10 K/uL  Hemoglobin : 9.1 g/dL  Hematocrit : 28.5 %  Platelet Count - Automated : 19 K/uL  Mean Cell Volume : 98.3 fl  Mean Cell Hemoglobin : 31.4 pg  Mean Cell Hemoglobin Concentration : 31.9 gm/dL  Auto Neutrophil # : x  Auto Lymphocyte # : x  Auto Monocyte # : x  Auto Eosinophil # : x  Auto Basophil # : x  Auto Neutrophil % : x  Auto Lymphocyte % : x  Auto Monocyte % : x  Auto Eosinophil % : x  Auto Basophil % : x                          9.1    <0.10 )-----------(        ( 2021 06:28 )             28.5         137  |  108  |  22  ----------------------------<  132<H>  3.9   |  20<L>  |  0.83    Ca    7.6<L>      2021 06:28  Phos  2.6       Mg     2.3         TPro  4.9<L>  /  Alb  2.6<L>  /  TBili  0.3  /  DBili  0.1  /  AST  14  /  ALT  16  /  AlkPhos  70      PT/INR - ( 2021 00:32 )   PT: 13.5 sec;   INR: 1.13 ratio         PTT - ( 2021 06:28 )  PTT:32.8 sec  LIVER FUNCTIONS - ( 2021 00:32 )  Alb: 2.6 g/dL / Pro: 4.9 g/dL / ALK PHOS: 70 U/L / ALT: 16 U/L / AST: 14 U/L / GGT: x           Lactate Dehydrogenase, Serum: 146 U/L ( @ 00:32)              Cultures:   - pending       Radiology:   < from: CT Angio Chest PE Protocol w/ IV Cont (21 @ 00:02) >  IMPRESSION:    Saddle embolus with right heart strain.    These results have been discussed with Dr. Arellano on 2021 at 12:47 AM by on-call resident.    Pneumoperitoneum is again noted.    < end of copied text >  < from: CT Abdomen and Pelvis w/ IV Cont (. @ 13:05) >  MPRESSION:    Terminal ileitis with associated ileus versus low-grade obstruction. Pockets of free air in the right lower quadrant adjacent to the terminal ileum concerning for bowel perforation. 2.6 cm loculated fluid adjacent to the terminal ileum and surrounding peritoneal enhancement suggestive of developing peritonitis. Distant free air in the right upper quadrant.    Sigmoid colon adjacent to the thickened terminal ileum is also markedly inflamed, with also notable diverticulosis. Sigmoid colitis/diverticulitis can also be considered on the differential versus primary terminal ileitis with reactive inflammation ofthe sigmoid colon.    Mild inflammation of the right colon and appendix may be reactive in nature. The appendix at the base and mid segment is nondistended. The tip of the appendix is not distinctly visualized adjacent to the inflamed terminal ileum.    < end of copied text >      Meds:   Antimicrobials:   acyclovir IVPB 490 milliGRAM(s) IV Intermittent every 8 hours  clotrimazole Lozenge 1 Lozenge Oral five times a day  fluconAZOLE IVPB 400 milliGRAM(s) IV Intermittent every 24 hours  meropenem  IVPB 1000 milliGRAM(s) IV Intermittent every 8 hours      Heme / Onc:   heparin  Infusion 800 Unit(s)/Hr IV Continuous <Continuous>      GI:  pantoprazole  Injectable 40 milliGRAM(s) IV Push daily  sodium bicarbonate Mouth Rinse 10 milliLiter(s) Swish and Spit five times a day      Cardiovascular:   furosemide   Injectable 20 milliGRAM(s) IV Push every 24 hours PRN  metoprolol tartrate Injectable 2.5 milliGRAM(s) IV Push every 6 hours      Immunologic:   filgrastim-sndz (ZARXIO) Injectable 480 MICROGram(s) SubCutaneous every 24 hours      Other medications:   BACItracin   Ointment 1 Application(s) Topical two times a day  Biotene Dry Mouth Oral Rinse 5 milliLiter(s) Swish and Spit five times a day  chlorhexidine 2% Cloths 1 Application(s) Topical <User Schedule>  dextrose 5% + lactated ringers. 1000 milliLiter(s) IV Continuous <Continuous>  hydrocortisone 1% Cream 1 Application(s) Topical two times a day  lidocaine/prilocaine Cream 1 Application(s) Topical daily  ondansetron Injectable 8 milliGRAM(s) IV Push every 8 hours      PRN:   acetaminophen     Tablet .. 650 milliGRAM(s) Oral every 6 hours PRN  furosemide   Injectable 20 milliGRAM(s) IV Push every 24 hours PRN  metoclopramide Injectable 10 milliGRAM(s) IV Push every 6 hours PRN        A/P:   66 year old male with a history of multiple myeloma s/p RVD x 6   Post Autologous PBSCT day +4  - melphalan /; s/p melphalan hydration for 24 hours post infusion of last dose   Strict I&O, daily weights, prn diuresis   - rest day   - rest day   - s/p HPC transplant; completed transplant hydration for 24 hours post infusion of cells. Suprapubic pain in am. UA pending, follow up culture, BK virus. AXR   -vasovegal episode in AM: will monitor tele for 24hrs    d/c telemetry. Lasix 40mg IV x today, follow up I&Os, daily weight. monitor rash, receiving 2nd dose Kepivance today   add Emend for 3 days and change zofran ATC. If worsening abd pain consider CT a/p oral contrast only.   - Neutropenic started on cipro once febrile will pancx and switch cipro to  Aztronam 2g Q8   - AMS this am, agonal breathing, diaphoretic, cool and clammy - responsive to sternal rub. Hypotensive, hypoxic. Placed on NRB, RRT called. Labs sent. Lactate send. PCN allergy - started on Aztreonam 2g IV q 8 hoursm Flagyl 500mg IV TID for anaerobic coverage given abdominal pain and distension, Vancomycin 1g IV x 1. CT A/P pending for 12:30 21. CE with new TWI in V2-V6 - likely demand ischemia. Repeat CE and lactate at 2pm.   - CT A/P with evidence of free air. Surgical team consulted- transferred to 8ICU for closer monitoring. No ID consult called.  - CT chest + Submassive PE, seen by cards- started on full dose A/C.       1. Infectious Disease:   acyclovir IVPB 490 milliGRAM(s) IV Intermittent every 8 hours  clotrimazole Lozenge 1 Lozenge Oral five times a day  fluconAZOLE IVPB 400 milliGRAM(s) IV Intermittent every 24 hours  meropenem  IVPB 1000 milliGRAM(s) IV Intermittent every 8 hours    2. VOD Prophylaxis: Actigall, Glutamine, Heparin (Full A/C)     3. GI Prophylaxis:    pantoprazole    Tablet 40 milliGRAM(s) Oral before breakfast    4. Mouthcare - NS / NaHCO3 rinses, Mycelex, Biotene; Skin care     5. GVHD prophylaxis - n/a     6. Transfuse & replete electrolytes prn     7. IV hydration, daily weights, strict I&O, prn diuresis     8. PO intake as tolerated, nutrition follow up as needed, MVI, folic acid     9. Antiemetics, anti-diarrhea medications:   LORazepam   Injectable 1 milliGRAM(s) IV Push every 24 hours  ondansetron Injectable 8 milliGRAM(s) IV Push every 8 hours  metoclopramide Injectable 10 milliGRAM(s) IV Push every 6 hours PRN  aprepitant 80 milliGRAM(s) Oral daily    10. OOB as tolerated, physical therapy consult if needed     11. Monitor coags / fibrinogen 2x week, vitamin K as needed     12. Monitor closely for clinical changes, monitor for fevers     13. Emotional support provided, plan of care discussed and questions addressed     14. Patient education done regarding plan of care, restrictions and discharge planning     15. Continue regular social work input     I have written the above note for Dr. Lozada  who performed service with me in the room.   Vale Ugarte PA-C ([phone])    I have seen and examined patient with PA, I agree with above note as scribed.                  HPC Transplant Team                                                      Critical / Counseling Time Provided: 30 minutes                                                                                                                                                        Chief Complaint: Autologous peripheral blood stem cell transplant with high dose Melphalan prep regimen for treatment of multiple myeloma    S: Patient seen and examined with [facility] [place] Transplant Team:   +generalized weakness    O: Vitals:   Vital Signs Last 24 Hrs  T(C): 37.1 (2021 07:00), Max: 37.5 (2021 19:00)  T(F): 98.7 (2021 07:00), Max: 99.5 (2021 19:00)  HR: 91 (2021 10:00) (84 - 109)  BP: 120/74 (2021 10:00) (95/67 - 138/80)  BP(mean): 90 (2021 10:00) (78 - 102)  RR: 14 (2021 10:00) (12 - 20)  SpO2: 97% (2021 10:00) (92% - 100%)    Admit weight: 98.4kg   Daily Weight in k.1 (2021 06:00)    Intake / Output:    @ 07:  -   @ 07:00  --------------------------------------------------------  IN: 6604.1 mL / OUT: 1595 mL / NET: 5009.1 mL     @ 07: @ 11:53  --------------------------------------------------------  IN: 391 mL / OUT: 175 mL / NET: 216 mL        PE:   Oropharynx: + erythema and post Kepivance coating no ulcerations   Oral Mucositis:              +                                         ndGndrndanddndend:nd nd2nd CVS: S1, S2 RRR   Lungs: CTA throughout bilaterally   Abdomen: + BS x 4, soft, ND mild tenderness with palpation RLQ.   Extremities: no edema   Gastric Mucositis:       -                                           Grade: n/a  Intestinal Mucositis:     -                                         Grade: n/a   Skin: patchy, erythematous maculopapular rash to face, neck and upper chest and back post Kepivance   TLC: CDI   Neuro: A&O x 3       Labs:   CBC Full  -  ( 2021 06:28 )  WBC Count : <0.10 K/uL  Hemoglobin : 9.1 g/dL  Hematocrit : 28.5 %  Platelet Count - Automated : 19 K/uL  Mean Cell Volume : 98.3 fl  Mean Cell Hemoglobin : 31.4 pg  Mean Cell Hemoglobin Concentration : 31.9 gm/dL  Auto Neutrophil # : x  Auto Lymphocyte # : x  Auto Monocyte # : x  Auto Eosinophil # : x  Auto Basophil # : x  Auto Neutrophil % : x  Auto Lymphocyte % : x  Auto Monocyte % : x  Auto Eosinophil % : x  Auto Basophil % : x                          9.1    <0.10 )-----------(        ( 2021 06:28 )             28.5         137  |  108  |  22  ----------------------------<  132<H>  3.9   |  20<L>  |  0.83    Ca    7.6<L>      2021 06:28  Phos  2.6       Mg     2.3         TPro  4.9<L>  /  Alb  2.6<L>  /  TBili  0.3  /  DBili  0.1  /  AST  14  /  ALT  16  /  AlkPhos  70      PT/INR - ( 2021 00:32 )   PT: 13.5 sec;   INR: 1.13 ratio         PTT - ( 2021 06:28 )  PTT:32.8 sec  LIVER FUNCTIONS - ( 2021 00:32 )  Alb: 2.6 g/dL / Pro: 4.9 g/dL / ALK PHOS: 70 U/L / ALT: 16 U/L / AST: 14 U/L / GGT: x           Lactate Dehydrogenase, Serum: 146 U/L ( @ 00:32)        Cultures:   - pending       Radiology:   < from: CT Angio Chest PE Protocol w/ IV Cont (21 @ 00:02) >  IMPRESSION:    Saddle embolus with right heart strain.    These results have been discussed with Dr. Arellano on 2021 at 12:47 AM by on-call resident.    Pneumoperitoneum is again noted.    < end of copied text >  < from: CT Abdomen and Pelvis w/ IV Cont (. @ 13:05) >  MPRESSION:    Terminal ileitis with associated ileus versus low-grade obstruction. Pockets of free air in the right lower quadrant adjacent to the terminal ileum concerning for bowel perforation. 2.6 cm loculated fluid adjacent to the terminal ileum and surrounding peritoneal enhancement suggestive of developing peritonitis. Distant free air in the right upper quadrant.    Sigmoid colon adjacent to the thickened terminal ileum is also markedly inflamed, with also notable diverticulosis. Sigmoid colitis/diverticulitis can also be considered on the differential versus primary terminal ileitis with reactive inflammation ofthe sigmoid colon.    Mild inflammation of the right colon and appendix may be reactive in nature. The appendix at the base and mid segment is nondistended. The tip of the appendix is not distinctly visualized adjacent to the inflamed terminal ileum.    < end of copied text >      Meds:   Antimicrobials:   acyclovir IVPB 490 milliGRAM(s) IV Intermittent every 8 hours  clotrimazole Lozenge 1 Lozenge Oral five times a day  fluconAZOLE IVPB 400 milliGRAM(s) IV Intermittent every 24 hours  meropenem  IVPB 1000 milliGRAM(s) IV Intermittent every 8 hours      Heme / Onc:   heparin  Infusion 800 Unit(s)/Hr IV Continuous <Continuous>      GI:  pantoprazole  Injectable 40 milliGRAM(s) IV Push daily  sodium bicarbonate Mouth Rinse 10 milliLiter(s) Swish and Spit five times a day      Cardiovascular:   furosemide   Injectable 20 milliGRAM(s) IV Push every 24 hours PRN  metoprolol tartrate Injectable 2.5 milliGRAM(s) IV Push every 6 hours      Immunologic:   filgrastim-sndz (ZARXIO) Injectable 480 MICROGram(s) SubCutaneous every 24 hours      Other medications:   BACItracin   Ointment 1 Application(s) Topical two times a day  Biotene Dry Mouth Oral Rinse 5 milliLiter(s) Swish and Spit five times a day  chlorhexidine 2% Cloths 1 Application(s) Topical <User Schedule>  dextrose 5% + lactated ringers. 1000 milliLiter(s) IV Continuous <Continuous>  hydrocortisone 1% Cream 1 Application(s) Topical two times a day  lidocaine/prilocaine Cream 1 Application(s) Topical daily  ondansetron Injectable 8 milliGRAM(s) IV Push every 8 hours      PRN:   acetaminophen     Tablet .. 650 milliGRAM(s) Oral every 6 hours PRN  furosemide   Injectable 20 milliGRAM(s) IV Push every 24 hours PRN  metoclopramide Injectable 10 milliGRAM(s) IV Push every 6 hours PRN        A/P:   66 year old male with a history of multiple myeloma s/p RVD x 6   Post Autologous PBSCT day +5  - melphalan /; s/p melphalan hydration for 24 hours post infusion of last dose   Strict I&O, daily weights, prn diuresis   - rest day   - rest day   - s/p HPC transplant; completed transplant hydration for 24 hours post infusion of cells. Suprapubic pain in am. UA pending, follow up culture, BK virus. AXR   -vasovegal episode in AM: will monitor tele for 24hrs    d/c telemetry. Lasix 40mg IV x today, follow up I&Os, daily weight. monitor rash, receiving 2nd dose Kepivance today   add Emend for 3 days and change zofran ATC. If worsening abd pain consider CT a/p oral contrast only.   - Neutropenic started on cipro once febrile will pancx and switch cipro to  Aztronam 2g Q8   - AMS this am, agonal breathing, diaphoretic, cool and clammy - responsive to sternal rub. Hypotensive, hypoxic. Placed on NRB, RRT called. Labs sent. Lactate send. PCN allergy - started on Aztreonam 2g IV q 8 hoursm Flagyl 500mg IV TID for anaerobic coverage given abdominal pain and distension, Vancomycin 1g IV x 1. CT A/P pending for 12:30 21. CE with new TWI in V2-V6 - likely demand ischemia. Repeat CE and lactate at 2pm.   - CT A/P with evidence of free air. Surgical team consulted- transferred to 8ICU for closer monitoring. No ID consult called- ABX broaden.    - CT chest + Submassive PE, seen by cards- started on full dose A/C. doppler + B/L DVT. D/C Zarxio      1. Infectious Disease:   acyclovir IVPB 490 milliGRAM(s) IV Intermittent every 8 hours  clotrimazole Lozenge 1 Lozenge Oral five times a day  fluconAZOLE IVPB 400 milliGRAM(s) IV Intermittent every 24 hours  meropenem  IVPB 1000 milliGRAM(s) IV Intermittent every 8 hours    2. VOD Prophylaxis: Actigall, Glutamine, Heparin (Full A/C)     3. GI Prophylaxis:    pantoprazole    Tablet 40 milliGRAM(s) Oral before breakfast    4. Mouthcare - NS / NaHCO3 rinses, Mycelex, Biotene; Skin care     5. GVHD prophylaxis - n/a     6. Transfuse & replete electrolytes prn   transfuse for PLT less than 10K of if febrile and PLT less than 15K or below 50 with planned surgical intervention hemoglobin less then 7 of symptomatic.    7. IV hydration, daily weights, strict I&O, prn diuresis     8. PO intake as tolerated, nutrition follow up as needed, MVI, folic acid     9. Antiemetics, anti-diarrhea medications:   LORazepam   Injectable 1 milliGRAM(s) IV Push every 24 hours  ondansetron Injectable 8 milliGRAM(s) IV Push every 8 hours  metoclopramide Injectable 10 milliGRAM(s) IV Push every 6 hours PRN  aprepitant 80 milliGRAM(s) Oral daily    10. OOB as tolerated, physical therapy consult if needed     11. Monitor coags / fibrinogen 2x week, vitamin K as needed     12. Monitor closely for clinical changes, monitor for fevers     13. Emotional support provided, plan of care discussed and questions addressed     14. Patient education done regarding plan of care, restrictions and discharge planning     15. Continue regular social work input     I have written the above note for Dr. Lozada  who performed service with me in the room.   Vale Ugarte PA-C ([phone])    I have seen and examined patient with PA, I agree with above note as scribed.                  HPC Transplant Team                                                      Critical / Counseling Time Provided: 30 minutes                                                                                                                                                        Chief Complaint: Autologous peripheral blood stem cell transplant with high dose Melphalan prep regimen for treatment of multiple myeloma    S: Patient seen and examined with [facility] Transplant Team:   +generalized weakness    O: Vitals:   Vital Signs Last 24 Hrs  T(C): 37.1 (2021 07:00), Max: 37.5 (2021 19:00)  T(F): 98.7 (2021 07:00), Max: 99.5 (2021 19:00)  HR: 91 (2021 10:00) (84 - 109)  BP: 120/74 (2021 10:00) (95/67 - 138/80)  BP(mean): 90 (2021 10:00) (78 - 102)  RR: 14 (2021 10:00) (12 - 20)  SpO2: 97% (2021 10:00) (92% - 100%)    Admit weight: 98.4kg   Daily Weight in k.1 (2021 06:00)    Intake / Output:    @ 07:  -   @ 07:00  --------------------------------------------------------  IN: 6604.1 mL / OUT: 1595 mL / NET: 5009.1 mL     @ 07: @ 11:53  --------------------------------------------------------  IN: 391 mL / OUT: 175 mL / NET: 216 mL        PE:   Oropharynx: + erythema and post Kepivance coating no ulcerations   Oral Mucositis:              +                                         ndGndrndanddndend:nd nd2nd CVS: S1, S2 RRR   Lungs: CTA throughout bilaterally   Abdomen: + BS x 4, soft, ND mild tenderness with palpation RLQ.   Extremities: no edema   Gastric Mucositis:       -                                           Grade: n/a  Intestinal Mucositis:     -                                         Grade: n/a   Skin: patchy, erythematous maculopapular rash to face, neck and upper chest and back post Kepivance   TLC: CDI   Neuro: A&O x 3       Labs:   CBC Full  -  ( 2021 06:28 )  WBC Count : <0.10 K/uL  Hemoglobin : 9.1 g/dL  Hematocrit : 28.5 %  Platelet Count - Automated : 19 K/uL  Mean Cell Volume : 98.3 fl  Mean Cell Hemoglobin : 31.4 pg  Mean Cell Hemoglobin Concentration : 31.9 gm/dL  Auto Neutrophil # : x  Auto Lymphocyte # : x  Auto Monocyte # : x  Auto Eosinophil # : x  Auto Basophil # : x  Auto Neutrophil % : x  Auto Lymphocyte % : x  Auto Monocyte % : x  Auto Eosinophil % : x  Auto Basophil % : x                          9.1    <0.10 )-----------(        ( 2021 06:28 )             28.5         137  |  108  |  22  ----------------------------<  132<H>  3.9   |  20<L>  |  0.83    Ca    7.6<L>      2021 06:28  Phos  2.6       Mg     2.3         TPro  4.9<L>  /  Alb  2.6<L>  /  TBili  0.3  /  DBili  0.1  /  AST  14  /  ALT  16  /  AlkPhos  70      PT/INR - ( 2021 00:32 )   PT: 13.5 sec;   INR: 1.13 ratio         PTT - ( 2021 06:28 )  PTT:32.8 sec  LIVER FUNCTIONS - ( 2021 00:32 )  Alb: 2.6 g/dL / Pro: 4.9 g/dL / ALK PHOS: 70 U/L / ALT: 16 U/L / AST: 14 U/L / GGT: x           Lactate Dehydrogenase, Serum: 146 U/L ( @ 00:32)        Cultures:   - pending       Radiology:   < from: CT Angio Chest PE Protocol w/ IV Cont (21 @ 00:02) >  IMPRESSION:    Saddle embolus with right heart strain.    These results have been discussed with Dr. Arellano on 2021 at 12:47 AM by on-call resident.    Pneumoperitoneum is again noted.    < end of copied text >  < from: CT Abdomen and Pelvis w/ IV Cont (. @ 13:05) >  MPRESSION:    Terminal ileitis with associated ileus versus low-grade obstruction. Pockets of free air in the right lower quadrant adjacent to the terminal ileum concerning for bowel perforation. 2.6 cm loculated fluid adjacent to the terminal ileum and surrounding peritoneal enhancement suggestive of developing peritonitis. Distant free air in the right upper quadrant.    Sigmoid colon adjacent to the thickened terminal ileum is also markedly inflamed, with also notable diverticulosis. Sigmoid colitis/diverticulitis can also be considered on the differential versus primary terminal ileitis with reactive inflammation ofthe sigmoid colon.    Mild inflammation of the right colon and appendix may be reactive in nature. The appendix at the base and mid segment is nondistended. The tip of the appendix is not distinctly visualized adjacent to the inflamed terminal ileum.    < end of copied text >      Meds:   Antimicrobials:   acyclovir IVPB 490 milliGRAM(s) IV Intermittent every 8 hours  clotrimazole Lozenge 1 Lozenge Oral five times a day  fluconAZOLE IVPB 400 milliGRAM(s) IV Intermittent every 24 hours  meropenem  IVPB 1000 milliGRAM(s) IV Intermittent every 8 hours      Heme / Onc:   heparin  Infusion 800 Unit(s)/Hr IV Continuous <Continuous>      GI:  pantoprazole  Injectable 40 milliGRAM(s) IV Push daily  sodium bicarbonate Mouth Rinse 10 milliLiter(s) Swish and Spit five times a day      Cardiovascular:   furosemide   Injectable 20 milliGRAM(s) IV Push every 24 hours PRN  metoprolol tartrate Injectable 2.5 milliGRAM(s) IV Push every 6 hours      Immunologic:   filgrastim-sndz (ZARXIO) Injectable 480 MICROGram(s) SubCutaneous every 24 hours      Other medications:   BACItracin   Ointment 1 Application(s) Topical two times a day  Biotene Dry Mouth Oral Rinse 5 milliLiter(s) Swish and Spit five times a day  chlorhexidine 2% Cloths 1 Application(s) Topical <User Schedule>  dextrose 5% + lactated ringers. 1000 milliLiter(s) IV Continuous <Continuous>  hydrocortisone 1% Cream 1 Application(s) Topical two times a day  lidocaine/prilocaine Cream 1 Application(s) Topical daily  ondansetron Injectable 8 milliGRAM(s) IV Push every 8 hours      PRN:   acetaminophen     Tablet .. 650 milliGRAM(s) Oral every 6 hours PRN  furosemide   Injectable 20 milliGRAM(s) IV Push every 24 hours PRN  metoclopramide Injectable 10 milliGRAM(s) IV Push every 6 hours PRN        A/P:   66 year old male with a history of multiple myeloma s/p RVD x 6   Post Autologous PBSCT day +5  - melphalan /; s/p melphalan hydration for 24 hours post infusion of last dose   Strict I&O, daily weights, prn diuresis   - rest day   - rest day   - s/p HPC transplant; completed transplant hydration for 24 hours post infusion of cells. Suprapubic pain in am. UA pending, follow up culture, BK virus. AXR   -vasovegal episode in AM: will monitor tele for 24hrs    d/c telemetry. Lasix 40mg IV x today, follow up I&Os, daily weight. monitor rash, receiving 2nd dose Kepivance today   add Emend for 3 days and change zofran ATC. If worsening abd pain consider CT a/p oral contrast only.   - Neutropenic started on cipro once febrile will pancx and switch cipro to  Aztronam 2g Q8   - AMS this am, agonal breathing, diaphoretic, cool and clammy - responsive to sternal rub. Hypotensive, hypoxic. Placed on NRB, RRT called. Labs sent. Lactate send. PCN allergy - started on Aztreonam 2g IV q 8 hoursm Flagyl 500mg IV TID for anaerobic coverage given abdominal pain and distension, Vancomycin 1g IV x 1. CT A/P pending for 12:30 21. CE with new TWI in V2-V6 - likely demand ischemia. Repeat CE and lactate at 2pm.   - CT A/P with evidence of free air. Surgical team consulted- transferred to 8ICU for closer monitoring. No ID consult called- ABX broaden.    - CT chest + Submassive PE, seen by cards- started on full dose A/C. doppler + B/L DVT. D/C Zarxio      1. Infectious Disease:   acyclovir IVPB 490 milliGRAM(s) IV Intermittent every 8 hours  clotrimazole Lozenge 1 Lozenge Oral five times a day  fluconAZOLE IVPB 400 milliGRAM(s) IV Intermittent every 24 hours  meropenem  IVPB 1000 milliGRAM(s) IV Intermittent every 8 hours    2. VOD Prophylaxis: Actigall, Glutamine, Heparin (Full A/C)     3. GI Prophylaxis:    pantoprazole    Tablet 40 milliGRAM(s) Oral before breakfast    4. Mouthcare - NS / NaHCO3 rinses, Mycelex, Biotene; Skin care     5. GVHD prophylaxis - n/a     6. Transfuse & replete electrolytes prn   transfuse for PLT less than 20K  or below 50 with planned surgical intervention hemoglobin less then 7 of symptomatic.    7. IV hydration, daily weights, strict I&O, prn diuresis     8. PO intake as tolerated, nutrition follow up as needed, MVI, folic acid     9. Antiemetics, anti-diarrhea medications:   LORazepam   Injectable 1 milliGRAM(s) IV Push every 24 hours  ondansetron Injectable 8 milliGRAM(s) IV Push every 8 hours  metoclopramide Injectable 10 milliGRAM(s) IV Push every 6 hours PRN  aprepitant 80 milliGRAM(s) Oral daily    10. OOB as tolerated, physical therapy consult if needed     11. Monitor coags / fibrinogen 2x week, vitamin K as needed     12. Monitor closely for clinical changes, monitor for fevers     13. Emotional support provided, plan of care discussed and questions addressed     14. Patient education done regarding plan of care, restrictions and discharge planning     15. Continue regular social work input     I have written the above note for Dr. Lozada  who performed service with me in the room.   Vale Ugarte PA-C ([phone])    I have seen and examined patient with PA, I agree with above note as scribed.                  HPC Transplant Team                                                      Critical / Counseling Time Provided: 30 minutes                                                                                                                                                        Chief Complaint: Autologous peripheral blood stem cell transplant with high dose Melphalan prep regimen for treatment of multiple myeloma    S: Patient seen and examined with [facility] Transplant Team:   +generalized weakness    O: Vitals:   Vital Signs Last 24 Hrs  T(C): 37.1 (2021 07:00), Max: 37.5 (2021 19:00)  T(F): 98.7 (2021 07:00), Max: 99.5 (2021 19:00)  HR: 91 (2021 10:00) (84 - 109)  BP: 120/74 (2021 10:00) (95/67 - 138/80)  BP(mean): 90 (2021 10:00) (78 - 102)  RR: 14 (2021 10:00) (12 - 20)  SpO2: 97% (2021 10:00) (92% - 100%)    Admit weight: 98.4kg   Daily Weight in k.1 (2021 06:00)    Intake / Output:    @ 07:  -   @ 07:00  --------------------------------------------------------  IN: 6604.1 mL / OUT: 1595 mL / NET: 5009.1 mL     @ 07: @ 11:53  --------------------------------------------------------  IN: 391 mL / OUT: 175 mL / NET: 216 mL        PE:   Oropharynx: + erythema and post Kepivance coating no ulcerations   Oral Mucositis:              +                                         ndGndrndanddndend:nd nd2nd CVS: S1, S2 RRR   Lungs: CTA throughout bilaterally   Abdomen: + BS x 4, soft, ND mild tenderness with palpation RLQ.   Extremities: no edema   Gastric Mucositis:       -                                           Grade: n/a  Intestinal Mucositis:     -                                         Grade: n/a   Skin: patchy, erythematous maculopapular rash to face, neck and upper chest and back post Kepivance   TLC: CDI   Neuro: A&O x 3       Labs:   CBC Full  -  ( 2021 06:28 )  WBC Count : <0.10 K/uL  Hemoglobin : 9.1 g/dL  Hematocrit : 28.5 %  Platelet Count - Automated : 19 K/uL  Mean Cell Volume : 98.3 fl  Mean Cell Hemoglobin : 31.4 pg  Mean Cell Hemoglobin Concentration : 31.9 gm/dL  Auto Neutrophil # : x  Auto Lymphocyte # : x  Auto Monocyte # : x  Auto Eosinophil # : x  Auto Basophil # : x  Auto Neutrophil % : x  Auto Lymphocyte % : x  Auto Monocyte % : x  Auto Eosinophil % : x  Auto Basophil % : x                          9.1    <0.10 )-----------(        ( 2021 06:28 )             28.5         137  |  108  |  22  ----------------------------<  132<H>  3.9   |  20<L>  |  0.83    Ca    7.6<L>      2021 06:28  Phos  2.6       Mg     2.3         TPro  4.9<L>  /  Alb  2.6<L>  /  TBili  0.3  /  DBili  0.1  /  AST  14  /  ALT  16  /  AlkPhos  70      PT/INR - ( 2021 00:32 )   PT: 13.5 sec;   INR: 1.13 ratio         PTT - ( 2021 06:28 )  PTT:32.8 sec  LIVER FUNCTIONS - ( 2021 00:32 )  Alb: 2.6 g/dL / Pro: 4.9 g/dL / ALK PHOS: 70 U/L / ALT: 16 U/L / AST: 14 U/L / GGT: x           Lactate Dehydrogenase, Serum: 146 U/L ( @ 00:32)        Cultures:   - pending       Radiology:   < from: CT Angio Chest PE Protocol w/ IV Cont (21 @ 00:02) >  IMPRESSION:    Saddle embolus with right heart strain.    These results have been discussed with Dr. Arellano on 2021 at 12:47 AM by on-call resident.    Pneumoperitoneum is again noted.    < end of copied text >  < from: CT Abdomen and Pelvis w/ IV Cont (. @ 13:05) >  MPRESSION:    Terminal ileitis with associated ileus versus low-grade obstruction. Pockets of free air in the right lower quadrant adjacent to the terminal ileum concerning for bowel perforation. 2.6 cm loculated fluid adjacent to the terminal ileum and surrounding peritoneal enhancement suggestive of developing peritonitis. Distant free air in the right upper quadrant.    Sigmoid colon adjacent to the thickened terminal ileum is also markedly inflamed, with also notable diverticulosis. Sigmoid colitis/diverticulitis can also be considered on the differential versus primary terminal ileitis with reactive inflammation ofthe sigmoid colon.    Mild inflammation of the right colon and appendix may be reactive in nature. The appendix at the base and mid segment is nondistended. The tip of the appendix is not distinctly visualized adjacent to the inflamed terminal ileum.    < end of copied text >      Meds:   Antimicrobials:   acyclovir IVPB 490 milliGRAM(s) IV Intermittent every 8 hours  clotrimazole Lozenge 1 Lozenge Oral five times a day  fluconAZOLE IVPB 400 milliGRAM(s) IV Intermittent every 24 hours  meropenem  IVPB 1000 milliGRAM(s) IV Intermittent every 8 hours      Heme / Onc:   heparin  Infusion 800 Unit(s)/Hr IV Continuous <Continuous>      GI:  pantoprazole  Injectable 40 milliGRAM(s) IV Push daily  sodium bicarbonate Mouth Rinse 10 milliLiter(s) Swish and Spit five times a day      Cardiovascular:   furosemide   Injectable 20 milliGRAM(s) IV Push every 24 hours PRN  metoprolol tartrate Injectable 2.5 milliGRAM(s) IV Push every 6 hours      Immunologic:   filgrastim-sndz (ZARXIO) Injectable 480 MICROGram(s) SubCutaneous every 24 hours      Other medications:   BACItracin   Ointment 1 Application(s) Topical two times a day  Biotene Dry Mouth Oral Rinse 5 milliLiter(s) Swish and Spit five times a day  chlorhexidine 2% Cloths 1 Application(s) Topical <User Schedule>  dextrose 5% + lactated ringers. 1000 milliLiter(s) IV Continuous <Continuous>  hydrocortisone 1% Cream 1 Application(s) Topical two times a day  lidocaine/prilocaine Cream 1 Application(s) Topical daily  ondansetron Injectable 8 milliGRAM(s) IV Push every 8 hours      PRN:   acetaminophen     Tablet .. 650 milliGRAM(s) Oral every 6 hours PRN  furosemide   Injectable 20 milliGRAM(s) IV Push every 24 hours PRN  metoclopramide Injectable 10 milliGRAM(s) IV Push every 6 hours PRN        A/P:   66 year old male with a history of multiple myeloma s/p RVD x 6   Post Autologous PBSCT day +5  - melphalan /; s/p melphalan hydration for 24 hours post infusion of last dose   Strict I&O, daily weights, prn diuresis   - rest day   - rest day   - s/p HPC transplant; completed transplant hydration for 24 hours post infusion of cells. Suprapubic pain in am. UA pending, follow up culture, BK virus. AXR   -vasovegal episode in AM: will monitor tele for 24hrs    d/c telemetry. Lasix 40mg IV x today, follow up I&Os, daily weight. monitor rash, receiving 2nd dose Kepivance today   add Emend for 3 days and change zofran ATC. If worsening abd pain consider CT a/p oral contrast only.   - Neutropenic started on cipro once febrile will pancx and switch cipro to  Aztronam 2g Q8   - AMS this am, agonal breathing, diaphoretic, cool and clammy - responsive to sternal rub. Hypotensive, hypoxic. Placed on NRB, RRT called. Labs sent. Lactate send. PCN allergy - started on Aztreonam 2g IV q 8 hoursm Flagyl 500mg IV TID for anaerobic coverage given abdominal pain and distension, Vancomycin 1g IV x 1. CT A/P pending for 12:30 21. CE with new TWI in V2-V6 - likely demand ischemia. Repeat CE and lactate at 2pm.   - CT A/P with evidence of free air. Surgical team consulted- transferred to 8ICU for closer monitoring. No ID consult called- ABX broaden.    - CT chest + Submassive PE, seen by cards- started on full dose A/C plan for IVC filter placement with thrombectomy: transfuse PLT to >50K . doppler + B/L DVT. D/C Zarxio      1. Infectious Disease:   acyclovir IVPB 490 milliGRAM(s) IV Intermittent every 8 hours  clotrimazole Lozenge 1 Lozenge Oral five times a day  fluconAZOLE IVPB 400 milliGRAM(s) IV Intermittent every 24 hours  meropenem  IVPB 1000 milliGRAM(s) IV Intermittent every 8 hours    2. VOD Prophylaxis: Actigall, Glutamine, Heparin (Full A/C)     3. GI Prophylaxis:    pantoprazole    Tablet 40 milliGRAM(s) Oral before breakfast    4. Mouthcare - NS / NaHCO3 rinses, Mycelex, Biotene; Skin care     5. GVHD prophylaxis - n/a     6. Transfuse & replete electrolytes prn   transfuse for PLT less than 20K  or below 50 with planned surgical intervention hemoglobin less then 7 of symptomatic.    7. IV hydration, daily weights, strict I&O, prn diuresis     8. PO intake as tolerated, nutrition follow up as needed, MVI, folic acid     9. Antiemetics, anti-diarrhea medications:   LORazepam   Injectable 1 milliGRAM(s) IV Push every 24 hours  ondansetron Injectable 8 milliGRAM(s) IV Push every 8 hours  metoclopramide Injectable 10 milliGRAM(s) IV Push every 6 hours PRN  aprepitant 80 milliGRAM(s) Oral daily    10. OOB as tolerated, physical therapy consult if needed     11. Monitor coags / fibrinogen 2x week, vitamin K as needed     12. Monitor closely for clinical changes, monitor for fevers     13. Emotional support provided, plan of care discussed and questions addressed     14. Patient education done regarding plan of care, restrictions and discharge planning     15. Continue regular social work input     I have written the above note for Dr. Lozada  who performed service with me in the room.   Vale Ugarte PA-C ([phone])    I have seen and examined patient with PA, I agree with above note as scribed.                  HPC Transplant Team                                                      Critical / Counseling Time Provided: 30 minutes                                                                                                                                                        Chief Complaint: Autologous peripheral blood stem cell transplant with high dose Melphalan prep regimen for treatment of multiple myeloma    S: Patient seen and examined with [facility] Transplant Team:   +generalized weakness    O: Vitals:   Vital Signs Last 24 Hrs  T(C): 37.1 (2021 07:00), Max: 37.5 (2021 19:00)  T(F): 98.7 (2021 07:00), Max: 99.5 (2021 19:00)  HR: 91 (2021 10:00) (84 - 109)  BP: 120/74 (2021 10:00) (95/67 - 138/80)  BP(mean): 90 (2021 10:00) (78 - 102)  RR: 14 (2021 10:00) (12 - 20)  SpO2: 97% (2021 10:00) (92% - 100%)    Admit weight: 98.4kg   Daily Weight in k.1 (2021 06:00)    Intake / Output:    @ 07:  -   @ 07:00  --------------------------------------------------------  IN: 6604.1 mL / OUT: 1595 mL / NET: 5009.1 mL     @ 07: @ 11:53  --------------------------------------------------------  IN: 391 mL / OUT: 175 mL / NET: 216 mL        PE:   Oropharynx: + erythema and post Kepivance coating no ulcerations   Oral Mucositis:              +                                         ndGndrndanddndend:nd nd2nd CVS: S1, S2 RRR   Lungs: CTA throughout bilaterally   Abdomen: + BS x 4, soft, ND mild tenderness with palpation RLQ.   Extremities: no edema   Gastric Mucositis:       -                                           Grade: n/a  Intestinal Mucositis:     -                                         Grade: n/a   Skin: patchy, erythematous maculopapular rash to face, neck and upper chest and back post Kepivance   TLC: CDI   Neuro: A&O x 3       Labs:   CBC Full  -  ( 2021 06:28 )  WBC Count : <0.10 K/uL  Hemoglobin : 9.1 g/dL  Hematocrit : 28.5 %  Platelet Count - Automated : 19 K/uL  Mean Cell Volume : 98.3 fl  Mean Cell Hemoglobin : 31.4 pg  Mean Cell Hemoglobin Concentration : 31.9 gm/dL  Auto Neutrophil # : x  Auto Lymphocyte # : x  Auto Monocyte # : x  Auto Eosinophil # : x  Auto Basophil # : x  Auto Neutrophil % : x  Auto Lymphocyte % : x  Auto Monocyte % : x  Auto Eosinophil % : x  Auto Basophil % : x                          9.1    <0.10 )-----------(        ( 2021 06:28 )             28.5         137  |  108  |  22  ----------------------------<  132<H>  3.9   |  20<L>  |  0.83    Ca    7.6<L>      2021 06:28  Phos  2.6       Mg     2.3         TPro  4.9<L>  /  Alb  2.6<L>  /  TBili  0.3  /  DBili  0.1  /  AST  14  /  ALT  16  /  AlkPhos  70      PT/INR - ( 2021 00:32 )   PT: 13.5 sec;   INR: 1.13 ratio         PTT - ( 2021 06:28 )  PTT:32.8 sec  LIVER FUNCTIONS - ( 2021 00:32 )  Alb: 2.6 g/dL / Pro: 4.9 g/dL / ALK PHOS: 70 U/L / ALT: 16 U/L / AST: 14 U/L / GGT: x           Lactate Dehydrogenase, Serum: 146 U/L ( @ 00:32)        Cultures:   - pending       Radiology:   < from: CT Angio Chest PE Protocol w/ IV Cont (21 @ 00:02) >  IMPRESSION:    Saddle embolus with right heart strain.    These results have been discussed with Dr. Arellano on 2021 at 12:47 AM by on-call resident.    Pneumoperitoneum is again noted.    < end of copied text >  < from: CT Abdomen and Pelvis w/ IV Cont (. @ 13:05) >  MPRESSION:    Terminal ileitis with associated ileus versus low-grade obstruction. Pockets of free air in the right lower quadrant adjacent to the terminal ileum concerning for bowel perforation. 2.6 cm loculated fluid adjacent to the terminal ileum and surrounding peritoneal enhancement suggestive of developing peritonitis. Distant free air in the right upper quadrant.    Sigmoid colon adjacent to the thickened terminal ileum is also markedly inflamed, with also notable diverticulosis. Sigmoid colitis/diverticulitis can also be considered on the differential versus primary terminal ileitis with reactive inflammation ofthe sigmoid colon.    Mild inflammation of the right colon and appendix may be reactive in nature. The appendix at the base and mid segment is nondistended. The tip of the appendix is not distinctly visualized adjacent to the inflamed terminal ileum.    < end of copied text >      Meds:   Antimicrobials:   acyclovir IVPB 490 milliGRAM(s) IV Intermittent every 8 hours  clotrimazole Lozenge 1 Lozenge Oral five times a day  fluconAZOLE IVPB 400 milliGRAM(s) IV Intermittent every 24 hours  meropenem  IVPB 1000 milliGRAM(s) IV Intermittent every 8 hours      Heme / Onc:   heparin  Infusion 800 Unit(s)/Hr IV Continuous <Continuous>      GI:  pantoprazole  Injectable 40 milliGRAM(s) IV Push daily  sodium bicarbonate Mouth Rinse 10 milliLiter(s) Swish and Spit five times a day      Cardiovascular:   furosemide   Injectable 20 milliGRAM(s) IV Push every 24 hours PRN  metoprolol tartrate Injectable 2.5 milliGRAM(s) IV Push every 6 hours      Immunologic:   filgrastim-sndz (ZARXIO) Injectable 480 MICROGram(s) SubCutaneous every 24 hours      Other medications:   BACItracin   Ointment 1 Application(s) Topical two times a day  Biotene Dry Mouth Oral Rinse 5 milliLiter(s) Swish and Spit five times a day  chlorhexidine 2% Cloths 1 Application(s) Topical <User Schedule>  dextrose 5% + lactated ringers. 1000 milliLiter(s) IV Continuous <Continuous>  hydrocortisone 1% Cream 1 Application(s) Topical two times a day  lidocaine/prilocaine Cream 1 Application(s) Topical daily  ondansetron Injectable 8 milliGRAM(s) IV Push every 8 hours      PRN:   acetaminophen     Tablet .. 650 milliGRAM(s) Oral every 6 hours PRN  furosemide   Injectable 20 milliGRAM(s) IV Push every 24 hours PRN  metoclopramide Injectable 10 milliGRAM(s) IV Push every 6 hours PRN        A/P:   66 year old male with a history of multiple myeloma s/p RVD x 6   Post Autologous PBSCT day +5  - melphalan /; s/p melphalan hydration for 24 hours post infusion of last dose   Strict I&O, daily weights, prn diuresis   - rest day   - rest day   - s/p HPC transplant; completed transplant hydration for 24 hours post infusion of cells. Suprapubic pain in am. UA pending, follow up culture, BK virus. AXR   -vasovegal episode in AM: will monitor tele for 24hrs    d/c telemetry. Lasix 40mg IV x today, follow up I&Os, daily weight. monitor rash, receiving 2nd dose Kepivance today   add Emend for 3 days and change zofran ATC. If worsening abd pain consider CT a/p oral contrast only.   - Neutropenic started on cipro once febrile will pancx and switch cipro to  Aztronam 2g Q8   - AMS this am, agonal breathing, diaphoretic, cool and clammy - responsive to sternal rub. Hypotensive, hypoxic. Placed on NRB, RRT called. Labs sent. Lactate send. PCN allergy - started on Aztreonam 2g IV q 8 hoursm Flagyl 500mg IV TID for anaerobic coverage given abdominal pain and distension, Vancomycin 1g IV x 1. CT A/P pending for 12:30 21. CE with new TWI in V2-V6 - likely demand ischemia. Repeat CE and lactate at 2pm.   - CT A/P with evidence of free air. Surgical team consulted- transferred to 8ICU for closer monitoring. No ID consult called- ABX broaden.    - CT chest + Submassive PE, seen by cards- started on full dose A/C plan for IVC filter placement with thrombectomy: transfuse PLT to >50K . doppler + B/L DVT. D/C Zarxio      1. Infectious Disease:   acyclovir IVPB 490 milliGRAM(s) IV Intermittent every 8 hours  clotrimazole Lozenge 1 Lozenge Oral five times a day  fluconAZOLE IVPB 400 milliGRAM(s) IV Intermittent every 24 hours  meropenem  IVPB 1000 milliGRAM(s) IV Intermittent every 8 hours    2. VOD Prophylaxis: Actigall, Glutamine, Heparin (Full A/C)     3. GI Prophylaxis:    pantoprazole    Tablet 40 milliGRAM(s) Oral before breakfast    4. Mouthcare - NS / NaHCO3 rinses, Mycelex, Biotene; Skin care     5. GVHD prophylaxis - n/a     6. Transfuse & replete electrolytes prn   transfuse for PLT less than 40 K or below 50 with planned surgical intervention hemoglobin less then 7 of symptomatic.    7. IV hydration, daily weights, strict I&O, prn diuresis     8. PO intake as tolerated, nutrition follow up as needed, MVI, folic acid     9. Antiemetics, anti-diarrhea medications:   LORazepam   Injectable 1 milliGRAM(s) IV Push every 24 hours  ondansetron Injectable 8 milliGRAM(s) IV Push every 8 hours  metoclopramide Injectable 10 milliGRAM(s) IV Push every 6 hours PRN  aprepitant 80 milliGRAM(s) Oral daily    10. OOB as tolerated, physical therapy consult if needed     11. Monitor coags / fibrinogen 2x week, vitamin K as needed     12. Monitor closely for clinical changes, monitor for fevers     13. Emotional support provided, plan of care discussed and questions addressed     14. Patient education done regarding plan of care, restrictions and discharge planning     15. Continue regular social work input     I have written the above note for Dr. Lozada  who performed service with me in the room.   Vale Ugarte PA-C ([phone])    I have seen and examined patient with PA, I agree with above note as scribed.

## 2021-11-24 NOTE — PROGRESS NOTE ADULT - ASSESSMENT
Assessment:  1. Submassive saddle PE  - With high risk features including syncope, R heart strain on TTE, elevated cardiac enzymes  - HR 90-100s, may be masked by metoprolol  2. L DVT  3. Plasma cell myeloma s/p SCT with pancytopenia  4. Neutropenic terminal ileitis/sigmoid colitis    Plan:  1. Continue heparin gtt for now, will discuss with patient re: possible mechanical thrombectomy to reduce clot burden, possible IVC filter  2. Have active T&S, platelets on hold for possible procedure  3. Repeat lactate, proBNP, hsTroponin  4. Please discuss with Heme/Onc regarding platelet goals with use of therapeutic anticoagulation, with expected pancytopenia in setting of MM/SCT  5. Close telemetry monitoring for changes in vital signs, patient at high risk of decompensation  6. Continue excellent SICU care    Thank you    Gabrielle Yuan MD  Cardiology Fellow  [phone]  All Cardiology service information can be found 24/7 on [email or web address], password: [email or web address]

## 2021-11-24 NOTE — CONSULT NOTE ADULT - CONSULT REASON
Elevated troponin
Pneumoperitoneum, hypotension
PE
free air
terminal ileitis, pneumoperitoneum
Neutropenic Sepsis
consulted for symptom management and supportive care during bone marrow transplantation

## 2021-11-24 NOTE — PROVIDER CONTACT NOTE (OTHER) - ACTION/TREATMENT ORDERED:
remote tele and hold lasix
Increase Heparin gtt rate to 1100u/hr; Recheck ptt in 6 hours, as per protocol.

## 2021-11-24 NOTE — PROVIDER CONTACT NOTE (OTHER) - REASON
Discussed lid hygiene, warm compress and eyelid wash.
ptt 32.8
passing out unresponsive  BP97/50 , TEMP36.8, pulse ox 98, hr 60

## 2021-11-24 NOTE — PROGRESS NOTE ADULT - SUBJECTIVE AND OBJECTIVE BOX
Vascular Cardiology  Progress note  DIRECT SERVICE NUMBER: [phone]            EMAIL [email or web address]   OFFICE [phone]    CC:  Syncope    INTERVAL HISTORY: Placed on NC, reports feeling more fatigued today. No further episodes of syncope since yesterday morning, denies chest pain, shortness of breath. Started on heparin gtt overnight, PTT not yet therapeutic     Allergies  Omnicef (Unknown)  penicillin (Hives)    Intolerances    MEDICATIONS:  furosemide   Injectable 20 milliGRAM(s) IV Push every 24 hours PRN  heparin  Infusion 800 Unit(s)/Hr IV Continuous <Continuous>  metoprolol tartrate Injectable 2.5 milliGRAM(s) IV Push every 6 hours  acyclovir IVPB 490 milliGRAM(s) IV Intermittent every 8 hours  clotrimazole Lozenge 1 Lozenge Oral five times a day  fluconAZOLE IVPB 400 milliGRAM(s) IV Intermittent every 24 hours  meropenem  IVPB 1000 milliGRAM(s) IV Intermittent every 8 hours  acetaminophen     Tablet .. 650 milliGRAM(s) Oral every 6 hours PRN  metoclopramide Injectable 10 milliGRAM(s) IV Push every 6 hours PRN  ondansetron Injectable 8 milliGRAM(s) IV Push every 8 hours  pantoprazole  Injectable 40 milliGRAM(s) IV Push daily  sodium bicarbonate Mouth Rinse 10 milliLiter(s) Swish and Spit five times a day  BACItracin   Ointment 1 Application(s) Topical two times a day  Biotene Dry Mouth Oral Rinse 5 milliLiter(s) Swish and Spit five times a day  chlorhexidine 2% Cloths 1 Application(s) Topical <User Schedule>  dextrose 5% + lactated ringers. 1000 milliLiter(s) IV Continuous <Continuous>  filgrastim-sndz (ZARXIO) Injectable 480 MICROGram(s) SubCutaneous every 24 hours  hydrocortisone 1% Cream 1 Application(s) Topical two times a day  lidocaine/prilocaine Cream 1 Application(s) Topical daily    PAST MEDICAL & SURGICAL HISTORY:  Hyperlipidemia    Shortness of breath on exertion    Lumbar herniated disc    T12 compression fracture    Acute lumbar radiculopathy    History of basal cell carcinoma    History of surgery on wrist    FAMILY HISTORY:  No pertinent family history in first degree relatives    SOCIAL HISTORY:  unchanged    REVIEW OF SYSTEMS:  CONSTITUTIONAL: No fever, weight loss; +fatigue  EYES: No eye pain, visual disturbances, or discharge  ENMT:  No difficulty hearing, tinnitus, vertigo; No sinus or throat pain  NECK: No pain or stiffness  RESPIRATORY: No cough, wheezing, chills or hemoptysis; No Shortness of Breath  CARDIOVASCULAR: No chest pain, palpitations, passing out, dizziness, or leg swelling  GASTROINTESTINAL: No abdominal or epigastric pain. No nausea, vomiting, or hematemesis; No diarrhea or constipation. No melena or hematochezia.  GENITOURINARY: No dysuria, frequency, hematuria, or incontinence  NEUROLOGICAL: No headaches, memory loss, loss of strength, numbness, or tremors  SKIN: No itching, burning, rashes, or lesions   LYMPH Nodes: No enlarged glands  ENDOCRINE: No heat or cold intolerance; No hair loss  MUSCULOSKELETAL: No joint pain or swelling; No muscle, back, or extremity pain  PSYCHIATRIC: No depression, anxiety, mood swings, or difficulty sleeping  HEME/LYMPH: No easy bruising, or bleeding gums  ALLERY AND IMMUNOLOGIC: No hives or eczema	    [ x] All others negative	    PHYSICAL EXAM:  T(C): 37.1 (11-24-21 @ 07:00), Max: 37.5 (11-23-21 @ 19:00)  HR: 91 (11-24-21 @ 10:00) (84 - 109)  BP: 120/74 (11-24-21 @ 10:00) (95/67 - 138/80)  RR: 14 (11-24-21 @ 10:00) (12 - 20)  SpO2: 97% (11-24-21 @ 10:00) (92% - 100%)  Wt(kg): --  I&O's Summary    23 Nov 2021 07:01  -  24 Nov 2021 07:00  --------------------------------------------------------  IN: 6604.1 mL / OUT: 1595 mL / NET: 5009.1 mL    24 Nov 2021 07:01  -  24 Nov 2021 11:39  --------------------------------------------------------  IN: 391 mL / OUT: 175 mL / NET: 216 mL    Appearance: Normal	  HEENT:   Normal oral mucosa, EOMI, laying comfortably in bed with nasal cannula	  Cardiovascular: Normal S1 S2, No murmurs, No edema  Respiratory: Lungs clear to auscultation anteriorly	  Psychiatry: A & O x 3, Mood & affect appropriate  Gastrointestinal: Mildly distended  Skin: No rashes, No ecchymoses, No cyanosis	  Neurologic: Non-focal  Extremities: Normal range of motion, No clubbing, cyanosis.  Vascular:   Right DP:  Palpable             Left DP:  Palpable      LABS:	 	    CBC Full  -  ( 24 Nov 2021 06:28 )  WBC Count : <0.10 K/uL  Hemoglobin : 9.1 g/dL  Hematocrit : 28.5 %  Platelet Count - Automated : 19 K/uL  Mean Cell Volume : 98.3 fl  Mean Cell Hemoglobin : 31.4 pg  Mean Cell Hemoglobin Concentration : 31.9 gm/dL  Auto Neutrophil # : x  Auto Lymphocyte # : x  Auto Monocyte # : x  Auto Eosinophil # : x  Auto Basophil # : x  Auto Neutrophil % : x  Auto Lymphocyte % : x  Auto Monocyte % : x  Auto Eosinophil % : x  Auto Basophil % : x    11-24    137  |  108  |  22  ----------------------------<  132<H>  3.9   |  20<L>  |  0.83  11-24    137  |  109<H>  |  24<H>  ----------------------------<  132<H>  3.9   |  18<L>  |  0.87    Ca    7.6<L>      24 Nov 2021 06:28  Ca    7.6<L>      24 Nov 2021 00:32  Phos  2.6     11-24  Phos  2.4     11-24  Mg     2.3     11-24  Mg     2.3     11-24    TPro  4.9<L>  /  Alb  2.6<L>  /  TBili  0.3  /  DBili  0.1  /  AST  14  /  ALT  16  /  AlkPhos  70  11-24  TPro  5.4<L>  /  Alb  3.0<L>  /  TBili  0.4  /  DBili  x   /  AST  18  /  ALT  19  /  AlkPhos  79  11-23

## 2021-11-24 NOTE — CHART NOTE - NSCHARTNOTEFT_GEN_A_CORE
Patient was in the Cath lab at time of my assessment    66 year old male with multiple myeloma admitted for an autologous pbsct with high dose melphalan prep regimen.     s/p HPC Transplant on 11/19    Noted to be hypotensive and encephalopathic on AM of 11/23.     CT A/P (11/23) with findings concerning for ileitis and Pockets of free air in the right lower quadrant adjacent to the terminal ileum concerning for bowel perforation and 2.6 cm loculated fluid adjacent to the terminal ileum and surrounding peritoneal enhancement suggestive of developing peritonitis.    Diagnosed with Saddle PE on 11/24 and required thrombectomy    Was not taken for operative management given high risk in context of pancytopenia and saddle PE    Prognosis is guarded    Antibiotics:  Meropenem: 11/23 -->  Fluconazole: 11/15 -->   PO Bactrim: 11/15 --> 11/17  Ciprofloxacin: 11/22 --> 11/23  Vancomycin: 11/23  Metronidazole: 11/23  Aztreonam: 11/23    #Bowel Perforation, Peritonitis, Abdominal Fluid Collection, Hypotension, Pancytopenia, Penicillin Allergy  --Continue Meropenem 1g IV Q8H (monitor for s/s of allergic reaction but low suspicion for reaction)  --Continue Fluconazole 400 mg PO/IV Q24H (treatment given small bowel perforation and for prophylaxis for SCT)  --Continue to follow CBC with diff  --Continue to follow transaminases  --Continue to follow temperature curve  --Follow up on preliminary blood cultures  --Surgery recommendations noted/appreciated    #s/p Stem Cell Transplant  --Continue Acyclovir prophylaxis; transitioned to IV - would maintain IVF hydration to decrease risk of crystal induced kidney injury  --Will require PCP prophylaxis - can monitor off for now.     I will continue to follow. Please feel free to contact me with any further questions.    Kris Rothman M.D.  [facility] Division of Infectious Disease  8AM-5PM: Pager Number [phone]  After Hours (or if no response): Please contact the Infectious Diseases Office at [phone]     The above assessment and plan were discussed with 8ICU Team

## 2021-11-24 NOTE — PROGRESS NOTE ADULT - ATTENDING COMMENTS
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 On AM of 11/23 patient developed neutropenic sepsis with RRT for vasovagal episode had CTAP showing pneumoperitoneum and terminal ileitis.    Change in EKG with increased Ti, ECHO showed RV strain. Stat CTA done shows saddle embolism with CT evidence of RV strain. Duplex of LE has Rt below knee VT  PERT team called, being evaluated for mechanical thrombectomy and IVC filter placement. Needs platelets transfusion prior to procedure  Abd exam unchanged  NPO, IVF D5LR  Vasopressor support as needed  Abx changed from Aztreonam to Meropenem, pt tolerated first dose well  Monitor neutropenia and thrombocytopenia  Ppx abx acyclovir Bactrim and Fluconazole    Discussed with vascular cardiology surgery oncology

## 2021-11-24 NOTE — CONSULT NOTE ADULT - SUBJECTIVE AND OBJECTIVE BOX
Raffaele Anderson MD  Cardiology Fellow  [phone]  All Cardiology service information can be found 24/7 on [email or web address], password: [email or web address]    Patient seen and evaluated at bedside    PERT TEAM NOTE     Chief Complaint:    HPI:  This is a 66 year old male with multiple myeloma admitted for an autologous pbsct with high dose melphalan prep regimen. Hematologic history as follows: 11/2020 was found to have a T-12 compression fracture. He saw orthopedics 1/2021, and was referred to endocrine. 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 Serum immunofixation showed 1 IgD lambda band and free lambda light chains. Urine immunofixation negative for monoclonal band. 4/29/21 a bone marrow biopsy and aspirate was consistent with plasma cell myeloma (> 90% involvement), flow cytometry positive for monotypic plasma cells. He started cycle 1 RVD on 5/13/21. He completed 6 cycles of RVD 9/30/21. During chemotherapy he reported occasional blurry vision (negative optho workup, negative MRI brain 9/29/21) and moderate fatigue. He has no complaints on admission other than facial erythema, likely related to kepivance.  (15 Nov 2021 13:09)    This is a 66 year old male with multiple myeloma admitted for an autologous pbsct with high dose melphalan prep regimen. Hematologic history as follows: 11/2020 was found to have a T-12 compression fracture. He saw orthopedics 1/2021, and was referred to endocrine. 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 Serum immunofixation showed 1 IgD lambda band and free lambda light chains. Urine immunofixation negative for monoclonal band. 4/29/21 a bone marrow biopsy and aspirate was consistent with plasma cell myeloma (> 90% involvement), flow cytometry positive for monotypic plasma cells. He started cycle 1 RVD on 5/13/21. He completed 6 cycles of RVD 9/30/21. During chemotherapy he reported occasional blurry vision (negative optho workup, negative MRI brain 9/29/21) and moderate fatigue. He has no complaints on admission other than facial erythema, likely related to kepivance.  (15 Nov 2021 13:09)    Summary from heme onc:   Post Autologous PBSCT day +4  11/16- melphalan 2/2; s/p melphalan hydration for 24 hours post infusion of last dose   Strict I&O, daily weights, prn diuresis   11/17- rest day   11/18- rest day   11/19- s/p HPC transplant; completed transplant hydration for 24 hours post infusion of cells. Suprapubic pain in am. UA pending, follow up culture, BK virus. AXR   11/19-vasovegal episode in AM: will monitor tele for 24hrs   11/20 d/c telemetry. Lasix 40mg IV x today, follow up I&Os, daily weight. monitor rash, receiving 2nd dose Kepivance today  11/21 add Emend for 3 days and change zofran ATC. If worsening abd pain consider CT a/p oral contrast only.   11/22- Neutropenic started on cipro once febrile will pancx and switch cipro to  Aztronam 2g Q8   11/23- AMS this am, agonal breathing, diaphoretic, cool and clammy - responsive to sternal rub. Hypotensive, hypoxic. Placed on NRB, RRT called. Labs sent. Lactate send. PCN allergy - started on Aztreonam 2g IV q 8 hoursm Flagyl 500mg IV TID for anaerobic coverage given abdominal pain and distension, Vancomycin 1g IV x 1. CT A/P pending for 12:30 11/23/21. CE with new TWI in V2-V6 - likely demand ischemia. Repeat CE and lactate at 2pm.   11/23- CT A/P with evidence of free air. Surgical team consulted- transferred to 8ICU. ID consult called as per Dr. Sorenson     Cardiac history:   11/23: Patient had a rapid called for AMS, hypotension, tachycardia to 100-110s, had ekg found to have new t-wave inversions. Patient noted to have diarrhea that day as well. Patient did have improvement of mental status with fluids. Labs significant for elevated Lactate of 6. Received total of 3L of fluids. Patient does have neutropenia and is currently on ppx. Patient transferred to sicu for peumoperitonem and terminal ileitis. Patient had new troponin elevation for which cardiology was consulted. Patient reports no chest pain, palpitations, just flush feeling and then passing out (similar to his history of syncope). Reports no chest pain at this time. Patient was using exercise bike his first 2 days of hospitalization.TTE with right sided dysfunction and CT imaging showing saddle PE       PMHx:   No pertinent past medical history    Hyperlipidemia    Shortness of breath on exertion    Lumbar herniated disc    T12 compression fracture    Acute lumbar radiculopathy    History of basal cell carcinoma        PSHx:   No significant past surgical history    History of surgery on wrist        Allergies:  Omnicef (Unknown)  penicillin (Hives)      Home Meds:    Current Medications:   acetaminophen     Tablet .. 650 milliGRAM(s) Oral every 6 hours PRN  acyclovir IVPB 490 milliGRAM(s) IV Intermittent every 8 hours  BACItracin   Ointment 1 Application(s) Topical two times a day  Biotene Dry Mouth Oral Rinse 5 milliLiter(s) Swish and Spit five times a day  chlorhexidine 2% Cloths 1 Application(s) Topical <User Schedule>  clotrimazole Lozenge 1 Lozenge Oral five times a day  filgrastim-sndz (ZARXIO) Injectable 480 MICROGram(s) SubCutaneous every 24 hours  fluconAZOLE IVPB 400 milliGRAM(s) IV Intermittent every 24 hours  furosemide   Injectable 20 milliGRAM(s) IV Push every 24 hours PRN  heparin  Infusion 800 Unit(s)/Hr IV Continuous <Continuous>  hydrocortisone 1% Cream 1 Application(s) Topical two times a day  lidocaine/prilocaine Cream 1 Application(s) Topical daily  meropenem  IVPB 1000 milliGRAM(s) IV Intermittent every 8 hours  metoclopramide Injectable 10 milliGRAM(s) IV Push every 6 hours PRN  metoprolol tartrate Injectable 2.5 milliGRAM(s) IV Push every 6 hours  ondansetron Injectable 8 milliGRAM(s) IV Push every 8 hours  pantoprazole  Injectable 40 milliGRAM(s) IV Push daily  potassium chloride  20 mEq/100 mL IVPB 20 milliEquivalent(s) IV Intermittent once  sodium bicarbonate Mouth Rinse 10 milliLiter(s) Swish and Spit five times a day  sodium chloride 0.9%. 1000 milliLiter(s) IV Continuous <Continuous>  sodium phosphate IVPB 15 milliMole(s) IV Intermittent every 1 hour      FAMILY HISTORY:  No pertinent family history in first degree relatives          REVIEW OF SYSTEMS:  CONSTITUTIONAL: No weakness, fevers or chills  EYES/ENT: No visual changes;  No dysphagia  NECK: No pain or stiffness  RESPIRATORY: No cough, wheezing, hemoptysis; No shortness of breath  CARDIOVASCULAR: No chest pain or palpitations; No lower extremity edema  GASTROINTESTINAL: No abdominal or epigastric pain. No nausea, vomiting, or hematemesis; No diarrhea or constipation. No melena or hematochezia.  BACK: No back pain  GENITOURINARY: No dysuria, frequency or hematuria  NEUROLOGICAL: Syncope   SKIN: No itching, burning, rashes, or lesions   All other review of systems is negative unless indicated above.    Physical Exam:  T(F): 99.5 (11-23), Max: 99.5 (11-23)  HR: 103 (11-24) (86 - 109)  BP: 116/75 (11-24) (88/55 - 136/74)  RR: 14 (11-24)  SpO2: 95% (11-24)  GENERAL: No acute distress, well-developed  HEAD:  Atraumatic, Normocephalic  ENT:  No JVD  CHEST/LUNG: Clear to auscultation bilaterally; No wheeze, equal breath sounds bilaterally   BACK: No spinal tenderness  HEART: tachycardic ; No murmurs, rubs, or gallops  ABDOMEN: Soft, Nontender, Nondistended; Bowel sounds present  EXTREMITIES:  No clubbing, cyanosis, or edema  PSYCH: Nl behavior, nl affect  NEUROLOGY: AAOx3, non-focal, cranial nerves intact  SKIN: Normal color, No rashes or lesions  LINES:    Cardiovascular Diagnostic Testing:    ECG: Initially RBBB with anterolateral STD now improving     Echo: Personally reviewed:  Dimensions:    Normal Values:  LA:     2.9    2.0 - 4.0 cm  Ao:     3.5    2.0 - 3.8 cm  SEPTUM: 0.8    0.6 - 1.2 cm  PWT:    1.1 0.6 - 1.1 cm  LVIDd:  4.7    3.0 - 5.6 cm  LVIDs:  2.8    1.8 - 4.0 cm  Derived variables:  LVMI: 70 g/m2  RWT: 0.46  Fractional short: 40 %  EF (Visual Estimate): 70-75 %  Doppler Peak Velocity (m/sec): AoV=1.1  ------------------------------------------------------------------------  Observations:  Mitral Valve: Normal mitral valve. Mild mitral  regurgitation.  Aortic Valve/Aorta: Normal trileaflet aortic valve. Peak  transaortic valve gradient equals 5 mm Hg. Minimal aortic  regurgitation.  Peak left ventricular outflow tract  gradient equals 3 mm Hg.  Aortic Root: 3.5 cm.  Left Atrium: Normal left atrium.  Left Ventricle: Septal flattening consistent with right  ventricular overload. Grossly normal left ventricular  systolic function. Nosegmental wall motion abnormalities.  Normal left ventricular internal dimensions and wall  thicknesses. Not assessed adequately  Right Heart: Moderate right atrial enlargement. Right  ventricular enlargement with mildly decreased right  ventricular systolic function. Normal tricuspid valve.  Moderate tricuspid regurgitation. Pulmonic valve not well  visualized.  Pericardium/Pleura: Normal pericardium with no pericardial  effusion.  Hemodynamic: Estimated right atrial pressure is 8 mm Hg.  Estimated right ventricular systolic pressure equals 46 mm  Hg, assuming right atrial pressure equals 8 mm Hg,  consistent with mild pulmonary hypertension.  ------------------------------------------------------------------------  Conclusions:  1. Normal mitral valve. Mild mitral regurgitation.  2. Septal flattening consistent with right ventricular  overload. Grossly normal left ventricular systolic  function. No segmental wall motion abnormalities.  3. Moderate right atrial enlargement.  4. Right ventricular enlargement with mildly decreased  right ventricular systolic function.  5. Normal tricuspid valve. Moderate tricuspid  regurgitation.  Discussed findings with On-call cardiology Fellow.      Imaging:    InTERPRETATION:  Extensive pulmonary embolism invovling the right and left pulmonary arteries with extention into the lobar and segmental branches.  There is evidence of right heart strain.  d/w Dr. Arellano  Please follow up official report in am.      Labs: Personally reviewed                        8.9    <0.10 )-----------( 22 ( 24 Nov 2021 00:32 )             28.2     11-24    137  |  109<H>  |  24<H>  ----------------------------<  132<H>  3.9   |  18<L>  |  0.87    Ca    7.6<L>      24 Nov 2021 00:32  Phos  2.4     11-24  Mg     2.3     11-24    TPro  4.9<L>  /  Alb  2.6<L>  /  TBili  0.3  /  DBili  0.1  /  AST  14  /  ALT  16  /  AlkPhos  70  11-24    PT/INR - ( 24 Nov 2021 00:32 )   PT: 13.5 sec;   INR: 1.13 ratio         PTT - ( 24 Nov 2021 00:32 )  PTT:29.6 sec    CARDIAC MARKERS ( 23 Nov 2021 18:26 )  246 ng/L / x     / x     / x     / x     / x      CARDIAC MARKERS ( 23 Nov 2021 14:49 )  346 ng/L / x     / x     / 45 U/L / x     / 6.8 ng/mL  CARDIAC MARKERS ( 23 Nov 2021 07:20 )  8 ng/L / x     / x     / x     / x     / 1.0 ng/mL        Serum Pro-Brain Natriuretic Peptide: 2695 pg/mL (11-24 @ 00:32)  Serum Pro-Brain Natriuretic Peptide: 1270 pg/mL (11-23 @ 18:26)

## 2021-11-24 NOTE — PROGRESS NOTE ADULT - ASSESSMENT
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 On AM of 11/23 patient developed neutropenic sepsis with RRT for vasovagal episode had CTAP showing pneumoperitoneum and terminal ileitis    Plan:  - transfer to SICU under Dr. Alaniz for serial abdominal exams and resuscitation  - patient non peritoneal, however unreliable in the setting of immunosuppression  - patient high risk to undergo surgery, will monitor clinically in ICU   - IV antibiotics      ACS  [phone]

## 2021-11-24 NOTE — CONSULT NOTE ADULT - SUBJECTIVE AND OBJECTIVE BOX
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 On 11/23 AM, pt developed neutropenic sepsis with RRT for vasovagal episode had CTAP showing pneumoperitoneum and terminal ileitis and questionable sigmoid colitis/diverticulitis. CRS consulted for further recommendations.     Patient currently denies headache, dizziness, weakness, fevers, chills, shortness of breath, chest pain, abdominal pain, or nausea/vomiting.       PAST MEDICAL & SURGICAL HISTORY:  Hyperlipidemia    Shortness of breath on exertion    Lumbar herniated disc    T12 compression fracture    Acute lumbar radiculopathy    History of basal cell carcinoma    History of surgery on wrist        MEDICATIONS  (STANDING):  acyclovir IVPB 490 milliGRAM(s) IV Intermittent every 8 hours  BACItracin   Ointment 1 Application(s) Topical two times a day  Biotene Dry Mouth Oral Rinse 5 milliLiter(s) Swish and Spit five times a day  chlorhexidine 2% Cloths 1 Application(s) Topical <User Schedule>  clotrimazole Lozenge 1 Lozenge Oral five times a day  filgrastim-sndz (ZARXIO) Injectable 480 MICROGram(s) SubCutaneous every 24 hours  fluconAZOLE IVPB 400 milliGRAM(s) IV Intermittent every 24 hours  heparin  Infusion 800 Unit(s)/Hr (8 mL/Hr) IV Continuous <Continuous>  hydrocortisone 1% Cream 1 Application(s) Topical two times a day  lidocaine/prilocaine Cream 1 Application(s) Topical daily  meropenem  IVPB 1000 milliGRAM(s) IV Intermittent every 8 hours  metoprolol tartrate Injectable 2.5 milliGRAM(s) IV Push every 6 hours  ondansetron Injectable 8 milliGRAM(s) IV Push every 8 hours  pantoprazole  Injectable 40 milliGRAM(s) IV Push daily  potassium chloride  20 mEq/100 mL IVPB 20 milliEquivalent(s) IV Intermittent once  sodium bicarbonate Mouth Rinse 10 milliLiter(s) Swish and Spit five times a day  sodium chloride 0.9%. 1000 milliLiter(s) (75 mL/Hr) IV Continuous <Continuous>  sodium phosphate IVPB 15 milliMole(s) IV Intermittent every 1 hour    MEDICATIONS  (PRN):  acetaminophen     Tablet .. 650 milliGRAM(s) Oral every 6 hours PRN Temp greater or equal to 38C (100.4F), Mild Pain (1 - 3)  furosemide   Injectable 20 milliGRAM(s) IV Push every 24 hours PRN if urine output < 100 ml/hr X 2 hours  metoclopramide Injectable 10 milliGRAM(s) IV Push every 6 hours PRN Nausea and/or Vomiting      Allergies    Omnicef (Unknown)  penicillin (Hives)    Intolerances        SOCIAL HISTORY:    FAMILY HISTORY:  No pertinent family history in first degree relatives        Physical Exam:  General: NAD, resting comfortably  Neuro: A/O x 3, CNs II-XII grossly intact, normal sensation, no focal deficits  HEENT: NC/AT, EOMI, normal hearing, no oral lesions, no LAD, neck supple  Pulmonary: normal resp effort on 2LNC  Cardiovascular: NSR, no murmurs  Abdominal: softly distended, no rebound tenderness or guarding  Extremities: WWP, normal strength, no clubbing/cyanosis/edema  Pulses: palpable distal pulses      Vital Signs Last 24 Hrs  T(C): 37.5 (23 Nov 2021 19:00), Max: 37.5 (23 Nov 2021 19:00)  T(F): 99.5 (23 Nov 2021 19:00), Max: 99.5 (23 Nov 2021 19:00)  HR: 105 (23 Nov 2021 23:00) (84 - 109)  BP: 119/70 (23 Nov 2021 23:00) (88/55 - 136/74)  BP(mean): 89 (23 Nov 2021 23:00) (66 - 94)  RR: 14 (23 Nov 2021 23:00) (14 - 22)  SpO2: 92% (23 Nov 2021 23:00) (92% - 100%)    I&O's Summary    22 Nov 2021 07:01  -  23 Nov 2021 07:00  --------------------------------------------------------  IN: 1381.4 mL / OUT: 500 mL / NET: 881.4 mL    23 Nov 2021 07:01  -  24 Nov 2021 00:42  --------------------------------------------------------  IN: 5244 mL / OUT: 595 mL / NET: 4649 mL            LABS:                        9.7    <0.10 )-----------( 29       ( 23 Nov 2021 18:26 )             29.8     11-23    136  |  106  |  24<H>  ----------------------------<  132<H>  3.8   |  19<L>  |  0.85    Ca    7.9<L>      23 Nov 2021 18:26  Phos  2.3     11-23  Mg     1.9     11-23    TPro  5.4<L>  /  Alb  3.0<L>  /  TBili  0.4  /  DBili  x   /  AST  18  /  ALT  19  /  AlkPhos  79  11-23    PT/INR - ( 23 Nov 2021 18:26 )   PT: 13.4 sec;   INR: 1.12 ratio         PTT - ( 23 Nov 2021 18:26 )  PTT:36.4 sec    CAPILLARY BLOOD GLUCOSE      POCT Blood Glucose.: 244 mg/dL (23 Nov 2021 07:13)    LIVER FUNCTIONS - ( 23 Nov 2021 18:26 )  Alb: 3.0 g/dL / Pro: 5.4 g/dL / ALK PHOS: 79 U/L / ALT: 19 U/L / AST: 18 U/L / GGT: x             Cultures:      RADIOLOGY & ADDITIONAL STUDIES:  < from: CT Abdomen and Pelvis w/ IV Cont (11.23.21 @ 13:05) >  FINDINGS:    LOWER CHEST: Peripheral reticulation and scarring of the lower thorax. No pleural effusion the visualized thorax. Suggestion of right coronary artery calcification.    LIVER: Enlarged measuring 20 cm in craniocaudal dimension. Main portal vein is patent.  BILE DUCTS: Normal caliber.  GALLBLADDER: Cholelithiasis.  SPLEEN: Normal size.  PANCREAS: No main pancreatic ductal dilatation. No peripancreatic inflammation.  ADRENALS: Unremarkable.  KIDNEYS/URETERS: No hydronephrosis. Left renal cysts, largest of the lower pole measuring 6.7 cm. Additional bilateral renal hypoattenuating foci too small to characterize.    BLADDER: Minimally distended withslight inflammation.  REPRODUCTIVE ORGANS: Slightly heterogeneous attenuation of the prostate with small focal coarse calcification. Prostate measures 4.7 cm transverse in dimension.    BOWEL and PERITONEUM: Limited evaluation due to lack of oral contrast. Stomach is underdistended. There is mid to distal small bowel distention with gradual transition in the right lower quadrant at the terminal ileum. The terminal ileum is markedly thickened and hyperemic in appearance. Small locules of free air are seen adjacent to the terminal ileum, image 115 series 3 with small adjacent loculated fluid measuring 2.6 cm in associated peritoneal enhancement. Distant free air is also noted in the right upper quadrant.    The colon is overall underdistended. Mild inflammation is noted of the right colon. The sigmoid colon with several diverticula is also markedly inflamed particularly adjacent to the terminal ileum, image 115 series 2. Peritoneal enhancement is also seen adjacent to the sigmoid colon, forexample on image 53 series 5.    The appendix is nondistended along the base and mid segment, however demonstrating mild inflammation, possibly reactive in nature. The tip of the appendix is not distinctly visualized adjacent to the inflamed terminalileum.    Additional small fluid in the pelvis.    VESSELS: No aneurysm of the abdominal aorta. Atheromatous change. Central vein patency is not adequately assessed due to timing of contrast.  RETROPERITONEUM/LYMPH NODES: Small volume nodes.  ABDOMINAL WALL: Tiny fat-containing umbilical and inguinal hernias.  BONES: Left pelvis fixation hardware is noted. Heterogeneous appearance of the bones with scattered lytic foci consistent with history of multiple myeloma, better evaluated on prior MRI imaging. Similar involvement of T12 vertebral body with height loss is noted, again better seen on prior thoracic spine MRI from 5/30/2021. Bilateral pars interarticularis defects at L5-S1 with grade 2 anterolisthesis which was noted on prior pelvic MRI from 6/1/2021. Age-indeterminate left lateral eighth rib pathologic fracture. Atrophy of left-sided adductor musculature.    IMPRESSION:    Terminal ileitis with associated ileus versus low-grade obstruction. Pockets of free air in the right lower quadrant adjacent to the terminal ileum concerning for bowel perforation. 2.6 cm loculated fluid adjacent to the terminal ileum and surrounding peritoneal enhancement suggestive of developing peritonitis. Distant free air in the right upper quadrant.    Sigmoid colon adjacent to the thickened terminal ileum is also markedly inflamed, with also notable diverticulosis. Sigmoid colitis/diverticulitis can also be considered on the differential versus primary terminal ileitis with reactive inflammation of the sigmoid colon.    Mild inflammation of the right colon and appendix may be reactive in nature. The appendix at the base and mid segment is nondistended. The tip of the appendix is not distinctly visualized adjacent to the inflamed terminal ileum.    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 On 11/23 AM, pt developed neutropenic sepsis with RRT for vasovagal episode had CTAP showing pneumoperitoneum and terminal ileitis and questionable sigmoid colitis/diverticulitis. CRS consulted for further recommendations.     Patient currently denies headache, dizziness, weakness, fevers, chills, shortness of breath, chest pain, abdominal pain, or nausea/vomiting. Patient reports last bowel movement was yesterday morning non-bloody, does not think he's passed flatus since. Patient's last colonoscopy was April 2021, reports no significant findings.      PAST MEDICAL & SURGICAL HISTORY:  Hyperlipidemia    Shortness of breath on exertion    Lumbar herniated disc    T12 compression fracture    Acute lumbar radiculopathy    History of basal cell carcinoma    History of surgery on wrist        MEDICATIONS  (STANDING):  acyclovir IVPB 490 milliGRAM(s) IV Intermittent every 8 hours  BACItracin   Ointment 1 Application(s) Topical two times a day  Biotene Dry Mouth Oral Rinse 5 milliLiter(s) Swish and Spit five times a day  chlorhexidine 2% Cloths 1 Application(s) Topical <User Schedule>  clotrimazole Lozenge 1 Lozenge Oral five times a day  filgrastim-sndz (ZARXIO) Injectable 480 MICROGram(s) SubCutaneous every 24 hours  fluconAZOLE IVPB 400 milliGRAM(s) IV Intermittent every 24 hours  heparin  Infusion 800 Unit(s)/Hr (8 mL/Hr) IV Continuous <Continuous>  hydrocortisone 1% Cream 1 Application(s) Topical two times a day  lidocaine/prilocaine Cream 1 Application(s) Topical daily  meropenem  IVPB 1000 milliGRAM(s) IV Intermittent every 8 hours  metoprolol tartrate Injectable 2.5 milliGRAM(s) IV Push every 6 hours  ondansetron Injectable 8 milliGRAM(s) IV Push every 8 hours  pantoprazole  Injectable 40 milliGRAM(s) IV Push daily  potassium chloride  20 mEq/100 mL IVPB 20 milliEquivalent(s) IV Intermittent once  sodium bicarbonate Mouth Rinse 10 milliLiter(s) Swish and Spit five times a day  sodium chloride 0.9%. 1000 milliLiter(s) (75 mL/Hr) IV Continuous <Continuous>  sodium phosphate IVPB 15 milliMole(s) IV Intermittent every 1 hour    MEDICATIONS  (PRN):  acetaminophen     Tablet .. 650 milliGRAM(s) Oral every 6 hours PRN Temp greater or equal to 38C (100.4F), Mild Pain (1 - 3)  furosemide   Injectable 20 milliGRAM(s) IV Push every 24 hours PRN if urine output < 100 ml/hr X 2 hours  metoclopramide Injectable 10 milliGRAM(s) IV Push every 6 hours PRN Nausea and/or Vomiting      Allergies    Omnicef (Unknown)  penicillin (Hives)    Intolerances        SOCIAL HISTORY:    FAMILY HISTORY:  No pertinent family history in first degree relatives        Physical Exam:  General: NAD, resting comfortably  Neuro: A/O x 3, CNs II-XII grossly intact, normal sensation, no focal deficits  HEENT: NC/AT, EOMI, normal hearing, no oral lesions, no LAD, neck supple  Pulmonary: normal resp effort on 2LNC  Cardiovascular: NSR, no murmurs  Abdominal: softly distended, no rebound tenderness or guarding  Extremities: WWP, normal strength, no clubbing/cyanosis/edema  Pulses: palpable distal pulses      Vital Signs Last 24 Hrs  T(C): 37.5 (23 Nov 2021 19:00), Max: 37.5 (23 Nov 2021 19:00)  T(F): 99.5 (23 Nov 2021 19:00), Max: 99.5 (23 Nov 2021 19:00)  HR: 105 (23 Nov 2021 23:00) (84 - 109)  BP: 119/70 (23 Nov 2021 23:00) (88/55 - 136/74)  BP(mean): 89 (23 Nov 2021 23:00) (66 - 94)  RR: 14 (23 Nov 2021 23:00) (14 - 22)  SpO2: 92% (23 Nov 2021 23:00) (92% - 100%)    I&O's Summary    22 Nov 2021 07:01  -  23 Nov 2021 07:00  --------------------------------------------------------  IN: 1381.4 mL / OUT: 500 mL / NET: 881.4 mL    23 Nov 2021 07:01  -  24 Nov 2021 00:42  --------------------------------------------------------  IN: 5244 mL / OUT: 595 mL / NET: 4649 mL            LABS:                        9.7    <0.10 )-----------( 29       ( 23 Nov 2021 18:26 )             29.8     11-23    136  |  106  |  24<H>  ----------------------------<  132<H>  3.8   |  19<L>  |  0.85    Ca    7.9<L>      23 Nov 2021 18:26  Phos  2.3     11-23  Mg     1.9     11-23    TPro  5.4<L>  /  Alb  3.0<L>  /  TBili  0.4  /  DBili  x   /  AST  18  /  ALT  19  /  AlkPhos  79  11-23    PT/INR - ( 23 Nov 2021 18:26 )   PT: 13.4 sec;   INR: 1.12 ratio         PTT - ( 23 Nov 2021 18:26 )  PTT:36.4 sec    CAPILLARY BLOOD GLUCOSE      POCT Blood Glucose.: 244 mg/dL (23 Nov 2021 07:13)    LIVER FUNCTIONS - ( 23 Nov 2021 18:26 )  Alb: 3.0 g/dL / Pro: 5.4 g/dL / ALK PHOS: 79 U/L / ALT: 19 U/L / AST: 18 U/L / GGT: x             Cultures:      RADIOLOGY & ADDITIONAL STUDIES:  < from: CT Abdomen and Pelvis w/ IV Cont (11.23.21 @ 13:05) >  FINDINGS:    LOWER CHEST: Peripheral reticulation and scarring of the lower thorax. No pleural effusion the visualized thorax. Suggestion of right coronary artery calcification.    LIVER: Enlarged measuring 20 cm in craniocaudal dimension. Main portal vein is patent.  BILE DUCTS: Normal caliber.  GALLBLADDER: Cholelithiasis.  SPLEEN: Normal size.  PANCREAS: No main pancreatic ductal dilatation. No peripancreatic inflammation.  ADRENALS: Unremarkable.  KIDNEYS/URETERS: No hydronephrosis. Left renal cysts, largest of the lower pole measuring 6.7 cm. Additional bilateral renal hypoattenuating foci too small to characterize.    BLADDER: Minimally distended withslight inflammation.  REPRODUCTIVE ORGANS: Slightly heterogeneous attenuation of the prostate with small focal coarse calcification. Prostate measures 4.7 cm transverse in dimension.    BOWEL and PERITONEUM: Limited evaluation due to lack of oral contrast. Stomach is underdistended. There is mid to distal small bowel distention with gradual transition in the right lower quadrant at the terminal ileum. The terminal ileum is markedly thickened and hyperemic in appearance. Small locules of free air are seen adjacent to the terminal ileum, image 115 series 3 with small adjacent loculated fluid measuring 2.6 cm in associated peritoneal enhancement. Distant free air is also noted in the right upper quadrant.    The colon is overall underdistended. Mild inflammation is noted of the right colon. The sigmoid colon with several diverticula is also markedly inflamed particularly adjacent to the terminal ileum, image 115 series 2. Peritoneal enhancement is also seen adjacent to the sigmoid colon, forexample on image 53 series 5.    The appendix is nondistended along the base and mid segment, however demonstrating mild inflammation, possibly reactive in nature. The tip of the appendix is not distinctly visualized adjacent to the inflamed terminalileum.    Additional small fluid in the pelvis.    VESSELS: No aneurysm of the abdominal aorta. Atheromatous change. Central vein patency is not adequately assessed due to timing of contrast.  RETROPERITONEUM/LYMPH NODES: Small volume nodes.  ABDOMINAL WALL: Tiny fat-containing umbilical and inguinal hernias.  BONES: Left pelvis fixation hardware is noted. Heterogeneous appearance of the bones with scattered lytic foci consistent with history of multiple myeloma, better evaluated on prior MRI imaging. Similar involvement of T12 vertebral body with height loss is noted, again better seen on prior thoracic spine MRI from 5/30/2021. Bilateral pars interarticularis defects at L5-S1 with grade 2 anterolisthesis which was noted on prior pelvic MRI from 6/1/2021. Age-indeterminate left lateral eighth rib pathologic fracture. Atrophy of left-sided adductor musculature.    IMPRESSION:    Terminal ileitis with associated ileus versus low-grade obstruction. Pockets of free air in the right lower quadrant adjacent to the terminal ileum concerning for bowel perforation. 2.6 cm loculated fluid adjacent to the terminal ileum and surrounding peritoneal enhancement suggestive of developing peritonitis. Distant free air in the right upper quadrant.    Sigmoid colon adjacent to the thickened terminal ileum is also markedly inflamed, with also notable diverticulosis. Sigmoid colitis/diverticulitis can also be considered on the differential versus primary terminal ileitis with reactive inflammation of the sigmoid colon.    Mild inflammation of the right colon and appendix may be reactive in nature. The appendix at the base and mid segment is nondistended. The tip of the appendix is not distinctly visualized adjacent to the inflamed terminal ileum.    < end of copied text >

## 2021-11-25 LAB
ALBUMIN SERPL ELPH-MCNC: 2.8 G/DL — LOW (ref 3.3–5)
ALP SERPL-CCNC: 63 U/L — SIGNIFICANT CHANGE UP (ref 40–120)
ALT FLD-CCNC: 13 U/L — SIGNIFICANT CHANGE UP (ref 10–45)
ANION GAP SERPL CALC-SCNC: 10 MMOL/L — SIGNIFICANT CHANGE UP (ref 5–17)
APTT BLD: 40.5 SEC — HIGH (ref 27.5–35.5)
APTT BLD: 61.4 SEC — HIGH (ref 27.5–35.5)
APTT BLD: 70.8 SEC — HIGH (ref 27.5–35.5)
AST SERPL-CCNC: 13 U/L — SIGNIFICANT CHANGE UP (ref 10–40)
BASE EXCESS BLDV CALC-SCNC: -0.5 MMOL/L — SIGNIFICANT CHANGE UP (ref -2–2)
BILIRUB DIRECT SERPL-MCNC: 0.1 MG/DL — SIGNIFICANT CHANGE UP (ref 0–0.3)
BILIRUB INDIRECT FLD-MCNC: 0.1 MG/DL — LOW (ref 0.2–1)
BILIRUB SERPL-MCNC: 0.2 MG/DL — SIGNIFICANT CHANGE UP (ref 0.2–1.2)
BLD GP AB SCN SERPL QL: NEGATIVE — SIGNIFICANT CHANGE UP
BUN SERPL-MCNC: 21 MG/DL — SIGNIFICANT CHANGE UP (ref 7–23)
CA-I SERPL-SCNC: 1.1 MMOL/L — LOW (ref 1.15–1.33)
CALCIUM SERPL-MCNC: 7.2 MG/DL — LOW (ref 8.4–10.5)
CHLORIDE BLDV-SCNC: 112 MMOL/L — HIGH (ref 96–108)
CHLORIDE SERPL-SCNC: 111 MMOL/L — HIGH (ref 96–108)
CK MB CFR SERPL CALC: 3.4 NG/ML — SIGNIFICANT CHANGE UP (ref 0–6.7)
CO2 BLDV-SCNC: 25 MMOL/L — SIGNIFICANT CHANGE UP (ref 22–26)
CO2 SERPL-SCNC: 22 MMOL/L — SIGNIFICANT CHANGE UP (ref 22–31)
CREAT SERPL-MCNC: 0.76 MG/DL — SIGNIFICANT CHANGE UP (ref 0.5–1.3)
GAS PNL BLDV: 140 MMOL/L — SIGNIFICANT CHANGE UP (ref 136–145)
GAS PNL BLDV: SIGNIFICANT CHANGE UP
GAS PNL BLDV: SIGNIFICANT CHANGE UP
GLUCOSE BLDV-MCNC: 130 MG/DL — HIGH (ref 70–99)
GLUCOSE SERPL-MCNC: 127 MG/DL — HIGH (ref 70–99)
HCO3 BLDV-SCNC: 24 MMOL/L — SIGNIFICANT CHANGE UP (ref 22–29)
HCT VFR BLD CALC: 25 % — LOW (ref 39–50)
HCT VFR BLDA CALC: 25 % — LOW (ref 39–51)
HGB BLD CALC-MCNC: 8.3 G/DL — LOW (ref 12.6–17.4)
HGB BLD-MCNC: 8 G/DL — LOW (ref 13–17)
HOROWITZ INDEX BLDV+IHG-RTO: 28 — SIGNIFICANT CHANGE UP
INR BLD: 1.22 RATIO — HIGH (ref 0.88–1.16)
LACTATE BLDV-MCNC: 0.7 MMOL/L — SIGNIFICANT CHANGE UP (ref 0.7–2)
LDH SERPL L TO P-CCNC: 163 U/L — SIGNIFICANT CHANGE UP (ref 50–242)
MAGNESIUM SERPL-MCNC: 2.1 MG/DL — SIGNIFICANT CHANGE UP (ref 1.6–2.6)
MCHC RBC-ENTMCNC: 31.5 PG — SIGNIFICANT CHANGE UP (ref 27–34)
MCHC RBC-ENTMCNC: 32 GM/DL — SIGNIFICANT CHANGE UP (ref 32–36)
MCV RBC AUTO: 98.4 FL — SIGNIFICANT CHANGE UP (ref 80–100)
NRBC # BLD: 0 /100 WBCS — SIGNIFICANT CHANGE UP (ref 0–0)
NT-PROBNP SERPL-SCNC: 2965 PG/ML — HIGH (ref 0–300)
PCO2 BLDV: 39 MMHG — LOW (ref 42–55)
PH BLDV: 7.4 — SIGNIFICANT CHANGE UP (ref 7.32–7.43)
PHOSPHATE SERPL-MCNC: 2 MG/DL — LOW (ref 2.5–4.5)
PLATELET # BLD AUTO: 34 K/UL — LOW (ref 150–400)
PO2 BLDV: 49 MMHG — HIGH (ref 25–45)
POTASSIUM BLDV-SCNC: 3.8 MMOL/L — SIGNIFICANT CHANGE UP (ref 3.5–5.1)
POTASSIUM SERPL-MCNC: 3.7 MMOL/L — SIGNIFICANT CHANGE UP (ref 3.5–5.3)
POTASSIUM SERPL-SCNC: 3.7 MMOL/L — SIGNIFICANT CHANGE UP (ref 3.5–5.3)
PROT SERPL-MCNC: 4.9 G/DL — LOW (ref 6–8.3)
PROTHROM AB SERPL-ACNC: 14.5 SEC — HIGH (ref 10.6–13.6)
RBC # BLD: 2.54 M/UL — LOW (ref 4.2–5.8)
RBC # FLD: 14.6 % — HIGH (ref 10.3–14.5)
RH IG SCN BLD-IMP: NEGATIVE — SIGNIFICANT CHANGE UP
SAO2 % BLDV: 81.3 % — SIGNIFICANT CHANGE UP (ref 67–88)
SODIUM SERPL-SCNC: 143 MMOL/L — SIGNIFICANT CHANGE UP (ref 135–145)
TROPONIN T, HIGH SENSITIVITY RESULT: 104 NG/L — HIGH (ref 0–51)
WBC # BLD: <0.1 K/UL — CRITICAL LOW (ref 3.8–10.5)
WBC # FLD AUTO: <0.1 K/UL — CRITICAL LOW (ref 3.8–10.5)

## 2021-11-25 PROCEDURE — 71045 X-RAY EXAM CHEST 1 VIEW: CPT | Mod: 26

## 2021-11-25 PROCEDURE — 99231 SBSQ HOSP IP/OBS SF/LOW 25: CPT | Mod: GC

## 2021-11-25 PROCEDURE — 99233 SBSQ HOSP IP/OBS HIGH 50: CPT

## 2021-11-25 PROCEDURE — 99232 SBSQ HOSP IP/OBS MODERATE 35: CPT

## 2021-11-25 PROCEDURE — 99233 SBSQ HOSP IP/OBS HIGH 50: CPT | Mod: GC

## 2021-11-25 RX ORDER — HEPARIN SODIUM 5000 [USP'U]/ML
4000 INJECTION INTRAVENOUS; SUBCUTANEOUS ONCE
Refills: 0 | Status: COMPLETED | OUTPATIENT
Start: 2021-11-25 | End: 2021-11-25

## 2021-11-25 RX ORDER — CALCIUM GLUCONATE 100 MG/ML
2 VIAL (ML) INTRAVENOUS ONCE
Refills: 0 | Status: COMPLETED | OUTPATIENT
Start: 2021-11-25 | End: 2021-11-25

## 2021-11-25 RX ORDER — ACETAMINOPHEN 500 MG
1000 TABLET ORAL ONCE
Refills: 0 | Status: COMPLETED | OUTPATIENT
Start: 2021-11-25 | End: 2021-11-25

## 2021-11-25 RX ORDER — POTASSIUM CHLORIDE 20 MEQ
10 PACKET (EA) ORAL
Refills: 0 | Status: COMPLETED | OUTPATIENT
Start: 2021-11-25 | End: 2021-11-25

## 2021-11-25 RX ORDER — POTASSIUM PHOSPHATE, MONOBASIC POTASSIUM PHOSPHATE, DIBASIC 236; 224 MG/ML; MG/ML
15 INJECTION, SOLUTION INTRAVENOUS ONCE
Refills: 0 | Status: COMPLETED | OUTPATIENT
Start: 2021-11-25 | End: 2021-11-25

## 2021-11-25 RX ORDER — BENZOCAINE AND MENTHOL 5; 1 G/100ML; G/100ML
1 LIQUID ORAL
Refills: 0 | Status: COMPLETED | OUTPATIENT
Start: 2021-11-25 | End: 2021-11-28

## 2021-11-25 RX ADMIN — Medication 1 LOZENGE: at 05:28

## 2021-11-25 RX ADMIN — Medication 400 MILLIGRAM(S): at 12:32

## 2021-11-25 RX ADMIN — Medication 109.8 MILLIGRAM(S): at 22:29

## 2021-11-25 RX ADMIN — PANTOPRAZOLE SODIUM 40 MILLIGRAM(S): 20 TABLET, DELAYED RELEASE ORAL at 11:35

## 2021-11-25 RX ADMIN — Medication 5 MILLILITER(S): at 22:31

## 2021-11-25 RX ADMIN — Medication 2.5 MILLIGRAM(S): at 18:14

## 2021-11-25 RX ADMIN — SODIUM CHLORIDE 75 MILLILITER(S): 9 INJECTION, SOLUTION INTRAVENOUS at 05:52

## 2021-11-25 RX ADMIN — Medication 10 MILLILITER(S): at 09:09

## 2021-11-25 RX ADMIN — ONDANSETRON 8 MILLIGRAM(S): 8 TABLET, FILM COATED ORAL at 05:27

## 2021-11-25 RX ADMIN — Medication 5 MILLILITER(S): at 18:15

## 2021-11-25 RX ADMIN — Medication 1 LOZENGE: at 22:31

## 2021-11-25 RX ADMIN — Medication 100 MILLIEQUIVALENT(S): at 04:37

## 2021-11-25 RX ADMIN — ONDANSETRON 8 MILLIGRAM(S): 8 TABLET, FILM COATED ORAL at 14:13

## 2021-11-25 RX ADMIN — Medication 5 MILLILITER(S): at 09:08

## 2021-11-25 RX ADMIN — Medication 1000 MILLIGRAM(S): at 12:47

## 2021-11-25 RX ADMIN — MEROPENEM 100 MILLIGRAM(S): 1 INJECTION INTRAVENOUS at 01:23

## 2021-11-25 RX ADMIN — Medication 109.8 MILLIGRAM(S): at 14:13

## 2021-11-25 RX ADMIN — MEROPENEM 100 MILLIGRAM(S): 1 INJECTION INTRAVENOUS at 09:08

## 2021-11-25 RX ADMIN — Medication 10 MILLILITER(S): at 14:14

## 2021-11-25 RX ADMIN — Medication 10 MILLILITER(S): at 05:28

## 2021-11-25 RX ADMIN — FLUCONAZOLE 100 MILLIGRAM(S): 150 TABLET ORAL at 16:17

## 2021-11-25 RX ADMIN — Medication 109.8 MILLIGRAM(S): at 05:29

## 2021-11-25 RX ADMIN — Medication 1 APPLICATION(S): at 18:17

## 2021-11-25 RX ADMIN — Medication 10 MILLILITER(S): at 18:16

## 2021-11-25 RX ADMIN — HEPARIN SODIUM 4000 UNIT(S): 5000 INJECTION INTRAVENOUS; SUBCUTANEOUS at 03:00

## 2021-11-25 RX ADMIN — Medication 200 GRAM(S): at 02:47

## 2021-11-25 RX ADMIN — Medication 5 MILLILITER(S): at 14:14

## 2021-11-25 RX ADMIN — Medication 1 LOZENGE: at 09:08

## 2021-11-25 RX ADMIN — Medication 2.5 MILLIGRAM(S): at 11:35

## 2021-11-25 RX ADMIN — Medication 2.5 MILLIGRAM(S): at 05:28

## 2021-11-25 RX ADMIN — Medication 5 MILLILITER(S): at 05:27

## 2021-11-25 RX ADMIN — Medication 100 MILLIEQUIVALENT(S): at 03:01

## 2021-11-25 RX ADMIN — HEPARIN SODIUM 15 UNIT(S)/HR: 5000 INJECTION INTRAVENOUS; SUBCUTANEOUS at 03:01

## 2021-11-25 RX ADMIN — Medication 100 MILLIEQUIVALENT(S): at 04:06

## 2021-11-25 RX ADMIN — Medication 1 LOZENGE: at 14:16

## 2021-11-25 RX ADMIN — POTASSIUM PHOSPHATE, MONOBASIC POTASSIUM PHOSPHATE, DIBASIC 62.5 MILLIMOLE(S): 236; 224 INJECTION, SOLUTION INTRAVENOUS at 05:52

## 2021-11-25 RX ADMIN — MEROPENEM 100 MILLIGRAM(S): 1 INJECTION INTRAVENOUS at 16:17

## 2021-11-25 RX ADMIN — SODIUM CHLORIDE 75 MILLILITER(S): 9 INJECTION, SOLUTION INTRAVENOUS at 22:30

## 2021-11-25 RX ADMIN — ONDANSETRON 8 MILLIGRAM(S): 8 TABLET, FILM COATED ORAL at 22:30

## 2021-11-25 RX ADMIN — Medication 10 MILLILITER(S): at 22:37

## 2021-11-25 RX ADMIN — CHLORHEXIDINE GLUCONATE 1 APPLICATION(S): 213 SOLUTION TOPICAL at 05:28

## 2021-11-25 RX ADMIN — Medication 1 LOZENGE: at 18:15

## 2021-11-25 NOTE — PROGRESS NOTE ADULT - ATTENDING COMMENTS
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 On AM of 11/23 patient developed neutropenic sepsis with RRT for vasovagal episode had CTAP showing pneumoperitoneum and terminal ileitis.    S/p mechanical thrombectomy with extraction of large amount of clot removal and decrease in PA pressure.   Continue Heparin drip gtt. HCT thrombocytopenia have been stable  Abd exam unchanged  NPO, IVF D5LR  Abx  Meropenem   Monitor neutropenia and thrombocytopenia  Ppx abx acyclovir Bactrim and Fluconazole

## 2021-11-25 NOTE — PROGRESS NOTE ADULT - SUBJECTIVE AND OBJECTIVE BOX
HPC Transplant Team                                                      Critical / Counseling Time Provided: 30 minutes                                                                                                                                                        Chief Complaint: Autologous peripheral blood stem cell transplant with high dose Melphalan prep regimen for treatment of multiple myeloma    S: Patient seen and examined with HPC Transplant Team:   +generalized weakness      O: Vitals:   Vital Signs Last 24 Hrs  T(C): 37.2 (2021 03:00), Max: 37.3 (2021 00:00)  T(F): 99 (2021 03:00), Max: 99.1 (2021 00:00)  HR: 81 (2021 07:00) (75 - 96)  BP: 140/68 (2021 07:00) (100/50 - 146/68)  BP(mean): 98 (2021 07:00) (71 - 102)  RR: 13 (2021 07:00) (11 - 18)  SpO2: 98% (2021 07:00) (97% - 100%)    Admit weight: 98.4kg   Daily     Daily Weight in k.5 (2021 00:34)    Intake / Output:    @ 07:01  -   @ 07:00  --------------------------------------------------------  IN: 3419 mL / OUT: 1700 mL / NET: 1719 mL      PE:   Oropharynx: + erythema and post Kepivance coating no ulcerations   Oral Mucositis:              +                                         ndGndrndanddndend:nd nd2nd CVS: S1, S2 RRR   Lungs: CTA throughout bilaterally   Abdomen: + BS x 4, soft, ND mild tenderness with palpation RLQ.   Extremities: no edema   Gastric Mucositis:       -                                           Grade: n/a  Intestinal Mucositis:     -                                         Grade: n/a   Skin: patchy, erythematous maculopapular rash to face, neck and upper chest and back post Kepivance   TLC: CDI   Neuro: A&O x 3             Labs:   CBC Full  -  ( 2021 02:17 )  WBC Count : <0.10 K/uL  Hemoglobin : 8.0 g/dL  Hematocrit : 25.0 %  Platelet Count - Automated : 34 K/uL  Mean Cell Volume : 98.4 fl  Mean Cell Hemoglobin : 31.5 pg  Mean Cell Hemoglobin Concentration : 32.0 gm/dL  Auto Neutrophil # : x  Auto Lymphocyte # : x  Auto Monocyte # : x  Auto Eosinophil # : x  Auto Basophil # : x  Auto Neutrophil % : x  Auto Lymphocyte % : x  Auto Monocyte % : x  Auto Eosinophil % : x  Auto Basophil % : x                          8.0    <0.10 )-----------( 34       ( 2021 02:17 )             25.0         143  |  111<H>  |  21  ----------------------------<  127<H>  3.7   |  22  |  0.76    Ca    7.2<L>      2021 02:17  Phos  2.0       Mg     2.1         TPro  4.9<L>  /  Alb  2.8<L>  /  TBili  0.2  /  DBili  0.1  /  AST  13  /  ALT  13  /  AlkPhos  63      PT/INR - ( 2021 00:32 )   PT: 13.5 sec;   INR: 1.13 ratio         PTT - ( 2021 02:17 )  PTT:40.5 sec  LIVER FUNCTIONS - ( 2021 02:17 )  Alb: 2.8 g/dL / Pro: 4.9 g/dL / ALK PHOS: 63 U/L / ALT: 13 U/L / AST: 13 U/L / GGT: x           Lactate Dehydrogenase, Serum: 163 U/L ( @ 02:17)              Cultures:           Radiology:     < from: CT Angio Chest PE Protocol w/ IV Cont (21 @ 00:02) >  IMPRESSION:  Saddle embolus with right heart strain.  These results have been discussed with Dr. Arellano on 2021 at 12:47 AM by on-call resident.  Pneumoperitoneum is again noted.    < from: CT Abdomen and Pelvis w/ IV Cont (21 @ 13:05) >  MPRESSION:  Terminal ileitis with associated ileus versus low-grade obstruction. Pockets of free air in the right lower quadrant adjacent to the terminal ileum concerning for bowel perforation. 2.6 cm loculated fluid adjacent to the terminal ileum and surrounding peritoneal enhancement suggestive of developing peritonitis. Distant free air in the right upper quadrant.  Sigmoid colon adjacent to the thickened terminal ileum is also markedly inflamed, with also notable diverticulosis. Sigmoid colitis/diverticulitis can also be considered on the differential versus primary terminal ileitis with reactive inflammation ofthe sigmoid colon.  Mild inflammation of the right colon and appendix may be reactive in nature. The appendix at the base and mid segment is nondistended. The tip of the appendix is not distinctly visualized adjacent to the inflamed terminal ileum.        Meds:   Antimicrobials:   acyclovir IVPB 490 milliGRAM(s) IV Intermittent every 8 hours  clotrimazole Lozenge 1 Lozenge Oral five times a day  fluconAZOLE IVPB 400 milliGRAM(s) IV Intermittent every 24 hours  meropenem  IVPB 1000 milliGRAM(s) IV Intermittent every 8 hours      Heme / Onc:   heparin  Infusion 800 Unit(s)/Hr IV Continuous <Continuous>      GI:  pantoprazole  Injectable 40 milliGRAM(s) IV Push daily  sodium bicarbonate Mouth Rinse 10 milliLiter(s) Swish and Spit five times a day      Cardiovascular:   metoprolol tartrate Injectable 2.5 milliGRAM(s) IV Push every 6 hours      Immunologic:       Other medications:   BACItracin   Ointment 1 Application(s) Topical two times a day  Biotene Dry Mouth Oral Rinse 5 milliLiter(s) Swish and Spit five times a day  chlorhexidine 2% Cloths 1 Application(s) Topical <User Schedule>  dextrose 5% + lactated ringers. 1000 milliLiter(s) IV Continuous <Continuous>  hydrocortisone 1% Cream 1 Application(s) Topical two times a day  lidocaine/prilocaine Cream 1 Application(s) Topical daily  ondansetron Injectable 8 milliGRAM(s) IV Push every 8 hours      PRN:   acetaminophen     Tablet .. 650 milliGRAM(s) Oral every 6 hours PRN  metoclopramide Injectable 10 milliGRAM(s) IV Push every 6 hours PRN        A/P:   66 year old male with a history of multiple myeloma s/p RVD x 6   Post Autologous PBSCT day +6  - melphalan 2/; s/p melphalan hydration for 24 hours post infusion of last dose   Strict I&O, daily weights, prn diuresis   - rest day   - rest day   - s/p HPC transplant; completed transplant hydration for 24 hours post infusion of cells. Suprapubic pain in am. UA pending, follow up culture, BK virus. AXR   -vasovegal episode in AM: will monitor tele for 24hrs    d/c telemetry. Lasix 40mg IV x today, follow up I&Os, daily weight. monitor rash, receiving 2nd dose Kepivance today   add Emend for 3 days and change zofran ATC. If worsening abd pain consider CT a/p oral contrast only.   - Neutropenic started on cipro once febrile will pancx and switch cipro to  Aztronam 2g Q8   - AMS this am, agonal breathing, diaphoretic, cool and clammy - responsive to sternal rub. Hypotensive, hypoxic. Placed on NRB, RRT called. Labs sent. Lactate send. PCN allergy - started on Aztreonam 2g IV q 8 hoursm Flagyl 500mg IV TID for anaerobic coverage given abdominal pain and distension, Vancomycin 1g IV x 1. CT A/P pending for 12:30 21. CE with new TWI in V2-V6 - likely demand ischemia. Repeat CE and lactate at 2pm.   - CT A/P with evidence of free air. Surgical team consulted- transferred to 8ICU for closer monitoring. No ID consult called- JESUS daniel.    - CT chest + Submassive PE, seen by cards- started on full dose A/C plan for IVC filter placement with thrombectomy: transfuse PLT to >50K . doppler + B/L DVT. D/C Zarxio      1. Infectious Disease:   acyclovir IVPB 490 milliGRAM(s) IV Intermittent every 8 hours  clotrimazole Lozenge 1 Lozenge Oral five times a day  fluconAZOLE IVPB 400 milliGRAM(s) IV Intermittent every 24 hours  meropenem  IVPB 1000 milliGRAM(s) IV Intermittent every 8 hours    2. VOD Prophylaxis: Actigall, Glutamine, Heparin (Full A/C)     3. GI Prophylaxis:    pantoprazole    Tablet 40 milliGRAM(s) Oral before breakfast    4. Mouthcare - NS / NaHCO3 rinses, Mycelex, Biotene; Skin care     5. GVHD prophylaxis - n/a     6. Transfuse & replete electrolytes prn   transfuse for PLT less than 40 K or below 50 with planned surgical intervention hemoglobin less then 7 of symptomatic.    7. IV hydration, daily weights, strict I&O, prn diuresis     8. PO intake as tolerated, nutrition follow up as needed, MVI, folic acid     9. Antiemetics, anti-diarrhea medications:   LORazepam   Injectable 1 milliGRAM(s) IV Push every 24 hours  ondansetron Injectable 8 milliGRAM(s) IV Push every 8 hours  metoclopramide Injectable 10 milliGRAM(s) IV Push every 6 hours PRN  aprepitant 80 milliGRAM(s) Oral daily    10. OOB as tolerated, physical therapy consult if needed     11. Monitor coags / fibrinogen 2x week, vitamin K as needed     12. Monitor closely for clinical changes, monitor for fevers     13. Emotional support provided, plan of care discussed and questions addressed     14. Patient education done regarding plan of care, restrictions and discharge planning     15. Continue regular social work input     I have written the above note for Dr. Mejia  who performed service with me in the room.   Deisy Ramsey NP   ([phone])    I have seen and examined patient with NP, I agree with above note as scribed.                              HPC Transplant Team                                                      Critical / Counseling Time Provided: 30 minutes                                                                                                                                                        Chief Complaint: Autologous peripheral blood stem cell transplant with high dose Melphalan prep regimen for treatment of multiple myeloma    S: Patient seen and examined with HPC Transplant Team:   +generalized weakness      O: Vitals:   Vital Signs Last 24 Hrs  T(C): 37.2 (2021 03:00), Max: 37.3 (2021 00:00)  T(F): 99 (2021 03:00), Max: 99.1 (2021 00:00)  HR: 81 (2021 07:00) (75 - 96)  BP: 140/68 (2021 07:00) (100/50 - 146/68)  BP(mean): 98 (2021 07:00) (71 - 102)  RR: 13 (2021 07:00) (11 - 18)  SpO2: 98% (2021 07:00) (97% - 100%)    Admit weight: 98.4kg   Daily   97.5 today  Daily Weight in k.5 (2021 00:34)    Intake / Output:    @ 07:01  -   @ 07:00  --------------------------------------------------------  IN: 3419 mL / OUT: 1700 mL / NET: 1719 mL      PE:   Oropharynx: + erythema and post Kepivance coating no ulcerations   Oral Mucositis:              +                                         ndGndrndanddndend:nd nd2nd CVS: S1, S2 RRR   Lungs: CTA throughout bilaterally   Abdomen: + BS x 4, soft, ND mild tenderness with palpation RLQ.   Extremities: no edema   Gastric Mucositis:       -                                           Grade: n/a  Intestinal Mucositis:     -                                         Grade: n/a   Skin: patchy, erythematous maculopapular rash to face, neck and upper chest and back post Kepivance   TLC: CDI   Neuro: A&O x 3       LABS:                          8.0    <0.10 )-----------( 34       ( 2021 02:17 )             25.0         Mean Cell Volume : 98.4 fl  Mean Cell Hemoglobin : 31.5 pg  Mean Cell Hemoglobin Concentration : 32.0 gm/dL  Auto Neutrophil # : x  Auto Lymphocyte # : x  Auto Monocyte # : x  Auto Eosinophil # : x  Auto Basophil # : x  Auto Neutrophil % : x  Auto Lymphocyte % : x  Auto Monocyte % : x  Auto Eosinophil % : x  Auto Basophil % : x          143  |  111<H>  |  21  ----------------------------<  127<H>  3.7   |  22  |  0.76    Ca    7.2<L>      2021 02:17  Phos  2.0       Mg     2.1         TPro  4.9<L>  /  Alb  2.8<L>  /  TBili  0.2  /  DBili  0.1  /  AST  13  /  ALT  13  /  AlkPhos  63        PT/INR - ( 2021 00:32 )   PT: 13.5 sec;   INR: 1.13 ratio         PTT - ( 2021 09:32 )  PTT:70.8 sec      Uric Acid --          Cultures:     Culture - Blood (21 @ 12:32)    Specimen Source: .Blood Blood    Culture Results:   No growth to date.        Radiology:     < from: Xray Chest 1 View- PORTABLE-Routine (Xray Chest 1 View- PORTABLE-Routine in AM.) (21 @ 06:50) >  IMPRESSION:  Trace left lower lung subsegmental atelectasis. No focal consolidations.    < from: [facility] Duplex Upper Ext Vein Scan, Bilat (21 @ 11:22) >  IMPRESSION:  No evidence of deep venous thrombosis in either upper extremity.    < from: VA Duplex Lower Ext Vein Scan, Bilat (21 @ 11:22) >  IMPRESSION: There is bilateral acute DVT.  On the right, there is below the knee DVT affecting the posterior tibial, peroneal and soleal veins.  On the left there is acute above and below the DVT the left popliteal vein and posterior tibial peroneal trunk.    < from: CT Angio Chest PE Protocol w/ IV Cont (21 @ 00:02) >  IMPRESSION:  Saddle embolus with right heart strain.  These results have been discussed with Dr. Arellano on 2021 at 12:47 AM by on-call resident.  Pneumoperitoneum is again noted.    < from: CT Abdomen and Pelvis w/ IV Cont (. @ 13:05) >  MPRESSION:  Terminal ileitis with associated ileus versus low-grade obstruction. Pockets of free air in the right lower quadrant adjacent to the terminal ileum concerning for bowel perforation. 2.6 cm loculated fluid adjacent to the terminal ileum and surrounding peritoneal enhancement suggestive of developing peritonitis. Distant free air in the right upper quadrant.  Sigmoid colon adjacent to the thickened terminal ileum is also markedly inflamed, with also notable diverticulosis. Sigmoid colitis/diverticulitis can also be considered on the differential versus primary terminal ileitis with reactive inflammation ofthe sigmoid colon.  Mild inflammation of the right colon and appendix may be reactive in nature. The appendix at the base and mid segment is nondistended. The tip of the appendix is not distinctly visualized adjacent to the inflamed terminal ileum.      Meds:   Antimicrobials:   acyclovir IVPB 490 milliGRAM(s) IV Intermittent every 8 hours  clotrimazole Lozenge 1 Lozenge Oral five times a day  fluconAZOLE IVPB 400 milliGRAM(s) IV Intermittent every 24 hours  meropenem  IVPB 1000 milliGRAM(s) IV Intermittent every 8 hours      Heme / Onc:   heparin  Infusion 800 Unit(s)/Hr IV Continuous <Continuous>      GI:  pantoprazole  Injectable 40 milliGRAM(s) IV Push daily  sodium bicarbonate Mouth Rinse 10 milliLiter(s) Swish and Spit five times a day      Cardiovascular:   metoprolol tartrate Injectable 2.5 milliGRAM(s) IV Push every 6 hours      Other medications:   BACItracin   Ointment 1 Application(s) Topical two times a day  Biotene Dry Mouth Oral Rinse 5 milliLiter(s) Swish and Spit five times a day  chlorhexidine 2% Cloths 1 Application(s) Topical <User Schedule>  dextrose 5% + lactated ringers. 1000 milliLiter(s) IV Continuous <Continuous>  hydrocortisone 1% Cream 1 Application(s) Topical two times a day  lidocaine/prilocaine Cream 1 Application(s) Topical daily  ondansetron Injectable 8 milliGRAM(s) IV Push every 8 hours      PRN:   acetaminophen     Tablet .. 650 milliGRAM(s) Oral every 6 hours PRN  metoclopramide Injectable 10 milliGRAM(s) IV Push every 6 hours PRN      A/P:   66 year old male with a history of multiple myeloma s/p RVD x 6   Post Autologous PBSCT day +6  - melphalan 2/2; s/p melphalan hydration for 24 hours post infusion of last dose   Strict I&O, daily weights, prn diuresis   - rest day   - rest day   - s/p HPC transplant; completed transplant hydration for 24 hours post infusion of cells. Suprapubic pain in am. UA pending, follow up culture, BK virus. AXR   -vasovegal episode in AM: will monitor tele for 24hrs    d/c telemetry. Lasix 40mg IV x today, follow up I&Os, daily weight. monitor rash, receiving 2nd dose Kepivance today   add Emend for 3 days and change zofran ATC. If worsening abd pain consider CT a/p oral contrast only.   - Neutropenic started on cipro once febrile will pancx and switch cipro to  Aztronam 2g Q8   - AMS this am, agonal breathing, diaphoretic, cool and clammy - responsive to sternal rub. Hypotensive, hypoxic. Placed on NRB, RRT called. Labs sent. Lactate send. PCN allergy - started on Aztreonam 2g IV q 8 hoursm Flagyl 500mg IV TID for anaerobic coverage given abdominal pain and distension, Vancomycin 1g IV x 1. CT A/P pending for 12:30 21. CE with new TWI in V2-V6 - likely demand ischemia. Repeat CE and lactate at 2pm.   - CT A/P with Terminal ileitis with associated ileus versus low-grade obstruction. Pockets of free air in the right lower quadrant adjacent to the terminal ileum concerning for bowel perforation. 2.6 cm loculated fluid adjacent to the terminal ileum and surrounding peritoneal enhancement suggestive of developing peritonitis. Surgical team consulted- transferred to 8ICU for closer monitoring.  ID consult called- ABX broaden.    - CT chest angio + Saddle embolus with right heart strain., seen by cards- started on full dose A/C, s/p IVC filter placement with thrombectomy: transfuse PLT to >50K . doppler + B/L DVT. D/C Zarxio        1. Infectious Disease:   acyclovir IVPB 490 milliGRAM(s) IV Intermittent every 8 hours  clotrimazole Lozenge 1 Lozenge Oral five times a day  fluconAZOLE IVPB 400 milliGRAM(s) IV Intermittent every 24 hours  meropenem  IVPB 1000 milliGRAM(s) IV Intermittent every 8 hours    2. VOD Prophylaxis: Actigall, Glutamine, Heparin (Full A/C)     3. GI Prophylaxis:    pantoprazole    Tablet 40 milliGRAM(s) Oral before breakfast    4. Mouthcare - NS / NaHCO3 rinses, Mycelex, Biotene; Skin care     5. GVHD prophylaxis - n/a     6. Transfuse & replete electrolytes prn   transfuse for PLT less than 40 K or below 50 with planned surgical intervention hemoglobin less then 7 of symptomatic.    7. IV hydration, daily weights, strict I&O, prn diuresis     8. PO intake as tolerated, nutrition follow up as needed, MVI, folic acid     9. Antiemetics, anti-diarrhea medications:   LORazepam   Injectable 1 milliGRAM(s) IV Push every 24 hours  ondansetron Injectable 8 milliGRAM(s) IV Push every 8 hours  metoclopramide Injectable 10 milliGRAM(s) IV Push every 6 hours PRN  aprepitant 80 milliGRAM(s) Oral daily    10. OOB as tolerated, physical therapy consult if needed     11. Monitor coags / fibrinogen 2x week, vitamin K as needed     12. Monitor closely for clinical changes, monitor for fevers     13. Emotional support provided, plan of care discussed and questions addressed     14. Patient education done regarding plan of care, restrictions and discharge planning     15. Continue regular social work input     I have written the above note for Dr. Mejia  who performed service with me in the room.   Deisy Ramsey NP   ([phone])    I have seen and examined patient with NP, I agree with above note as scribed.

## 2021-11-25 NOTE — PROGRESS NOTE ADULT - ATTENDING COMMENTS
at this stage is tolerating non operative approach  not peritoneal, would be a poor surgical candidate  focus at this stage is management of P.E.

## 2021-11-25 NOTE — PROGRESS NOTE ADULT - SUBJECTIVE AND OBJECTIVE BOX
HISTORY  66y Male multiple myeloma admitted for an autologous pbsct with high dose melphalan prep regimen. Hematologic history as follows: 11/2020 was found to have a T-12 compression fracture. He saw orthopedics 1/2021, and was referred to endocrine. 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 Serum immunofixation showed 1 IgD lambda band and free lambda light chains. Urine immunofixation negative for monoclonal band. 4/29/21 a bone marrow biopsy and aspirate was consistent with plasma cell myeloma (> 90% involvement), flow cytometry positive for monotypic plasma cells. He started cycle 1 RVD on 5/13/21. He completed 6 cycles of RVD 9/30/21. During chemotherapy he reported occasional blurry vision (negative optho workup, negative MRI brain 9/29/21) and moderate fatigue. He has no complaints on admission other than facial erythema, likely related to kepivance.  (15 Nov 2021 13:09)      24 HOUR EVENTS:  - R heart strain noted on Echo  - CTA with saddle PE -> heparin drip increased to therapeutic level  - PERC team consulted: taken for [facility] thrombectomy 11/24 with clot removal  - 800cc on bladder scan, Ortega inserted by Urology  - CRS consulted    SUBJECTIVE/ROS:  [ ] A ten-point review of systems was otherwise negative except as noted.  [ ] Due to altered mental status/intubation, subjective information were not able to be obtained from the patient. History was obtained, to the extent possible, from review of the chart and collateral sources of information.      NEURO  Exam: awake, alert, oriented  Meds: acetaminophen     Tablet .. 650 milliGRAM(s) Oral every 6 hours PRN Temp greater or equal to 38C (100.4F), Mild Pain (1 - 3)  metoclopramide Injectable 10 milliGRAM(s) IV Push every 6 hours PRN Nausea and/or Vomiting  ondansetron Injectable 8 milliGRAM(s) IV Push every 8 hours    [x] Adequacy of sedation and pain control has been assessed and adjusted      RESPIRATORY  RR: 14 (11-25-21 @ 00:00) (11 - 19)  SpO2: 97% (11-25-21 @ 00:00) (95% - 100%)  Exam: unlabored, clear to auscultation bilaterally  Mechanical Ventilation: none   ABG - ( 23 Nov 2021 18:26 )  pH: x     /  pCO2: x     /  pO2: x     / HCO3: x     / Base Excess: x     /  SaO2: x       Lactate: 1.5    [N/A] Extubation Readiness Assessed  Meds: none       CARDIOVASCULAR  HR: 80 (11-25-21 @ 00:00) (80 - 103)  BP: 124/71 (11-25-21 @ 00:00) (100/50 - 146/68)  BP(mean): 95 (11-25-21 @ 00:00) (71 - 102)  VBG - ( 24 Nov 2021 00:30 )  pH: 7.41  /  pCO2: 35    /  pO2: 49    / HCO3: 22    / Base Excess: -2.1  /  SaO2: 81.7   Lactate: 1.0    Exam: regular rate and rhythm  Cardiac Rhythm: sinus  Perfusion     [x]Adequate   [ ]Inadequate  Mentation   [x]Normal       [ ]Reduced  Extremities  [x]Warm         [ ]Cool  Volume Status [ ]Hypervolemic [x]Euvolemic [ ]Hypovolemic  Meds: furosemide   Injectable 20 milliGRAM(s) IV Push every 24 hours PRN if urine output < 100 ml/hr X 2 hours  metoprolol tartrate Injectable 2.5 milliGRAM(s) IV Push every 6 hours        GI/NUTRITION  Exam: soft, nontender, nondistended  Diet: NPO   Meds: pantoprazole  Injectable 40 milliGRAM(s) IV Push daily  sodium bicarbonate Mouth Rinse 10 milliLiter(s) Swish and Spit five times a day      GENITOURINARY  I&O's Detail    11-23 @ 07:01 - 11-24 @ 07:00  --------------------------------------------------------  IN:    Heparin: 45.1 mL    Heparin: 56 mL    IV PiggyBack: 900 mL    IV PiggyBack: 350 mL    Oral Fluid: 180 mL    sodium chloride 0.9%: 3073 mL    Sodium Chloride 0.9% Bolus: 2000 mL  Total IN: 6604.1 mL    OUT:    Indwelling Catheter - Urethral (mL): 1000 mL    Voided (mL): 595 mL  Total OUT: 1595 mL    Total NET: 5009.1 mL      11-24 @ 07:01  -  11-25 @ 00:27  --------------------------------------------------------  IN:    dextrose 5% + lactated ringers: 825 mL    Heparin: 143 mL    IV PiggyBack: 500 mL    Platelets - Single Donor: 450 mL    sodium chloride 0.9%: 150 mL  Total IN: 2068 mL    OUT:    Indwelling Catheter - Urethral (mL): 1325 mL  Total OUT: 1325 mL    Total NET: 743 mL          11-24    137  |  108  |  22  ----------------------------<  132<H>  3.9   |  20<L>  |  0.83    Ca    7.6<L>      24 Nov 2021 06:28  Phos  2.6     11-24  Mg     2.3     11-24    TPro  4.9<L>  /  Alb  2.6<L>  /  TBili  0.3  /  DBili  0.1  /  AST  14  /  ALT  16  /  AlkPhos  70  11-24    [ ] Ortega catheter, indication: N/A  Meds: dextrose 5% + lactated ringers. 1000 milliLiter(s) IV Continuous <Continuous>        HEMATOLOGIC  Meds: heparin  Infusion 800 Unit(s)/Hr IV Continuous <Continuous>    [x] VTE Prophylaxis                        8.3    <0.10 )-----------( 41       ( 24 Nov 2021 17:58 )             25.6     PT/INR - ( 24 Nov 2021 00:32 )   PT: 13.5 sec;   INR: 1.13 ratio         PTT - ( 24 Nov 2021 12:07 )  PTT:38.9 sec  Transfusion     [ ] PRBC   [ ] Platelets   [ ] FFP   [ ] Cryoprecipitate      INFECTIOUS DISEASES  WBC Count: <0.10 K/uL (11-24 @ 17:58)  WBC Count: <0.10 K/uL (11-24 @ 06:28)  WBC Count: <0.10 K/uL (11-24 @ 00:32)    RECENT CULTURES:  Specimen Source: .Blood Blood  Date/Time: 11-23 @ 12:32  Culture Results:   No growth to date.  Gram Stain: --  Organism: --    Meds: acyclovir IVPB 490 milliGRAM(s) IV Intermittent every 8 hours  clotrimazole Lozenge 1 Lozenge Oral five times a day  filgrastim-sndz (ZARXIO) Injectable 480 MICROGram(s) SubCutaneous every 24 hours  fluconAZOLE IVPB 400 milliGRAM(s) IV Intermittent every 24 hours  meropenem  IVPB 1000 milliGRAM(s) IV Intermittent every 8 hours        ENDOCRINE  CAPILLARY BLOOD GLUCOSE        Meds: none       ACCESS DEVICES:  [x] Peripheral IV  [ ] Central Venous Line	[ ] R	[ ] L	[ ] IJ	[ ] Fem	[ ] SC	Placed:   [ ] Arterial Line		[ ] R	[ ] L	[ ] Fem	[ ] Rad	[ ] Ax	Placed:   [ ] PICC:					[ ] Mediport  [ ] Urinary Catheter, Date Placed:   [x] Necessity of urinary, arterial, and venous catheters discussed    OTHER MEDICATIONS:  BACItracin   Ointment 1 Application(s) Topical two times a day  Biotene Dry Mouth Oral Rinse 5 milliLiter(s) Swish and Spit five times a day  chlorhexidine 2% Cloths 1 Application(s) Topical <User Schedule>  hydrocortisone 1% Cream 1 Application(s) Topical two times a day  lidocaine/prilocaine Cream 1 Application(s) Topical daily      CODE STATUS: full

## 2021-11-25 NOTE — PROGRESS NOTE ADULT - SUBJECTIVE AND OBJECTIVE BOX
Follow Up:  Bowel Perforation    Interval History: afebrile. denies significant abdominal pain.     REVIEW OF SYSTEMS  [  ] ROS unobtainable because:    [ x ] All other systems negative except as noted below    Constitutional:  [ ] fever [ ] chills  [ ] weight loss  [ ] weakness  Skin:  [ ] rash [ ] phlebitis	  Eyes: [ ] icterus [ ] pain  [ ] discharge	  ENMT: [ ] sore throat  [ ] thrush [ ] ulcers [ ] exudates  Respiratory: [ ] dyspnea [ ] hemoptysis [ ] cough [ ] sputum	  Cardiovascular:  [ ] chest pain [ ] palpitations [ ] edema	  Gastrointestinal:  [ ] nausea [ ] vomiting [ ] diarrhea [ ] constipation [ x ] pain	  Genitourinary:  [ ] dysuria [ ] frequency [ ] hematuria [ ] discharge [ ] flank pain  [ ] incontinence  Musculoskeletal:  [ ] myalgias [ ] arthralgias [ ] arthritis  [ ] back pain  Neurological:  [ ] headache [ ] seizures  [ ] confusion/altered mental status    Allergies  Omnicef (Unknown)  penicillin (Hives)        ANTIMICROBIALS:  acyclovir IVPB 490 every 8 hours  clotrimazole Lozenge 1 five times a day  fluconAZOLE IVPB 400 every 24 hours  meropenem  IVPB 1000 every 8 hours      OTHER MEDS:  MEDICATIONS  (STANDING):  heparin  Infusion 800 <Continuous>  metoclopramide Injectable 10 every 6 hours PRN  metoprolol tartrate Injectable 2.5 every 6 hours  ondansetron Injectable 8 every 8 hours  pantoprazole  Injectable 40 daily  sodium bicarbonate Mouth Rinse 10 five times a day      Vital Signs Last 24 Hrs  T(C): 36.7 (25 Nov 2021 08:00), Max: 37.3 (25 Nov 2021 00:00)  T(F): 98.1 (25 Nov 2021 08:00), Max: 99.1 (25 Nov 2021 00:00)  HR: 76 (25 Nov 2021 13:00) (75 - 90)  BP: 156/70 (25 Nov 2021 13:00) (100/50 - 156/70)  BP(mean): 100 (25 Nov 2021 13:00) (71 - 108)  RR: 13 (25 Nov 2021 13:00) (11 - 33)  SpO2: 97% (25 Nov 2021 13:00) (96% - 100%)    PHYSICAL EXAMINATION:  General: Alert and Awake, NAD  Cardiac: RRR, No M/R/G  Resp: CTAB, No Wh/Rh/Ra  Abdomen: NBS, mild diffuse abdominal tenderness. No HSM, No rigidity or guarding  MSK: No LE edema. No Calf tenderness  Skin: No rashes or lesions. Skin is warm and dry to the touch.   Neuro: Alert and Awake. CN 2-12 Grossly intact. Moves all four extremities spontaneously.  Psych: Calm, Pleasant, Cooperative                          8.0    <0.10 )-----------( 34       ( 25 Nov 2021 02:17 )             25.0       11-25    143  |  111<H>  |  21  ----------------------------<  127<H>  3.7   |  22  |  0.76    Ca    7.2<L>      25 Nov 2021 02:17  Phos  2.0     11-25  Mg     2.1     11-25    TPro  4.9<L>  /  Alb  2.8<L>  /  TBili  0.2  /  DBili  0.1  /  AST  13  /  ALT  13  /  AlkPhos  63  11-25          MICROBIOLOGY:  v  .Blood Blood  11-23-21   No growth to date.  --  --      STER [id number]  11-19-21   No growth  --  --      Clean Catch Clean Catch (Midstream)  11-19-21   No growth  --  --      STER [id number]  11-03-21   No growth at 14 days.  --  --      STER [id number]  11-02-21   No growth at 14 days.  --    No organisms seen      STER [id number]  11-01-21   No growth at 14 days.  --  --    RADIOLOGY:    <The imaging below has been reviewed and visualized by me independently. Findings as detailed in report below>    < from: Xray Chest 1 View- PORTABLE-Routine (Xray Chest 1 View- PORTABLE-Routine in AM.) (11.25.21 @ 06:50) >  IMPRESSION:  Trace left lower lung subsegmental atelectasis. No focal consolidations.    < end of copied text >

## 2021-11-25 NOTE — PROGRESS NOTE ADULT - SUBJECTIVE AND OBJECTIVE BOX
)SURGERY PROGRESS NOTE      SUBJECTIVE  Pt seen and examined at bedside. No complaints.  No acute events overnight.       OBJECTIVE:    PHYSICAL EXAM   General Appearance: Appears well, NAD  Resp: Patent airway, non-labored breathing  CV: RRR  Abdomen: Soft, Nontender, Nondistended      Vital Signs Last 24 Hrs  T(C): 37.3 (25 Nov 2021 00:00), Max: 37.3 (25 Nov 2021 00:00)  T(F): 99.1 (25 Nov 2021 00:00), Max: 99.1 (25 Nov 2021 00:00)  HR: 82 (25 Nov 2021 02:00) (80 - 101)  BP: 139/70 (25 Nov 2021 02:00) (100/50 - 146/68)  BP(mean): 96 (25 Nov 2021 02:00) (71 - 102)  RR: 13 (25 Nov 2021 02:00) (11 - 19)  SpO2: 99% (25 Nov 2021 02:00) (97% - 100%      23 Nov 2021 07:01  -  24 Nov 2021 07:00  --------------------------------------------------------  IN:    Heparin: 45.1 mL    Heparin: 56 mL    IV PiggyBack: 900 mL    IV PiggyBack: 350 mL    Oral Fluid: 180 mL    sodium chloride 0.9%: 3073 mL    Sodium Chloride 0.9% Bolus: 2000 mL  Total IN: 6604.1 mL    OUT:    Indwelling Catheter - Urethral (mL): 1000 mL    Voided (mL): 595 mL  Total OUT: 1595 mL    Total NET: 5009.1 mL      24 Nov 2021 07:01  -  25 Nov 2021 02:37  --------------------------------------------------------  IN:    dextrose 5% + lactated ringers: 975 mL    Heparin: 169 mL    IV PiggyBack: 600 mL    Platelets - Single Donor: 450 mL    sodium chloride 0.9%: 150 mL  Total IN: 2344 mL    OUT:    Indwelling Catheter - Urethral (mL): 1450 mL  Total OUT: 1450 mL    Total NET: 894 mL      LABS:  11-23 @ 14:49 Creatine 45 U/L [30 - 200]  cret                        8.3    <0.10 )-----------( 41       ( 24 Nov 2021 17:58 )             25.6     11-24    137  |  108  |  22  ----------------------------<  132<H>  3.9   |  20<L>  |  0.83    Ca    7.6<L>      24 Nov 2021 06:28  Phos  2.6     11-24  Mg     2.3     11-24    TPro  4.9<L>  /  Alb  2.6<L>  /  TBili  0.3  /  DBili  0.1  /  AST  14  /  ALT  16  /  AlkPhos  70  11-24    PT/INR - ( 24 Nov 2021 00:32 )   PT: 13.5 sec;   INR: 1.13 ratio         PTT - ( 24 Nov 2021 12:07 )  PTT:38.9 sec         )SURGERY PROGRESS NOTE      SUBJECTIVE  Pt seen and examined at bedside. No complaints.  No acute events overnight.       24 HOUR EVENTS:  - R heart strain noted on Echo  - CTA with saddle PE -> heparin drip increased to therapeutic level  - PERC team consulted: taken for [facility] thrombectomy 11/24 with clot removal  - 800cc on bladder scan, Ortega inserted by Urology  - CRS consulted      OBJECTIVE:    PHYSICAL EXAM   General Appearance: NAD, alert   Resp: Patent airway, non-labored breathing  CV: RRR  Abdomen: Soft, Nontender, Nondistended      Vital Signs Last 24 Hrs  T(C): 37.3 (25 Nov 2021 00:00), Max: 37.3 (25 Nov 2021 00:00)  T(F): 99.1 (25 Nov 2021 00:00), Max: 99.1 (25 Nov 2021 00:00)  HR: 82 (25 Nov 2021 02:00) (80 - 101)  BP: 139/70 (25 Nov 2021 02:00) (100/50 - 146/68)  BP(mean): 96 (25 Nov 2021 02:00) (71 - 102)  RR: 13 (25 Nov 2021 02:00) (11 - 19)  SpO2: 99% (25 Nov 2021 02:00) (97% - 100%      23 Nov 2021 07:01  -  24 Nov 2021 07:00  --------------------------------------------------------  IN:    Heparin: 45.1 mL    Heparin: 56 mL    IV PiggyBack: 900 mL    IV PiggyBack: 350 mL    Oral Fluid: 180 mL    sodium chloride 0.9%: 3073 mL    Sodium Chloride 0.9% Bolus: 2000 mL  Total IN: 6604.1 mL    OUT:    Indwelling Catheter - Urethral (mL): 1000 mL    Voided (mL): 595 mL  Total OUT: 1595 mL    Total NET: 5009.1 mL      24 Nov 2021 07:01  -  25 Nov 2021 02:37  --------------------------------------------------------  IN:    dextrose 5% + lactated ringers: 975 mL    Heparin: 169 mL    IV PiggyBack: 600 mL    Platelets - Single Donor: 450 mL    sodium chloride 0.9%: 150 mL  Total IN: 2344 mL    OUT:    Indwelling Catheter - Urethral (mL): 1450 mL  Total OUT: 1450 mL    Total NET: 894 mL      LABS:                        8.0    <0.10 )-----------( 34       ( 25 Nov 2021 02:17 )             25.0     143  |  111<H>  |  21  ----------------------------<  127<H>  3.7   |  22  |  0.76    Ca    7.2<L>      25 Nov 2021 02:17  Phos  2.0     11-25  Mg     2.1     11-25    TPro  4.9<L>  /  Alb  2.8<L>  /  TBili  0.2  /  DBili  0.1  /  AST  13  /  ALT  13  /  AlkPhos  63  11-25    PT/INR - ( 24 Nov 2021 00:32 )   PT: 13.5 sec;   INR: 1.13 ratio    PTT - ( 24 Nov 2021 12:07 )  PTT:38.9 sec

## 2021-11-25 NOTE — PROGRESS NOTE ADULT - ASSESSMENT
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 On AM of 11/23 patient developed neutropenic sepsis with RRT for vasovagal episode had CTAP showing pneumoperitoneum and terminal ileitis. Admitted to SICU for HD monitoring and serial abdominal exams.      PLAN:    NEURO: AOx4  - Pain control with acetaminophen, dilaudid PRN    RESPIRATORY: submassive pulmonary embolus with R heart stain  - Saturating well on 2L NC  - Full strength heparin gtt  - Daily CXR    CARDIOVASCULAR: Type 2 NSTEMI, troponins downtrending. R heart strain on TTE  - 11/23 TTE: EF 70-75%, septal flattening c/w RV overload, RV enlargement and mildly decreased systolic function  - Appreciate Cardiology recs: s/p mech thrombectomy  - Trend troponins, cardiac enzymes per PERC team  - F/u add'l cardiologist recs  - Lopressor 2.5 Q6H    GI/NUTRITION: Pneumoperitoneum and terminal ileitis  - No urgent surgical intervention per ACS  - NPO  - serial abdominal exams  - f/u CRS recs    GENITOURINARY/RENAL: Urinary retention s/p Ortega insertion by Urology  - Continue Ortega  - Monitor UOP  - Daily BMPs    HEMATOLOGIC: Hx MM s/p autologous SCT 11/19  - Appreciate heme/onc recs: continue Zarizo daily  - Heparin gtt for PE and VOD ppx  - Trend daily CBCs    INFECTIOUS DISEASE: Neutropenia s/p SCT, suspected intrabdominal infxn i/s/o pneumoperitoneum  - Meropenem 1g Q8H empirically  - Continue prophylactic fluconazole/acyclovir per Heme  - f/u Blood Cx  - Appreciate ID recs     ENDOCRINE: No active issues  - f/u TFTs    DISPO: SICU  Code Status: Full Code

## 2021-11-25 NOTE — PROGRESS NOTE ADULT - ASSESSMENT
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 On AM of 11/23 patient developed neutropenic sepsis with RRT for vasovagal episode had CTAP showing pneumoperitoneum and terminal ileitis    Plan:  - transfer to SICU under Dr. 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 On AM of 11/23 patient developed neutropenic sepsis with RRT for vasovagal episode had CTAP showing pneumoperitoneum and terminal ileitis. Patient transferred to SICU for serial abdominal exams and resuscitation.    PLAN:  - patient non peritoneal, however unreliable in the setting of immunosuppression  - patient high risk to undergo surgery, will monitor clinically in ICU   - IV antibiotics (meropenem/fluconazole/acyclovir)  - NPO/mIVF  - appreciate SICU care      ACS  [phone]

## 2021-11-25 NOTE — PROGRESS NOTE ADULT - ATTENDING COMMENTS
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 He is now admitted for an autoSCT with high dose melphalan prep regimen. Transplant day 11/19/21. Hospital course has been complicated by vasovagal episodes on 11/19/21 and 11/23/21. On 11/23/21 developed neutropenic sepsis, improved with fluid resuscitation. CT on 11/23/21 showed Terminal ileitis with associated ileus versus low-grade obstruction with free air and possible bowel perforation, surgery consulted and transferred to SICU, managed conservatively for now. Patient was also found to have increasing troponins, echo showed heart strain and CTA chest on 11/24/21 showed a saddle PE. Also LE doppler on 11/24/24 showed b/l DVT. Patient is hemodynamically stable, mentating well.     PE:   VSS  Gen: A/O x 3, NAD, tired  RESP: NC on 2L, CTA, no wheeze no rhonchi  CV: S1, S2 RRR  Abd: +RLQ pain on palp, soft, mildly distended, + hyperactive BS  LE: no edema  Neuro: CNII-XII intact, no focal deficits  Psych: flat affect    MM  -dx 4/2021  -s/p RVD x 6 (5/2021 - 9/2021) -->   -admitted for autoSCT with Ivett 100mg/M2 on 11/19/21  -hold Zarxio for now given acute events / acute abdomen  Today is day + 5    ID  BACTERIAL:  Neutropenic sepsis (11/23/21) started on merem (11/23 - ) and vanco x1 (11/23)  VIRAL: c/w Acyclovir 400mg po TID  FUNGAL: c/w Diflucan 400 mg po daily.  PCP prophylaxis: hold for now    HEME  Saddle PE and b/l LE DVT (dx 11/24/21) - start therapeutic anticoagulation with heparin IV  -keep Hb >7  -keep plt >20; keep plt >50 for procedures    GI:  Abd pain / bowel perforation on CT on 11/23/21 - NPO, management as per SICU team   Diarrhea - f/u stool cx  VOD prophylaxis - low dose heparin gtt (dosed at 100 units / kg / day), glutamine supplementation, Actigall BID   GI prophylaxis - Protonix po QD   Mouth care and skin care as per protocol.    The time was used discussed with patient, family, primary team, consultants, and acute management. 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 He is now admitted for an autoSCT with high dose melphalan prep regimen. Transplant day 11/19/21. Hospital course has been complicated by vasovagal episodes on 11/19/21 and 11/23/21. On 11/23/21 developed neutropenic sepsis, improved with fluid resuscitation. CT on 11/23/21 showed Terminal ileitis with associated ileus versus low-grade obstruction with free air and possible bowel perforation, surgery consulted and transferred to SICU, managed conservatively for now. Patient was also found to have increasing troponins, echo showed heart strain and CTA chest on 11/24/21 showed a saddle PE. Also LE doppler on 11/24/24 showed b/l DVT. Patient is hemodynamically stable, mentating well.     PE:   VSS  Gen: A/O x 3, NAD, tired  RESP: CTA, no wheeze no rhonchi  CV: S1, S2 RRR  Abd:  soft, mildly distended, + hyperactive BS  LE: no edema  Neuro: CNII-XII intact, no focal deficits  Psych: flat affect    MM  -dx 4/2021  -s/p RVD x 6 (5/2021 - 9/2021) -->   -admitted for autoSCT with Ivett 100mg/M2 on 11/19/21  -hold Zarxio for now given acute events / acute abdomen  Today is day + 6    ID  BACTERIAL:  Neutropenic sepsis (11/23/21) started on meropenem (11/23 - ) and vanco x1 (11/23)  VIRAL: c/w Acyclovir 400mg po TID  FUNGAL: c/w Diflucan 400 mg po daily.  PCP prophylaxis: hold for now    HEME  Saddle PE and b/l LE DVT (dx 11/24/21) - start therapeutic anticoagulation with heparin IV, s/p mechanical thrombectomy and IVC filter placement on 11/24  -keep Hb >7  -keep plt >20; keep plt >50 for procedures    GI:  Abd pain / bowel perforation on CT on 11/23/21 - NPO, management as per SICU team   Diarrhea - f/u stool cx  VOD prophylaxis - low dose heparin gtt (dosed at 100 units / kg / day), glutamine supplementation, Actigall BID   GI prophylaxis - Protonix po QD   Mouth care and skin care as per protocol.    The time was used discussed with patient, family, primary team, consultants, and acute management.

## 2021-11-25 NOTE — PROGRESS NOTE ADULT - ASSESSMENT
66 year old male with multiple myeloma admitted for an autologous pbsct with high dose melphalan prep regimen.     s/p HPC Transplant on 11/19    Noted to be hypotensive and encephalopathic on AM of 11/23.     CT A/P (11/23) with findings concerning for ileitis and Pockets of free air in the right lower quadrant adjacent to the terminal ileum concerning for bowel perforation and 2.6 cm loculated fluid adjacent to the terminal ileum and surrounding peritoneal enhancement suggestive of developing peritonitis.    Diagnosed with Saddle PE on 11/24 and required thrombectomy    Was not taken for operative management given high risk in context of pancytopenia and saddle PE    Prognosis is guarded    Antibiotics:  Meropenem: 11/23 -->  Fluconazole: 11/15 -->   PO Bactrim: 11/15 --> 11/17  Ciprofloxacin: 11/22 --> 11/23  Vancomycin: 11/23  Metronidazole: 11/23  Aztreonam: 11/23    #Bowel Perforation, Peritonitis, Abdominal Fluid Collection, Hypotension, Pancytopenia, Penicillin Allergy  --Continue Meropenem 1g IV Q8H   --Continue Fluconazole 400 mg PO/IV Q24H (treatment given small bowel perforation and for prophylaxis for SCT)  --Continue to follow CBC with diff  --Continue to follow transaminases  --Continue to follow temperature curve  --Follow up on preliminary blood cultures  --Surgery recommendations noted/appreciated    #s/p Stem Cell Transplant  --Continue Acyclovir prophylaxis; transitioned to IV - would maintain IVF hydration to decrease risk of crystal induced kidney injury  --Will require PCP prophylaxis - can monitor off for now.     I will continue to follow. Please feel free to contact me with any further questions.    Kris Rothman M.D.  [facility] Division of Infectious Disease  8AM-5PM: Pager Number [phone]  After Hours (or if no response): Please contact the Infectious Diseases Office at [phone]

## 2021-11-26 LAB
ALBUMIN SERPL ELPH-MCNC: 2.9 G/DL — LOW (ref 3.3–5)
ALP SERPL-CCNC: 62 U/L — SIGNIFICANT CHANGE UP (ref 40–120)
ALT FLD-CCNC: 15 U/L — SIGNIFICANT CHANGE UP (ref 10–45)
ANION GAP SERPL CALC-SCNC: 9 MMOL/L — SIGNIFICANT CHANGE UP (ref 5–17)
APTT BLD: 41.3 SEC — HIGH (ref 27.5–35.5)
APTT BLD: 43.6 SEC — HIGH (ref 27.5–35.5)
APTT BLD: 57.2 SEC — HIGH (ref 27.5–35.5)
APTT BLD: 57.7 SEC — HIGH (ref 27.5–35.5)
APTT BLD: 58.2 SEC — HIGH (ref 27.5–35.5)
APTT BLD: 62.2 SEC — HIGH (ref 27.5–35.5)
AST SERPL-CCNC: 13 U/L — SIGNIFICANT CHANGE UP (ref 10–40)
BASE EXCESS BLDV CALC-SCNC: -1.6 MMOL/L — SIGNIFICANT CHANGE UP (ref -2–2)
BILIRUB DIRECT SERPL-MCNC: <0.1 MG/DL — SIGNIFICANT CHANGE UP (ref 0–0.3)
BILIRUB INDIRECT FLD-MCNC: >0.1 MG/DL — LOW (ref 0.2–1)
BILIRUB SERPL-MCNC: 0.2 MG/DL — SIGNIFICANT CHANGE UP (ref 0.2–1.2)
BUN SERPL-MCNC: 20 MG/DL — SIGNIFICANT CHANGE UP (ref 7–23)
CA-I SERPL-SCNC: 1.12 MMOL/L — LOW (ref 1.15–1.33)
CALCIUM SERPL-MCNC: 7.5 MG/DL — LOW (ref 8.4–10.5)
CHLORIDE BLDV-SCNC: 112 MMOL/L — HIGH (ref 96–108)
CHLORIDE SERPL-SCNC: 111 MMOL/L — HIGH (ref 96–108)
CK MB CFR SERPL CALC: 1.7 NG/ML — SIGNIFICANT CHANGE UP (ref 0–6.7)
CO2 BLDV-SCNC: 24 MMOL/L — SIGNIFICANT CHANGE UP (ref 22–26)
CO2 SERPL-SCNC: 21 MMOL/L — LOW (ref 22–31)
CREAT SERPL-MCNC: 0.75 MG/DL — SIGNIFICANT CHANGE UP (ref 0.5–1.3)
D DIMER BLD IA.RAPID-MCNC: 1174 NG/ML DDU — HIGH
GAS PNL BLDV: 140 MMOL/L — SIGNIFICANT CHANGE UP (ref 136–145)
GAS PNL BLDV: SIGNIFICANT CHANGE UP
GAS PNL BLDV: SIGNIFICANT CHANGE UP
GLUCOSE BLDV-MCNC: 120 MG/DL — HIGH (ref 70–99)
GLUCOSE SERPL-MCNC: 120 MG/DL — HIGH (ref 70–99)
HCO3 BLDV-SCNC: 23 MMOL/L — SIGNIFICANT CHANGE UP (ref 22–29)
HCT VFR BLD CALC: 24 % — LOW (ref 39–50)
HCT VFR BLD CALC: 24.3 % — LOW (ref 39–50)
HCT VFR BLDA CALC: 24 % — LOW (ref 39–51)
HGB BLD CALC-MCNC: 7.9 G/DL — LOW (ref 12.6–17.4)
HGB BLD-MCNC: 7.9 G/DL — LOW (ref 13–17)
HGB BLD-MCNC: 8 G/DL — LOW (ref 13–17)
HOROWITZ INDEX BLDV+IHG-RTO: 21 — SIGNIFICANT CHANGE UP
INR BLD: 1.18 RATIO — HIGH (ref 0.88–1.16)
LACTATE BLDV-MCNC: 0.6 MMOL/L — LOW (ref 0.7–2)
LACTATE SERPL-SCNC: 1.2 MMOL/L — SIGNIFICANT CHANGE UP (ref 0.7–2)
LDH SERPL L TO P-CCNC: 156 U/L — SIGNIFICANT CHANGE UP (ref 50–242)
LMWH PPP CHRO-ACNC: 0.36 IU/ML — LOW (ref 0.5–1.1)
MAGNESIUM SERPL-MCNC: 1.9 MG/DL — SIGNIFICANT CHANGE UP (ref 1.6–2.6)
MCHC RBC-ENTMCNC: 31.2 PG — SIGNIFICANT CHANGE UP (ref 27–34)
MCHC RBC-ENTMCNC: 32 PG — SIGNIFICANT CHANGE UP (ref 27–34)
MCHC RBC-ENTMCNC: 32.9 GM/DL — SIGNIFICANT CHANGE UP (ref 32–36)
MCHC RBC-ENTMCNC: 32.9 GM/DL — SIGNIFICANT CHANGE UP (ref 32–36)
MCV RBC AUTO: 94.9 FL — SIGNIFICANT CHANGE UP (ref 80–100)
MCV RBC AUTO: 97.2 FL — SIGNIFICANT CHANGE UP (ref 80–100)
NRBC # BLD: 0 /100 WBCS — SIGNIFICANT CHANGE UP (ref 0–0)
NRBC # BLD: 0 /100 WBCS — SIGNIFICANT CHANGE UP (ref 0–0)
NT-PROBNP SERPL-SCNC: 1935 PG/ML — HIGH (ref 0–300)
NT-PROBNP SERPL-SCNC: 2250 PG/ML — HIGH (ref 0–300)
PCO2 BLDV: 36 MMHG — LOW (ref 42–55)
PH BLDV: 7.41 — SIGNIFICANT CHANGE UP (ref 7.32–7.43)
PHOSPHATE SERPL-MCNC: 1.8 MG/DL — LOW (ref 2.5–4.5)
PLATELET # BLD AUTO: 20 K/UL — CRITICAL LOW (ref 150–400)
PLATELET # BLD AUTO: 36 K/UL — LOW (ref 150–400)
PO2 BLDV: 46 MMHG — HIGH (ref 25–45)
POTASSIUM BLDV-SCNC: 3.7 MMOL/L — SIGNIFICANT CHANGE UP (ref 3.5–5.1)
POTASSIUM SERPL-MCNC: 3.7 MMOL/L — SIGNIFICANT CHANGE UP (ref 3.5–5.3)
POTASSIUM SERPL-SCNC: 3.7 MMOL/L — SIGNIFICANT CHANGE UP (ref 3.5–5.3)
PROT SERPL-MCNC: 5 G/DL — LOW (ref 6–8.3)
PROTHROM AB SERPL-ACNC: 14.1 SEC — HIGH (ref 10.6–13.6)
RBC # BLD: 2.5 M/UL — LOW (ref 4.2–5.8)
RBC # BLD: 2.53 M/UL — LOW (ref 4.2–5.8)
RBC # FLD: 14.4 % — SIGNIFICANT CHANGE UP (ref 10.3–14.5)
RBC # FLD: 14.4 % — SIGNIFICANT CHANGE UP (ref 10.3–14.5)
SAO2 % BLDV: 79.9 % — SIGNIFICANT CHANGE UP (ref 67–88)
SODIUM SERPL-SCNC: 141 MMOL/L — SIGNIFICANT CHANGE UP (ref 135–145)
TROPONIN T, HIGH SENSITIVITY RESULT: 47 NG/L — SIGNIFICANT CHANGE UP (ref 0–51)
TROPONIN T, HIGH SENSITIVITY RESULT: 58 NG/L — HIGH (ref 0–51)
WBC # BLD: <0.1 K/UL — CRITICAL LOW (ref 3.8–10.5)
WBC # BLD: <0.1 K/UL — CRITICAL LOW (ref 3.8–10.5)
WBC # FLD AUTO: <0.1 K/UL — CRITICAL LOW (ref 3.8–10.5)
WBC # FLD AUTO: <0.1 K/UL — CRITICAL LOW (ref 3.8–10.5)

## 2021-11-26 PROCEDURE — 93970 EXTREMITY STUDY: CPT | Mod: 26

## 2021-11-26 PROCEDURE — 99232 SBSQ HOSP IP/OBS MODERATE 35: CPT

## 2021-11-26 PROCEDURE — 71045 X-RAY EXAM CHEST 1 VIEW: CPT | Mod: 26

## 2021-11-26 PROCEDURE — 38240 TRANSPLT ALLO HCT/DONOR: CPT

## 2021-11-26 PROCEDURE — 99233 SBSQ HOSP IP/OBS HIGH 50: CPT

## 2021-11-26 RX ORDER — HEPARIN SODIUM 5000 [USP'U]/ML
1600 INJECTION INTRAVENOUS; SUBCUTANEOUS
Qty: 25000 | Refills: 0 | Status: DISCONTINUED | OUTPATIENT
Start: 2021-11-26 | End: 2021-11-28

## 2021-11-26 RX ORDER — MAGNESIUM SULFATE 500 MG/ML
1 VIAL (ML) INJECTION ONCE
Refills: 0 | Status: COMPLETED | OUTPATIENT
Start: 2021-11-26 | End: 2021-11-26

## 2021-11-26 RX ORDER — DIPHENHYDRAMINE HYDROCHLORIDE AND LIDOCAINE HYDROCHLORIDE AND ALUMINUM HYDROXIDE AND MAGNESIUM HYDRO
30 KIT EVERY 6 HOURS
Refills: 0 | Status: DISCONTINUED | OUTPATIENT
Start: 2021-11-26 | End: 2021-11-27

## 2021-11-26 RX ORDER — POTASSIUM CHLORIDE 20 MEQ
10 PACKET (EA) ORAL
Refills: 0 | Status: COMPLETED | OUTPATIENT
Start: 2021-11-26 | End: 2021-11-26

## 2021-11-26 RX ADMIN — SODIUM CHLORIDE 75 MILLILITER(S): 9 INJECTION, SOLUTION INTRAVENOUS at 04:38

## 2021-11-26 RX ADMIN — HEPARIN SODIUM 17 UNIT(S)/HR: 5000 INJECTION INTRAVENOUS; SUBCUTANEOUS at 15:31

## 2021-11-26 RX ADMIN — MEROPENEM 100 MILLIGRAM(S): 1 INJECTION INTRAVENOUS at 08:06

## 2021-11-26 RX ADMIN — PANTOPRAZOLE SODIUM 40 MILLIGRAM(S): 20 TABLET, DELAYED RELEASE ORAL at 13:21

## 2021-11-26 RX ADMIN — DIPHENHYDRAMINE HYDROCHLORIDE AND LIDOCAINE HYDROCHLORIDE AND ALUMINUM HYDROXIDE AND MAGNESIUM HYDRO 30 MILLILITER(S): KIT at 13:24

## 2021-11-26 RX ADMIN — Medication 250 MILLIMOLE(S): at 01:57

## 2021-11-26 RX ADMIN — BENZOCAINE AND MENTHOL 1 LOZENGE: 5; 1 LIQUID ORAL at 18:45

## 2021-11-26 RX ADMIN — BENZOCAINE AND MENTHOL 1 LOZENGE: 5; 1 LIQUID ORAL at 13:20

## 2021-11-26 RX ADMIN — Medication 100 GRAM(S): at 01:56

## 2021-11-26 RX ADMIN — Medication 109.8 MILLIGRAM(S): at 05:02

## 2021-11-26 RX ADMIN — ONDANSETRON 8 MILLIGRAM(S): 8 TABLET, FILM COATED ORAL at 05:02

## 2021-11-26 RX ADMIN — MEROPENEM 100 MILLIGRAM(S): 1 INJECTION INTRAVENOUS at 00:16

## 2021-11-26 RX ADMIN — Medication 5 MILLILITER(S): at 13:36

## 2021-11-26 RX ADMIN — Medication 2.5 MILLIGRAM(S): at 05:01

## 2021-11-26 RX ADMIN — CHLORHEXIDINE GLUCONATE 1 APPLICATION(S): 213 SOLUTION TOPICAL at 05:02

## 2021-11-26 RX ADMIN — Medication 5 MILLILITER(S): at 18:45

## 2021-11-26 RX ADMIN — ONDANSETRON 8 MILLIGRAM(S): 8 TABLET, FILM COATED ORAL at 13:36

## 2021-11-26 RX ADMIN — Medication 2.5 MILLIGRAM(S): at 18:58

## 2021-11-26 RX ADMIN — HEPARIN SODIUM 16 UNIT(S)/HR: 5000 INJECTION INTRAVENOUS; SUBCUTANEOUS at 01:59

## 2021-11-26 RX ADMIN — Medication 10 MILLILITER(S): at 09:13

## 2021-11-26 RX ADMIN — Medication 2.5 MILLIGRAM(S): at 00:16

## 2021-11-26 RX ADMIN — Medication 5 MILLILITER(S): at 05:03

## 2021-11-26 RX ADMIN — Medication 100 MILLIEQUIVALENT(S): at 05:03

## 2021-11-26 RX ADMIN — Medication 1 LOZENGE: at 13:35

## 2021-11-26 RX ADMIN — Medication 1 LOZENGE: at 09:09

## 2021-11-26 RX ADMIN — FLUCONAZOLE 100 MILLIGRAM(S): 150 TABLET ORAL at 16:25

## 2021-11-26 RX ADMIN — Medication 109.8 MILLIGRAM(S): at 21:02

## 2021-11-26 RX ADMIN — Medication 2.5 MILLIGRAM(S): at 13:21

## 2021-11-26 RX ADMIN — DIPHENHYDRAMINE HYDROCHLORIDE AND LIDOCAINE HYDROCHLORIDE AND ALUMINUM HYDROXIDE AND MAGNESIUM HYDRO 30 MILLILITER(S): KIT at 18:44

## 2021-11-26 RX ADMIN — Medication 109.8 MILLIGRAM(S): at 13:35

## 2021-11-26 RX ADMIN — Medication 5 MILLILITER(S): at 09:08

## 2021-11-26 RX ADMIN — BENZOCAINE AND MENTHOL 1 LOZENGE: 5; 1 LIQUID ORAL at 05:01

## 2021-11-26 RX ADMIN — Medication 1 LOZENGE: at 18:46

## 2021-11-26 RX ADMIN — MEROPENEM 100 MILLIGRAM(S): 1 INJECTION INTRAVENOUS at 16:26

## 2021-11-26 RX ADMIN — Medication 100 MILLIEQUIVALENT(S): at 04:38

## 2021-11-26 RX ADMIN — ONDANSETRON 8 MILLIGRAM(S): 8 TABLET, FILM COATED ORAL at 21:02

## 2021-11-26 NOTE — PROGRESS NOTE ADULT - SUBJECTIVE AND OBJECTIVE BOX
Vascular Cardiology  Progress note  DIRECT SERVICE NUMBER: [phone]            EMAIL [email or web address]   OFFICE [phone]    CC:  PE    INTERVAL HISTORY:  Doing well post procedure  R groin stable without any hematoma  Breathing well.  no CP .  Vitals stable  Having diarrhea that started overnight         Allergies    Omnicef (Unknown)  penicillin (Hives)    Intolerances    	        ICU Vital Signs Last 24 Hrs  T(C): 37.8 (26 Nov 2021 07:00), Max: 37.8 (25 Nov 2021 19:00)  T(F): 100 (26 Nov 2021 07:00), Max: 100 (25 Nov 2021 19:00)  HR: 77 (26 Nov 2021 10:00) (70 - 86)  BP: 146/73 (26 Nov 2021 10:00) (133/70 - 156/75)  BP(mean): 104 (26 Nov 2021 10:00) (92 - 108)  ABP: --  ABP(mean): --  RR: 19 (26 Nov 2021 10:00) (12 - 22)  SpO2: 97% (26 Nov 2021 10:00) (95% - 99%)    PAST MEDICAL & SURGICAL HISTORY:  Hyperlipidemia    Shortness of breath on exertion    Lumbar herniated disc    T12 compression fracture    Acute lumbar radiculopathy    History of basal cell carcinoma    History of surgery on wrist        FAMILY HISTORY:  No pertinent family history in first degree relatives        SOCIAL HISTORY:  unchanged    REVIEW OF SYSTEMS:  CONSTITUTIONAL: No fever, weight loss, or fatigue  EYES: No eye pain, visual disturbances, or discharge  ENMT:  No difficulty hearing, tinnitus, vertigo; No sinus or throat pain  NECK: No pain or stiffness  RESPIRATORY: No cough, wheezing, chills or hemoptysis; No Shortness of Breath  CARDIOVASCULAR: No chest pain, palpitations, passing out, dizziness, or leg swelling  GASTROINTESTINAL: Diarrhea  GENITOURINARY: No dysuria, frequency, hematuria, or incontinence  NEUROLOGICAL: No headaches, memory loss, loss of strength, numbness, or tremors  SKIN: No itching, burning, rashes, or lesions   LYMPH Nodes: No enlarged glands  ENDOCRINE: No heat or cold intolerance; No hair loss  MUSCULOSKELETAL: No joint pain or swelling; No muscle, back, or extremity pain  PSYCHIATRIC: No depression, anxiety, mood swings, or difficulty sleeping  HEME/LYMPH: No easy bruising, or bleeding gums  ALLERY AND IMMUNOLOGIC: No hives or eczema	    [ x] All others negative	  [ ] Unable to obtain        ICU Vital Signs Last 24 Hrs  T(C): 37.8 (26 Nov 2021 07:00), Max: 37.8 (25 Nov 2021 19:00)  T(F): 100 (26 Nov 2021 07:00), Max: 100 (25 Nov 2021 19:00)  HR: 77 (26 Nov 2021 10:00) (70 - 86)  BP: 146/73 (26 Nov 2021 10:00) (133/70 - 156/75)  BP(mean): 104 (26 Nov 2021 10:00) (92 - 108)  ABP: --  ABP(mean): --  RR: 19 (26 Nov 2021 10:00) (12 - 22)  SpO2: 97% (26 Nov 2021 10:00) (95% - 99%)        Appearance: Normal	  HEENT:   Normal oral mucosa, PERRL, EOMI	  Carotid:  Right: No bruit    Left:  No bruit  Lymphatic: No lymphadenopathy  Cardiovascular: Normal S1 S2, No JVD, No murmurs, No edema  Respiratory: Lungs clear to auscultation	  Psychiatry: A & O x 3, Mood & affect appropriate  Gastrointestinal:  Soft, Non-tender, + BS	  Skin: No rashes, No ecchymoses, No cyanosis	  Neurologic: Non-focal  Extremities: Normal range of motion, No clubbing, cyanosis.       LABS:	 	    CBC Full  -  ( 26 Nov 2021 00:27 )  WBC Count : <0.10 K/uL  Hemoglobin : 8.0 g/dL  Hematocrit : 24.3 %  Platelet Count - Automated : 20 K/uL  Mean Cell Volume : 97.2 fl  Mean Cell Hemoglobin : 32.0 pg  Mean Cell Hemoglobin Concentration : 32.9 gm/dL  Auto Neutrophil # : x  Auto Lymphocyte # : x  Auto Monocyte # : x  Auto Eosinophil # : x  Auto Basophil # : x  Auto Neutrophil % : x  Auto Lymphocyte % : x  Auto Monocyte % : x  Auto Eosinophil % : x  Auto Basophil % : x    11-26    141  |  111<H>  |  20  ----------------------------<  120<H>  3.7   |  21<L>  |  0.75  11-25    143  |  111<H>  |  21  ----------------------------<  127<H>  3.7   |  22  |  0.76    Ca    7.5<L>      26 Nov 2021 00:27  Ca    7.2<L>      25 Nov 2021 02:17  Phos  1.8     11-26  Phos  2.0     11-25  Mg     1.9     11-26  Mg     2.1     11-25    TPro  5.0<L>  /  Alb  2.9<L>  /  TBili  0.2  /  DBili  <0.1  /  AST  13  /  ALT  15  /  AlkPhos  62  11-26  TPro  4.9<L>  /  Alb  2.8<L>  /  TBili  0.2  /  DBili  0.1  /  AST  13  /  ALT  13  /  AlkPhos  63  11-25          Assessment:  1. Large PE  -s/p mechanical thromectomy  2.  MM  3.  Terminal ileitis        Plan:  1. Continue heparin gtt for now, with caution given platelet count  2.  If a/c needs to be held due to low platelets, then will need to consider IVC filter  3.  Please repeat lower extremity venous duplex today to re-assess DVT status  4.  SICU care for diarrhea  5.  Hopefully his platelet counts start to recover soon         Thank you    Conrad Holley    Vascular Cardiology Service  DIRECT SERVICE NUMBER [phone]  Office [phone]  email:   [email or web address]

## 2021-11-26 NOTE — CHART NOTE - NSCHARTNOTEFT_GEN_A_CORE
Nutrition Follow Up Note  Patient seen for: follow up s/p transfer to SICU  with neutropenic sepsis    Chart reviewed, events noted. 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 On AM of  patient developed neutropenic sepsis with RRT for vasovagal episode had CTAP showing pneumoperitoneum and terminal ileitis. Patient transferred to SICU for serial abdominal exams and resuscitation."    Source: [] Patient       [x] EMR        [] RN        [] Family at bedside       [X] Other: pt asleep in bed at time of visit      Diet Order:   Diet, NPO (21)    Is current diet order appropriate/adequate? [] Yes  [X]  No: NPO as of  (4 days)    PO intake :  Pt previously ordered for Regular diet (11/15-) with Glutasolve and 2 servings Ensure Enlive. Pt noted with throat pain, lesions on palate, per chart.    Nutrition-related concerns:  - s/p autologous peripheral blood stem cell transplant   - CT abdomen (): pneumoperitoneum and terminal ileitis. Per chart "patient high risk to undergo surgery, will monitor clinically in ICU"  - IVF: D5 lactated ringers @ 75 ml/hr    GI:  Last BM  (x2), diarrhea.   Bowel Regimen? [] Yes   [X] No    Weights:   Daily Weight in k.7 (), Weight in k.5 (-), Weight in k.1 (), Weight in k.8 (), Weight in k.6 (-), Weight in k.9 (-), Weight in k.2 ()  DOSIN.4 kg (11/15)  IBW: 80.7 kg    MEDICATIONS  (STANDING):  acyclovir IVPB  clotrimazole Lozenge  dextrose 5% + lactated ringers.  fluconAZOLE IVPB  meropenem  IVPB  metoprolol tartrate Injectable  pantoprazole  Injectable  sodium bicarbonate Mouth Rinse    Pertinent Labs:  @ 00:27: Na 141, BUN 20, Cr 0.75, <H>, K+ 3.7, Phos 1.8<L>, Mg 1.9, Alk Phos 62, ALT/SGPT 15, AST/SGOT 13,     Skin per nursing documentation: no pressure injuries documented  Edema: 1+ left/right foot ()    Estimated Nutrition Needs: based on dosing wt 98.4kg, with consideration for BMI~29  Energy Needs: 5512-8924 calories (25-30 kcal/kg)  Protein Needs: 118-138 grams  Fluid Needs: defer to team    Previous Nutrition Diagnosis: Predicted Inadequate Protein-Energy Intake  Nutrition Diagnosis is: [] ongoing  [] resolved [X] not applicable - advanced below    New Nutrition Diagnosis: Inadequate Protein-Energy Intake related to neutropenic sepsis and altered GI function as evidenced by pt NPO x 4 days, in setting of medical management of pneumoperitoneum and terminal ileitis    Nutrition Care Plan:  [X] In Progress  [] Achieved  [] Not applicable    Nutrition Interventions:     Education Provided:       [] Yes:  [X] No: n/a       Recommendations:      1. Monitor GI function, ability to resume Regular diet  2. Resume 2 servings Ensure Enlive and Glutasolve when diet is advanced  3. Pt at high risk for acute malnutrition; RD will continue to monitor    Monitoring and Evaluation:   Continue to monitor nutritional intake, tolerance to diet prescription, weights, labs, skin integrity      RD remains available upon request and will follow up per protocol  Lamar Self, MS RD CDN [facility] (Pager #[phone]) Nutrition Follow Up Note  Patient seen for: follow up s/p transfer to SICU  with neutropenic sepsis    Chart reviewed, events noted. 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 On AM of  patient developed neutropenic sepsis with RRT for vasovagal episode had CTAP showing pneumoperitoneum and terminal ileitis. Patient transferred to SICU for serial abdominal exams and resuscitation."    Source: [X] Patient       [x] EMR        [] RN        [] Family at bedside       [] Other:       Diet Order:   Diet, NPO (21) --> Clear Liquid Diet    Is current diet order appropriate/adequate? [] Yes  [X]  No: NPO x 3 days, advanced to clear liquids today ()    PO intake :  Pt previously ordered for Regular diet (11/15-) with Glutasolve and 2 servings Ensure Enlive, however pt reports minimal intake secondary to dysgeusia. Pt reports throat pain and mouth sores have improved. Pt declines Ensure Clear, amenable to high protein gelatin; requests PowerAde and Italian ice.    Nutrition-related concerns:  - s/p autologous peripheral blood stem cell transplant   - CT abdomen (): pneumoperitoneum and terminal ileitis. Per chart "patient high risk to undergo surgery, will monitor clinically in ICU"  - IVF: D5 lactated ringers @ 75 ml/hr    GI:  Last BM  (x2), diarrhea.   Bowel Regimen? [] Yes   [X] No    Weights:   Daily Weight in k.7 (), Weight in k.5 (-), Weight in k.1 (-), Weight in k.8 (-), Weight in k.6 (-), Weight in k.9 (-), Weight in k.2 (-)  DOSIN.4 kg (11/15)  IBW: 80.7 kg    MEDICATIONS  (STANDING):  acyclovir IVPB  clotrimazole Lozenge  dextrose 5% + lactated ringers.  fluconAZOLE IVPB  meropenem  IVPB  metoprolol tartrate Injectable  pantoprazole  Injectable  sodium bicarbonate Mouth Rinse    Pertinent Labs:  @ 00:27: Na 141, BUN 20, Cr 0.75, <H>, K+ 3.7, Phos 1.8<L>, Mg 1.9, Alk Phos 62, ALT/SGPT 15, AST/SGOT 13,     Skin per nursing documentation: no pressure injuries documented  Edema: 1+ left/right foot ()    Estimated Nutrition Needs: based on dosing wt 98.4kg, with consideration for BMI~29  Energy Needs: 4267-8833 calories (25-30 kcal/kg)  Protein Needs: 118-138 grams  Fluid Needs: defer to team    Previous Nutrition Diagnosis: Predicted Inadequate Protein-Energy Intake  Nutrition Diagnosis is: [] ongoing  [] resolved [X] not applicable - advanced below    New Nutrition Diagnosis: Inadequate Protein-Energy Intake related to neutropenic sepsis, dysgeusia, and altered GI function, as evidenced by pt NPO x 3 days, in setting of medical management of pneumoperitoneum and terminal ileitis    Nutrition Care Plan:  [X] In Progress  [] Achieved  [] Not applicable    Nutrition Interventions:     Education Provided:       [] Yes:  [X] No: n/a       Recommendations:      1. Advance to Regular diet as tolerated. Food preferences taken to improve po intake on clear liquid diet.  2. Resume 2 servings Ensure Enlive and Glutasolve when diet is advanced  3. Pt at risk for acute malnutrition; RD will continue to monitor    Monitoring and Evaluation:   Continue to monitor nutritional intake, tolerance to diet prescription, weights, labs, skin integrity      RD remains available upon request and will follow up per protocol  Lamar Self, MS RD CDN [facility] (Pager #[phone])

## 2021-11-26 NOTE — PROGRESS NOTE ADULT - SUBJECTIVE AND OBJECTIVE BOX
HPC Transplant Team                                                      Critical / Counseling Time Provided: 30 minutes                                                                                                                                                        Chief Complaint:     S: Patient seen and examined with [facility] Transplant Team:   Denies mouth / tongue / throat pain, dyspnea, cough, nausea, vomiting, diarrhea, abdominal pain     O: Vitals:   Vital Signs Last 24 Hrs  T(C): 37.6 (2021 03:00), Max: 37.8 (2021 11:00)  T(F): 99.7 (2021 03:00), Max: 100 (2021 11:00)  HR: 75 (2021 07:00) (70 - 86)  BP: 134/78 (2021 07:00) (134/72 - 156/75)  BP(mean): 101 (2021 07:00) (92 - 108)  RR: 15 (2021 07:00) (12 - 33)  SpO2: 97% (2021 07:00) (95% - 99%)    Admit weight:   Daily     Daily Weight in k.7 (2021 02:06)    Intake / Output:    @ 07:01  -   @ 07:00  --------------------------------------------------------  IN: 3726 mL / OUT: 1490 mL / NET: 2236 mL          PE:   Oropharynx:   Oral Mucositis:                                                        Grade:   CVS:   Lungs:   Abdomen:  Extremities:   Gastric Mucositis:                                                  Grade:   Intestinal Mucositis:                                              Grade:   Skin:   TLC:   Neuro:   Pain:     Labs:   CBC Full  -  ( 2021 00:27 )  WBC Count : <0.10 K/uL  Hemoglobin : 8.0 g/dL  Hematocrit : 24.3 %  Platelet Count - Automated : 20 K/uL  Mean Cell Volume : 97.2 fl  Mean Cell Hemoglobin : 32.0 pg  Mean Cell Hemoglobin Concentration : 32.9 gm/dL  Auto Neutrophil # : x  Auto Lymphocyte # : x  Auto Monocyte # : x  Auto Eosinophil # : x  Auto Basophil # : x  Auto Neutrophil % : x  Auto Lymphocyte % : x  Auto Monocyte % : x  Auto Eosinophil % : x  Auto Basophil % : x                          8.0    <0.10 )-----------(        ( 2021 00:27 )             24.3         141  |  111<H>  |  20  ----------------------------<  120<H>  3.7   |  21<L>  |  0.75    Ca    7.5<L>      2021 00:27  Phos  1.8       Mg     1.9         TPro  5.0<L>  /  Alb  2.9<L>  /  TBili  0.2  /  DBili  <0.1  /  AST  13  /  ALT  15  /  AlkPhos  62      PT/INR - ( 2021 14:41 )   PT: 14.5 sec;   INR: 1.22 ratio         PTT - ( 2021 01:20 )  PTT:43.6 sec  LIVER FUNCTIONS - ( 2021 00:27 )  Alb: 2.9 g/dL / Pro: 5.0 g/dL / ALK PHOS: 62 U/L / ALT: 15 U/L / AST: 13 U/L / GGT: x           Lactate Dehydrogenase, Serum: 156 U/L ( @ 00:27)          Karnofsky / Lansky Scale:   GVHD:   Skin:   Liver:   Gut:   Overall Grade:       Cultures:         Radiology:       Meds:   Antimicrobials:   acyclovir IVPB 490 milliGRAM(s) IV Intermittent every 8 hours  clotrimazole Lozenge 1 Lozenge Oral five times a day  fluconAZOLE IVPB 400 milliGRAM(s) IV Intermittent every 24 hours  meropenem  IVPB 1000 milliGRAM(s) IV Intermittent every 8 hours      Heme / Onc:   heparin  Infusion 1600 Unit(s)/Hr IV Continuous <Continuous>      GI:  pantoprazole  Injectable 40 milliGRAM(s) IV Push daily  sodium bicarbonate Mouth Rinse 10 milliLiter(s) Swish and Spit five times a day      Cardiovascular:   metoprolol tartrate Injectable 2.5 milliGRAM(s) IV Push every 6 hours      Immunologic:       Other medications:   BACItracin   Ointment 1 Application(s) Topical two times a day  benzocaine 15 mG/menthol 3.6 mG (Sugar-Free) Lozenge 1 Lozenge Oral four times a day  Biotene Dry Mouth Oral Rinse 5 milliLiter(s) Swish and Spit five times a day  chlorhexidine 2% Cloths 1 Application(s) Topical <User Schedule>  dextrose 5% + lactated ringers. 1000 milliLiter(s) IV Continuous <Continuous>  hydrocortisone 1% Cream 1 Application(s) Topical two times a day  lidocaine/prilocaine Cream 1 Application(s) Topical daily  ondansetron Injectable 8 milliGRAM(s) IV Push every 8 hours      PRN:   metoclopramide Injectable 10 milliGRAM(s) IV Push every 6 hours PRN      A/P:   ___ year old ___  with a history of ______________________  Pre / Status Post :  Autologous / Allogeneic PBSCT / BMT day ____________    1. Infectious Disease:   Fluconazole, Acyclovir     2. VOD Prophylaxis: Actigall, Glutamine, Heparin (dosed at 100 units / kg / day)     3. GI Prophylaxis:  Protonix    4. Mouthcare - NS / NaHCO3 rinses, Mycelex, Caphosol, skin care     5. GVHD prophylaxis     6. Transfuse & replete electrolytes prn     7. IV hydration, daily weights, strict I&O, prn diuresis     8. PO intake as tolerated, nutrition follow up as needed, MVI, folic acid     9. Antiemetics, anti-diarrhea medications:   Reglan, Ativan    10. OOB as tolerated, physical therapy consult if needed     11. Monitor coags / fibrinogen 2x week, vitamin K as needed     12. Monitor closely for clinical changes, monitor for fevers     13. Emotional support provided, plan of care discussed with patient and family, questions addressed     14. Patient education done regarding chemotherapy prep, plan of care, restrictions and discharge planning     15. Continue regular social work input     I have written the above note for Dr. Justice who performed service with me in the room.   Anika Christiansen  NP-C ([phone])    I have seen and examined patient with NP, I agree with above note as scribed.                    HPC Transplant Team                                                      Critical / Counseling Time Provided: 30 minutes                                                                                                                                                        Chief Complaint: Autologous peripheral blood stem cell transplant with high dose Melphalan prep regimen for treatment of multiple myeloma    S: Patient seen and examined with HPC Transplant Team:   Over all feeling better.  +generalized weakness    O: Vitals:   Vital Signs Last 24 Hrs  T(C): 37.6 (2021 03:00), Max: 37.8 (2021 11:00)  T(F): 99.7 (2021 03:00), Max: 100 (2021 11:00)  HR: 75 (2021 07:00) (70 - 86)  BP: 134/78 (2021 07:00) (134/72 - 156/75)  BP(mean): 101 (2021 07:00) (92 - 108)  RR: 15 (2021 07:00) (12 - 33)  SpO2: 97% (2021 07:00) (95% - 99%)    Admit weight: 98.4kg   Daily   97.5 today  Daily Weight in k.5 (2021 00:34)    Intake / Output:    @ 07:01  -   @ 07:00  --------------------------------------------------------  IN: 3726 mL / OUT: 1490 mL / NET: 2236 mL      PE:   Oropharynx: + erythema and post Kepivance coating no ulcerations   Oral Mucositis:              +                                         ndGndrndanddndend:nd nd2nd CVS: S1, S2 RRR   Lungs: CTA throughout bilaterally   Abdomen: + BS x 4, soft, ND mild tenderness with palpation RLQ.   Extremities: no edema   Gastric Mucositis:       -                                           Grade: n/a  Intestinal Mucositis:     -                                         Grade: n/a   Skin: patchy, erythematous maculopapular rash to face, neck and upper chest and back post Kepivance   TLC: CDI   Neuro: A&O x 3     Labs:   CBC Full  -  ( 2021 00:27 )  WBC Count : <0.10 K/uL  Hemoglobin : 8.0 g/dL  Hematocrit : 24.3 %  Platelet Count - Automated : 20 K/uL  Mean Cell Volume : 97.2 fl  Mean Cell Hemoglobin : 32.0 pg  Mean Cell Hemoglobin Concentration : 32.9 gm/dL  Auto Neutrophil # : x  Auto Lymphocyte # : x  Auto Monocyte # : x  Auto Eosinophil # : x  Auto Basophil # : x  Auto Neutrophil % : x  Auto Lymphocyte % : x  Auto Monocyte % : x  Auto Eosinophil % : x  Auto Basophil % : x                          8.0    <0.10 )-----------( 20       ( 2021 00:27 )             24.3         141  |  111<H>  |  20  ----------------------------<  120<H>  3.7   |  21<L>  |  0.75    Ca    7.5<L>      2021 00:27  Phos  1.8       Mg     1.9         TPro  5.0<L>  /  Alb  2.9<L>  /  TBili  0.2  /  DBili  <0.1  /  AST  13  /  ALT  15  /  AlkPhos  62      PT/INR - ( 2021 14:41 )   PT: 14.5 sec;   INR: 1.22 ratio         PTT - ( 2021 01:20 )  PTT:43.6 sec  LIVER FUNCTIONS - ( 2021 00:27 )  Alb: 2.9 g/dL / Pro: 5.0 g/dL / ALK PHOS: 62 U/L / ALT: 15 U/L / AST: 13 U/L / GGT: x           Lactate Dehydrogenase, Serum: 156 U/L ( @ 00:27)    Cultures:   NOne    Radiology:   CXR- Clear lungs.    Meds:   Antimicrobials:   acyclovir IVPB 490 milliGRAM(s) IV Intermittent every 8 hours  clotrimazole Lozenge 1 Lozenge Oral five times a day  fluconAZOLE IVPB 400 milliGRAM(s) IV Intermittent every 24 hours  meropenem  IVPB 1000 milliGRAM(s) IV Intermittent every 8 hours    Heme / Onc:   heparin  Infusion 1600 Unit(s)/Hr IV Continuous <Continuous>    GI:  pantoprazole  Injectable 40 milliGRAM(s) IV Push daily  sodium bicarbonate Mouth Rinse 10 milliLiter(s) Swish and Spit five times a day    Cardiovascular:   metoprolol tartrate Injectable 2.5 milliGRAM(s) IV Push every 6 hours    Other medications:   BACItracin   Ointment 1 Application(s) Topical two times a day  benzocaine 15 mG/menthol 3.6 mG (Sugar-Free) Lozenge 1 Lozenge Oral four times a day  Biotene Dry Mouth Oral Rinse 5 milliLiter(s) Swish and Spit five times a day  chlorhexidine 2% Cloths 1 Application(s) Topical <User Schedule>  dextrose 5% + lactated ringers. 1000 milliLiter(s) IV Continuous <Continuous>  hydrocortisone 1% Cream 1 Application(s) Topical two times a day  lidocaine/prilocaine Cream 1 Application(s) Topical daily  ondansetron Injectable 8 milliGRAM(s) IV Push every 8 hours    PRN:   metoclopramide Injectable 10 milliGRAM(s) IV Push every 6 hours PRN    A/P:   66 year old male with a history of multiple myeloma s/p RVD x 6   Post Autologous PBSCT day + 7  - melphalan ; s/p melphalan hydration for 24 hours post infusion of last dose   Strict I&O, daily weights, prn diuresis   - rest day   - rest day   - s/p HPC transplant; completed transplant hydration for 24 hours post infusion of cells. Suprapubic pain in am. UA pending, follow up culture, BK virus. AXR   -vasovegal episode in AM: will monitor tele for 24hrs    d/c telemetry. Lasix 40mg IV x today, follow up I&Os, daily weight. monitor rash, receiving 2nd dose Kepivance today   add Emend for 3 days and change zofran ATC. If worsening abd pain consider CT a/p oral contrast only.   - Neutropenic started on cipro once febrile will pancx and switch cipro to  Aztronam 2g Q8   - AMS this am, agonal breathing, diaphoretic, cool and clammy - responsive to sternal rub. Hypotensive, hypoxic. Placed on NRB, RRT called. Labs sent. Lactate send. PCN allergy - started on Aztreonam 2g IV q 8 hoursm Flagyl 500mg IV TID for anaerobic coverage given abdominal pain and distension, Vancomycin 1g IV x 1. CT A/P pending for 12:30 21. CE with new TWI in V2-V6 - likely demand ischemia. Repeat CE and lactate at 2pm.   - CT A/P with Terminal ileitis with associated ileus versus low-grade obstruction. Pockets of free air in the right lower quadrant adjacent to the terminal ileum concerning for bowel perforation. 2.6 cm loculated fluid adjacent to the terminal ileum and surrounding peritoneal enhancement suggestive of developing peritonitis. Surgical team consulted- transferred to 8ICU for closer monitoring.  ID consult called- JESUS daniel.    - CT chest angio + Saddle embolus with right heart strain., seen by cards- started on full dose A/C, s/p IVC filter placement with thrombectomy: transfuse PLT to >50K . doppler + B/L DVT. D/C Zarxio    1. Infectious Disease:   acyclovir IVPB 490 milliGRAM(s) IV Intermittent every 8 hours  clotrimazole Lozenge 1 Lozenge Oral five times a day  fluconAZOLE IVPB 400 milliGRAM(s) IV Intermittent every 24 hours  meropenem  IVPB 1000 milliGRAM(s) IV Intermittent every 8 hours    2. VOD Prophylaxis: Actigall, Glutamine, Heparin (Full A/C)     3. GI Prophylaxis:    pantoprazole    Tablet 40 erick GRAM(s) Oral before breakfast    4. Mouth care - NS / NaHCO3 rinses, Mycelex, Biotene; Skin care     5. GVHD prophylaxis - n/a     6. Transfuse & replete electrolytes prn   transfuse for PLT less than 40 K or below 50 with planned surgical intervention hemoglobin less then 7 of symptomatic.    7. IV hydration, daily weights, strict I&O, prn diuresis     8. PO intake as tolerated, nutrition follow up as needed, MVI, folic acid     9. Antiemetics, anti-diarrhea medications:   LORazepam   Injectable 1 milliGRAM(s) IV Push every 24 hours  ondansetron Injectable 8 milliGRAM(s) IV Push every 8 hours  metoclopramide Injectable 10 milliGRAM(s) IV Push every 6 hours PRN  aprepitant 80 milliGRAM(s) Oral daily    10. OOB as tolerated, physical therapy consult if needed     11. Monitor coags / fibrinogen 2x week, vitamin K as needed     12. Monitor closely for clinical changes, monitor for fevers     13. Emotional support provided, plan of care discussed and questions addressed     14. Patient education done regarding plan of care, restrictions and discharge planning     15. Continue regular social work input     I have written the above note for Dr. Justice who performed service with me in the room.   Anika Christiansen  NP-C ([phone])    I have seen and examined patient with NP, I agree with above note as scribed.

## 2021-11-26 NOTE — PROGRESS NOTE ADULT - ATTENDING COMMENTS
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 He is now admitted for an autoSCT with high dose melphalan prep regimen. Transplant day 11/19/21. Hospital course has been complicated by vasovagal episodes on 11/19/21 and 11/23/21. On 11/23/21 developed neutropenic sepsis, improved with fluid resuscitation. CT on 11/23/21 showed Terminal ileitis with associated ileus versus low-grade obstruction with free air and possible bowel perforation, surgery consulted and transferred to SICU, managed conservatively for now. Patient was also found to have increasing troponins, echo showed heart strain and CTA chest on 11/24/21 showed a saddle PE. Also LE doppler on 11/24/24 showed b/l DVT. Patient is hemodynamically stable, mentating well.     PE:   VSS  Gen: A/O x 3, NAD, tired  RESP: CTA, no wheeze no rhonchi  CV: S1, S2 RRR  Abd:  soft, mildly distended, + hyperactive BS  LE: no edema  Neuro: CNII-XII intact, no focal deficits  Psych: flat affect    MM  -dx 4/2021  -s/p RVD x 6 (5/2021 - 9/2021) -->   -admitted for autoSCT with Ivett 100mg/M2 on 11/19/21  -hold Zarxio for now given acute events / acute abdomen  Today is day + 6    ID  BACTERIAL:  Neutropenic sepsis (11/23/21) started on meropenem (11/23 - ) and vanco x1 (11/23)  VIRAL: c/w Acyclovir 400mg po TID  FUNGAL: c/w Diflucan 400 mg po daily.  PCP prophylaxis: hold for now    HEME  Saddle PE and b/l LE DVT (dx 11/24/21) - start therapeutic anticoagulation with heparin IV, s/p mechanical thrombectomy and IVC filter placement on 11/24  -keep Hb >7  -keep plt >20; keep plt >50 for procedures    GI:  Abd pain / bowel perforation on CT on 11/23/21 - NPO, management as per SICU team   Diarrhea - f/u stool cx  VOD prophylaxis - low dose heparin gtt (dosed at 100 units / kg / day), glutamine supplementation, Actigall BID   GI prophylaxis - Protonix po QD   Mouth care and skin care as per protocol.    The time was used discussed with patient, family, primary team, consultants, and acute management. 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 He is now admitted for an autoSCT with high dose melphalan prep regimen. Transplant day 11/19/21. Hospital course has been complicated by vasovagal episodes on 11/19/21 and 11/23/21. On 11/23/21 developed neutropenic sepsis, improved with fluid resuscitation. CT on 11/23/21 showed Terminal ileitis with associated ileus versus low-grade obstruction with free air and possible bowel perforation, surgery consulted and transferred to SICU, managed conservatively for now. Patient was also found to have increasing troponins, echo showed heart strain and CTA chest on 11/24/21 showed a saddle PE s/p thrombectomy (11/24/21). Also LE doppler on 11/24/24 showed b/l DVT. Patient is hemodynamically stable, mentating well.     PE:   VSS  Gen: A/O x 3, NAD, tired  RESP: CTA, no wheeze no rhonchi  CV: S1, S2 RRR  Abd:  soft, mildly distended, + hyperactive BS  LE: no edema  Neuro: CNII-XII intact, no focal deficits  Psych: appropriate    MM  -dx 4/2021  -s/p RVD x 6 (5/2021 - 9/2021) -->   -admitted for autoSCT with Ivett 100mg/M2 on 11/19/21  -hold Zarxio for now given acute events / acute abdomen  Today is day + 7    ID  BACTERIAL:  Neutropenic sepsis (11/23/21) started on meropenem (11/23 - ) and vanco x1 (11/23)  VIRAL: c/w Acyclovir 400mg po TID  FUNGAL: c/w Diflucan 400 mg po daily.  PCP prophylaxis: hold for now    HEME  Saddle PE s/p and b/l LE DVT (dx 11/24/21) - c/w therapeutic anticoagulation with heparin IV, s/p mechanical thrombectomy and IVC filter placement on 11/24/21  -keep Hb >7  -keep plt >40; keep plt >50 for procedures    GI:  Abd pain / bowel perforation on CT on 11/23/21 - tolerating clear liquids, management as per SICU team   Diarrhea - f/u stool cx  VOD prophylaxis - low dose heparin gtt (dosed at 100 units / kg / day), glutamine supplementation, Actigall BID   GI prophylaxis - Protonix po QD   Mouth care and skin care as per protocol.    The time was used discussed with patient, family, primary team, consultants, and acute management.

## 2021-11-26 NOTE — PROGRESS NOTE ADULT - ASSESSMENT
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 On AM of 11/23 patient developed neutropenic sepsis with RRT for vasovagal episode had CTAP showing pneumoperitoneum and terminal ileitis. Patient transferred to SICU for serial abdominal exams and resuscitation.    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 On AM of 11/23 patient developed neutropenic sepsis with RRT for vasovagal episode had CTAP showing pneumoperitoneum and terminal ileitis. Patient transferred to SICU for serial abdominal exams and resuscitation.    - patient non peritoneal, however unreliable in the setting of immunosuppression  - patient high risk to undergo surgery, will monitor clinically in ICU   - IV antibiotics (meropenem/fluconazole/acyclovir)  - NPO/mIVF  - appreciate SICU care      ACS  [id number]

## 2021-11-26 NOTE — PROGRESS NOTE ADULT - ATTENDING COMMENTS
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 On AM of 11/23 patient developed neutropenic sepsis with RRT for vasovagal episode had CTAP showing pneumoperitoneum and terminal ileitis.    Hemodynamically stable  Continue Heparin drip gtt. received platelets for worsening thrombocytopenia. Neutropenia expected to improve in 3-4 days  Abd soft, to start clears  Decrease IVF  Abx  Meropenem   Ppx abx acyclovir Bactrim and Fluconazole  Repeat duplex of LE to assess the need for IVC filter

## 2021-11-26 NOTE — PROGRESS NOTE ADULT - ASSESSMENT
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 On AM of 11/23 patient developed neutropenic sepsis with RRT for vasovagal episode had CTAP showing pneumoperitoneum and terminal ileitis. Admitted to SICU for HD monitoring and serial abdominal exams. Patient noted to have elevated troponin with new ekg changes in this setting, no significant cardiac history or family history of cardiac disease. Does follow with cardiology for history of syncope (thought to be non cardiac in etiology), stress test ~1 year ago was normal per patient. No chest Pain. Patient found to have PE with lactate elevation (now resolved), troponin elevation, tachycardia. Of note he has been hemodynamically stable, on 2L NC saturating 99, HR in low 100s. Hemoglobin and platelets have been steadily declining. Patient also with neutropenia and pneumoperitoneum as well, on broad spectrum abx.       #Submassive Saddle PE: PESI Class IV-V   -Case discussed with PERT team (currently reviewing images/case)   -Active T&S, have platelets on hold   -Trend cardiac enzymes, lactate, BNP  -Patient placed on heparin (would discuss with heme)  -UE/LE duplex (may be candidate for IVC filter pending findings)   -Otherwise agree with broad spectrum abx  -If any change in clinical status please call     Post-round addendum:  Plan:  1. Continue heparin gtt for now, will plan for mechanical thrombectomy to reduce clot burden, possible IVC filter, consent obtained from patient  2. Have active T&S, platelets on hold for possible procedure  3. Repeat lactate, proBNP, hsTroponin  4. Please discuss with Heme/Onc regarding platelet goals with use of therapeutic anticoagulation, with expected pancytopenia in setting of MM/SCT  5. Close telemetry monitoring for changes in vital signs, patient at high risk of decompensation  6. Continue excellent SICU care

## 2021-11-26 NOTE — PROGRESS NOTE ADULT - ASSESSMENT
66 year old male with multiple myeloma admitted for an autologous pbsct with high dose melphalan prep regimen.     s/p HPC Transplant on 11/19    Noted to be hypotensive and encephalopathic on AM of 11/23.     CT A/P (11/23) with findings concerning for ileitis and Pockets of free air in the right lower quadrant adjacent to the terminal ileum concerning for bowel perforation and 2.6 cm loculated fluid adjacent to the terminal ileum and surrounding peritoneal enhancement suggestive of developing peritonitis.    Diagnosed with Saddle PE on 11/24 and required thrombectomy    Was not taken for operative management given high risk in context of pancytopenia and saddle PE    Prognosis is guarded    Antibiotics:  Meropenem: 11/23 -->  Fluconazole: 11/15 -->   PO Bactrim: 11/15 --> 11/17  Ciprofloxacin: 11/22 --> 11/23  Vancomycin: 11/23  Metronidazole: 11/23  Aztreonam: 11/23    #Bowel Perforation, Peritonitis, Abdominal Fluid Collection, Hypotension, Pancytopenia, Penicillin Allergy  --Continue Meropenem 1g IV Q8H   --Continue Fluconazole 400 mg PO/IV Q24H (treatment given small bowel perforation and for prophylaxis for SCT)  --Continue to follow CBC with diff  --Continue to follow transaminases  --Continue to follow temperature curve  --Follow up on preliminary blood cultures  --Surgery recommendations noted/appreciated    #s/p Stem Cell Transplant  --Continue Acyclovir prophylaxis; transitioned to IV - would maintain IVF hydration to decrease risk of crystal induced kidney injury  --Will require PCP prophylaxis - can monitor off for now.     I will continue to follow. Please feel free to contact me with any further questions.    Kris Rothman M.D.  [facility] Division of Infectious Disease  8AM-5PM: Pager Number [phone]  After Hours (or if no response): Please contact the Infectious Diseases Office at [phone]     The above assessment and plan were discussed with Dr Judd Feldman (8ICU)

## 2021-11-26 NOTE — PROGRESS NOTE ADULT - SUBJECTIVE AND OBJECTIVE BOX
Follow Up:  perforation    Interval History: afebrile. denies abdominal pain.     REVIEW OF SYSTEMS  [  ] ROS unobtainable because:    [ x ] All other systems negative except as noted below    Constitutional:  [ ] fever [ ] chills  [ ] weight loss  [ ] weakness  Skin:  [ ] rash [ ] phlebitis	  Eyes: [ ] icterus [ ] pain  [ ] discharge	  ENMT: [ ] sore throat  [ ] thrush [ ] ulcers [ ] exudates  Respiratory: [ ] dyspnea [ ] hemoptysis [ ] cough [ ] sputum	  Cardiovascular:  [ ] chest pain [ ] palpitations [ ] edema	  Gastrointestinal:  [ ] nausea [ ] vomiting [ ] diarrhea [ ] constipation [ ] pain	  Genitourinary:  [ ] dysuria [ ] frequency [ ] hematuria [ ] discharge [ ] flank pain  [ ] incontinence  Musculoskeletal:  [ ] myalgias [ ] arthralgias [ ] arthritis  [ ] back pain  Neurological:  [ ] headache [ ] seizures  [ ] confusion/altered mental status    Allergies  Omnicef (Unknown)  penicillin (Hives)        ANTIMICROBIALS:  acyclovir IVPB 490 every 8 hours  clotrimazole Lozenge 1 five times a day  fluconAZOLE IVPB 400 every 24 hours  meropenem  IVPB 1000 every 8 hours      OTHER MEDS:  MEDICATIONS  (STANDING):  heparin  Infusion 1600 <Continuous>  metoclopramide Injectable 10 every 6 hours PRN  metoprolol tartrate Injectable 2.5 every 6 hours  ondansetron Injectable 8 every 8 hours  pantoprazole  Injectable 40 daily      Vital Signs Last 24 Hrs  T(C): 37 (26 Nov 2021 15:00), Max: 37.8 (25 Nov 2021 23:00)  T(F): 98.6 (26 Nov 2021 15:00), Max: 100 (25 Nov 2021 23:00)  HR: 77 (26 Nov 2021 20:00) (70 - 84)  BP: 151/75 (26 Nov 2021 20:00) (133/70 - 157/76)  BP(mean): 106 (26 Nov 2021 20:00) (96 - 118)  RR: 17 (26 Nov 2021 20:00) (12 - 20)  SpO2: 97% (26 Nov 2021 20:00) (95% - 98%)    PHYSICAL EXAMINATION:  General: Alert and Awake, NAD  Cardiac: RRR, No M/R/G  Resp: CTAB, No Wh/Rh/Ra  Abdomen: NBS, mild diffuse abdominal tenderness. No HSM, No rigidity or guarding  MSK: No LE edema. No Calf tenderness  Skin: No rashes or lesions. Skin is warm and dry to the touch.   Neuro: Alert and Awake. CN 2-12 Grossly intact. Moves all four extremities spontaneously.  Psych: Calm, Pleasant, Cooperative                            7.9    <0.10 )-----------( 36       ( 26 Nov 2021 14:02 )             24.0       11-26    141  |  111<H>  |  20  ----------------------------<  120<H>  3.7   |  21<L>  |  0.75    Ca    7.5<L>      26 Nov 2021 00:27  Phos  1.8     11-26  Mg     1.9     11-26    TPro  5.0<L>  /  Alb  2.9<L>  /  TBili  0.2  /  DBili  <0.1  /  AST  13  /  ALT  15  /  AlkPhos  62  11-26          MICROBIOLOGY:  v  .Blood Blood  11-23-21   No growth to date.  --  --      STER [id number]  11-19-21   No growth  --  --      Clean Catch Clean Catch (Midstream)  11-19-21   No growth  --  --      STER [id number]  11-03-21   No growth at 14 days.  --  --      STER [id number]  11-02-21   No growth at 14 days.  --    No organisms seen      STER [id number]  11-01-21   No growth at 14 days.  --  --    RADIOLOGY:    <The imaging below has been reviewed and visualized by me independently. Findings as detailed in report below>    < from: [facility] Duplex Lower Ext Vein Scan, Bilat (11.26.21 @ 13:33) >    IMPRESSION:  There is persistent bilateral DVT in the below the knee right posterior tibial, peroneal and soleal veins and above the knee in the left popliteal vein and tibial peroneal trunk.  No new DVT is seen.    < end of copied text >

## 2021-11-26 NOTE — PROGRESS NOTE ADULT - SUBJECTIVE AND OBJECTIVE BOX
)SURGERY PROGRESS NOTE      SUBJECTIVE  Pt seen and examined at bedside. No complaints.  No acute events overnight.       24 HOUR EVENTS:        OBJECTIVE:    PHYSICAL EXAM   General Appearance: NAD, alert   Resp: Patent airway, non-labored breathing  CV: RRR  Abdomen: Soft, Nontender, Nondistended       Vital Signs Last 24 Hrs  T(C): 37.8 (25 Nov 2021 23:00), Max: 37.8 (25 Nov 2021 11:00)  T(F): 100 (25 Nov 2021 23:00), Max: 100 (25 Nov 2021 11:00)  HR: 79 (26 Nov 2021 00:00) (75 - 86)  BP: 143/74 (26 Nov 2021 00:00) (128/62 - 156/75)  BP(mean): 102 (26 Nov 2021 00:00) (89 - 108)  RR: 14 (26 Nov 2021 00:00) (12 - 33)  SpO2: 97% (26 Nov 2021 00:00) (95% - 100%)      24 Nov 2021 07:01  -  25 Nov 2021 07:00  --------------------------------------------------------  IN:    dextrose 5% + lactated ringers: 1350 mL    Heparin: 244 mL    IV PiggyBack: 1000 mL    IV PiggyBack: 225 mL    Platelets - Single Donor: 450 mL    sodium chloride 0.9%: 150 mL  Total IN: 3419 mL    OUT:    Indwelling Catheter - Urethral (mL): 1700 mL  Total OUT: 1700 mL    Total NET: 1719 mL      25 Nov 2021 07:01  -  26 Nov 2021 01:03  --------------------------------------------------------  IN:    dextrose 5% + lactated ringers: 1200 mL    Heparin: 255 mL    IV PiggyBack: 385 mL    IV PiggyBack: 350 mL  Total IN: 2190 mL    OUT:    Indwelling Catheter - Urethral (mL): 925 mL  Total OUT: 925 mL    Total NET: 1265 mL        LABS:  cret                        8.0    <0.10 )-----------( 20       ( 26 Nov 2021 00:27 )             24.3     11-25    143  |  111<H>  |  21  ----------------------------<  127<H>  3.7   |  22  |  0.76    Ca    7.2<L>      25 Nov 2021 02:17  Phos  2.0     11-25  Mg     2.1     11-25    TPro  4.9<L>  /  Alb  2.8<L>  /  TBili  0.2  /  DBili  0.1  /  AST  13  /  ALT  13  /  AlkPhos  63  11-25    PT/INR - ( 25 Nov 2021 14:41 )   PT: 14.5 sec;   INR: 1.22 ratio         PTT - ( 26 Nov 2021 00:27 )  PTT:41.3 sec           SURGERY PROGRESS NOTE    Pt seen and examined at bedside. No complaints.  No acute events overnight.     PHYSICAL EXAM   General Appearance: NAD, alert   Resp: Patent airway, non-labored breathing  CV: RRR  Abdomen: Soft, Nontender, Nondistended     Vital Signs Last 24 Hrs  T(C): 37.8 (25 Nov 2021 23:00), Max: 37.8 (25 Nov 2021 11:00)  T(F): 100 (25 Nov 2021 23:00), Max: 100 (25 Nov 2021 11:00)  HR: 79 (26 Nov 2021 00:00) (75 - 86)  BP: 143/74 (26 Nov 2021 00:00) (128/62 - 156/75)  BP(mean): 102 (26 Nov 2021 00:00) (89 - 108)  RR: 14 (26 Nov 2021 00:00) (12 - 33)  SpO2: 97% (26 Nov 2021 00:00) (95% - 100%)    24 Nov 2021 07:01  -  25 Nov 2021 07:00  --------------------------------------------------------  IN:    dextrose 5% + lactated ringers: 1350 mL    Heparin: 244 mL    IV PiggyBack: 1000 mL    IV PiggyBack: 225 mL    Platelets - Single Donor: 450 mL    sodium chloride 0.9%: 150 mL  Total IN: 3419 mL  OUT:    Indwelling Catheter - Urethral (mL): 1700 mL  Total OUT: 1700 mL  Total NET: 1719 mL    25 Nov 2021 07:01  -  26 Nov 2021 01:03  --------------------------------------------------------  IN:    dextrose 5% + lactated ringers: 1200 mL    Heparin: 255 mL    IV PiggyBack: 385 mL    IV PiggyBack: 350 mL  Total IN: 2190 mL  OUT:    Indwelling Catheter - Urethral (mL): 925 mL  Total OUT: 925 mL  Total NET: 1265 mL    LABS:                    8.0    <0.10 )-----------( 20       ( 26 Nov 2021 00:27 )             24.3   143  |  111<H>  |  21  ----------------------------<  127<H>  3.7   |  22  |  0.76  Ca    7.2<L>      25 Nov 2021 02:17  Phos  2.0     11-25  Mg     2.1     11-25  TPro  4.9<L>  /  Alb  2.8<L>  /  TBili  0.2  /  DBili  0.1  /  AST  13  /  ALT  13  /  AlkPhos  63  11-25  PT/INR - ( 25 Nov 2021 14:41 )   PT: 14.5 sec;   INR: 1.22 ratio    PTT - ( 26 Nov 2021 00:27 )  PTT:41.3 sec

## 2021-11-26 NOTE — PROGRESS NOTE ADULT - SUBJECTIVE AND OBJECTIVE BOX
HISTORY:  This is a 66 year old male with multiple myeloma admitted for an autologous pbsct with high dose melphalan prep regimen. Hematologic history as follows: 11/2020 was found to have a T-12 compression fracture. He saw orthopedics 1/2021, and was referred to endocrine. 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 Serum immunofixation showed 1 IgD lambda band and free lambda light chains. Urine immunofixation negative for monoclonal band. 4/29/21 a bone marrow biopsy and aspirate was consistent with plasma cell myeloma (> 90% involvement), flow cytometry positive for monotypic plasma cells. He started cycle 1 RVD on 5/13/21. He completed 6 cycles of RVD 9/30/21. During chemotherapy he reported occasional blurry vision (negative optho workup, negative MRI brain 9/29/21) and moderate fatigue. He has no complaints on admission other than facial erythema, likely related to kepivance.  (15 Nov 2021 13:09)      24 HOUR EVENTS:  - Mechanical thrombectomy performed     NEURO  Meds: metoclopramide Injectable 10 milliGRAM(s) IV Push every 6 hours PRN Nausea and/or Vomiting  ondansetron Injectable 8 milliGRAM(s) IV Push every 8 hours      RESPIRATORY  RR: 14 (11-25-21 @ 23:00) (12 - 33)  SpO2: 95% (11-25-21 @ 23:00) (95% - 100%)  Wt(kg): --  Exam: Unlabored breathing, RR 14        CARDIOVASCULAR  HR: 79 (11-25-21 @ 23:00) (75 - 86)  BP: 139/72 (11-25-21 @ 23:00) (128/62 - 156/75)  BP(mean): 98 (11-25-21 @ 23:00) (89 - 108)  ABP: --  ABP(mean): --  Wt(kg): --  CVP(cm H2O): --  VBG - ( 25 Nov 2021 02:19 )  pH: 7.40  /  pCO2: 39    /  pO2: 49    / HCO3: 24    / Base Excess: -0.5  /  SaO2: 81.3   Lactate: 0.7            Cardiac Rhythm: RRR  Perfusion     [x ]Adequate   [ ]Inadequate  Mentation   [ x]Normal       [ ]Reduced  Extremities  [ x]Warm         [ ]Cool  Volume Status [ ]Hypervolemic [ x]Euvolemic [ ]Hypovolemic  Meds: metoprolol tartrate Injectable 2.5 milliGRAM(s) IV Push every 6 hours      GI/NUTRITION  Meds: pantoprazole  Injectable 40 milliGRAM(s) IV Push daily  sodium bicarbonate Mouth Rinse 10 milliLiter(s) Swish and Spit five times a day  Exam: nontender / non distended       GENITOURINARY  I&O's Detail    11-24 @ 07:01 - 11-25 @ 07:00  --------------------------------------------------------  IN:    dextrose 5% + lactated ringers: 1350 mL    Heparin: 244 mL    IV PiggyBack: 225 mL    IV PiggyBack: 1000 mL    Platelets - Single Donor: 450 mL    sodium chloride 0.9%: 150 mL  Total IN: 3419 mL    OUT:    Indwelling Catheter - Urethral (mL): 1700 mL  Total OUT: 1700 mL    Total NET: 1719 mL      11-25 @ 07:01 - 11-26 @ 00:13  --------------------------------------------------------  IN:    dextrose 5% + lactated ringers: 1125 mL    Heparin: 240 mL    IV PiggyBack: 250 mL    IV PiggyBack: 385 mL  Total IN: 2000 mL    OUT:    Indwelling Catheter - Urethral (mL): 865 mL  Total OUT: 865 mL    Total NET: 1135 mL          11-25    143  |  111<H>  |  21  ----------------------------<  127<H>  3.7   |  22  |  0.76    Ca    7.2<L>      25 Nov 2021 02:17  Phos  2.0     11-25  Mg     2.1     11-25    TPro  4.9<L>  /  Alb  2.8<L>  /  TBili  0.2  /  DBili  0.1  /  AST  13  /  ALT  13  /  AlkPhos  63  11-25    Meds: dextrose 5% + lactated ringers. 1000 milliLiter(s) IV Continuous <Continuous>      HEMATOLOGIC  Meds: heparin  Infusion 800 Unit(s)/Hr IV Continuous <Continuous>                          8.0    <0.10 )-----------( 34       ( 25 Nov 2021 02:17 )             25.0     PT/INR - ( 25 Nov 2021 14:41 )   PT: 14.5 sec;   INR: 1.22 ratio         PTT - ( 25 Nov 2021 14:41 )  PTT:61.4 sec    INFECTIOUS DISEASES  T(C): 37.8 (11-25-21 @ 19:00), Max: 37.8 (11-25-21 @ 11:00)  Wt(kg): --  WBC Count: <0.10 K/uL (11-25 @ 02:17)    Recent Cultures:  Specimen Source: .Blood Blood, 11-23 @ 12:32; Results   No growth to date.; Gram Stain: --; Organism: --  Specimen Source: STER [id number], 11-19 @ 16:21; Results   No growth; Gram Stain: --; Organism: --  Specimen Source: Clean Catch Clean Catch (Midstream), 11-19 @ 12:26; Results   No growth; Gram Stain: --; Organism: --    Meds: acyclovir IVPB 490 milliGRAM(s) IV Intermittent every 8 hours  clotrimazole Lozenge 1 Lozenge Oral five times a day  fluconAZOLE IVPB 400 milliGRAM(s) IV Intermittent every 24 hours  meropenem  IVPB 1000 milliGRAM(s) IV Intermittent every 8 hours      ENDOCRINE  Capillary Blood Glucose    Meds:     ACCESS DEVICES:  [x ] Peripheral IV  [ ] Central Venous Line		[ ] R	[ ] L	[ ] IJ	[ ] Fem	[ ] SC	Placed:   [ ] Arterial Line			[ ] R	[ ] L	[ ] Fem	[ ] Rad	[ ] Ax	Placed:   [ ] PICC:					[ ] Mediport  [ ] Urinary Catheter, Date Placed:   [ ] Necessity of urinary, arterial, and venous catheters discussed    OTHER MEDICATIONS:  BACItracin   Ointment 1 Application(s) Topical two times a day  benzocaine 15 mG/menthol 3.6 mG (Sugar-Free) Lozenge 1 Lozenge Oral four times a day  Biotene Dry Mouth Oral Rinse 5 milliLiter(s) Swish and Spit five times a day  chlorhexidine 2% Cloths 1 Application(s) Topical <User Schedule>  hydrocortisone 1% Cream 1 Application(s) Topical two times a day  lidocaine/prilocaine Cream 1 Application(s) Topical daily      IMAGING: HISTORY:  65 YO M with a 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 On AM of 11/23 patient developed neutropenic sepsis with RRT for vasovagal episode had CTAP showing pneumoperitoneum and terminal ileitis. Admitted to SICU for HD monitoring and serial abdominal exams.    24 HOUR EVENTS:  - Throat pain, lesions on palate  - s/p 1u platelet overnight for plt 20    NEURO  Meds: metoclopramide Injectable 10 milliGRAM(s) IV Push every 6 hours PRN Nausea and/or Vomiting  ondansetron Injectable 8 milliGRAM(s) IV Push every 8 hours      RESPIRATORY  RR: 14 (11-25-21 @ 23:00) (12 - 33)  SpO2: 95% (11-25-21 @ 23:00) (95% - 100%)  Wt(kg): --  Exam: Unlabored breathing, RR 14        CARDIOVASCULAR  HR: 79 (11-25-21 @ 23:00) (75 - 86)  BP: 139/72 (11-25-21 @ 23:00) (128/62 - 156/75)  BP(mean): 98 (11-25-21 @ 23:00) (89 - 108)  ABP: --  ABP(mean): --  Wt(kg): --  CVP(cm H2O): --  VBG - ( 25 Nov 2021 02:19 )  pH: 7.40  /  pCO2: 39    /  pO2: 49    / HCO3: 24    / Base Excess: -0.5  /  SaO2: 81.3   Lactate: 0.7            Cardiac Rhythm: RRR  Perfusion     [x ]Adequate   [ ]Inadequate  Mentation   [ x]Normal       [ ]Reduced  Extremities  [ x]Warm         [ ]Cool  Volume Status [ ]Hypervolemic [ x]Euvolemic [ ]Hypovolemic  Meds: metoprolol tartrate Injectable 2.5 milliGRAM(s) IV Push every 6 hours      GI/NUTRITION  Meds: pantoprazole  Injectable 40 milliGRAM(s) IV Push daily  sodium bicarbonate Mouth Rinse 10 milliLiter(s) Swish and Spit five times a day  Exam: nontender / non distended       GENITOURINARY  I&O's Detail    11-24 @ 07:01  -  11-25 @ 07:00  --------------------------------------------------------  IN:    dextrose 5% + lactated ringers: 1350 mL    Heparin: 244 mL    IV PiggyBack: 225 mL    IV PiggyBack: 1000 mL    Platelets - Single Donor: 450 mL    sodium chloride 0.9%: 150 mL  Total IN: 3419 mL    OUT:    Indwelling Catheter - Urethral (mL): 1700 mL  Total OUT: 1700 mL    Total NET: 1719 mL      11-25 @ 07:01  -  11-26 @ 00:13  --------------------------------------------------------  IN:    dextrose 5% + lactated ringers: 1125 mL    Heparin: 240 mL    IV PiggyBack: 250 mL    IV PiggyBack: 385 mL  Total IN: 2000 mL    OUT:    Indwelling Catheter - Urethral (mL): 865 mL  Total OUT: 865 mL    Total NET: 1135 mL          11-25    143  |  111<H>  |  21  ----------------------------<  127<H>  3.7   |  22  |  0.76    Ca    7.2<L>      25 Nov 2021 02:17  Phos  2.0     11-25  Mg     2.1     11-25    TPro  4.9<L>  /  Alb  2.8<L>  /  TBili  0.2  /  DBili  0.1  /  AST  13  /  ALT  13  /  AlkPhos  63  11-25    Meds: dextrose 5% + lactated ringers. 1000 milliLiter(s) IV Continuous <Continuous>      HEMATOLOGIC  Meds: heparin  Infusion 800 Unit(s)/Hr IV Continuous <Continuous>                          8.0    <0.10 )-----------( 34       ( 25 Nov 2021 02:17 )             25.0     PT/INR - ( 25 Nov 2021 14:41 )   PT: 14.5 sec;   INR: 1.22 ratio         PTT - ( 25 Nov 2021 14:41 )  PTT:61.4 sec    INFECTIOUS DISEASES  T(C): 37.8 (11-25-21 @ 19:00), Max: 37.8 (11-25-21 @ 11:00)  Wt(kg): --  WBC Count: <0.10 K/uL (11-25 @ 02:17)    Recent Cultures:  Specimen Source: .Blood Blood, 11-23 @ 12:32; Results   No growth to date.; Gram Stain: --; Organism: --  Specimen Source: STER [id number], 11-19 @ 16:21; Results   No growth; Gram Stain: --; Organism: --  Specimen Source: Clean Catch Clean Catch (Midstream), 11-19 @ 12:26; Results   No growth; Gram Stain: --; Organism: --    Meds: acyclovir IVPB 490 milliGRAM(s) IV Intermittent every 8 hours  clotrimazole Lozenge 1 Lozenge Oral five times a day  fluconAZOLE IVPB 400 milliGRAM(s) IV Intermittent every 24 hours  meropenem  IVPB 1000 milliGRAM(s) IV Intermittent every 8 hours      ENDOCRINE  Capillary Blood Glucose    Meds:     ACCESS DEVICES:  [x ] Peripheral IV  [ ] Central Venous Line		[ ] R	[ ] L	[ ] IJ	[ ] Fem	[ ] SC	Placed:   [ ] Arterial Line			[ ] R	[ ] L	[ ] Fem	[ ] Rad	[ ] Ax	Placed:   [ ] PICC:					[ ] Mediport  [ ] Urinary Catheter, Date Placed:   [ ] Necessity of urinary, arterial, and venous catheters discussed    OTHER MEDICATIONS:  BACItracin   Ointment 1 Application(s) Topical two times a day  benzocaine 15 mG/menthol 3.6 mG (Sugar-Free) Lozenge 1 Lozenge Oral four times a day  Biotene Dry Mouth Oral Rinse 5 milliLiter(s) Swish and Spit five times a day  chlorhexidine 2% Cloths 1 Application(s) Topical <User Schedule>  hydrocortisone 1% Cream 1 Application(s) Topical two times a day  lidocaine/prilocaine Cream 1 Application(s) Topical daily      IMAGING:

## 2021-11-27 LAB
ALBUMIN SERPL ELPH-MCNC: 2.9 G/DL — LOW (ref 3.3–5)
ALP SERPL-CCNC: 70 U/L — SIGNIFICANT CHANGE UP (ref 40–120)
ALT FLD-CCNC: 12 U/L — SIGNIFICANT CHANGE UP (ref 10–45)
ANION GAP SERPL CALC-SCNC: 12 MMOL/L — SIGNIFICANT CHANGE UP (ref 5–17)
ANION GAP SERPL CALC-SCNC: 13 MMOL/L — SIGNIFICANT CHANGE UP (ref 5–17)
APTT BLD: 65.2 SEC — HIGH (ref 27.5–35.5)
APTT BLD: 79.2 SEC — HIGH (ref 27.5–35.5)
AST SERPL-CCNC: 12 U/L — SIGNIFICANT CHANGE UP (ref 10–40)
BASE EXCESS BLDV CALC-SCNC: -2.1 MMOL/L — LOW (ref -2–2)
BILIRUB DIRECT SERPL-MCNC: <0.1 MG/DL — SIGNIFICANT CHANGE UP (ref 0–0.3)
BILIRUB INDIRECT FLD-MCNC: >0.1 MG/DL — LOW (ref 0.2–1)
BILIRUB SERPL-MCNC: 0.2 MG/DL — SIGNIFICANT CHANGE UP (ref 0.2–1.2)
BUN SERPL-MCNC: 14 MG/DL — SIGNIFICANT CHANGE UP (ref 7–23)
BUN SERPL-MCNC: 16 MG/DL — SIGNIFICANT CHANGE UP (ref 7–23)
C DIFF GDH STL QL: NEGATIVE — SIGNIFICANT CHANGE UP
C DIFF GDH STL QL: SIGNIFICANT CHANGE UP
CA-I SERPL-SCNC: 1.1 MMOL/L — LOW (ref 1.15–1.33)
CALCIUM SERPL-MCNC: 6.7 MG/DL — LOW (ref 8.4–10.5)
CALCIUM SERPL-MCNC: 7.1 MG/DL — LOW (ref 8.4–10.5)
CHLORIDE BLDV-SCNC: 110 MMOL/L — HIGH (ref 96–108)
CHLORIDE SERPL-SCNC: 105 MMOL/L — SIGNIFICANT CHANGE UP (ref 96–108)
CHLORIDE SERPL-SCNC: 108 MMOL/L — SIGNIFICANT CHANGE UP (ref 96–108)
CO2 BLDV-SCNC: 23 MMOL/L — SIGNIFICANT CHANGE UP (ref 22–26)
CO2 SERPL-SCNC: 19 MMOL/L — LOW (ref 22–31)
CO2 SERPL-SCNC: 20 MMOL/L — LOW (ref 22–31)
CREAT SERPL-MCNC: 0.64 MG/DL — SIGNIFICANT CHANGE UP (ref 0.5–1.3)
CREAT SERPL-MCNC: 0.69 MG/DL — SIGNIFICANT CHANGE UP (ref 0.5–1.3)
GAS PNL BLDV: 139 MMOL/L — SIGNIFICANT CHANGE UP (ref 136–145)
GAS PNL BLDV: SIGNIFICANT CHANGE UP
GAS PNL BLDV: SIGNIFICANT CHANGE UP
GLUCOSE BLDC GLUCOMTR-MCNC: 126 MG/DL — HIGH (ref 70–99)
GLUCOSE BLDV-MCNC: 113 MG/DL — HIGH (ref 70–99)
GLUCOSE SERPL-MCNC: 126 MG/DL — HIGH (ref 70–99)
GLUCOSE SERPL-MCNC: 250 MG/DL — HIGH (ref 70–99)
HCO3 BLDV-SCNC: 22 MMOL/L — SIGNIFICANT CHANGE UP (ref 22–29)
HCT VFR BLD CALC: 23 % — LOW (ref 39–50)
HCT VFR BLD CALC: 24.5 % — LOW (ref 39–50)
HCT VFR BLD CALC: 25.1 % — LOW (ref 39–50)
HCT VFR BLDA CALC: 25 % — LOW (ref 39–51)
HGB BLD CALC-MCNC: 8.2 G/DL — LOW (ref 12.6–17.4)
HGB BLD-MCNC: 7.5 G/DL — LOW (ref 13–17)
HGB BLD-MCNC: 8.1 G/DL — LOW (ref 13–17)
HGB BLD-MCNC: 8.1 G/DL — LOW (ref 13–17)
HOROWITZ INDEX BLDV+IHG-RTO: 21 — SIGNIFICANT CHANGE UP
LACTATE BLDV-MCNC: 0.9 MMOL/L — SIGNIFICANT CHANGE UP (ref 0.7–2)
LDH SERPL L TO P-CCNC: 160 U/L — SIGNIFICANT CHANGE UP (ref 50–242)
MAGNESIUM SERPL-MCNC: 1.9 MG/DL — SIGNIFICANT CHANGE UP (ref 1.6–2.6)
MCHC RBC-ENTMCNC: 30.9 PG — SIGNIFICANT CHANGE UP (ref 27–34)
MCHC RBC-ENTMCNC: 31.4 PG — SIGNIFICANT CHANGE UP (ref 27–34)
MCHC RBC-ENTMCNC: 31.6 PG — SIGNIFICANT CHANGE UP (ref 27–34)
MCHC RBC-ENTMCNC: 32.3 GM/DL — SIGNIFICANT CHANGE UP (ref 32–36)
MCHC RBC-ENTMCNC: 32.6 GM/DL — SIGNIFICANT CHANGE UP (ref 32–36)
MCHC RBC-ENTMCNC: 33.1 GM/DL — SIGNIFICANT CHANGE UP (ref 32–36)
MCV RBC AUTO: 95.7 FL — SIGNIFICANT CHANGE UP (ref 80–100)
MCV RBC AUTO: 95.8 FL — SIGNIFICANT CHANGE UP (ref 80–100)
MCV RBC AUTO: 96.2 FL — SIGNIFICANT CHANGE UP (ref 80–100)
NRBC # BLD: 0 /100 WBCS — SIGNIFICANT CHANGE UP (ref 0–0)
NT-PROBNP SERPL-SCNC: 1386 PG/ML — HIGH (ref 0–300)
PCO2 BLDV: 35 MMHG — LOW (ref 42–55)
PH BLDV: 7.41 — SIGNIFICANT CHANGE UP (ref 7.32–7.43)
PHOSPHATE SERPL-MCNC: 1.9 MG/DL — LOW (ref 2.5–4.5)
PLATELET # BLD AUTO: 25 K/UL — LOW (ref 150–400)
PLATELET # BLD AUTO: 39 K/UL — LOW (ref 150–400)
PLATELET # BLD AUTO: 40 K/UL — LOW (ref 150–400)
PO2 BLDV: 46 MMHG — HIGH (ref 25–45)
POTASSIUM BLDV-SCNC: 3.3 MMOL/L — LOW (ref 3.5–5.1)
POTASSIUM SERPL-MCNC: 3.1 MMOL/L — LOW (ref 3.5–5.3)
POTASSIUM SERPL-MCNC: 3.4 MMOL/L — LOW (ref 3.5–5.3)
POTASSIUM SERPL-SCNC: 3.1 MMOL/L — LOW (ref 3.5–5.3)
POTASSIUM SERPL-SCNC: 3.4 MMOL/L — LOW (ref 3.5–5.3)
PROT SERPL-MCNC: 5.1 G/DL — LOW (ref 6–8.3)
RBC # BLD: 2.39 M/UL — LOW (ref 4.2–5.8)
RBC # BLD: 2.56 M/UL — LOW (ref 4.2–5.8)
RBC # BLD: 2.62 M/UL — LOW (ref 4.2–5.8)
RBC # FLD: 14.2 % — SIGNIFICANT CHANGE UP (ref 10.3–14.5)
RBC # FLD: 14.3 % — SIGNIFICANT CHANGE UP (ref 10.3–14.5)
RBC # FLD: 14.3 % — SIGNIFICANT CHANGE UP (ref 10.3–14.5)
SAO2 % BLDV: 78.8 % — SIGNIFICANT CHANGE UP (ref 67–88)
SODIUM SERPL-SCNC: 136 MMOL/L — SIGNIFICANT CHANGE UP (ref 135–145)
SODIUM SERPL-SCNC: 141 MMOL/L — SIGNIFICANT CHANGE UP (ref 135–145)
WBC # BLD: 0.1 K/UL — CRITICAL LOW (ref 3.8–10.5)
WBC # BLD: 0.14 K/UL — CRITICAL LOW (ref 3.8–10.5)
WBC # BLD: 0.18 K/UL — CRITICAL LOW (ref 3.8–10.5)
WBC # FLD AUTO: 0.1 K/UL — CRITICAL LOW (ref 3.8–10.5)
WBC # FLD AUTO: 0.14 K/UL — CRITICAL LOW (ref 3.8–10.5)
WBC # FLD AUTO: 0.18 K/UL — CRITICAL LOW (ref 3.8–10.5)

## 2021-11-27 PROCEDURE — 99233 SBSQ HOSP IP/OBS HIGH 50: CPT | Mod: GC

## 2021-11-27 PROCEDURE — 99232 SBSQ HOSP IP/OBS MODERATE 35: CPT

## 2021-11-27 RX ORDER — MAGNESIUM SULFATE 500 MG/ML
1 VIAL (ML) INJECTION ONCE
Refills: 0 | Status: COMPLETED | OUTPATIENT
Start: 2021-11-27 | End: 2021-11-27

## 2021-11-27 RX ORDER — POTASSIUM CHLORIDE 20 MEQ
20 PACKET (EA) ORAL
Refills: 0 | Status: COMPLETED | OUTPATIENT
Start: 2021-11-27 | End: 2021-11-27

## 2021-11-27 RX ORDER — POTASSIUM CHLORIDE 20 MEQ
20 PACKET (EA) ORAL
Refills: 0 | Status: DISCONTINUED | OUTPATIENT
Start: 2021-11-27 | End: 2021-11-27

## 2021-11-27 RX ADMIN — Medication 50 MILLIEQUIVALENT(S): at 16:42

## 2021-11-27 RX ADMIN — Medication 50 MILLIEQUIVALENT(S): at 06:00

## 2021-11-27 RX ADMIN — Medication 5 MILLILITER(S): at 17:25

## 2021-11-27 RX ADMIN — BENZOCAINE AND MENTHOL 1 LOZENGE: 5; 1 LIQUID ORAL at 17:25

## 2021-11-27 RX ADMIN — Medication 1 LOZENGE: at 17:26

## 2021-11-27 RX ADMIN — Medication 2.5 MILLIGRAM(S): at 05:30

## 2021-11-27 RX ADMIN — CHLORHEXIDINE GLUCONATE 1 APPLICATION(S): 213 SOLUTION TOPICAL at 05:31

## 2021-11-27 RX ADMIN — Medication 50 MILLIEQUIVALENT(S): at 04:30

## 2021-11-27 RX ADMIN — Medication 1 LOZENGE: at 05:35

## 2021-11-27 RX ADMIN — FLUCONAZOLE 100 MILLIGRAM(S): 150 TABLET ORAL at 15:54

## 2021-11-27 RX ADMIN — Medication 5 MILLILITER(S): at 21:14

## 2021-11-27 RX ADMIN — Medication 100 GRAM(S): at 07:46

## 2021-11-27 RX ADMIN — ONDANSETRON 8 MILLIGRAM(S): 8 TABLET, FILM COATED ORAL at 21:14

## 2021-11-27 RX ADMIN — Medication 5 MILLILITER(S): at 13:03

## 2021-11-27 RX ADMIN — MEROPENEM 100 MILLIGRAM(S): 1 INJECTION INTRAVENOUS at 16:14

## 2021-11-27 RX ADMIN — Medication 2.5 MILLIGRAM(S): at 00:02

## 2021-11-27 RX ADMIN — Medication 109.8 MILLIGRAM(S): at 13:02

## 2021-11-27 RX ADMIN — Medication 1 LOZENGE: at 21:15

## 2021-11-27 RX ADMIN — BENZOCAINE AND MENTHOL 1 LOZENGE: 5; 1 LIQUID ORAL at 11:19

## 2021-11-27 RX ADMIN — Medication 1 LOZENGE: at 10:45

## 2021-11-27 RX ADMIN — Medication 5 MILLILITER(S): at 05:35

## 2021-11-27 RX ADMIN — Medication 50 MILLIEQUIVALENT(S): at 21:14

## 2021-11-27 RX ADMIN — Medication 250 MILLIMOLE(S): at 05:36

## 2021-11-27 RX ADMIN — Medication 2.5 MILLIGRAM(S): at 11:39

## 2021-11-27 RX ADMIN — HEPARIN SODIUM 17 UNIT(S)/HR: 5000 INJECTION INTRAVENOUS; SUBCUTANEOUS at 08:23

## 2021-11-27 RX ADMIN — MEROPENEM 100 MILLIGRAM(S): 1 INJECTION INTRAVENOUS at 08:28

## 2021-11-27 RX ADMIN — BENZOCAINE AND MENTHOL 1 LOZENGE: 5; 1 LIQUID ORAL at 05:35

## 2021-11-27 RX ADMIN — Medication 250 MILLIMOLE(S): at 04:29

## 2021-11-27 RX ADMIN — Medication 109.8 MILLIGRAM(S): at 05:31

## 2021-11-27 RX ADMIN — MEROPENEM 100 MILLIGRAM(S): 1 INJECTION INTRAVENOUS at 00:02

## 2021-11-27 RX ADMIN — ONDANSETRON 8 MILLIGRAM(S): 8 TABLET, FILM COATED ORAL at 05:31

## 2021-11-27 RX ADMIN — Medication 5 MILLILITER(S): at 10:44

## 2021-11-27 RX ADMIN — Medication 50 MILLIEQUIVALENT(S): at 17:55

## 2021-11-27 RX ADMIN — ONDANSETRON 8 MILLIGRAM(S): 8 TABLET, FILM COATED ORAL at 14:36

## 2021-11-27 RX ADMIN — Medication 1 LOZENGE: at 13:02

## 2021-11-27 RX ADMIN — Medication 10 MILLIGRAM(S): at 10:45

## 2021-11-27 RX ADMIN — Medication 2.5 MILLIGRAM(S): at 17:25

## 2021-11-27 RX ADMIN — Medication 109.8 MILLIGRAM(S): at 21:13

## 2021-11-27 RX ADMIN — PANTOPRAZOLE SODIUM 40 MILLIGRAM(S): 20 TABLET, DELAYED RELEASE ORAL at 11:18

## 2021-11-27 NOTE — PROGRESS NOTE ADULT - SUBJECTIVE AND OBJECTIVE BOX
SURGERY PROGRESS NOTE    Pt seen and examined at bedside. No complaints.  No acute events overnight.     PHYSICAL EXAM   General Appearance: NAD, alert   Resp: Patent airway, non-labored breathing  CV: RRR  Abdomen: Soft, Nontender, Nondistended       Vital Signs Last 24 Hrs  T(C): 37.4 (26 Nov 2021 23:00), Max: 37.8 (26 Nov 2021 07:00)  T(F): 99.3 (26 Nov 2021 23:00), Max: 100 (26 Nov 2021 07:00)  HR: 78 (27 Nov 2021 00:00) (70 - 84)  BP: 148/81 (27 Nov 2021 00:00) (133/70 - 157/76)  BP(mean): 109 (27 Nov 2021 00:00) (96 - 118)  RR: 14 (27 Nov 2021 00:00) (12 - 20)  SpO2: 96% (27 Nov 2021 00:00) (95% - 98%)      25 Nov 2021 07:01  -  26 Nov 2021 07:00  --------------------------------------------------------  IN:    dextrose 5% + lactated ringers: 1725 mL    Heparin: 96 mL    Heparin: 270 mL    IV PiggyBack: 485 mL    IV PiggyBack: 1150 mL  Total IN: 3726 mL    OUT:    Indwelling Catheter - Urethral (mL): 1490 mL  Total OUT: 1490 mL    Total NET: 2236 mL      26 Nov 2021 07:01  -  27 Nov 2021 00:43  --------------------------------------------------------  IN:    dextrose 5% + lactated ringers: 1275 mL    Heparin: 283 mL    IV PiggyBack: 610 mL    Oral Fluid: 100 mL  Total IN: 2268 mL    OUT:    Indwelling Catheter - Urethral (mL): 1095 mL  Total OUT: 1095 mL    Total NET: 1173 mL        LABS:  cret                        7.9    <0.10 )-----------( 36       ( 26 Nov 2021 14:02 )             24.0     11-26    141  |  111<H>  |  20  ----------------------------<  120<H>  3.7   |  21<L>  |  0.75    Ca    7.5<L>      26 Nov 2021 00:27  Phos  1.8     11-26  Mg     1.9     11-26    TPro  5.0<L>  /  Alb  2.9<L>  /  TBili  0.2  /  DBili  <0.1  /  AST  13  /  ALT  15  /  AlkPhos  62  11-26    PT/INR - ( 26 Nov 2021 15:13 )   PT: 14.1 sec;   INR: 1.18 ratio         PTT - ( 26 Nov 2021 21:08 )  PTT:57.7 sec         SURGERY PROGRESS NOTE    Pt seen and examined at bedside. No complaints.  No acute events overnight. Tolerating CLD. On heparin gtt. No further propagation of DVT.     PHYSICAL EXAM   General Appearance: NAD, alert   Resp: Patent airway, non-labored breathing  CV: RRR  Abdomen: Soft, Nontender, Nondistended       Vital Signs Last 24 Hrs  T(C): 37.4 (26 Nov 2021 23:00), Max: 37.8 (26 Nov 2021 07:00)  T(F): 99.3 (26 Nov 2021 23:00), Max: 100 (26 Nov 2021 07:00)  HR: 78 (27 Nov 2021 00:00) (70 - 84)  BP: 148/81 (27 Nov 2021 00:00) (133/70 - 157/76)  BP(mean): 109 (27 Nov 2021 00:00) (96 - 118)  RR: 14 (27 Nov 2021 00:00) (12 - 20)  SpO2: 96% (27 Nov 2021 00:00) (95% - 98%)      25 Nov 2021 07:01  -  26 Nov 2021 07:00  --------------------------------------------------------  IN:    dextrose 5% + lactated ringers: 1725 mL    Heparin: 96 mL    Heparin: 270 mL    IV PiggyBack: 485 mL    IV PiggyBack: 1150 mL  Total IN: 3726 mL    OUT:    Indwelling Catheter - Urethral (mL): 1490 mL  Total OUT: 1490 mL    Total NET: 2236 mL      26 Nov 2021 07:01  -  27 Nov 2021 00:43  --------------------------------------------------------  IN:    dextrose 5% + lactated ringers: 1275 mL    Heparin: 283 mL    IV PiggyBack: 610 mL    Oral Fluid: 100 mL  Total IN: 2268 mL    OUT:    Indwelling Catheter - Urethral (mL): 1095 mL  Total OUT: 1095 mL    Total NET: 1173 mL        LABS:  cret                        7.9    <0.10 )-----------( 36       ( 26 Nov 2021 14:02 )             24.0     11-26    141  |  111<H>  |  20  ----------------------------<  120<H>  3.7   |  21<L>  |  0.75    Ca    7.5<L>      26 Nov 2021 00:27  Phos  1.8     11-26  Mg     1.9     11-26    TPro  5.0<L>  /  Alb  2.9<L>  /  TBili  0.2  /  DBili  <0.1  /  AST  13  /  ALT  15  /  AlkPhos  62  11-26    PT/INR - ( 26 Nov 2021 15:13 )   PT: 14.1 sec;   INR: 1.18 ratio         PTT - ( 26 Nov 2021 21:08 )  PTT:57.7 sec

## 2021-11-27 NOTE — PROGRESS NOTE ADULT - ATTENDING COMMENTS
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 He is now admitted for an autoSCT with high dose melphalan prep regimen. Transplant day 11/19/21. Hospital course has been complicated by vasovagal episodes on 11/19/21 and 11/23/21. On 11/23/21 developed neutropenic sepsis, improved with fluid resuscitation. CT on 11/23/21 showed Terminal ileitis with associated ileus versus low-grade obstruction with free air and possible bowel perforation, surgery consulted and transferred to SICU, managed conservatively for now. Patient was also found to have increasing troponins, echo showed heart strain and CTA chest on 11/24/21 showed a saddle PE s/p thrombectomy (11/24/21). Also LE doppler on 11/24/24 showed b/l DVT. Patient is hemodynamically stable, mentating well.     PE:   VSS  Gen: A/O x 3, NAD, tired  RESP: CTA, no wheeze no rhonchi  CV: S1, S2 RRR  Abd:  soft, mildly distended, + hyperactive BS  LE: no edema  Neuro: CNII-XII intact, no focal deficits  Psych: appropriate    MM  -dx 4/2021  -s/p RVD x 6 (5/2021 - 9/2021) -->   -admitted for autoSCT with Ivett 100mg/M2 on 11/19/21  -hold Zarxio for now given acute events / acute abdomen  Today is day + 7    ID  BACTERIAL:  Neutropenic sepsis (11/23/21) started on meropenem (11/23 - ) and vanco x1 (11/23)  VIRAL: c/w Acyclovir 400mg po TID  FUNGAL: c/w Diflucan 400 mg po daily.  PCP prophylaxis: hold for now    HEME  Saddle PE s/p and b/l LE DVT (dx 11/24/21) - c/w therapeutic anticoagulation with heparin IV, s/p mechanical thrombectomy and IVC filter placement on 11/24/21  -keep Hb >7  -keep plt >40; keep plt >50 for procedures    GI:  Abd pain / bowel perforation on CT on 11/23/21 - tolerating clear liquids, management as per SICU team   Diarrhea - f/u stool cx  VOD prophylaxis - low dose heparin gtt (dosed at 100 units / kg / day), glutamine supplementation, Actigall BID   GI prophylaxis - Protonix po QD   Mouth care and skin care as per protocol.    The time was used discussed with patient, family, primary team, consultants, and acute management. 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 He is now admitted for an autoSCT with high dose melphalan prep regimen. Transplant day 11/19/21. Hospital course has been complicated by vasovagal episodes on 11/19/21 and 11/23/21. On 11/23/21 developed neutropenic sepsis, improved with fluid resuscitation. CT on 11/23/21 showed Terminal ileitis with associated ileus versus low-grade obstruction with free air and possible bowel perforation, surgery consulted and transferred to SICU, managed conservatively for now. Patient was also found to have increasing troponins, echo showed heart strain and CTA chest on 11/24/21 showed a saddle PE s/p thrombectomy (11/24/21). Also LE doppler on 11/24/24 showed b/l DVT. Patient is hemodynamically stable, mentating well.     PE:   VSS  Gen: A/O x 3, NAD, tired  RESP: CTA, no wheeze no rhonchi  CV: S1, S2 RRR  Abd:  soft, increased distended, + hyperactive BS  LE: no edema  Neuro: CNII-XII intact, no focal deficits  Psych: appropriate    MM  -dx 4/2021  -s/p RVD x 6 (5/2021 - 9/2021) -->   -admitted for autoSCT with Ivett 100mg/M2 on 11/19/21  -hold Zarxio for now given acute events / acute abdomen  Today is day + 8    ID  BACTERIAL:  Neutropenic sepsis (11/23/21) started on meropenem (11/23 - ) and vanco x1 (11/23)  VIRAL: c/w Acyclovir 400mg po TID  FUNGAL: c/w Diflucan 400 mg po daily.  PCP prophylaxis: hold for now    HEME  Saddle PE s/p and b/l LE DVT (dx 11/24/21) - c/w therapeutic anticoagulation with heparin IV, s/p mechanical thrombectomy and IVC filter placement on 11/24/21  -keep Hb >7  -keep plt >40; keep plt >50 for procedures    GI:  Abd pain / bowel perforation on CT on 11/23/21 - tolerating clear liquids, management as per SICU team   Diarrhea - f/u stool cx  VOD prophylaxis - low dose heparin gtt (dosed at 100 units / kg / day), glutamine supplementation, Actigall BID   GI prophylaxis - Protonix po QD   Mouth care and skin care as per protocol.    The time was used discussed with patient, family, primary team, consultants, and acute management.

## 2021-11-27 NOTE — PROGRESS NOTE ADULT - ASSESSMENT
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 On AM of 11/23 patient developed neutropenic sepsis with RRT for vasovagal episode had CTAP showing pneumoperitoneum and terminal ileitis. Patient transferred to SICU for serial abdominal exams and resuscitation.    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 On AM of 11/23 patient developed neutropenic sepsis with RRT for vasovagal episode had CTAP showing pneumoperitoneum and terminal ileitis. Patient transferred to SICU for serial abdominal exams and resuscitation.    - patient non peritoneal, however unreliable in the setting of immunosuppression  - patient high risk to undergo surgery, will monitor clinically   - IV antibiotics (meropenem/fluconazole/acyclovir)  - advance diet as tolerated   - transfer back to previous service patient was on once ready to leave ICU   - appreciate SICU care      ACS  [phone]

## 2021-11-27 NOTE — PROGRESS NOTE ADULT - SUBJECTIVE AND OBJECTIVE BOX
HPC Transplant Team                                                      Critical / Counseling Time Provided: 30 minutes                                                                                                                                                        Chief Complaint: Autologous peripheral blood stem cell transplant with high dose Melphalan prep regimen for treatment of multiple myeloma    S: Patient seen and examined with HPC Transplant Team:   Over all feeling better.  +generalized weakness      O: Vitals:   Vital Signs Last 24 Hrs  T(C): 37 (27 Nov 2021 07:00), Max: 37.4 (26 Nov 2021 23:00)  T(F): 98.6 (27 Nov 2021 07:00), Max: 99.3 (26 Nov 2021 23:00)  HR: 73 (27 Nov 2021 07:00) (72 - 84)  BP: 138/72 (27 Nov 2021 07:00) (126/72 - 157/76)  BP(mean): 99 (27 Nov 2021 07:00) (92 - 118)  RR: 12 (27 Nov 2021 07:00) (12 - 20)  SpO2: 98% (27 Nov 2021 07:00) (95% - 98%)    Admit weight: 98.4kg   Daily     Daily     Intake / Output:   11-26 @ 07:01  -  11-27 @ 07:00  --------------------------------------------------------  IN: 3112 mL / OUT: 1410 mL / NET: 1702 mL        PE:   Oropharynx: + erythema and post Kepivance coating no ulcerations   Oral Mucositis:              +                                         ndGndrndanddndend:nd nd2nd CVS: S1, S2 RRR   Lungs: CTA throughout bilaterally   Abdomen: + BS x 4, soft, ND mild tenderness with palpation RLQ.   Extremities: no edema   Gastric Mucositis:       -                                           Grade: n/a  Intestinal Mucositis:     -                                         Grade: n/a   Skin: patchy, erythematous maculopapular rash to face, neck and upper chest and back post Kepivance   TLC: CDI   Neuro: A&O x 3         Labs:   CBC Full  -  ( 27 Nov 2021 03:13 )  WBC Count : 0.10 K/uL  Hemoglobin : 8.1 g/dL  Hematocrit : 25.1 %  Platelet Count - Automated : 25 K/uL  Mean Cell Volume : 95.8 fl  Mean Cell Hemoglobin : 30.9 pg  Mean Cell Hemoglobin Concentration : 32.3 gm/dL  Auto Neutrophil # : x  Auto Lymphocyte # : x  Auto Monocyte # : x  Auto Eosinophil # : x  Auto Basophil # : x  Auto Neutrophil % : x  Auto Lymphocyte % : x  Auto Monocyte % : x  Auto Eosinophil % : x  Auto Basophil % : x                          8.1    0.10  )-----------( 25       ( 27 Nov 2021 03:13 )             25.1     11-27    141  |  108  |  16  ----------------------------<  126<H>  3.4<L>   |  20<L>  |  0.69    Ca    7.1<L>      27 Nov 2021 03:13  Phos  1.9     11-27  Mg     1.9     11-27    TPro  5.1<L>  /  Alb  2.9<L>  /  TBili  0.2  /  DBili  <0.1  /  AST  12  /  ALT  12  /  AlkPhos  70  11-27    PT/INR - ( 26 Nov 2021 15:13 )   PT: 14.1 sec;   INR: 1.18 ratio         PTT - ( 27 Nov 2021 03:13 )  PTT:65.2 sec  LIVER FUNCTIONS - ( 27 Nov 2021 03:13 )  Alb: 2.9 g/dL / Pro: 5.1 g/dL / ALK PHOS: 70 U/L / ALT: 12 U/L / AST: 12 U/L / GGT: x           Lactate Dehydrogenase, Serum: 160 U/L (11-27 @ 03:13)        Cultures:     Culture - Blood (11.23.21 @ 12:32)    Specimen Source: .Blood Blood    Culture Results:   No growth to date.        Radiology:     < from: Xray Chest 1 View- PORTABLE-Routine (Xray Chest 1 View- PORTABLE-Routine in AM.) (11.25.21 @ 06:50) >  IMPRESSION:  Trace left lower lung subsegmental atelectasis. No focal consolidations.    < from: [facility] Duplex Upper Ext Vein Scan, Bilat (11.24.21 @ 11:22) >  IMPRESSION:  No evidence of deep venous thrombosis in either upper extremity.    < from: [facility] Duplex Lower Ext Vein Scan, Bilat (11.24.21 @ 11:22) >  IMPRESSION: There is bilateral acute DVT.  On the right, there is below the knee DVT affecting the posterior tibial, peroneal and soleal veins.  On the left there is acute above and below the DVT the left popliteal vein and posterior tibial peroneal trunk.    < from: CT Angio Chest PE Protocol w/ IV Cont (11.24.21 @ 00:02) >  IMPRESSION:  Saddle embolus with right heart strain.  These results have been discussed with Dr. Arellano on 11/24/2021 at 12:47 AM by on-call resident.  Pneumoperitoneum is again noted.    < from: CT Abdomen and Pelvis w/ IV Cont (11.23.21 @ 13:05) >  MPRESSION:  Terminal ileitis with associated ileus versus low-grade obstruction. Pockets of free air in the right lower quadrant adjacent to the terminal ileum concerning for bowel perforation. 2.6 cm loculated fluid adjacent to the terminal ileum and surrounding peritoneal enhancement suggestive of developing peritonitis. Distant free air in the right upper quadrant.  Sigmoid colon adjacent to the thickened terminal ileum is also markedly inflamed, with also notable diverticulosis. Sigmoid colitis/diverticulitis can also be considered on the differential versus primary terminal ileitis with reactive inflammation ofthe sigmoid colon.  Mild inflammation of the right colon and appendix may be reactive in nature. The appendix at the base and mid segment is nondistended. The tip of the appendix is not distinctly visualized adjacent to the inflamed terminal ileum.          Meds:   Antimicrobials:   acyclovir IVPB 490 milliGRAM(s) IV Intermittent every 8 hours  clotrimazole Lozenge 1 Lozenge Oral five times a day  fluconAZOLE IVPB 400 milliGRAM(s) IV Intermittent every 24 hours  meropenem  IVPB 1000 milliGRAM(s) IV Intermittent every 8 hours      Heme / Onc:   heparin  Infusion 1600 Unit(s)/Hr IV Continuous <Continuous>      GI:  pantoprazole  Injectable 40 milliGRAM(s) IV Push daily      Cardiovascular:   metoprolol tartrate Injectable 2.5 milliGRAM(s) IV Push every 6 hours      Other medications:   benzocaine 15 mG/menthol 3.6 mG (Sugar-Free) Lozenge 1 Lozenge Oral four times a day  Biotene Dry Mouth Oral Rinse 5 milliLiter(s) Swish and Spit five times a day  chlorhexidine 2% Cloths 1 Application(s) Topical <User Schedule>  dextrose 5% + lactated ringers. 1000 milliLiter(s) IV Continuous <Continuous>  FIRST- Mouthwash  BLM 30 milliLiter(s) Swish and Swallow every 6 hours  lidocaine/prilocaine Cream 1 Application(s) Topical daily  magnesium sulfate  IVPB 1 Gram(s) IV Intermittent once  ondansetron Injectable 8 milliGRAM(s) IV Push every 8 hours      PRN:   metoclopramide Injectable 10 milliGRAM(s) IV Push every 6 hours PRN      A/P:   66 year old male with a history of multiple myeloma s/p RVD x 6   Post Autologous PBSCT day + 8  11/16- melphalan 2/2; s/p melphalan hydration for 24 hours post infusion of last dose   Strict I&O, daily weights, prn diuresis   11/17- rest day   11/18- rest day   11/19- s/p HPC transplant; completed transplant hydration for 24 hours post infusion of cells. Suprapubic pain in am. UA pending, follow up culture, BK virus. AXR   11/19-vasovegal episode in AM: will monitor tele for 24hrs   11/20 d/c telemetry. Lasix 40mg IV x today, follow up I&Os, daily weight. monitor rash, receiving 2nd dose Kepivance today  11/21 add Emend for 3 days and change zofran ATC. If worsening abd pain consider CT a/p oral contrast only.   11/22- Neutropenic started on cipro once febrile will pancx and switch cipro to  Aztronam 2g Q8   11/23- AMS this am, agonal breathing, diaphoretic, cool and clammy - responsive to sternal rub. Hypotensive, hypoxic. Placed on NRB, RRT called. Labs sent. Lactate send. PCN allergy - started on Aztreonam 2g IV q 8 hoursm Flagyl 500mg IV TID for anaerobic coverage given abdominal pain and distension, Vancomycin 1g IV x 1. CT A/P pending for 12:30 11/23/21. CE with new TWI in V2-V6 - likely demand ischemia. Repeat CE and lactate at 2pm.   11/23- CT A/P with Terminal ileitis with associated ileus versus low-grade obstruction. Pockets of free air in the right lower quadrant adjacent to the terminal ileum concerning for bowel perforation. 2.6 cm loculated fluid adjacent to the terminal ileum and surrounding peritoneal enhancement suggestive of developing peritonitis. Surgical team consulted- [facility]  ID consult called- JESUS daniel.    11/24- CT chest angio + Saddle embolus with right heart strain., seen by cards- started on full dose A/C, s/p IVC filter placement with thrombectomy: transfuse PLT to >50K . doppler + B/L DVT. D/C Zarxio    1. Infectious Disease:   acyclovir IVPB 490 milliGRAM(s) IV Intermittent every 8 hours  clotrimazole Lozenge 1 Lozenge Oral five times a day  fluconAZOLE IVPB 400 milliGRAM(s) IV Intermittent every 24 hours  meropenem  IVPB 1000 milliGRAM(s) IV Intermittent every 8 hours    2. VOD Prophylaxis: Actigall, Glutamine, Heparin (Full A/C)     3. GI Prophylaxis:    pantoprazole    Tablet 40 erick GRAM(s) Oral before breakfast    4. Mouth care - NS / NaHCO3 rinses, Mycelex, Biotene; Skin care     5. GVHD prophylaxis - n/a     6. Transfuse & replete electrolytes prn   transfuse for PLT less than 40 K or below 50 with planned surgical intervention hemoglobin less then 7 of symptomatic.    7. IV hydration, daily weights, strict I&O, prn diuresis     8. PO intake as tolerated, nutrition follow up as needed, MVI, folic acid     9. Antiemetics, anti-diarrhea medications:   LORazepam   Injectable 1 milliGRAM(s) IV Push every 24 hours  ondansetron Injectable 8 milliGRAM(s) IV Push every 8 hours  metoclopramide Injectable 10 milliGRAM(s) IV Push every 6 hours PRN  aprepitant 80 milliGRAM(s) Oral daily    10. OOB as tolerated, physical therapy consult if needed     11. Monitor coags / fibrinogen 2x week, vitamin K as needed     12. Monitor closely for clinical changes, monitor for fevers     13. Emotional support provided, plan of care discussed and questions addressed     14. Patient education done regarding plan of care, restrictions and discharge planning     15. Continue regular social work input     I have written the above note for Dr. Mejia  who performed service with me in the room.   Deisy Ramsey NP  ([phone])    I have seen and examined patient with NP, I agree with above note as scribed.                                    HPC Transplant Team                                                      Critical / Counseling Time Provided: 30 minutes                                                                                                                                                        Chief Complaint: Autologous peripheral blood stem cell transplant with high dose Melphalan prep regimen for treatment of multiple myeloma    S: Patient seen and examined with HPC Transplant Team:     +generalized weakness  +watery diarrhea      O: Vitals:   Vital Signs Last 24 Hrs  T(C): 37 (27 Nov 2021 07:00), Max: 37.4 (26 Nov 2021 23:00)  T(F): 98.6 (27 Nov 2021 07:00), Max: 99.3 (26 Nov 2021 23:00)  HR: 73 (27 Nov 2021 07:00) (72 - 84)  BP: 138/72 (27 Nov 2021 07:00) (126/72 - 157/76)  BP(mean): 99 (27 Nov 2021 07:00) (92 - 118)  RR: 12 (27 Nov 2021 07:00) (12 - 20)  SpO2: 98% (27 Nov 2021 07:00) (95% - 98%)    Admit weight: 98.4kg   Daily     Daily     Intake / Output:   11-26 @ 07:01  -  11-27 @ 07:00  --------------------------------------------------------  IN: 3112 mL / OUT: 1410 mL / NET: 1702 mL        PE:   Oropharynx: + erythema and post Kepivance coating no ulcerations   Oral Mucositis:              +                                         ndGndrndanddndend:nd nd2nd CVS: S1, S2 RRR   Lungs: CTA throughout bilaterally   Abdomen: + BS x 4, soft, ND mild tenderness with palpation RLQ.   Extremities: no edema   Gastric Mucositis:       -                                           Grade: n/a  Intestinal Mucositis:     -                                         Grade: n/a   Skin: patchy, erythematous maculopapular rash to face, neck and upper chest and back post Kepivance   TLC: CDI   Neuro: A&O x 3         Labs:   CBC Full  -  ( 27 Nov 2021 03:13 )  WBC Count : 0.10 K/uL  Hemoglobin : 8.1 g/dL  Hematocrit : 25.1 %  Platelet Count - Automated : 25 K/uL  Mean Cell Volume : 95.8 fl  Mean Cell Hemoglobin : 30.9 pg  Mean Cell Hemoglobin Concentration : 32.3 gm/dL  Auto Neutrophil # : x  Auto Lymphocyte # : x  Auto Monocyte # : x  Auto Eosinophil # : x  Auto Basophil # : x  Auto Neutrophil % : x  Auto Lymphocyte % : x  Auto Monocyte % : x  Auto Eosinophil % : x  Auto Basophil % : x                          8.1    0.10  )-----------( 25       ( 27 Nov 2021 03:13 )             25.1     11-27    141  |  108  |  16  ----------------------------<  126<H>  3.4<L>   |  20<L>  |  0.69    Ca    7.1<L>      27 Nov 2021 03:13  Phos  1.9     11-27  Mg     1.9     11-27    TPro  5.1<L>  /  Alb  2.9<L>  /  TBili  0.2  /  DBili  <0.1  /  AST  12  /  ALT  12  /  AlkPhos  70  11-27    PT/INR - ( 26 Nov 2021 15:13 )   PT: 14.1 sec;   INR: 1.18 ratio         PTT - ( 27 Nov 2021 03:13 )  PTT:65.2 sec  LIVER FUNCTIONS - ( 27 Nov 2021 03:13 )  Alb: 2.9 g/dL / Pro: 5.1 g/dL / ALK PHOS: 70 U/L / ALT: 12 U/L / AST: 12 U/L / GGT: x           Lactate Dehydrogenase, Serum: 160 U/L (11-27 @ 03:13)        Cultures:     Culture - Blood (11.23.21 @ 12:32)    Specimen Source: .Blood Blood    Culture Results:   No growth to date.        Radiology:     < from: Xray Chest 1 View- PORTABLE-Routine (Xray Chest 1 View- PORTABLE-Routine in AM.) (11.25.21 @ 06:50) >  IMPRESSION:  Trace left lower lung subsegmental atelectasis. No focal consolidations.    < from: [facility] Duplex Upper Ext Vein Scan, Bilat (11.24.21 @ 11:22) >  IMPRESSION:  No evidence of deep venous thrombosis in either upper extremity.    < from: [facility] Duplex Lower Ext Vein Scan, Bilat (11.24.21 @ 11:22) >  IMPRESSION: There is bilateral acute DVT.  On the right, there is below the knee DVT affecting the posterior tibial, peroneal and soleal veins.  On the left there is acute above and below the DVT the left popliteal vein and posterior tibial peroneal trunk.    < from: CT Angio Chest PE Protocol w/ IV Cont (11.24.21 @ 00:02) >  IMPRESSION:  Saddle embolus with right heart strain.  These results have been discussed with Dr. Arellano on 11/24/2021 at 12:47 AM by on-call resident.  Pneumoperitoneum is again noted.    < from: CT Abdomen and Pelvis w/ IV Cont (11.23.21 @ 13:05) >  MPRESSION:  Terminal ileitis with associated ileus versus low-grade obstruction. Pockets of free air in the right lower quadrant adjacent to the terminal ileum concerning for bowel perforation. 2.6 cm loculated fluid adjacent to the terminal ileum and surrounding peritoneal enhancement suggestive of developing peritonitis. Distant free air in the right upper quadrant.  Sigmoid colon adjacent to the thickened terminal ileum is also markedly inflamed, with also notable diverticulosis. Sigmoid colitis/diverticulitis can also be considered on the differential versus primary terminal ileitis with reactive inflammation ofthe sigmoid colon.  Mild inflammation of the right colon and appendix may be reactive in nature. The appendix at the base and mid segment is nondistended. The tip of the appendix is not distinctly visualized adjacent to the inflamed terminal ileum.          Meds:   Antimicrobials:   acyclovir IVPB 490 milliGRAM(s) IV Intermittent every 8 hours  clotrimazole Lozenge 1 Lozenge Oral five times a day  fluconAZOLE IVPB 400 milliGRAM(s) IV Intermittent every 24 hours  meropenem  IVPB 1000 milliGRAM(s) IV Intermittent every 8 hours      Heme / Onc:   heparin  Infusion 1600 Unit(s)/Hr IV Continuous <Continuous>      GI:  pantoprazole  Injectable 40 milliGRAM(s) IV Push daily      Cardiovascular:   metoprolol tartrate Injectable 2.5 milliGRAM(s) IV Push every 6 hours      Other medications:   benzocaine 15 mG/menthol 3.6 mG (Sugar-Free) Lozenge 1 Lozenge Oral four times a day  Biotene Dry Mouth Oral Rinse 5 milliLiter(s) Swish and Spit five times a day  chlorhexidine 2% Cloths 1 Application(s) Topical <User Schedule>  dextrose 5% + lactated ringers. 1000 milliLiter(s) IV Continuous <Continuous>  FIRST- Mouthwash  BLM 30 milliLiter(s) Swish and Swallow every 6 hours  lidocaine/prilocaine Cream 1 Application(s) Topical daily  magnesium sulfate  IVPB 1 Gram(s) IV Intermittent once  ondansetron Injectable 8 milliGRAM(s) IV Push every 8 hours      PRN:   metoclopramide Injectable 10 milliGRAM(s) IV Push every 6 hours PRN      A/P:   66 year old male with a history of multiple myeloma s/p RVD x 6   Post Autologous PBSCT day + 8  11/16- melphalan 2/2; s/p melphalan hydration for 24 hours post infusion of last dose   Strict I&O, daily weights, prn diuresis   11/17- rest day   11/18- rest day   11/19- s/p HPC transplant; completed transplant hydration for 24 hours post infusion of cells. Suprapubic pain in am. UA pending, follow up culture, BK virus. AXR   11/19-vasovegal episode in AM: will monitor tele for 24hrs   11/20 d/c telemetry. Lasix 40mg IV x today, follow up I&Os, daily weight. monitor rash, receiving 2nd dose Kepivance today  11/21 add Emend for 3 days and change zofran ATC. If worsening abd pain consider CT a/p oral contrast only.   11/22- Neutropenic started on cipro once febrile will pancx and switch cipro to  Aztronam 2g Q8   11/23- AMS this am, agonal breathing, diaphoretic, cool and clammy - responsive to sternal rub. Hypotensive, hypoxic. Placed on NRB, RRT called. Labs sent. Lactate send. PCN allergy - started on Aztreonam 2g IV q 8 hoursm Flagyl 500mg IV TID for anaerobic coverage given abdominal pain and distension, Vancomycin 1g IV x 1. CT A/P pending for 12:30 11/23/21. CE with new TWI in V2-V6 - likely demand ischemia. Repeat CE and lactate at 2pm.   11/23- CT A/P with Terminal ileitis with associated ileus versus low-grade obstruction. Pockets of free air in the right lower quadrant adjacent to the terminal ileum concerning for bowel perforation. 2.6 cm loculated fluid adjacent to the terminal ileum and surrounding peritoneal enhancement suggestive of developing peritonitis. Surgical team consulted- transferred to [facility]ICU for closer monitoring.  ID consult called- JESUS daniel.    11/24- CT chest angio + Saddle embolus with right heart strain., seen by cards- started on full dose A/C, s/p IVC filter placement with thrombectomy: transfuse PLT to >50K . doppler + B/L DVT. D/C Zarxio    1. Infectious Disease:   acyclovir IVPB 490 milliGRAM(s) IV Intermittent every 8 hours  clotrimazole Lozenge 1 Lozenge Oral five times a day  fluconAZOLE IVPB 400 milliGRAM(s) IV Intermittent every 24 hours  meropenem  IVPB 1000 milliGRAM(s) IV Intermittent every 8 hours    2. VOD Prophylaxis: Actigall, Glutamine, Heparin (Full A/C)     3. GI Prophylaxis:    pantoprazole    Tablet 40 erick GRAM(s) Oral before breakfast    4. Mouth care - NS / NaHCO3 rinses, Mycelex, Biotene; Skin care     5. GVHD prophylaxis - n/a     6. Transfuse & replete electrolytes prn   transfuse for PLT less than 40 K or below 50 with planned surgical intervention hemoglobin less then 7 of symptomatic.    7. IV hydration, daily weights, strict I&O, prn diuresis     8. PO intake as tolerated, nutrition follow up as needed, MVI, folic acid     9. Antiemetics, anti-diarrhea medications:   LORazepam   Injectable 1 milliGRAM(s) IV Push every 24 hours  ondansetron Injectable 8 milliGRAM(s) IV Push every 8 hours  metoclopramide Injectable 10 milliGRAM(s) IV Push every 6 hours PRN  aprepitant 80 milliGRAM(s) Oral daily    10. OOB as tolerated, physical therapy consult if needed     11. Monitor coags / fibrinogen 2x week, vitamin K as needed     12. Monitor closely for clinical changes, monitor for fevers     13. Emotional support provided, plan of care discussed and questions addressed     14. Patient education done regarding plan of care, restrictions and discharge planning     15. Continue regular social work input     I have written the above note for Dr. Mejia  who performed service with me in the room.   Deisy Ramsey NP  ([phone])    I have seen and examined patient with NP, I agree with above note as scribed.                                    HPC Transplant Team                                                      Critical / Counseling Time Provided: 30 minutes                                                                                                                                                        Chief Complaint: Autologous peripheral blood stem cell transplant with high dose Melphalan prep regimen for treatment of multiple myeloma    S: Patient seen and examined with HPC Transplant Team:     +generalized weakness  +watery diarrhea      O: Vitals:   Vital Signs Last 24 Hrs  T(C): 37 (27 Nov 2021 07:00), Max: 37.4 (26 Nov 2021 23:00)  T(F): 98.6 (27 Nov 2021 07:00), Max: 99.3 (26 Nov 2021 23:00)  HR: 73 (27 Nov 2021 07:00) (72 - 84)  BP: 138/72 (27 Nov 2021 07:00) (126/72 - 157/76)  BP(mean): 99 (27 Nov 2021 07:00) (92 - 118)  RR: 12 (27 Nov 2021 07:00) (12 - 20)  SpO2: 98% (27 Nov 2021 07:00) (95% - 98%)    Admit weight: 98.4kg   Daily   not available       Intake / Output:   11-26 @ 07:01  -  11-27 @ 07:00  --------------------------------------------------------  IN: 3112 mL / OUT: 1410 mL / NET: 1702 mL        PE:   Oropharynx: + erythema and post Kepivance coating no ulcerations   Oral Mucositis:              +                                         ndGndrndanddndend:nd nd2nd CVS: S1, S2 RRR   Lungs: CTA throughout bilaterally   Abdomen: + BS x 4, soft, mild distended,  mild tenderness   Extremities: no edema   Gastric Mucositis:       -                                           Grade: n/a  Intestinal Mucositis:     -                                         Grade: n/a   Skin: patchy, erythematous maculopapular rash to face, neck and upper chest and back post Kepivance   TLC: CDI   Neuro: A&O x 3         Labs:   CBC Full  -  ( 27 Nov 2021 03:13 )  WBC Count : 0.10 K/uL  Hemoglobin : 8.1 g/dL  Hematocrit : 25.1 %  Platelet Count - Automated : 25 K/uL  Mean Cell Volume : 95.8 fl  Mean Cell Hemoglobin : 30.9 pg  Mean Cell Hemoglobin Concentration : 32.3 gm/dL  Auto Neutrophil # : x  Auto Lymphocyte # : x  Auto Monocyte # : x  Auto Eosinophil # : x  Auto Basophil # : x  Auto Neutrophil % : x  Auto Lymphocyte % : x  Auto Monocyte % : x  Auto Eosinophil % : x  Auto Basophil % : x                          8.1    0.10  )-----------( 25       ( 27 Nov 2021 03:13 )             25.1     11-27    141  |  108  |  16  ----------------------------<  126<H>  3.4<L>   |  20<L>  |  0.69    Ca    7.1<L>      27 Nov 2021 03:13  Phos  1.9     11-27  Mg     1.9     11-27    TPro  5.1<L>  /  Alb  2.9<L>  /  TBili  0.2  /  DBili  <0.1  /  AST  12  /  ALT  12  /  AlkPhos  70  11-27    PT/INR - ( 26 Nov 2021 15:13 )   PT: 14.1 sec;   INR: 1.18 ratio         PTT - ( 27 Nov 2021 03:13 )  PTT:65.2 sec  LIVER FUNCTIONS - ( 27 Nov 2021 03:13 )  Alb: 2.9 g/dL / Pro: 5.1 g/dL / ALK PHOS: 70 U/L / ALT: 12 U/L / AST: 12 U/L / GGT: x           Lactate Dehydrogenase, Serum: 160 U/L (11-27 @ 03:13)        Cultures:     C. difficile GDH &amp; toxins A/B by EIA (11.27.21 @ 11:31)    Clostridium difficile GDH Toxins A&amp;B, EIA:   Negative        Culture - Blood (11.23.21 @ 12:32)    Specimen Source: .Blood Blood    Culture Results:   No growth to date.        Radiology:       < from: [facility] Duplex Lower Ext Vein Scan, Bilat (11.26.21 @ 13:33) >  IMPRESSION:  There is persistent bilateral DVT in the below the knee right posterior tibial, peroneal and soleal veins and above the knee in the left popliteal vein and tibial peroneal trunk.  No new DVT is seen.    < from: Xray Chest 1 View- PORTABLE-Routine (Xray Chest 1 View- PORTABLE-Routine in AM.) (11.26.21 @ 07:17) >  IMPRESSION:  Clear lungs.    < from: VA Duplex Upper Ext Vein Scan, Bilat (11.24.21 @ 11:22) >  IMPRESSION:  No evidence of deep venous thrombosis in either upper extremity.    < from: VA Duplex Lower Ext Vein Scan, Bilat (11.24.21 @ 11:22) >  IMPRESSION: There is bilateral acute DVT.  On the right, there is below the knee DVT affecting the posterior tibial, peroneal and soleal veins.  On the left there is acute above and below the DVT the left popliteal vein and posterior tibial peroneal trunk.    < from: CT Angio Chest PE Protocol w/ IV Cont (11.24.21 @ 00:02) >  IMPRESSION:  Saddle embolus with right heart strain.  These results have been discussed with Dr. Arellano on 11/24/2021 at 12:47 AM by on-call resident.  Pneumoperitoneum is again noted.    < from: CT Abdomen and Pelvis w/ IV Cont (11.23.21 @ 13:05) >  MPRESSION:  Terminal ileitis with associated ileus versus low-grade obstruction. Pockets of free air in the right lower quadrant adjacent to the terminal ileum concerning for bowel perforation. 2.6 cm loculated fluid adjacent to the terminal ileum and surrounding peritoneal enhancement suggestive of developing peritonitis. Distant free air in the right upper quadrant.  Sigmoid colon adjacent to the thickened terminal ileum is also markedly inflamed, with also notable diverticulosis. Sigmoid colitis/diverticulitis can also be considered on the differential versus primary terminal ileitis with reactive inflammation ofthe sigmoid colon.  Mild inflammation of the right colon and appendix may be reactive in nature. The appendix at the base and mid segment is nondistended. The tip of the appendix is not distinctly visualized adjacent to the inflamed terminal ileum.      Meds:   Antimicrobials:   acyclovir IVPB 490 milliGRAM(s) IV Intermittent every 8 hours  clotrimazole Lozenge 1 Lozenge Oral five times a day  fluconAZOLE IVPB 400 milliGRAM(s) IV Intermittent every 24 hours  meropenem  IVPB 1000 milliGRAM(s) IV Intermittent every 8 hours      Heme / Onc:   heparin  Infusion 1600 Unit(s)/Hr IV Continuous <Continuous>      GI:  pantoprazole  Injectable 40 milliGRAM(s) IV Push daily      Cardiovascular:   metoprolol tartrate Injectable 2.5 milliGRAM(s) IV Push every 6 hours      Other medications:   benzocaine 15 mG/menthol 3.6 mG (Sugar-Free) Lozenge 1 Lozenge Oral four times a day  Biotene Dry Mouth Oral Rinse 5 milliLiter(s) Swish and Spit five times a day  chlorhexidine 2% Cloths 1 Application(s) Topical <User Schedule>  dextrose 5% + lactated ringers. 1000 milliLiter(s) IV Continuous <Continuous>  FIRST- Mouthwash  BLM 30 milliLiter(s) Swish and Swallow every 6 hours  lidocaine/prilocaine Cream 1 Application(s) Topical daily  magnesium sulfate  IVPB 1 Gram(s) IV Intermittent once  ondansetron Injectable 8 milliGRAM(s) IV Push every 8 hours      PRN:   metoclopramide Injectable 10 milliGRAM(s) IV Push every 6 hours PRN      A/P:   66 year old male with a history of multiple myeloma s/p RVD x 6   Post Autologous PBSCT day + 8  11/16- melphalan 2/2; s/p melphalan hydration for 24 hours post infusion of last dose   Strict I&O, daily weights, prn diuresis   11/17- rest day   11/18- rest day   11/19- s/p HPC transplant; completed transplant hydration for 24 hours post infusion of cells. Suprapubic pain in am. UA pending, follow up culture, BK virus. AXR   11/19-vasovegal episode in AM: will monitor tele for 24hrs   11/20 d/c telemetry. Lasix 40mg IV x today, follow up I&Os, daily weight. monitor rash, receiving 2nd dose Kepivance today  11/21 add Emend for 3 days and change zofran ATC. If worsening abd pain consider CT a/p oral contrast only.   11/22- Neutropenic started on cipro once febrile will pancx and switch cipro to  Aztronam 2g Q8   11/23- AMS this am, agonal breathing, diaphoretic, cool and clammy - responsive to sternal rub. Hypotensive, hypoxic. Placed on NRB, RRT called. Labs sent. Lactate send. PCN allergy - started on Aztreonam 2g IV q 8 hoursm Flagyl 500mg IV TID for anaerobic coverage given abdominal pain and distension, Vancomycin 1g IV x 1. CT A/P pending for 12:30 11/23/21. CE with new TWI in V2-V6 - likely demand ischemia. Repeat CE and lactate at 2pm.   11/23- CT A/P with Terminal ileitis with associated ileus versus low-grade obstruction. Pockets of free air in the right lower quadrant adjacent to the terminal ileum concerning for bowel perforation. 2.6 cm loculated fluid adjacent to the terminal ileum and surrounding peritoneal enhancement suggestive of developing peritonitis. Surgical team consulted- transferred to 8ICU for closer monitoring.  ID consult called- JESUS daniel.    11/24- CT chest angio + Saddle embolus with right heart strain., seen by cards- started on full dose A/C, s/p IVC filter placement with thrombectomy: transfuse PLT to >50K . doppler + B/L DVT. D/C Zarxio  11/27 on clear liq diet, watery diarrhea, C diff(-)    1. Infectious Disease:   acyclovir IVPB 490 milliGRAM(s) IV Intermittent every 8 hours  clotrimazole Lozenge 1 Lozenge Oral five times a day  fluconAZOLE IVPB 400 milliGRAM(s) IV Intermittent every 24 hours  meropenem  IVPB 1000 milliGRAM(s) IV Intermittent every 8 hours  monitor diarrhea closely     2. VOD Prophylaxis: Actigall, Glutamine, Heparin (Full A/C)     3. GI Prophylaxis:    pantoprazole    Tablet 40 erick GRAM(s) Oral before breakfast    4. Mouth care - NS / NaHCO3 rinses, Mycelex, Biotene; Skin care     5. GVHD prophylaxis - n/a     6. Transfuse & replete electrolytes prn   transfuse for PLT less than 40 K or below 50 with planned surgical intervention hemoglobin less then 7 of symptomatic.    7. IV hydration, daily weights, strict I&O, prn diuresis     8. PO intake as tolerated, nutrition follow up as needed, MVI, folic acid     9. Antiemetics, anti-diarrhea medications:   LORazepam   Injectable 1 milliGRAM(s) IV Push every 24 hours  ondansetron Injectable 8 milliGRAM(s) IV Push every 8 hours  metoclopramide Injectable 10 milliGRAM(s) IV Push every 6 hours PRN  aprepitant 80 milliGRAM(s) Oral daily    10. OOB as tolerated, physical therapy consult if needed     11. Monitor coags / fibrinogen 2x week, vitamin K as needed     12. Monitor closely for clinical changes, monitor for fevers     13. Emotional support provided, plan of care discussed and questions addressed     14. Patient education done regarding plan of care, restrictions and discharge planning     15. Continue regular social work input     I have written the above note for Dr. Mejia  who performed service with me in the room.   Deisy Ramsey NP  ([phone])    I have seen and examined patient with NP, I agree with above note as scribed.

## 2021-11-27 NOTE — PROGRESS NOTE ADULT - ATTENDING COMMENTS
Patient seen and examined on AM SICU rounds  Doing well, no new complaints  Hemodynamically stable  Oxygenating well   On full-dose IV heparin for acute PE seen on CTA from 11/24/21  No signs of increasing abdominal sepsis - will advance to clear liquid diet  + diarrhea.  Given broad-spectrum antibiotics, will send for c. dif toxin testing  On meropenem / fluconazole as per ID for nonoperative treatment of likely perforated bowel.

## 2021-11-27 NOTE — PROGRESS NOTE ADULT - ASSESSMENT
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 On AM of 11/23 patient developed neutropenic sepsis with RRT for vasovagal episode had CTAP showing pneumoperitoneum and terminal ileitis. Admitted to SICU for HD monitoring and serial abdominal exams.    PLAN:    NEURO:  Pain control with acetaminophen, dilaudid PRN  No other active issues    RESPIRATORY:   No active issues  Saturating well on 2L NC    CARDIOVASCULAR:  Troponemia 8-->346  Cardiology c/s  f/u TTE  lopressor 2.5 q6h  RPT duplex LE no propagation of DVT    GI/NUTRITION:  Pneumoperitoneum and terminal ileitis  nonop management per sx  NPO  serial abdominal exams    GENITOURINARY/RENAL:  No active issues  Monitor UOP  F/u UA  50mL /hr LR     HEMATOLOGIC:  MM s/p autologous SCT  zarxio qd per heme  appreciate heme/onc recs  low dose hep gtt for VOD ppx s/p SCT per heme/onc  F/U PTT    INFECTIOUS DISEASE:  neutropenic  rodrigo 1g q8h empirically for suspected intrabdominal infx i/s/o pneumoperitoneum  fluc/acyclovir for ppx s/p SCT  ID following    ENDOCRINE:  No active issues    DISPO:  SICU

## 2021-11-27 NOTE — PROGRESS NOTE ADULT - SUBJECTIVE AND OBJECTIVE BOX
HISTORY:    66y Male multiple myeloma admitted for an autologous pbsct with high dose melphalan prep regimen. Hematologic history as follows: 11/2020 was found to have a T-12 compression fracture. He saw orthopedics 1/2021, and was referred to endocrine. 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 Serum immunofixation showed 1 IgD lambda band and free lambda light chains. Urine immunofixation negative for monoclonal band. 4/29/21 a bone marrow biopsy and aspirate was consistent with plasma cell myeloma (> 90% involvement), flow cytometry positive for monotypic plasma cells. He started cycle 1 RVD on 5/13/21. He completed 6 cycles of RVD 9/30/21. During chemotherapy he reported occasional blurry vision (negative optho workup, negative MRI brain 9/29/21) and moderate fatigue. He has no complaints on admission other than facial erythema, likely related to kepivance.    24 HOUR EVENTS:    Advanced to clear diet  Change 75-->50 ml / hr  RPT duplex no propagation of DVT  NEURO AxOx4, moving all four limbs spontaneously   		    Meds: metoclopramide Injectable 10 milliGRAM(s) IV Push every 6 hours PRN Nausea and/or Vomiting  ondansetron Injectable 8 milliGRAM(s) IV Push every 8 hours      RESPIRATORY  RR: 13 (11-26-21 @ 23:00) (12 - 20)  SpO2: 97% (11-26-21 @ 23:00) (95% - 98%)  Wt(kg): --  Exam: CTA B/L     ABG - ( 26 Nov 2021 15:13 )  pH: x     /  pCO2: x     /  pO2: x     / HCO3: x     / Base Excess: x     /  SaO2: x       Lactate: 1.2              CARDIOVASCULAR  HR: 80 (11-26-21 @ 23:00) (70 - 84)  BP: 135/83 (11-26-21 @ 23:00) (133/70 - 157/76)  BP(mean): 104 (11-26-21 @ 23:00) (96 - 118)  ABP: --  ABP(mean): --  Wt(kg): --  CVP(cm H2O): --  VBG - ( 26 Nov 2021 00:25 )  pH: 7.41  /  pCO2: 36    /  pO2: 46    / HCO3: 23    / Base Excess: -1.6  /  SaO2: 79.9   Lactate: 0.6              Lactate, Blood: 1.2 mmol/L (11-26 @ 15:13)    Exam:  RRR  Cardiac Rhythm:   Perfusion     [ x]Adequate   [ ]Inadequate  Mentation   [ x]Normal       [ ]Reduced  Extremities  [ x]Warm         [ ]Cool  Volume Status [ ]Hypervolemic [ x]Euvolemic [ ]Hypovolemic  Meds: metoprolol tartrate Injectable 2.5 milliGRAM(s) IV Push every 6 hours      GI/NUTRITION  Exam:  Nontender abdomen  Diet: Clear liquids   Meds: pantoprazole  Injectable 40 milliGRAM(s) IV Push daily      GENITOURINARY  I&O's Detail    11-25 @ 07:01  -  11-26 @ 07:00  --------------------------------------------------------  IN:    dextrose 5% + lactated ringers: 1725 mL    Heparin: 96 mL    Heparin: 270 mL    IV PiggyBack: 485 mL    IV PiggyBack: 1150 mL  Total IN: 3726 mL    OUT:    Indwelling Catheter - Urethral (mL): 1490 mL  Total OUT: 1490 mL    Total NET: 2236 mL      11-26 @ 07:01  -  11-27 @ 00:00  --------------------------------------------------------  IN:    dextrose 5% + lactated ringers: 1200 mL    Heparin: 266 mL    IV PiggyBack: 510 mL    Oral Fluid: 100 mL  Total IN: 2076 mL    OUT:    Indwelling Catheter - Urethral (mL): 1050 mL  Total OUT: 1050 mL    Total NET: 1026 mL          11-26    141  |  111<H>  |  20  ----------------------------<  120<H>  3.7   |  21<L>  |  0.75    Ca    7.5<L>      26 Nov 2021 00:27  Phos  1.8     11-26  Mg     1.9     11-26    TPro  5.0<L>  /  Alb  2.9<L>  /  TBili  0.2  /  DBili  <0.1  /  AST  13  /  ALT  15  /  AlkPhos  62  11-26    Meds: dextrose 5% + lactated ringers. 1000 milliLiter(s) IV Continuous <Continuous>      HEMATOLOGIC  Meds: heparin  Infusion 1600 Unit(s)/Hr IV Continuous <Continuous>                          7.9    <0.10 )-----------( 36       ( 26 Nov 2021 14:02 )             24.0     PT/INR - ( 26 Nov 2021 15:13 )   PT: 14.1 sec;   INR: 1.18 ratio         PTT - ( 26 Nov 2021 21:08 )  PTT:57.7 sec    INFECTIOUS DISEASES  T(C): 37.4 (11-26-21 @ 23:00), Max: 37.8 (11-26-21 @ 07:00)  Wt(kg): --  WBC Count: <0.10 K/uL (11-26 @ 14:02)  WBC Count: <0.10 K/uL (11-26 @ 00:27)    Recent Cultures:  Specimen Source: .Blood Blood, 11-23 @ 12:32; Results   No growth to date.; Gram Stain: --; Organism: --    Meds: acyclovir IVPB 490 milliGRAM(s) IV Intermittent every 8 hours  clotrimazole Lozenge 1 Lozenge Oral five times a day  fluconAZOLE IVPB 400 milliGRAM(s) IV Intermittent every 24 hours  meropenem  IVPB 1000 milliGRAM(s) IV Intermittent every 8 hours      ENDOCRINE  Capillary Blood Glucose    Meds:     ACCESS DEVICES:  [ x] Peripheral IV  x[ ] Central Venous Line		[x ] R	[ ] L	[ ] IJ	[ ] Fem	[ ] SC	Placed:   [ ] Arterial Line			[ ] R	[ ] L	[ ] Fem	[ ] Rad	[ ] Ax	Placed:   [ ] PICC:					[ ] Mediport  [ ] Urinary Catheter, Date Placed:   [ ] Necessity of urinary, arterial, and venous catheters discussed    OTHER MEDICATIONS:  benzocaine 15 mG/menthol 3.6 mG (Sugar-Free) Lozenge 1 Lozenge Oral four times a day  Biotene Dry Mouth Oral Rinse 5 milliLiter(s) Swish and Spit five times a day  chlorhexidine 2% Cloths 1 Application(s) Topical <User Schedule>  FIRST- Mouthwash  BLM 30 milliLiter(s) Swish and Swallow every 6 hours  lidocaine/prilocaine Cream 1 Application(s) Topical daily      IMAGING: HISTORY:    66y Male multiple myeloma admitted for an autologous pbsct with high dose melphalan prep regimen. Hematologic history as follows: 11/2020 was found to have a T-12 compression fracture. He saw orthopedics 1/2021, and was referred to endocrine. 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 Serum immunofixation showed 1 IgD lambda band and free lambda light chains. Urine immunofixation negative for monoclonal band. 4/29/21 a bone marrow biopsy and aspirate was consistent with plasma cell myeloma (> 90% involvement), flow cytometry positive for monotypic plasma cells. He started cycle 1 RVD on 5/13/21. He completed 6 cycles of RVD 9/30/21. During chemotherapy he reported occasional blurry vision (negative optho workup, negative MRI brain 9/29/21) and moderate fatigue. He has no complaints on admission other than facial erythema, likely related to kepivance.    24 HOUR EVENTS:  Advanced to clear diet  IVF 75-->50 ml / hr  RPT duplex no propagation of DVT, no plan for IVC filter at this time      NEURO   Exam: AxOx4, moving all four limbs spontaneously   Meds: metoclopramide Injectable 10 milliGRAM(s) IV Push every 6 hours PRN Nausea and/or Vomiting  ondansetron Injectable 8 milliGRAM(s) IV Push every 8 hours      RESPIRATORY  RR: 13 (11-26-21 @ 23:00) (12 - 20)  SpO2: 97% (11-26-21 @ 23:00) (95% - 98%)  Wt(kg): --  Exam: CTA B/L     ABG - ( 26 Nov 2021 15:13 )  pH: x     /  pCO2: x     /  pO2: x     / HCO3: x     / Base Excess: x     /  SaO2: x       Lactate: 1.2              CARDIOVASCULAR  HR: 80 (11-26-21 @ 23:00) (70 - 84)  BP: 135/83 (11-26-21 @ 23:00) (133/70 - 157/76)  BP(mean): 104 (11-26-21 @ 23:00) (96 - 118)  ABP: --  ABP(mean): --  Wt(kg): --  CVP(cm H2O): --  VBG - ( 26 Nov 2021 00:25 )  pH: 7.41  /  pCO2: 36    /  pO2: 46    / HCO3: 23    / Base Excess: -1.6  /  SaO2: 79.9   Lactate: 0.6              Lactate, Blood: 1.2 mmol/L (11-26 @ 15:13)    Exam:  RRR  Cardiac Rhythm:   Perfusion     [ x]Adequate   [ ]Inadequate  Mentation   [ x]Normal       [ ]Reduced  Extremities  [ x]Warm         [ ]Cool  Volume Status [ ]Hypervolemic [ x]Euvolemic [ ]Hypovolemic  Meds: metoprolol tartrate Injectable 2.5 milliGRAM(s) IV Push every 6 hours      GI/NUTRITION  Exam:  Nontender abdomen  Diet: Clear liquids   Meds: pantoprazole  Injectable 40 milliGRAM(s) IV Push daily      GENITOURINARY  I&O's Detail    11-25 @ 07:01  -  11-26 @ 07:00  --------------------------------------------------------  IN:    dextrose 5% + lactated ringers: 1725 mL    Heparin: 96 mL    Heparin: 270 mL    IV PiggyBack: 485 mL    IV PiggyBack: 1150 mL  Total IN: 3726 mL    OUT:    Indwelling Catheter - Urethral (mL): 1490 mL  Total OUT: 1490 mL    Total NET: 2236 mL      11-26 @ 07:01  -  11-27 @ 00:00  --------------------------------------------------------  IN:    dextrose 5% + lactated ringers: 1200 mL    Heparin: 266 mL    IV PiggyBack: 510 mL    Oral Fluid: 100 mL  Total IN: 2076 mL    OUT:    Indwelling Catheter - Urethral (mL): 1050 mL  Total OUT: 1050 mL    Total NET: 1026 mL          11-26    141  |  111<H>  |  20  ----------------------------<  120<H>  3.7   |  21<L>  |  0.75    Ca    7.5<L>      26 Nov 2021 00:27  Phos  1.8     11-26  Mg     1.9     11-26    TPro  5.0<L>  /  Alb  2.9<L>  /  TBili  0.2  /  DBili  <0.1  /  AST  13  /  ALT  15  /  AlkPhos  62  11-26    Meds: dextrose 5% + lactated ringers. 1000 milliLiter(s) IV Continuous <Continuous>      HEMATOLOGIC  Meds: heparin  Infusion 1600 Unit(s)/Hr IV Continuous <Continuous>                          7.9    <0.10 )-----------( 36       ( 26 Nov 2021 14:02 )             24.0     PT/INR - ( 26 Nov 2021 15:13 )   PT: 14.1 sec;   INR: 1.18 ratio         PTT - ( 26 Nov 2021 21:08 )  PTT:57.7 sec    INFECTIOUS DISEASES  T(C): 37.4 (11-26-21 @ 23:00), Max: 37.8 (11-26-21 @ 07:00)  Wt(kg): --  WBC Count: <0.10 K/uL (11-26 @ 14:02)  WBC Count: <0.10 K/uL (11-26 @ 00:27)    Recent Cultures:  Specimen Source: .Blood Blood, 11-23 @ 12:32; Results   No growth to date.; Gram Stain: --; Organism: --    Meds: acyclovir IVPB 490 milliGRAM(s) IV Intermittent every 8 hours  clotrimazole Lozenge 1 Lozenge Oral five times a day  fluconAZOLE IVPB 400 milliGRAM(s) IV Intermittent every 24 hours  meropenem  IVPB 1000 milliGRAM(s) IV Intermittent every 8 hours      ENDOCRINE  Capillary Blood Glucose    Meds:     ACCESS DEVICES:  [ x] Peripheral IV  x[ ] Central Venous Line		[x ] R	[ ] L	[ ] IJ	[ ] Fem	[ ] SC	Placed:   [ ] Arterial Line			[ ] R	[ ] L	[ ] Fem	[ ] Rad	[ ] Ax	Placed:   [ ] PICC:					[ ] Mediport  [ ] Urinary Catheter, Date Placed:   [ ] Necessity of urinary, arterial, and venous catheters discussed    OTHER MEDICATIONS:  benzocaine 15 mG/menthol 3.6 mG (Sugar-Free) Lozenge 1 Lozenge Oral four times a day  Biotene Dry Mouth Oral Rinse 5 milliLiter(s) Swish and Spit five times a day  chlorhexidine 2% Cloths 1 Application(s) Topical <User Schedule>  FIRST- Mouthwash  BLM 30 milliLiter(s) Swish and Swallow every 6 hours  lidocaine/prilocaine Cream 1 Application(s) Topical daily      IMAGING: HISTORY:    66y Male multiple myeloma admitted for an autologous pbsct with high dose melphalan prep regimen. Hematologic history as follows: 11/2020 was found to have a T-12 compression fracture. He saw orthopedics 1/2021, and was referred to endocrine. 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 Serum immunofixation showed 1 IgD lambda band and free lambda light chains. Urine immunofixation negative for monoclonal band. 4/29/21 a bone marrow biopsy and aspirate was consistent with plasma cell myeloma (> 90% involvement), flow cytometry positive for monotypic plasma cells. He started cycle 1 RVD on 5/13/21. He completed 6 cycles of RVD 9/30/21. During chemotherapy he reported occasional blurry vision (negative optho workup, negative MRI brain 9/29/21) and moderate fatigue. He has no complaints on admission other than facial erythema, likely related to kepivance. Present on SICU for RR being called on floor, found to have pneumoperitoneum, + trops, saddle embolus,     24 HOUR EVENTS:  Advanced to clear diet  IVF 75-->50 ml / hr  RPT duplex no propagation of DVT, no plan for IVC filter at this time      NEURO   Exam: AxOx4, moving all four limbs spontaneously   Meds: metoclopramide Injectable 10 milliGRAM(s) IV Push every 6 hours PRN Nausea and/or Vomiting  ondansetron Injectable 8 milliGRAM(s) IV Push every 8 hours      RESPIRATORY  RR: 13 (11-26-21 @ 23:00) (12 - 20)  SpO2: 97% (11-26-21 @ 23:00) (95% - 98%)  Wt(kg): --  Exam: CTA B/L     ABG - ( 26 Nov 2021 15:13 )  pH: x     /  pCO2: x     /  pO2: x     / HCO3: x     / Base Excess: x     /  SaO2: x       Lactate: 1.2              CARDIOVASCULAR  HR: 80 (11-26-21 @ 23:00) (70 - 84)  BP: 135/83 (11-26-21 @ 23:00) (133/70 - 157/76)  BP(mean): 104 (11-26-21 @ 23:00) (96 - 118)  ABP: --  ABP(mean): --  Wt(kg): --  CVP(cm H2O): --  VBG - ( 26 Nov 2021 00:25 )  pH: 7.41  /  pCO2: 36    /  pO2: 46    / HCO3: 23    / Base Excess: -1.6  /  SaO2: 79.9   Lactate: 0.6              Lactate, Blood: 1.2 mmol/L (11-26 @ 15:13)    Exam:  RRR  Cardiac Rhythm:   Perfusion     [ x]Adequate   [ ]Inadequate  Mentation   [ x]Normal       [ ]Reduced  Extremities  [ x]Warm         [ ]Cool  Volume Status [ ]Hypervolemic [ x]Euvolemic [ ]Hypovolemic  Meds: metoprolol tartrate Injectable 2.5 milliGRAM(s) IV Push every 6 hours      GI/NUTRITION  Exam:  Nontender abdomen  Diet: Clear liquids   Meds: pantoprazole  Injectable 40 milliGRAM(s) IV Push daily      GENITOURINARY  I&O's Detail    11-25 @ 07:01  -  11-26 @ 07:00  --------------------------------------------------------  IN:    dextrose 5% + lactated ringers: 1725 mL    Heparin: 96 mL    Heparin: 270 mL    IV PiggyBack: 485 mL    IV PiggyBack: 1150 mL  Total IN: 3726 mL    OUT:    Indwelling Catheter - Urethral (mL): 1490 mL  Total OUT: 1490 mL    Total NET: 2236 mL      11-26 @ 07:01  -  11-27 @ 00:00  --------------------------------------------------------  IN:    dextrose 5% + lactated ringers: 1200 mL    Heparin: 266 mL    IV PiggyBack: 510 mL    Oral Fluid: 100 mL  Total IN: 2076 mL    OUT:    Indwelling Catheter - Urethral (mL): 1050 mL  Total OUT: 1050 mL    Total NET: 1026 mL          11-26    141  |  111<H>  |  20  ----------------------------<  120<H>  3.7   |  21<L>  |  0.75    Ca    7.5<L>      26 Nov 2021 00:27  Phos  1.8     11-26  Mg     1.9     11-26    TPro  5.0<L>  /  Alb  2.9<L>  /  TBili  0.2  /  DBili  <0.1  /  AST  13  /  ALT  15  /  AlkPhos  62  11-26    Meds: dextrose 5% + lactated ringers. 1000 milliLiter(s) IV Continuous <Continuous>      HEMATOLOGIC  Meds: heparin  Infusion 1600 Unit(s)/Hr IV Continuous <Continuous>                          7.9    <0.10 )-----------( 36       ( 26 Nov 2021 14:02 )             24.0     PT/INR - ( 26 Nov 2021 15:13 )   PT: 14.1 sec;   INR: 1.18 ratio         PTT - ( 26 Nov 2021 21:08 )  PTT:57.7 sec    INFECTIOUS DISEASES  T(C): 37.4 (11-26-21 @ 23:00), Max: 37.8 (11-26-21 @ 07:00)  Wt(kg): --  WBC Count: <0.10 K/uL (11-26 @ 14:02)  WBC Count: <0.10 K/uL (11-26 @ 00:27)    Recent Cultures:  Specimen Source: .Blood Blood, 11-23 @ 12:32; Results   No growth to date.; Gram Stain: --; Organism: --    Meds: acyclovir IVPB 490 milliGRAM(s) IV Intermittent every 8 hours  clotrimazole Lozenge 1 Lozenge Oral five times a day  fluconAZOLE IVPB 400 milliGRAM(s) IV Intermittent every 24 hours  meropenem  IVPB 1000 milliGRAM(s) IV Intermittent every 8 hours      ENDOCRINE  Capillary Blood Glucose    Meds:     ACCESS DEVICES:  [ x] Peripheral IV  x[ ] Central Venous Line		[x ] R	[ ] L	[ ] IJ	[ ] Fem	[ ] SC	Placed:   [ ] Arterial Line			[ ] R	[ ] L	[ ] Fem	[ ] Rad	[ ] Ax	Placed:   [ ] PICC:					[ ] Mediport  [ ] Urinary Catheter, Date Placed:   [ ] Necessity of urinary, arterial, and venous catheters discussed    OTHER MEDICATIONS:  benzocaine 15 mG/menthol 3.6 mG (Sugar-Free) Lozenge 1 Lozenge Oral four times a day  Biotene Dry Mouth Oral Rinse 5 milliLiter(s) Swish and Spit five times a day  chlorhexidine 2% Cloths 1 Application(s) Topical <User Schedule>  FIRST- Mouthwash  BLM 30 milliLiter(s) Swish and Swallow every 6 hours  lidocaine/prilocaine Cream 1 Application(s) Topical daily      IMAGING:

## 2021-11-28 ENCOUNTER — RX RENEWAL (OUTPATIENT)
Age: 66
End: 2021-11-28

## 2021-11-28 LAB
ALBUMIN SERPL ELPH-MCNC: 2.7 G/DL — LOW (ref 3.3–5)
ALP SERPL-CCNC: 81 U/L — SIGNIFICANT CHANGE UP (ref 40–120)
ALT FLD-CCNC: 18 U/L — SIGNIFICANT CHANGE UP (ref 10–45)
ANION GAP SERPL CALC-SCNC: 11 MMOL/L — SIGNIFICANT CHANGE UP (ref 5–17)
ANION GAP SERPL CALC-SCNC: 11 MMOL/L — SIGNIFICANT CHANGE UP (ref 5–17)
APTT BLD: 73.4 SEC — HIGH (ref 27.5–35.5)
AST SERPL-CCNC: 22 U/L — SIGNIFICANT CHANGE UP (ref 10–40)
BASOPHILS # BLD AUTO: 0 K/UL — SIGNIFICANT CHANGE UP (ref 0–0.2)
BASOPHILS NFR BLD AUTO: 0 % — SIGNIFICANT CHANGE UP (ref 0–2)
BILIRUB DIRECT SERPL-MCNC: <0.1 MG/DL — SIGNIFICANT CHANGE UP (ref 0–0.3)
BILIRUB INDIRECT FLD-MCNC: >0 MG/DL — LOW (ref 0.2–1)
BILIRUB SERPL-MCNC: 0.1 MG/DL — LOW (ref 0.2–1.2)
BUN SERPL-MCNC: 13 MG/DL — SIGNIFICANT CHANGE UP (ref 7–23)
BUN SERPL-MCNC: 14 MG/DL — SIGNIFICANT CHANGE UP (ref 7–23)
CALCIUM SERPL-MCNC: 6.9 MG/DL — LOW (ref 8.4–10.5)
CALCIUM SERPL-MCNC: 6.9 MG/DL — LOW (ref 8.4–10.5)
CHLORIDE SERPL-SCNC: 108 MMOL/L — SIGNIFICANT CHANGE UP (ref 96–108)
CHLORIDE SERPL-SCNC: 109 MMOL/L — HIGH (ref 96–108)
CO2 SERPL-SCNC: 18 MMOL/L — LOW (ref 22–31)
CO2 SERPL-SCNC: 18 MMOL/L — LOW (ref 22–31)
CREAT SERPL-MCNC: 0.62 MG/DL — SIGNIFICANT CHANGE UP (ref 0.5–1.3)
CREAT SERPL-MCNC: 0.68 MG/DL — SIGNIFICANT CHANGE UP (ref 0.5–1.3)
CULTURE RESULTS: SIGNIFICANT CHANGE UP
EOSINOPHIL # BLD AUTO: 0 K/UL — SIGNIFICANT CHANGE UP (ref 0–0.5)
EOSINOPHIL NFR BLD AUTO: 0 % — SIGNIFICANT CHANGE UP (ref 0–6)
GLUCOSE SERPL-MCNC: 117 MG/DL — HIGH (ref 70–99)
GLUCOSE SERPL-MCNC: 117 MG/DL — HIGH (ref 70–99)
HCT VFR BLD CALC: 23.8 % — LOW (ref 39–50)
HCT VFR BLD CALC: 24.4 % — LOW (ref 39–50)
HCT VFR BLD CALC: 26.1 % — LOW (ref 39–50)
HGB BLD-MCNC: 7.9 G/DL — LOW (ref 13–17)
HGB BLD-MCNC: 8 G/DL — LOW (ref 13–17)
HGB BLD-MCNC: 8.5 G/DL — LOW (ref 13–17)
LDH SERPL L TO P-CCNC: 170 U/L — SIGNIFICANT CHANGE UP (ref 50–242)
LYMPHOCYTES # BLD AUTO: 0.07 K/UL — LOW (ref 1–3.3)
LYMPHOCYTES # BLD AUTO: 13.1 % — SIGNIFICANT CHANGE UP (ref 13–44)
MAGNESIUM SERPL-MCNC: 1.9 MG/DL — SIGNIFICANT CHANGE UP (ref 1.6–2.6)
MAGNESIUM SERPL-MCNC: 2.1 MG/DL — SIGNIFICANT CHANGE UP (ref 1.6–2.6)
MCHC RBC-ENTMCNC: 31.3 PG — SIGNIFICANT CHANGE UP (ref 27–34)
MCHC RBC-ENTMCNC: 31.5 PG — SIGNIFICANT CHANGE UP (ref 27–34)
MCHC RBC-ENTMCNC: 31.5 PG — SIGNIFICANT CHANGE UP (ref 27–34)
MCHC RBC-ENTMCNC: 32.6 GM/DL — SIGNIFICANT CHANGE UP (ref 32–36)
MCHC RBC-ENTMCNC: 32.8 GM/DL — SIGNIFICANT CHANGE UP (ref 32–36)
MCHC RBC-ENTMCNC: 33.2 GM/DL — SIGNIFICANT CHANGE UP (ref 32–36)
MCV RBC AUTO: 94.8 FL — SIGNIFICANT CHANGE UP (ref 80–100)
MCV RBC AUTO: 96 FL — SIGNIFICANT CHANGE UP (ref 80–100)
MCV RBC AUTO: 96.1 FL — SIGNIFICANT CHANGE UP (ref 80–100)
MONOCYTES # BLD AUTO: 0.07 K/UL — SIGNIFICANT CHANGE UP (ref 0–0.9)
MONOCYTES NFR BLD AUTO: 13 % — SIGNIFICANT CHANGE UP (ref 2–14)
NEUTROPHILS # BLD AUTO: 0.37 K/UL — LOW (ref 1.8–7.4)
NEUTROPHILS NFR BLD AUTO: 68.1 % — SIGNIFICANT CHANGE UP (ref 43–77)
NRBC # BLD: 0 /100 WBCS — SIGNIFICANT CHANGE UP (ref 0–0)
NRBC # BLD: 3 /100 WBCS — HIGH (ref 0–0)
NT-PROBNP SERPL-SCNC: 910 PG/ML — HIGH (ref 0–300)
PHOSPHATE SERPL-MCNC: 1.5 MG/DL — LOW (ref 2.5–4.5)
PHOSPHATE SERPL-MCNC: 2.1 MG/DL — LOW (ref 2.5–4.5)
PLATELET # BLD AUTO: 21 K/UL — LOW (ref 150–400)
PLATELET # BLD AUTO: 34 K/UL — LOW (ref 150–400)
PLATELET # BLD AUTO: 41 K/UL — LOW (ref 150–400)
POTASSIUM SERPL-MCNC: 3.7 MMOL/L — SIGNIFICANT CHANGE UP (ref 3.5–5.3)
POTASSIUM SERPL-MCNC: 3.8 MMOL/L — SIGNIFICANT CHANGE UP (ref 3.5–5.3)
POTASSIUM SERPL-SCNC: 3.7 MMOL/L — SIGNIFICANT CHANGE UP (ref 3.5–5.3)
POTASSIUM SERPL-SCNC: 3.8 MMOL/L — SIGNIFICANT CHANGE UP (ref 3.5–5.3)
PROT SERPL-MCNC: 5 G/DL — LOW (ref 6–8.3)
RBC # BLD: 2.51 M/UL — LOW (ref 4.2–5.8)
RBC # BLD: 2.54 M/UL — LOW (ref 4.2–5.8)
RBC # BLD: 2.72 M/UL — LOW (ref 4.2–5.8)
RBC # FLD: 14.2 % — SIGNIFICANT CHANGE UP (ref 10.3–14.5)
RBC # FLD: 14.3 % — SIGNIFICANT CHANGE UP (ref 10.3–14.5)
RBC # FLD: 14.3 % — SIGNIFICANT CHANGE UP (ref 10.3–14.5)
SODIUM SERPL-SCNC: 137 MMOL/L — SIGNIFICANT CHANGE UP (ref 135–145)
SODIUM SERPL-SCNC: 138 MMOL/L — SIGNIFICANT CHANGE UP (ref 135–145)
SPECIMEN SOURCE: SIGNIFICANT CHANGE UP
WBC # BLD: 0.33 K/UL — CRITICAL LOW (ref 3.8–10.5)
WBC # BLD: 0.54 K/UL — CRITICAL LOW (ref 3.8–10.5)
WBC # BLD: 0.63 K/UL — CRITICAL LOW (ref 3.8–10.5)
WBC # FLD AUTO: 0.33 K/UL — CRITICAL LOW (ref 3.8–10.5)
WBC # FLD AUTO: 0.54 K/UL — CRITICAL LOW (ref 3.8–10.5)
WBC # FLD AUTO: 0.63 K/UL — CRITICAL LOW (ref 3.8–10.5)

## 2021-11-28 PROCEDURE — 99232 SBSQ HOSP IP/OBS MODERATE 35: CPT

## 2021-11-28 PROCEDURE — 99233 SBSQ HOSP IP/OBS HIGH 50: CPT | Mod: GC

## 2021-11-28 PROCEDURE — 71045 X-RAY EXAM CHEST 1 VIEW: CPT | Mod: 26

## 2021-11-28 RX ORDER — MAGNESIUM SULFATE 500 MG/ML
2 VIAL (ML) INJECTION ONCE
Refills: 0 | Status: COMPLETED | OUTPATIENT
Start: 2021-11-28 | End: 2021-11-28

## 2021-11-28 RX ORDER — ATORVASTATIN CALCIUM 80 MG/1
1 TABLET, FILM COATED ORAL
Qty: 0 | Refills: 0 | DISCHARGE

## 2021-11-28 RX ORDER — ENOXAPARIN SODIUM 100 MG/ML
100 INJECTION SUBCUTANEOUS EVERY 12 HOURS
Refills: 0 | Status: DISCONTINUED | OUTPATIENT
Start: 2021-11-28 | End: 2021-12-02

## 2021-11-28 RX ORDER — PANTOPRAZOLE SODIUM 20 MG/1
40 TABLET, DELAYED RELEASE ORAL DAILY
Refills: 0 | Status: DISCONTINUED | OUTPATIENT
Start: 2021-11-28 | End: 2021-11-28

## 2021-11-28 RX ORDER — METOPROLOL TARTRATE 50 MG
25 TABLET ORAL EVERY 12 HOURS
Refills: 0 | Status: DISCONTINUED | OUTPATIENT
Start: 2021-11-28 | End: 2021-11-29

## 2021-11-28 RX ORDER — PANTOPRAZOLE SODIUM 20 MG/1
40 TABLET, DELAYED RELEASE ORAL
Refills: 0 | Status: DISCONTINUED | OUTPATIENT
Start: 2021-11-28 | End: 2021-12-07

## 2021-11-28 RX ORDER — POTASSIUM CHLORIDE 20 MEQ
40 PACKET (EA) ORAL ONCE
Refills: 0 | Status: COMPLETED | OUTPATIENT
Start: 2021-11-28 | End: 2021-11-28

## 2021-11-28 RX ORDER — ACETAMINOPHEN 500 MG
650 TABLET ORAL EVERY 6 HOURS
Refills: 0 | Status: DISCONTINUED | OUTPATIENT
Start: 2021-11-28 | End: 2021-12-07

## 2021-11-28 RX ADMIN — Medication 2.5 MILLIGRAM(S): at 05:08

## 2021-11-28 RX ADMIN — Medication 1 LOZENGE: at 13:58

## 2021-11-28 RX ADMIN — FLUCONAZOLE 100 MILLIGRAM(S): 150 TABLET ORAL at 15:54

## 2021-11-28 RX ADMIN — Medication 50 GRAM(S): at 02:12

## 2021-11-28 RX ADMIN — MEROPENEM 100 MILLIGRAM(S): 1 INJECTION INTRAVENOUS at 16:17

## 2021-11-28 RX ADMIN — Medication 5 MILLILITER(S): at 22:13

## 2021-11-28 RX ADMIN — Medication 40 MILLIEQUIVALENT(S): at 05:16

## 2021-11-28 RX ADMIN — CHLORHEXIDINE GLUCONATE 1 APPLICATION(S): 213 SOLUTION TOPICAL at 05:09

## 2021-11-28 RX ADMIN — Medication 62.5 MILLIMOLE(S): at 14:32

## 2021-11-28 RX ADMIN — Medication 109.8 MILLIGRAM(S): at 05:08

## 2021-11-28 RX ADMIN — Medication 109.8 MILLIGRAM(S): at 22:10

## 2021-11-28 RX ADMIN — Medication 250 MILLIMOLE(S): at 02:12

## 2021-11-28 RX ADMIN — ONDANSETRON 8 MILLIGRAM(S): 8 TABLET, FILM COATED ORAL at 05:09

## 2021-11-28 RX ADMIN — LIDOCAINE AND PRILOCAINE CREAM 1 APPLICATION(S): 25; 25 CREAM TOPICAL at 22:19

## 2021-11-28 RX ADMIN — Medication 250 MILLIMOLE(S): at 05:07

## 2021-11-28 RX ADMIN — MEROPENEM 100 MILLIGRAM(S): 1 INJECTION INTRAVENOUS at 00:23

## 2021-11-28 RX ADMIN — PANTOPRAZOLE SODIUM 40 MILLIGRAM(S): 20 TABLET, DELAYED RELEASE ORAL at 12:30

## 2021-11-28 RX ADMIN — Medication 5 MILLILITER(S): at 17:54

## 2021-11-28 RX ADMIN — HEPARIN SODIUM 17 UNIT(S)/HR: 5000 INJECTION INTRAVENOUS; SUBCUTANEOUS at 07:28

## 2021-11-28 RX ADMIN — Medication 5 MILLILITER(S): at 09:11

## 2021-11-28 RX ADMIN — SODIUM CHLORIDE 50 MILLILITER(S): 9 INJECTION, SOLUTION INTRAVENOUS at 07:28

## 2021-11-28 RX ADMIN — Medication 5 MILLILITER(S): at 05:09

## 2021-11-28 RX ADMIN — Medication 1 LOZENGE: at 22:11

## 2021-11-28 RX ADMIN — Medication 25 MILLIGRAM(S): at 17:53

## 2021-11-28 RX ADMIN — MEROPENEM 100 MILLIGRAM(S): 1 INJECTION INTRAVENOUS at 09:00

## 2021-11-28 RX ADMIN — Medication 109.8 MILLIGRAM(S): at 13:56

## 2021-11-28 RX ADMIN — Medication 2.5 MILLIGRAM(S): at 00:22

## 2021-11-28 RX ADMIN — ENOXAPARIN SODIUM 100 MILLIGRAM(S): 100 INJECTION SUBCUTANEOUS at 12:15

## 2021-11-28 RX ADMIN — BENZOCAINE AND MENTHOL 1 LOZENGE: 5; 1 LIQUID ORAL at 17:54

## 2021-11-28 RX ADMIN — BENZOCAINE AND MENTHOL 1 LOZENGE: 5; 1 LIQUID ORAL at 05:08

## 2021-11-28 RX ADMIN — Medication 1 LOZENGE: at 17:55

## 2021-11-28 RX ADMIN — BENZOCAINE AND MENTHOL 1 LOZENGE: 5; 1 LIQUID ORAL at 12:30

## 2021-11-28 RX ADMIN — Medication 1 LOZENGE: at 09:12

## 2021-11-28 RX ADMIN — Medication 5 MILLILITER(S): at 13:58

## 2021-11-28 RX ADMIN — Medication 1 LOZENGE: at 05:08

## 2021-11-28 NOTE — PROGRESS NOTE ADULT - ASSESSMENT
66 year old male with multiple myeloma admitted for an autologous pbsct with high dose melphalan prep regimen.     s/p HPC Transplant on 11/19    Noted to be hypotensive and encephalopathic on AM of 11/23.     CT A/P (11/23) with findings concerning for ileitis and Pockets of free air in the right lower quadrant adjacent to the terminal ileum concerning for bowel perforation and 2.6 cm loculated fluid adjacent to the terminal ileum and surrounding peritoneal enhancement suggestive of developing peritonitis.    Diagnosed with Saddle PE on 11/24 and required thrombectomy    Was not taken for operative management given high risk in context of pancytopenia and saddle PE    Prognosis is guarded    Antibiotics:  Meropenem: 11/23 -->  Fluconazole: 11/15 -->   PO Bactrim: 11/15 --> 11/17  Ciprofloxacin: 11/22 --> 11/23  Vancomycin: 11/23  Metronidazole: 11/23  Aztreonam: 11/23    #Bowel Perforation, Peritonitis, Abdominal Fluid Collection, Hypotension, Pancytopenia, Penicillin Allergy  --Recommend repeat CT A/P with IV contrast to help guide antibiotic duration  --Continue Meropenem 1g IV Q8H   --Continue Fluconazole 400 mg PO/IV Q24H (treatment given small bowel perforation and for prophylaxis for SCT)  --Continue to follow CBC with diff  --Continue to follow transaminases  --Continue to follow temperature curve  --Follow up on preliminary blood cultures  --Surgery recommendations noted/appreciated    #s/p Stem Cell Transplant  --Continue Acyclovir prophylaxis; transitioned to IV - would maintain IVF hydration to decrease risk of crystal induced kidney injury  --Will require PCP prophylaxis - can monitor off for now.     I will continue to follow. Please feel free to contact me with any further questions.    Kris Rothman M.D.  [facility] Division of Infectious Disease  8AM-5PM: Pager Number [phone]  After Hours (or if no response): Please contact the Infectious Diseases Office at [phone]     The above assessment and plan were discussed with 8ICU Team

## 2021-11-28 NOTE — PROGRESS NOTE ADULT - SUBJECTIVE AND OBJECTIVE BOX
Follow Up:      Interval History:    REVIEW OF SYSTEMS  [  ] ROS unobtainable because:    [  ] All other systems negative except as noted below    Constitutional:  [ ] fever [ ] chills  [ ] weight loss  [ ] weakness  Skin:  [ ] rash [ ] phlebitis	  Eyes: [ ] icterus [ ] pain  [ ] discharge	  ENMT: [ ] sore throat  [ ] thrush [ ] ulcers [ ] exudates  Respiratory: [ ] dyspnea [ ] hemoptysis [ ] cough [ ] sputum	  Cardiovascular:  [ ] chest pain [ ] palpitations [ ] edema	  Gastrointestinal:  [ ] nausea [ ] vomiting [ ] diarrhea [ ] constipation [ ] pain	  Genitourinary:  [ ] dysuria [ ] frequency [ ] hematuria [ ] discharge [ ] flank pain  [ ] incontinence  Musculoskeletal:  [ ] myalgias [ ] arthralgias [ ] arthritis  [ ] back pain  Neurological:  [ ] headache [ ] seizures  [ ] confusion/altered mental status    Allergies  Omnicef (Unknown)  penicillin (Hives)        ANTIMICROBIALS:  acyclovir IVPB 490 every 8 hours  clotrimazole Lozenge 1 five times a day  fluconAZOLE IVPB 400 every 24 hours  meropenem  IVPB 1000 every 8 hours      OTHER MEDS:  MEDICATIONS  (STANDING):  enoxaparin Injectable 100 every 12 hours  metoclopramide Injectable 10 every 6 hours PRN  metoprolol tartrate 25 every 12 hours  pantoprazole   Suspension 40 daily      Vital Signs Last 24 Hrs  T(C): 37.1 (28 Nov 2021 15:00), Max: 37.4 (28 Nov 2021 11:00)  T(F): 98.8 (28 Nov 2021 15:00), Max: 99.3 (28 Nov 2021 11:00)  HR: 77 (28 Nov 2021 17:00) (69 - 87)  BP: 117/77 (28 Nov 2021 17:00) (117/77 - 146/78)  BP(mean): 92 (28 Nov 2021 17:00) (90 - 108)  RR: 14 (28 Nov 2021 17:00) (12 - 24)  SpO2: 97% (28 Nov 2021 17:00) (91% - 99%)    PHYSICAL EXAMINATION:  General: Alert and Awake, NAD  HEENT: PERRL, EOMI  Neck: Supple  Cardiac: RRR, No M/R/G  Resp: CTAB, No Wh/Rh/Ra  Abdomen: NBS, NT/ND, No HSM, No rigidity or guarding  MSK: No LE edema. No Calf tenderness  : No haile  Skin: No rashes or lesions. Skin is warm and dry to the touch.   Neuro: Alert and Awake. CN 2-12 Grossly intact. Moves all four extremities spontaneously.  Psych: Calm, Pleasant, Cooperative                          8.5    0.54  )-----------( 21 ( 28 Nov 2021 12:53 )             26.1       11-28    138  |  109<H>  |  13  ----------------------------<  117<H>  3.8   |  18<L>  |  0.62    Ca    6.9<L>      28 Nov 2021 12:53  Phos  2.1     11-28  Mg     2.1     11-28    TPro  5.0<L>  /  Alb  2.7<L>  /  TBili  0.1<L>  /  DBili  <0.1  /  AST  22  /  ALT  18  /  AlkPhos  81  11-28          MICROBIOLOGY:  v  .Blood Blood  11-23-21   No Growth Final  --  --      STER [id number]  11-19-21   No growth  --  --      Clean Catch Clean Catch (Midstream)  11-19-21   No growth  --  --      STER [id number]  11-03-21   No growth at 14 days.  --  --      STER [id number]  11-02-21   No growth at 14 days.  --    No organisms seen      STER [id number]  11-01-21   No growth at 14 days.  --  --            Clostridium difficile GDH Toxins A&amp;B, EIA:   Negative (11-27-21 @ 11:31)  Clostridium difficile GDH Interpretation: Negative for toxigenic C. Difficile.  This specimen is negative for C.  Difficile glutamate dehydrogenase (GDH) antigen and negative for C.  Difficile Toxins A & B, by EIA.  GDH is a highly sensitive screening  marker for C. Difficile that is produced in large amounts by all C.  Difficile strains, both toxigenic and nontoxigenic.  This assay has not  been validated as a test of cure.  Repeat testing during the same episode  of diarrhea is of limited value and is discouraged.  The results of this  assay should always be interpreted in conjunction with pateint's clinical  history. (11-27-21 @ 11:31)      RADIOLOGY:    <The imaging below has been reviewed and visualized by me independently. Findings as detailed in report below> Follow Up:  perforation    Interval History: afebrile. minimal abdominal pain.     REVIEW OF SYSTEMS  [  ] ROS unobtainable because:    [ x ] All other systems negative except as noted below    Constitutional:  [ ] fever [ ] chills  [ ] weight loss  [ ] weakness  Skin:  [ ] rash [ ] phlebitis	  Eyes: [ ] icterus [ ] pain  [ ] discharge	  ENMT: [ ] sore throat  [ ] thrush [ ] ulcers [ ] exudates  Respiratory: [ ] dyspnea [ ] hemoptysis [ ] cough [ ] sputum	  Cardiovascular:  [ ] chest pain [ ] palpitations [ ] edema	  Gastrointestinal:  [ ] nausea [ ] vomiting [ ] diarrhea [ ] constipation [ ] pain	  Genitourinary:  [ ] dysuria [ ] frequency [ ] hematuria [ ] discharge [ ] flank pain  [ ] incontinence  Musculoskeletal:  [ ] myalgias [ ] arthralgias [ ] arthritis  [ ] back pain  Neurological:  [ ] headache [ ] seizures  [ ] confusion/altered mental status      Allergies  Omnicef (Unknown)  penicillin (Hives)        ANTIMICROBIALS:  acyclovir IVPB 490 every 8 hours  clotrimazole Lozenge 1 five times a day  fluconAZOLE IVPB 400 every 24 hours  meropenem  IVPB 1000 every 8 hours      OTHER MEDS:  MEDICATIONS  (STANDING):  enoxaparin Injectable 100 every 12 hours  metoclopramide Injectable 10 every 6 hours PRN  metoprolol tartrate 25 every 12 hours  pantoprazole   Suspension 40 daily      Vital Signs Last 24 Hrs  T(C): 37.1 (28 Nov 2021 15:00), Max: 37.4 (28 Nov 2021 11:00)  T(F): 98.8 (28 Nov 2021 15:00), Max: 99.3 (28 Nov 2021 11:00)  HR: 77 (28 Nov 2021 17:00) (69 - 87)  BP: 117/77 (28 Nov 2021 17:00) (117/77 - 146/78)  BP(mean): 92 (28 Nov 2021 17:00) (90 - 108)  RR: 14 (28 Nov 2021 17:00) (12 - 24)  SpO2: 97% (28 Nov 2021 17:00) (91% - 99%)    PHYSICAL EXAMINATION:  General: Alert and Awake, NAD  Cardiac: RRR, No M/R/G  Resp: CTAB, No Wh/Rh/Ra  Abdomen: NBS, mild diffuse abdominal tenderness. No HSM, No rigidity or guarding  MSK: No LE edema. No Calf tenderness  Skin: No rashes or lesions. Skin is warm and dry to the touch.   Neuro: Alert and Awake. CN 2-12 Grossly intact. Moves all four extremities spontaneously.  Psych: Calm, Pleasant, Cooperative                          8.5    0.54  )-----------( 21 ( 28 Nov 2021 12:53 )             26.1       11-28    138  |  109<H>  |  13  ----------------------------<  117<H>  3.8   |  18<L>  |  0.62    Ca    6.9<L>      28 Nov 2021 12:53  Phos  2.1     11-28  Mg     2.1     11-28    TPro  5.0<L>  /  Alb  2.7<L>  /  TBili  0.1<L>  /  DBili  <0.1  /  AST  22  /  ALT  18  /  AlkPhos  81  11-28          MICROBIOLOGY:  v  .Blood Blood  11-23-21   No Growth Final  --  --      STER [id number]  11-19-21   No growth  --  --      Clean Catch Clean Catch (Midstream)  11-19-21   No growth  --  --      STER [id number]  11-03-21   No growth at 14 days.  --  --      STER [id number]  11-02-21   No growth at 14 days.  --    No organisms seen      STER [id number]  11-01-21   No growth at 14 days.  --  --            Clostridium difficile GDH Toxins A&amp;B, EIA:   Negative (11-27-21 @ 11:31)  Clostridium difficile GDH Interpretation: Negative for toxigenic C. Difficile.  This specimen is negative for C.  Difficile glutamate dehydrogenase (GDH) antigen and negative for C.  Difficile Toxins A & B, by EIA.  GDH is a highly sensitive screening  marker for C. Difficile that is produced in large amounts by all C.  Difficile strains, both toxigenic and nontoxigenic.  This assay has not  been validated as a test of cure.  Repeat testing during the same episode  of diarrhea is of limited value and is discouraged.  The results of this  assay should always be interpreted in conjunction with pateint's clinical  history. (11-27-21 @ 11:31)      RADIOLOGY:    <The imaging below has been reviewed and visualized by me independently. Findings as detailed in report below>    < from: Xray Chest 1 View- PORTABLE-Routine (Xray Chest 1 View- PORTABLE-Routine in AM.) (11.28.21 @ 07:22) >  IMPRESSION:  Clear lungs.    < end of copied text >

## 2021-11-28 NOTE — PROGRESS NOTE ADULT - ATTENDING COMMENTS
Patient seen and examined on AM SICU rounds  Awake, alert, oriented x 4  Hemodynamically stable  Oxygenating well  WBC slowly increasing (0.10 -> 0.3 -> 0.5) over last few days.  On empiric antibiotics for bowel perf (Meropenem) as per ID.    On IV heparin for Pulmonary Embolism.  Ptls=21 without signs of bleeding  On regular diet, ++ diarrhea    - C.dif. negative yesterday. unclear etiology of diarrhea  - Will change from IV heparin to full dose Lovenox  - OOB -> chair  - If platelets continue to decrease, will transfuse.

## 2021-11-28 NOTE — PROGRESS NOTE ADULT - ATTENDING COMMENTS
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 He is now admitted for an autoSCT with high dose melphalan prep regimen. Transplant day 11/19/21. Hospital course has been complicated by vasovagal episodes on 11/19/21 and 11/23/21. On 11/23/21 developed neutropenic sepsis, improved with fluid resuscitation. CT on 11/23/21 showed Terminal ileitis with associated ileus versus low-grade obstruction with free air and possible bowel perforation, surgery consulted and transferred to SICU, managed conservatively for now. Patient was also found to have increasing troponins, echo showed heart strain and CTA chest on 11/24/21 showed a saddle PE s/p thrombectomy (11/24/21). Also LE doppler on 11/24/24 showed b/l DVT. Patient is hemodynamically stable, mentating well.     PE:   VSS  Gen: A/O x 3, NAD, tired  RESP: CTA, no wheeze no rhonchi  CV: S1, S2 RRR  Abd:  soft, increased distended, + hyperactive BS  LE: no edema  Neuro: CNII-XII intact, no focal deficits  Psych: appropriate    MM  -dx 4/2021  -s/p RVD x 6 (5/2021 - 9/2021) -->   -admitted for autoSCT with Ivett 100mg/M2 on 11/19/21  -hold Zarxio for now given acute events / acute abdomen  Today is day + 8    ID  BACTERIAL:  Neutropenic sepsis (11/23/21) started on meropenem (11/23 - ) and vanco x1 (11/23)  VIRAL: c/w Acyclovir 400mg po TID  FUNGAL: c/w Diflucan 400 mg po daily.  PCP prophylaxis: hold for now    HEME  Saddle PE s/p and b/l LE DVT (dx 11/24/21) - c/w therapeutic anticoagulation with heparin IV, s/p mechanical thrombectomy and IVC filter placement on 11/24/21  -keep Hb >7  -keep plt >40; keep plt >50 for procedures    GI:  Abd pain / bowel perforation on CT on 11/23/21 - tolerating clear liquids, management as per SICU team   Diarrhea - f/u stool cx  VOD prophylaxis - low dose heparin gtt (dosed at 100 units / kg / day), glutamine supplementation, Actigall BID   GI prophylaxis - Protonix po QD   Mouth care and skin care as per protocol.    The time was used discussed with patient, family, primary team, consultants, and acute management. 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 He is now admitted for an autoSCT with high dose melphalan prep regimen. Transplant day 11/19/21. Hospital course has been complicated by vasovagal episodes on 11/19/21 and 11/23/21. On 11/23/21 developed neutropenic sepsis, improved with fluid resuscitation. CT on 11/23/21 showed Terminal ileitis with associated ileus versus low-grade obstruction with free air and possible bowel perforation, surgery consulted and transferred to SICU, managed conservatively for now. Patient was also found to have increasing troponins, echo showed heart strain and CTA chest on 11/24/21 showed a saddle PE s/p thrombectomy (11/24/21). Also LE doppler on 11/24/24 showed b/l DVT. Patient is hemodynamically stable, mentating well.     PE:   VSS  Gen: A/O x 3, NAD, tired  RESP: CTA, no wheeze no rhonchi  CV: S1, S2 RRR  Abd:  soft, increased distention, + hyperactive BS  LE: no edema  Neuro: CNII-XII intact, no focal deficits  Psych: appropriate    MM  -dx 4/2021  -s/p RVD x 6 (5/2021 - 9/2021) -->   -admitted for autoSCT with Ivett 100mg/M2 on 11/19/21  -hold Zarxio for now given acute events / acute abdomen  Today is day + 9  -Plt goal 40K given need for therapeutic AC     ID  BACTERIAL:  Neutropenic sepsis (11/23/21) started on meropenem (11/23 - ) and vanco x1 (11/23)  VIRAL: c/w Acyclovir 400mg po TID  FUNGAL: c/w Diflucan 400 mg po daily.  PCP prophylaxis: hold for now    HEME  Saddle PE s/p and b/l LE DVT (dx 11/24/21) - c/w therapeutic anticoagulation with heparin IV, s/p mechanical thrombectomy and IVC filter placement on 11/24/21  -keep Hb >7  -keep plt >40; keep plt >50 for procedures    GI:  Abd pain / bowel perforation on CT on 11/23/21 - tolerating clear liquids, management as per SICU team   Diarrhea - f/u stool cx  VOD prophylaxis - low dose heparin gtt (dosed at 100 units / kg / day), glutamine supplementation, Actigall BID   GI prophylaxis - Protonix po QD   Mouth care and skin care as per protocol.    The time was used discussed with patient, family, primary team, consultants, and acute management.

## 2021-11-28 NOTE — PROGRESS NOTE ADULT - ASSESSMENT
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 On AM of 11/23 patient developed neutropenic sepsis with RRT for vasovagal episode had CTAP showing pneumoperitoneum and terminal ileitis. Patient transferred to SICU for serial abdominal exams and resuscitation.    - patient non peritoneal, however unreliable in the setting of immunosuppression  - patient high risk to undergo surgery, will monitor clinically   - IV antibiotics (meropenem/fluconazole/acyclovir)  - advance diet as tolerated   - transfer back to previous service patient was on once ready to leave ICU   - appreciate outstanding SICU care      ACS  [phone]

## 2021-11-28 NOTE — PROGRESS NOTE ADULT - SUBJECTIVE AND OBJECTIVE BOX
Trauma Surgery Progress Note    SUBJECTIVE  No acute events overnight. Pt seen and examined on mornings rounds.    OBJECTIVE  ___________________________________________________  VITAL SIGNS / I&O's   Vital Signs Last 24 Hrs  T(C): 36.6 (27 Nov 2021 23:00), Max: 37.4 (27 Nov 2021 12:00)  T(F): 97.9 (27 Nov 2021 23:00), Max: 99.3 (27 Nov 2021 12:00)  HR: 75 (28 Nov 2021 05:00) (69 - 92)  BP: 146/74 (28 Nov 2021 05:00) (126/72 - 146/78)  BP(mean): 105 (28 Nov 2021 05:00) (88 - 108)  RR: 14 (28 Nov 2021 05:00) (12 - 27)  SpO2: 94% (28 Nov 2021 05:00) (91% - 98%)      26 Nov 2021 07:01  -  27 Nov 2021 07:00  --------------------------------------------------------  IN:    dextrose 5% + lactated ringers: 1650 mL    Heparin: 402 mL    IV PiggyBack: 960 mL    Oral Fluid: 100 mL  Total IN: 3112 mL    OUT:    Indwelling Catheter - Urethral (mL): 1410 mL  Total OUT: 1410 mL    Total NET: 1702 mL      27 Nov 2021 07:01  -  28 Nov 2021 05:50  --------------------------------------------------------  IN:    dextrose 5% + lactated ringers: 1100 mL    Heparin: 374 mL    IV PiggyBack: 840 mL    IV PiggyBack: 1050 mL    Oral Fluid: 100 mL    Platelets - Single Donor: 225 mL  Total IN: 3689 mL    OUT:    Indwelling Catheter - Urethral (mL): 1290 mL  Total OUT: 1290 mL    Total NET: 2399 mL        ___________________________________________________  PHYSICAL EXAM    General Appearance: NAD, alert   Resp: Patent airway, non-labored breathing  CV: RRR  Abdomen: Soft, Nontender, Nondistended    ___________________________________________________  LABS                        8.0    0.33  )-----------( 34       ( 28 Nov 2021 00:25 )             24.4     28 Nov 2021 00:25    137    |  108    |  14     ----------------------------<  117    3.7     |  18     |  0.68     Ca    6.9        28 Nov 2021 00:25  Phos  1.5       28 Nov 2021 00:25  Mg     1.9       28 Nov 2021 00:25    TPro  5.0    /  Alb  2.7    /  TBili  0.1    /  DBili  <0.1   /  AST  22     /  ALT  18     /  AlkPhos  81     28 Nov 2021 00:25    PT/INR - ( 26 Nov 2021 15:13 )   PT: 14.1 sec;   INR: 1.18 ratio         PTT - ( 28 Nov 2021 00:25 )  PTT:73.4 sec  CAPILLARY BLOOD GLUCOSE      POCT Blood Glucose.: 126 mg/dL (27 Nov 2021 16:26)          ___________________________________________________  MICRO  Recent Cultures:  Specimen Source: .Blood Blood, 11-23 @ 12:32; Results   No growth to date.; Gram Stain: --; Organism: --    ___________________________________________________  MEDICATIONS  (STANDING):  acyclovir IVPB 490 milliGRAM(s) IV Intermittent every 8 hours  benzocaine 15 mG/menthol 3.6 mG (Sugar-Free) Lozenge 1 Lozenge Oral four times a day  Biotene Dry Mouth Oral Rinse 5 milliLiter(s) Swish and Spit five times a day  chlorhexidine 2% Cloths 1 Application(s) Topical <User Schedule>  clotrimazole Lozenge 1 Lozenge Oral five times a day  dextrose 5% + lactated ringers. 1000 milliLiter(s) (50 mL/Hr) IV Continuous <Continuous>  fluconAZOLE IVPB 400 milliGRAM(s) IV Intermittent every 24 hours  heparin  Infusion 1600 Unit(s)/Hr (17 mL/Hr) IV Continuous <Continuous>  lidocaine/prilocaine Cream 1 Application(s) Topical daily  meropenem  IVPB 1000 milliGRAM(s) IV Intermittent every 8 hours  metoprolol tartrate Injectable 2.5 milliGRAM(s) IV Push every 6 hours  pantoprazole  Injectable 40 milliGRAM(s) IV Push daily    MEDICATIONS  (PRN):  metoclopramide Injectable 10 milliGRAM(s) IV Push every 6 hours PRN Nausea and/or Vomiting

## 2021-11-28 NOTE — PROGRESS NOTE ADULT - SUBJECTIVE AND OBJECTIVE BOX
HISTORY:  66y Male multiple myeloma admitted for an autologous pbsct with high dose melphalan prep regimen. Hematologic history as follows: 11/2020 was found to have a T-12 compression fracture. He saw orthopedics 1/2021, and was referred to endocrine. 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 Serum immunofixation showed 1 IgD lambda band and free lambda light chains. Urine immunofixation negative for monoclonal band. 4/29/21 a bone marrow biopsy and aspirate was consistent with plasma cell myeloma (> 90% involvement), flow cytometry positive for monotypic plasma cells. He started cycle 1 RVD on 5/13/21. He completed 6 cycles of RVD 9/30/21. During chemotherapy he reported occasional blurry vision (negative optho workup, negative MRI brain 9/29/21) and moderate fatigue. He has no complaints on admission other than facial erythema, likely related to kepivance. Present on SICU for RR being called on floor, found to have pneumoperitoneum, + trops, saddle embolus,     24 HOUR EVENTS:   -Received one unit of platelets 25-39  - C diff sample sent, neg result    NEURO    Exam: AxOx3 no acute distress, pain adequately controlled   Meds: metoclopramide Injectable 10 milliGRAM(s) IV Push every 6 hours PRN Nausea and/or Vomiting  ondansetron Injectable 8 milliGRAM(s) IV Push every 8 hours      RESPIRATORY  RR: 15 (11-27-21 @ 23:00) (12 - 27)  SpO2: 91% (11-27-21 @ 23:00) (91% - 98%)  Wt(kg): --  Exam: CTA B/L no signs of resp distress  ABG - ( 26 Nov 2021 15:13 )  pH: x     /  pCO2: x     /  pO2: x     / HCO3: x     / Base Excess: x     /  SaO2: x       Lactate: 1.2              Meds:     CARDIOVASCULAR  HR: 79 (11-27-21 @ 23:00) (72 - 92)  BP: 127/73 (11-27-21 @ 23:00) (126/72 - 146/78)  BP(mean): 92 (11-27-21 @ 23:00) (88 - 105)  ABP: --  ABP(mean): --  Wt(kg): --  CVP(cm H2O): --  VBG - ( 27 Nov 2021 03:03 )  pH: 7.41  /  pCO2: 35    /  pO2: 46    / HCO3: 22    / Base Excess: -2.1  /  SaO2: 78.8   Lactate: 0.9                Exam:  No murmurs noted   Cardiac Rhythm: RRR  Perfusion     [ x]Adequate   [ ]Inadequate  Mentation   [ x]Normal       [ ]Reduced  Extremities  [ x]Warm         [ ]Cool  Volume Status [ ]Hypervolemic [ x]Euvolemic [ ]Hypovolemic  Meds: metoprolol tartrate Injectable 2.5 milliGRAM(s) IV Push every 6 hours      GI/NUTRITION  Exam:  Non tender, non distended   Diet: Clear liquid   Meds: pantoprazole  Injectable 40 milliGRAM(s) IV Push daily      GENITOURINARY  I&O's Detail    11-26 @ 07:01 - 11-27 @ 07:00  --------------------------------------------------------  IN:    dextrose 5% + lactated ringers: 1650 mL    Heparin: 402 mL    IV PiggyBack: 960 mL    Oral Fluid: 100 mL  Total IN: 3112 mL    OUT:    Indwelling Catheter - Urethral (mL): 1410 mL  Total OUT: 1410 mL    Total NET: 1702 mL      11-27 @ 07:01  -  11-28 @ 00:03  --------------------------------------------------------  IN:    dextrose 5% + lactated ringers: 800 mL    Heparin: 272 mL    IV PiggyBack: 300 mL    IV PiggyBack: 790 mL    Oral Fluid: 100 mL    Platelets - Single Donor: 225 mL  Total IN: 2487 mL    OUT:    Indwelling Catheter - Urethral (mL): 890 mL  Total OUT: 890 mL    Total NET: 1597 mL          11-27    136  |  105  |  14  ----------------------------<  250<H>  3.1<L>   |  19<L>  |  0.64    Ca    6.7<L>      27 Nov 2021 14:26  Phos  1.9     11-27  Mg     1.9     11-27    TPro  5.1<L>  /  Alb  2.9<L>  /  TBili  0.2  /  DBili  <0.1  /  AST  12  /  ALT  12  /  AlkPhos  70  11-27    Meds: dextrose 5% + lactated ringers. 1000 milliLiter(s) IV Continuous <Continuous>      HEMATOLOGIC  Meds: heparin  Infusion 1600 Unit(s)/Hr IV Continuous <Continuous>                          8.1    0.18  )-----------( 39       ( 27 Nov 2021 14:26 )             24.5     PT/INR - ( 26 Nov 2021 15:13 )   PT: 14.1 sec;   INR: 1.18 ratio         PTT - ( 27 Nov 2021 14:26 )  PTT:79.2 sec    INFECTIOUS DISEASES  T(C): 37.3 (11-27-21 @ 19:00), Max: 37.4 (11-27-21 @ 03:00)  Wt(kg): --  WBC Count: 0.18 K/uL (11-27 @ 14:26)  WBC Count: 0.14 K/uL (11-27 @ 09:27)  WBC Count: 0.10 K/uL (11-27 @ 03:13)    Recent Cultures:  Specimen Source: .Blood Blood, 11-23 @ 12:32; Results   No growth to date.; Gram Stain: --; Organism: --    Meds: acyclovir IVPB 490 milliGRAM(s) IV Intermittent every 8 hours  clotrimazole Lozenge 1 Lozenge Oral five times a day  fluconAZOLE IVPB 400 milliGRAM(s) IV Intermittent every 24 hours  meropenem  IVPB 1000 milliGRAM(s) IV Intermittent every 8 hours      ENDOCRINE  Capillary Blood Glucose    Meds:     ACCESS DEVICES:  [x ] Peripheral IV  [ ] Central Venous Line		[ ] R	[ ] L	[ ] IJ	[ ] Fem	[ ] SC	Placed:   [ ] Arterial Line			[ ] R	[ ] L	[ ] Fem	[ ] Rad	[ ] Ax	Placed:   [ ] PICC:					[ ] Mediport  [ ] Urinary Catheter, Date Placed:   [ x] Necessity of urinary, arterial, and venous catheters discussed    OTHER MEDICATIONS:  benzocaine 15 mG/menthol 3.6 mG (Sugar-Free) Lozenge 1 Lozenge Oral four times a day  Biotene Dry Mouth Oral Rinse 5 milliLiter(s) Swish and Spit five times a day  chlorhexidine 2% Cloths 1 Application(s) Topical <User Schedule>  lidocaine/prilocaine Cream 1 Application(s) Topical daily      IMAGING:

## 2021-11-28 NOTE — PROGRESS NOTE ADULT - ASSESSMENT
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 On AM of 11/23 patient developed neutropenic sepsis with RRT for vasovagal episode had CTAP showing pneumoperitoneum and terminal ileitis. Admitted to SICU for HD monitoring and serial abdominal exams.    PLAN:    NEURO:  Pain control with acetaminophen, dilaudid PRN  No other active issues    RESPIRATORY:   No active issues  Saturating well on 2L NC  Daily cxr     CARDIOVASCULAR:  Troponemia 8-->346 ------>58-> , 47, cease trending  Cardiology c/s  TTE EF 70-75, no actionable findings   lopressor 2.5 q6h  RPT duplex LE no propagation of DVT    GI/NUTRITION:  Pneumoperitoneum and terminal ileitis  nonop management per sx  Clear liquids   serial abdominal exams    GENITOURINARY/RENAL:  No active issues  Monitor UOP  F/u UA  50mL /hr LR     HEMATOLOGIC:  MM s/p autologous SCT  zarxio qd per heme  appreciate heme/onc recs  Therapeutic dose hep gtt for VOD ppx s/p SCT per heme/onc  F/U PTT    INFECTIOUS DISEASE:  neutropenic  rodrigo 1g q8h empirically for suspected intrabdominal infx i/s/o pneumoperitoneum  fluc/acyclovir for ppx s/p SCT  ID following    ENDOCRINE:  No active issues    DISPO:  SICU

## 2021-11-28 NOTE — PROGRESS NOTE ADULT - SUBJECTIVE AND OBJECTIVE BOX
++++++INCOMPLETE NOTE++++++++++++++++        HPC Transplant Team                                                      Critical / Counseling Time Provided: 30 minutes                                                                                                                                                        Chief Complaint: Autologous peripheral blood stem cell transplant with high dose Melphalan prep regimen for treatment of multiple myeloma    S: Patient seen and examined with HPC Transplant Team:       Susan    O: Vitals:   Vital Signs Last 24 Hrs  T(C): 36.8 (28 Nov 2021 07:00), Max: 37.4 (27 Nov 2021 12:00)  T(F): 98.2 (28 Nov 2021 07:00), Max: 99.3 (27 Nov 2021 12:00)  HR: 73 (28 Nov 2021 10:00) (69 - 92)  BP: 130/72 (28 Nov 2021 10:00) (127/70 - 146/78)  BP(mean): 90 (28 Nov 2021 10:00) (90 - 108)  RR: 13 (28 Nov 2021 10:00) (12 - 27)  SpO2: 95% (28 Nov 2021 10:00) (91% - 99%)    Admit weight: 98.4kg  Daily     Intake / Output:   11-27 @ 07:01 - 11-28 @ 07:00  --------------------------------------------------------  IN: 3923 mL / OUT: 1400 mL / NET: 2523 mL    11-28 @ 07:01 - 11-28 @ 11:19  --------------------------------------------------------  IN: 284 mL / OUT: 85 mL / NET: 199 mL      PE:   Oropharynx: + erythema and post Kepivance coating no ulcerations   Oral Mucositis:              +                                         ndGndrndanddndend:nd nd2nd CVS: S1, S2 RRR   Lungs: CTA throughout bilaterally   Abdomen: + BS x 4, soft, mild distended,  mild tenderness   Extremities: no edema   Gastric Mucositis:       -                                           Grade: n/a  Intestinal Mucositis:     -                                         Grade: n/a   Skin: patchy, erythematous maculopapular rash to face, neck and upper chest and back post Kepivance   TLC: CDI   Neuro: A&O x 3       Labs:       CBC Full  -  ( 28 Nov 2021 00:25 )  WBC Count : 0.33 K/uL  Hemoglobin : 8.0 g/dL  Hematocrit : 24.4 %  Platelet Count - Automated : 34 K/uL  Mean Cell Volume : 96.1 fl  Mean Cell Hemoglobin : 31.5 pg  Mean Cell Hemoglobin Concentration : 32.8 gm/dL  Auto Neutrophil # : x  Auto Lymphocyte # : x  Auto Monocyte # : x  Auto Eosinophil # : x  Auto Basophil # : x  Auto Neutrophil % : x  Auto Lymphocyte % : x  Auto Monocyte % : x  Auto Eosinophil % : x  Auto Basophil % : x                          8.0    0.33  )-----------( 34       ( 28 Nov 2021 00:25 )             24.4     11-28    137  |  108  |  14  ----------------------------<  117<H>  3.7   |  18<L>  |  0.68    Ca    6.9<L>      28 Nov 2021 00:25  Phos  1.5     11-28  Mg     1.9     11-28    TPro  5.0<L>  /  Alb  2.7<L>  /  TBili  0.1<L>  /  DBili  <0.1  /  AST  22  /  ALT  18  /  AlkPhos  81  11-28    PT/INR - ( 26 Nov 2021 15:13 )   PT: 14.1 sec;   INR: 1.18 ratio         PTT - ( 28 Nov 2021 00:25 )  PTT:73.4 sec  LIVER FUNCTIONS - ( 28 Nov 2021 00:25 )  Alb: 2.7 g/dL / Pro: 5.0 g/dL / ALK PHOS: 81 U/L / ALT: 18 U/L / AST: 22 U/L / GGT: x           Lactate Dehydrogenase, Serum: 170 U/L (11-28 @ 00:25)      Cultures:         Radiology:       Meds:   Antimicrobials:   acyclovir IVPB 490 milliGRAM(s) IV Intermittent every 8 hours  clotrimazole Lozenge 1 Lozenge Oral five times a day  fluconAZOLE IVPB 400 milliGRAM(s) IV Intermittent every 24 hours  meropenem  IVPB 1000 milliGRAM(s) IV Intermittent every 8 hours      Heme / Onc:   enoxaparin Injectable 100 milliGRAM(s) SubCutaneous every 12 hours  heparin  Infusion 1600 Unit(s)/Hr IV Continuous <Continuous>      GI:  pantoprazole   Suspension 40 milliGRAM(s) Oral daily      Cardiovascular:   metoprolol tartrate 25 milliGRAM(s) Oral every 12 hours      Immunologic:       Other medications:   benzocaine 15 mG/menthol 3.6 mG (Sugar-Free) Lozenge 1 Lozenge Oral four times a day  Biotene Dry Mouth Oral Rinse 5 milliLiter(s) Swish and Spit five times a day  chlorhexidine 2% Cloths 1 Application(s) Topical <User Schedule>  dextrose 5% + lactated ringers. 1000 milliLiter(s) IV Continuous <Continuous>  lidocaine/prilocaine Cream 1 Application(s) Topical daily      PRN:   metoclopramide Injectable 10 milliGRAM(s) IV Push every 6 hours PRN        A/P:   66 year old male with a history of multiple myeloma s/p RVD x 6   Post Autologous PBSCT day + 9  11/16- melphalan 2/2; s/p melphalan hydration for 24 hours post infusion of last dose   Strict I&O, daily weights, prn diuresis   11/17- rest day   11/18- rest day   11/19- s/p HPC transplant; completed transplant hydration for 24 hours post infusion of cells. Suprapubic pain in am. UA pending, follow up culture, BK virus. AXR   11/19-vasovegal episode in AM: will monitor tele for 24hrs   11/20 d/c telemetry. Lasix 40mg IV x today, follow up I&Os, daily weight. monitor rash, receiving 2nd dose Kepivance today  11/21 add Emend for 3 days and change zofran ATC. If worsening abd pain consider CT a/p oral contrast only.   11/22- Neutropenic started on cipro once febrile will pancx and switch cipro to  Aztronam 2g Q8   11/23- AMS this am, agonal breathing, diaphoretic, cool and clammy - responsive to sternal rub. Hypotensive, hypoxic. Placed on NRB, RRT called. Labs sent. Lactate send. PCN allergy - started on Aztreonam 2g IV q 8 hoursm Flagyl 500mg IV TID for anaerobic coverage given abdominal pain and distension, Vancomycin 1g IV x 1. CT A/P pending for 12:30 11/23/21. CE with new TWI in V2-V6 - likely demand ischemia. Repeat CE and lactate at 2pm.   11/23- CT A/P with Terminal ileitis with associated ileus versus low-grade obstruction. Pockets of free air in the right lower quadrant adjacent to the terminal ileum concerning for bowel perforation. 2.6 cm loculated fluid adjacent to the terminal ileum and surrounding peritoneal enhancement suggestive of developing peritonitis. Surgical team consulted- transferred to 8ICU for closer monitoring.  ID consult called- JESUS daniel.    11/24- CT chest angio + Saddle embolus with right heart strain., seen by cards- started on full dose A/C, s/p IVC filter placement with thrombectomy: transfuse PLT to >50K . doppler + B/L DVT. D/C Zarxio  11/27 on clear liq diet, watery diarrhea, C diff(-)    1. Infectious Disease:   acyclovir IVPB 490 milliGRAM(s) IV Intermittent every 8 hours  clotrimazole Lozenge 1 Lozenge Oral five times a day  fluconAZOLE IVPB 400 milliGRAM(s) IV Intermittent every 24 hours  meropenem  IVPB 1000 milliGRAM(s) IV Intermittent every 8 hours  monitor diarrhea closely     2. VOD Prophylaxis: Actigall, Glutamine, Heparin (Full A/C)     3. GI Prophylaxis:    pantoprazole    Tablet 40 erick GRAM(s) Oral before breakfast    4. Mouth care - NS / NaHCO3 rinses, Mycelex, Biotene; Skin care     5. GVHD prophylaxis - n/a     6. Transfuse & replete electrolytes prn   transfuse for PLT less than 40 K or below 50 with planned surgical intervention hemoglobin less then 7 of symptomatic.    7. IV hydration, daily weights, strict I&O, prn diuresis     8. PO intake as tolerated, nutrition follow up as needed, MVI, folic acid     9. Antiemetics, anti-diarrhea medications:   LORazepam   Injectable 1 milliGRAM(s) IV Push every 24 hours  ondansetron Injectable 8 milliGRAM(s) IV Push every 8 hours  metoclopramide Injectable 10 milliGRAM(s) IV Push every 6 hours PRN  aprepitant 80 milliGRAM(s) Oral daily    10. OOB as tolerated, physical therapy consult if needed     11. Monitor coags / fibrinogen 2x week, vitamin K as needed     12. Monitor closely for clinical changes, monitor for fevers     13. Emotional support provided, plan of care discussed and questions addressed     14. Patient education done regarding plan of care, restrictions and discharge planning     15. Continue regular social work input     I have written the above note for Dr. Mejia  who performed service with me in the room.   Feliciano Singh PA-C  ([phone])    I have seen and examined patient with PA, I agree with above note as scribed.                        HPC Transplant Team                                                      Critical / Counseling Time Provided: 30 minutes                                                                                                                                                        Chief Complaint: Autologous peripheral blood stem cell transplant with high dose Melphalan prep regimen for treatment of multiple myeloma    S: Patient seen and examined with HPC Transplant Team: Tolerating clears, feels gaseous    Denies worsened/acute abdominal pain, n/v, CP, SOB     O: Vitals:   Vital Signs Last 24 Hrs  T(C): 36.8 (28 Nov 2021 07:00), Max: 37.4 (27 Nov 2021 12:00)  T(F): 98.2 (28 Nov 2021 07:00), Max: 99.3 (27 Nov 2021 12:00)  HR: 73 (28 Nov 2021 10:00) (69 - 92)  BP: 130/72 (28 Nov 2021 10:00) (127/70 - 146/78)  BP(mean): 90 (28 Nov 2021 10:00) (90 - 108)  RR: 13 (28 Nov 2021 10:00) (12 - 27)  SpO2: 95% (28 Nov 2021 10:00) (91% - 99%)    Admit weight: 98.4kg  Daily pending    Intake / Output:   11-27 @ 07:01 - 11-28 @ 07:00  --------------------------------------------------------  IN: 3923 mL / OUT: 1400 mL / NET: 2523 mL    11-28 @ 07:01 - 11-28 @ 11:19  --------------------------------------------------------  IN: 284 mL / OUT: 85 mL / NET: 199 mL      PE:   Oropharynx: + erythema and post Kepivance coating no ulcerations   Oral Mucositis:              +                                         ndGndrndanddndend:nd nd2nd CVS: S1, S2 RRR   Lungs: CTA throughout bilaterally   Abdomen: + BS x 4, soft, mild distended,  mild tenderness   Extremities: +1/2-1 edema   Gastric Mucositis:       -                                           Grade: n/a  Intestinal Mucositis:     -                                         Grade: n/a   Skin: patchy, erythematous maculopapular rash to face, neck and upper chest and back post Kepivance   TLC: CDI   Neuro: A&O x 3       Labs:       CBC Full  -  ( 28 Nov 2021 00:25 )  WBC Count : 0.33 K/uL  Hemoglobin : 8.0 g/dL  Hematocrit : 24.4 %  Platelet Count - Automated : 34 K/uL  Mean Cell Volume : 96.1 fl  Mean Cell Hemoglobin : 31.5 pg  Mean Cell Hemoglobin Concentration : 32.8 gm/dL  Auto Neutrophil # : x  Auto Lymphocyte # : x  Auto Monocyte # : x  Auto Eosinophil # : x  Auto Basophil # : x  Auto Neutrophil % : x  Auto Lymphocyte % : x  Auto Monocyte % : x  Auto Eosinophil % : x  Auto Basophil % : x                          8.0    0.33  )-----------( 34       ( 28 Nov 2021 00:25 )             24.4     11-28    137  |  108  |  14  ----------------------------<  117<H>  3.7   |  18<L>  |  0.68    Ca    6.9<L>      28 Nov 2021 00:25  Phos  1.5     11-28  Mg     1.9     11-28    TPro  5.0<L>  /  Alb  2.7<L>  /  TBili  0.1<L>  /  DBili  <0.1  /  AST  22  /  ALT  18  /  AlkPhos  81  11-28    PT/INR - ( 26 Nov 2021 15:13 )   PT: 14.1 sec;   INR: 1.18 ratio         PTT - ( 28 Nov 2021 00:25 )  PTT:73.4 sec  LIVER FUNCTIONS - ( 28 Nov 2021 00:25 )  Alb: 2.7 g/dL / Pro: 5.0 g/dL / ALK PHOS: 81 U/L / ALT: 18 U/L / AST: 22 U/L / GGT: x           Lactate Dehydrogenase, Serum: 170 U/L (11-28 @ 00:25)      Cultures:   Culture - Blood (11.23.21 @ 12:32)   Specimen Source: .Blood Blood   Culture Results: No Growth Final      Radiology:     < from: Xray Chest 1 View- PORTABLE-Routine (Xray Chest 1 View- PORTABLE-Routine in AM.) (11.28.21 @ 07:22) >  IMPRESSION:  Clear lungs.      < from: VA Duplex Lower Ext Vein Scan, Bilat (11.26.21 @ 13:33) >  There is persistent bilateral DVT in the below the knee right posterior tibial, peroneal and soleal veins and above the knee in the left popliteal vein and tibial peroneal trunk.  No new DVT is seen.      Meds:   Antimicrobials:   acyclovir IVPB 490 milliGRAM(s) IV Intermittent every 8 hours  clotrimazole Lozenge 1 Lozenge Oral five times a day  fluconAZOLE IVPB 400 milliGRAM(s) IV Intermittent every 24 hours  meropenem  IVPB 1000 milliGRAM(s) IV Intermittent every 8 hours      Heme / Onc:   enoxaparin Injectable 100 milliGRAM(s) SubCutaneous every 12 hours  heparin  Infusion 1600 Unit(s)/Hr IV Continuous <Continuous>      GI:  pantoprazole   Suspension 40 milliGRAM(s) Oral daily      Cardiovascular:   metoprolol tartrate 25 milliGRAM(s) Oral every 12 hours      Immunologic:       Other medications:   benzocaine 15 mG/menthol 3.6 mG (Sugar-Free) Lozenge 1 Lozenge Oral four times a day  Biotene Dry Mouth Oral Rinse 5 milliLiter(s) Swish and Spit five times a day  chlorhexidine 2% Cloths 1 Application(s) Topical <User Schedule>  dextrose 5% + lactated ringers. 1000 milliLiter(s) IV Continuous <Continuous>  lidocaine/prilocaine Cream 1 Application(s) Topical daily      PRN:   metoclopramide Injectable 10 milliGRAM(s) IV Push every 6 hours PRN        A/P:   66 year old male with a history of multiple myeloma s/p RVD x 6   Post Autologous PBSCT day + 9  11/16- melphalan 2/2; s/p melphalan hydration for 24 hours post infusion of last dose   Strict I&O, daily weights, prn diuresis   11/17- rest day   11/18- rest day   11/19- s/p HPC transplant; completed transplant hydration for 24 hours post infusion of cells. Suprapubic pain in am. UA pending, follow up culture, BK virus. AXR   11/19-vasovegal episode in AM: will monitor tele for 24hrs   11/20 d/c telemetry. Lasix 40mg IV x today, follow up I&Os, daily weight. monitor rash, receiving 2nd dose Kepivance today  11/21 add Emend for 3 days and change zofran ATC. If worsening abd pain consider CT a/p oral contrast only.   11/22- Neutropenic started on cipro once febrile will pancx and switch cipro to  Aztronam 2g Q8   11/23- AMS this am, agonal breathing, diaphoretic, cool and clammy - responsive to sternal rub. Hypotensive, hypoxic. Placed on NRB, RRT called. Labs sent. Lactate send. PCN allergy - started on Aztreonam 2g IV q 8 hoursm Flagyl 500mg IV TID for anaerobic coverage given abdominal pain and distension, Vancomycin 1g IV x 1. CT A/P pending for 12:30 11/23/21. CE with new TWI in V2-V6 - likely demand ischemia. Repeat CE and lactate at 2pm.   11/23- CT A/P with Terminal ileitis with associated ileus versus low-grade obstruction. Pockets of free air in the right lower quadrant adjacent to the terminal ileum concerning for bowel perforation. 2.6 cm loculated fluid adjacent to the terminal ileum and surrounding peritoneal enhancement suggestive of developing peritonitis. Surgical team consulted- transferred to 8ICU for closer monitoring.  ID consult called- ABX fredy.    11/24- CT chest angio + Saddle embolus with right heart strain., seen by cards- started on full dose A/C, s/p IVC filter placement with thrombectomy: transfuse PLT to >50K . doppler + B/L DVT. D/C Zarxio  11/27 on clear liq diet, watery diarrhea, C diff(-)  11/28 Transitioned from Heparin gtt to Lovenox - D/W ICU MD, recommend keeping keeping plt's >40k while on AC.     1. Infectious Disease:   acyclovir IVPB 490 milliGRAM(s) IV Intermittent every 8 hours  clotrimazole Lozenge 1 Lozenge Oral five times a day  fluconAZOLE IVPB 400 milliGRAM(s) IV Intermittent every 24 hours  meropenem  IVPB 1000 milliGRAM(s) IV Intermittent every 8 hours  monitor diarrhea closely     2. VOD Prophylaxis: Actigall, Glutamine, Heparin (Full A/C)     3. GI Prophylaxis:    pantoprazole    Tablet 40 erick GRAM(s) Oral before breakfast    4. Mouth care - NS / NaHCO3 rinses, Mycelex, Biotene; Skin care     5. GVHD prophylaxis - n/a     6. Transfuse & replete electrolytes prn   transfuse for PLT less than 40K (while on AC) or below 50 with planned surgical intervention hemoglobin less then 7 of symptomatic.    7. IV hydration, daily weights, strict I&O, prn diuresis     8. PO intake as tolerated, nutrition follow up as needed, MVI, folic acid     9. Antiemetics, anti-diarrhea medications:   LORazepam   Injectable 1 milliGRAM(s) IV Push every 24 hours  ondansetron Injectable 8 milliGRAM(s) IV Push every 8 hours  metoclopramide Injectable 10 milliGRAM(s) IV Push every 6 hours PRN  aprepitant 80 milliGRAM(s) Oral daily    10. OOB as tolerated, physical therapy consult if needed     11. Monitor coags / fibrinogen 2x week, vitamin K as needed     12. Monitor closely for clinical changes, monitor for fevers     13. Emotional support provided, plan of care discussed and questions addressed     14. Patient education done regarding plan of care, restrictions and discharge planning     15. Continue regular social work input     I have written the above note for Dr. Mejia  who performed service with me in the room.   Feliciano Singh PA-C ([phone])    I have seen and examined patient with PA, I agree with above note as scribed.

## 2021-11-29 LAB
ALBUMIN SERPL ELPH-MCNC: 2.7 G/DL — LOW (ref 3.3–5)
ALP SERPL-CCNC: 92 U/L — SIGNIFICANT CHANGE UP (ref 40–120)
ALT FLD-CCNC: 30 U/L — SIGNIFICANT CHANGE UP (ref 10–45)
ANION GAP SERPL CALC-SCNC: 11 MMOL/L — SIGNIFICANT CHANGE UP (ref 5–17)
ANION GAP SERPL CALC-SCNC: 11 MMOL/L — SIGNIFICANT CHANGE UP (ref 5–17)
APTT BLD: 38.6 SEC — HIGH (ref 27.5–35.5)
AST SERPL-CCNC: 26 U/L — SIGNIFICANT CHANGE UP (ref 10–40)
BASOPHILS # BLD AUTO: 0 K/UL — SIGNIFICANT CHANGE UP (ref 0–0.2)
BASOPHILS NFR BLD AUTO: 0 % — SIGNIFICANT CHANGE UP (ref 0–2)
BILIRUB DIRECT SERPL-MCNC: <0.1 MG/DL — SIGNIFICANT CHANGE UP (ref 0–0.3)
BILIRUB INDIRECT FLD-MCNC: >0.1 MG/DL — LOW (ref 0.2–1)
BILIRUB SERPL-MCNC: 0.2 MG/DL — SIGNIFICANT CHANGE UP (ref 0.2–1.2)
BLD GP AB SCN SERPL QL: NEGATIVE — SIGNIFICANT CHANGE UP
BUN SERPL-MCNC: 11 MG/DL — SIGNIFICANT CHANGE UP (ref 7–23)
BUN SERPL-MCNC: 11 MG/DL — SIGNIFICANT CHANGE UP (ref 7–23)
CALCIUM SERPL-MCNC: 6.6 MG/DL — LOW (ref 8.4–10.5)
CALCIUM SERPL-MCNC: 6.8 MG/DL — LOW (ref 8.4–10.5)
CHLORIDE SERPL-SCNC: 110 MMOL/L — HIGH (ref 96–108)
CHLORIDE SERPL-SCNC: 112 MMOL/L — HIGH (ref 96–108)
CO2 SERPL-SCNC: 18 MMOL/L — LOW (ref 22–31)
CO2 SERPL-SCNC: 18 MMOL/L — LOW (ref 22–31)
CREAT SERPL-MCNC: 0.6 MG/DL — SIGNIFICANT CHANGE UP (ref 0.5–1.3)
CREAT SERPL-MCNC: 0.62 MG/DL — SIGNIFICANT CHANGE UP (ref 0.5–1.3)
EOSINOPHIL # BLD AUTO: 0 K/UL — SIGNIFICANT CHANGE UP (ref 0–0.5)
EOSINOPHIL NFR BLD AUTO: 0 % — SIGNIFICANT CHANGE UP (ref 0–6)
GLUCOSE SERPL-MCNC: 108 MG/DL — HIGH (ref 70–99)
GLUCOSE SERPL-MCNC: 116 MG/DL — HIGH (ref 70–99)
HCT VFR BLD CALC: 24.4 % — LOW (ref 39–50)
HCT VFR BLD CALC: 25 % — LOW (ref 39–50)
HGB BLD-MCNC: 8 G/DL — LOW (ref 13–17)
HGB BLD-MCNC: 8.2 G/DL — LOW (ref 13–17)
LDH SERPL L TO P-CCNC: 183 U/L — SIGNIFICANT CHANGE UP (ref 50–242)
LYMPHOCYTES # BLD AUTO: 0.15 K/UL — LOW (ref 1–3.3)
LYMPHOCYTES # BLD AUTO: 16.6 % — SIGNIFICANT CHANGE UP (ref 13–44)
MAGNESIUM SERPL-MCNC: 1.8 MG/DL — SIGNIFICANT CHANGE UP (ref 1.6–2.6)
MAGNESIUM SERPL-MCNC: 2.7 MG/DL — HIGH (ref 1.6–2.6)
MCHC RBC-ENTMCNC: 31.2 PG — SIGNIFICANT CHANGE UP (ref 27–34)
MCHC RBC-ENTMCNC: 31.7 PG — SIGNIFICANT CHANGE UP (ref 27–34)
MCHC RBC-ENTMCNC: 32.8 GM/DL — SIGNIFICANT CHANGE UP (ref 32–36)
MCHC RBC-ENTMCNC: 32.8 GM/DL — SIGNIFICANT CHANGE UP (ref 32–36)
MCV RBC AUTO: 95.1 FL — SIGNIFICANT CHANGE UP (ref 80–100)
MCV RBC AUTO: 96.8 FL — SIGNIFICANT CHANGE UP (ref 80–100)
MONOCYTES # BLD AUTO: 0.18 K/UL — SIGNIFICANT CHANGE UP (ref 0–0.9)
MONOCYTES NFR BLD AUTO: 20.2 % — HIGH (ref 2–14)
NEUTROPHILS # BLD AUTO: 0.54 K/UL — LOW (ref 1.8–7.4)
NEUTROPHILS NFR BLD AUTO: 61.4 % — SIGNIFICANT CHANGE UP (ref 43–77)
NRBC # BLD: 0 /100 WBCS — SIGNIFICANT CHANGE UP (ref 0–0)
PHOSPHATE SERPL-MCNC: 1.6 MG/DL — LOW (ref 2.5–4.5)
PHOSPHATE SERPL-MCNC: 2.2 MG/DL — LOW (ref 2.5–4.5)
PLATELET # BLD AUTO: 31 K/UL — LOW (ref 150–400)
PLATELET # BLD AUTO: 62 K/UL — LOW (ref 150–400)
POTASSIUM SERPL-MCNC: 3.6 MMOL/L — SIGNIFICANT CHANGE UP (ref 3.5–5.3)
POTASSIUM SERPL-MCNC: 4 MMOL/L — SIGNIFICANT CHANGE UP (ref 3.5–5.3)
POTASSIUM SERPL-SCNC: 3.6 MMOL/L — SIGNIFICANT CHANGE UP (ref 3.5–5.3)
POTASSIUM SERPL-SCNC: 4 MMOL/L — SIGNIFICANT CHANGE UP (ref 3.5–5.3)
PROT SERPL-MCNC: 4.8 G/DL — LOW (ref 6–8.3)
RBC # BLD: 2.52 M/UL — LOW (ref 4.2–5.8)
RBC # BLD: 2.63 M/UL — LOW (ref 4.2–5.8)
RBC # FLD: 14.2 % — SIGNIFICANT CHANGE UP (ref 10.3–14.5)
RBC # FLD: 14.3 % — SIGNIFICANT CHANGE UP (ref 10.3–14.5)
RH IG SCN BLD-IMP: NEGATIVE — SIGNIFICANT CHANGE UP
SODIUM SERPL-SCNC: 139 MMOL/L — SIGNIFICANT CHANGE UP (ref 135–145)
SODIUM SERPL-SCNC: 141 MMOL/L — SIGNIFICANT CHANGE UP (ref 135–145)
WBC # BLD: 0.88 K/UL — CRITICAL LOW (ref 3.8–10.5)
WBC # BLD: 1.18 K/UL — LOW (ref 3.8–10.5)
WBC # FLD AUTO: 0.88 K/UL — CRITICAL LOW (ref 3.8–10.5)
WBC # FLD AUTO: 1.18 K/UL — LOW (ref 3.8–10.5)

## 2021-11-29 PROCEDURE — 38240 TRANSPLT ALLO HCT/DONOR: CPT

## 2021-11-29 PROCEDURE — 99233 SBSQ HOSP IP/OBS HIGH 50: CPT

## 2021-11-29 PROCEDURE — 99232 SBSQ HOSP IP/OBS MODERATE 35: CPT

## 2021-11-29 PROCEDURE — 74177 CT ABD & PELVIS W/CONTRAST: CPT | Mod: 26

## 2021-11-29 RX ORDER — POTASSIUM PHOSPHATE, MONOBASIC POTASSIUM PHOSPHATE, DIBASIC 236; 224 MG/ML; MG/ML
30 INJECTION, SOLUTION INTRAVENOUS ONCE
Refills: 0 | Status: COMPLETED | OUTPATIENT
Start: 2021-11-29 | End: 2021-11-29

## 2021-11-29 RX ORDER — MAGNESIUM SULFATE 500 MG/ML
2 VIAL (ML) INJECTION ONCE
Refills: 0 | Status: COMPLETED | OUTPATIENT
Start: 2021-11-29 | End: 2021-11-29

## 2021-11-29 RX ORDER — METOPROLOL TARTRATE 50 MG
25 TABLET ORAL EVERY 8 HOURS
Refills: 0 | Status: DISCONTINUED | OUTPATIENT
Start: 2021-11-29 | End: 2021-12-07

## 2021-11-29 RX ORDER — TAMSULOSIN HYDROCHLORIDE 0.4 MG/1
0.4 CAPSULE ORAL AT BEDTIME
Refills: 0 | Status: DISCONTINUED | OUTPATIENT
Start: 2021-11-29 | End: 2021-12-07

## 2021-11-29 RX ORDER — POTASSIUM CHLORIDE 20 MEQ
20 PACKET (EA) ORAL
Refills: 0 | Status: COMPLETED | OUTPATIENT
Start: 2021-11-29 | End: 2021-11-29

## 2021-11-29 RX ADMIN — MEROPENEM 100 MILLIGRAM(S): 1 INJECTION INTRAVENOUS at 16:48

## 2021-11-29 RX ADMIN — Medication 50 GRAM(S): at 15:04

## 2021-11-29 RX ADMIN — Medication 5 MILLILITER(S): at 06:51

## 2021-11-29 RX ADMIN — PANTOPRAZOLE SODIUM 40 MILLIGRAM(S): 20 TABLET, DELAYED RELEASE ORAL at 08:28

## 2021-11-29 RX ADMIN — POTASSIUM PHOSPHATE, MONOBASIC POTASSIUM PHOSPHATE, DIBASIC 125 MILLIMOLE(S): 236; 224 INJECTION, SOLUTION INTRAVENOUS at 09:56

## 2021-11-29 RX ADMIN — Medication 50 GRAM(S): at 08:29

## 2021-11-29 RX ADMIN — Medication 109.8 MILLIGRAM(S): at 06:51

## 2021-11-29 RX ADMIN — Medication 109.8 MILLIGRAM(S): at 22:00

## 2021-11-29 RX ADMIN — Medication 1 LOZENGE: at 22:01

## 2021-11-29 RX ADMIN — FLUCONAZOLE 100 MILLIGRAM(S): 150 TABLET ORAL at 16:49

## 2021-11-29 RX ADMIN — Medication 5 MILLILITER(S): at 13:45

## 2021-11-29 RX ADMIN — Medication 25 MILLIGRAM(S): at 15:04

## 2021-11-29 RX ADMIN — Medication 1 LOZENGE: at 13:45

## 2021-11-29 RX ADMIN — CHLORHEXIDINE GLUCONATE 1 APPLICATION(S): 213 SOLUTION TOPICAL at 05:36

## 2021-11-29 RX ADMIN — MEROPENEM 100 MILLIGRAM(S): 1 INJECTION INTRAVENOUS at 01:07

## 2021-11-29 RX ADMIN — Medication 109.8 MILLIGRAM(S): at 13:45

## 2021-11-29 RX ADMIN — Medication 20 MILLIEQUIVALENT(S): at 08:29

## 2021-11-29 RX ADMIN — Medication 5 MILLILITER(S): at 10:17

## 2021-11-29 RX ADMIN — LIDOCAINE AND PRILOCAINE CREAM 1 APPLICATION(S): 25; 25 CREAM TOPICAL at 21:35

## 2021-11-29 RX ADMIN — Medication 25 MILLIGRAM(S): at 06:51

## 2021-11-29 RX ADMIN — MEROPENEM 100 MILLIGRAM(S): 1 INJECTION INTRAVENOUS at 08:30

## 2021-11-29 RX ADMIN — Medication 250 MILLIMOLE(S): at 21:35

## 2021-11-29 RX ADMIN — Medication 5 MILLILITER(S): at 17:07

## 2021-11-29 RX ADMIN — TAMSULOSIN HYDROCHLORIDE 0.4 MILLIGRAM(S): 0.4 CAPSULE ORAL at 22:02

## 2021-11-29 RX ADMIN — ENOXAPARIN SODIUM 100 MILLIGRAM(S): 100 INJECTION SUBCUTANEOUS at 11:11

## 2021-11-29 RX ADMIN — ENOXAPARIN SODIUM 100 MILLIGRAM(S): 100 INJECTION SUBCUTANEOUS at 00:00

## 2021-11-29 RX ADMIN — Medication 1000 MILLIGRAM(S): at 08:30

## 2021-11-29 RX ADMIN — Medication 5 MILLILITER(S): at 22:01

## 2021-11-29 RX ADMIN — Medication 20 MILLIEQUIVALENT(S): at 09:56

## 2021-11-29 RX ADMIN — Medication 1 LOZENGE: at 10:17

## 2021-11-29 RX ADMIN — Medication 1 LOZENGE: at 06:51

## 2021-11-29 RX ADMIN — Medication 1 LOZENGE: at 17:07

## 2021-11-29 NOTE — OCCUPATIONAL THERAPY INITIAL EVALUATION ADULT - ADDITIONAL COMMENTS
CT chest angio 11/24-Saddle embolus with right heart strain.  B/L LE doppler 11/26-There is persistent bilateral DVT in the below the knee right posterior tibial, peroneal and soleal veins and above the knee in the left popliteal vein and tibial peroneal trunk.  No new DVT is seen.

## 2021-11-29 NOTE — PROGRESS NOTE ADULT - ASSESSMENT
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 On AM of 11/23 patient developed neutropenic sepsis with RRT for vasovagal episode had CTAP showing pneumoperitoneum and terminal ileitis. Patient transferred to SICU for serial abdominal exams and resuscitation.    - patient non peritoneal, however unreliable in the setting of immunosuppression  - patient high risk to undergo surgery, will monitor clinically   - IV antibiotics (meropenem/fluconazole/acyclovir)  - advance diet as tolerated   - transfer back to previous service patient was on once ready to leave ICU   - appreciate outstanding SICU care      ACS  [phone]

## 2021-11-29 NOTE — PROGRESS NOTE ADULT - ASSESSMENT
Assessment:  1. Submassive saddle PE s/p thrombectomy  - With high risk features including syncope, R heart strain on TTE, elevated cardiac enzymes  - HR 90-100s, may be masked by metoprolol  2. L DVT  3. Plasma cell myeloma s/p SCT with pancytopenia  4. Neutropenic terminal ileitis/sigmoid colitis    Plan:  1. Continue therapeutic anticoagulation, currently on Lovenox 1mg/kg BID, if unable to tolerate AC will need to consider IVC filter  2. Heme/Onc following regarding platelet goals with use of therapeutic anticoagulation, appreciate guidance  3. Please provide bilateral below knee compression stockings for symptomatic management of DVTs    Thank you    Vascular Cardiology Service  DIRECT SERVICE NUMBER [phone]  Office [phone]  email:   [email or web address]

## 2021-11-29 NOTE — PHYSICAL THERAPY INITIAL EVALUATION ADULT - PRECAUTIONS/LIMITATIONS, REHAB EVAL
Urine immunofixation negative for monoclonal band. 4/29/21 a bone marrow biopsy and aspirate was consistent with plasma cell myeloma (> 90% involvement), flow cytometry positive for monotypic plasma cells. He started cycle 1 RVD on 5/13/21. He completed 6 cycles of RVD 9/30/21. During chemotherapy he reported occasional blurry vision (negative optho workup, negative MRI brain 9/29/21) and moderate fatigue. He has no complaints on admission other than facial erythema, likely related to kepivance.

## 2021-11-29 NOTE — OCCUPATIONAL THERAPY INITIAL EVALUATION ADULT - PERTINENT HX OF CURRENT PROBLEM, REHAB EVAL
65yo M with multiple myeloma admitted for an autologous pbsct with high dose melphalan prep regimen.

## 2021-11-29 NOTE — PROGRESS NOTE ADULT - ATTENDING COMMENTS
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 He is now admitted for an autoSCT with high dose melphalan prep regimen. Transplant day 11/19/21. Hospital course has been complicated by vasovagal episodes on 11/19/21 and 11/23/21. On 11/23/21 developed neutropenic sepsis, improved with fluid resuscitation. CT on 11/23/21 showed Terminal ileitis with associated ileus versus low-grade obstruction with free air and possible bowel perforation, surgery consulted and transferred to SICU, managed conservatively for now. Patient was also found to have increasing troponins, echo showed heart strain and CTA chest on 11/24/21 showed a saddle PE s/p thrombectomy (11/24/21). Also LE doppler on 11/24/24 showed b/l DVT. Patient is hemodynamically stable, mentating well.     PE:   VSS  Gen: A/O x 3, NAD, tired  RESP: CTA, no wheeze no rhonchi  CV: S1, S2 RRR  Abd:  soft, increased distention, + hyperactive BS  LE: no edema  Neuro: CNII-XII intact, no focal deficits  Psych: appropriate    MM  -dx 4/2021  -s/p RVD x 6 (5/2021 - 9/2021) -->   -admitted for autoSCT with Ivett 100mg/M2 on 11/19/21  -hold Zarxio for now given acute events / acute abdomen  Today is day + 9  -Plt goal 40K given need for therapeutic AC     ID  BACTERIAL:  Neutropenic sepsis (11/23/21) started on meropenem (11/23 - ) and vanco x1 (11/23)  VIRAL: c/w Acyclovir 400mg po TID  FUNGAL: c/w Diflucan 400 mg po daily.  PCP prophylaxis: hold for now    HEME  Saddle PE s/p and b/l LE DVT (dx 11/24/21) - c/w therapeutic anticoagulation with heparin IV, s/p mechanical thrombectomy and IVC filter placement on 11/24/21  -keep Hb >7  -keep plt >40; keep plt >50 for procedures    GI:  Abd pain / bowel perforation on CT on 11/23/21 - tolerating clear liquids, management as per SICU team   Diarrhea - f/u stool cx  VOD prophylaxis - low dose heparin gtt (dosed at 100 units / kg / day), glutamine supplementation, Actigall BID   GI prophylaxis - Protonix po QD   Mouth care and skin care as per protocol.    The time was used discussed with patient, family, primary team, consultants, and acute management. 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 He is now admitted for an autoSCT with high dose melphalan prep regimen. Transplant day 11/19/21. Hospital course has been complicated by vasovagal episodes on 11/19/21 and 11/23/21. On 11/23/21 developed neutropenic sepsis, improved with fluid resuscitation. CT on 11/23/21 showed Terminal ileitis with associated ileus versus low-grade obstruction with free air and possible bowel perforation, surgery consulted and transferred to SICU, managed conservatively for now. Patient was also found to have increasing troponins, echo showed heart strain and CTA chest on 11/24/21 showed a saddle PE,  LE doppler on 11/24/24 showed b/l DVT. He is now s/p thrombectomy and IVC filter (11/24/21).Patient is hemodynamically stable, mentating well.     PE:   VSS  Gen: A/O x 3, NAD, tired  RESP: CTA, no wheeze no rhonchi  CV: S1, S2 RRR  Abd:  soft, increased distention, + hyperactive BS  LE: no edema  Neuro: CNII-XII intact, no focal deficits  Psych: appropriate    MM  -dx 4/2021  -s/p RVD x 6 (5/2021 - 9/2021) -->   -admitted for autoSCT with Ivett 100mg/M2 on 11/19/21  -hold Zarxio for now given acute events / acute abdomen  Today is day + 10  -Plt goal 40K given need for therapeutic AC     ID  BACTERIAL:  Neutropenic sepsis (11/23/21) started on meropenem (11/23 - ) and vanco x1 (11/23)  VIRAL: c/w Acyclovir   FUNGAL: c/w Diflucan 400 mg po daily.  PCP prophylaxis: hold for now    HEME  Saddle PE s/p and b/l LE DVT (dx 11/24/21) - c/w Lovenox, s/p mechanical thrombectomy and IVC filter placement on 11/24/21  -keep Hb >7  -keep plt >40; keep plt >50 for procedures    GI:  Abd pain / bowel perforation on CT on 11/23/21 - tolerating PO diet,  -repeat CT abd (11/29/21) -  terminal ileitis with mild increased fluid distention of the upstream small bowel, compatible with ileus versus partial small bowel obstruction. Unchanged trace loculated ascites with peritoneal enhancement at the level of the terminal ileum, suggesting peritonitis. However, there has been interval resolution of the extraluminal free air since 11/23/2021.  -management as per SICU team   Diarrhea - stool cx neg  VOD prophylaxis - glutamine supplementation, Actigall BID   GI prophylaxis - Protonix po QD   Mouth care and skin care as per protocol.    The time was used discussed with patient, family, primary team, consultants, and acute management.

## 2021-11-29 NOTE — PROGRESS NOTE ADULT - SUBJECTIVE AND OBJECTIVE BOX
Vascular Cardiology  Progress note  DIRECT SERVICE NUMBER: [phone]            EMAIL [email or web address]   OFFICE [phone]    CC:  Syncope    INTERVAL HISTORY: No complaints, remains pancytopenic, has worsening RLQ tenderness, awaiting results of CT scan. Denies chest pain, shortness of breath       Allergies  Omnicef (Unknown)  penicillin (Hives)    Intolerances    MEDICATIONS:  enoxaparin Injectable 100 milliGRAM(s) SubCutaneous every 12 hours  metoprolol tartrate 25 milliGRAM(s) Oral every 12 hours  tamsulosin 0.4 milliGRAM(s) Oral at bedtime  acyclovir IVPB 490 milliGRAM(s) IV Intermittent every 8 hours  clotrimazole Lozenge 1 Lozenge Oral five times a day  fluconAZOLE IVPB 400 milliGRAM(s) IV Intermittent every 24 hours  meropenem  IVPB 1000 milliGRAM(s) IV Intermittent every 8 hours  acetaminophen     Tablet .. 650 milliGRAM(s) Oral every 6 hours PRN  metoclopramide Injectable 10 milliGRAM(s) IV Push every 6 hours PRN  pantoprazole    Tablet 40 milliGRAM(s) Oral before breakfast  Biotene Dry Mouth Oral Rinse 5 milliLiter(s) Swish and Spit five times a day  chlorhexidine 2% Cloths 1 Application(s) Topical <User Schedule>  lidocaine/prilocaine Cream 1 Application(s) Topical daily      PAST MEDICAL & SURGICAL HISTORY:  Hyperlipidemia    Shortness of breath on exertion    Lumbar herniated disc    T12 compression fracture    Acute lumbar radiculopathy    History of basal cell carcinoma    History of surgery on wrist    FAMILY HISTORY:  No pertinent family history in first degree relatives    SOCIAL HISTORY:  unchanged    REVIEW OF SYSTEMS:  CONSTITUTIONAL: No fever, weight loss, or fatigue  EYES: No eye pain, visual disturbances, or discharge  ENMT:  No difficulty hearing, tinnitus, vertigo; No sinus or throat pain  NECK: No pain or stiffness  RESPIRATORY: No cough, wheezing, chills or hemoptysis; No Shortness of Breath  CARDIOVASCULAR: No chest pain, palpitations, passing out, dizziness, or leg swelling  GASTROINTESTINAL: No nausea, vomiting, or hematemesis; No diarrhea or constipation. No melena or hematochezia. +abdominal pain  GENITOURINARY: No dysuria, frequency, hematuria, or incontinence  NEUROLOGICAL: No headaches, memory loss, loss of strength, numbness, or tremors  SKIN: No itching, burning, rashes, or lesions   LYMPH Nodes: No enlarged glands  ENDOCRINE: No heat or cold intolerance; No hair loss  MUSCULOSKELETAL: No joint pain or swelling; No muscle, back, or extremity pain  PSYCHIATRIC: No depression, anxiety, mood swings, or difficulty sleeping  HEME/LYMPH: No easy bruising, or bleeding gums  ALLERY AND IMMUNOLOGIC: No hives or eczema	    [ x] All others negative	    PHYSICAL EXAM:  T(C): 36.9 (11-29-21 @ 12:00), Max: 37.5 (11-28-21 @ 19:00)  HR: 80 (11-29-21 @ 12:00) (72 - 95)  BP: 128/69 (11-29-21 @ 12:00) (112/70 - 142/75)  RR: 13 (11-29-21 @ 12:00) (12 - 22)  SpO2: 97% (11-29-21 @ 12:00) (93% - 99%)  Wt(kg): --  I&O's Summary    28 Nov 2021 07:01  -  29 Nov 2021 07:00  --------------------------------------------------------  IN: 2958 mL / OUT: 990 mL / NET: 1968 mL    29 Nov 2021 07:01  -  29 Nov 2021 13:12  --------------------------------------------------------  IN: 0 mL / OUT: 385 mL / NET: -385 mL    Appearance: Normal	  HEENT:   Normal oral mucosa, EOMI, laying comfortably in bed  Cardiovascular: Normal S1 S2, No murmurs, No edema  Respiratory: Lungs clear to auscultation anteriorly  Psychiatry: A & O x 3, Mood & affect appropriate  Gastrointestinal: Mildly distended  Skin: No rashes, No ecchymoses, No cyanosis	  Neurologic: Non-focal  Extremities: Normal range of motion, No clubbing, cyanosis. Bilateral edema to knees  Vascular:   Right DP:  Palpable             Left DP:  Palpable      LABS:	 	    CBC Full  -  ( 29 Nov 2021 06:19 )  WBC Count : 0.88 K/uL  Hemoglobin : 8.0 g/dL  Hematocrit : 24.4 %  Platelet Count - Automated : 31 K/uL  Mean Cell Volume : 96.8 fl  Mean Cell Hemoglobin : 31.7 pg  Mean Cell Hemoglobin Concentration : 32.8 gm/dL  Auto Neutrophil # : 0.54 K/uL  Auto Lymphocyte # : 0.15 K/uL  Auto Monocyte # : 0.18 K/uL  Auto Eosinophil # : 0.00 K/uL  Auto Basophil # : 0.00 K/uL  Auto Neutrophil % : 61.4 %  Auto Lymphocyte % : 16.6 %  Auto Monocyte % : 20.2 %  Auto Eosinophil % : 0.0 %  Auto Basophil % : 0.0 %    11-29    141  |  112<H>  |  11  ----------------------------<  116<H>  3.6   |  18<L>  |  0.62  11-28    138  |  109<H>  |  13  ----------------------------<  117<H>  3.8   |  18<L>  |  0.62    Ca    6.6<L>      29 Nov 2021 06:18  Ca    6.9<L>      28 Nov 2021 12:53  Phos  1.6     11-29  Phos  2.1     11-28  Mg     1.8     11-29  Mg     2.1     11-28    TPro  4.8<L>  /  Alb  2.7<L>  /  TBili  0.2  /  DBili  <0.1  /  AST  26  /  ALT  30  /  AlkPhos  92  11-29  TPro  5.0<L>  /  Alb  2.7<L>  /  TBili  0.1<L>  /  DBili  <0.1  /  AST  22  /  ALT  18  /  AlkPhos  81  11-28

## 2021-11-29 NOTE — PHYSICAL THERAPY INITIAL EVALUATION ADULT - ADDITIONAL COMMENTS
PTA pt was independent with functional mobility and ADLs. Pt lives in a PH with his wife 2 steps to enter 1 flight to negotiate

## 2021-11-29 NOTE — PHYSICAL THERAPY INITIAL EVALUATION ADULT - PERTINENT HX OF CURRENT PROBLEM, REHAB EVAL
67yo M with multiple myeloma admitted for an autologous pbsct with high dose melphalan prep regimen. Hematologic history as follows: 11/2020 was found to have a T-12 compression fracture. He saw orthopedics 1/2021, and was referred to endocrine. 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 Serum immunofixation showed 1 IgD lambda band and free lambda light chains.

## 2021-11-29 NOTE — PROGRESS NOTE ADULT - ASSESSMENT
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 On AM of 11/23 patient developed neutropenic sepsis with RRT for vasovagal episode had CTAP showing pneumoperitoneum and terminal ileitis. Admitted to SICU for HD monitoring and serial abdominal exams.    PLAN:    NEURO:  Pain control with acetaminophen, dilaudid PRN  No other active issues    RESPIRATORY:   No active issues  Saturating well on 2L NC  Daily cxr     CARDIOVASCULAR:  Troponemia 8-->346 ------>58-> , 47, cease trending  Cardiology c/s  TTE EF 70-75, no actionable findings   lopressor 2.5 q6h  RPT duplex LE no propagation of DVT    GI/NUTRITION:  Pneumoperitoneum and terminal ileitis  nonop management per sx  Regular diet  serial abdominal exams    GENITOURINARY/RENAL:  No active issues  Monitor UOP  F/u UA  50mL /hr LR     HEMATOLOGIC:  MM s/p autologous SCT  zarxio qd per heme  appreciate heme/onc recs--Plt goal >40  Therapeutic dose lovenox   F/U PTT    INFECTIOUS DISEASE:  neutropenic  rodrigo 1g q8h empirically for suspected intrabdominal infx i/s/o pneumoperitoneum  fluc/acyclovir for ppx s/p SCT  ID following    ENDOCRINE:  No active issues    DISPO:  Stable for transfer to BMT floor

## 2021-11-29 NOTE — OCCUPATIONAL THERAPY INITIAL EVALUATION ADULT - PRECAUTIONS/LIMITATIONS, REHAB EVAL
Hematologic history as follows: 11/2020 was found to have a T-12 compression fracture. He saw orthopedics 1/2021, and was referred to endocrine. 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 Serum immunofixation showed 1 IgD lambda band and free lambda light chains. Urine immunofixation negative for monoclonal band. 4/29/21 a bone marrow biopsy and aspirate was consistent with plasma cell myeloma (> 90% involvement), flow cytometry positive for monotypic plasma cells. He started cycle 1 RVD on 5/13/21. He completed 6 cycles of RVD 9/30/21. During chemotherapy he reported occasional blurry vision (negative optho workup, negative MRI brain 9/29/21) and moderate fatigue. He has no complaints on admission other than facial erythema, likely related to kepivance./fall precautions/isolation precautions

## 2021-11-29 NOTE — PROGRESS NOTE ADULT - ATTENDING COMMENTS
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 On AM of 11/23 patient developed neutropenic sepsis with RRT for vasovagal episode had CTAP showing pneumoperitoneum and terminal ileitis.    Hemodynamically stable  Heparin drip changed to Lovenox. HCT have been stable. Received one unit platelets for thrombocytopenia. g  Abd soft, tolerating PO  DC IVF  Abx  Meropenem, CT A/P to assess perforation   Ppx abx acyclovir Bactrim and Fluconazole

## 2021-11-29 NOTE — PROGRESS NOTE ADULT - SUBJECTIVE AND OBJECTIVE BOX
HISTORY  66y Male    24 HOUR EVENTS:  66y Male multiple myeloma admitted for an autologous pbsct with high dose melphalan prep regimen. Hematologic history as follows: 11/2020 was found to have a T-12 compression fracture. He saw orthopedics 1/2021, and was referred to endocrine. 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 Serum immunofixation showed 1 IgD lambda band and free lambda light chains. Urine immunofixation negative for monoclonal band. 4/29/21 a bone marrow biopsy and aspirate was consistent with plasma cell myeloma (> 90% involvement), flow cytometry positive for monotypic plasma cells. He started cycle 1 RVD on 5/13/21. He completed 6 cycles of RVD 9/30/21. During chemotherapy he reported occasional blurry vision (negative optho workup, negative MRI brain 9/29/21) and moderate fatigue. He has no complaints on admission other than facial erythema, likely related to kepivance. Present on SICU for RR being called on floor, found to have pneumoperitoneum, + trops, submassive PE with large clot burden.     SUBJECTIVE/ROS:  [ x ] A ten-point review of systems was otherwise negative except as noted.    NEURO  Exam: AxOx3 no acute distress, pain adequately controlled   Meds: acetaminophen     Tablet .. 650 milliGRAM(s) Oral every 6 hours PRN Mild Pain (1 - 3)  metoclopramide Injectable 10 milliGRAM(s) IV Push every 6 hours PRN Nausea and/or Vomiting    [x] Adequacy of sedation and pain control has been assessed and adjusted    RESPIRATORY  RR: 16 (11-28-21 @ 22:00) (12 - 22)  SpO2: 97% (11-28-21 @ 22:00) (94% - 99%)  Wt(kg): --  Exam: unlabored, clear to auscultation bilaterally  Mechanical Ventilation:     [N/A] Extubation Readiness Assessed  Meds:       CARDIOVASCULAR  HR: 72 (11-28-21 @ 22:00) (69 - 87)  BP: 133/72 (11-28-21 @ 22:00) (117/77 - 146/74)  BP(mean): 96 (11-28-21 @ 22:00) (90 - 108)  ABP: --  ABP(mean): --  Wt(kg): --  CVP(cm H2O): --  VBG - ( 27 Nov 2021 03:03 )  pH: 7.41  /  pCO2: 35    /  pO2: 46    / HCO3: 22    / Base Excess: -2.1  /  SaO2: 78.8   Lactate: 0.9      Exam: regular rate and rhythm  Cardiac Rhythm: sinus  Perfusion     [x]Adequate   [ ]Inadequate  Mentation   [x]Normal       [ ]Reduced  Extremities  [x]Warm         [ ]Cool  Volume Status [ ]Hypervolemic [x]Euvolemic [ ]Hypovolemic  Meds: metoprolol tartrate 25 milliGRAM(s) Oral every 12 hours    GI/NUTRITION  Exam: soft, nontender, mild distension  Diet: Regular  Meds: pantoprazole    Tablet 40 milliGRAM(s) Oral before breakfast    GENITOURINARY  I&O's Detail    11-27 @ 07:01  -  11-28 @ 07:00  --------------------------------------------------------  IN:    dextrose 5% + lactated ringers: 1200 mL    Heparin: 408 mL    IV PiggyBack: 1050 mL    IV PiggyBack: 940 mL    Oral Fluid: 100 mL    Platelets - Single Donor: 225 mL  Total IN: 3923 mL    OUT:    Indwelling Catheter - Urethral (mL): 1400 mL  Total OUT: 1400 mL    Total NET: 2523 mL      11-28 @ 07:01  -  11-29 @ 00:18  --------------------------------------------------------  IN:    dextrose 5% + lactated ringers: 750 mL    Heparin: 51 mL    IV PiggyBack: 350 mL    IV PiggyBack: 50 mL    IV PiggyBack: 250 mL    Oral Fluid: 650 mL    Platelets - Single Donor: 207 mL  Total IN: 2308 mL    OUT:    Indwelling Catheter - Urethral (mL): 640 mL  Total OUT: 640 mL    Total NET: 1668 mL    11-28    138  |  109<H>  |  13  ----------------------------<  117<H>  3.8   |  18<L>  |  0.62    Ca    6.9<L>      28 Nov 2021 12:53  Phos  2.1     11-28  Mg     2.1     11-28    TPro  5.0<L>  /  Alb  2.7<L>  /  TBili  0.1<L>  /  DBili  <0.1  /  AST  22  /  ALT  18  /  AlkPhos  81  11-28    [ ] Ortega catheter, indication: N/A  Meds: dextrose 5% + lactated ringers. 1000 milliLiter(s) IV Continuous <Continuous>    HEMATOLOGIC  Meds: enoxaparin Injectable 100 milliGRAM(s) SubCutaneous every 12 hours    [x] VTE Prophylaxis                        7.9    0.63  )-----------( 41       ( 28 Nov 2021 19:08 )             23.8     PTT - ( 28 Nov 2021 00:25 )  PTT:73.4 sec  Transfusion     [ ] PRBC   [ ] Platelets   [ ] FFP   [ ] Cryoprecipitate      INFECTIOUS DISEASES  WBC Count: 0.63 K/uL (11-28 @ 19:08)  WBC Count: 0.54 K/uL (11-28 @ 12:53)  WBC Count: 0.33 K/uL (11-28 @ 00:25)    RECENT CULTURES:    Meds: acyclovir IVPB 490 milliGRAM(s) IV Intermittent every 8 hours  clotrimazole Lozenge 1 Lozenge Oral five times a day  fluconAZOLE IVPB 400 milliGRAM(s) IV Intermittent every 24 hours  meropenem  IVPB 1000 milliGRAM(s) IV Intermittent every 8 hours    ENDOCRINE  CAPILLARY BLOOD GLUCOSE    Meds:       ACCESS DEVICES:  [ ] Peripheral IV  [ ] Central Venous Line	[ ] R	[ ] L	[ ] IJ	[ ] Fem	[ ] SC	Placed:   [ ] Arterial Line		[ ] R	[ ] L	[ ] Fem	[ ] Rad	[ ] Ax	Placed:   [ ] PICC:					[ ] Mediport  [ ] Urinary Catheter, Date Placed:   [x] Necessity of urinary, arterial, and venous catheters discussed    OTHER MEDICATIONS:  Biotene Dry Mouth Oral Rinse 5 milliLiter(s) Swish and Spit five times a day  chlorhexidine 2% Cloths 1 Application(s) Topical <User Schedule>  lidocaine/prilocaine Cream 1 Application(s) Topical daily

## 2021-11-29 NOTE — PROGRESS NOTE ADULT - SUBJECTIVE AND OBJECTIVE BOX
Follow Up:  Perforation    Interval History: afebrile. denies abdominal pain.     REVIEW OF SYSTEMS  [  ] ROS unobtainable because:    [x ] All other systems negative except as noted below    Constitutional:  [ ] fever [ ] chills  [ ] weight loss  [ ] weakness  Skin:  [ ] rash [ ] phlebitis	  Eyes: [ ] icterus [ ] pain  [ ] discharge	  ENMT: [ ] sore throat  [ ] thrush [ ] ulcers [ ] exudates  Respiratory: [ ] dyspnea [ ] hemoptysis [ ] cough [ ] sputum	  Cardiovascular:  [ ] chest pain [ ] palpitations [ ] edema	  Gastrointestinal:  [ ] nausea [ ] vomiting [ ] diarrhea [ ] constipation [ ] pain	  Genitourinary:  [ ] dysuria [ ] frequency [ ] hematuria [ ] discharge [ ] flank pain  [ ] incontinence  Musculoskeletal:  [ ] myalgias [ ] arthralgias [ ] arthritis  [ ] back pain  Neurological:  [ ] headache [ ] seizures  [ ] confusion/altered mental status    Allergies  Omnicef (Unknown)  penicillin (Hives)        ANTIMICROBIALS:  acyclovir IVPB 490 every 8 hours  clotrimazole Lozenge 1 five times a day  fluconAZOLE IVPB 400 every 24 hours  meropenem  IVPB 1000 every 8 hours      OTHER MEDS:  MEDICATIONS  (STANDING):  acetaminophen     Tablet .. 650 every 6 hours PRN  enoxaparin Injectable 100 every 12 hours  metoclopramide Injectable 10 every 6 hours PRN  metoprolol tartrate 25 every 8 hours  pantoprazole    Tablet 40 before breakfast  tamsulosin 0.4 at bedtime      Vital Signs Last 24 Hrs  T(C): 37 (29 Nov 2021 16:00), Max: 37.4 (29 Nov 2021 03:00)  T(F): 98.6 (29 Nov 2021 16:00), Max: 99.3 (29 Nov 2021 03:00)  HR: 73 (29 Nov 2021 19:00) (72 - 95)  BP: 126/69 (29 Nov 2021 19:00) (112/70 - 139/69)  BP(mean): 91 (29 Nov 2021 19:00) (81 - 98)  RR: 17 (29 Nov 2021 19:00) (12 - 22)  SpO2: 99% (29 Nov 2021 19:00) (93% - 99%)    PHYSICAL EXAMINATION:  General: Alert and Awake, NAD  Cardiac: RRR, No M/R/G  Resp: CTAB, No Wh/Rh/Ra  Abdomen: NBS, no abdominal tenderness. No HSM, No rigidity or guarding  MSK: No LE edema. No Calf tenderness  Skin: No rashes or lesions. Skin is warm and dry to the touch.   Neuro: Alert and Awake. CN 2-12 Grossly intact. Moves all four extremities spontaneously.  Psych: Calm, Pleasant, Cooperative                          8.2    1.18  )-----------( 62       ( 29 Nov 2021 17:21 )             25.0       11-29    139  |  110<H>  |  11  ----------------------------<  108<H>  4.0   |  18<L>  |  0.60    Ca    6.8<L>      29 Nov 2021 17:21  Phos  2.2     11-29  Mg     2.7     11-29    TPro  4.8<L>  /  Alb  2.7<L>  /  TBili  0.2  /  DBili  <0.1  /  AST  26  /  ALT  30  /  AlkPhos  92  11-29          MICROBIOLOGY:  v  .Blood Blood  11-23-21   No Growth Final  --  --      STER [id number]  11-19-21   No growth  --  --      Clean Catch Clean Catch (Midstream)  11-19-21   No growth  --  --      STER [id number]  11-03-21   No growth at 14 days.  --  --      STER [id number]  11-02-21   No growth at 14 days.  --    No organisms seen      STER [id number]  11-01-21   No growth at 14 days.  --  --    Clostridium difficile GDH Toxins A&amp;B, EIA:   Negative (11-27-21 @ 11:31)  Clostridium difficile GDH Interpretation: Negative for toxigenic C. Difficile.  This specimen is negative for C.  Difficile glutamate dehydrogenase (GDH) antigen and negative for C.  Difficile Toxins A & B, by EIA.  GDH is a highly sensitive screening  marker for C. Difficile that is produced in large amounts by all C.  Difficile strains, both toxigenic and nontoxigenic.  This assay has not  been validated as a test of cure.  Repeat testing during the same episode  of diarrhea is of limited value and is discouraged.  The results of this  assay should always be interpreted in conjunction with pateint's clinical  history. (11-27-21 @ 11:31)      RADIOLOGY:    <The imaging below has been reviewed and visualized by me independently. Findings as detailed in report below>    < from: Xray Chest 1 View- PORTABLE-Routine (Xray Chest 1 View- PORTABLE-Routine in AM.) (11.28.21 @ 07:22) >  IMPRESSION:  Clear lungs.    < end of copied text >

## 2021-11-29 NOTE — OCCUPATIONAL THERAPY INITIAL EVALUATION ADULT - LIVES WITH, PROFILE
Pvt home, 3 steps to enter. Bed and bath 2nd floor. (I) ADLs and functional transfers without AD/DME. (+) /children/spouse

## 2021-11-29 NOTE — PROGRESS NOTE ADULT - SUBJECTIVE AND OBJECTIVE BOX
[facility] Transplant Team                                                      Critical / Counseling Time Provided: 30 minutes                                                                                                                                                        Chief Complaint:     S: Patient seen and examined with [facility] Transplant Team:   Denies mouth / tongue / throat pain, dyspnea, cough, nausea, vomiting, diarrhea, abdominal pain     O: Vitals:   Vital Signs Last 24 Hrs  T(C): 37.4 (2021 03:00), Max: 37.5 (2021 19:00)  T(F): 99.3 (2021 03:00), Max: 99.5 (2021 19:00)  HR: 95 (2021 07:00) (71 - 95)  BP: 123/74 (2021 07:00) (112/70 - 142/75)  BP(mean): 90 (2021 07:00) (85 - 104)  RR: 18 (2021 07:00) (12 - 22)  SpO2: 97% (2021 07:00) (93% - 99%)    Admit weight:   Daily     Daily Weight in k.8 (2021 04:00)    Intake / Output:    @ 07:01  -   @ 07:00  --------------------------------------------------------  IN: 2958 mL / OUT: 990 mL / NET: 1968 mL    PE:   Oropharynx: + erythema and post Kepivance coating no ulcerations   Oral Mucositis:              +                                         ndGndrndanddndend:nd nd2nd CVS: S1, S2 RRR   Lungs: CTA throughout bilaterally   Abdomen: + BS x 4, soft, mild distended,  mild tenderness   Extremities: +1/2-1 edema   Gastric Mucositis:       -                                           Grade: n/a  Intestinal Mucositis:     -                                         Grade: n/a   Skin: patchy, erythematous maculopapular rash to face, neck and upper chest and back post Kepivance   TLC: CDI   Neuro: A&O x 3     Labs:   CBC Full  -  ( 2021 06:19 )  WBC Count : 0.88 K/uL  Hemoglobin : 8.0 g/dL  Hematocrit : 24.4 %  Platelet Count - Automated : 31 K/uL  Mean Cell Volume : 96.8 fl  Mean Cell Hemoglobin : 31.7 pg  Mean Cell Hemoglobin Concentration : 32.8 gm/dL  Auto Neutrophil # : x  Auto Lymphocyte # : x  Auto Monocyte # : x  Auto Eosinophil # : x  Auto Basophil # : x  Auto Neutrophil % : x  Auto Lymphocyte % : x  Auto Monocyte % : x  Auto Eosinophil % : x  Auto Basophil % : x                          8.0    0.88  )-----------(        ( 2021 06:19 )             24.4         141  |  112<H>  |  11  ----------------------------<  116<H>  3.6   |  18<L>  |  0.62    Ca    6.6<L>      2021 06:18  Phos  1.6       Mg     1.8         TPro  4.8<L>  /  Alb  2.7<L>  /  TBili  0.2  /  DBili  <0.1  /  AST  26  /  ALT  30  /  AlkPhos  92      PTT - ( 2021 06:19 )  PTT:38.6 sec  LIVER FUNCTIONS - ( 2021 06:18 )  Alb: 2.7 g/dL / Pro: 4.8 g/dL / ALK PHOS: 92 U/L / ALT: 30 U/L / AST: 26 U/L / GGT: x           Lactate Dehydrogenase, Serum: 183 U/L ( @ 06:18)    Cultures:         Radiology:       Meds:   Antimicrobials:   acyclovir IVPB 490 milliGRAM(s) IV Intermittent every 8 hours  clotrimazole Lozenge 1 Lozenge Oral five times a day  fluconAZOLE IVPB 400 milliGRAM(s) IV Intermittent every 24 hours  meropenem  IVPB 1000 milliGRAM(s) IV Intermittent every 8 hours    Heme / Onc:   enoxaparin Injectable 100 milliGRAM(s) SubCutaneous every 12 hours    GI:  pantoprazole    Tablet 40 milliGRAM(s) Oral before breakfast    Cardiovascular:   metoprolol tartrate 25 milliGRAM(s) Oral every 12 hours    Other medications:   Biotene Dry Mouth Oral Rinse 5 milliLiter(s) Swish and Spit five times a day  chlorhexidine 2% Cloths 1 Application(s) Topical <User Schedule>  lidocaine/prilocaine Cream 1 Application(s) Topical daily    PRN:   acetaminophen     Tablet .. 650 milliGRAM(s) Oral every 6 hours PRN  metoclopramide Injectable 10 milliGRAM(s) IV Push every 6 hours PRN    A/P:  66 year old male with a history of multiple myeloma s/p RVD x 6   Post Autologous PBSCT day +  10  - melphalan 2/; s/p melphalan hydration for 24 hours post infusion of last dose   Strict I&O, daily weights, prn diuresis   - rest day   - rest day   - s/p HPC transplant; completed transplant hydration for 24 hours post infusion of cells. Suprapubic pain in am. UA pending, follow up culture, BK virus. AXR   -vasovegal episode in AM: will monitor tele for 24hrs    d/c telemetry. Lasix 40mg IV x today, follow up I&Os, daily weight. monitor rash, receiving 2nd dose Kepivance today   add Emend for 3 days and change zofran ATC. If worsening abd pain consider CT a/p oral contrast only.   - Neutropenic started on cipro once febrile will pancx and switch cipro to  Aztronam 2g Q8   - AMS this am, agonal breathing, diaphoretic, cool and clammy - responsive to sternal rub. Hypotensive, hypoxic. Placed on NRB, RRT called. Labs sent. Lactate send. PCN allergy - started on Aztreonam 2g IV q 8 hoursm Flagyl 500mg IV TID for anaerobic coverage given abdominal pain and distension, Vancomycin 1g IV x 1. CT A/P pending for 12:30 21. CE with new TWI in V2-V6 - likely demand ischemia. Repeat CE and lactate at 2pm.   - CT A/P with Terminal ileitis with associated ileus versus low-grade obstruction. Pockets of free air in the right lower quadrant adjacent to the terminal ileum concerning for bowel perforation. 2.6 cm loculated fluid adjacent to the terminal ileum and surrounding peritoneal enhancement suggestive of developing peritonitis. Surgical team consulted- transferred to 8ICU for closer monitoring.  ID consult called- ABX fredy.    - CT chest angio + Saddle embolus with right heart strain., seen by cards- started on full dose A/C, s/p IVC filter placement with thrombectomy: transfuse PLT to >50K . doppler + B/L DVT. D/C Zarxio   on clear liq diet, watery diarrhea, C diff(-)   Transitioned from Heparin gtt to Lovenox - D/W ICU MD, recommend keeping keeping plt's >40k while on AC.     1. Infectious Disease:   acyclovir IVPB 490 milliGRAM(s) IV Intermittent every 8 hours  clotrimazole Lozenge 1 Lozenge Oral five times a day  fluconAZOLE IVPB 400 milliGRAM(s) IV Intermittent every 24 hours  meropenem  IVPB 1000 milliGRAM(s) IV Intermittent every 8 hours  monitor diarrhea closely     2. VOD Prophylaxis: Actigall, Glutamine, Heparin (Full A/C)     3. GI Prophylaxis:    pantoprazole    Tablet 40 erick GRAM(s) Oral before breakfast    4. Mouth care - NS / NaHCO3 rinses, Mycelex, Biotene; Skin care     5. GVHD prophylaxis - n/a     6. Transfuse & replete electrolytes prn   transfuse for PLT less than 40K (while on AC) or below 50 with planned surgical intervention hemoglobin less then 7 of symptomatic.    7. IV hydration, daily weights, strict I&O, prn diuresis     8. PO intake as tolerated, nutrition follow up as needed, MVI, folic acid     9. Antiemetics, anti-diarrhea medications:   LORazepam   Injectable 1 milliGRAM(s) IV Push every 24 hours  ondansetron Injectable 8 milliGRAM(s) IV Push every 8 hours  metoclopramide Injectable 10 milliGRAM(s) IV Push every 6 hours PRN  aprepitant 80 milliGRAM(s) Oral daily    10. OOB as tolerated, physical therapy consult if needed     11. Monitor coags / fibrinogen 2x week, vitamin K as needed     12. Monitor closely for clinical changes, monitor for fevers     13. Emotional support provided, plan of care discussed and questions addressed     14. Patient education done regarding plan of care, restrictions and discharge planning     15. Continue regular social work input     I have written the above note for Dr. Justice who performed service with me in the room.   Anika Christiansen  NP-C ([phone])    I have seen and examined patient with NP, I agree with above note as scribed.                    HPC Transplant Team                                                      Critical / Counseling Time Provided: 30 minutes                                                                                                                                                        Chief Complaint: Autologous peripheral blood stem cell transplant with high dose Melphalan prep regimen for treatment of multiple myeloma    S: Patient seen and examined with [facility] Transplant Team:  Feeling better  today, OOB to chair    Denies any chest pain, palpitation, SOB, nausea, vomiting or diarrhea.    O: Vitals:   Vital Signs Last 24 Hrs  T(C): 37.4 (29 Nov 2021 03:00), Max: 37.5 (28 Nov 2021 19:00)  T(F): 99.3 (29 Nov 2021 03:00), Max: 99.5 (28 Nov 2021 19:00)  HR: 95 (29 Nov 2021 07:00) (71 - 95)  BP: 123/74 (29 Nov 2021 07:00) (112/70 - 142/75)  BP(mean): 90 (29 Nov 2021 07:00) (85 - 104)  RR: 18 (29 Nov 2021 07:00) (12 - 22)  SpO2: 97% (29 Nov 2021 07:00) (93% - 99%)    Admit weight: 98.4 kg.    Intake / Output:   11-28 @ 07:01  -  11-29 @ 07:00  --------------------------------------------------------  IN: 2958 mL / OUT: 990 mL / NET: 1968 mL    PE:   Oropharynx: + erythema and post Kepivance coating no ulcerations   Oral Mucositis:              +                                         ndGndrndanddndend:nd nd2nd CVS: S1, S2 RRR   Lungs: CTA throughout bilaterally   Abdomen: + BS x 4, soft, mild distended,  mild tenderness   Extremities: +1/2-1 edema   Gastric Mucositis:       -                                           Grade: n/a  Intestinal Mucositis:     -                                         Grade: n/a   Skin: patchy, erythematous maculopapular rash to face, neck and upper chest and back post Kepivance   TLC: CDI   Neuro: A&O x 3     Labs:   CBC Full  -  ( 29 Nov 2021 06:19 )  WBC Count : 0.88 K/uL  Hemoglobin : 8.0 g/dL  Hematocrit : 24.4 %  Platelet Count - Automated : 31 K/uL  Mean Cell Volume : 96.8 fl  Mean Cell Hemoglobin : 31.7 pg  Mean Cell Hemoglobin Concentration : 32.8 gm/dL  Auto Neutrophil # : x  Auto Lymphocyte # : x  Auto Monocyte # : x  Auto Eosinophil # : x  Auto Basophil # : x  Auto Neutrophil % : x  Auto Lymphocyte % : x  Auto Monocyte % : x  Auto Eosinophil % : x  Auto Basophil % : x                          8.0    0.88  )-----------( 31       ( 29 Nov 2021 06:19 )             24.4     11-29    141  |  112<H>  |  11  ----------------------------<  116<H>  3.6   |  18<L>  |  0.62    Ca    6.6<L>      29 Nov 2021 06:18  Phos  1.6     11-29  Mg     1.8     11-29    TPro  4.8<L>  /  Alb  2.7<L>  /  TBili  0.2  /  DBili  <0.1  /  AST  26  /  ALT  30  /  AlkPhos  92  11-29    PTT - ( 29 Nov 2021 06:19 )  PTT:38.6 sec  LIVER FUNCTIONS - ( 29 Nov 2021 06:18 )  Alb: 2.7 g/dL / Pro: 4.8 g/dL / ALK PHOS: 92 U/L / ALT: 30 U/L / AST: 26 U/L / GGT: x           Lactate Dehydrogenase, Serum: 183 U/L (11-29 @ 06:18)    Cultures:   Culture - Blood (11.23.21 @ 12:32)    Specimen Source: .Blood Blood    Culture Results:   No Growth Final    Culture - Urine (11.19.21 @ 12:26)    Specimen Source: Clean Catch Clean Catch (Midstream)    Culture Results:   No growth    Radiology:   Xray Chest 1 View- PORTABLE-Routine (Xray Chest 1 View- PORTABLE-Routine in AM.) (11.28.21 @ 07:22) >  Clear lungs.    Meds:   Antimicrobials:   acyclovir IVPB 490 milliGRAM(s) IV Intermittent every 8 hours  clotrimazole Lozenge 1 Lozenge Oral five times a day  fluconAZOLE IVPB 400 milliGRAM(s) IV Intermittent every 24 hours  meropenem  IVPB 1000 milliGRAM(s) IV Intermittent every 8 hours    Heme / Onc:   enoxaparin Injectable 100 milliGRAM(s) SubCutaneous every 12 hours    GI:  pantoprazole    Tablet 40 milliGRAM(s) Oral before breakfast    Cardiovascular:   metoprolol tartrate 25 milliGRAM(s) Oral every 12 hours    Other medications:   Biotene Dry Mouth Oral Rinse 5 milliLiter(s) Swish and Spit five times a day  chlorhexidine 2% Cloths 1 Application(s) Topical <User Schedule>  lidocaine/prilocaine Cream 1 Application(s) Topical daily    PRN:   acetaminophen     Tablet .. 650 milliGRAM(s) Oral every 6 hours PRN  metoclopramide Injectable 10 milliGRAM(s) IV Push every 6 hours PRN    A/P:  66 year old male with a history of multiple myeloma s/p RVD x 6   Post Autologous PBSCT day +  10  11/16- melphalan 2/2; s/p melphalan hydration for 24 hours post infusion of last dose   Strict I&O, daily weights, prn diuresis   11/17- rest day   11/18- rest day   11/19- s/p HPC transplant; completed transplant hydration for 24 hours post infusion of cells. Suprapubic pain in am. UA pending, follow up culture, BK virus. AXR   11/19-vasovegal episode in AM: will monitor tele for 24hrs   11/20 d/c telemetry. Lasix 40mg IV x today, follow up I&Os, daily weight. monitor rash, receiving 2nd dose Kepivance today  11/21 add Emend for 3 days and change zofran ATC. If worsening abd pain consider CT a/p oral contrast only.   11/22- Neutropenic started on cipro once febrile will pancx and switch cipro to  Aztronam 2g Q8   11/23- AMS this am, agonal breathing, diaphoretic, cool and clammy - responsive to sternal rub. Hypotensive, hypoxic. Placed on NRB, RRT called. Labs sent. Lactate send. PCN allergy - started on Aztreonam 2g IV q 8 hoursm Flagyl 500mg IV TID for anaerobic coverage given abdominal pain and distension, Vancomycin 1g IV x 1. CT A/P pending for 12:30 11/23/21. CE with new TWI in V2-V6 - likely demand ischemia. Repeat CE and lactate at 2pm.   11/23- CT A/P with Terminal ileitis with associated ileus versus low-grade obstruction. Pockets of free air in the right lower quadrant adjacent to the terminal ileum concerning for bowel perforation. 2.6 cm loculated fluid adjacent to the terminal ileum and surrounding peritoneal enhancement suggestive of developing peritonitis. Surgical team consulted- transferred to 8ICU for closer monitoring.  ID consult called- JESUS daniel.    11/24- CT chest angio + Saddle embolus with right heart strain., seen by cards- started on full dose A/C, s/p IVC filter placement with thrombectomy: transfuse PLT to >50K . doppler + B/L DVT. D/C Zarxio  11/27 on clear liq diet, watery diarrhea, C diff(-)  11/28 Transitioned from Heparin gtt to Lovenox - D/W ICU MD, recommend keeping keeping plt's >40k while on AC.     1. Infectious Disease:   acyclovir IVPB 490 milliGRAM(s) IV Intermittent every 8 hours  clotrimazole Lozenge 1 Lozenge Oral five times a day  fluconAZOLE IVPB 400 milliGRAM(s) IV Intermittent every 24 hours  meropenem  IVPB 1000 milliGRAM(s) IV Intermittent every 8 hours    2. VOD Prophylaxis: Actigall, Glutamine, Heparin (Full A/C)     3. GI Prophylaxis:    pantoprazole Tablet 40 erick GRAM(s) Oral before breakfast    4. Mouth care - NS / NaHCO3 rinses, Mycelex, Biotene; Skin care     5. GVHD prophylaxis - n/a     6. Transfuse & replete electrolytes prn   transfuse for PLT less than 40K (while on AC) or below 50 with planned surgical intervention hemoglobin less then 7 of symptomatic.    7. IV hydration, daily weights, strict I&O, prn diuresis     8. PO intake as tolerated, nutrition follow up as needed, MVI, folic acid     9. Antiemetics, anti-diarrhea medications:   metoclopramide Injectable 10 milliGRAM(s) IV Push every 6 hours PRN    10. OOB as tolerated, physical therapy consult if needed     11. Monitor coags / fibrinogen 2x week, vitamin K as needed     12. Monitor closely for clinical changes, monitor for fevers     13. Emotional support provided, plan of care discussed and questions addressed     14. Patient education done regarding plan of care, restrictions and discharge planning     15. Continue regular social work input     I have written the above note for Dr. Justice who performed service with me in the room.   Anika Christiansen NP-C ([phone])    I have seen and examined patient with NP, I agree with above note as scribed.

## 2021-11-29 NOTE — PROGRESS NOTE ADULT - SUBJECTIVE AND OBJECTIVE BOX
Trauma Surgery Progress Note    SUBJECTIVE  No acute events overnight. Pt seen and examined on mornings rounds. No acute complaints    OBJECTIVE  ___________________________________________________          Vital Signs Last 24 Hrs  T(C): 37 (29 Nov 2021 16:00), Max: 37.5 (28 Nov 2021 19:00)  T(F): 98.6 (29 Nov 2021 16:00), Max: 99.5 (28 Nov 2021 19:00)  HR: 81 (29 Nov 2021 16:00) (72 - 95)  BP: 122/76 (29 Nov 2021 16:00) (112/70 - 142/75)  BP(mean): 93 (29 Nov 2021 16:00) (81 - 103)  RR: 14 (29 Nov 2021 16:00) (12 - 22)  SpO2: 97% (29 Nov 2021 16:00) (93% - 99%)        28 Nov 2021 07:01  -  29 Nov 2021 07:00  --------------------------------------------------------  IN:    dextrose 5% + lactated ringers: 1150 mL    Heparin: 51 mL    IV PiggyBack: 600 mL    IV PiggyBack: 50 mL    IV PiggyBack: 250 mL    Oral Fluid: 650 mL    Platelets - Single Donor: 207 mL  Total IN: 2958 mL    OUT:    Indwelling Catheter - Urethral (mL): 990 mL  Total OUT: 990 mL    Total NET: 1968 mL      29 Nov 2021 07:01  -  29 Nov 2021 17:06  --------------------------------------------------------  IN:  Total IN: 0 mL    OUT:    Indwelling Catheter - Urethral (mL): 560 mL  Total OUT: 560 mL    Total NET: -560 mL          ___________________________________________________  PHYSICAL EXAM    General Appearance: NAD, alert   Resp: Patent airway, non-labored breathing  CV: RRR  Abdomen: Soft, Nontender, Nondistended    ___________________________________________________    LABS:  cret                        8.0    0.88  )-----------( 31       ( 29 Nov 2021 06:19 )             24.4     11-29    141  |  112<H>  |  11  ----------------------------<  116<H>  3.6   |  18<L>  |  0.62    Ca    6.6<L>      29 Nov 2021 06:18  Phos  1.6     11-29  Mg     1.8     11-29    TPro  4.8<L>  /  Alb  2.7<L>  /  TBili  0.2  /  DBili  <0.1  /  AST  26  /  ALT  30  /  AlkPhos  92  11-29    PTT - ( 29 Nov 2021 06:19 )  PTT:38.6 sec          ___________________________________________________  MEDICATIONS  (STANDING):  acyclovir IVPB 490 milliGRAM(s) IV Intermittent every 8 hours  benzocaine 15 mG/menthol 3.6 mG (Sugar-Free) Lozenge 1 Lozenge Oral four times a day  Biotene Dry Mouth Oral Rinse 5 milliLiter(s) Swish and Spit five times a day  chlorhexidine 2% Cloths 1 Application(s) Topical <User Schedule>  clotrimazole Lozenge 1 Lozenge Oral five times a day  dextrose 5% + lactated ringers. 1000 milliLiter(s) (50 mL/Hr) IV Continuous <Continuous>  fluconAZOLE IVPB 400 milliGRAM(s) IV Intermittent every 24 hours  heparin  Infusion 1600 Unit(s)/Hr (17 mL/Hr) IV Continuous <Continuous>  lidocaine/prilocaine Cream 1 Application(s) Topical daily  meropenem  IVPB 1000 milliGRAM(s) IV Intermittent every 8 hours  metoprolol tartrate Injectable 2.5 milliGRAM(s) IV Push every 6 hours  pantoprazole  Injectable 40 milliGRAM(s) IV Push daily    MEDICATIONS  (PRN):  metoclopramide Injectable 10 milliGRAM(s) IV Push every 6 hours PRN Nausea and/or Vomiting

## 2021-11-30 LAB
ALBUMIN SERPL ELPH-MCNC: 2.8 G/DL — LOW (ref 3.3–5)
ALP SERPL-CCNC: 97 U/L — SIGNIFICANT CHANGE UP (ref 40–120)
ALT FLD-CCNC: 36 U/L — SIGNIFICANT CHANGE UP (ref 10–45)
ANION GAP SERPL CALC-SCNC: 11 MMOL/L — SIGNIFICANT CHANGE UP (ref 5–17)
ANION GAP SERPL CALC-SCNC: 13 MMOL/L — SIGNIFICANT CHANGE UP (ref 5–17)
APTT BLD: 42.6 SEC — HIGH (ref 27.5–35.5)
AST SERPL-CCNC: 25 U/L — SIGNIFICANT CHANGE UP (ref 10–40)
BILIRUB DIRECT SERPL-MCNC: <0.1 MG/DL — SIGNIFICANT CHANGE UP (ref 0–0.3)
BILIRUB INDIRECT FLD-MCNC: SIGNIFICANT CHANGE UP MG/DL (ref 0.2–1)
BILIRUB SERPL-MCNC: 0.2 MG/DL — SIGNIFICANT CHANGE UP (ref 0.2–1.2)
BUN SERPL-MCNC: 10 MG/DL — SIGNIFICANT CHANGE UP (ref 7–23)
BUN SERPL-MCNC: 11 MG/DL — SIGNIFICANT CHANGE UP (ref 7–23)
CALCIUM SERPL-MCNC: 6.7 MG/DL — LOW (ref 8.4–10.5)
CALCIUM SERPL-MCNC: 7 MG/DL — LOW (ref 8.4–10.5)
CHLORIDE SERPL-SCNC: 108 MMOL/L — SIGNIFICANT CHANGE UP (ref 96–108)
CHLORIDE SERPL-SCNC: 109 MMOL/L — HIGH (ref 96–108)
CO2 SERPL-SCNC: 18 MMOL/L — LOW (ref 22–31)
CO2 SERPL-SCNC: 19 MMOL/L — LOW (ref 22–31)
CREAT SERPL-MCNC: 0.59 MG/DL — SIGNIFICANT CHANGE UP (ref 0.5–1.3)
CREAT SERPL-MCNC: 0.61 MG/DL — SIGNIFICANT CHANGE UP (ref 0.5–1.3)
CULTURE RESULTS: SIGNIFICANT CHANGE UP
GLUCOSE SERPL-MCNC: 106 MG/DL — HIGH (ref 70–99)
GLUCOSE SERPL-MCNC: 109 MG/DL — HIGH (ref 70–99)
HCT VFR BLD CALC: 24.7 % — LOW (ref 39–50)
HCT VFR BLD CALC: 25.8 % — LOW (ref 39–50)
HGB BLD-MCNC: 8 G/DL — LOW (ref 13–17)
HGB BLD-MCNC: 8.5 G/DL — LOW (ref 13–17)
LDH SERPL L TO P-CCNC: 202 U/L — SIGNIFICANT CHANGE UP (ref 50–242)
MAGNESIUM SERPL-MCNC: 1.9 MG/DL — SIGNIFICANT CHANGE UP (ref 1.6–2.6)
MAGNESIUM SERPL-MCNC: 2.1 MG/DL — SIGNIFICANT CHANGE UP (ref 1.6–2.6)
MCHC RBC-ENTMCNC: 31.1 PG — SIGNIFICANT CHANGE UP (ref 27–34)
MCHC RBC-ENTMCNC: 31.4 PG — SIGNIFICANT CHANGE UP (ref 27–34)
MCHC RBC-ENTMCNC: 32.4 GM/DL — SIGNIFICANT CHANGE UP (ref 32–36)
MCHC RBC-ENTMCNC: 32.9 GM/DL — SIGNIFICANT CHANGE UP (ref 32–36)
MCV RBC AUTO: 95.2 FL — SIGNIFICANT CHANGE UP (ref 80–100)
MCV RBC AUTO: 96.1 FL — SIGNIFICANT CHANGE UP (ref 80–100)
NRBC # BLD: 0 /100 WBCS — SIGNIFICANT CHANGE UP (ref 0–0)
PHOSPHATE SERPL-MCNC: 1.9 MG/DL — LOW (ref 2.5–4.5)
PHOSPHATE SERPL-MCNC: 2.7 MG/DL — SIGNIFICANT CHANGE UP (ref 2.5–4.5)
PLATELET # BLD AUTO: 27 K/UL — LOW (ref 150–400)
PLATELET # BLD AUTO: 51 K/UL — LOW (ref 150–400)
POTASSIUM SERPL-MCNC: 3.6 MMOL/L — SIGNIFICANT CHANGE UP (ref 3.5–5.3)
POTASSIUM SERPL-MCNC: 3.7 MMOL/L — SIGNIFICANT CHANGE UP (ref 3.5–5.3)
POTASSIUM SERPL-SCNC: 3.6 MMOL/L — SIGNIFICANT CHANGE UP (ref 3.5–5.3)
POTASSIUM SERPL-SCNC: 3.7 MMOL/L — SIGNIFICANT CHANGE UP (ref 3.5–5.3)
PROT SERPL-MCNC: 5.1 G/DL — LOW (ref 6–8.3)
RBC # BLD: 2.57 M/UL — LOW (ref 4.2–5.8)
RBC # BLD: 2.71 M/UL — LOW (ref 4.2–5.8)
RBC # FLD: 14.3 % — SIGNIFICANT CHANGE UP (ref 10.3–14.5)
RBC # FLD: 14.5 % — SIGNIFICANT CHANGE UP (ref 10.3–14.5)
SODIUM SERPL-SCNC: 138 MMOL/L — SIGNIFICANT CHANGE UP (ref 135–145)
SODIUM SERPL-SCNC: 140 MMOL/L — SIGNIFICANT CHANGE UP (ref 135–145)
SPECIMEN SOURCE: SIGNIFICANT CHANGE UP
WBC # BLD: 1.28 K/UL — LOW (ref 3.8–10.5)
WBC # BLD: 1.53 K/UL — LOW (ref 3.8–10.5)
WBC # FLD AUTO: 1.28 K/UL — LOW (ref 3.8–10.5)
WBC # FLD AUTO: 1.53 K/UL — LOW (ref 3.8–10.5)

## 2021-11-30 PROCEDURE — 99232 SBSQ HOSP IP/OBS MODERATE 35: CPT

## 2021-11-30 PROCEDURE — 38240 TRANSPLT ALLO HCT/DONOR: CPT

## 2021-11-30 PROCEDURE — 99233 SBSQ HOSP IP/OBS HIGH 50: CPT

## 2021-11-30 RX ORDER — MAGNESIUM SULFATE 500 MG/ML
2 VIAL (ML) INJECTION ONCE
Refills: 0 | Status: COMPLETED | OUTPATIENT
Start: 2021-11-30 | End: 2021-11-30

## 2021-11-30 RX ORDER — POTASSIUM CHLORIDE 20 MEQ
40 PACKET (EA) ORAL ONCE
Refills: 0 | Status: COMPLETED | OUTPATIENT
Start: 2021-11-30 | End: 2021-11-30

## 2021-11-30 RX ORDER — MEROPENEM 1 G/30ML
1000 INJECTION INTRAVENOUS EVERY 8 HOURS
Refills: 0 | Status: COMPLETED | OUTPATIENT
Start: 2021-11-30 | End: 2021-12-07

## 2021-11-30 RX ADMIN — TAMSULOSIN HYDROCHLORIDE 0.4 MILLIGRAM(S): 0.4 CAPSULE ORAL at 21:01

## 2021-11-30 RX ADMIN — Medication 109.8 MILLIGRAM(S): at 21:02

## 2021-11-30 RX ADMIN — Medication 250 MILLIMOLE(S): at 10:03

## 2021-11-30 RX ADMIN — Medication 1 LOZENGE: at 09:57

## 2021-11-30 RX ADMIN — Medication 5 MILLILITER(S): at 18:16

## 2021-11-30 RX ADMIN — Medication 5 MILLILITER(S): at 15:17

## 2021-11-30 RX ADMIN — Medication 5 MILLILITER(S): at 09:57

## 2021-11-30 RX ADMIN — LIDOCAINE AND PRILOCAINE CREAM 1 APPLICATION(S): 25; 25 CREAM TOPICAL at 21:00

## 2021-11-30 RX ADMIN — MEROPENEM 100 MILLIGRAM(S): 1 INJECTION INTRAVENOUS at 18:18

## 2021-11-30 RX ADMIN — FLUCONAZOLE 100 MILLIGRAM(S): 150 TABLET ORAL at 16:30

## 2021-11-30 RX ADMIN — MEROPENEM 100 MILLIGRAM(S): 1 INJECTION INTRAVENOUS at 10:00

## 2021-11-30 RX ADMIN — Medication 5 MILLILITER(S): at 05:52

## 2021-11-30 RX ADMIN — Medication 62.5 MILLIMOLE(S): at 21:00

## 2021-11-30 RX ADMIN — Medication 25 MILLIGRAM(S): at 09:56

## 2021-11-30 RX ADMIN — Medication 1 LOZENGE: at 05:52

## 2021-11-30 RX ADMIN — ENOXAPARIN SODIUM 100 MILLIGRAM(S): 100 INJECTION SUBCUTANEOUS at 01:58

## 2021-11-30 RX ADMIN — Medication 109.8 MILLIGRAM(S): at 15:16

## 2021-11-30 RX ADMIN — ENOXAPARIN SODIUM 100 MILLIGRAM(S): 100 INJECTION SUBCUTANEOUS at 12:15

## 2021-11-30 RX ADMIN — CHLORHEXIDINE GLUCONATE 1 APPLICATION(S): 213 SOLUTION TOPICAL at 05:52

## 2021-11-30 RX ADMIN — Medication 109.8 MILLIGRAM(S): at 05:51

## 2021-11-30 RX ADMIN — Medication 1 LOZENGE: at 15:17

## 2021-11-30 RX ADMIN — Medication 250 MILLIMOLE(S): at 08:35

## 2021-11-30 RX ADMIN — MEROPENEM 100 MILLIGRAM(S): 1 INJECTION INTRAVENOUS at 21:00

## 2021-11-30 RX ADMIN — Medication 250 MILLIMOLE(S): at 12:11

## 2021-11-30 RX ADMIN — Medication 1 LOZENGE: at 18:17

## 2021-11-30 RX ADMIN — Medication 25 MILLIGRAM(S): at 01:58

## 2021-11-30 RX ADMIN — Medication 50 GRAM(S): at 18:51

## 2021-11-30 RX ADMIN — PANTOPRAZOLE SODIUM 40 MILLIGRAM(S): 20 TABLET, DELAYED RELEASE ORAL at 08:27

## 2021-11-30 RX ADMIN — Medication 1 LOZENGE: at 21:02

## 2021-11-30 RX ADMIN — MEROPENEM 100 MILLIGRAM(S): 1 INJECTION INTRAVENOUS at 01:58

## 2021-11-30 RX ADMIN — Medication 25 MILLIGRAM(S): at 18:15

## 2021-11-30 RX ADMIN — Medication 40 MILLIEQUIVALENT(S): at 08:34

## 2021-11-30 RX ADMIN — Medication 5 MILLILITER(S): at 21:01

## 2021-11-30 RX ADMIN — Medication 40 MILLIEQUIVALENT(S): at 18:45

## 2021-11-30 NOTE — PROGRESS NOTE ADULT - SUBJECTIVE AND OBJECTIVE BOX
Vascular Cardiology  Progress note  DIRECT SERVICE NUMBER: [phone]            EMAIL [email or web address]   OFFICE [phone]    CC: Syncope    INTERVAL HISTORY: Received 1u PLTs, repeat PLTs > 50k this morning. Repeat CT scan with continued terminal ileitis.        Allergies    Omnicef (Unknown)  penicillin (Hives)    Intolerances    	    MEDICATIONS:  enoxaparin Injectable 100 milliGRAM(s) SubCutaneous every 12 hours  metoprolol tartrate 25 milliGRAM(s) Oral every 8 hours  tamsulosin 0.4 milliGRAM(s) Oral at bedtime    acyclovir IVPB 490 milliGRAM(s) IV Intermittent every 8 hours  clotrimazole Lozenge 1 Lozenge Oral five times a day  fluconAZOLE IVPB 400 milliGRAM(s) IV Intermittent every 24 hours      acetaminophen     Tablet .. 650 milliGRAM(s) Oral every 6 hours PRN    pantoprazole    Tablet 40 milliGRAM(s) Oral before breakfast      Biotene Dry Mouth Oral Rinse 5 milliLiter(s) Swish and Spit five times a day  chlorhexidine 2% Cloths 1 Application(s) Topical <User Schedule>  lidocaine/prilocaine Cream 1 Application(s) Topical daily  sodium phosphate IVPB 15 milliMole(s) IV Intermittent every 1 hour      PAST MEDICAL & SURGICAL HISTORY:  Hyperlipidemia    Shortness of breath on exertion    Lumbar herniated disc    T12 compression fracture    Acute lumbar radiculopathy    History of basal cell carcinoma    History of surgery on wrist        FAMILY HISTORY:  No pertinent family history in first degree relatives        SOCIAL HISTORY:  unchanged    REVIEW OF SYSTEMS:  CONSTITUTIONAL: No fever, weight loss, or fatigue  EYES: No eye pain, visual disturbances, or discharge  ENMT:  No difficulty hearing, tinnitus, vertigo; No sinus or throat pain  NECK: No pain or stiffness  RESPIRATORY: No cough, wheezing, chills or hemoptysis; No Shortness of Breath  CARDIOVASCULAR: No chest pain, palpitations, passing out, dizziness, or leg swelling  GASTROINTESTINAL: No nausea, vomiting, or hematemesis; No diarrhea or constipation. No melena or hematochezia. +abdominal pain  GENITOURINARY: No dysuria, frequency, hematuria, or incontinence  NEUROLOGICAL: No headaches, memory loss, loss of strength, numbness, or tremors  SKIN: No itching, burning, rashes, or lesions   LYMPH Nodes: No enlarged glands  ENDOCRINE: No heat or cold intolerance; No hair loss  MUSCULOSKELETAL: No joint pain or swelling; No muscle, back, or extremity pain  PSYCHIATRIC: No depression, anxiety, mood swings, or difficulty sleeping  HEME/LYMPH: No easy bruising, or bleeding gums  ALLERY AND IMMUNOLOGIC: No hives or eczema	    [ x] All others negative    PHYSICAL EXAM:  T(C): 36.9 (11-30-21 @ 04:00), Max: 37 (11-29-21 @ 16:00)  HR: 81 (11-30-21 @ 09:00) (68 - 88)  BP: 121/60 (11-30-21 @ 09:00) (105/59 - 130/66)  RR: 18 (11-30-21 @ 09:00) (11 - 22)  SpO2: 97% (11-30-21 @ 09:00) (79% - 99%)  Wt(kg): --  I&O's Summary    29 Nov 2021 07:01  -  30 Nov 2021 07:00  --------------------------------------------------------  IN: 820 mL / OUT: 1265 mL / NET: -445 mL    30 Nov 2021 07:01  -  30 Nov 2021 10:56  --------------------------------------------------------  IN: 0 mL / OUT: 75 mL / NET: -75 mL    Appearance: Normal	  HEENT:   Normal oral mucosa, EOMI, laying comfortably in bed  Cardiovascular: Normal S1 S2, No murmurs, No edema  Respiratory: Lungs clear to auscultation anteriorly  Psychiatry: A & O x 3, Mood & affect appropriate  Gastrointestinal: Mildly distended  Skin: No rashes, No ecchymoses, No cyanosis	  Neurologic: Non-focal  Extremities: Normal range of motion, No clubbing, cyanosis. Bilateral edema to knees  Vascular:   Right DP:  Palpable             Left DP:  Palpable  LABS:	 	    CBC Full  -  ( 30 Nov 2021 05:55 )  WBC Count : 1.28 K/uL  Hemoglobin : 8.0 g/dL  Hematocrit : 24.7 %  Platelet Count - Automated : 51 K/uL  Mean Cell Volume : 96.1 fl  Mean Cell Hemoglobin : 31.1 pg  Mean Cell Hemoglobin Concentration : 32.4 gm/dL  Auto Neutrophil # : x  Auto Lymphocyte # : x  Auto Monocyte # : x  Auto Eosinophil # : x  Auto Basophil # : x  Auto Neutrophil % : x  Auto Lymphocyte % : x  Auto Monocyte % : x  Auto Eosinophil % : x  Auto Basophil % : x    11-30    138  |  108  |  10  ----------------------------<  109<H>  3.6   |  19<L>  |  0.59  11-29    139  |  110<H>  |  11  ----------------------------<  108<H>  4.0   |  18<L>  |  0.60    Ca    7.0<L>      30 Nov 2021 05:55  Ca    6.8<L>      29 Nov 2021 17:21  Phos  1.9     11-30  Phos  2.2     11-29  Mg     2.1     11-30  Mg     2.7     11-29    TPro  5.1<L>  /  Alb  2.8<L>  /  TBili  0.2  /  DBili  <0.1  /  AST  25  /  ALT  36  /  AlkPhos  97  11-30  TPro  4.8<L>  /  Alb  2.7<L>  /  TBili  0.2  /  DBili  <0.1  /  AST  26  /  ALT  30  /  AlkPhos  92  11-29

## 2021-11-30 NOTE — PROGRESS NOTE ADULT - SUBJECTIVE AND OBJECTIVE BOX
HPC Transplant Team                                                      Critical / Counseling Time Provided: 30 minutes                                                                                                                                                        Chief Complaint: Autologous peripheral blood stem cell transplant with high dose Melphalan prep regimen for treatment of multiple myeloma    S: Patient seen and examined with HPC Transplant Team:  Feeling better  today, OOB to chair    O: Vitals:   Vital Signs Last 24 Hrs  T(C): 36.9 (30 Nov 2021 04:00), Max: 37 (29 Nov 2021 16:00)  T(F): 98.4 (30 Nov 2021 04:00), Max: 98.6 (29 Nov 2021 16:00)  HR: 81 (30 Nov 2021 09:00) (68 - 88)  BP: 121/60 (30 Nov 2021 09:00) (105/59 - 130/66)  BP(mean): 82 (30 Nov 2021 09:00) (74 - 103)  RR: 18 (30 Nov 2021 09:00) (11 - 22)  SpO2: 97% (30 Nov 2021 09:00) (79% - 99%)    Admit weight: 98.4 kg  Daily       Intake / Output:   11-29 @ 07:01  -  11-30 @ 07:00  --------------------------------------------------------  IN: 820 mL / OUT: 1265 mL / NET: -445 mL    11-30 @ 07:01  -  11-30 @ 09:40  --------------------------------------------------------  IN: 0 mL / OUT: 75 mL / NET: -75 mL        PE:   Oropharynx: + erythema and post Kepivance coating no ulcerations   Oral Mucositis:              +                                         ndGndrndanddndend:nd nd2nd CVS: S1, S2 RRR   Lungs: CTA throughout bilaterally   Abdomen: + BS x 4, soft, mild distended,  mild tenderness   Extremities: +1/2-1 edema   Gastric Mucositis:       -                                           Grade: n/a  Intestinal Mucositis:     -                                         Grade: n/a   Skin: patchy, erythematous maculopapular rash to face, neck and upper chest and back post Kepivance   TLC: CDI   Neuro: A&O x 3         Labs:   CBC Full  -  ( 30 Nov 2021 05:55 )  WBC Count : 1.28 K/uL  Hemoglobin : 8.0 g/dL  Hematocrit : 24.7 %  Platelet Count - Automated : 51 K/uL  Mean Cell Volume : 96.1 fl  Mean Cell Hemoglobin : 31.1 pg  Mean Cell Hemoglobin Concentration : 32.4 gm/dL  Auto Neutrophil # : x  Auto Lymphocyte # : x  Auto Monocyte # : x  Auto Eosinophil # : x  Auto Basophil # : x  Auto Neutrophil % : x  Auto Lymphocyte % : x  Auto Monocyte % : x  Auto Eosinophil % : x  Auto Basophil % : x                          8.0    1.28  )-----------( 51       ( 30 Nov 2021 05:55 )             24.7     11-30    138  |  108  |  10  ----------------------------<  109<H>  3.6   |  19<L>  |  0.59    Ca    7.0<L>      30 Nov 2021 05:55  Phos  1.9     11-30  Mg     2.1     11-30    TPro  5.1<L>  /  Alb  2.8<L>  /  TBili  0.2  /  DBili  <0.1  /  AST  25  /  ALT  36  /  AlkPhos  97  11-30    PTT - ( 30 Nov 2021 05:55 )  PTT:42.6 sec  LIVER FUNCTIONS - ( 30 Nov 2021 05:55 )  Alb: 2.8 g/dL / Pro: 5.1 g/dL / ALK PHOS: 97 U/L / ALT: 36 U/L / AST: 25 U/L / GGT: x           Lactate Dehydrogenase, Serum: 202 U/L (11-30 @ 05:55)          Cultures:   Culture - Blood (11.23.21 @ 12:32)    Specimen Source: .Blood Blood    Culture Results:   No Growth Final    Culture - Urine (11.19.21 @ 12:26)    Specimen Source: Clean Catch Clean Catch (Midstream)    Culture Results:   No growth    Radiology:   Xray Chest 1 View- PORTABLE-Routine (Xray Chest 1 View- PORTABLE-Routine in AM.) (11.28.21 @ 07:22) >  Clear lungs.      Meds:   Antimicrobials:   acyclovir IVPB 490 milliGRAM(s) IV Intermittent every 8 hours  clotrimazole Lozenge 1 Lozenge Oral five times a day  fluconAZOLE IVPB 400 milliGRAM(s) IV Intermittent every 24 hours  meropenem  IVPB 1000 milliGRAM(s) IV Intermittent every 8 hours      Heme / Onc:   enoxaparin Injectable 100 milliGRAM(s) SubCutaneous every 12 hours      GI:  pantoprazole    Tablet 40 milliGRAM(s) Oral before breakfast      Cardiovascular:   metoprolol tartrate 25 milliGRAM(s) Oral every 8 hours  tamsulosin 0.4 milliGRAM(s) Oral at bedtime      Immunologic:       Other medications:   Biotene Dry Mouth Oral Rinse 5 milliLiter(s) Swish and Spit five times a day  chlorhexidine 2% Cloths 1 Application(s) Topical <User Schedule>  lidocaine/prilocaine Cream 1 Application(s) Topical daily  sodium phosphate IVPB 15 milliMole(s) IV Intermittent every 1 hour      PRN:   acetaminophen     Tablet .. 650 milliGRAM(s) Oral every 6 hours PRN            A/P:  66 year old male with a history of multiple myeloma s/p RVD x 6   Post Autologous PBSCT day +  11  11/16- melphalan 2/2; s/p melphalan hydration for 24 hours post infusion of last dose   Strict I&O, daily weights, prn diuresis   11/17- rest day   11/18- rest day   11/19- s/p HPC transplant; completed transplant hydration for 24 hours post infusion of cells. Suprapubic pain in am. UA pending, follow up culture, BK virus. AXR   11/19-vasovegal episode in AM: will monitor tele for 24hrs   11/20 d/c telemetry. Lasix 40mg IV x today, follow up I&Os, daily weight. monitor rash, receiving 2nd dose Kepivance today  11/21 add Emend for 3 days and change zofran ATC. If worsening abd pain consider CT a/p oral contrast only.   11/22- Neutropenic started on cipro once febrile will pancx and switch cipro to  Aztronam 2g Q8   11/23- AMS this am, agonal breathing, diaphoretic, cool and clammy - responsive to sternal rub. Hypotensive, hypoxic. Placed on NRB, RRT called. Labs sent. Lactate send. PCN allergy - started on Aztreonam 2g IV q 8 hoursm Flagyl 500mg IV TID for anaerobic coverage given abdominal pain and distension, Vancomycin 1g IV x 1. CT A/P pending for 12:30 11/23/21. CE with new TWI in V2-V6 - likely demand ischemia. Repeat CE and lactate at 2pm.   11/23- CT A/P with Terminal ileitis with associated ileus versus low-grade obstruction. Pockets of free air in the right lower quadrant adjacent to the terminal ileum concerning for bowel perforation. 2.6 cm loculated fluid adjacent to the terminal ileum and surrounding peritoneal enhancement suggestive of developing peritonitis. Surgical team consulted- transferred to 8ICU for closer monitoring.  ID consult called- ABDIRK daniel.    11/24- CT chest angio + Saddle embolus with right heart strain., seen by cards- started on full dose A/C, s/p IVC filter placement with thrombectomy: transfuse PLT to >50K . doppler + B/L DVT. D/C Zarxio  11/27-early engraftment   11/27 on clear liq diet, watery diarrhea, C diff(-)  11/28 Transitioned from Heparin gtt to Lovenox - D/W ICU MD, recommend keeping keeping plt's >40k while on AC.   Pending transfer back to BMTU    1. Infectious Disease:   acyclovir IVPB 490 milliGRAM(s) IV Intermittent every 8 hours  clotrimazole Lozenge 1 Lozenge Oral five times a day  fluconAZOLE IVPB 400 milliGRAM(s) IV Intermittent every 24 hours  meropenem  IVPB 1000 milliGRAM(s) IV Intermittent every 8 hours    2. VOD Prophylaxis: Actigall, Glutamine, Heparin (Full A/C)     3. GI Prophylaxis:    pantoprazole Tablet 40 erick GRAM(s) Oral before breakfast    4. Mouth care - NS / NaHCO3 rinses, Mycelex, Biotene; Skin care     5. GVHD prophylaxis - n/a     6. Transfuse & replete electrolytes prn   transfuse for PLT less than 40K (while on AC) or below 50 with planned surgical intervention hemoglobin less then 7 of symptomatic.    7. IV hydration, daily weights, strict I&O, prn diuresis     8. PO intake as tolerated, nutrition follow up as needed, MVI, folic acid     9. Antiemetics, anti-diarrhea medications:   metoclopramide Injectable 10 milliGRAM(s) IV Push every 6 hours PRN    10. OOB as tolerated, physical therapy consult if needed     11. Monitor coags / fibrinogen 2x week, vitamin K as needed     12. Monitor closely for clinical changes, monitor for fevers     13. Emotional support provided, plan of care discussed and questions addressed     14. Patient education done regarding plan of care, restrictions and discharge planning     15. Continue regular social work input     I have written the above note for Dr. Justice who performed service with me in the room.   Vale Ugarte PA-C([phone])    I have seen and examined patient with PA, I agree with above note as scribed.

## 2021-11-30 NOTE — PROGRESS NOTE ADULT - ASSESSMENT
66 year old male with multiple myeloma admitted for an autologous pbsct with high dose melphalan prep regimen.     s/p HPC Transplant on 11/19    Noted to be hypotensive and encephalopathic on AM of 11/23.     CT A/P (11/23) with findings concerning for ileitis and Pockets of free air in the right lower quadrant adjacent to the terminal ileum concerning for bowel perforation and 2.6 cm loculated fluid adjacent to the terminal ileum and surrounding peritoneal enhancement suggestive of developing peritonitis.    Diagnosed with Saddle PE on 11/24 and required thrombectomy    Was not taken for operative management given high risk in context of pancytopenia and saddle PE    Prognosis is guarded    Antibiotics:  Meropenem: 11/23 -->  Fluconazole: 11/15 -->   PO Bactrim: 11/15 --> 11/17  Ciprofloxacin: 11/22 --> 11/23  Vancomycin: 11/23  Metronidazole: 11/23  Aztreonam: 11/23    #Bowel Perforation, Peritonitis, Abdominal Fluid Collection, Hypotension, Pancytopenia, Penicillin Allergy  CT A/P (11/29) reassuring, small loculated ascites  Favor treating for 14 days total of Meropenem  --Continue Meropenem 1g IV Q8H   --Continue Fluconazole 400 mg PO/IV Q24H (treatment given small bowel perforation and for prophylaxis for SCT)  --Continue to follow CBC with diff  --Continue to follow transaminases  --Continue to follow temperature curve  --Follow up on preliminary blood cultures  --Surgery recommendations noted/appreciated    #Adenovirus Enteritis  --Recommend serum Adenovirus PCR  --Recommend contact/droplet isolation    #s/p Stem Cell Transplant  --Continue Acyclovir prophylaxis; transitioned to IV - would maintain IVF hydration to decrease risk of crystal induced kidney injury  --Will require PCP prophylaxis - can monitor off for now.     I will be away tomorrow. Please contact the Infectious Diseases Office to contact the covering Infectious Diseases Attending.     Kris Rothman M.D.  [facility] Division of Infectious Disease  8AM-5PM: Pager Number [phone]  After Hours (or if no response): Please contact the Infectious Diseases Office at [phone]     The above assessment and plan were discussed with 8ICU and BMT Team

## 2021-11-30 NOTE — PROGRESS NOTE ADULT - ATTENDING COMMENTS
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 On AM of 11/23 patient developed neutropenic sepsis with RRT for vasovagal episode had CTAP showing pneumoperitoneum and terminal ileitis.    Hemodynamically stable  T Lovenox. HCT have been stable. Improving thrombocytopenia and leucopenia  Abd soft, tolerating PO  Off IVF  CT A/P no free air. Can DC abx  Ppx abx acyclovir Bactrim and Fluconazole  Stable for transfer to floor

## 2021-11-30 NOTE — PROGRESS NOTE ADULT - ATTENDING COMMENTS
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 He is now admitted for an autoSCT with high dose melphalan prep regimen. Transplant day 11/19/21. Hospital course has been complicated by vasovagal episodes on 11/19/21 and 11/23/21. On 11/23/21 developed neutropenic sepsis, improved with fluid resuscitation. CT on 11/23/21 showed Terminal ileitis with associated ileus versus low-grade obstruction with free air and possible bowel perforation, surgery consulted and transferred to SICU, managed conservatively for now. Patient was also found to have increasing troponins, echo showed heart strain and CTA chest on 11/24/21 showed a saddle PE,  LE doppler on 11/24/24 showed b/l DVT. He is now s/p thrombectomy and IVC filter (11/24/21).Patient is hemodynamically stable, mentating well.     PE:   VSS  Gen: A/O x 3, NAD, tired  RESP: CTA, no wheeze no rhonchi  CV: S1, S2 RRR  Abd:  soft, increased distention, + hyperactive BS  LE: no edema  Neuro: CNII-XII intact, no focal deficits  Psych: appropriate    MM  -dx 4/2021  -s/p RVD x 6 (5/2021 - 9/2021) -->   -admitted for autoSCT with Ivett 100mg/M2 on 11/19/21  -hold Zarxio for now given acute events / acute abdomen  Today is day + 11  -Plt goal 40K given need for therapeutic AC     ID  BACTERIAL:  Neutropenic sepsis (11/23/21) started on meropenem (11/23 - ) and vanco x1 (11/23)  VIRAL: c/w Acyclovir   FUNGAL: c/w Diflucan 400 mg po daily.  PCP prophylaxis: hold for now    HEME  Saddle PE s/p and b/l LE DVT (dx 11/24/21) - c/w Lovenox, s/p mechanical thrombectomy and IVC filter placement on 11/24/21  -keep Hb >7  -keep plt >40; keep plt >50 for procedures    GI:  Abd pain / bowel perforation on CT on 11/23/21 - tolerating PO diet,  -repeat CT abd (11/29/21) -  terminal ileitis with mild increased fluid distention of the upstream small bowel, compatible with ileus versus partial small bowel obstruction. Unchanged trace loculated ascites with peritoneal enhancement at the level of the terminal ileum, suggesting peritonitis. However, there has been interval resolution of the extraluminal free air since 11/23/2021.  -management as per SICU team   Diarrhea - stool cx neg  VOD prophylaxis - glutamine supplementation, Actigall BID   GI prophylaxis - Protonix po QD   Mouth care and skin care as per protocol.    The time was used discussed with patient, family, primary team, consultants, and acute management.

## 2021-11-30 NOTE — PROGRESS NOTE ADULT - ASSESSMENT
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 On AM of 11/23 patient developed neutropenic sepsis with RRT for vasovagal episode had CTAP showing pneumoperitoneum and terminal ileitis. Admitted to SICU for HD monitoring and serial abdominal exams.    PLAN:  NEURO:  Pain control with acetaminophen, dilaudid PRN  No other active issues    RESPIRATORY:   No active issues  Saturating well on RA  Daily cxr     CARDIOVASCULAR:  Troponemia 8-->346 ------>58-> , 47, cease trending  Cardiology c/s  TTE EF 70-75, no actionable findings   lopressor 25 mg q8h  RPT duplex LE no propagation of DVT    GI/NUTRITION:  CTAP showing Pneumoperitoneum resolved and terminal ileitis persistent  nonop management per sx  Regular diet  Abdominal exams reassuring     GENITOURINARY/RENAL:  No active issues  Monitor UOP  F/u UA  IVL  flomax added today, will d/c Jordan in AM    HEMATOLOGIC:  MM s/p autologous SCT  zarxio qd per heme  appreciate heme/onc recs--Plt goal >40  Therapeutic dose lovenox   F/U PTT    INFECTIOUS DISEASE:  neutropenic  rodriog 1g q8h empirically for suspected intrabdominal infx i/s/o pneumoperitoneum  fluc/acyclovir for ppx s/p SCT  ID following    ENDOCRINE:  No active issues    DISPO: SICU  Code Status: Full Code       ASSESSMENT:  65 YO M with a 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 On AM of 11/23 patient developed neutropenic sepsis with RRT for vasovagal episode had CTAP showing pneumoperitoneum and terminal ileitis. Admitted to SICU for HD monitoring and serial abdominal exams.    PLAN:  NEURO:  Pain control with acetaminophen, dilaudid PRN  No other active issues    RESPIRATORY:   No active issues  Saturating well on RA  Daily cxr     CARDIOVASCULAR:  Troponemia 8-->346 ------>58-> , 47, cease trending  Cardiology c/s  TTE EF 70-75, no actionable findings   lopressor 25 mg q8h  RPT duplex LE no propagation of DVT    GI/NUTRITION:  CTAP showing Pneumoperitoneum resolved and terminal ileitis persistent  nonop management per sx  Regular diet  Abdominal exams reassuring     GENITOURINARY/RENAL:  No active issues  Monitor UOP  F/u UA  IVL  flomax added today, will d/c Jordan in AM    HEMATOLOGIC:  MM s/p autologous SCT  zarxio qd per heme  appreciate heme/onc recs--Plt goal >40  Therapeutic dose lovenox   F/U PTT    INFECTIOUS DISEASE:  neutropenic  rodrigo 1g q8h empirically for suspected intrabdominal infx i/s/o pneumoperitoneum  fluc/acyclovir for ppx s/p SCT  ID following    ENDOCRINE:  No active issues    DISPO: Stable for tx back to BMT floor  Code Status: Full Code

## 2021-11-30 NOTE — PROGRESS NOTE ADULT - SUBJECTIVE AND OBJECTIVE BOX
HISTORY  66y Male multiple myeloma admitted for an autologous pbsct with high dose melphalan prep regimen. Hematologic history as follows: 11/2020 was found to have a T-12 compression fracture. He saw orthopedics 1/2021, and was referred to endocrine. 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 Serum immunofixation showed 1 IgD lambda band and free lambda light chains. Urine immunofixation negative for monoclonal band. 4/29/21 a bone marrow biopsy and aspirate was consistent with plasma cell myeloma (> 90% involvement), flow cytometry positive for monotypic plasma cells. He started cycle 1 RVD on 5/13/21. He completed 6 cycles of RVD 9/30/21. During chemotherapy he reported occasional blurry vision (negative optho workup, negative MRI brain 9/29/21) and moderate fatigue. He has no complaints on admission other than facial erythema, likely related to kepivance. Present on SICU for RR being called on floor, found to have pneumoperitoneum, + trops, submassive PE with large clot burden.     24 HOUR EVENTS:  - 1u Plt transfused this morning  - repeat CT A.P performed --> persistent terminal ileitis with resolution of free air  - GI PCR sent   - Started on Flomax, plan to pull Ortega tomorrow    SUBJECTIVE/ROS:  [x] A ten-point review of systems was otherwise negative except as noted.  [ ] Due to altered mental status/intubation, subjective information were not able to be obtained from the patient. History was obtained, to the extent possible, from review of the chart and collateral sources of information.      NEURO  Exam: AxOx3 no acute distress, pain adequately controlled   Meds: acetaminophen     Tablet .. 650 milliGRAM(s) Oral every 6 hours PRN Mild Pain (1 - 3)  metoclopramide Injectable 10 milliGRAM(s) IV Push every 6 hours PRN Nausea and/or Vomiting    [x] Adequacy of sedation and pain control has been assessed and adjusted      RESPIRATORY  RR: 14 (11-30-21 @ 00:00) (12 - 22)  SpO2: 98% (11-30-21 @ 00:00) (93% - 99%)  Wt(kg): --  Exam: unlabored, clear to auscultation bilaterally  Mechanical Ventilation:     [N/A] Extubation Readiness Assessed  Meds:     CARDIOVASCULAR  HR: 74 (11-30-21 @ 00:00) (72 - 95)  BP: 114/66 (11-30-21 @ 00:00) (112/70 - 139/69)  BP(mean): 84 (11-30-21 @ 00:00) (81 - 103)  ABP: --  ABP(mean): --  Wt(kg): --  CVP(cm H2O): --  Exam: regular rate and rhythm  Cardiac Rhythm: sinus  Perfusion     [x]Adequate   [ ]Inadequate  Mentation   [x]Normal       [ ]Reduced  Extremities  [x]Warm         [ ]Cool  Volume Status [ ]Hypervolemic [x]Euvolemic [ ]Hypovolemic  Meds: metoprolol tartrate 25 milliGRAM(s) Oral every 8 hours  tamsulosin 0.4 milliGRAM(s) Oral at bedtime      GI/NUTRITION  Exam: soft, nontender, mild distension  Diet: Regular  Meds: pantoprazole    Tablet 40 milliGRAM(s) Oral before breakfast      GENITOURINARY  I&O's Detail    11-28 @ 07:01 - 11-29 @ 07:00  --------------------------------------------------------  IN:    dextrose 5% + lactated ringers: 1150 mL    Heparin: 51 mL    IV PiggyBack: 600 mL    IV PiggyBack: 50 mL    IV PiggyBack: 250 mL    Oral Fluid: 650 mL    Platelets - Single Donor: 207 mL  Total IN: 2958 mL    OUT:    Indwelling Catheter - Urethral (mL): 990 mL  Total OUT: 990 mL    Total NET: 1968 mL      11-29 @ 07:01 - 11-30 @ 00:19  --------------------------------------------------------  IN:    IV PiggyBack: 500 mL    IV PiggyBack: 100 mL  Total IN: 600 mL    OUT:    Indwelling Catheter - Urethral (mL): 1025 mL  Total OUT: 1025 mL    Total NET: -425 mL          11-29    139  |  110<H>  |  11  ----------------------------<  108<H>  4.0   |  18<L>  |  0.60    Ca    6.8<L>      29 Nov 2021 17:21  Phos  2.2     11-29  Mg     2.7     11-29    TPro  4.8<L>  /  Alb  2.7<L>  /  TBili  0.2  /  DBili  <0.1  /  AST  26  /  ALT  30  /  AlkPhos  92  11-29    [ ] Ortega catheter, indication: N/A  Meds:       HEMATOLOGIC  Meds: enoxaparin Injectable 100 milliGRAM(s) SubCutaneous every 12 hours    [x] VTE Prophylaxis                        8.2    1.18  )-----------( 62       ( 29 Nov 2021 17:21 )             25.0     PTT - ( 29 Nov 2021 06:19 )  PTT:38.6 sec  Transfusion     [ ] PRBC   [ ] Platelets   [ ] FFP   [ ] Cryoprecipitate      INFECTIOUS DISEASES  WBC Count: 1.18 K/uL (11-29 @ 17:21)  WBC Count: 0.88 K/uL (11-29 @ 06:19)    RECENT CULTURES:    Meds: acyclovir IVPB 490 milliGRAM(s) IV Intermittent every 8 hours  clotrimazole Lozenge 1 Lozenge Oral five times a day  fluconAZOLE IVPB 400 milliGRAM(s) IV Intermittent every 24 hours  meropenem  IVPB 1000 milliGRAM(s) IV Intermittent every 8 hours        ENDOCRINE  No active problems    ACCESS DEVICES:  [x] Peripheral IV  [ ] Central Venous Line	[ ] R	[ ] L	[ ] IJ	[ ] Fem	[ ] SC	Placed:   [ ] Arterial Line		[ ] R	[ ] L	[ ] Fem	[ ] Rad	[ ] Ax	Placed:   [ ] PICC:					[ ] Mediport  [ ] Urinary Catheter, Date Placed:   [x] Necessity of urinary, arterial, and venous catheters discussed    OTHER MEDICATIONS:  Biotene Dry Mouth Oral Rinse 5 milliLiter(s) Swish and Spit five times a day  chlorhexidine 2% Cloths 1 Application(s) Topical <User Schedule>  lidocaine/prilocaine Cream 1 Application(s) Topical daily      CODE STATUS:  Full Code    IMAGING:  EXAM: CT ABDOMEN AND PELVIS IC      PROCEDURE DATE: 11/29/2021    Unchanged terminal ileitis with mild increased fluid distention of the upstream small bowel, compatible with ileus versus partial small bowel obstruction. Unchanged trace loculated ascites with peritoneal enhancement at the level of the terminal ileum, suggesting peritonitis. However, there has been interval resolution of the extraluminal free air since 11/23/2021.    Mild wall thickening of the adjacent proximal sigmoid colon, likely reactive to the terminal ileitis.    New small bilateral pleural effusions

## 2021-11-30 NOTE — PROGRESS NOTE ADULT - ASSESSMENT
Assessment:  1. Submassive saddle PE s/p thrombectomy  2. Bilateral DVT  3. Plasma cell myeloma s/p SCT with pancytopenia  4. Neutropenic terminal ileitis/sigmoid colitis    Plan:  1. Continue therapeutic anticoagulation, currently on Lovenox 1mg/kg BID, if unable to tolerate AC will need to consider IVC filter  2. Heme/Onc following regarding platelet goals with use of therapeutic anticoagulation, appreciate guidance  3. Bilateral below knee compression stockings for symptomatic management of DVTs    Thank you    Vascular Cardiology Service  DIRECT SERVICE NUMBER [phone]  Office [phone]  email:   [email or web address]

## 2021-11-30 NOTE — PROGRESS NOTE ADULT - ASSESSMENT
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 On AM of 11/23 patient developed neutropenic sepsis with RRT for vasovagal episode had CTAP showing pneumoperitoneum and terminal ileitis. Patient transferred to SICU for serial abdominal exams and resuscitation.    - patient non peritoneal, however unreliable in the setting of immunosuppression  - patient high risk to undergo surgery, will monitor clinically   - IV antibiotics (meropenem/fluconazole/acyclovir)  - advance diet as tolerated   - transfer back to previous service patient was on once ready to leave ICU   - appreciate outstanding SICU care      ACS  [phone]

## 2021-11-30 NOTE — PROGRESS NOTE ADULT - SUBJECTIVE AND OBJECTIVE BOX
Trauma Surgery Progress Note    SUBJECTIVE  No acute events overnight. Pt seen and examined on mornings rounds. No acute complaints    OBJECTIVE  ___________________________________________________      Vital Signs Last 24 Hrs  T(C): 36.9 (30 Nov 2021 04:00), Max: 37 (29 Nov 2021 16:00)  T(F): 98.4 (30 Nov 2021 04:00), Max: 98.6 (29 Nov 2021 16:00)  HR: 81 (30 Nov 2021 09:00) (68 - 88)  BP: 121/60 (30 Nov 2021 09:00) (105/59 - 130/66)  BP(mean): 82 (30 Nov 2021 09:00) (74 - 103)  RR: 18 (30 Nov 2021 09:00) (11 - 22)  SpO2: 97% (30 Nov 2021 09:00) (79% - 99%)      29 Nov 2021 07:01  -  30 Nov 2021 07:00  --------------------------------------------------------  IN:    IV PiggyBack: 500 mL    IV PiggyBack: 200 mL    Oral Fluid: 120 mL  Total IN: 820 mL    OUT:    Indwelling Catheter - Urethral (mL): 1265 mL  Total OUT: 1265 mL    Total NET: -445 mL      30 Nov 2021 07:01  -  30 Nov 2021 10:17  --------------------------------------------------------  IN:  Total IN: 0 mL    OUT:    Indwelling Catheter - Urethral (mL): 75 mL  Total OUT: 75 mL    Total NET: -75 mL          ___________________________________________________  PHYSICAL EXAM    General Appearance: NAD, alert   Resp: Patent airway, non-labored breathing  CV: RRR  Abdomen: Soft, Nontender, Nondistended    ___________________________________________________      LABS:  cret                        8.0    1.28  )-----------( 51       ( 30 Nov 2021 05:55 )             24.7     11-30    138  |  108  |  10  ----------------------------<  109<H>  3.6   |  19<L>  |  0.59    Ca    7.0<L>      30 Nov 2021 05:55  Phos  1.9     11-30  Mg     2.1     11-30    TPro  5.1<L>  /  Alb  2.8<L>  /  TBili  0.2  /  DBili  <0.1  /  AST  25  /  ALT  36  /  AlkPhos  97  11-30    PTT - ( 30 Nov 2021 05:55 )  PTT:42.6 sec

## 2021-11-30 NOTE — PROGRESS NOTE ADULT - SUBJECTIVE AND OBJECTIVE BOX
Follow Up:  Peritonitis    Interval History:    REVIEW OF SYSTEMS  [  ] ROS unobtainable because:    [  ] All other systems negative except as noted below    Constitutional:  [ ] fever [ ] chills  [ ] weight loss  [ ] weakness  Skin:  [ ] rash [ ] phlebitis	  Eyes: [ ] icterus [ ] pain  [ ] discharge	  ENMT: [ ] sore throat  [ ] thrush [ ] ulcers [ ] exudates  Respiratory: [ ] dyspnea [ ] hemoptysis [ ] cough [ ] sputum	  Cardiovascular:  [ ] chest pain [ ] palpitations [ ] edema	  Gastrointestinal:  [ ] nausea [ ] vomiting [ ] diarrhea [ ] constipation [ ] pain	  Genitourinary:  [ ] dysuria [ ] frequency [ ] hematuria [ ] discharge [ ] flank pain  [ ] incontinence  Musculoskeletal:  [ ] myalgias [ ] arthralgias [ ] arthritis  [ ] back pain  Neurological:  [ ] headache [ ] seizures  [ ] confusion/altered mental status    Allergies  Omnicef (Unknown)  penicillin (Hives)        ANTIMICROBIALS:  acyclovir IVPB 490 every 8 hours  clotrimazole Lozenge 1 five times a day  fluconAZOLE IVPB 400 every 24 hours  meropenem  IVPB 1000 every 8 hours      OTHER MEDS:  MEDICATIONS  (STANDING):  acetaminophen     Tablet .. 650 every 6 hours PRN  enoxaparin Injectable 100 every 12 hours  metoprolol tartrate 25 every 8 hours  pantoprazole    Tablet 40 before breakfast  tamsulosin 0.4 at bedtime      Vital Signs Last 24 Hrs  T(C): 36.9 (30 Nov 2021 04:00), Max: 37 (29 Nov 2021 20:00)  T(F): 98.4 (30 Nov 2021 04:00), Max: 98.6 (29 Nov 2021 20:00)  HR: 80 (30 Nov 2021 11:00) (68 - 88)  BP: 112/76 (30 Nov 2021 11:00) (105/59 - 126/75)  BP(mean): 88 (30 Nov 2021 11:00) (74 - 103)  RR: 14 (30 Nov 2021 11:00) (11 - 22)  SpO2: 97% (30 Nov 2021 11:00) (79% - 99%)    PHYSICAL EXAMINATION:  General: Alert and Awake, NAD  HEENT: PERRL, EOMI  Neck: Supple  Cardiac: RRR, No M/R/G  Resp: CTAB, No Wh/Rh/Ra  Abdomen: NBS, NT/ND, No HSM, No rigidity or guarding  MSK: No LE edema. No Calf tenderness  : No haile  Skin: No rashes or lesions. Skin is warm and dry to the touch.   Neuro: Alert and Awake. CN 2-12 Grossly intact. Moves all four extremities spontaneously.  Psych: Calm, Pleasant, Cooperative                          8.0    1.28  )-----------( 51       ( 30 Nov 2021 05:55 )             24.7       11-30    138  |  108  |  10  ----------------------------<  109<H>  3.6   |  19<L>  |  0.59    Ca    7.0<L>      30 Nov 2021 05:55  Phos  1.9     11-30  Mg     2.1     11-30    TPro  5.1<L>  /  Alb  2.8<L>  /  TBili  0.2  /  DBili  <0.1  /  AST  25  /  ALT  36  /  AlkPhos  97  11-30          MICROBIOLOGY:  v  .Stool Feces  11-30-21   Adenovirus 40/41  DETECTED by PCR  *******Please Note:*******  GI panel PCR evaluates for:  Campylobacter, Plesiomonas shigelloides, Salmonella,  Vibrio, Yersinia enterocolitica, Enteroaggregative  Escherichia coli (EAEC), Enteropathogenic E.coli (EPEC),  Enterotoxigenic E. coli (ETEC) lt/st, Shiga-like  toxin-producing E. coli (STEC) stx1/stx2,  Shigella/ Enteroinvasive E. coli (EIEC), Cryptosporidium,  Cyclospora cayetanensis, Entamoeba histolytica,  Giardia lamblia, Adenovirus F 40/41, Astrovirus,  Norovirus GI/GII, Rotavirus A, Sapovirus  --  --      .Blood Blood  11-23-21   No Growth Final  --  --      STER [id number]  11-19-21   No growth  --  --      Clean Catch Clean Catch (Midstream)  11-19-21   No growth  --  --      STER [id number]  11-03-21   No growth at 14 days.  --  --      STER [id number]  11-02-21   No growth at 14 days.  --    No organisms seen      STER [id number]  11-01-21   No growth at 14 days.  --  --            Clostridium difficile GDH Toxins A&amp;B, EIA:   Negative (11-27-21 @ 11:31)  Clostridium difficile GDH Interpretation: Negative for toxigenic C. Difficile.  This specimen is negative for C.  Difficile glutamate dehydrogenase (GDH) antigen and negative for C.  Difficile Toxins A & B, by EIA.  GDH is a highly sensitive screening  marker for C. Difficile that is produced in large amounts by all C.  Difficile strains, both toxigenic and nontoxigenic.  This assay has not  been validated as a test of cure.  Repeat testing during the same episode  of diarrhea is of limited value and is discouraged.  The results of this  assay should always be interpreted in conjunction with pateint's clinical  history. (11-27-21 @ 11:31)      RADIOLOGY:    <The imaging below has been reviewed and visualized by me independently. Findings as detailed in report below>    < from: CT Abdomen and Pelvis w/ IV Cont (11.29.21 @ 10:44) >  IMPRESSION:    Unchanged terminal ileitis with mild increased fluid distention of the upstream small bowel, compatible with ileus versus partial small bowel obstruction. Unchanged trace loculated ascites with peritoneal enhancement at the level of the terminal ileum, suggesting peritonitis. However, there has been interval resolution of the extraluminal free air since 11/23/2021.    Mild wall thickening of the adjacent proximal sigmoid colon, likely reactive to the terminal ileitis.    New small bilateral pleural effusions.    < end of copied text >   Follow Up:  Peritonitis    Interval History: afebrile. continued diarrhea. denies abdominal pain.     REVIEW OF SYSTEMS  [  ] ROS unobtainable because:    [x  ] All other systems negative except as noted below    Constitutional:  [ ] fever [ ] chills  [ ] weight loss  [ ] weakness  Skin:  [ ] rash [ ] phlebitis	  Eyes: [ ] icterus [ ] pain  [ ] discharge	  ENMT: [ ] sore throat  [ ] thrush [ ] ulcers [ ] exudates  Respiratory: [ ] dyspnea [ ] hemoptysis [ ] cough [ ] sputum	  Cardiovascular:  [ ] chest pain [ ] palpitations [ ] edema	  Gastrointestinal:  [ ] nausea [ ] vomiting [ x] diarrhea [ ] constipation [ ] pain	  Genitourinary:  [ ] dysuria [ ] frequency [ ] hematuria [ ] discharge [ ] flank pain  [ ] incontinence  Musculoskeletal:  [ ] myalgias [ ] arthralgias [ ] arthritis  [ ] back pain  Neurological:  [ ] headache [ ] seizures  [ ] confusion/altered mental status    Allergies  Omnicef (Unknown)  penicillin (Hives)        ANTIMICROBIALS:  acyclovir IVPB 490 every 8 hours  clotrimazole Lozenge 1 five times a day  fluconAZOLE IVPB 400 every 24 hours  meropenem  IVPB 1000 every 8 hours      OTHER MEDS:  MEDICATIONS  (STANDING):  acetaminophen     Tablet .. 650 every 6 hours PRN  enoxaparin Injectable 100 every 12 hours  metoprolol tartrate 25 every 8 hours  pantoprazole    Tablet 40 before breakfast  tamsulosin 0.4 at bedtime      Vital Signs Last 24 Hrs  T(C): 36.9 (30 Nov 2021 04:00), Max: 37 (29 Nov 2021 20:00)  T(F): 98.4 (30 Nov 2021 04:00), Max: 98.6 (29 Nov 2021 20:00)  HR: 80 (30 Nov 2021 11:00) (68 - 88)  BP: 112/76 (30 Nov 2021 11:00) (105/59 - 126/75)  BP(mean): 88 (30 Nov 2021 11:00) (74 - 103)  RR: 14 (30 Nov 2021 11:00) (11 - 22)  SpO2: 97% (30 Nov 2021 11:00) (79% - 99%)    PHYSICAL EXAMINATION:  General: Alert and Awake, NAD  Cardiac: RRR, No M/R/G  Resp: CTAB, No Wh/Rh/Ra  Abdomen: NBS, no abdominal tenderness. No HSM, No rigidity or guarding  MSK: No LE edema. No Calf tenderness  Skin: No rashes or lesions. Skin is warm and dry to the touch.   Neuro: Alert and Awake. CN 2-12 Grossly intact. Moves all four extremities spontaneously.  Psych: Calm, Pleasant, Cooperative                          8.0    1.28  )-----------( 51       ( 30 Nov 2021 05:55 )             24.7       11-30    138  |  108  |  10  ----------------------------<  109<H>  3.6   |  19<L>  |  0.59    Ca    7.0<L>      30 Nov 2021 05:55  Phos  1.9     11-30  Mg     2.1     11-30    TPro  5.1<L>  /  Alb  2.8<L>  /  TBili  0.2  /  DBili  <0.1  /  AST  25  /  ALT  36  /  AlkPhos  97  11-30          MICROBIOLOGY:  v  .Stool Feces  11-30-21   Adenovirus 40/41  DETECTED by PCR  *******Please Note:*******  GI panel PCR evaluates for:  Campylobacter, Plesiomonas shigelloides, Salmonella,  Vibrio, Yersinia enterocolitica, Enteroaggregative  Escherichia coli (EAEC), Enteropathogenic E.coli (EPEC),  Enterotoxigenic E. coli (ETEC) lt/st, Shiga-like  toxin-producing E. coli (STEC) stx1/stx2,  Shigella/ Enteroinvasive E. coli (EIEC), Cryptosporidium,  Cyclospora cayetanensis, Entamoeba histolytica,  Giardia lamblia, Adenovirus F 40/41, Astrovirus,  Norovirus GI/GII, Rotavirus A, Sapovirus  --  --      .Blood Blood  11-23-21   No Growth Final  --  --      STER [id number]  11-19-21   No growth  --  --      Clean Catch Clean Catch (Midstream)  11-19-21   No growth  --  --      STER [id number]  11-03-21   No growth at 14 days.  --  --      STER [id number]  11-02-21   No growth at 14 days.  --    No organisms seen      STER [id number]  11-01-21   No growth at 14 days.  --  --            Clostridium difficile GDH Toxins A&amp;B, EIA:   Negative (11-27-21 @ 11:31)  Clostridium difficile GDH Interpretation: Negative for toxigenic C. Difficile.  This specimen is negative for C.  Difficile glutamate dehydrogenase (GDH) antigen and negative for C.  Difficile Toxins A & B, by EIA.  GDH is a highly sensitive screening  marker for C. Difficile that is produced in large amounts by all C.  Difficile strains, both toxigenic and nontoxigenic.  This assay has not  been validated as a test of cure.  Repeat testing during the same episode  of diarrhea is of limited value and is discouraged.  The results of this  assay should always be interpreted in conjunction with pateint's clinical  history. (11-27-21 @ 11:31)      RADIOLOGY:    <The imaging below has been reviewed and visualized by me independently. Findings as detailed in report below>    < from: CT Abdomen and Pelvis w/ IV Cont (11.29.21 @ 10:44) >  IMPRESSION:    Unchanged terminal ileitis with mild increased fluid distention of the upstream small bowel, compatible with ileus versus partial small bowel obstruction. Unchanged trace loculated ascites with peritoneal enhancement at the level of the terminal ileum, suggesting peritonitis. However, there has been interval resolution of the extraluminal free air since 11/23/2021.    Mild wall thickening of the adjacent proximal sigmoid colon, likely reactive to the terminal ileitis.    New small bilateral pleural effusions.    < end of copied text >

## 2021-11-30 NOTE — DOWNTIME INTERRUPTION NOTE - WHICH MANUAL FORMS INITIATED?
Pain assessment flowsheet  Goal outcome evaluation record  Neurological assessment sheet  A & I record

## 2021-12-01 LAB
ALBUMIN SERPL ELPH-MCNC: 2.8 G/DL — LOW (ref 3.3–5)
ALP SERPL-CCNC: 107 U/L — SIGNIFICANT CHANGE UP (ref 40–120)
ALT FLD-CCNC: 34 U/L — SIGNIFICANT CHANGE UP (ref 10–45)
ANION GAP SERPL CALC-SCNC: 11 MMOL/L — SIGNIFICANT CHANGE UP (ref 5–17)
APTT BLD: 41.8 SEC — HIGH (ref 27.5–35.5)
AST SERPL-CCNC: 27 U/L — SIGNIFICANT CHANGE UP (ref 10–40)
BASOPHILS # BLD AUTO: 0 K/UL — SIGNIFICANT CHANGE UP (ref 0–0.2)
BASOPHILS NFR BLD AUTO: 0 % — SIGNIFICANT CHANGE UP (ref 0–2)
BILIRUB DIRECT SERPL-MCNC: <0.1 MG/DL — SIGNIFICANT CHANGE UP (ref 0–0.3)
BILIRUB INDIRECT FLD-MCNC: >0.1 MG/DL — LOW (ref 0.2–1)
BILIRUB SERPL-MCNC: 0.2 MG/DL — SIGNIFICANT CHANGE UP (ref 0.2–1.2)
BUN SERPL-MCNC: 9 MG/DL — SIGNIFICANT CHANGE UP (ref 7–23)
CALCIUM SERPL-MCNC: 6.6 MG/DL — LOW (ref 8.4–10.5)
CHLORIDE SERPL-SCNC: 111 MMOL/L — HIGH (ref 96–108)
CO2 SERPL-SCNC: 18 MMOL/L — LOW (ref 22–31)
CREAT SERPL-MCNC: 0.62 MG/DL — SIGNIFICANT CHANGE UP (ref 0.5–1.3)
EOSINOPHIL # BLD AUTO: 0 K/UL — SIGNIFICANT CHANGE UP (ref 0–0.5)
EOSINOPHIL NFR BLD AUTO: 0 % — SIGNIFICANT CHANGE UP (ref 0–6)
GLUCOSE SERPL-MCNC: 105 MG/DL — HIGH (ref 70–99)
HCT VFR BLD CALC: 24.6 % — LOW (ref 39–50)
HGB BLD-MCNC: 8.1 G/DL — LOW (ref 13–17)
INR BLD: 1.25 RATIO — HIGH (ref 0.88–1.16)
LDH SERPL L TO P-CCNC: 214 U/L — SIGNIFICANT CHANGE UP (ref 50–242)
LYMPHOCYTES # BLD AUTO: 0.25 K/UL — LOW (ref 1–3.3)
LYMPHOCYTES # BLD AUTO: 13.1 % — SIGNIFICANT CHANGE UP (ref 13–44)
MAGNESIUM SERPL-MCNC: 1.9 MG/DL — SIGNIFICANT CHANGE UP (ref 1.6–2.6)
MCHC RBC-ENTMCNC: 31.5 PG — SIGNIFICANT CHANGE UP (ref 27–34)
MCHC RBC-ENTMCNC: 32.9 GM/DL — SIGNIFICANT CHANGE UP (ref 32–36)
MCV RBC AUTO: 95.7 FL — SIGNIFICANT CHANGE UP (ref 80–100)
MONOCYTES # BLD AUTO: 0.33 K/UL — SIGNIFICANT CHANGE UP (ref 0–0.9)
MONOCYTES NFR BLD AUTO: 17.5 % — HIGH (ref 2–14)
NEUTROPHILS # BLD AUTO: 1.26 K/UL — LOW (ref 1.8–7.4)
NEUTROPHILS NFR BLD AUTO: 66.7 % — SIGNIFICANT CHANGE UP (ref 43–77)
NRBC # BLD: 0 /100 WBCS — SIGNIFICANT CHANGE UP (ref 0–0)
PHOSPHATE SERPL-MCNC: 1.8 MG/DL — LOW (ref 2.5–4.5)
PLATELET # BLD AUTO: 56 K/UL — LOW (ref 150–400)
POTASSIUM SERPL-MCNC: 4 MMOL/L — SIGNIFICANT CHANGE UP (ref 3.5–5.3)
POTASSIUM SERPL-SCNC: 4 MMOL/L — SIGNIFICANT CHANGE UP (ref 3.5–5.3)
PROT SERPL-MCNC: 5 G/DL — LOW (ref 6–8.3)
PROTHROM AB SERPL-ACNC: 14.8 SEC — HIGH (ref 10.6–13.6)
RBC # BLD: 2.57 M/UL — LOW (ref 4.2–5.8)
RBC # FLD: 14.4 % — SIGNIFICANT CHANGE UP (ref 10.3–14.5)
SARS-COV-2 RNA SPEC QL NAA+PROBE: SIGNIFICANT CHANGE UP
SODIUM SERPL-SCNC: 140 MMOL/L — SIGNIFICANT CHANGE UP (ref 135–145)
WBC # BLD: 1.89 K/UL — LOW (ref 3.8–10.5)
WBC # FLD AUTO: 1.89 K/UL — LOW (ref 3.8–10.5)

## 2021-12-01 PROCEDURE — 51702 INSERT TEMP BLADDER CATH: CPT

## 2021-12-01 PROCEDURE — 99232 SBSQ HOSP IP/OBS MODERATE 35: CPT

## 2021-12-01 RX ORDER — CALCIUM GLUCONATE 100 MG/ML
2 VIAL (ML) INTRAVENOUS ONCE
Refills: 0 | Status: COMPLETED | OUTPATIENT
Start: 2021-12-01 | End: 2021-12-01

## 2021-12-01 RX ORDER — PETROLATUM,WHITE
1 JELLY (GRAM) TOPICAL THREE TIMES A DAY
Refills: 0 | Status: DISCONTINUED | OUTPATIENT
Start: 2021-12-01 | End: 2021-12-07

## 2021-12-01 RX ORDER — FLUCONAZOLE 150 MG/1
400 TABLET ORAL DAILY
Refills: 0 | Status: DISCONTINUED | OUTPATIENT
Start: 2021-12-01 | End: 2021-12-07

## 2021-12-01 RX ORDER — SODIUM,POTASSIUM PHOSPHATES 278-250MG
1 POWDER IN PACKET (EA) ORAL
Refills: 0 | Status: COMPLETED | OUTPATIENT
Start: 2021-12-01 | End: 2021-12-03

## 2021-12-01 RX ORDER — SODIUM BICARBONATE 1 MEQ/ML
5 SYRINGE (ML) INTRAVENOUS
Refills: 0 | Status: DISCONTINUED | OUTPATIENT
Start: 2021-12-01 | End: 2021-12-07

## 2021-12-01 RX ORDER — POTASSIUM PHOSPHATE, MONOBASIC POTASSIUM PHOSPHATE, DIBASIC 236; 224 MG/ML; MG/ML
30 INJECTION, SOLUTION INTRAVENOUS ONCE
Refills: 0 | Status: COMPLETED | OUTPATIENT
Start: 2021-12-01 | End: 2021-12-01

## 2021-12-01 RX ORDER — ACYCLOVIR SODIUM 500 MG
400 VIAL (EA) INTRAVENOUS EVERY 8 HOURS
Refills: 0 | Status: DISCONTINUED | OUTPATIENT
Start: 2021-12-01 | End: 2021-12-07

## 2021-12-01 RX ADMIN — TAMSULOSIN HYDROCHLORIDE 0.4 MILLIGRAM(S): 0.4 CAPSULE ORAL at 22:10

## 2021-12-01 RX ADMIN — POTASSIUM PHOSPHATE, MONOBASIC POTASSIUM PHOSPHATE, DIBASIC 83.33 MILLIMOLE(S): 236; 224 INJECTION, SOLUTION INTRAVENOUS at 10:08

## 2021-12-01 RX ADMIN — Medication 1 LOZENGE: at 10:10

## 2021-12-01 RX ADMIN — ENOXAPARIN SODIUM 100 MILLIGRAM(S): 100 INJECTION SUBCUTANEOUS at 14:10

## 2021-12-01 RX ADMIN — Medication 1 TABLET(S): at 22:10

## 2021-12-01 RX ADMIN — Medication 5 MILLILITER(S): at 18:28

## 2021-12-01 RX ADMIN — Medication 109.8 MILLIGRAM(S): at 06:36

## 2021-12-01 RX ADMIN — Medication 1 LOZENGE: at 06:38

## 2021-12-01 RX ADMIN — PANTOPRAZOLE SODIUM 40 MILLIGRAM(S): 20 TABLET, DELAYED RELEASE ORAL at 14:10

## 2021-12-01 RX ADMIN — Medication 400 MILLIGRAM(S): at 22:10

## 2021-12-01 RX ADMIN — Medication 5 MILLILITER(S): at 22:10

## 2021-12-01 RX ADMIN — Medication 25 MILLIGRAM(S): at 10:08

## 2021-12-01 RX ADMIN — Medication 1 APPLICATION(S): at 13:00

## 2021-12-01 RX ADMIN — Medication 1 LOZENGE: at 14:12

## 2021-12-01 RX ADMIN — Medication 25 MILLIGRAM(S): at 00:03

## 2021-12-01 RX ADMIN — Medication 400 MILLIGRAM(S): at 14:11

## 2021-12-01 RX ADMIN — Medication 1 TABLET(S): at 14:13

## 2021-12-01 RX ADMIN — Medication 5 MILLILITER(S): at 14:12

## 2021-12-01 RX ADMIN — Medication 1 TABLET(S): at 18:28

## 2021-12-01 RX ADMIN — Medication 200 GRAM(S): at 10:07

## 2021-12-01 RX ADMIN — Medication 1 LOZENGE: at 22:10

## 2021-12-01 RX ADMIN — ENOXAPARIN SODIUM 100 MILLIGRAM(S): 100 INJECTION SUBCUTANEOUS at 00:03

## 2021-12-01 RX ADMIN — Medication 5 MILLILITER(S): at 14:14

## 2021-12-01 RX ADMIN — Medication 1 APPLICATION(S): at 22:10

## 2021-12-01 RX ADMIN — Medication 1 LOZENGE: at 18:29

## 2021-12-01 RX ADMIN — MEROPENEM 100 MILLIGRAM(S): 1 INJECTION INTRAVENOUS at 06:36

## 2021-12-01 RX ADMIN — Medication 5 MILLILITER(S): at 06:38

## 2021-12-01 RX ADMIN — MEROPENEM 100 MILLIGRAM(S): 1 INJECTION INTRAVENOUS at 14:12

## 2021-12-01 RX ADMIN — CHLORHEXIDINE GLUCONATE 1 APPLICATION(S): 213 SOLUTION TOPICAL at 08:30

## 2021-12-01 RX ADMIN — Medication 5 MILLILITER(S): at 10:09

## 2021-12-01 RX ADMIN — FLUCONAZOLE 400 MILLIGRAM(S): 150 TABLET ORAL at 14:11

## 2021-12-01 RX ADMIN — Medication 25 MILLIGRAM(S): at 18:28

## 2021-12-01 RX ADMIN — MEROPENEM 100 MILLIGRAM(S): 1 INJECTION INTRAVENOUS at 22:11

## 2021-12-01 NOTE — PROCEDURE NOTE - NSPROCDETAILS_GEN_ALL_CORE
sterile technique, indwelling urinary device inserted
guidewire recovered/lumen(s) aspirated and flushed/sterile dressing applied/sterile technique, catheter placed/ultrasound guidance with use of sterile gel and probe cove
sterile technique, indwelling urinary device inserted

## 2021-12-01 NOTE — PROGRESS NOTE ADULT - ATTENDING COMMENTS
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 He is now admitted for an autoSCT with high dose melphalan prep regimen. Transplant day 11/19/21. Hospital course has been complicated by vasovagal episodes on 11/19/21 and 11/23/21. On 11/23/21 developed neutropenic sepsis, improved with fluid resuscitation. CT on 11/23/21 showed Terminal ileitis with associated ileus versus low-grade obstruction with free air and possible bowel perforation, surgery consulted and transferred to SICU, managed conservatively for now. Patient was also found to have increasing troponins, echo showed heart strain and CTA chest on 11/24/21 showed a saddle PE,  LE doppler on 11/24/24 showed b/l DVT. He is now s/p thrombectomy and IVC filter (11/24/21).Patient is hemodynamically stable, mentating well.     PE:   VSS  Gen: A/O x 3, NAD, tired  RESP: CTA, no wheeze no rhonchi  CV: S1, S2 RRR  Abd:  soft, increased distention, + hyperactive BS  LE: no edema  Neuro: CNII-XII intact, no focal deficits  Psych: appropriate    MM  -dx 4/2021  -s/p RVD x 6 (5/2021 - 9/2021) -->   -admitted for autoSCT with Ivett 100mg/M2 on 11/19/21  -hold Zarxio for now given acute events / acute abdomen  Today is day + 11  -Plt goal 40K given need for therapeutic AC     ID  BACTERIAL:  Neutropenic sepsis (11/23/21) started on meropenem (11/23 - ) and vanco x1 (11/23)  VIRAL: c/w Acyclovir   FUNGAL: c/w Diflucan 400 mg po daily.  PCP prophylaxis: hold for now    HEME  Saddle PE s/p and b/l LE DVT (dx 11/24/21) - c/w Lovenox, s/p mechanical thrombectomy and IVC filter placement on 11/24/21  -keep Hb >7  -keep plt >40; keep plt >50 for procedures    GI:  Abd pain / bowel perforation on CT on 11/23/21 - tolerating PO diet,  -repeat CT abd (11/29/21) -  terminal ileitis with mild increased fluid distention of the upstream small bowel, compatible with ileus versus partial small bowel obstruction. Unchanged trace loculated ascites with peritoneal enhancement at the level of the terminal ileum, suggesting peritonitis. However, there has been interval resolution of the extraluminal free air since 11/23/2021.  -management as per SICU team   Diarrhea - stool cx neg  VOD prophylaxis - glutamine supplementation, Actigall BID   GI prophylaxis - Protonix po QD   Mouth care and skin care as per protocol.    The time was used discussed with patient, family, primary team, consultants, and acute management. 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 He is now admitted for an autoSCT with high dose melphalan prep regimen. Transplant day 11/19/21. Hospital course has been complicated by vasovagal episodes on 11/19/21 and 11/23/21. On 11/23/21 developed neutropenic sepsis, improved with fluid resuscitation. CT on 11/23/21 showed Terminal ileitis with associated ileus versus low-grade obstruction with free air and possible bowel perforation, surgery consulted and transferred to SICU, managed conservatively for now. Patient was also found to have increasing troponins, echo showed heart strain and CTA chest on 11/24/21 showed a saddle PE,  LE doppler on 11/24/24 showed b/l DVT. He is now s/p thrombectomy and IVC filter (11/24/21).Patient is hemodynamically stable, mentating well.     PE:   VSS  Gen: A/O x 3, NAD, tired  RESP: CTA, no wheeze no rhonchi  CV: S1, S2 RRR  Abd:  soft, increased distention, + hyperactive BS  LE: no edema  Neuro: CNII-XII intact, no focal deficits  Psych: appropriate    MM  -dx 4/2021  -s/p RVD x 6 (5/2021 - 9/2021) -->   -admitted for autoSCT with Ivett 100mg/M2 on 11/19/21  -hold Zarxio for now given acute events / acute abdomen  Today is day + 12  -Plt goal 40K given need for therapeutic AC     ID  BACTERIAL:  Neutropenic sepsis (11/23/21) started on meropenem (11/23 - ) and vanco x1 (11/23)  VIRAL: c/w Acyclovir   FUNGAL: c/w Diflucan 400 mg po daily.  PCP prophylaxis: hold for now    HEME  Saddle PE s/p and b/l LE DVT (dx 11/24/21) - c/w Lovenox, s/p mechanical thrombectomy and IVC filter placement on 11/24/21  -keep Hb >7  -keep plt >40; keep plt >50 for procedures    GI:  Abd pain / bowel perforation on CT on 11/23/21 - tolerating PO diet  -c/w Abx as per surgery  -repeat CT abd (11/29/21) -  terminal ileitis with mild increased fluid distention of the upstream small bowel, compatible with ileus versus partial small bowel obstruction. Unchanged trace loculated ascites with peritoneal enhancement at the level of the terminal ileum, suggesting peritonitis. However, there has been interval resolution of the extraluminal free air since 11/23/2021.  Diarrhea - stool cx neg, c/w Abx  VOD prophylaxis - glutamine supplementation, Actigall BID   GI prophylaxis - Protonix po QD   Mouth care and skin care as per protocol.    The time was used discussed with patient, family, primary team, consultants, and acute management.

## 2021-12-01 NOTE — PROGRESS NOTE ADULT - SUBJECTIVE AND OBJECTIVE BOX
HPC Transplant Team                                                      Critical / Counseling Time Provided: 30 minutes                                                                                                                                                        Chief Complaint: Autologous peripheral blood stem cell transplant with high dose Melphalan prep regimen for treatment of multiple myeloma    S: Patient seen and examined with HPC Transplant Team:       O: Vitals:   Vital Signs Last 24 Hrs  T(C): 36.1 (01 Dec 2021 05:00), Max: 36.7 (01 Dec 2021 00:20)  T(F): 97 (01 Dec 2021 05:00), Max: 98.1 (01 Dec 2021 00:20)  HR: 81 (01 Dec 2021 05:00) (69 - 83)  BP: 121/66 (01 Dec 2021 05:00) (99/65 - 146/87)  BP(mean): 88 (30 Nov 2021 22:00) (74 - 104)  RR: 18 (01 Dec 2021 05:00) (12 - 20)  SpO2: 97% (01 Dec 2021 05:00) (91% - 99%)    Admit weight: 98.4kg   Today's weight:       Intake / Output:   11-30 @ 07:01  -  12-01 @ 07:00  --------------------------------------------------------  IN: 475 mL / OUT: 255 mL / NET: 220 mL        PE:   Oropharynx: + erythema and post Kepivance coating no ulcerations   Oral Mucositis:              +                                         ndGndrndanddndend:nd nd2nd CVS: S1, S2 RRR   Lungs: CTA throughout bilaterally   Abdomen: + BS x 4, soft, mild distended,  mild tenderness   Extremities: +1/2-1 edema   Gastric Mucositis:       -                                           Grade: n/a  Intestinal Mucositis:     -                                         Grade: n/a   Skin: patchy, erythematous maculopapular rash to face, neck and upper chest and back post Kepivance   TLC: CDI   Neuro: A&O x 3     Labs:   CBC Full  -  ( 01 Dec 2021 06:54 )  WBC Count : 1.89 K/uL  Hemoglobin : 8.1 g/dL  Hematocrit : 24.6 %  Platelet Count - Automated : 56 K/uL  Mean Cell Volume : 95.7 fl  Mean Cell Hemoglobin : 31.5 pg  Mean Cell Hemoglobin Concentration : 32.9 gm/dL  Auto Neutrophil # : x  Auto Lymphocyte # : x  Auto Monocyte # : x  Auto Eosinophil # : x  Auto Basophil # : x  Auto Neutrophil % : x  Auto Lymphocyte % : x  Auto Monocyte % : x  Auto Eosinophil % : x  Auto Basophil % : x                          8.1    1.89  )-----------( 56       ( 01 Dec 2021 06:54 )             24.6     12-01    140  |  111<H>  |  9   ----------------------------<  105<H>  4.0   |  18<L>  |  0.62    Ca    6.6<L>      01 Dec 2021 06:53  Phos  1.8     12-01  Mg     1.9     12-01    TPro  5.0<L>  /  Alb  2.8<L>  /  TBili  0.2  /  DBili  <0.1  /  AST  27  /  ALT  34  /  AlkPhos  107  12-01    PT/INR - ( 01 Dec 2021 06:54 )   PT: 14.8 sec;   INR: 1.25 ratio         PTT - ( 01 Dec 2021 06:54 )  PTT:41.8 sec  LIVER FUNCTIONS - ( 01 Dec 2021 06:53 )  Alb: 2.8 g/dL / Pro: 5.0 g/dL / ALK PHOS: 107 U/L / ALT: 34 U/L / AST: 27 U/L / GGT: x           Lactate Dehydrogenase, Serum: 214 U/L (12-01 @ 06:53)      Cultures:   Culture - Blood (11.23.21 @ 12:32)    Specimen Source: .Blood Blood    Culture Results:   No Growth Final    Culture - Urine (11.19.21 @ 12:26)    Specimen Source: Clean Catch Clean Catch (Midstream)    Culture Results:   No growth      Radiology:   Xray Chest 1 View- PORTABLE-Routine (Xray Chest 1 View- PORTABLE-Routine in AM.) (11.28.21 @ 07:22) >  Clear lungs.    Meds:   Antimicrobials:   acyclovir IVPB 490 milliGRAM(s) IV Intermittent every 8 hours  clotrimazole Lozenge 1 Lozenge Oral five times a day  fluconAZOLE IVPB 400 milliGRAM(s) IV Intermittent every 24 hours  meropenem  IVPB 1000 milliGRAM(s) IV Intermittent every 8 hours      Heme / Onc:   enoxaparin Injectable 100 milliGRAM(s) SubCutaneous every 12 hours      GI:  pantoprazole    Tablet 40 milliGRAM(s) Oral before breakfast      Cardiovascular:   metoprolol tartrate 25 milliGRAM(s) Oral every 8 hours  tamsulosin 0.4 milliGRAM(s) Oral at bedtime      Immunologic:       Other medications:   Biotene Dry Mouth Oral Rinse 5 milliLiter(s) Swish and Spit five times a day  calcium gluconate IVPB 2 Gram(s) IV Intermittent once  chlorhexidine 2% Cloths 1 Application(s) Topical <User Schedule>  lidocaine/prilocaine Cream 1 Application(s) Topical daily  potassium phosphate / sodium phosphate Tablet (K-PHOS No. 2) 1 Tablet(s) Oral four times a day  potassium phosphate IVPB 30 milliMole(s) IV Intermittent once      PRN:   acetaminophen     Tablet .. 650 milliGRAM(s) Oral every 6 hours PRN    A/P:  66 year old male with a history of multiple myeloma s/p RVD x 6   Post Autologous PBSCT day +  12  11/16- melphalan 2/2; s/p melphalan hydration for 24 hours post infusion of last dose   Strict I&O, daily weights, prn diuresis   11/17- rest day   11/18- rest day   11/19- s/p HPC transplant; completed transplant hydration for 24 hours post infusion of cells. Suprapubic pain in am. UA pending, follow up culture, BK virus. AXR   11/19-vasovegal episode in AM: will monitor tele for 24hrs   11/20 d/c telemetry. Lasix 40mg IV x today, follow up I&Os, daily weight. monitor rash, receiving 2nd dose Kepivance today  11/21 add Emend for 3 days and change zofran ATC. If worsening abd pain consider CT a/p oral contrast only.   11/22- Neutropenic started on cipro once febrile will pancx and switch cipro to  Aztronam 2g Q8   11/23- AMS this am, agonal breathing, diaphoretic, cool and clammy - responsive to sternal rub. Hypotensive, hypoxic. Placed on NRB, RRT called. Labs sent. Lactate send. PCN allergy - started on Aztreonam 2g IV q 8 hoursm Flagyl 500mg IV TID for anaerobic coverage given abdominal pain and distension, Vancomycin 1g IV x 1. CT A/P pending for 12:30 11/23/21. CE with new TWI in V2-V6 - likely demand ischemia. Repeat CE and lactate at 2pm.   11/23- CT A/P with Terminal ileitis with associated ileus versus low-grade obstruction. Pockets of free air in the right lower quadrant adjacent to the terminal ileum concerning for bowel perforation. 2.6 cm loculated fluid adjacent to the terminal ileum and surrounding peritoneal enhancement suggestive of developing peritonitis. Surgical team consulted- transferred to 8ICU for closer monitoring.  ID consult called- JESUS daniel.    11/24- CT chest angio + Saddle embolus with right heart strain., seen by cards- started on full dose A/C, s/p IVC filter placement with thrombectomy: transfuse PLT to >50K . doppler + B/L DVT. D/C Zarxio  11/27-early engraftment   11/27 on clear liq diet, watery diarrhea, C diff(-)  11/28 Transitioned from Heparin gtt to Lovenox - D/W ICU MD, recommend keeping keeping plt's >40k while on AC.   Pending transfer back to BMTU  12/1- SICU care appreciated. Failed TOV - 20F coude catheter placed this am by urology. Continue lovenox for now, may change to DOAC / NOAC prior to discharge. Continue antibiotics per ID service. Engrafting.     1. Terminal ileitis / sigmoid colitis   CT A/P (11/23) with findings concerning for ileitis and Pockets of free air in the right lower quadrant adjacent to the terminal ileum concerning for bowel perforation and 2.6 cm loculated fluid adjacent to the terminal ileum and surrounding peritoneal enhancement suggestive of developing peritonitis.  SICU care / surgical input appreciated - high risk for surgical intervention, monitored clinically    Continue antibiotics - ID input appreciated   Tolerating regular diet   Repeat CT A/P 11/29/21 showed interval resolution of extraluminal free air since 11/23/21; mild wall thickening of adjacent proximal sigmoid colon, likely reactive to terminal ileitis   Abdominal exams     2. DVT / PE   Diagnosed with Saddle PE on 11/24 and required thrombectomy  Bilateral DVTs, most recent duplex LE showed no propagation of DVT   Troponins normalized - no longer trending   Continue therapeutic anticoagulation - currently receiving Lovenox 1mg/kg BID - consider changing to DOAC / NOAC prior to discharge     3. Urinary retention   Ortega initially placed in SICU - removed 11/30  Failed TOV - retaining 900ml; 20F coude catheter placed by urology 12/1/21 AM   Continue flomax     4. Neutropenic sepsis   Antibiotic management per ID service   Meropenem: 11/23 -->  Fluconazole: 11/15 -->   PO Bactrim: 11/15 --> 11/17  Ciprofloxacin: 11/22 --> 11/23  Vancomycin: 11/23  Metronidazole: 11/23  Aztreonam: 11/23    5. Infectious Disease:   acyclovir IVPB 490 milliGRAM(s) IV Intermittent every 8 hours  clotrimazole Lozenge 1 Lozenge Oral five times a day  fluconAZOLE IVPB 400 milliGRAM(s) IV Intermittent every 24 hours  meropenem  IVPB 1000 milliGRAM(s) IV Intermittent every 8 hours    Can change acyclovir / fluconazole to po as tolerated     6. VOD Prophylaxis: Actigall, Glutamine    7. GI Prophylaxis:  pantoprazole    Tablet 40 milliGRAM(s) Oral before breakfast    8. Mouthcare - NS / NaHCO3 rinses, Mycelex, Biotene; Skin care     9. GVHD prophylaxis - n/a     10. Transfuse & replete electrolytes prn   potassium phosphate / sodium phosphate Tablet (K-PHOS No. 2) 1 Tablet(s) Oral four times a day  potassium phosphate IVPB 30 milliMole(s) IV Intermittent once  calcium gluconate IVPB 2 Gram(s) IV Intermittent once    11. IV hydration, daily weights, strict I&O, prn diuresis     12. PO intake as tolerated, nutrition follow up as needed, MVI, folic acid     13. Antiemetics, anti-diarrhea medications:     14. OOB as tolerated, physical therapy consult if needed     15. Monitor coags / fibrinogen 2x week, vitamin K as needed     16. Monitor closely for clinical changes, monitor for fevers     17. Emotional support provided, plan of care discussed and questions addressed     18. Patient education done regarding plan of care, restrictions and discharge planning     19. Continue regular social work input     I have written the above note for Dr. Lozada who performed service with me in the room.   Terrie Barr NP-C ([phone])    I have seen and examined patient with NP, I agree with above note as scribed.                    HPC Transplant Team                                                      Critical / Counseling Time Provided: 30 minutes                                                                                                                                                        Chief Complaint: Autologous peripheral blood stem cell transplant with high dose Melphalan prep regimen for treatment of multiple myeloma    S: Patient seen and examined with HPC Transplant Team:   + diarrhea  +fatigue     O: Vitals:   Vital Signs Last 24 Hrs  T(C): 36.1 (01 Dec 2021 05:00), Max: 36.7 (01 Dec 2021 00:20)  T(F): 97 (01 Dec 2021 05:00), Max: 98.1 (01 Dec 2021 00:20)  HR: 81 (01 Dec 2021 05:00) (69 - 83)  BP: 121/66 (01 Dec 2021 05:00) (99/65 - 146/87)  BP(mean): 88 (30 Nov 2021 22:00) (74 - 104)  RR: 18 (01 Dec 2021 05:00) (12 - 20)  SpO2: 97% (01 Dec 2021 05:00) (91% - 99%)    Admit weight: 98.4kg   Today's weight: pending      Intake / Output:   11-30 @ 07:01  -  12-01 @ 07:00  --------------------------------------------------------  IN: 475 mL / OUT: 255 mL / NET: 220 mL        PE:   Oropharynx: + erythema and post Kepivance coating no ulcerations   Oral Mucositis:              +                                         ndGndrndanddndend:nd nd2nd CVS: S1, S2 RRR   Lungs: CTA throughout bilaterally   Abdomen: + BS x 4, soft, mild distended,  mild tenderness   Extremities: +1/2-1 edema   Gastric Mucositis:       -                                           Grade: n/a  Intestinal Mucositis:     -                                         Grade: n/a   Skin: patchy, erythematous maculopapular rash to face, neck and upper chest and back post Kepivance   TLC: CDI   Neuro: A&O x 3     Labs:   CBC Full  -  ( 01 Dec 2021 06:54 )  WBC Count : 1.89 K/uL  Hemoglobin : 8.1 g/dL  Hematocrit : 24.6 %  Platelet Count - Automated : 56 K/uL  Mean Cell Volume : 95.7 fl  Mean Cell Hemoglobin : 31.5 pg  Mean Cell Hemoglobin Concentration : 32.9 gm/dL  Auto Neutrophil # : x  Auto Lymphocyte # : x  Auto Monocyte # : x  Auto Eosinophil # : x  Auto Basophil # : x  Auto Neutrophil % : x  Auto Lymphocyte % : x  Auto Monocyte % : x  Auto Eosinophil % : x  Auto Basophil % : x                          8.1    1.89  )-----------( 56       ( 01 Dec 2021 06:54 )             24.6     12-01    140  |  111<H>  |  9   ----------------------------<  105<H>  4.0   |  18<L>  |  0.62    Ca    6.6<L>      01 Dec 2021 06:53  Phos  1.8     12-01  Mg     1.9     12-01    TPro  5.0<L>  /  Alb  2.8<L>  /  TBili  0.2  /  DBili  <0.1  /  AST  27  /  ALT  34  /  AlkPhos  107  12-01    PT/INR - ( 01 Dec 2021 06:54 )   PT: 14.8 sec;   INR: 1.25 ratio         PTT - ( 01 Dec 2021 06:54 )  PTT:41.8 sec  LIVER FUNCTIONS - ( 01 Dec 2021 06:53 )  Alb: 2.8 g/dL / Pro: 5.0 g/dL / ALK PHOS: 107 U/L / ALT: 34 U/L / AST: 27 U/L / GGT: x           Lactate Dehydrogenase, Serum: 214 U/L (12-01 @ 06:53)      Cultures:   Culture - Blood (11.23.21 @ 12:32)    Specimen Source: .Blood Blood    Culture Results:   No Growth Final    Culture - Urine (11.19.21 @ 12:26)    Specimen Source: Clean Catch Clean Catch (Midstream)    Culture Results:   No growth      Radiology:   Xray Chest 1 View- PORTABLE-Routine (Xray Chest 1 View- PORTABLE-Routine in AM.) (11.28.21 @ 07:22) >  Clear lungs.    Meds:   Antimicrobials:   acyclovir IVPB 490 milliGRAM(s) IV Intermittent every 8 hours  clotrimazole Lozenge 1 Lozenge Oral five times a day  fluconAZOLE IVPB 400 milliGRAM(s) IV Intermittent every 24 hours  meropenem  IVPB 1000 milliGRAM(s) IV Intermittent every 8 hours      Heme / Onc:   enoxaparin Injectable 100 milliGRAM(s) SubCutaneous every 12 hours      GI:  pantoprazole    Tablet 40 milliGRAM(s) Oral before breakfast      Cardiovascular:   metoprolol tartrate 25 milliGRAM(s) Oral every 8 hours  tamsulosin 0.4 milliGRAM(s) Oral at bedtime      Immunologic:       Other medications:   Biotene Dry Mouth Oral Rinse 5 milliLiter(s) Swish and Spit five times a day  calcium gluconate IVPB 2 Gram(s) IV Intermittent once  chlorhexidine 2% Cloths 1 Application(s) Topical <User Schedule>  lidocaine/prilocaine Cream 1 Application(s) Topical daily  potassium phosphate / sodium phosphate Tablet (K-PHOS No. 2) 1 Tablet(s) Oral four times a day  potassium phosphate IVPB 30 milliMole(s) IV Intermittent once      PRN:   acetaminophen     Tablet .. 650 milliGRAM(s) Oral every 6 hours PRN    A/P:  66 year old male with a history of multiple myeloma s/p RVD x 6   Post Autologous PBSCT day +  12  11/16- melphalan 2/2; s/p melphalan hydration for 24 hours post infusion of last dose   Strict I&O, daily weights, prn diuresis   11/17- rest day   11/18- rest day   11/19- s/p HPC transplant; completed transplant hydration for 24 hours post infusion of cells. Suprapubic pain in am. UA pending, follow up culture, BK virus. AXR   11/19-vasovegal episode in AM: will monitor tele for 24hrs   11/20 d/c telemetry. Lasix 40mg IV x today, follow up I&Os, daily weight. monitor rash, receiving 2nd dose Kepivance today  11/21 add Emend for 3 days and change zofran ATC. If worsening abd pain consider CT a/p oral contrast only.   11/22- Neutropenic started on cipro once febrile will pancx and switch cipro to  Aztronam 2g Q8   11/23- AMS this am, agonal breathing, diaphoretic, cool and clammy - responsive to sternal rub. Hypotensive, hypoxic. Placed on NRB, RRT called. Labs sent. Lactate send. PCN allergy - started on Aztreonam 2g IV q 8 hoursm Flagyl 500mg IV TID for anaerobic coverage given abdominal pain and distension, Vancomycin 1g IV x 1. CT A/P pending for 12:30 11/23/21. CE with new TWI in V2-V6 - likely demand ischemia. Repeat CE and lactate at 2pm.   11/23- CT A/P with Terminal ileitis with associated ileus versus low-grade obstruction. Pockets of free air in the right lower quadrant adjacent to the terminal ileum concerning for bowel perforation. 2.6 cm loculated fluid adjacent to the terminal ileum and surrounding peritoneal enhancement suggestive of developing peritonitis. Surgical team consulted- transferred to 8ICU for closer monitoring.  ID consult called- ABX fredy.    11/24- CT chest angio + Saddle embolus with right heart strain., seen by cards- started on full dose A/C, s/p IVC filter placement with thrombectomy: transfuse PLT to >50K . doppler + B/L DVT. D/C Zarxio  11/27-early engraftment   11/27 on clear liq diet, watery diarrhea, C diff(-)  11/28 Transitioned from Heparin gtt to Lovenox - D/W ICU MD, recommend keeping keeping plt's >40k while on AC.   Pending transfer back to BMTU  12/1- SICU care appreciated. Failed TOV - 20F coude catheter placed this am by urology. Continue lovenox for now, may change to DOAC / NOAC prior to discharge. Continue antibiotics per ID service. Engrafting.     1. Terminal ileitis / sigmoid colitis   CT A/P (11/23) with findings concerning for ileitis and Pockets of free air in the right lower quadrant adjacent to the terminal ileum concerning for bowel perforation and 2.6 cm loculated fluid adjacent to the terminal ileum and surrounding peritoneal enhancement suggestive of developing peritonitis.  SICU care / surgical input appreciated - high risk for surgical intervention, monitored clinically    Continue antibiotics - ID input appreciated   Tolerating regular diet   Repeat CT A/P 11/29/21 showed interval resolution of extraluminal free air since 11/23/21; mild wall thickening of adjacent proximal sigmoid colon, likely reactive to terminal ileitis   Abdominal exams     2. DVT / PE   Diagnosed with Saddle PE on 11/24 and required thrombectomy  Bilateral DVTs, most recent duplex LE showed no propagation of DVT   Troponins normalized - no longer trending   Continue therapeutic anticoagulation - currently receiving Lovenox 1mg/kg BID - consider changing to DOAC / NOAC prior to discharge     3. Urinary retention   Orteag initially placed in SICU - removed 11/30  Failed TOV - retaining 900ml; 20F coude catheter placed by urology 12/1/21 AM   Continue flomax     4. Neutropenic sepsis   Antibiotic management per ID service   Meropenem: 11/23 -->  Fluconazole: 11/15 -->   PO Bactrim: 11/15 --> 11/17  Ciprofloxacin: 11/22 --> 11/23  Vancomycin: 11/23  Metronidazole: 11/23  Aztreonam: 11/23    5. Infectious Disease:   acyclovir IVPB 490 milliGRAM(s) IV Intermittent every 8 hours  clotrimazole Lozenge 1 Lozenge Oral five times a day  fluconAZOLE IVPB 400 milliGRAM(s) IV Intermittent every 24 hours  meropenem  IVPB 1000 milliGRAM(s) IV Intermittent every 8 hours    Can change acyclovir / fluconazole to po as tolerated     6. VOD Prophylaxis: Actigall, Glutamine    7. GI Prophylaxis:  pantoprazole    Tablet 40 milliGRAM(s) Oral before breakfast    8. Mouthcare - NS / NaHCO3 rinses, Mycelex, Biotene; Skin care     9. GVHD prophylaxis - n/a     10. Transfuse & replete electrolytes prn   potassium phosphate / sodium phosphate Tablet (K-PHOS No. 2) 1 Tablet(s) Oral four times a day  potassium phosphate IVPB 30 milliMole(s) IV Intermittent once  calcium gluconate IVPB 2 Gram(s) IV Intermittent once    11. IV hydration, daily weights, strict I&O, prn diuresis     12. PO intake as tolerated, nutrition follow up as needed, MVI, folic acid     13. Antiemetics, anti-diarrhea medications:     14. OOB as tolerated, physical therapy consult if needed     15. Monitor coags / fibrinogen 2x week, vitamin K as needed     16. Monitor closely for clinical changes, monitor for fevers     17. Emotional support provided, plan of care discussed and questions addressed     18. Patient education done regarding plan of care, restrictions and discharge planning     19. Continue regular social work input     I have written the above note for Dr. Lozada who performed service with me in the room.   Terrie Barr NP-C ([phone])    I have seen and examined patient with NP, I agree with above note as scribed.

## 2021-12-01 NOTE — PROGRESS NOTE ADULT - ATTENDING COMMENTS
Continue therapeutic anticoagulation, currently on Lovenox 1mg/kg BID     [facility] [phone]

## 2021-12-01 NOTE — CHART NOTE - NSCHARTNOTESELECT_GEN_ALL_CORE
BMTU cells/Event Note
Infectious Diseases/Event Note
Nutrition Services
Event Note
Event Note
Nutrition Services

## 2021-12-01 NOTE — CHART NOTE - NSCHARTNOTEFT_GEN_A_CORE
Nutrition Follow Up Note  Patient seen for: BMT follow-up     Chart reviewed, events noted. Per chart "66 year old male with a history of multiple myeloma s/p RVD x 6. Post Autologous PBSCT day +  12"     Source: [X] Patient       [x] EMR          Diet Order:   Diet, Regular:   GlutaSolve(Glutamine) 15gm pkg     Qty per Day:  1  Supplement Feeding Modality:  Oral  Ensure Clear Cans or Servings Per Day:  2       Frequency:  Daily (-)    - Is current order appropriate/adequate? [X] Yes    - PO intake :   [X] <50%  Poor    - Nutrition-related concerns:       - Course c/b pneumoperitoneum and terminal ileitis ( CT). Pt was subsequently NPO/Clears x 5 days, diet advanced to regular .       - Pt reports ongoing poor appetite/PO intake x > 1 week, estimates eating <50% of meals 3x/day in setting if dysgeusia and diarrhea.       - Denies drinking Ensure Clear secondary to dislike of flavor (apple), amenable to trial of berry flavor. Food preferences obtained; RD to honor as able.       - Phos low today, s/p repletion. K+ and Mg WNL. Continue to monitor and replete electrolytes if low.     GI: Denies N/V. No difficulty chewing/swallowing.  Last BM today ( 1x). Endorses ongoing diarrhea. Bowel Regimen? [] Yes   [X] No    Weights:   Daily Weight in k.9 (), Weight in k.8 (-), Weight in k.7 (-), Weight in k.5 (-25),   ** Weight fluctuating - of note Pt has received diuretics in-house. Weight changes likely secondary to fluid shifts. RD to continue to monitor weight trends as able.     Nutritionally Pertinent MEDICATIONS  (STANDING):  acyclovir   Oral Tab/Cap  clotrimazole Lozenge  fluconAZOLE   Tablet  meropenem  IVPB  metoprolol tartrate  pantoprazole    Tablet  potassium phosphate / sodium phosphate Tablet (K-PHOS No. 2)  sodium bicarbonate Mouth Rinse  tamsulosin    Pertinent Labs:  @ 06:53: Na 140, BUN 9, Cr 0.62, <H>, K+ 4.0, Phos 1.8<L>, Mg 1.9, Alk Phos 107, ALT/SGPT 34, AST/SGOT 27,   11-30 @ 16:44: Na 140, BUN 11, Cr 0.61, <H>, K+ 3.7, Phos 2.7, Mg 1.9    Skin per nursing documentation:   Edema: 1+ generalized, 2+ rosa. legs    Estimated Needs:   [X] no change since previous assessment    Previous Nutrition Diagnosis: Inadequate protein-energy intake   Nutrition Diagnosis is: [X] ongoing - being addressed with PO intake, oral nutritional supplements, food preferences    New Nutrition Diagnosis: [X] N/A    Nutrition Care Plan:  [X] In Progress    Nutrition Interventions:     Education Provided:       [X] Yes  Discussed importance of adequate consumption of meals/supplements to optimize protein-energy intake. Encouraged small/frequent meals, nutrient dense snacks, prioritizing protein foods at meal time. Reviewed dysgeusia and diarrhea nutrition therapy. Pt advised RD remains available to address GI symptoms.        Recommendations:         [X] Continue current diet order: Regular            [X] Continue oral nutrition supplement: Ensure Clear 2x/day     [X] Encourage adequate consumption of meals/supplements to optimize protein-energy intake.      [X] Continue current micronutrient supplementation: multivitamin and folic acid     [X] Food preferences obtained; RD to honor as able.      [X] Continue to monitor and replete electrolytes if low.     Monitoring and Evaluation:   Continue to monitor nutritional intake, tolerance to diet prescription, weights, labs, skin integrity    RD remains available upon request and will follow up per protocol  Denise Mason, RD #[phone]

## 2021-12-01 NOTE — PROGRESS NOTE ADULT - SUBJECTIVE AND OBJECTIVE BOX
Vascular Cardiology  Progress note  EMAIL [email or web address]   OFFICE [phone]    INTERVAL HISTORY:  -Still having copious diarrhea. Still endorsing abdominal discomfort and tightness in abdomen- unchanged from previous days.  -No melena or hematochezia.  -Denies CP or SOB.  -Received another 1 unit platelets overnight.      Allergies  Omnicef (Unknown)  penicillin (Hives)    MEDICATIONS:  enoxaparin Injectable 100 milliGRAM(s) SubCutaneous every 12 hours  metoprolol tartrate 25 milliGRAM(s) Oral every 8 hours  tamsulosin 0.4 milliGRAM(s) Oral at bedtime  acyclovir   Oral Tab/Cap 400 milliGRAM(s) Oral every 8 hours  clotrimazole Lozenge 1 Lozenge Oral five times a day  fluconAZOLE   Tablet 400 milliGRAM(s) Oral daily  meropenem  IVPB 1000 milliGRAM(s) IV Intermittent every 8 hours  acetaminophen     Tablet .. 650 milliGRAM(s) Oral every 6 hours PRN  pantoprazole    Tablet 40 milliGRAM(s) Oral before breakfast  sodium bicarbonate Mouth Rinse 5 milliLiter(s) Swish and Spit five times a day  Biotne Dry Mouth Oral Rinse 5 milliLiter(s) Swish and Spit five times a day  chlorhexidine 2% Cloths 1 Application(s) Topical <User Schedule>  potassium phosphate / sodium phosphate Tablet (K-PHOS No. 2) 1 Tablet(s) Oral four times a day    PAST MEDICAL & SURGICAL HISTORY:  Hyperlipidemia  Shortness of breath on exertion  Lumbar herniated disc  T12 compression fracture  Acute lumbar radiculopathy  History of basal cell carcinoma  History of surgery on wrist    FAMILY HISTORY:  No pertinent family history in first degree relatives    SOCIAL HISTORY:  unchanged    REVIEW OF SYSTEMS:  CONSTITUTIONAL: No fever, weight loss, or fatigue  EYES: No eye pain, visual disturbances, or discharge  ENMT:  No difficulty hearing, tinnitus, vertigo; No sinus or throat pain  NECK: No pain or stiffness  RESPIRATORY: No cough, wheezing, chills or hemoptysis; No Shortness of Breath  CARDIOVASCULAR: No chest pain, palpitations, passing out, dizziness, or leg swelling  GENITOURINARY: No dysuria, frequency, hematuria, or incontinence  NEUROLOGICAL: No headaches, memory loss, loss of strength, numbness, or tremors  SKIN: No itching, burning, rashes, or lesions   LYMPH Nodes: No enlarged glands  ENDOCRINE: No heat or cold intolerance; No hair loss  MUSCULOSKELETAL: No joint pain or swelling; No muscle, back, or extremity pain  PSYCHIATRIC: No depression, anxiety, mood swings, or difficulty sleeping  HEME/LYMPH: No easy bruising, or bleeding gums  ALLERY AND IMMUNOLOGIC: No hives or eczema	    [x] All others negative	  [ ] Unable to obtain    PHYSICAL EXAM:  T(C): 36.9 (12-01-21 @ 09:04), Max: 36.9 (12-01-21 @ 09:04)  HR: 83 (12-01-21 @ 09:04) (69 - 83)  BP: 144/76 (12-01-21 @ 09:04) (99/65 - 146/87)  RR: 18 (12-01-21 @ 09:04) (12 - 20)  SpO2: 97% (12-01-21 @ 09:04) (91% - 99%)    Wt(kg): --  I&O's Summary    30 Nov 2021 07:01  -  01 Dec 2021 07:00  --------------------------------------------------------  IN: 475 mL / OUT: 255 mL / NET: 220 mL    01 Dec 2021 07:01  -  01 Dec 2021 10:59  --------------------------------------------------------  IN: 120 mL / OUT: 0 mL / NET: 120 mL    Appearance: Normal	  HEENT:   Normal oral mucosa, EOMI, laying comfortably in bed  Cardiovascular: Normal S1 S2, No murmurs, No edema  Respiratory: Lungs clear to auscultation anteriorly  Psychiatry: A & O x 3, Mood & affect appropriate  Gastrointestinal: Mildly distended  Skin: No rashes, No ecchymoses, No cyanosis	  Neurologic: Non-focal  Extremities: Normal range of motion, No clubbing, cyanosis. Bilateral edema to knees  Vascular:   Right DP:  Palpable             Left DP:  Palpable    LABS:	 	    CBC Full  -  ( 01 Dec 2021 06:54 )  WBC Count : 1.89 K/uL  Hemoglobin : 8.1 g/dL  Hematocrit : 24.6 %  Platelet Count - Automated : 56 K/uL  Mean Cell Volume : 95.7 fl  Mean Cell Hemoglobin : 31.5 pg  Mean Cell Hemoglobin Concentration : 32.9 gm/dL  Auto Neutrophil # : 1.26 K/uL  Auto Lymphocyte # : 0.25 K/uL  Auto Monocyte # : 0.33 K/uL  Auto Eosinophil # : 0.00 K/uL  Auto Basophil # : 0.00 K/uL  Auto Neutrophil % : 66.7 %  Auto Lymphocyte % : 13.1 %  Auto Monocyte % : 17.5 %  Auto Eosinophil % : 0.0 %  Auto Basophil % : 0.0 %    12-01    140  |  111<H>  |  9   ----------------------------<  105<H>  4.0   |  18<L>  |  0.62  11-30    140  |  109<H>  |  11  ----------------------------<  106<H>  3.7   |  18<L>  |  0.61    Ca    6.6<L>      01 Dec 2021 06:53  Ca    6.7<L>      30 Nov 2021 16:44  Phos  1.8     12-01  Phos  2.7     11-30  Mg     1.9     12-01  Mg     1.9     11-30    TPro  5.0<L>  /  Alb  2.8<L>  /  TBili  0.2  /  DBili  <0.1  /  AST  27  /  ALT  34  /  AlkPhos  107  12-01  TPro  5.1<L>  /  Alb  2.8<L>  /  TBili  0.2  /  DBili  <0.1  /  AST  25  /  ALT  36  /  AlkPhos  97  11-30    Assessment:  1. Submassive saddle PE s/p thrombectomy  2. Bilateral DVT  3. Plasma cell myeloma s/p SCT with pancytopenia  4. Neutropenic terminal ileitis/sigmoid colitis    Plan:  1. Continue therapeutic anticoagulation, currently on Lovenox 1mg/kg BID, if unable to tolerate AC will need to consider IVC filter  2. Heme/Onc following regarding platelet goals with use of therapeutic anticoagulation, appreciate guidance  3. Bilateral below knee compression stockings for symptomatic management of DVTs    Thank you    Vascular Cardiology Service  DIRECT SERVICE NUMBER [phone]  Office [phone]  email:   [email or web address]

## 2021-12-02 LAB
ALBUMIN SERPL ELPH-MCNC: 2.9 G/DL — LOW (ref 3.3–5)
ALP SERPL-CCNC: 107 U/L — SIGNIFICANT CHANGE UP (ref 40–120)
ALT FLD-CCNC: 43 U/L — SIGNIFICANT CHANGE UP (ref 10–45)
ANION GAP SERPL CALC-SCNC: 11 MMOL/L — SIGNIFICANT CHANGE UP (ref 5–17)
APTT BLD: 42.4 SEC — HIGH (ref 27.5–35.5)
AST SERPL-CCNC: 33 U/L — SIGNIFICANT CHANGE UP (ref 10–40)
BASOPHILS # BLD AUTO: 0.06 K/UL — SIGNIFICANT CHANGE UP (ref 0–0.2)
BASOPHILS NFR BLD AUTO: 2.6 % — HIGH (ref 0–2)
BILIRUB SERPL-MCNC: 0.2 MG/DL — SIGNIFICANT CHANGE UP (ref 0.2–1.2)
BLASTS # FLD: 0.9 % — HIGH (ref 0–0)
BUN SERPL-MCNC: 8 MG/DL — SIGNIFICANT CHANGE UP (ref 7–23)
CALCIUM SERPL-MCNC: 6.9 MG/DL — LOW (ref 8.4–10.5)
CHLORIDE SERPL-SCNC: 111 MMOL/L — HIGH (ref 96–108)
CLOSURE TME COLL+EPINEP BLD: SIGNIFICANT CHANGE UP (ref 150–400)
CO2 SERPL-SCNC: 18 MMOL/L — LOW (ref 22–31)
CREAT SERPL-MCNC: 0.64 MG/DL — SIGNIFICANT CHANGE UP (ref 0.5–1.3)
EOSINOPHIL # BLD AUTO: 0 K/UL — SIGNIFICANT CHANGE UP (ref 0–0.5)
EOSINOPHIL NFR BLD AUTO: 0 % — SIGNIFICANT CHANGE UP (ref 0–6)
FIBRINOGEN PPP-MCNC: 952 MG/DL — HIGH (ref 290–520)
GLUCOSE SERPL-MCNC: 111 MG/DL — HIGH (ref 70–99)
HCT VFR BLD CALC: 24.7 % — LOW (ref 39–50)
HCT VFR BLD CALC: 25 % — LOW (ref 39–50)
HGB BLD-MCNC: 7.9 G/DL — LOW (ref 13–17)
HGB BLD-MCNC: 8 G/DL — LOW (ref 13–17)
INR BLD: 1.3 RATIO — HIGH (ref 0.88–1.16)
LYMPHOCYTES # BLD AUTO: 0.45 K/UL — LOW (ref 1–3.3)
LYMPHOCYTES # BLD AUTO: 20 % — SIGNIFICANT CHANGE UP (ref 13–44)
MAGNESIUM SERPL-MCNC: 1.7 MG/DL — SIGNIFICANT CHANGE UP (ref 1.6–2.6)
MANUAL SMEAR VERIFICATION: SIGNIFICANT CHANGE UP
MCHC RBC-ENTMCNC: 30.3 PG — SIGNIFICANT CHANGE UP (ref 27–34)
MCHC RBC-ENTMCNC: 31 PG — SIGNIFICANT CHANGE UP (ref 27–34)
MCHC RBC-ENTMCNC: 32 GM/DL — SIGNIFICANT CHANGE UP (ref 32–36)
MCHC RBC-ENTMCNC: 32 GM/DL — SIGNIFICANT CHANGE UP (ref 32–36)
MCV RBC AUTO: 94.7 FL — SIGNIFICANT CHANGE UP (ref 80–100)
MCV RBC AUTO: 96.9 FL — SIGNIFICANT CHANGE UP (ref 80–100)
MONOCYTES # BLD AUTO: 0.55 K/UL — SIGNIFICANT CHANGE UP (ref 0–0.9)
MONOCYTES NFR BLD AUTO: 24.3 % — HIGH (ref 2–14)
NEUTROPHILS # BLD AUTO: 1.15 K/UL — LOW (ref 1.8–7.4)
NEUTROPHILS NFR BLD AUTO: 51.3 % — SIGNIFICANT CHANGE UP (ref 43–77)
NRBC # BLD: 0 /100 WBCS — SIGNIFICANT CHANGE UP (ref 0–0)
PHOSPHATE SERPL-MCNC: 2.1 MG/DL — LOW (ref 2.5–4.5)
PLAT MORPH BLD: NORMAL — SIGNIFICANT CHANGE UP
PLATELET # BLD AUTO: 23 K/UL — LOW (ref 150–400)
PLATELET # BLD AUTO: SIGNIFICANT CHANGE UP (ref 150–400)
POTASSIUM SERPL-MCNC: 3.9 MMOL/L — SIGNIFICANT CHANGE UP (ref 3.5–5.3)
POTASSIUM SERPL-SCNC: 3.9 MMOL/L — SIGNIFICANT CHANGE UP (ref 3.5–5.3)
PROMYELOCYTES # FLD: 0.9 % — HIGH (ref 0–0)
PROT SERPL-MCNC: 5 G/DL — LOW (ref 6–8.3)
PROTHROM AB SERPL-ACNC: 15.4 SEC — HIGH (ref 10.6–13.6)
RBC # BLD: 2.55 M/UL — LOW (ref 4.2–5.8)
RBC # BLD: 2.64 M/UL — LOW (ref 4.2–5.8)
RBC # FLD: 14.4 % — SIGNIFICANT CHANGE UP (ref 10.3–14.5)
RBC # FLD: 14.4 % — SIGNIFICANT CHANGE UP (ref 10.3–14.5)
RBC BLD AUTO: SIGNIFICANT CHANGE UP
SODIUM SERPL-SCNC: 140 MMOL/L — SIGNIFICANT CHANGE UP (ref 135–145)
WBC # BLD: 2.01 K/UL — LOW (ref 3.8–10.5)
WBC # BLD: 2.25 K/UL — LOW (ref 3.8–10.5)
WBC # FLD AUTO: 2.01 K/UL — LOW (ref 3.8–10.5)
WBC # FLD AUTO: 2.25 K/UL — LOW (ref 3.8–10.5)

## 2021-12-02 PROCEDURE — 99233 SBSQ HOSP IP/OBS HIGH 50: CPT

## 2021-12-02 PROCEDURE — 99291 CRITICAL CARE FIRST HOUR: CPT

## 2021-12-02 RX ORDER — APIXABAN 2.5 MG/1
5 TABLET, FILM COATED ORAL EVERY 12 HOURS
Refills: 0 | Status: DISCONTINUED | OUTPATIENT
Start: 2021-12-02 | End: 2021-12-07

## 2021-12-02 RX ORDER — ATOVAQUONE 750 MG/5ML
5 SUSPENSION ORAL
Qty: 300 | Refills: 3
Start: 2021-12-02 | End: 2022-03-31

## 2021-12-02 RX ORDER — ICOSAPENT ETHYL 500 MG/1
2 CAPSULE, LIQUID FILLED ORAL
Qty: 0 | Refills: 0 | DISCHARGE

## 2021-12-02 RX ORDER — APIXABAN 2.5 MG/1
1 TABLET, FILM COATED ORAL
Qty: 60 | Refills: 3
Start: 2021-12-02 | End: 2022-03-31

## 2021-12-02 RX ORDER — METOPROLOL TARTRATE 50 MG
1 TABLET ORAL
Qty: 90 | Refills: 3
Start: 2021-12-02 | End: 2022-03-31

## 2021-12-02 RX ORDER — ATORVASTATIN CALCIUM 80 MG/1
1 TABLET, FILM COATED ORAL
Qty: 0 | Refills: 0 | DISCHARGE

## 2021-12-02 RX ORDER — ACYCLOVIR SODIUM 500 MG
1 VIAL (EA) INTRAVENOUS
Qty: 90 | Refills: 3
Start: 2021-12-02 | End: 2022-03-31

## 2021-12-02 RX ORDER — CALCIUM GLUCONATE 100 MG/ML
2 VIAL (ML) INTRAVENOUS ONCE
Refills: 0 | Status: COMPLETED | OUTPATIENT
Start: 2021-12-02 | End: 2021-12-02

## 2021-12-02 RX ORDER — TAMSULOSIN HYDROCHLORIDE 0.4 MG/1
1 CAPSULE ORAL
Qty: 30 | Refills: 3
Start: 2021-12-02 | End: 2022-03-31

## 2021-12-02 RX ORDER — ONDANSETRON 8 MG/1
1 TABLET, FILM COATED ORAL
Qty: 90 | Refills: 3
Start: 2021-12-02 | End: 2022-03-31

## 2021-12-02 RX ORDER — PANTOPRAZOLE SODIUM 20 MG/1
1 TABLET, DELAYED RELEASE ORAL
Qty: 30 | Refills: 3
Start: 2021-12-02 | End: 2022-03-31

## 2021-12-02 RX ORDER — FLUCONAZOLE 150 MG/1
2 TABLET ORAL
Qty: 60 | Refills: 3
Start: 2021-12-02 | End: 2022-03-31

## 2021-12-02 RX ORDER — FOLIC ACID 0.8 MG
1 TABLET ORAL
Qty: 30 | Refills: 3
Start: 2021-12-02 | End: 2022-03-31

## 2021-12-02 RX ORDER — POTASSIUM PHOSPHATE, MONOBASIC POTASSIUM PHOSPHATE, DIBASIC 236; 224 MG/ML; MG/ML
15 INJECTION, SOLUTION INTRAVENOUS ONCE
Refills: 0 | Status: COMPLETED | OUTPATIENT
Start: 2021-12-02 | End: 2021-12-02

## 2021-12-02 RX ADMIN — Medication 1 LOZENGE: at 10:10

## 2021-12-02 RX ADMIN — POTASSIUM PHOSPHATE, MONOBASIC POTASSIUM PHOSPHATE, DIBASIC 62.5 MILLIMOLE(S): 236; 224 INJECTION, SOLUTION INTRAVENOUS at 10:09

## 2021-12-02 RX ADMIN — Medication 1 TABLET(S): at 13:00

## 2021-12-02 RX ADMIN — Medication 5 MILLILITER(S): at 13:08

## 2021-12-02 RX ADMIN — CHLORHEXIDINE GLUCONATE 1 APPLICATION(S): 213 SOLUTION TOPICAL at 09:01

## 2021-12-02 RX ADMIN — Medication 25 MILLIGRAM(S): at 08:31

## 2021-12-02 RX ADMIN — Medication 1 APPLICATION(S): at 05:53

## 2021-12-02 RX ADMIN — Medication 5 MILLILITER(S): at 08:30

## 2021-12-02 RX ADMIN — Medication 25 MILLIGRAM(S): at 00:07

## 2021-12-02 RX ADMIN — Medication 5 MILLILITER(S): at 17:45

## 2021-12-02 RX ADMIN — Medication 400 MILLIGRAM(S): at 22:07

## 2021-12-02 RX ADMIN — Medication 5 MILLILITER(S): at 10:10

## 2021-12-02 RX ADMIN — MEROPENEM 100 MILLIGRAM(S): 1 INJECTION INTRAVENOUS at 13:00

## 2021-12-02 RX ADMIN — Medication 5 MILLILITER(S): at 00:08

## 2021-12-02 RX ADMIN — ENOXAPARIN SODIUM 100 MILLIGRAM(S): 100 INJECTION SUBCUTANEOUS at 00:08

## 2021-12-02 RX ADMIN — TAMSULOSIN HYDROCHLORIDE 0.4 MILLIGRAM(S): 0.4 CAPSULE ORAL at 22:08

## 2021-12-02 RX ADMIN — Medication 1 TABLET(S): at 08:31

## 2021-12-02 RX ADMIN — Medication 1 LOZENGE: at 13:01

## 2021-12-02 RX ADMIN — MEROPENEM 100 MILLIGRAM(S): 1 INJECTION INTRAVENOUS at 05:52

## 2021-12-02 RX ADMIN — Medication 200 GRAM(S): at 09:01

## 2021-12-02 RX ADMIN — Medication 25 MILLIGRAM(S): at 17:45

## 2021-12-02 RX ADMIN — Medication 5 MILLILITER(S): at 22:07

## 2021-12-02 RX ADMIN — Medication 1 APPLICATION(S): at 13:04

## 2021-12-02 RX ADMIN — Medication 400 MILLIGRAM(S): at 05:53

## 2021-12-02 RX ADMIN — Medication 5 MILLILITER(S): at 20:17

## 2021-12-02 RX ADMIN — Medication 1 LOZENGE: at 22:07

## 2021-12-02 RX ADMIN — Medication 400 MILLIGRAM(S): at 13:01

## 2021-12-02 RX ADMIN — Medication 1 APPLICATION(S): at 22:12

## 2021-12-02 RX ADMIN — FLUCONAZOLE 400 MILLIGRAM(S): 150 TABLET ORAL at 13:01

## 2021-12-02 RX ADMIN — Medication 1 TABLET(S): at 17:45

## 2021-12-02 RX ADMIN — Medication 5 MILLILITER(S): at 05:52

## 2021-12-02 RX ADMIN — Medication 1 LOZENGE: at 05:52

## 2021-12-02 RX ADMIN — Medication 1 LOZENGE: at 17:45

## 2021-12-02 RX ADMIN — Medication 1 TABLET(S): at 22:08

## 2021-12-02 RX ADMIN — APIXABAN 5 MILLIGRAM(S): 2.5 TABLET, FILM COATED ORAL at 14:49

## 2021-12-02 RX ADMIN — MEROPENEM 100 MILLIGRAM(S): 1 INJECTION INTRAVENOUS at 22:07

## 2021-12-02 RX ADMIN — Medication 5 MILLILITER(S): at 13:04

## 2021-12-02 RX ADMIN — PANTOPRAZOLE SODIUM 40 MILLIGRAM(S): 20 TABLET, DELAYED RELEASE ORAL at 05:53

## 2021-12-02 NOTE — PROGRESS NOTE ADULT - ATTENDING COMMENTS
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 He is now admitted for an autoSCT with high dose melphalan prep regimen. Transplant day 11/19/21. Hospital course has been complicated by vasovagal episodes on 11/19/21 and 11/23/21. On 11/23/21 developed neutropenic sepsis, improved with fluid resuscitation. CT on 11/23/21 showed Terminal ileitis with associated ileus versus low-grade obstruction with free air and possible bowel perforation, surgery consulted and transferred to SICU, managed conservatively for now. Patient was also found to have increasing troponins, echo showed heart strain and CTA chest on 11/24/21 showed a saddle PE,  LE doppler on 11/24/24 showed b/l DVT. He is now s/p thrombectomy and IVC filter (11/24/21).Patient is hemodynamically stable, mentating well.     PE:   VSS  Gen: A/O x 3, NAD, tired  RESP: CTA, no wheeze no rhonchi  CV: S1, S2 RRR  Abd:  soft, increased distention, + hyperactive BS  LE: no edema  Neuro: CNII-XII intact, no focal deficits  Psych: appropriate    MM  -dx 4/2021  -s/p RVD x 6 (5/2021 - 9/2021) -->   -admitted for autoSCT with Ivett 100mg/M2 on 11/19/21  -hold Zarxio for now given acute events / acute abdomen  Today is day + 13  -Plt goal 40K given need for therapeutic AC     ID  BACTERIAL:  Neutropenic sepsis (11/23/21) started on meropenem (11/23 - ) and vanco x1 (11/23)  VIRAL: c/w Acyclovir   FUNGAL: c/w Diflucan 400 mg po daily.  PCP prophylaxis: hold for now    HEME  Saddle PE s/p and b/l LE DVT (dx 11/24/21) - c/w Lovenox, s/p mechanical thrombectomy and IVC filter placement on 11/24/21  -keep Hb >7  -keep plt >40; keep plt >50 for procedures    GI:  Abd pain / bowel perforation on CT on 11/23/21 - tolerating PO diet  -c/w Abx as per surgery  -repeat CT abd (11/29/21) -  terminal ileitis with mild increased fluid distention of the upstream small bowel, compatible with ileus versus partial small bowel obstruction. Unchanged trace loculated ascites with peritoneal enhancement at the level of the terminal ileum, suggesting peritonitis. However, there has been interval resolution of the extraluminal free air since 11/23/2021.  Diarrhea - stool cx neg, c/w Abx  VOD prophylaxis - glutamine supplementation, Actigall BID   GI prophylaxis - Protonix po QD   Mouth care and skin care as per protocol.    The time was used discussed with patient, family, primary team, consultants, and acute management. 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 He is now admitted for an autoSCT with high dose melphalan prep regimen. Transplant day 11/19/21. Hospital course has been complicated by vasovagal episodes on 11/19/21 and 11/23/21. On 11/23/21 developed neutropenic sepsis, improved with fluid resuscitation. CT on 11/23/21 showed Terminal ileitis with associated ileus versus low-grade obstruction with free air and possible bowel perforation, surgery consulted and transferred to SICU, managed conservatively for now. Patient was also found to have increasing troponins, echo showed heart strain and CTA chest on 11/24/21 showed a saddle PE,  LE doppler on 11/24/24 showed b/l DVT. He is now s/p thrombectomy and IVC filter (11/24/21).Patient is hemodynamically stable, mentating well.     PE:   VSS  Gen: A/O x 3, NAD, tired  RESP: CTA, no wheeze no rhonchi  CV: S1, S2 RRR  Abd:  soft, increased distention, + hyperactive BS  LE: no edema  Neuro: CNII-XII intact, no focal deficits  Psych: appropriate    MM  -dx 4/2021  -s/p RVD x 6 (5/2021 - 9/2021) -->   -admitted for autoSCT with Ivett 100mg/M2 on 11/19/21  -hold Zarxio for now given acute events / acute abdomen  Today is day + 13  -patient is engrafting    ID  BACTERIAL:  Neutropenic sepsis (11/23/21) started on meropenem (11/23 - ) and vanco x1 (11/23)  VIRAL: c/w Acyclovir   FUNGAL: c/w Diflucan 400 mg po daily.  PCP prophylaxis: hold for now    HEME  Saddle PE s/p and b/l LE DVT (dx 11/24/21) - c/w Lovenox, s/p mechanical thrombectomy and IVC filter placement on 11/24/21  -will transition to Eliquis  -keep Hb >7  -keep plt >40; keep plt >50 for procedures  -12/2/21 plt clumping today - okay to give anticoagulation since plt have been >50k    GI:  Abd pain / bowel perforation on CT on 11/23/21 - tolerating PO diet  -c/w Abx as per surgery  -repeat CT abd (11/29/21) -  terminal ileitis with mild increased fluid distention of the upstream small bowel, compatible with ileus versus partial small bowel obstruction. Unchanged trace loculated ascites with peritoneal enhancement at the level of the terminal ileum, suggesting peritonitis. However, there has been interval resolution of the extraluminal free air since 11/23/2021.  Diarrhea - stool cx neg, c/w Abx  VOD prophylaxis - glutamine supplementation, Actigall BID   GI prophylaxis - Protonix po QD   Mouth care and skin care as per protocol.    The time was used discussed with patient, family, primary team, consultants, and acute management.

## 2021-12-02 NOTE — PROGRESS NOTE ADULT - ATTENDING COMMENTS
Appreciate hematology recs re:  anticoagulation  Monitor platelets closely  no filter if he is on a/c  Eliquis 5mg BID now      Kishan [phone]

## 2021-12-02 NOTE — PROGRESS NOTE ADULT - SUBJECTIVE AND OBJECTIVE BOX
HPC Transplant Team                                                      Critical / Counseling Time Provided: 30 minutes                                                                                                                                                        Chief Complaint: Autologous peripheral blood stem cell transplant with high dose Melphalan prep regimen for treatment of multiple myeloma    S: Patient seen and examined with HPC Transplant Team:       O: Vitals:   Vital Signs Last 24 Hrs  T(C): 36.9 (02 Dec 2021 05:30), Max: 37.1 (01 Dec 2021 21:05)  T(F): 98.4 (02 Dec 2021 05:30), Max: 98.8 (01 Dec 2021 21:05)  HR: 69 (02 Dec 2021 05:30) (69 - 97)  BP: 127/75 (02 Dec 2021 05:30) (111/70 - 144/76)  BP(mean): --  RR: 18 (02 Dec 2021 05:30) (18 - 19)  SpO2: 94% (02 Dec 2021 05:30) (94% - 99%)    Admit weight: 98.4kg   Daily Weight in k.9 (01 Dec 2021 09:04)  Today's weight:     Intake / Output:    @ 07:01  -  -02 @ 07:00  --------------------------------------------------------  IN: 2255 mL / OUT: 3425 mL / NET: -1170 mL    PE:   Oropharynx: + erythema and post Kepivance coating no ulcerations   Oral Mucositis:              +                                         ndGndrndanddndend:nd nd2nd CVS: S1, S2 RRR   Lungs: CTA throughout bilaterally   Abdomen: + BS x 4, soft, mild distended,  mild tenderness   Extremities: +1/2-1 edema   Gastric Mucositis:       -                                           Grade: n/a  Intestinal Mucositis:     -                                         Grade: n/a   Skin: patchy, erythematous maculopapular rash to face, neck and upper chest and back post Kepivance   TLC: CDI   Neuro: A&O x 3     Labs:   CBC Full  -  ( 02 Dec 2021 06:37 )  WBC Count : 2.25 K/uL  Hemoglobin : 7.9 g/dL  Hematocrit : 24.7 %  Platelet Count - Automated : Clumped  Mean Cell Volume : 96.9 fl  Mean Cell Hemoglobin : 31.0 pg  Mean Cell Hemoglobin Concentration : 32.0 gm/dL  Auto Neutrophil # : 1.15 K/uL  Auto Lymphocyte # : 0.45 K/uL  Auto Monocyte # : 0.55 K/uL  Auto Eosinophil # : 0.00 K/uL  Auto Basophil # : 0.06 K/uL  Auto Neutrophil % : 51.3 %  Auto Lymphocyte % : 20.0 %  Auto Monocyte % : 24.3 %  Auto Eosinophil % : 0.0 %  Auto Basophil % : 2.6 %                          7.9    2.25  )-----------( Clumped    ( 02 Dec 2021 06:37 )             24.7     12-02    140  |  111<H>  |  8   ----------------------------<  111<H>  3.9   |  18<L>  |  0.64    Ca    6.9<L>      02 Dec 2021 06:40  Phos  2.1     12-  Mg     1.7     12    TPro  5.0<L>  /  Alb  2.9<L>  /  TBili  0.2  /  DBili  x   /  AST  33  /  ALT  43  /  AlkPhos  107  12-02    PT/INR - ( 02 Dec 2021 06:40 )   PT: 15.4 sec;   INR: 1.30 ratio         PTT - ( 02 Dec 2021 06:40 )  PTT:42.4 sec  LIVER FUNCTIONS - ( 02 Dec 2021 06:40 )  Alb: 2.9 g/dL / Pro: 5.0 g/dL / ALK PHOS: 107 U/L / ALT: 43 U/L / AST: 33 U/L / GGT: x               Cultures:   GI PCR Panel, Stool (21 @ 00:23)    Culture Results:   Adenovirus 40/41  DETECTED by PCR  *******Please Note:*******  GI panel PCR evaluates for:  Campylobacter, Plesiomonas shigelloides, Salmonella,  Vibrio, Yersinia enterocolitica, Enteroaggregative  Escherichia coli (EAEC), Enteropathogenic E.coli (EPEC),  Enterotoxigenic E. coli (ETEC) lt/st, Shiga-like  toxin-producing E. coli (STEC) stx1/stx2,  Shigella/ Enteroinvasive E. coli (EIEC), Cryptosporidium,  Cyclospora cayetanensis, Entamoeba histolytica,  Giardia lamblia, Adenovirus F 40/41, Astrovirus,  Norovirus GI/GII, Rotavirus A, Sapovirus    Culture - Blood (21 @ 12:32)    Specimen Source: .Blood Blood    Culture Results:   No Growth Final    Culture - Urine (21 @ 12:26)    Specimen Source: Clean Catch Clean Catch (Midstream)    Culture Results:   No growth      Radiology:   EXAM:  CT ABDOMEN AND PELVIS IC                        PROCEDURE DATE:  2021    IMPRESSION:  Unchanged terminal ileitis with mild increased fluid distention of the upstream small bowel, compatible with ileus versus partial small bowel obstruction. Unchanged trace loculated ascites with peritoneal enhancement at the level of the terminal ileum, suggesting peritonitis. However, there has been interval resolution of the extraluminal free air since 2021.  Mild wall thickening of the adjacent proximal sigmoid colon, likely reactive to the terminal ileitis.  New small bilateral pleural effusions.    Xray Chest 1 View- PORTABLE-Routine (Xray Chest 1 View- PORTABLE-Routine in AM.) (21 @ 07:22) >  Clear lungs.    EXAM:  DUPLEX SCAN EXT VEINS LOWER BI                        PROCEDURE DATE:  2021    IMPRESSION:  There is persistent bilateral DVT in the below the knee right posterior tibial, peroneal and soleal veins and above the knee in the left popliteal vein and tibial peroneal trunk.  No new DVT is seen.    [facility]                        PROCEDURE DATE:  2021    IMPRESSION:  Saddle embolus with right heart strain.  These results have been discussed with Dr. Arellano on 2021 at 12:47 AM by on-call resident.  Pneumoperitoneum is again noted.    Meds:   Antimicrobials:   acyclovir   Oral Tab/Cap 400 milliGRAM(s) Oral every 8 hours  clotrimazole Lozenge 1 Lozenge Oral five times a day  fluconAZOLE   Tablet 400 milliGRAM(s) Oral daily  meropenem  IVPB 1000 milliGRAM(s) IV Intermittent every 8 hours      Heme / Onc:   enoxaparin Injectable 100 milliGRAM(s) SubCutaneous every 12 hours      GI:  pantoprazole    Tablet 40 milliGRAM(s) Oral before breakfast  sodium bicarbonate Mouth Rinse 5 milliLiter(s) Swish and Spit five times a day      Cardiovascular:   metoprolol tartrate 25 milliGRAM(s) Oral every 8 hours  tamsulosin 0.4 milliGRAM(s) Oral at bedtime      Immunologic:       Other medications:   AQUAPHOR (petrolatum Ointment) 1 Application(s) Topical three times a day  Biotene Dry Mouth Oral Rinse 5 milliLiter(s) Swish and Spit five times a day  calcium gluconate IVPB 2 Gram(s) IV Intermittent once  chlorhexidine 2% Cloths 1 Application(s) Topical <User Schedule>  potassium phosphate / sodium phosphate Tablet (K-PHOS No. 2) 1 Tablet(s) Oral four times a day  potassium phosphate IVPB 15 milliMole(s) IV Intermittent once      PRN:   acetaminophen     Tablet .. 650 milliGRAM(s) Oral every 6 hours PRN    A/P:  66 year old male with a history of multiple myeloma s/p RVD x 6   Post Autologous PBSCT day +  13  - melphalan 2/2; s/p melphalan hydration for 24 hours post infusion of last dose   Strict I&O, daily weights, prn diuresis   - rest day   - rest day   - s/p HPC transplant; completed transplant hydration for 24 hours post infusion of cells. Suprapubic pain in am. UA pending, follow up culture, BK virus. AXR   -vasovegal episode in AM: will monitor tele for 24hrs    d/c telemetry. Lasix 40mg IV x today, follow up I&Os, daily weight. monitor rash, receiving 2nd dose Kepivance today   add Emend for 3 days and change zofran ATC. If worsening abd pain consider CT a/p oral contrast only.   - Neutropenic started on cipro once febrile will pancx and switch cipro to  Aztronam 2g Q8   - AMS this am, agonal breathing, diaphoretic, cool and clammy - responsive to sternal rub. Hypotensive, hypoxic. Placed on NRB, RRT called. Labs sent. Lactate send. PCN allergy - started on Aztreonam 2g IV q 8 hoursm Flagyl 500mg IV TID for anaerobic coverage given abdominal pain and distension, Vancomycin 1g IV x 1. CT A/P pending for 12:30 21. CE with new TWI in V2-V6 - likely demand ischemia. Repeat CE and lactate at 2pm.   - CT A/P with Terminal ileitis with associated ileus versus low-grade obstruction. Pockets of free air in the right lower quadrant adjacent to the terminal ileum concerning for bowel perforation. 2.6 cm loculated fluid adjacent to the terminal ileum and surrounding peritoneal enhancement suggestive of developing peritonitis. Surgical team consulted- transferred to 8ICU for closer monitoring.  ID consult called- JESUS daniel.    - CT chest angio + Saddle embolus with right heart strain., seen by cards- started on full dose A/C, s/p IVC filter placement with thrombectomy: transfuse PLT to >50K . doppler + B/L DVT. D/C Zarxio  -early engraftment    on clear liq diet, watery diarrhea, C diff(-)   Transitioned from Heparin gtt to Lovenox - D/W ICU MD, recommend keeping keeping plt's >40k while on AC.   Pending transfer back to BMTU  - SICU care appreciated. Failed TOV - 20F coude catheter placed this am by urology. Continue lovenox for now, may change to DOAC / NOAC prior to discharge. Continue antibiotics per ID service. Engrafting.     1. Terminal ileitis / sigmoid colitis   CT A/P () with findings concerning for ileitis and Pockets of free air in the right lower quadrant adjacent to the terminal ileum concerning for bowel perforation and 2.6 cm loculated fluid adjacent to the terminal ileum and surrounding peritoneal enhancement suggestive of developing peritonitis.  SICU care / surgical input appreciated - high risk for surgical intervention, monitored clinically    Continue antibiotics - ID input appreciated   Tolerating regular diet   Repeat CT A/P 21 showed interval resolution of extraluminal free air since 21; mild wall thickening of adjacent proximal sigmoid colon, likely reactive to terminal ileitis   Abdominal exams     2. DVT / PE   Diagnosed with Saddle PE on  and required thrombectomy  Bilateral DVTs, most recent duplex LE showed no propagation of DVT   Troponins normalized - no longer trending   Continue therapeutic anticoagulation - currently receiving Lovenox 1mg/kg BID - consider changing to DOAC / NOAC prior to discharge     3. Urinary retention   Ortega initially placed in SICU - removed   Failed TOV - retaining 900ml; 20F coude catheter placed by urology 21 AM   Continue flomax     4. Neutropenic sepsis   Antibiotic management per ID service   Meropenem:  -->  Fluconazole: 11/15 -->   PO Bactrim: 11/15 -->   Ciprofloxacin:  -->   Vancomycin:   Metronidazole:   Aztreonam:     5. Infectious Disease:   acyclovir   Oral Tab/Cap 400 milliGRAM(s) Oral every 8 hours  clotrimazole Lozenge 1 Lozenge Oral five times a day  fluconAZOLE   Tablet 400 milliGRAM(s) Oral daily  meropenem  IVPB 1000 milliGRAM(s) IV Intermittent every 8 hours    6. VOD Prophylaxis: Actigall, Glutamine    7. GI Prophylaxis:  pantoprazole    Tablet 40 milliGRAM(s) Oral before breakfast    8. Mouthcare - NS / NaHCO3 rinses, Mycelex, Biotene; Skin care     9. GVHD prophylaxis - n/a     10. Transfuse & replete electrolytes prn   potassium phosphate / sodium phosphate Tablet (K-PHOS No. 2) 1 Tablet(s) Oral four times a day  potassium phosphate IVPB 15 milliMole(s) IV Intermittent once  calcium gluconate IVPB 2 Gram(s) IV Intermittent once    11. IV hydration, daily weights, strict I&O, prn diuresis     12. PO intake as tolerated, nutrition follow up as needed, MVI, folic acid     13. Antiemetics, anti-diarrhea medications:     14. OOB as tolerated, physical therapy consult if needed     15. Monitor coags / fibrinogen 2x week, vitamin K as needed     16. Monitor closely for clinical changes, monitor for fevers     17. Emotional support provided, plan of care discussed and questions addressed     18. Patient education done regarding plan of care, restrictions and discharge planning     19. Continue regular social work input     I have written the above note for Dr. Lozada who performed service with me in the room.   Terrie Barr NP-C ([phone])    I have seen and examined patient with NP, I agree with above note as scribed.                    HPC Transplant Team                                                      Critical / Counseling Time Provided: 30 minutes                                                                                                                                                        Chief Complaint: Autologous peripheral blood stem cell transplant with high dose Melphalan prep regimen for treatment of multiple myeloma    S: Patient seen and examined with HPC Transplant Team:   +fatigue      O: Vitals:   Vital Signs Last 24 Hrs  T(C): 36.9 (02 Dec 2021 05:30), Max: 37.1 (01 Dec 2021 21:05)  T(F): 98.4 (02 Dec 2021 05:30), Max: 98.8 (01 Dec 2021 21:05)  HR: 69 (02 Dec 2021 05:30) (69 - 97)  BP: 127/75 (02 Dec 2021 05:30) (111/70 - 144/76)  BP(mean): --  RR: 18 (02 Dec 2021 05:30) (18 - 19)  SpO2: 94% (02 Dec 2021 05:30) (94% - 99%)    Admit weight: 98.4kg   Daily Weight in k.9 (01 Dec 2021 09:04)  Today's weight:     Intake / Output:    @ 07:01  -  - @ 07:00  --------------------------------------------------------  IN: 2255 mL / OUT: 3425 mL / NET: -1170 mL    PE:   Oropharynx: + erythema and post Kepivance coating no ulcerations   Oral Mucositis:              +                                         ndGndrndanddndend:nd nd2nd CVS: S1, S2 RRR   Lungs: CTA throughout bilaterally   Abdomen: + BS x 4, soft, mild distended,  mild tenderness   Extremities: +1/2-1 edema   Gastric Mucositis:       -                                           Grade: n/a  Intestinal Mucositis:     -                                         Grade: n/a   Skin: patchy, erythematous maculopapular rash to face, neck and upper chest and back post Kepivance   TLC: CDI   Neuro: A&O x 3     Labs:   CBC Full  -  ( 02 Dec 2021 06:37 )  WBC Count : 2.25 K/uL  Hemoglobin : 7.9 g/dL  Hematocrit : 24.7 %  Platelet Count - Automated : Clumped  Mean Cell Volume : 96.9 fl  Mean Cell Hemoglobin : 31.0 pg  Mean Cell Hemoglobin Concentration : 32.0 gm/dL  Auto Neutrophil # : 1.15 K/uL  Auto Lymphocyte # : 0.45 K/uL  Auto Monocyte # : 0.55 K/uL  Auto Eosinophil # : 0.00 K/uL  Auto Basophil # : 0.06 K/uL  Auto Neutrophil % : 51.3 %  Auto Lymphocyte % : 20.0 %  Auto Monocyte % : 24.3 %  Auto Eosinophil % : 0.0 %  Auto Basophil % : 2.6 %                          7.9    2.25  )-----------( Clumped    ( 02 Dec 2021 06:37 )             24.7     12-02    140  |  111<H>  |  8   ----------------------------<  111<H>  3.9   |  18<L>  |  0.64    Ca    6.9<L>      02 Dec 2021 06:40  Phos  2.1     12-  Mg     1.7     12    TPro  5.0<L>  /  Alb  2.9<L>  /  TBili  0.2  /  DBili  x   /  AST  33  /  ALT  43  /  AlkPhos  107  12-02    PT/INR - ( 02 Dec 2021 06:40 )   PT: 15.4 sec;   INR: 1.30 ratio         PTT - ( 02 Dec 2021 06:40 )  PTT:42.4 sec  LIVER FUNCTIONS - ( 02 Dec 2021 06:40 )  Alb: 2.9 g/dL / Pro: 5.0 g/dL / ALK PHOS: 107 U/L / ALT: 43 U/L / AST: 33 U/L / GGT: x               Cultures:   GI PCR Panel, Stool (21 @ 00:23)    Culture Results:   Adenovirus 40/41  DETECTED by PCR  *******Please Note:*******  GI panel PCR evaluates for:  Campylobacter, Plesiomonas shigelloides, Salmonella,  Vibrio, Yersinia enterocolitica, Enteroaggregative  Escherichia coli (EAEC), Enteropathogenic E.coli (EPEC),  Enterotoxigenic E. coli (ETEC) lt/st, Shiga-like  toxin-producing E. coli (STEC) stx1/stx2,  Shigella/ Enteroinvasive E. coli (EIEC), Cryptosporidium,  Cyclospora cayetanensis, Entamoeba histolytica,  Giardia lamblia, Adenovirus F 40/41, Astrovirus,  Norovirus GI/GII, Rotavirus A, Sapovirus    Culture - Blood (21 @ 12:32)    Specimen Source: .Blood Blood    Culture Results:   No Growth Final    Culture - Urine (21 @ 12:26)    Specimen Source: Clean Catch Clean Catch (Midstream)    Culture Results:   No growth      Radiology:   EXAM:  CT ABDOMEN AND PELVIS IC                        PROCEDURE DATE:  2021    IMPRESSION:  Unchanged terminal ileitis with mild increased fluid distention of the upstream small bowel, compatible with ileus versus partial small bowel obstruction. Unchanged trace loculated ascites with peritoneal enhancement at the level of the terminal ileum, suggesting peritonitis. However, there has been interval resolution of the extraluminal free air since 2021.  Mild wall thickening of the adjacent proximal sigmoid colon, likely reactive to the terminal ileitis.  New small bilateral pleural effusions.    Xray Chest 1 View- PORTABLE-Routine (Xray Chest 1 View- PORTABLE-Routine in AM.) (21 @ 07:22) >  Clear lungs.    EXAM:  DUPLEX SCAN EXT VEINS LOWER BI                        PROCEDURE DATE:  2021    IMPRESSION:  There is persistent bilateral DVT in the below the knee right posterior tibial, peroneal and soleal veins and above the knee in the left popliteal vein and tibial peroneal trunk.  No new DVT is seen.    [facility]  [facility]                        PROCEDURE DATE:  2021    IMPRESSION:  Saddle embolus with right heart strain.  These results have been discussed with Dr. Arellano on 2021 at 12:47 AM by on-call resident.  Pneumoperitoneum is again noted.    Meds:   Antimicrobials:   acyclovir   Oral Tab/Cap 400 milliGRAM(s) Oral every 8 hours  clotrimazole Lozenge 1 Lozenge Oral five times a day  fluconAZOLE   Tablet 400 milliGRAM(s) Oral daily  meropenem  IVPB 1000 milliGRAM(s) IV Intermittent every 8 hours      Heme / Onc:   enoxaparin Injectable 100 milliGRAM(s) SubCutaneous every 12 hours      GI:  pantoprazole    Tablet 40 milliGRAM(s) Oral before breakfast  sodium bicarbonate Mouth Rinse 5 milliLiter(s) Swish and Spit five times a day      Cardiovascular:   metoprolol tartrate 25 milliGRAM(s) Oral every 8 hours  tamsulosin 0.4 milliGRAM(s) Oral at bedtime      Immunologic:       Other medications:   AQUAPHOR (petrolatum Ointment) 1 Application(s) Topical three times a day  Biotene Dry Mouth Oral Rinse 5 milliLiter(s) Swish and Spit five times a day  calcium gluconate IVPB 2 Gram(s) IV Intermittent once  chlorhexidine 2% Cloths 1 Application(s) Topical <User Schedule>  potassium phosphate / sodium phosphate Tablet (K-PHOS No. 2) 1 Tablet(s) Oral four times a day  potassium phosphate IVPB 15 milliMole(s) IV Intermittent once      PRN:   acetaminophen     Tablet .. 650 milliGRAM(s) Oral every 6 hours PRN    A/P:  66 year old male with a history of multiple myeloma s/p RVD x 6   Post Autologous PBSCT day +  13  - melphalan 2/; s/p melphalan hydration for 24 hours post infusion of last dose   Strict I&O, daily weights, prn diuresis   - rest day   - rest day   - s/p HPC transplant; completed transplant hydration for 24 hours post infusion of cells. Suprapubic pain in am. UA pending, follow up culture, BK virus. AXR   -vasovegal episode in AM: will monitor tele for 24hrs    d/c telemetry. Lasix 40mg IV x today, follow up I&Os, daily weight. monitor rash, receiving 2nd dose Kepivance today   add Emend for 3 days and change zofran ATC. If worsening abd pain consider CT a/p oral contrast only.   - Neutropenic started on cipro once febrile will pancx and switch cipro to  Aztronam 2g Q8   - AMS this am, agonal breathing, diaphoretic, cool and clammy - responsive to sternal rub. Hypotensive, hypoxic. Placed on NRB, RRT called. Labs sent. Lactate send. PCN allergy - started on Aztreonam 2g IV q 8 hoursm Flagyl 500mg IV TID for anaerobic coverage given abdominal pain and distension, Vancomycin 1g IV x 1. CT A/P pending for 12:30 21. CE with new TWI in V2-V6 - likely demand ischemia. Repeat CE and lactate at 2pm.   - CT A/P with Terminal ileitis with associated ileus versus low-grade obstruction. Pockets of free air in the right lower quadrant adjacent to the terminal ileum concerning for bowel perforation. 2.6 cm loculated fluid adjacent to the terminal ileum and surrounding peritoneal enhancement suggestive of developing peritonitis. Surgical team consulted- transferred to 8ICU for closer monitoring.  ID consult called- JESUS daniel.    - CT chest angio + Saddle embolus with right heart strain., seen by cards- started on full dose A/C, s/p IVC filter placement with thrombectomy: transfuse PLT to >50K . doppler + B/L DVT. D/C Zarxio  -early engraftment    on clear liq diet, watery diarrhea, C diff(-)   Transitioned from Heparin gtt to Lovenox - D/W ICU MD, recommend keeping keeping plt's >40k while on AC.   Pending transfer back to BMTU  - SICU care appreciated. Failed TOV - 20F coude catheter placed this am by urology. Continue lovenox for now, may change to DOAC / NOAC prior to discharge. Continue antibiotics per ID service. Engrafting.   - Lovenox switched to Eliquis     1. Terminal ileitis / sigmoid colitis   CT A/P () with findings concerning for ileitis and Pockets of free air in the right lower quadrant adjacent to the terminal ileum concerning for bowel perforation and 2.6 cm loculated fluid adjacent to the terminal ileum and surrounding peritoneal enhancement suggestive of developing peritonitis.  SICU care / surgical input appreciated - high risk for surgical intervention, monitored clinically    Continue antibiotics - ID input appreciated   Tolerating regular diet   Repeat CT A/P 21 showed interval resolution of extraluminal free air since 21; mild wall thickening of adjacent proximal sigmoid colon, likely reactive to terminal ileitis   Abdominal exams     2. DVT / PE   Diagnosed with Saddle PE on  and required thrombectomy  Bilateral DVTs, most recent duplex LE showed no propagation of DVT   Troponins normalized - no longer trending   Continue therapeutic anticoagulation - currently receiving Lovenox 1mg/kg BID - consider changing to DOAC / NOAC prior to discharge     3. Urinary retention   Ortega initially placed in SICU - removed   Failed TOV - retaining 900ml; 20F coude catheter placed by urology 21 AM   Continue flomax     4. Neutropenic sepsis   Antibiotic management per ID service   Meropenem:  -->  Fluconazole: 11/15 -->   PO Bactrim: 11/15 -->   Ciprofloxacin:  -->   Vancomycin:   Metronidazole:   Aztreonam:     5. Infectious Disease:   acyclovir   Oral Tab/Cap 400 milliGRAM(s) Oral every 8 hours  clotrimazole Lozenge 1 Lozenge Oral five times a day  fluconAZOLE   Tablet 400 milliGRAM(s) Oral daily  meropenem  IVPB 1000 milliGRAM(s) IV Intermittent every 8 hours    6. VOD Prophylaxis: Actigall, Glutamine    7. GI Prophylaxis:  pantoprazole    Tablet 40 milliGRAM(s) Oral before breakfast    8. Mouthcare - NS / NaHCO3 rinses, Mycelex, Biotene; Skin care     9. GVHD prophylaxis - n/a     10. Transfuse & replete electrolytes prn   potassium phosphate / sodium phosphate Tablet (K-PHOS No. 2) 1 Tablet(s) Oral four times a day  potassium phosphate IVPB 15 milliMole(s) IV Intermittent once  calcium gluconate IVPB 2 Gram(s) IV Intermittent once    11. IV hydration, daily weights, strict I&O, prn diuresis     12. PO intake as tolerated, nutrition follow up as needed, MVI, folic acid     13. Antiemetics, anti-diarrhea medications:     14. OOB as tolerated, physical therapy consult if needed     15. Monitor coags / fibrinogen 2x week, vitamin K as needed     16. Monitor closely for clinical changes, monitor for fevers     17. Emotional support provided, plan of care discussed and questions addressed     18. Patient education done regarding plan of care, restrictions and discharge planning     19. Continue regular social work input     I have written the above note for Dr. Lozada who performed service with me in the room.   Terrie Barr NP-C ([phone])    I have seen and examined patient with NP, I agree with above note as scribed.

## 2021-12-02 NOTE — PROGRESS NOTE ADULT - SUBJECTIVE AND OBJECTIVE BOX
Vascular Cardiology  Progress note  EMAIL [email or web address]     OFFICE [phone]    INTERVAL HISTORY:  -s/p 1 unit platelets last night; plt count this morning dropped to 23.  -Patient denies any acute bleeding; continues to have diarrhea, but denies hematochezia or melena.  -No CP or SOB.     Allergies  Omnicef (Unknown)  penicillin (Hives)	    MEDICATIONS:  apixaban 5 milliGRAM(s) Oral every 12 hours  metoprolol tartrate 25 milliGRAM(s) Oral every 8 hours  tamsulosin 0.4 milliGRAM(s) Oral at bedtime  acyclovir   Oral Tab/Cap 400 milliGRAM(s) Oral every 8 hours  clotrimazole Lozenge 1 Lozenge Oral five times a day  fluconAZOLE   Tablet 400 milliGRAM(s) Oral daily  meropenem  IVPB 1000 milliGRAM(s) IV Intermittent every 8 hour  acetaminophen     Tablet .. 650 milliGRAM(s) Oral every 6 hours PRN  pantoprazole    Tablet 40 milliGRAM(s) Oral before breakfast  sodium bicarbonate Mouth Rinse 5 milliLiter(s) Swish and Spit five times a day  AQUAPHOR (petrolatum Ointment) 1 Application(s) Topical three times a day  Biotene Dry Mouth Oral Rinse 5 milliLiter(s) Swish and Spit five times a day  chlorhexidine 2% Cloths 1 Application(s) Topical <User Schedule>  potassium phosphate / sodium phosphate Tablet (K-PHOS No. 2) 1 Tablet(s) Oral four times a day    PAST MEDICAL & SURGICAL HISTORY:  Hyperlipidemia  Shortness of breath on exertion  Lumbar herniated disc  T12 compression fracture  Acute lumbar radiculopathy  History of basal cell carcinoma  History of surgery on wrist    FAMILY HISTORY:  No pertinent family history in first degree relatives    SOCIAL HISTORY:  unchanged    REVIEW OF SYSTEMS:  CONSTITUTIONAL: No fever, weight loss, or fatigue  EYES: No eye pain, visual disturbances, or discharge  ENMT:  No difficulty hearing, tinnitus, vertigo; No sinus or throat pain  NECK: No pain or stiffness  RESPIRATORY: No cough, wheezing, chills or hemoptysis; No Shortness of Breath  CARDIOVASCULAR: No chest pain, palpitations, passing out, dizziness, or leg swelling  GENITOURINARY: No dysuria, frequency, hematuria, or incontinence  NEUROLOGICAL: No headaches, memory loss, loss of strength, numbness, or tremors  SKIN: No itching, burning, rashes, or lesions   LYMPH Nodes: No enlarged glands  ENDOCRINE: No heat or cold intolerance; No hair loss  MUSCULOSKELETAL: No joint pain or swelling; No muscle, back, or extremity pain  PSYCHIATRIC: No depression, anxiety, mood swings, or difficulty sleeping  HEME/LYMPH: No easy bruising, or bleeding gums  ALLERY AND IMMUNOLOGIC: No hives or eczema	    [ x] All others negative	  [ ] Unable to obtain    PHYSICAL EXAM:  T(C): 37 (12-02-21 @ 08:40), Max: 37.1 (12-01-21 @ 21:05)  HR: 82 (12-02-21 @ 08:40) (69 - 97)  BP: 129/62 (12-02-21 @ 08:40) (111/70 - 135/76)  RR: 18 (12-02-21 @ 08:40) (18 - 19)  SpO2: 98% (12-02-21 @ 08:40) (94% - 99%)    Wt(kg): --  I&O's Summary    01 Dec 2021 07:01  -  02 Dec 2021 07:00  --------------------------------------------------------  IN: 2255 mL / OUT: 3425 mL / NET: -1170 mL    02 Dec 2021 07:01  -  02 Dec 2021 11:52  --------------------------------------------------------  IN: 516 mL / OUT: 0 mL / NET: 516 mL    Appearance: Normal	  HEENT:   Normal oral mucosa, EOMI, laying comfortably in bed  Cardiovascular: Normal S1 S2, No murmurs, No edema  Respiratory: Lungs clear to auscultation anteriorly  Psychiatry: A & O x 3, Mood & affect appropriate  Gastrointestinal: Mildly distended  Skin: No rashes, No ecchymoses, No cyanosis	  Neurologic: Non-focal  Extremities: Normal range of motion, No clubbing, cyanosis. Bilateral edema to knees  Vascular:   Right DP:  Palpable             Left DP:  Palpable    LABS:	 	    CBC Full  -  ( 02 Dec 2021 06:37 )  WBC Count : 2.25 K/uL  Hemoglobin : 7.9 g/dL  Hematocrit : 24.7 %  Platelet Count - Automated : Clumped  Mean Cell Volume : 96.9 fl  Mean Cell Hemoglobin : 31.0 pg  Mean Cell Hemoglobin Concentration : 32.0 gm/dL  Auto Neutrophil # : 1.15 K/uL  Auto Lymphocyte # : 0.45 K/uL  Auto Monocyte # : 0.55 K/uL  Auto Eosinophil # : 0.00 K/uL  Auto Basophil # : 0.06 K/uL  Auto Neutrophil % : 51.3 %  Auto Lymphocyte % : 20.0 %  Auto Monocyte % : 24.3 %  Auto Eosinophil % : 0.0 %  Auto Basophil % : 2.6 %    12-02    140  |  111<H>  |  8   ----------------------------<  111<H>  3.9   |  18<L>  |  0.64  12-01    140  |  111<H>  |  9   ----------------------------<  105<H>  4.0   |  18<L>  |  0.62    Ca    6.9<L>      02 Dec 2021 06:40  Ca    6.6<L>      01 Dec 2021 06:53  Phos  2.1     12-02  Phos  1.8     12-01  Mg     1.7     12-02  Mg     1.9     12-01    TPro  5.0<L>  /  Alb  2.9<L>  /  TBili  0.2  /  DBili  x   /  AST  33  /  ALT  43  /  AlkPhos  107  12-02  TPro  5.0<L>  /  Alb  2.8<L>  /  TBili  0.2  /  DBili  <0.1  /  AST  27  /  ALT  34  /  AlkPhos  107  12-01    Assessment:  1. Submassive saddle PE s/p thrombectomy  2. Bilateral DVT  3. Plasma cell myeloma s/p SCT with pancytopenia  4. Neutropenic terminal ileitis/sigmoid colitis    Plan:  1. Continue therapeutic anticoagulation, currently on Lovenox 1mg/kg BID, if unable to tolerate AC will need to consider IVC filter  2. Heme/Onc following regarding platelet goals with use of therapeutic anticoagulation, appreciate guidance  3. Bilateral below knee compression stockings for symptomatic management of DVTs    Thank you    Vascular Cardiology Service  DIRECT SERVICE NUMBER [phone]  Office [phone]  email:   [email or web address]

## 2021-12-03 LAB
ALBUMIN SERPL ELPH-MCNC: 2.8 G/DL — LOW (ref 3.3–5)
ALP SERPL-CCNC: 107 U/L — SIGNIFICANT CHANGE UP (ref 40–120)
ALT FLD-CCNC: 43 U/L — SIGNIFICANT CHANGE UP (ref 10–45)
ANION GAP SERPL CALC-SCNC: 10 MMOL/L — SIGNIFICANT CHANGE UP (ref 5–17)
AST SERPL-CCNC: 28 U/L — SIGNIFICANT CHANGE UP (ref 10–40)
BASOPHILS # BLD AUTO: 0 K/UL — SIGNIFICANT CHANGE UP (ref 0–0.2)
BASOPHILS NFR BLD AUTO: 0 % — SIGNIFICANT CHANGE UP (ref 0–2)
BILIRUB DIRECT SERPL-MCNC: <0.1 MG/DL — SIGNIFICANT CHANGE UP (ref 0–0.3)
BILIRUB INDIRECT FLD-MCNC: >0.1 MG/DL — LOW (ref 0.2–1)
BILIRUB SERPL-MCNC: 0.2 MG/DL — SIGNIFICANT CHANGE UP (ref 0.2–1.2)
BLD GP AB SCN SERPL QL: NEGATIVE — SIGNIFICANT CHANGE UP
BUN SERPL-MCNC: 7 MG/DL — SIGNIFICANT CHANGE UP (ref 7–23)
CALCIUM SERPL-MCNC: 7.3 MG/DL — LOW (ref 8.4–10.5)
CHLORIDE SERPL-SCNC: 111 MMOL/L — HIGH (ref 96–108)
CLOSURE TME COLL+EPINEP BLD: 45 K/UL — LOW (ref 150–400)
CO2 SERPL-SCNC: 19 MMOL/L — LOW (ref 22–31)
CREAT SERPL-MCNC: 0.64 MG/DL — SIGNIFICANT CHANGE UP (ref 0.5–1.3)
CULTURE RESULTS: SIGNIFICANT CHANGE UP
EOSINOPHIL # BLD AUTO: 0 K/UL — SIGNIFICANT CHANGE UP (ref 0–0.5)
EOSINOPHIL NFR BLD AUTO: 0 % — SIGNIFICANT CHANGE UP (ref 0–6)
GLUCOSE SERPL-MCNC: 100 MG/DL — HIGH (ref 70–99)
HADV DNA FLD NAA+PROBE-LOG#: SIGNIFICANT CHANGE UP COPIES/ML
HCT VFR BLD CALC: 24.3 % — LOW (ref 39–50)
HGB BLD-MCNC: 8.2 G/DL — LOW (ref 13–17)
LYMPHOCYTES # BLD AUTO: 0.17 K/UL — LOW (ref 1–3.3)
LYMPHOCYTES # BLD AUTO: 6.2 % — LOW (ref 13–44)
MAGNESIUM SERPL-MCNC: 1.6 MG/DL — SIGNIFICANT CHANGE UP (ref 1.6–2.6)
MANUAL SMEAR VERIFICATION: SIGNIFICANT CHANGE UP
MCHC RBC-ENTMCNC: 32 PG — SIGNIFICANT CHANGE UP (ref 27–34)
MCHC RBC-ENTMCNC: 33.7 GM/DL — SIGNIFICANT CHANGE UP (ref 32–36)
MCV RBC AUTO: 94.9 FL — SIGNIFICANT CHANGE UP (ref 80–100)
METAMYELOCYTES # FLD: 1.8 % — HIGH (ref 0–0)
MONOCYTES # BLD AUTO: 0.71 K/UL — SIGNIFICANT CHANGE UP (ref 0–0.9)
MONOCYTES NFR BLD AUTO: 25.7 % — HIGH (ref 2–14)
NEUTROPHILS # BLD AUTO: 1.52 K/UL — LOW (ref 1.8–7.4)
NEUTROPHILS NFR BLD AUTO: 54 % — SIGNIFICANT CHANGE UP (ref 43–77)
NEUTS BAND # BLD: 0.9 % — SIGNIFICANT CHANGE UP (ref 0–8)
PHOSPHATE SERPL-MCNC: 2.4 MG/DL — LOW (ref 2.5–4.5)
PLAT MORPH BLD: NORMAL — SIGNIFICANT CHANGE UP
PLATELET # BLD AUTO: 23 K/UL — LOW (ref 150–400)
POTASSIUM SERPL-MCNC: 4 MMOL/L — SIGNIFICANT CHANGE UP (ref 3.5–5.3)
POTASSIUM SERPL-SCNC: 4 MMOL/L — SIGNIFICANT CHANGE UP (ref 3.5–5.3)
PROMYELOCYTES # FLD: 2.6 % — HIGH (ref 0–0)
PROT SERPL-MCNC: 5.1 G/DL — LOW (ref 6–8.3)
RBC # BLD: 2.56 M/UL — LOW (ref 4.2–5.8)
RBC # FLD: 14.5 % — SIGNIFICANT CHANGE UP (ref 10.3–14.5)
RBC BLD AUTO: SIGNIFICANT CHANGE UP
RH IG SCN BLD-IMP: NEGATIVE — SIGNIFICANT CHANGE UP
SODIUM SERPL-SCNC: 140 MMOL/L — SIGNIFICANT CHANGE UP (ref 135–145)
SPECIMEN SOURCE: SIGNIFICANT CHANGE UP
TOXIC GRANULES BLD QL SMEAR: PRESENT — SIGNIFICANT CHANGE UP
VARIANT LYMPHS # BLD: 8.8 % — HIGH (ref 0–6)
WBC # BLD: 2.77 K/UL — LOW (ref 3.8–10.5)
WBC # FLD AUTO: 2.77 K/UL — LOW (ref 3.8–10.5)

## 2021-12-03 PROCEDURE — 99232 SBSQ HOSP IP/OBS MODERATE 35: CPT

## 2021-12-03 PROCEDURE — 99233 SBSQ HOSP IP/OBS HIGH 50: CPT

## 2021-12-03 RX ORDER — POTASSIUM PHOSPHATE, MONOBASIC POTASSIUM PHOSPHATE, DIBASIC 236; 224 MG/ML; MG/ML
15 INJECTION, SOLUTION INTRAVENOUS ONCE
Refills: 0 | Status: COMPLETED | OUTPATIENT
Start: 2021-12-03 | End: 2021-12-03

## 2021-12-03 RX ADMIN — Medication 25 MILLIGRAM(S): at 17:02

## 2021-12-03 RX ADMIN — Medication 5 MILLILITER(S): at 09:18

## 2021-12-03 RX ADMIN — POTASSIUM PHOSPHATE, MONOBASIC POTASSIUM PHOSPHATE, DIBASIC 62.5 MILLIMOLE(S): 236; 224 INJECTION, SOLUTION INTRAVENOUS at 09:10

## 2021-12-03 RX ADMIN — Medication 5 MILLILITER(S): at 21:53

## 2021-12-03 RX ADMIN — Medication 400 MILLIGRAM(S): at 05:07

## 2021-12-03 RX ADMIN — PANTOPRAZOLE SODIUM 40 MILLIGRAM(S): 20 TABLET, DELAYED RELEASE ORAL at 05:07

## 2021-12-03 RX ADMIN — Medication 5 MILLILITER(S): at 11:36

## 2021-12-03 RX ADMIN — Medication 5 MILLILITER(S): at 20:33

## 2021-12-03 RX ADMIN — Medication 1 LOZENGE: at 09:18

## 2021-12-03 RX ADMIN — Medication 1 LOZENGE: at 13:58

## 2021-12-03 RX ADMIN — Medication 5 MILLILITER(S): at 05:07

## 2021-12-03 RX ADMIN — Medication 1 APPLICATION(S): at 05:07

## 2021-12-03 RX ADMIN — Medication 1 LOZENGE: at 05:07

## 2021-12-03 RX ADMIN — Medication 1 TABLET(S): at 08:51

## 2021-12-03 RX ADMIN — Medication 5 MILLILITER(S): at 13:59

## 2021-12-03 RX ADMIN — Medication 400 MILLIGRAM(S): at 21:54

## 2021-12-03 RX ADMIN — Medication 1 APPLICATION(S): at 14:02

## 2021-12-03 RX ADMIN — Medication 25 MILLIGRAM(S): at 08:51

## 2021-12-03 RX ADMIN — FLUCONAZOLE 400 MILLIGRAM(S): 150 TABLET ORAL at 11:37

## 2021-12-03 RX ADMIN — Medication 400 MILLIGRAM(S): at 11:36

## 2021-12-03 RX ADMIN — APIXABAN 5 MILLIGRAM(S): 2.5 TABLET, FILM COATED ORAL at 17:03

## 2021-12-03 RX ADMIN — CHLORHEXIDINE GLUCONATE 1 APPLICATION(S): 213 SOLUTION TOPICAL at 09:06

## 2021-12-03 RX ADMIN — Medication 1 LOZENGE: at 21:53

## 2021-12-03 RX ADMIN — Medication 5 MILLILITER(S): at 17:03

## 2021-12-03 RX ADMIN — MEROPENEM 100 MILLIGRAM(S): 1 INJECTION INTRAVENOUS at 13:58

## 2021-12-03 RX ADMIN — MEROPENEM 100 MILLIGRAM(S): 1 INJECTION INTRAVENOUS at 05:07

## 2021-12-03 RX ADMIN — Medication 25 MILLIGRAM(S): at 00:13

## 2021-12-03 RX ADMIN — TAMSULOSIN HYDROCHLORIDE 0.4 MILLIGRAM(S): 0.4 CAPSULE ORAL at 21:54

## 2021-12-03 RX ADMIN — Medication 5 MILLILITER(S): at 00:12

## 2021-12-03 RX ADMIN — Medication 1 LOZENGE: at 17:03

## 2021-12-03 RX ADMIN — Medication 5 MILLILITER(S): at 08:52

## 2021-12-03 RX ADMIN — MEROPENEM 100 MILLIGRAM(S): 1 INJECTION INTRAVENOUS at 21:55

## 2021-12-03 RX ADMIN — APIXABAN 5 MILLIGRAM(S): 2.5 TABLET, FILM COATED ORAL at 05:06

## 2021-12-03 RX ADMIN — Medication 1 APPLICATION(S): at 21:54

## 2021-12-03 NOTE — PROGRESS NOTE ADULT - ASSESSMENT
66 year old male with multiple myeloma admitted for an autologous pbsct with high dose melphalan prep regimen.     s/p HPC Transplant on 11/19    Noted to be hypotensive and encephalopathic on AM of 11/23.     CT A/P (11/23) with findings concerning for ileitis and Pockets of free air in the right lower quadrant adjacent to the terminal ileum concerning for bowel perforation and 2.6 cm loculated fluid adjacent to the terminal ileum and surrounding peritoneal enhancement suggestive of developing peritonitis.    Diagnosed with Saddle PE on 11/24 and required thrombectomy    Was not taken for operative management given high risk in context of pancytopenia and saddle PE  Clinically improving  CT A/P (11/29) reassuring, small loculated ascites    Antibiotics:  Meropenem: 11/23 -->  Fluconazole: 11/15 -->   PO Bactrim: 11/15 --> 11/17  Ciprofloxacin: 11/22 --> 11/23  Vancomycin: 11/23  Metronidazole: 11/23  Aztreonam: 11/23    #Bowel Perforation, Peritonitis, Abdominal Fluid Collection, Hypotension, Pancytopenia, Penicillin Allergy  CT A/P (11/29) reassuring, small loculated ascites  Favor treating for 14 days total of Meropenem  --Continue Meropenem 1g IV Q8H (End Date: 12/7/21)  --Continue Fluconazole 400 mg PO/IV Q24H (End Date: 12/7/21)    #Adenovirus Enteritis  --Follow up on serum Adenovirus PCR  --Recommend contact/droplet isolation    #s/p Stem Cell Transplant  --Continue Acyclovir prophylaxis; transitioned to IV - would maintain IVF hydration to decrease risk of crystal induced kidney injury  --Will require PCP prophylaxis - can monitor off for now.     I will sign off at this time. Please feel free to contact me with any further questions or concerns.    Kris Rothman M.D.  [facility] Division of Infectious Disease  8AM-5PM: Pager Number [phone]  After Hours (or if no response): Please contact the Infectious Diseases Office at [phone]     The above assessment and plan were discussed with BMT Team

## 2021-12-03 NOTE — PROGRESS NOTE ADULT - ATTENDING COMMENTS
Continue therapeutic anticoagulation, eliquis 5mg BID.   if unable to tolerate AC will need to consider IVC filter  discussed with hematology    Kishan [phone]

## 2021-12-03 NOTE — PROGRESS NOTE ADULT - SUBJECTIVE AND OBJECTIVE BOX
Vascular Cardiology  Progress note  EMAIL [email or web address]     OFFICE [phone]    INTERVAL HISTORY:  -No acute events overnight.  -Continues to have diarrhea, but no hematochezia or melena.     Allergies  Omnicef (Unknown)  penicillin (Hives)    MEDICATIONS:  apixaban 5 milliGRAM(s) Oral every 12 hours  metoprolol tartrate 25 milliGRAM(s) Oral every 8 hours  tamsulosin 0.4 milliGRAM(s) Oral at bedtime  acyclovir   Oral Tab/Cap 400 milliGRAM(s) Oral every 8 hours  clotrimazole Lozenge 1 Lozenge Oral five times a day  fluconAZOLE   Tablet 400 milliGRAM(s) Oral daily  meropenem  IVPB 1000 milliGRAM(s) IV Intermittent every 8 hours  acetaminophen     Tablet .. 650 milliGRAM(s) Oral every 6 hours PRN  pantoprazole    Tablet 40 milliGRAM(s) Oral before breakfast  sodium bicarbonate Mouth Rinse 5 milliLiter(s) Swish and Spit five times a day  AQUAPHOR (petrolatum Ointment) 1 Application(s) Topical three times a day  Biotene Dry Mouth Oral Rinse 5 milliLiter(s) Swish and Spit five times a day  chlorhexidine 2% Cloths 1 Application(s) Topical <User Schedule>    PAST MEDICAL & SURGICAL HISTORY:  Hyperlipidemia  Shortness of breath on exertion  Lumbar herniated disc  T12 compression fracture  Acute lumbar radiculopathy  History of basal cell carcinoma  History of surgery on wrist    FAMILY HISTORY:  No pertinent family history in first degree relatives    SOCIAL HISTORY:  unchanged    REVIEW OF SYSTEMS:  CONSTITUTIONAL: No fever, weight loss, or fatigue  EYES: No eye pain, visual disturbances, or discharge  ENMT:  No difficulty hearing, tinnitus, vertigo; No sinus or throat pain  NECK: No pain or stiffness  RESPIRATORY: No cough, wheezing, chills or hemoptysis; No Shortness of Breath  CARDIOVASCULAR: No chest pain, palpitations, passing out, dizziness, or leg swelling  GENITOURINARY: No dysuria, frequency, hematuria, or incontinence  NEUROLOGICAL: No headaches, memory loss, loss of strength, numbness, or tremors  SKIN: No itching, burning, rashes, or lesions   LYMPH Nodes: No enlarged glands  ENDOCRINE: No heat or cold intolerance; No hair loss  MUSCULOSKELETAL: No joint pain or swelling; No muscle, back, or extremity pain  PSYCHIATRIC: No depression, anxiety, mood swings, or difficulty sleeping  HEME/LYMPH: No easy bruising, or bleeding gums  ALLERY AND IMMUNOLOGIC: No hives or eczema	    [ x] All others negative	  [ ] Unable to obtain    PHYSICAL EXAM:  T(C): 36.7 (12-03-21 @ 09:00), Max: 37 (12-03-21 @ 00:50)  HR: 80 (12-03-21 @ 09:00) (71 - 82)  BP: 126/77 (12-03-21 @ 09:00) (114/69 - 135/70)  RR: 16 (12-03-21 @ 09:00) (16 - 18)  SpO2: 96% (12-03-21 @ 09:00) (96% - 98%)    Wt(kg): --  I&O's Summary    02 Dec 2021 07:01  -  03 Dec 2021 07:00  --------------------------------------------------------  IN: 1714 mL / OUT: 1200 mL / NET: 514 mL    03 Dec 2021 07:01  -  03 Dec 2021 11:29  --------------------------------------------------------  IN: 0 mL / OUT: 450 mL / NET: -450 mL    Appearance: Normal	  HEENT:   Normal oral mucosa, EOMI, laying comfortably in bed  Cardiovascular: Normal S1 S2, No murmurs, No edema  Respiratory: Lungs clear to auscultation anteriorly  Psychiatry: A & O x 3, Mood & affect appropriate  Gastrointestinal: Mildly distended  Skin: No rashes, No ecchymoses, No cyanosis	  Neurologic: Non-focal  Extremities: Normal range of motion, No clubbing, cyanosis. Bilateral edema to knees  Vascular:   Right DP:  Palpable             Left DP:  Palpable    LABS:	 	    CBC Full  -  ( 03 Dec 2021 07:30 )  WBC Count : 2.77 K/uL  Hemoglobin : 8.2 g/dL  Hematocrit : 24.3 %  Platelet Count - Automated : Clumped  Mean Cell Volume : 94.9 fl  Mean Cell Hemoglobin : 32.0 pg  Mean Cell Hemoglobin Concentration : 33.7 gm/dL  Auto Neutrophil # : 1.52 K/uL  Auto Lymphocyte # : 0.17 K/uL  Auto Monocyte # : 0.71 K/uL  Auto Eosinophil # : 0.00 K/uL  Auto Basophil # : 0.00 K/uL  Auto Neutrophil % : 54.0 %  Auto Lymphocyte % : 6.2 %  Auto Monocyte % : 25.7 %  Auto Eosinophil % : 0.0 %  Auto Basophil % : 0.0 %    12-03    140  |  111<H>  |  7   ----------------------------<  100<H>  4.0   |  19<L>  |  0.64  12-02    140  |  111<H>  |  8   ----------------------------<  111<H>  3.9   |  18<L>  |  0.64    Ca    7.3<L>      03 Dec 2021 07:30  Ca    6.9<L>      02 Dec 2021 06:40  Phos  2.4     12-03  Phos  2.1     12-02  Mg     1.6     12-03  Mg     1.7     12-02    TPro  5.1<L>  /  Alb  2.8<L>  /  TBili  0.2  /  DBili  <0.1  /  AST  28  /  ALT  43  /  AlkPhos  107  12-03  TPro  5.0<L>  /  Alb  2.9<L>  /  TBili  0.2  /  DBili  x   /  AST  33  /  ALT  43  /  AlkPhos  107  12-02    Assessment:  1. Submassive saddle PE s/p thrombectomy  2. Bilateral DVT  3. Plasma cell myeloma s/p SCT with pancytopenia  4. Neutropenic terminal ileitis/sigmoid colitis    Plan:  1. Continue therapeutic anticoagulation, currently on Lovenox 1mg/kg BID, if unable to tolerate AC will need to consider IVC filter  2. Heme/Onc following regarding platelet goals with use of therapeutic anticoagulation, appreciate guidance  3. Bilateral below knee compression stockings for symptomatic management of DVTs    Thank you    Vascular Cardiology Service  DIRECT SERVICE NUMBER [phone]  Office [phone]  email:   [email or web address]     Vascular Cardiology  Progress note  EMAIL [email or web address]     OFFICE [phone]    INTERVAL HISTORY:  -No acute events overnight.  -Continues to have diarrhea, but no hematochezia or melena.     Allergies  Omnicef (Unknown)  penicillin (Hives)    MEDICATIONS:  apixaban 5 milliGRAM(s) Oral every 12 hours  metoprolol tartrate 25 milliGRAM(s) Oral every 8 hours  tamsulosin 0.4 milliGRAM(s) Oral at bedtime  acyclovir   Oral Tab/Cap 400 milliGRAM(s) Oral every 8 hours  clotrimazole Lozenge 1 Lozenge Oral five times a day  fluconAZOLE   Tablet 400 milliGRAM(s) Oral daily  meropenem  IVPB 1000 milliGRAM(s) IV Intermittent every 8 hours  acetaminophen     Tablet .. 650 milliGRAM(s) Oral every 6 hours PRN  pantoprazole    Tablet 40 milliGRAM(s) Oral before breakfast  sodium bicarbonate Mouth Rinse 5 milliLiter(s) Swish and Spit five times a day  AQUAPHOR (petrolatum Ointment) 1 Application(s) Topical three times a day  Biotene Dry Mouth Oral Rinse 5 milliLiter(s) Swish and Spit five times a day  chlorhexidine 2% Cloths 1 Application(s) Topical <User Schedule>    PAST MEDICAL & SURGICAL HISTORY:  Hyperlipidemia  Shortness of breath on exertion  Lumbar herniated disc  T12 compression fracture  Acute lumbar radiculopathy  History of basal cell carcinoma  History of surgery on wrist    FAMILY HISTORY:  No pertinent family history in first degree relatives    SOCIAL HISTORY:  unchanged    REVIEW OF SYSTEMS:  CONSTITUTIONAL: No fever, weight loss, or fatigue  EYES: No eye pain, visual disturbances, or discharge  ENMT:  No difficulty hearing, tinnitus, vertigo; No sinus or throat pain  NECK: No pain or stiffness  RESPIRATORY: No cough, wheezing, chills or hemoptysis; No Shortness of Breath  CARDIOVASCULAR: No chest pain, palpitations, passing out, dizziness, or leg swelling  GENITOURINARY: No dysuria, frequency, hematuria, or incontinence  NEUROLOGICAL: No headaches, memory loss, loss of strength, numbness, or tremors  SKIN: No itching, burning, rashes, or lesions   LYMPH Nodes: No enlarged glands  ENDOCRINE: No heat or cold intolerance; No hair loss  MUSCULOSKELETAL: No joint pain or swelling; No muscle, back, or extremity pain  PSYCHIATRIC: No depression, anxiety, mood swings, or difficulty sleeping  HEME/LYMPH: No easy bruising, or bleeding gums  ALLERY AND IMMUNOLOGIC: No hives or eczema	    [ x] All others negative	  [ ] Unable to obtain    PHYSICAL EXAM:  T(C): 36.7 (12-03-21 @ 09:00), Max: 37 (12-03-21 @ 00:50)  HR: 80 (12-03-21 @ 09:00) (71 - 82)  BP: 126/77 (12-03-21 @ 09:00) (114/69 - 135/70)  RR: 16 (12-03-21 @ 09:00) (16 - 18)  SpO2: 96% (12-03-21 @ 09:00) (96% - 98%)    Wt(kg): --  I&O's Summary    02 Dec 2021 07:01  -  03 Dec 2021 07:00  --------------------------------------------------------  IN: 1714 mL / OUT: 1200 mL / NET: 514 mL    03 Dec 2021 07:01  -  03 Dec 2021 11:29  --------------------------------------------------------  IN: 0 mL / OUT: 450 mL / NET: -450 mL    Appearance: Normal	  HEENT:   Normal oral mucosa, EOMI, laying comfortably in bed  Cardiovascular: Normal S1 S2, No murmurs, No edema  Respiratory: Lungs clear to auscultation anteriorly  Psychiatry: A & O x 3, Mood & affect appropriate  Gastrointestinal: Mildly distended  Skin: No rashes, No ecchymoses, No cyanosis	  Neurologic: Non-focal  Extremities: Normal range of motion, No clubbing, cyanosis. Bilateral edema to knees  Vascular:   Right DP:  Palpable             Left DP:  Palpable    LABS:	 	    CBC Full  -  ( 03 Dec 2021 07:30 )  WBC Count : 2.77 K/uL  Hemoglobin : 8.2 g/dL  Hematocrit : 24.3 %  Platelet Count - Automated : Clumped  Mean Cell Volume : 94.9 fl  Mean Cell Hemoglobin : 32.0 pg  Mean Cell Hemoglobin Concentration : 33.7 gm/dL  Auto Neutrophil # : 1.52 K/uL  Auto Lymphocyte # : 0.17 K/uL  Auto Monocyte # : 0.71 K/uL  Auto Eosinophil # : 0.00 K/uL  Auto Basophil # : 0.00 K/uL  Auto Neutrophil % : 54.0 %  Auto Lymphocyte % : 6.2 %  Auto Monocyte % : 25.7 %  Auto Eosinophil % : 0.0 %  Auto Basophil % : 0.0 %    12-03    140  |  111<H>  |  7   ----------------------------<  100<H>  4.0   |  19<L>  |  0.64  12-02    140  |  111<H>  |  8   ----------------------------<  111<H>  3.9   |  18<L>  |  0.64    Ca    7.3<L>      03 Dec 2021 07:30  Ca    6.9<L>      02 Dec 2021 06:40  Phos  2.4     12-03  Phos  2.1     12-02  Mg     1.6     12-03  Mg     1.7     12-02    TPro  5.1<L>  /  Alb  2.8<L>  /  TBili  0.2  /  DBili  <0.1  /  AST  28  /  ALT  43  /  AlkPhos  107  12-03  TPro  5.0<L>  /  Alb  2.9<L>  /  TBili  0.2  /  DBili  x   /  AST  33  /  ALT  43  /  AlkPhos  107  12-02    Assessment:  1. Submassive saddle PE s/p thrombectomy  2. Bilateral DVT  3. Plasma cell myeloma s/p SCT with pancytopenia  4. Neutropenic terminal ileitis/sigmoid colitis    Plan:  1. Continue therapeutic anticoagulation, eliquis 5mg BID.   if unable to tolerate AC will need to consider IVC filter  2. Heme/Onc following regarding platelet goals with use of therapeutic anticoagulation, appreciate guidance  3. Bilateral below knee compression stockings for symptomatic management of DVTs    Thank you    Vascular Cardiology Service  DIRECT SERVICE NUMBER [phone]  Office [phone]  email:   [email or web address]

## 2021-12-03 NOTE — PROGRESS NOTE ADULT - ATTENDING COMMENTS
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 He is now admitted for an autoSCT with high dose melphalan prep regimen. Transplant day 11/19/21. Hospital course has been complicated by vasovagal episodes on 11/19/21 and 11/23/21. On 11/23/21 developed neutropenic sepsis, improved with fluid resuscitation. CT on 11/23/21 showed Terminal ileitis with associated ileus versus low-grade obstruction with free air and possible bowel perforation, surgery consulted and transferred to SICU, managed conservatively for now. Patient was also found to have increasing troponins, echo showed heart strain and CTA chest on 11/24/21 showed a saddle PE,  LE doppler on 11/24/24 showed b/l DVT. He is now s/p thrombectomy and IVC filter (11/24/21).Patient is hemodynamically stable, mentating well.     PE:   VSS  Gen: A/O x 3, NAD, tired  RESP: CTA, no wheeze no rhonchi  CV: S1, S2 RRR  Abd:  soft, increased distention, + hyperactive BS  LE: no edema  Neuro: CNII-XII intact, no focal deficits  Psych: appropriate    MM  -dx 4/2021  -s/p RVD x 6 (5/2021 - 9/2021) -->   -admitted for autoSCT with Ivett 100mg/M2 on 11/19/21  -hold Zarxio for now given acute events / acute abdomen  Today is day + 14  -patient is engrafting    ID  BACTERIAL:  Neutropenic sepsis (11/23/21) started on meropenem (11/23 - ) and vanco x1 (11/23)  VIRAL: c/w Acyclovir   FUNGAL: c/w Diflucan 400 mg po daily.  PCP prophylaxis: hold for now    HEME  Saddle PE s/p and b/l LE DVT (dx 11/24/21) - c/w Lovenox, s/p mechanical thrombectomy and IVC filter placement on 11/24/21  -will transition to Eliquis  -keep Hb >7  -keep plt >40; keep plt >50 for procedures  -12/2/21 plt clumping today - okay to give anticoagulation since plt have been >50k    GI:  Abd pain / bowel perforation on CT on 11/23/21 - tolerating PO diet  -c/w Abx as per surgery  -repeat CT abd (11/29/21) -  terminal ileitis with mild increased fluid distention of the upstream small bowel, compatible with ileus versus partial small bowel obstruction. Unchanged trace loculated ascites with peritoneal enhancement at the level of the terminal ileum, suggesting peritonitis. However, there has been interval resolution of the extraluminal free air since 11/23/2021.  Diarrhea - stool cx neg, c/w Abx  VOD prophylaxis - glutamine supplementation, Actigall BID   GI prophylaxis - Protonix po QD   Mouth care and skin care as per protocol.    The time was used discussed with patient, family, primary team, consultants, and acute management. 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 He is now admitted for an autoSCT with high dose melphalan prep regimen. Transplant day 11/19/21. Hospital course has been complicated by vasovagal episodes on 11/19/21 and 11/23/21. On 11/23/21 developed neutropenic sepsis, improved with fluid resuscitation. CT on 11/23/21 showed Terminal ileitis with associated ileus versus low-grade obstruction with free air and possible bowel perforation, surgery consulted and transferred to SICU, managed conservatively for now. Patient was also found to have increasing troponins, echo showed heart strain and CTA chest on 11/24/21 showed a saddle PE,  LE doppler on 11/24/24 showed b/l DVT. He is now s/p thrombectomy and IVC filter (11/24/21).Patient is hemodynamically stable, mentating well.     PE:   VSS  Gen: A/O x 3, NAD, tired  RESP: CTA, no wheeze no rhonchi  CV: S1, S2 RRR  Abd:  soft, increased distention, + hyperactive BS  LE: no edema  Neuro: CNII-XII intact, no focal deficits  Psych: appropriate    MM  -dx 4/2021  -s/p RVD x 6 (5/2021 - 9/2021) -->   -admitted for autoSCT with Ivett 100mg/M2 on 11/19/21  -hold Zarxio for now given acute events / acute abdomen  Today is day + 14  -12/3/21 patient engrafted    ID  BACTERIAL:  Neutropenic sepsis (11/23/21) started on meropenem (11/23 - ) and vanco x1 (11/23)  VIRAL: c/w Acyclovir   FUNGAL: c/w Diflucan 400 mg po daily.  PCP prophylaxis: hold for now    HEME  Saddle PE s/p and b/l LE DVT (dx 11/24/21) - c/w Lovenox, s/p mechanical thrombectomy on 11/24/21  -will transition to Eliquis  -keep Hb >7  -keep plt >40; keep plt >50 for procedures  -12/2/21 plt clumping today - okay to give anticoagulation since plt have been >50k    GI:  Abd pain / bowel perforation on CT on 11/23/21 - tolerating PO diet  -c/w Abx until 12/7/21 as per surgery  -repeat CT abd (11/29/21) -  terminal ileitis with mild increased fluid distention of the upstream small bowel, compatible with ileus versus partial small bowel obstruction. Unchanged trace loculated ascites with peritoneal enhancement at the level of the terminal ileum, suggesting peritonitis. However, there has been interval resolution of the extraluminal free air since 11/23/2021.  Diarrhea - stool cx neg, c/w Abx  VOD prophylaxis - glutamine supplementation, Actigall BID   GI prophylaxis - Protonix po QD   Mouth care and skin care as per protocol.    The time was used discussed with patient, family, primary team, consultants, and acute management.

## 2021-12-03 NOTE — PROGRESS NOTE ADULT - SUBJECTIVE AND OBJECTIVE BOX
HPC Transplant Team                                                      Critical / Counseling Time Provided: 30 minutes                                                                                                                                                        Chief Complaint: Autologous peripheral blood stem cell transplant with high dose Melphalan prep regimen for treatment of multiple myeloma    S: Patient seen and examined with HPC Transplant Team:   +fatigue      O: Vitals:   Vital Signs Last 24 Hrs  T(C): 36.9 (03 Dec 2021 06:35), Max: 37 (03 Dec 2021 00:50)  T(F): 98.5 (03 Dec 2021 06:35), Max: 98.6 (03 Dec 2021 00:50)  HR: 80 (03 Dec 2021 06:35) (71 - 82)  BP: 135/70 (03 Dec 2021 06:35) (114/69 - 135/70)  BP(mean): --  RR: 18 (03 Dec 2021 06:35) (18 - 18)  SpO2: 98% (03 Dec 2021 06:35) (98% - 98%)    Admit weight: 98.4kg  Daily     Intake / Output:   12-02 @ 07:01  -  12-03 @ 07:00  --------------------------------------------------------  IN: 1714 mL / OUT: 1200 mL / NET: 514 mL    12-03 @ 07:01  -  12-03 @ 08:52  --------------------------------------------------------  IN: 0 mL / OUT: 450 mL / NET: -450 mL      PE:   Oropharynx: + erythema and post Kepivance coating no ulcerations   Oral Mucositis:              +                                         ndGndrndanddndend:nd nd2nd CVS: S1, S2 RRR   Lungs: CTA throughout bilaterally   Abdomen: + BS x 4, soft, mild distended,  mild tenderness   Extremities: +1/2-1 edema   Gastric Mucositis:       -                                           Grade: n/a  Intestinal Mucositis:     -                                         Grade: n/a   Skin: patchy, erythematous maculopapular rash to face, neck and upper chest and back post Kepivance   TLC: CDI   Neuro: A&O x 3      Labs:   CBC Full  -  ( 03 Dec 2021 07:30 )  WBC Count : 2.77 K/uL  Hemoglobin : 8.2 g/dL  Hematocrit : 24.3 %  Platelet Count - Automated : Clumped  Mean Cell Volume : 94.9 fl  Mean Cell Hemoglobin : 32.0 pg  Mean Cell Hemoglobin Concentration : 33.7 gm/dL  Auto Neutrophil # : x  Auto Lymphocyte # : x  Auto Monocyte # : x  Auto Eosinophil # : x  Auto Basophil # : x  Auto Neutrophil % : x  Auto Lymphocyte % : x  Auto Monocyte % : x  Auto Eosinophil % : x  Auto Basophil % : x                          8.2    2.77  )-----------( Clumped    ( 03 Dec 2021 07:30 )             24.3     12-03    140  |  111<H>  |  7   ----------------------------<  100<H>  4.0   |  19<L>  |  0.64    Ca    7.3<L>      03 Dec 2021 07:30  Phos  2.4     12-03  Mg     1.6     12-03    TPro  5.1<L>  /  Alb  2.8<L>  /  TBili  0.2  /  DBili  <0.1  /  AST  28  /  ALT  43  /  AlkPhos  107  12-03    PT/INR - ( 02 Dec 2021 06:40 )   PT: 15.4 sec;   INR: 1.30 ratio         PTT - ( 02 Dec 2021 06:40 )  PTT:42.4 sec  LIVER FUNCTIONS - ( 03 Dec 2021 07:30 )  Alb: 2.8 g/dL / Pro: 5.1 g/dL / ALK PHOS: 107 U/L / ALT: 43 U/L / AST: 28 U/L / GGT: x             Cultures:   GI PCR Panel, Stool (11.30.21 @ 00:23)    Culture Results:   Adenovirus 40/41  DETECTED by PCR  *******Please Note:*******  GI panel PCR evaluates for:  Campylobacter, Plesiomonas shigelloides, Salmonella,  Vibrio, Yersinia enterocolitica, Enteroaggregative  Escherichia coli (EAEC), Enteropathogenic E.coli (EPEC),  Enterotoxigenic E. coli (ETEC) lt/st, Shiga-like  toxin-producing E. coli (STEC) stx1/stx2,  Shigella/ Enteroinvasive E. coli (EIEC), Cryptosporidium,  Cyclospora cayetanensis, Entamoeba histolytica,  Giardia lamblia, Adenovirus F 40/41, Astrovirus,  Norovirus GI/GII, Rotavirus A, Sapovirus    Culture - Blood (11.23.21 @ 12:32)    Specimen Source: .Blood Blood    Culture Results:   No Growth Final    Culture - Urine (11.19.21 @ 12:26)    Specimen Source: Clean Catch Clean Catch (Midstream)    Culture Results:   No growth      Radiology:   EXAM:  CT ABDOMEN AND PELVIS IC                        PROCEDURE DATE:  11/29/2021    IMPRESSION:  Unchanged terminal ileitis with mild increased fluid distention of the upstream small bowel, compatible with ileus versus partial small bowel obstruction. Unchanged trace loculated ascites with peritoneal enhancement at the level of the terminal ileum, suggesting peritonitis. However, there has been interval resolution of the extraluminal free air since 11/23/2021.  Mild wall thickening of the adjacent proximal sigmoid colon, likely reactive to the terminal ileitis.  New small bilateral pleural effusions.    Xray Chest 1 View- PORTABLE-Routine (Xray Chest 1 View- PORTABLE-Routine in AM.) (11.28.21 @ 07:22) >  Clear lungs.    EXAM:  DUPLEX SCAN EXT VEINS LOWER BI                        PROCEDURE DATE:  11/26/2021    IMPRESSION:  There is persistent bilateral DVT in the below the knee right posterior tibial, peroneal and soleal veins and above the knee in the left popliteal vein and tibial peroneal trunk.  No new DVT is seen.    EXAM:  CT ANGIO CHEST PULM ART [facility]                        PROCEDURE DATE:  11/24/2021    IMPRESSION:  Saddle embolus with right heart strain.  These results have been discussed with Dr. Arellano on 11/24/2021 at 12:47 AM by on-call resident.  Pneumoperitoneum is again noted.      Meds:   Antimicrobials:   acyclovir   Oral Tab/Cap 400 milliGRAM(s) Oral every 8 hours  clotrimazole Lozenge 1 Lozenge Oral five times a day  fluconAZOLE   Tablet 400 milliGRAM(s) Oral daily  meropenem  IVPB 1000 milliGRAM(s) IV Intermittent every 8 hours      Heme / Onc:   apixaban 5 milliGRAM(s) Oral every 12 hours      GI:  pantoprazole    Tablet 40 milliGRAM(s) Oral before breakfast  sodium bicarbonate Mouth Rinse 5 milliLiter(s) Swish and Spit five times a day      Cardiovascular:   metoprolol tartrate 25 milliGRAM(s) Oral every 8 hours  tamsulosin 0.4 milliGRAM(s) Oral at bedtime      Immunologic:       Other medications:   AQUAPHOR (petrolatum Ointment) 1 Application(s) Topical three times a day  Biotene Dry Mouth Oral Rinse 5 milliLiter(s) Swish and Spit five times a day  chlorhexidine 2% Cloths 1 Application(s) Topical <User Schedule>  potassium phosphate / sodium phosphate Tablet (K-PHOS No. 2) 1 Tablet(s) Oral four times a day  potassium phosphate IVPB 15 milliMole(s) IV Intermittent once      PRN:   acetaminophen     Tablet .. 650 milliGRAM(s) Oral every 6 hours PRN        A/P:  66 year old male with a history of multiple myeloma s/p RVD x 6   Post Autologous PBSCT day +  14  11/16- melphalan 2/2; s/p melphalan hydration for 24 hours post infusion of last dose   Strict I&O, daily weights, prn diuresis   11/17- rest day   11/18- rest day   11/19- s/p HPC transplant; completed transplant hydration for 24 hours post infusion of cells. Suprapubic pain in am. UA pending, follow up culture, BK virus. AXR   11/19-vasovegal episode in AM: will monitor tele for 24hrs   11/20 d/c telemetry. Lasix 40mg IV x today, follow up I&Os, daily weight. monitor rash, receiving 2nd dose Kepivance today  11/21 add Emend for 3 days and change zofran ATC. If worsening abd pain consider CT a/p oral contrast only.   11/22- Neutropenic started on cipro once febrile will pancx and switch cipro to  Aztronam 2g Q8   11/23- AMS this am, agonal breathing, diaphoretic, cool and clammy - responsive to sternal rub. Hypotensive, hypoxic. Placed on NRB, RRT called. Labs sent. Lactate send. PCN allergy - started on Aztreonam 2g IV q 8 hoursm Flagyl 500mg IV TID for anaerobic coverage given abdominal pain and distension, Vancomycin 1g IV x 1. CT A/P pending for 12:30 11/23/21. CE with new TWI in V2-V6 - likely demand ischemia. Repeat CE and lactate at 2pm.   11/23- CT A/P with Terminal ileitis with associated ileus versus low-grade obstruction. Pockets of free air in the right lower quadrant adjacent to the terminal ileum concerning for bowel perforation. 2.6 cm loculated fluid adjacent to the terminal ileum and surrounding peritoneal enhancement suggestive of developing peritonitis. Surgical team consulted- transferred to 8ICU for closer monitoring.  ID consult called- JESUS daniel.    11/24- CT chest angio + Saddle embolus with right heart strain., seen by cards- started on full dose A/C, s/p IVC filter placement with thrombectomy: transfuse PLT to >50K . doppler + B/L DVT. D/C Zarxio  11/27-early engraftment   11/27 on clear liq diet, watery diarrhea, C diff(-)  11/28 Transitioned from Heparin gtt to Lovenox - D/W ICU MD, recommend keeping keeping plt's >40k while on AC.   Pending transfer back to BMTU  12/1- SICU care appreciated. Failed TOV - 20F coude catheter placed this am by urology. Continue lovenox for now, may change to DOAC / NOAC prior to discharge. Continue antibiotics per ID service. Engrafting.   12/2- Lovenox switched to Eliquis     1. Terminal ileitis / sigmoid colitis   CT A/P (11/23) with findings concerning for ileitis and Pockets of free air in the right lower quadrant adjacent to the terminal ileum concerning for bowel perforation and 2.6 cm loculated fluid adjacent to the terminal ileum and surrounding peritoneal enhancement suggestive of developing peritonitis.  SICU care / surgical input appreciated - high risk for surgical intervention, monitored clinically    Continue antibiotics - ID input appreciated   Tolerating regular diet   Repeat CT A/P 11/29/21 showed interval resolution of extraluminal free air since 11/23/21; mild wall thickening of adjacent proximal sigmoid colon, likely reactive to terminal ileitis   Abdominal exams     2. DVT / PE   Diagnosed with Saddle PE on 11/24 and required thrombectomy  Bilateral DVTs, most recent duplex LE showed no propagation of DVT   Troponins normalized - no longer trending   Continue therapeutic anticoagulation - currently receiving Lovenox 1mg/kg BID - consider changing to DOAC / NOAC prior to discharge     3. Urinary retention   Ortega initially placed in SICU - removed 11/30  Failed TOV - retaining 900ml; 20F coude catheter placed by urology 12/1/21 AM   Continue flomax     4. Neutropenic sepsis   Antibiotic management per ID service   Meropenem: 11/23 -->  Fluconazole: 11/15 -->   PO Bactrim: 11/15 --> 11/17  Ciprofloxacin: 11/22 --> 11/23  Vancomycin: 11/23  Metronidazole: 11/23  Aztreonam: 11/23    5. Infectious Disease:   acyclovir   Oral Tab/Cap 400 milliGRAM(s) Oral every 8 hours  clotrimazole Lozenge 1 Lozenge Oral five times a day  fluconAZOLE   Tablet 400 milliGRAM(s) Oral daily  meropenem  IVPB 1000 milliGRAM(s) IV Intermittent every 8 hours    6. VOD Prophylaxis: Actigall, Glutamine    7. GI Prophylaxis:  pantoprazole    Tablet 40 milliGRAM(s) Oral before breakfast    8. Mouthcare - NS / NaHCO3 rinses, Mycelex, Biotene; Skin care     9. GVHD prophylaxis - n/a     10. Transfuse & replete electrolytes prn   potassium phosphate / sodium phosphate Tablet (K-PHOS No. 2) 1 Tablet(s) Oral four times a day  potassium phosphate IVPB 15 milliMole(s) IV Intermittent once  calcium gluconate IVPB 2 Gram(s) IV Intermittent once    11. IV hydration, daily weights, strict I&O, prn diuresis     12. PO intake as tolerated, nutrition follow up as needed, MVI, folic acid     13. Antiemetics, anti-diarrhea medications:     14. OOB as tolerated, physical therapy consult if needed     15. Monitor coags / fibrinogen 2x week, vitamin K as needed     16. Monitor closely for clinical changes, monitor for fevers     17. Emotional support provided, plan of care discussed and questions addressed     18. Patient education done regarding plan of care, restrictions and discharge planning     19. Continue regular social work input     I have written the above note for Dr. Lozada who performed service with me in the room.   Vale Ugarte([phone])    I have seen and examined patient with PA, I agree with above note as scribed.

## 2021-12-03 NOTE — PROGRESS NOTE ADULT - SUBJECTIVE AND OBJECTIVE BOX
Follow Up:  Peritonitis    Interval History: diarrhea improving. minimal abdominal pain.     REVIEW OF SYSTEMS  [  ] ROS unobtainable because:    [ x ] All other systems negative except as noted below    Constitutional:  [ ] fever [ ] chills  [ ] weight loss  [ ] weakness  Skin:  [ ] rash [ ] phlebitis	  Eyes: [ ] icterus [ ] pain  [ ] discharge	  ENMT: [ ] sore throat  [ ] thrush [ ] ulcers [ ] exudates  Respiratory: [ ] dyspnea [ ] hemoptysis [ ] cough [ ] sputum	  Cardiovascular:  [ ] chest pain [ ] palpitations [ ] edema	  Gastrointestinal:  [ ] nausea [ ] vomiting [ x] diarrhea [ ] constipation [x ] pain	  Genitourinary:  [ ] dysuria [ ] frequency [ ] hematuria [ ] discharge [ ] flank pain  [ ] incontinence  Musculoskeletal:  [ ] myalgias [ ] arthralgias [ ] arthritis  [ ] back pain  Neurological:  [ ] headache [ ] seizures  [ ] confusion/altered mental status    Allergies  Omnicef (Unknown)  penicillin (Hives)        ANTIMICROBIALS:  acyclovir   Oral Tab/Cap 400 every 8 hours  clotrimazole Lozenge 1 five times a day  fluconAZOLE   Tablet 400 daily  meropenem  IVPB 1000 every 8 hours      OTHER MEDS:  MEDICATIONS  (STANDING):  acetaminophen     Tablet .. 650 every 6 hours PRN  apixaban 5 every 12 hours  metoprolol tartrate 25 every 8 hours  pantoprazole    Tablet 40 before breakfast  sodium bicarbonate Mouth Rinse 5 five times a day  tamsulosin 0.4 at bedtime      Vital Signs Last 24 Hrs  T(C): 36.9 (03 Dec 2021 17:23), Max: 37 (03 Dec 2021 00:50)  T(F): 98.4 (03 Dec 2021 17:23), Max: 98.6 (03 Dec 2021 00:50)  HR: 81 (03 Dec 2021 17:23) (67 - 82)  BP: 120/78 (03 Dec 2021 17:23) (111/71 - 135/70)  BP(mean): --  RR: 19 (03 Dec 2021 17:23) (16 - 19)  SpO2: 98% (03 Dec 2021 17:23) (96% - 98%)    PHYSICAL EXAMINATION:  General: Alert and Awake, NAD  Cardiac: RRR, No M/R/G  Resp: CTAB, No Wh/Rh/Ra  Abdomen: NBS, NT/ND, No HSM, No rigidity or guarding  MSK: No LE edema. No Calf tenderness  : No haile  Skin: No rashes or lesions. Skin is warm and dry to the touch.   Neuro: Alert and Awake. CN 2-12 Grossly intact. Moves all four extremities spontaneously.  Psych: Calm, Pleasant, Cooperative                          8.2    2.77  )-----------( Clumped    ( 03 Dec 2021 07:30 )             24.3       12-03    140  |  111<H>  |  7   ----------------------------<  100<H>  4.0   |  19<L>  |  0.64    Ca    7.3<L>      03 Dec 2021 07:30  Phos  2.4     12-03  Mg     1.6     12-03    TPro  5.1<L>  /  Alb  2.8<L>  /  TBili  0.2  /  DBili  <0.1  /  AST  28  /  ALT  43  /  AlkPhos  107  12-03          MICROBIOLOGY:  v  .Stool Feces  11-30-21   Adenovirus 40/41  DETECTED by PCR  *******Please Note:*******  GI panel PCR evaluates for:  Campylobacter, Plesiomonas shigelloides, Salmonella,  Vibrio, Yersinia enterocolitica, Enteroaggregative  Escherichia coli (EAEC), Enteropathogenic E.coli (EPEC),  Enterotoxigenic E. coli (ETEC) lt/st, Shiga-like  toxin-producing E. coli (STEC) stx1/stx2,  Shigella/ Enteroinvasive E. coli (EIEC), Cryptosporidium,  Cyclospora cayetanensis, Entamoeba histolytica,  Giardia lamblia, Adenovirus F 40/41, Astrovirus,  Norovirus GI/GII, Rotavirus A, Sapovirus  --  --      .Blood Blood  11-23-21   No Growth Final  --  --      STER [id number]  11-19-21   No growth at 14 days.  --  --      Clean Catch Clean Catch (Midstream)  11-19-21   No growth  --  --            Clostridium difficile GDH Toxins A&amp;B, EIA:   Negative (11-27-21 @ 11:31)  Clostridium difficile GDH Interpretation: Negative for toxigenic C. Difficile.  This specimen is negative for C.  Difficile glutamate dehydrogenase (GDH) antigen and negative for C.  Difficile Toxins A & B, by EIA.  GDH is a highly sensitive screening  marker for C. Difficile that is produced in large amounts by all C.  Difficile strains, both toxigenic and nontoxigenic.  This assay has not  been validated as a test of cure.  Repeat testing during the same episode  of diarrhea is of limited value and is discouraged.  The results of this  assay should always be interpreted in conjunction with pateint's clinical  history. (11-27-21 @ 11:31)      RADIOLOGY:    <The imaging below has been reviewed and visualized by me independently. Findings as detailed in report below>    < from: CT Abdomen and Pelvis w/ IV Cont (11.29.21 @ 10:44) >  IMPRESSION:    Unchanged terminal ileitis with mild increased fluid distention of the upstream small bowel, compatible with ileus versus partial small bowel obstruction. Unchanged trace loculated ascites with peritoneal enhancement at the level of the terminal ileum, suggesting peritonitis. However, there has been interval resolution of the extraluminal free air since 11/23/2021.    Mild wall thickening of the adjacent proximal sigmoid colon, likely reactive to the terminal ileitis.    New small bilateral pleural effusions.    < end of copied text >

## 2021-12-04 LAB
ALBUMIN SERPL ELPH-MCNC: 2.8 G/DL — LOW (ref 3.3–5)
ALP SERPL-CCNC: 109 U/L — SIGNIFICANT CHANGE UP (ref 40–120)
ALT FLD-CCNC: 41 U/L — SIGNIFICANT CHANGE UP (ref 10–45)
ANION GAP SERPL CALC-SCNC: 10 MMOL/L — SIGNIFICANT CHANGE UP (ref 5–17)
AST SERPL-CCNC: 25 U/L — SIGNIFICANT CHANGE UP (ref 10–40)
BASOPHILS # BLD AUTO: 0.02 K/UL — SIGNIFICANT CHANGE UP (ref 0–0.2)
BASOPHILS NFR BLD AUTO: 0.6 % — SIGNIFICANT CHANGE UP (ref 0–2)
BILIRUB SERPL-MCNC: 0.2 MG/DL — SIGNIFICANT CHANGE UP (ref 0.2–1.2)
BUN SERPL-MCNC: 7 MG/DL — SIGNIFICANT CHANGE UP (ref 7–23)
CALCIUM SERPL-MCNC: 7.4 MG/DL — LOW (ref 8.4–10.5)
CHLORIDE SERPL-SCNC: 109 MMOL/L — HIGH (ref 96–108)
CLOSURE TME COLL+EPINEP BLD: 95 K/UL — LOW (ref 150–400)
CO2 SERPL-SCNC: 19 MMOL/L — LOW (ref 22–31)
CREAT SERPL-MCNC: 0.62 MG/DL — SIGNIFICANT CHANGE UP (ref 0.5–1.3)
EOSINOPHIL # BLD AUTO: 0 K/UL — SIGNIFICANT CHANGE UP (ref 0–0.5)
EOSINOPHIL NFR BLD AUTO: 0 % — SIGNIFICANT CHANGE UP (ref 0–6)
GLUCOSE SERPL-MCNC: 103 MG/DL — HIGH (ref 70–99)
HCT VFR BLD CALC: 25.9 % — LOW (ref 39–50)
HGB BLD-MCNC: 8.4 G/DL — LOW (ref 13–17)
IMM GRANULOCYTES NFR BLD AUTO: 1.8 % — HIGH (ref 0–1.5)
LYMPHOCYTES # BLD AUTO: 0.61 K/UL — LOW (ref 1–3.3)
LYMPHOCYTES # BLD AUTO: 18 % — SIGNIFICANT CHANGE UP (ref 13–44)
MAGNESIUM SERPL-MCNC: 1.6 MG/DL — SIGNIFICANT CHANGE UP (ref 1.6–2.6)
MANUAL SMEAR VERIFICATION: SIGNIFICANT CHANGE UP
MCHC RBC-ENTMCNC: 31.3 PG — SIGNIFICANT CHANGE UP (ref 27–34)
MCHC RBC-ENTMCNC: 32.4 GM/DL — SIGNIFICANT CHANGE UP (ref 32–36)
MCV RBC AUTO: 96.6 FL — SIGNIFICANT CHANGE UP (ref 80–100)
MONOCYTES # BLD AUTO: 0.95 K/UL — HIGH (ref 0–0.9)
MONOCYTES NFR BLD AUTO: 28.1 % — HIGH (ref 2–14)
NEUTROPHILS # BLD AUTO: 1.74 K/UL — LOW (ref 1.8–7.4)
NEUTROPHILS NFR BLD AUTO: 51.5 % — SIGNIFICANT CHANGE UP (ref 43–77)
NRBC # BLD: 0 /100 WBCS — SIGNIFICANT CHANGE UP (ref 0–0)
PHOSPHATE SERPL-MCNC: 1.9 MG/DL — LOW (ref 2.5–4.5)
PLAT MORPH BLD: NORMAL — SIGNIFICANT CHANGE UP
PLATELET # BLD AUTO: 81 K/UL — LOW (ref 150–400)
PLATELET CLUMP BLD QL SMEAR: ABNORMAL
POTASSIUM SERPL-MCNC: 4 MMOL/L — SIGNIFICANT CHANGE UP (ref 3.5–5.3)
POTASSIUM SERPL-SCNC: 4 MMOL/L — SIGNIFICANT CHANGE UP (ref 3.5–5.3)
PROT SERPL-MCNC: 5.3 G/DL — LOW (ref 6–8.3)
RBC # BLD: 2.68 M/UL — LOW (ref 4.2–5.8)
RBC # FLD: 14.5 % — SIGNIFICANT CHANGE UP (ref 10.3–14.5)
RBC BLD AUTO: SIGNIFICANT CHANGE UP
SODIUM SERPL-SCNC: 138 MMOL/L — SIGNIFICANT CHANGE UP (ref 135–145)
WBC # BLD: 3.38 K/UL — LOW (ref 3.8–10.5)
WBC # FLD AUTO: 3.38 K/UL — LOW (ref 3.8–10.5)

## 2021-12-04 PROCEDURE — 99291 CRITICAL CARE FIRST HOUR: CPT

## 2021-12-04 RX ORDER — POTASSIUM PHOSPHATE, MONOBASIC POTASSIUM PHOSPHATE, DIBASIC 236; 224 MG/ML; MG/ML
30 INJECTION, SOLUTION INTRAVENOUS ONCE
Refills: 0 | Status: COMPLETED | OUTPATIENT
Start: 2021-12-04 | End: 2021-12-04

## 2021-12-04 RX ADMIN — Medication 5 MILLILITER(S): at 13:01

## 2021-12-04 RX ADMIN — Medication 5 MILLILITER(S): at 00:54

## 2021-12-04 RX ADMIN — APIXABAN 5 MILLIGRAM(S): 2.5 TABLET, FILM COATED ORAL at 17:01

## 2021-12-04 RX ADMIN — Medication 400 MILLIGRAM(S): at 06:37

## 2021-12-04 RX ADMIN — Medication 5 MILLILITER(S): at 06:38

## 2021-12-04 RX ADMIN — Medication 1 APPLICATION(S): at 06:38

## 2021-12-04 RX ADMIN — Medication 25 MILLIGRAM(S): at 08:26

## 2021-12-04 RX ADMIN — Medication 1 LOZENGE: at 13:01

## 2021-12-04 RX ADMIN — Medication 1 APPLICATION(S): at 21:26

## 2021-12-04 RX ADMIN — MEROPENEM 100 MILLIGRAM(S): 1 INJECTION INTRAVENOUS at 06:39

## 2021-12-04 RX ADMIN — PANTOPRAZOLE SODIUM 40 MILLIGRAM(S): 20 TABLET, DELAYED RELEASE ORAL at 06:37

## 2021-12-04 RX ADMIN — Medication 5 MILLILITER(S): at 10:42

## 2021-12-04 RX ADMIN — Medication 1 LOZENGE: at 17:01

## 2021-12-04 RX ADMIN — Medication 5 MILLILITER(S): at 21:14

## 2021-12-04 RX ADMIN — Medication 5 MILLILITER(S): at 20:56

## 2021-12-04 RX ADMIN — Medication 400 MILLIGRAM(S): at 21:14

## 2021-12-04 RX ADMIN — Medication 400 MILLIGRAM(S): at 13:01

## 2021-12-04 RX ADMIN — Medication 5 MILLILITER(S): at 16:51

## 2021-12-04 RX ADMIN — MEROPENEM 100 MILLIGRAM(S): 1 INJECTION INTRAVENOUS at 21:15

## 2021-12-04 RX ADMIN — APIXABAN 5 MILLIGRAM(S): 2.5 TABLET, FILM COATED ORAL at 06:38

## 2021-12-04 RX ADMIN — POTASSIUM PHOSPHATE, MONOBASIC POTASSIUM PHOSPHATE, DIBASIC 83.33 MILLIMOLE(S): 236; 224 INJECTION, SOLUTION INTRAVENOUS at 13:44

## 2021-12-04 RX ADMIN — Medication 5 MILLILITER(S): at 17:01

## 2021-12-04 RX ADMIN — Medication 25 MILLIGRAM(S): at 16:51

## 2021-12-04 RX ADMIN — Medication 5 MILLILITER(S): at 08:25

## 2021-12-04 RX ADMIN — Medication 5 MILLILITER(S): at 11:16

## 2021-12-04 RX ADMIN — Medication 1 LOZENGE: at 21:14

## 2021-12-04 RX ADMIN — FLUCONAZOLE 400 MILLIGRAM(S): 150 TABLET ORAL at 11:17

## 2021-12-04 RX ADMIN — Medication 1 LOZENGE: at 10:41

## 2021-12-04 RX ADMIN — Medication 25 MILLIGRAM(S): at 00:53

## 2021-12-04 RX ADMIN — Medication 1 LOZENGE: at 06:38

## 2021-12-04 RX ADMIN — MEROPENEM 100 MILLIGRAM(S): 1 INJECTION INTRAVENOUS at 13:00

## 2021-12-04 RX ADMIN — CHLORHEXIDINE GLUCONATE 1 APPLICATION(S): 213 SOLUTION TOPICAL at 08:26

## 2021-12-04 RX ADMIN — Medication 1 APPLICATION(S): at 13:00

## 2021-12-04 RX ADMIN — TAMSULOSIN HYDROCHLORIDE 0.4 MILLIGRAM(S): 0.4 CAPSULE ORAL at 21:14

## 2021-12-04 NOTE — PROGRESS NOTE ADULT - ATTENDING COMMENTS
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 He is now admitted for an autoSCT with high dose melphalan prep regimen. Transplant day 11/19/21. Hospital course has been complicated by vasovagal episodes on 11/19/21 and 11/23/21. On 11/23/21 developed neutropenic sepsis, improved with fluid resuscitation. CT on 11/23/21 showed Terminal ileitis with associated ileus versus low-grade obstruction with free air and possible bowel perforation, surgery consulted and transferred to SICU, managed conservatively for now. Patient was also found to have increasing troponins, echo showed heart strain and CTA chest on 11/24/21 showed a saddle PE,  LE doppler on 11/24/24 showed b/l DVT. He is now s/p thrombectomy and IVC filter (11/24/21).Patient is hemodynamically stable, mentating well.     PE:   VSS  Gen: A/O x 3, NAD, tired  RESP: CTA, no wheeze no rhonchi  CV: S1, S2 RRR  Abd:  soft, increased distention, + hyperactive BS  LE: no edema  Neuro: CNII-XII intact, no focal deficits  Psych: appropriate    MM  -dx 4/2021  -s/p RVD x 6 (5/2021 - 9/2021) -->   -admitted for autoSCT with Ivett 100mg/M2 on 11/19/21  -hold Zarxio for now given acute events / acute abdomen  Today is day + 14  -12/3/21 patient engrafted    ID  BACTERIAL:  Neutropenic sepsis (11/23/21) started on meropenem (11/23 - ) and vanco x1 (11/23)  VIRAL: c/w Acyclovir   FUNGAL: c/w Diflucan 400 mg po daily.  PCP prophylaxis: hold for now    HEME  Saddle PE s/p and b/l LE DVT (dx 11/24/21) - c/w Lovenox, s/p mechanical thrombectomy on 11/24/21  -will transition to Eliquis  -keep Hb >7  -keep plt >40; keep plt >50 for procedures  -12/2/21 plt clumping today - okay to give anticoagulation since plt have been >50k    GI:  Abd pain / bowel perforation on CT on 11/23/21 - tolerating PO diet  -c/w Abx until 12/7/21 as per surgery  -repeat CT abd (11/29/21) -  terminal ileitis with mild increased fluid distention of the upstream small bowel, compatible with ileus versus partial small bowel obstruction. Unchanged trace loculated ascites with peritoneal enhancement at the level of the terminal ileum, suggesting peritonitis. However, there has been interval resolution of the extraluminal free air since 11/23/2021.  Diarrhea - stool cx neg, c/w Abx  VOD prophylaxis - glutamine supplementation, Actigall BID   GI prophylaxis - Protonix po QD   Mouth care and skin care as per protocol.    The time was used discussed with patient, family, primary team, consultants, and acute management. 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 He is now admitted for an autoSCT with high dose melphalan prep regimen. Transplant day 11/19/21. Hospital course has been complicated by vasovagal episodes on 11/19/21 and 11/23/21. On 11/23/21 developed neutropenic sepsis, improved with fluid resuscitation. CT on 11/23/21 showed Terminal ileitis with associated ileus versus low-grade obstruction with free air and possible bowel perforation, surgery consulted and transferred to SICU, managed conservatively for now. Patient was also found to have increasing troponins, echo showed heart strain and CTA chest on 11/24/21 showed a saddle PE,  LE doppler on 11/24/24 showed b/l DVT. He is now s/p thrombectomy and IVC filter (11/24/21).Patient is hemodynamically stable, mentating well.     PE:   VSS  Gen: A/O x 3, NAD, tired  RESP: CTA, no wheeze no rhonchi  CV: S1, S2 RRR  Abd:  soft, increased distention, + hyperactive BS  LE: no edema  Neuro: CNII-XII intact, no focal deficits  Psych: appropriate    MM  -dx 4/2021  -s/p RVD x 6 (5/2021 - 9/2021) -->   -admitted for autoSCT with Ivett 100mg/M2 on 11/19/21  -hold Zarxio for now given acute events / acute abdomen  Today is day + 15  -12/3/21 patient engrafted    ID  BACTERIAL:  Neutropenic sepsis (11/23/21) started on meropenem (11/23 - ) and vanco x1 (11/23)  VIRAL: c/w Acyclovir   FUNGAL: c/w Diflucan 400 mg po daily.  PCP prophylaxis: hold for now    HEME  Saddle PE s/p and b/l LE DVT (dx 11/24/21) - c/w Lovenox, s/p mechanical thrombectomy on 11/24/21  -will transition to Eliquis  -keep Hb >7  -keep plt >40; keep plt >50 for procedures  -12/2/21 plt clumping today - okay to give anticoagulation since plt have been >50k    GI:  Abd pain / bowel perforation on CT on 11/23/21 - tolerating PO diet  -c/w Abx until 12/7/21 as per surgery  -repeat CT abd (11/29/21) -  terminal ileitis with mild increased fluid distention of the upstream small bowel, compatible with ileus versus partial small bowel obstruction. Unchanged trace loculated ascites with peritoneal enhancement at the level of the terminal ileum, suggesting peritonitis. However, there has been interval resolution of the extraluminal free air since 11/23/2021.  Diarrhea - stool cx neg, c/w Abx  VOD prophylaxis - glutamine supplementation, Actigall BID   GI prophylaxis - Protonix po QD   Mouth care and skin care as per protocol.    The time was used discussed with patient, family, primary team, consultants, and acute management.

## 2021-12-04 NOTE — PROGRESS NOTE ADULT - SUBJECTIVE AND OBJECTIVE BOX
HPC Transplant Team                                                      Critical / Counseling Time Provided: 30 minutes                                                                                                                                                        Chief Complaint: Autologous peripheral blood stem cell transplant with high dose Melphalan prep regimen for treatment of multiple myeloma    S: Patient seen and examined with HPC Transplant Team:   +fatigue    O: Vitals:   Vital Signs Last 24 Hrs  T(C): 36.6 (04 Dec 2021 06:10), Max: 37 (03 Dec 2021 21:00)  T(F): 97.9 (04 Dec 2021 06:10), Max: 98.6 (03 Dec 2021 21:00)  HR: 74 (04 Dec 2021 06:10) (67 - 81)  BP: 124/76 (04 Dec 2021 06:10) (111/71 - 129/72)  RR: 18 (04 Dec 2021 06:10) (16 - 19)  SpO2: 98% (04 Dec 2021 06:10) (96% - 98%)    Admit weight: 98.4 kg  Daily     Daily Weight in k.4 (03 Dec 2021 12:45)    Intake / Output:   12-03 @ 07:01  -   @ 07:00  --------------------------------------------------------  IN: 2029 mL / OUT: 2650 mL / NET: -621 mL          PE:   Oropharynx:   Oral Mucositis:                                                        Grade:   CVS:   Lungs:   Abdomen:  Extremities:   Gastric Mucositis:                                                  Grade:   Intestinal Mucositis:                                              Grade:   Skin:   TLC:   Neuro:   Pain:     Labs:   CBC Full  -  ( 03 Dec 2021 07:30 )  WBC Count : 2.77 K/uL  Hemoglobin : 8.2 g/dL  Hematocrit : 24.3 %  Platelet Count - Automated : Clumped  Mean Cell Volume : 94.9 fl  Mean Cell Hemoglobin : 32.0 pg  Mean Cell Hemoglobin Concentration : 33.7 gm/dL  Auto Neutrophil # : 1.52 K/uL  Auto Lymphocyte # : 0.17 K/uL  Auto Monocyte # : 0.71 K/uL  Auto Eosinophil # : 0.00 K/uL  Auto Basophil # : 0.00 K/uL  Auto Neutrophil % : 54.0 %  Auto Lymphocyte % : 6.2 %  Auto Monocyte % : 25.7 %  Auto Eosinophil % : 0.0 %  Auto Basophil % : 0.0 %                          8.2    2.77  )-----------( Clumped    ( 03 Dec 2021 07:30 )             24.3     12-    140  |  111<H>  |  7   ----------------------------<  100<H>  4.0   |  19<L>  |  0.64    Ca    7.3<L>      03 Dec 2021 07:30  Phos  2.4       Mg     1.6         TPro  5.1<L>  /  Alb  2.8<L>  /  TBili  0.2  /  DBili  <0.1  /  AST  28  /  ALT  43  /  AlkPhos  107        LIVER FUNCTIONS - ( 03 Dec 2021 07:30 )  Alb: 2.8 g/dL / Pro: 5.1 g/dL / ALK PHOS: 107 U/L / ALT: 43 U/L / AST: 28 U/L / GGT: x             Cultures:         Radiology:       Meds:   Antimicrobials:   acyclovir   Oral Tab/Cap 400 milliGRAM(s) Oral every 8 hours  clotrimazole Lozenge 1 Lozenge Oral five times a day  fluconAZOLE   Tablet 400 milliGRAM(s) Oral daily  meropenem  IVPB 1000 milliGRAM(s) IV Intermittent every 8 hours      Heme / Onc:   apixaban 5 milliGRAM(s) Oral every 12 hours      GI:  pantoprazole    Tablet 40 milliGRAM(s) Oral before breakfast  sodium bicarbonate Mouth Rinse 5 milliLiter(s) Swish and Spit five times a day      Cardiovascular:   metoprolol tartrate 25 milliGRAM(s) Oral every 8 hours  tamsulosin 0.4 milliGRAM(s) Oral at bedtime      Other medications:   AQUAPHOR (petrolatum Ointment) 1 Application(s) Topical three times a day  Biotene Dry Mouth Oral Rinse 5 milliLiter(s) Swish and Spit five times a day  chlorhexidine 2% Cloths 1 Application(s) Topical <User Schedule>      PRN:   acetaminophen     Tablet .. 650 milliGRAM(s) Oral every 6 hours PRN      A/P:  66 year old male with a history of multiple myeloma s/p RVD x 6   Post Autologous PBSCT day +  15  - melphalan 2/2; s/p melphalan hydration for 24 hours post infusion of last dose   Strict I&O, daily weights, prn diuresis   - rest day   - rest day   - s/p HPC transplant; completed transplant hydration for 24 hours post infusion of cells. Suprapubic pain in am. UA pending, follow up culture, BK virus. AXR   -vasovegal episode in AM: will monitor tele for 24hrs    d/c telemetry. Lasix 40mg IV x today, follow up I&Os, daily weight. monitor rash, receiving 2nd dose Kepivance today   add Emend for 3 days and change zofran ATC. If worsening abd pain consider CT a/p oral contrast only.   - Neutropenic started on cipro once febrile will pancx and switch cipro to  Aztronam 2g Q8   - AMS this am, agonal breathing, diaphoretic, cool and clammy - responsive to sternal rub. Hypotensive, hypoxic. Placed on NRB, RRT called. Labs sent. Lactate send. PCN allergy - started on Aztreonam 2g IV q 8 hoursm Flagyl 500mg IV TID for anaerobic coverage given abdominal pain and distension, Vancomycin 1g IV x 1. CT A/P pending for 12:30 21. CE with new TWI in V2-V6 - likely demand ischemia. Repeat CE and lactate at 2pm.   - CT A/P with Terminal ileitis with associated ileus versus low-grade obstruction. Pockets of free air in the right lower quadrant adjacent to the terminal ileum concerning for bowel perforation. 2.6 cm loculated fluid adjacent to the terminal ileum and surrounding peritoneal enhancement suggestive of developing peritonitis. Surgical team consulted- transferred to 8ICU for closer monitoring.  ID consult called- ABX fredy.    - CT chest angio + Saddle embolus with right heart strain., seen by cards- started on full dose A/C, s/p IVC filter placement with thrombectomy: transfuse PLT to >50K . doppler + B/L DVT. D/C Zarxio  -early engraftment    on clear liq diet, watery diarrhea, C diff(-)   Transitioned from Heparin gtt to Lovenox - D/W ICU MD, recommend keeping keeping plt's >40k while on AC.   Pending transfer back to BMTU  - SICU care appreciated. Failed TOV - 20F coude catheter placed this am by urology. Continue lovenox for now, may change to DOAC / NOAC prior to discharge. Continue antibiotics per ID service. Engrafting.   - Lovenox switched to Eliquis     1. Terminal ileitis / sigmoid colitis   CT A/P () with findings concerning for ileitis and Pockets of free air in the right lower quadrant adjacent to the terminal ileum concerning for bowel perforation and 2.6 cm loculated fluid adjacent to the terminal ileum and surrounding peritoneal enhancement suggestive of developing peritonitis.  SICU care / surgical input appreciated - high risk for surgical intervention, monitored clinically    Continue antibiotics - ID input appreciated   Tolerating regular diet   Repeat CT A/P 21 showed interval resolution of extraluminal free air since 21; mild wall thickening of adjacent proximal sigmoid colon, likely reactive to terminal ileitis   Abdominal exams     2. DVT / PE   Diagnosed with Saddle PE on  and required thrombectomy  Bilateral DVTs, most recent duplex LE showed no propagation of DVT   Troponins normalized - no longer trending   Continue therapeutic anticoagulation - currently receiving Lovenox 1mg/kg BID - consider changing to DOAC / NOAC prior to discharge     3. Urinary retention   Ortega initially placed in SICU - removed   Failed TOV - retaining 900ml; 20F coude catheter placed by urology 21 AM   Continue flomax     4. Neutropenic sepsis   Antibiotic management per ID service   Meropenem:  -->  Fluconazole: 11/15 -->   PO Bactrim: 11/15 -->   Ciprofloxacin:  -->   Vancomycin:   Metronidazole:   Aztreonam:     5. Infectious Disease:   acyclovir   Oral Tab/Cap 400 milliGRAM(s) Oral every 8 hours  clotrimazole Lozenge 1 Lozenge Oral five times a day  fluconAZOLE   Tablet 400 milliGRAM(s) Oral daily  meropenem  IVPB 1000 milliGRAM(s) IV Intermittent every 8 hours    6. VOD Prophylaxis: Actigall, Glutamine    7. GI Prophylaxis:  pantoprazole    Tablet 40 milliGRAM(s) Oral before breakfast    8. Mouthcare - NS / NaHCO3 rinses, Mycelex, Biotene; Skin care     9. GVHD prophylaxis - n/a     10. Transfuse & replete electrolytes prn   potassium phosphate / sodium phosphate Tablet (K-PHOS No. 2) 1 Tablet(s) Oral four times a day  potassium phosphate IVPB 15 milliMole(s) IV Intermittent once  calcium gluconate IVPB 2 Gram(s) IV Intermittent once    11. IV hydration, daily weights, strict I&O, prn diuresis     12. PO intake as tolerated, nutrition follow up as needed, MVI, folic acid     13. Antiemetics, anti-diarrhea medications:     14. OOB as tolerated, physical therapy consult if needed     15. Monitor coags / fibrinogen 2x week, vitamin K as needed     16. Monitor closely for clinical changes, monitor for fevers     17. Emotional support provided, plan of care discussed and questions addressed     18. Patient education done regarding plan of care, restrictions and discharge planning     19. Continue regular social work input     I have written the above note for Dr. Lozada who performed service with me in the room.   Elton Antonio PA-C ([phone])    I have seen and examined patient with PA, I agree with above note as scribed.                      HPC Transplant Team                                                      Critical / Counseling Time Provided: 30 minutes                                                                                                                                                        Chief Complaint: Autologous peripheral blood stem cell transplant with high dose Melphalan prep regimen for treatment of multiple myeloma    S: Patient seen and examined with HPC Transplant Team:   + feeling better  + ready to ambulate more    O: Vitals:   Vital Signs Last 24 Hrs  T(C): 36.6 (04 Dec 2021 06:10), Max: 37 (03 Dec 2021 21:00)  T(F): 97.9 (04 Dec 2021 06:10), Max: 98.6 (03 Dec 2021 21:00)  HR: 74 (04 Dec 2021 06:10) (67 - 81)  BP: 124/76 (04 Dec 2021 06:10) (111/71 - 129/72)  RR: 18 (04 Dec 2021 06:10) (16 - 19)  SpO2: 98% (04 Dec 2021 06:10) (96% - 98%)    Admit weight: 98.4 kg  Daily Weight in k.4 (03 Dec 2021 12:45)  Daily Weight in k.7 (04 Dec 2021 09:00)    Intake / Output:    @ 07:01  -  - @ 07:00  --------------------------------------------------------  IN: 9 mL / OUT: 2650 mL / NET: -621 mL      PE:   Oropharynx: + erythema and post Kepivance coating no ulcerations   Oral Mucositis:              +                                         ndGndrndanddndend:nd nd2nd CVS: S1, S2 RRR   Lungs: CTA throughout bilaterally   Abdomen: + BS x 4, soft, mild distended,  mild tenderness   Extremities: +1/2-1 edema   Gastric Mucositis:       -                                           Grade: n/a  Intestinal Mucositis:     -                                         Grade: n/a   Skin: patchy, erythematous maculopapular rash to face, neck and upper chest and back post Kepivance   TLC: CDI   Neuro: A&O x 3      Labs:                   8.4    3.38  )-----------( 81       ( 04 Dec 2021 07:33 )             25.9     04 Dec 2021 07:31    138    |  109    |  7      ----------------------------<  103    4.0     |  19     |  0.62     Ca    7.4        04 Dec 2021 07:31  Phos  1.9       04 Dec 2021 07:31  Mg     1.6       04 Dec 2021 07:31    TPro  5.3    /  Alb  2.8    /  TBili  0.2    /  DBili  x      /  AST  25     /  ALT  41     /  AlkPhos  109    04 Dec 2021 07:31    LIVER FUNCTIONS - ( 04 Dec 2021 07:31 )  Alb: 2.8 g/dL / Pro: 5.3 g/dL / ALK PHOS: 109 U/L / ALT: 41 U/L / AST: 25 U/L / GGT: x           Cultures:    GI PCR Panel, Stool (21 @ 00:23)    Culture Results:   Adenovirus 40/41  DETECTED by PCR  *******Please Note:*******  GI panel PCR evaluates for:  Campylobacter, Plesiomonas shigelloides, Salmonella,  Vibrio, Yersinia enterocolitica, Enteroaggregative  Escherichia coli (EAEC), Enteropathogenic E.coli (EPEC),  Enterotoxigenic E. coli (ETEC) lt/st, Shiga-like  toxin-producing E. coli (STEC) stx1/stx2,  Shigella/ Enteroinvasive E. coli (EIEC), Cryptosporidium,  Cyclospora cayetanensis, Entamoeba histolytica,  Giardia lamblia, Adenovirus F 40/41, Astrovirus,  Norovirus GI/GII, Rotavirus A, Sapovirus    Culture - Blood (21 @ 12:32)    Specimen Source: .Blood Blood    Culture Results:   No Growth Final    Radiology:   from: CT Abdomen and Pelvis w/ IV Cont (21 @ 10:44)   IMPRESSION:  Unchanged terminal ileitis with mild increased fluid distention of the upstream small bowel, compatible with ileus versus partial small bowel obstruction. Unchanged trace loculated ascites with peritoneal enhancement at the level of the terminal ileum, suggesting peritonitis. However, there has been interval resolution of the extraluminal free air since 2021.  Mild wall thickening of the adjacent proximal sigmoid colon, likely reactive to the terminal ileitis.  New small bilateral pleural effusions.      Meds:   Antimicrobials:   acyclovir   Oral Tab/Cap 400 milliGRAM(s) Oral every 8 hours  clotrimazole Lozenge 1 Lozenge Oral five times a day  fluconAZOLE   Tablet 400 milliGRAM(s) Oral daily  meropenem  IVPB 1000 milliGRAM(s) IV Intermittent every 8 hours      Heme / Onc:   apixaban 5 milliGRAM(s) Oral every 12 hours      GI:  pantoprazole    Tablet 40 milliGRAM(s) Oral before breakfast  sodium bicarbonate Mouth Rinse 5 milliLiter(s) Swish and Spit five times a day      Cardiovascular:   metoprolol tartrate 25 milliGRAM(s) Oral every 8 hours  tamsulosin 0.4 milliGRAM(s) Oral at bedtime      Other medications:   AQUAPHOR (petrolatum Ointment) 1 Application(s) Topical three times a day  Biotene Dry Mouth Oral Rinse 5 milliLiter(s) Swish and Spit five times a day  chlorhexidine 2% Cloths 1 Application(s) Topical <User Schedule>      PRN:   acetaminophen     Tablet .. 650 milliGRAM(s) Oral every 6 hours PRN      A/P:  66 year old male with a history of multiple myeloma s/p RVD x 6   Post Autologous PBSCT day +  15  - melphalan 2/2; s/p melphalan hydration for 24 hours post infusion of last dose   Strict I&O, daily weights, prn diuresis   - rest day   - rest day   - s/p HPC transplant; completed transplant hydration for 24 hours post infusion of cells. Suprapubic pain in am. UA pending, follow up culture, BK virus. AXR   -vasovegal episode in AM: will monitor tele for 24hrs    d/c telemetry. Lasix 40mg IV x today, follow up I&Os, daily weight. monitor rash, receiving 2nd dose Kepivance today   add Emend for 3 days and change zofran ATC. If worsening abd pain consider CT a/p oral contrast only.   - Neutropenic started on cipro once febrile will pancx and switch cipro to  Aztronam 2g Q8   - AMS this am, agonal breathing, diaphoretic, cool and clammy - responsive to sternal rub. Hypotensive, hypoxic. Placed on NRB, RRT called. Labs sent. Lactate send. PCN allergy - started on Aztreonam 2g IV q 8 hoursm Flagyl 500mg IV TID for anaerobic coverage given abdominal pain and distension, Vancomycin 1g IV x 1. CT A/P pending for 12:30 21. CE with new TWI in V2-V6 - likely demand ischemia. Repeat CE and lactate at 2pm.   - CT A/P with Terminal ileitis with associated ileus versus low-grade obstruction. Pockets of free air in the right lower quadrant adjacent to the terminal ileum concerning for bowel perforation. 2.6 cm loculated fluid adjacent to the terminal ileum and surrounding peritoneal enhancement suggestive of developing peritonitis. Surgical team consulted- transferred to 8ICU for closer monitoring.  ID consult called- ABX broaden.    - CT chest angio + Saddle embolus with right heart strain., seen by cards- started on full dose A/C, s/p IVC filter placement with thrombectomy: transfuse PLT to >50K . doppler + B/L DVT. D/C Zarxio  -early engraftment    on clear liq diet, watery diarrhea, C diff(-)   Transitioned from Heparin gtt to Lovenox - D/W ICU MD, recommend keeping keeping plt's >40k while on AC.   Pending transfer back to BMTU  - SICU care appreciated. Failed TOV - 20F coude catheter placed this am by urology. Continue lovenox for now, may change to DOAC / NOAC prior to discharge. Continue antibiotics per ID service. Engrafting.   - Lovenox switched to Eliquis    Ortega removed, passed TOV    1. Terminal ileitis / sigmoid colitis   CT A/P () with findings concerning for ileitis and Pockets of free air in the right lower quadrant adjacent to the terminal ileum concerning for bowel perforation and 2.6 cm loculated fluid adjacent to the terminal ileum and surrounding peritoneal enhancement suggestive of developing peritonitis.  SICU care / surgical input appreciated - high risk for surgical intervention, monitored clinically    Continue antibiotics - ID input appreciated   Tolerating regular diet   Repeat CT A/P 21 showed interval resolution of extraluminal free air since 21; mild wall thickening of adjacent proximal sigmoid colon, likely reactive to terminal ileitis   Abdominal exams     2. DVT / PE   Diagnosed with Saddle PE on  and required thrombectomy  Bilateral DVTs, most recent duplex LE showed no propagation of DVT   Troponins normalized - no longer trending   Continue therapeutic anticoagulation - currently receiving Lovenox 1mg/kg BID - consider changing to DOAC / NOAC prior to discharge     3. Urinary retention   Ortega initially placed in SICU - removed   Failed TOV - retaining 900ml; 20F coude catheter placed by urology 21 AM   Continue flomax     4. Neutropenic sepsis   Antibiotic management per ID service   Meropenem:  -->  Fluconazole: 11/15 -->   PO Bactrim: 11/15 -->   Ciprofloxacin:  -->   Vancomycin:   Metronidazole:   Aztreonam:     5. Infectious Disease:   acyclovir   Oral Tab/Cap 400 milliGRAM(s) Oral every 8 hours  clotrimazole Lozenge 1 Lozenge Oral five times a day  fluconAZOLE   Tablet 400 milliGRAM(s) Oral daily  meropenem  IVPB 1000 milliGRAM(s) IV Intermittent every 8 hours    6. VOD Prophylaxis: Actigall, Glutamine    7. GI Prophylaxis:  pantoprazole    Tablet 40 milliGRAM(s) Oral before breakfast    8. Mouthcare - NS / NaHCO3 rinses, Mycelex, Biotene; Skin care     9. GVHD prophylaxis - n/a     10. Transfuse & replete electrolytes prn   potassium phosphate / sodium phosphate Tablet (K-PHOS No. 2) 1 Tablet(s) Oral four times a day  potassium phosphate IVPB 15 milliMole(s) IV Intermittent once  calcium gluconate IVPB 2 Gram(s) IV Intermittent once    11. IV hydration, daily weights, strict I&O, prn diuresis     12. PO intake as tolerated, nutrition follow up as needed, MVI, folic acid     13. Antiemetics, anti-diarrhea medications:     14. OOB as tolerated, physical therapy consult if needed     15. Monitor coags / fibrinogen 2x week, vitamin K as needed     16. Monitor closely for clinical changes, monitor for fevers     17. Emotional support provided, plan of care discussed and questions addressed     18. Patient education done regarding plan of care, restrictions and discharge planning     19. Continue regular social work input     I have written the above note for Dr. Lozada who performed service with me in the room.   Elton Antonio PA-C ([phone])    I have seen and examined patient with PA, I agree with above note as scribed.

## 2021-12-05 LAB
ALBUMIN SERPL ELPH-MCNC: 2.7 G/DL — LOW (ref 3.3–5)
ALP SERPL-CCNC: 102 U/L — SIGNIFICANT CHANGE UP (ref 40–120)
ALT FLD-CCNC: 32 U/L — SIGNIFICANT CHANGE UP (ref 10–45)
ANION GAP SERPL CALC-SCNC: 8 MMOL/L — SIGNIFICANT CHANGE UP (ref 5–17)
AST SERPL-CCNC: 18 U/L — SIGNIFICANT CHANGE UP (ref 10–40)
BASOPHILS # BLD AUTO: 0 K/UL — SIGNIFICANT CHANGE UP (ref 0–0.2)
BASOPHILS NFR BLD AUTO: 0 % — SIGNIFICANT CHANGE UP (ref 0–2)
BILIRUB SERPL-MCNC: 0.1 MG/DL — LOW (ref 0.2–1.2)
BUN SERPL-MCNC: 6 MG/DL — LOW (ref 7–23)
CALCIUM SERPL-MCNC: 7.3 MG/DL — LOW (ref 8.4–10.5)
CHLORIDE SERPL-SCNC: 109 MMOL/L — HIGH (ref 96–108)
CLOSURE TME COLL+EPINEP BLD: 134 K/UL — LOW (ref 150–400)
CO2 SERPL-SCNC: 21 MMOL/L — LOW (ref 22–31)
CREAT SERPL-MCNC: 0.59 MG/DL — SIGNIFICANT CHANGE UP (ref 0.5–1.3)
EOSINOPHIL # BLD AUTO: 0 K/UL — SIGNIFICANT CHANGE UP (ref 0–0.5)
EOSINOPHIL NFR BLD AUTO: 0 % — SIGNIFICANT CHANGE UP (ref 0–6)
GLUCOSE SERPL-MCNC: 105 MG/DL — HIGH (ref 70–99)
HCT VFR BLD CALC: 25.1 % — LOW (ref 39–50)
HGB BLD-MCNC: 8 G/DL — LOW (ref 13–17)
LYMPHOCYTES # BLD AUTO: 0.44 K/UL — LOW (ref 1–3.3)
LYMPHOCYTES # BLD AUTO: 11.5 % — LOW (ref 13–44)
MAGNESIUM SERPL-MCNC: 1.4 MG/DL — LOW (ref 1.6–2.6)
MANUAL SMEAR VERIFICATION: SIGNIFICANT CHANGE UP
MCHC RBC-ENTMCNC: 30.4 PG — SIGNIFICANT CHANGE UP (ref 27–34)
MCHC RBC-ENTMCNC: 31.9 GM/DL — LOW (ref 32–36)
MCV RBC AUTO: 95.4 FL — SIGNIFICANT CHANGE UP (ref 80–100)
MONOCYTES # BLD AUTO: 0.91 K/UL — HIGH (ref 0–0.9)
MONOCYTES NFR BLD AUTO: 23.9 % — HIGH (ref 2–14)
NEUTROPHILS # BLD AUTO: 2.45 K/UL — SIGNIFICANT CHANGE UP (ref 1.8–7.4)
NEUTROPHILS NFR BLD AUTO: 64.6 % — SIGNIFICANT CHANGE UP (ref 43–77)
PHOSPHATE SERPL-MCNC: 1.8 MG/DL — LOW (ref 2.5–4.5)
PLAT MORPH BLD: ABNORMAL
PLATELET # BLD AUTO: 61 K/UL — LOW (ref 150–400)
POTASSIUM SERPL-MCNC: 4 MMOL/L — SIGNIFICANT CHANGE UP (ref 3.5–5.3)
POTASSIUM SERPL-SCNC: 4 MMOL/L — SIGNIFICANT CHANGE UP (ref 3.5–5.3)
PROT SERPL-MCNC: 4.9 G/DL — LOW (ref 6–8.3)
RBC # BLD: 2.63 M/UL — LOW (ref 4.2–5.8)
RBC # FLD: 14.4 % — SIGNIFICANT CHANGE UP (ref 10.3–14.5)
RBC BLD AUTO: SIGNIFICANT CHANGE UP
SODIUM SERPL-SCNC: 138 MMOL/L — SIGNIFICANT CHANGE UP (ref 135–145)
TOXIC GRANULES BLD QL SMEAR: PRESENT — SIGNIFICANT CHANGE UP
WBC # BLD: 3.8 K/UL — SIGNIFICANT CHANGE UP (ref 3.8–10.5)
WBC # FLD AUTO: 3.8 K/UL — SIGNIFICANT CHANGE UP (ref 3.8–10.5)

## 2021-12-05 PROCEDURE — 99291 CRITICAL CARE FIRST HOUR: CPT

## 2021-12-05 RX ORDER — MAGNESIUM SULFATE 500 MG/ML
2 VIAL (ML) INJECTION ONCE
Refills: 0 | Status: COMPLETED | OUTPATIENT
Start: 2021-12-05 | End: 2021-12-05

## 2021-12-05 RX ORDER — POTASSIUM PHOSPHATE, MONOBASIC POTASSIUM PHOSPHATE, DIBASIC 236; 224 MG/ML; MG/ML
30 INJECTION, SOLUTION INTRAVENOUS ONCE
Refills: 0 | Status: COMPLETED | OUTPATIENT
Start: 2021-12-05 | End: 2021-12-05

## 2021-12-05 RX ORDER — SODIUM,POTASSIUM PHOSPHATES 278-250MG
1 POWDER IN PACKET (EA) ORAL
Refills: 0 | Status: COMPLETED | OUTPATIENT
Start: 2021-12-05 | End: 2021-12-07

## 2021-12-05 RX ADMIN — Medication 5 MILLILITER(S): at 22:02

## 2021-12-05 RX ADMIN — Medication 5 MILLILITER(S): at 08:17

## 2021-12-05 RX ADMIN — Medication 400 MILLIGRAM(S): at 22:02

## 2021-12-05 RX ADMIN — Medication 1 LOZENGE: at 06:12

## 2021-12-05 RX ADMIN — Medication 25 MILLIGRAM(S): at 23:08

## 2021-12-05 RX ADMIN — CHLORHEXIDINE GLUCONATE 1 APPLICATION(S): 213 SOLUTION TOPICAL at 08:17

## 2021-12-05 RX ADMIN — PANTOPRAZOLE SODIUM 40 MILLIGRAM(S): 20 TABLET, DELAYED RELEASE ORAL at 06:12

## 2021-12-05 RX ADMIN — MEROPENEM 100 MILLIGRAM(S): 1 INJECTION INTRAVENOUS at 13:21

## 2021-12-05 RX ADMIN — Medication 1 TABLET(S): at 17:10

## 2021-12-05 RX ADMIN — Medication 5 MILLILITER(S): at 17:11

## 2021-12-05 RX ADMIN — Medication 1 LOZENGE: at 22:03

## 2021-12-05 RX ADMIN — MEROPENEM 100 MILLIGRAM(S): 1 INJECTION INTRAVENOUS at 06:13

## 2021-12-05 RX ADMIN — Medication 1 TABLET(S): at 11:39

## 2021-12-05 RX ADMIN — Medication 5 MILLILITER(S): at 23:09

## 2021-12-05 RX ADMIN — Medication 1 LOZENGE: at 09:58

## 2021-12-05 RX ADMIN — Medication 5 MILLILITER(S): at 13:22

## 2021-12-05 RX ADMIN — TAMSULOSIN HYDROCHLORIDE 0.4 MILLIGRAM(S): 0.4 CAPSULE ORAL at 22:01

## 2021-12-05 RX ADMIN — FLUCONAZOLE 400 MILLIGRAM(S): 150 TABLET ORAL at 11:38

## 2021-12-05 RX ADMIN — APIXABAN 5 MILLIGRAM(S): 2.5 TABLET, FILM COATED ORAL at 17:11

## 2021-12-05 RX ADMIN — Medication 50 GRAM(S): at 08:57

## 2021-12-05 RX ADMIN — Medication 400 MILLIGRAM(S): at 13:22

## 2021-12-05 RX ADMIN — Medication 5 MILLILITER(S): at 20:57

## 2021-12-05 RX ADMIN — Medication 25 MILLIGRAM(S): at 08:17

## 2021-12-05 RX ADMIN — Medication 5 MILLILITER(S): at 17:10

## 2021-12-05 RX ADMIN — Medication 1 TABLET(S): at 08:57

## 2021-12-05 RX ADMIN — Medication 1 LOZENGE: at 17:10

## 2021-12-05 RX ADMIN — Medication 25 MILLIGRAM(S): at 00:24

## 2021-12-05 RX ADMIN — Medication 400 MILLIGRAM(S): at 06:12

## 2021-12-05 RX ADMIN — Medication 5 MILLILITER(S): at 09:57

## 2021-12-05 RX ADMIN — Medication 1 APPLICATION(S): at 13:22

## 2021-12-05 RX ADMIN — Medication 5 MILLILITER(S): at 00:23

## 2021-12-05 RX ADMIN — MEROPENEM 100 MILLIGRAM(S): 1 INJECTION INTRAVENOUS at 22:00

## 2021-12-05 RX ADMIN — APIXABAN 5 MILLIGRAM(S): 2.5 TABLET, FILM COATED ORAL at 06:12

## 2021-12-05 RX ADMIN — Medication 1 TABLET(S): at 22:01

## 2021-12-05 RX ADMIN — Medication 25 MILLIGRAM(S): at 17:10

## 2021-12-05 RX ADMIN — Medication 1 APPLICATION(S): at 22:13

## 2021-12-05 RX ADMIN — Medication 1 APPLICATION(S): at 06:19

## 2021-12-05 RX ADMIN — Medication 1 LOZENGE: at 13:22

## 2021-12-05 RX ADMIN — Medication 5 MILLILITER(S): at 06:19

## 2021-12-05 RX ADMIN — POTASSIUM PHOSPHATE, MONOBASIC POTASSIUM PHOSPHATE, DIBASIC 83.33 MILLIMOLE(S): 236; 224 INJECTION, SOLUTION INTRAVENOUS at 09:59

## 2021-12-05 RX ADMIN — Medication 5 MILLILITER(S): at 11:37

## 2021-12-05 NOTE — PROGRESS NOTE ADULT - ATTENDING COMMENTS
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 He is now admitted for an autoSCT with high dose melphalan prep regimen. Transplant day 11/19/21. Hospital course has been complicated by vasovagal episodes on 11/19/21 and 11/23/21. On 11/23/21 developed neutropenic sepsis, improved with fluid resuscitation. CT on 11/23/21 showed Terminal ileitis with associated ileus versus low-grade obstruction with free air and possible bowel perforation, surgery consulted and transferred to SICU, managed conservatively for now. Patient was also found to have increasing troponins, echo showed heart strain and CTA chest on 11/24/21 showed a saddle PE,  LE doppler on 11/24/24 showed b/l DVT. He is now s/p thrombectomy and IVC filter (11/24/21).Patient is hemodynamically stable, mentating well.     PE:   VSS  Gen: A/O x 3, NAD, tired  RESP: CTA, no wheeze no rhonchi  CV: S1, S2 RRR  Abd:  soft, increased distention, + hyperactive BS  LE: no edema  Neuro: CNII-XII intact, no focal deficits  Psych: appropriate    MM  -dx 4/2021  -s/p RVD x 6 (5/2021 - 9/2021) -->   -admitted for autoSCT with Ivett 100mg/M2 on 11/19/21  -hold Zarxio for now given acute events / acute abdomen  Today is day + 15  -12/3/21 patient engrafted    ID  BACTERIAL:  Neutropenic sepsis (11/23/21) started on meropenem (11/23 - ) and vanco x1 (11/23)  VIRAL: c/w Acyclovir   FUNGAL: c/w Diflucan 400 mg po daily.  PCP prophylaxis: hold for now    HEME  Saddle PE s/p and b/l LE DVT (dx 11/24/21) - c/w Lovenox, s/p mechanical thrombectomy on 11/24/21  -will transition to Eliquis  -keep Hb >7  -keep plt >40; keep plt >50 for procedures  -12/2/21 plt clumping today - okay to give anticoagulation since plt have been >50k    GI:  Abd pain / bowel perforation on CT on 11/23/21 - tolerating PO diet  -c/w Abx until 12/7/21 as per surgery  -repeat CT abd (11/29/21) -  terminal ileitis with mild increased fluid distention of the upstream small bowel, compatible with ileus versus partial small bowel obstruction. Unchanged trace loculated ascites with peritoneal enhancement at the level of the terminal ileum, suggesting peritonitis. However, there has been interval resolution of the extraluminal free air since 11/23/2021.  Diarrhea - stool cx neg, c/w Abx  VOD prophylaxis - glutamine supplementation, Actigall BID   GI prophylaxis - Protonix po QD   Mouth care and skin care as per protocol.    The time was used discussed with patient, family, primary team, consultants, and acute management. 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 He is now admitted for an autoSCT with high dose melphalan prep regimen. Transplant day 11/19/21. Hospital course has been complicated by vasovagal episodes on 11/19/21 and 11/23/21. On 11/23/21 developed neutropenic sepsis, improved with fluid resuscitation. CT on 11/23/21 showed Terminal ileitis with associated ileus versus low-grade obstruction with free air and possible bowel perforation, surgery consulted and transferred to SICU, managed conservatively for now. Patient was also found to have increasing troponins, echo showed heart strain and CTA chest on 11/24/21 showed a saddle PE,  LE doppler on 11/24/24 showed b/l DVT. He is now s/p thrombectomy and IVC filter (11/24/21).Patient is hemodynamically stable, mentating well.     PE:   VSS  Gen: A/O x 3, NAD, tired  RESP: CTA, no wheeze no rhonchi  CV: S1, S2 RRR  Abd:  soft, increased distention, + hyperactive BS  LE: no edema  Neuro: CNII-XII intact, no focal deficits  Psych: appropriate    MM  -dx 4/2021  -s/p RVD x 6 (5/2021 - 9/2021) -->   -admitted for autoSCT with Ivett 100mg/M2 on 11/19/21  -hold Zarxio for now given acute events / acute abdomen  Today is day + 16  -12/3/21 patient engrafted    ID  BACTERIAL:  Neutropenic sepsis (11/23/21) started on meropenem (11/23 - ) and vanco x1 (11/23)  VIRAL: c/w Acyclovir   FUNGAL: c/w Diflucan 400 mg po daily.  PCP prophylaxis: hold for now    HEME  Saddle PE s/p and b/l LE DVT (dx 11/24/21) - c/w Lovenox, s/p mechanical thrombectomy on 11/24/21  -will transition to Eliquis  -keep Hb >7  -keep plt >40; keep plt >50 for procedures  -12/2/21 plt clumping today - okay to give anticoagulation since plt have been >50k    GI:  Abd pain / bowel perforation on CT on 11/23/21 - tolerating PO diet  -c/w Abx until 12/7/21 as per surgery  -repeat CT abd (11/29/21) -  terminal ileitis with mild increased fluid distention of the upstream small bowel, compatible with ileus versus partial small bowel obstruction. Unchanged trace loculated ascites with peritoneal enhancement at the level of the terminal ileum, suggesting peritonitis. However, there has been interval resolution of the extraluminal free air since 11/23/2021.  Diarrhea - stool cx neg, c/w Abx  VOD prophylaxis - glutamine supplementation, Actigall BID   GI prophylaxis - Protonix po QD   Mouth care and skin care as per protocol.    The time was used discussed with patient, family, primary team, consultants, and acute management.

## 2021-12-05 NOTE — PROGRESS NOTE ADULT - SUBJECTIVE AND OBJECTIVE BOX
HPC Transplant Team                                                      Critical / Counseling Time Provided: 30 minutes                                                                                                                                                        Chief Complaint: Autologous peripheral blood stem cell transplant with high dose Melphalan prep regimen for treatment of multiple myeloma    S: Patient seen and examined with HPC Transplant Team:   + feeling better  + ready to ambulate more    O: Vitals:   Vital Signs Last 24 Hrs  T(C): 36.8 (05 Dec 2021 05:00), Max: 37.2 (04 Dec 2021 22:20)  T(F): 98.2 (05 Dec 2021 05:00), Max: 99 (04 Dec 2021 22:20)  HR: 74 (05 Dec 2021 05:00) (74 - 81)  BP: 126/77 (05 Dec 2021 05:00) (105/67 - 127/79)  RR: 18 (05 Dec 2021 05:00) (18 - 18)  SpO2: 95% (05 Dec 2021 05:00) (95% - 98%)    Admit weight: 98.4  Daily Weight in k.7 (04 Dec 2021 09:00)    Intake / Output:   -04 @ 07:01  -  12-05 @ 07:00  --------------------------------------------------------  IN: 1442 mL / OUT: 1900 mL / NET: -458 mL      PE:   Oropharynx: + erythema and post Kepivance coating no ulcerations   Oral Mucositis:              +                                         ndGndrndanddndend:nd nd2nd CVS: S1, S2 RRR   Lungs: CTA throughout bilaterally   Abdomen: + BS x 4, soft, mild distended,  mild tenderness   Extremities: +1/2-1 edema   Gastric Mucositis:       -                                           Grade: n/a  Intestinal Mucositis:     -                                         Grade: n/a   Skin: patchy, erythematous maculopapular rash to face, neck and upper chest and back post Kepivance   TLC: CDI   Neuro: A&O x 3  Pain:     Labs:   CBC Full  -  ( 05 Dec 2021 06:56 )  WBC Count : 3.80 K/uL  Hemoglobin : 8.0 g/dL  Hematocrit : 25.1 %  Platelet Count - Automated : 61 K/uL  Mean Cell Volume : 95.4 fl  Mean Cell Hemoglobin : 30.4 pg  Mean Cell Hemoglobin Concentration : 31.9 gm/dL  Auto Neutrophil # : x  Auto Lymphocyte # : x  Auto Monocyte # : x  Auto Eosinophil # : x  Auto Basophil # : x  Auto Neutrophil % : x  Auto Lymphocyte % : x  Auto Monocyte % : x  Auto Eosinophil % : x  Auto Basophil % : x                          8.0    3.80  )-----------( 61       ( 05 Dec 2021 06:56 )             25.1       Cultures:         Radiology:       Meds:   Antimicrobials:   acyclovir   Oral Tab/Cap 400 milliGRAM(s) Oral every 8 hours  clotrimazole Lozenge 1 Lozenge Oral five times a day  fluconAZOLE   Tablet 400 milliGRAM(s) Oral daily  meropenem  IVPB 1000 milliGRAM(s) IV Intermittent every 8 hours      Heme / Onc:   apixaban 5 milliGRAM(s) Oral every 12 hours      GI:  pantoprazole    Tablet 40 milliGRAM(s) Oral before breakfast  sodium bicarbonate Mouth Rinse 5 milliLiter(s) Swish and Spit five times a day      Cardiovascular:   metoprolol tartrate 25 milliGRAM(s) Oral every 8 hours  tamsulosin 0.4 milliGRAM(s) Oral at bedtime      Other medications:   AQUAPHOR (petrolatum Ointment) 1 Application(s) Topical three times a day  Biotene Dry Mouth Oral Rinse 5 milliLiter(s) Swish and Spit five times a day  chlorhexidine 2% Cloths 1 Application(s) Topical <User Schedule>      PRN:   acetaminophen     Tablet .. 650 milliGRAM(s) Oral every 6 hours PRN      A/P:  66 year old male with a history of multiple myeloma s/p RVD x 6   Post Autologous PBSCT day +  16  - melphalan ; s/p melphalan hydration for 24 hours post infusion of last dose   Strict I&O, daily weights, prn diuresis   - rest day   - rest day   - s/p HPC transplant; completed transplant hydration for 24 hours post infusion of cells. Suprapubic pain in am. UA pending, follow up culture, BK virus. AXR   -vasovegal episode in AM: will monitor tele for 24hrs    d/c telemetry. Lasix 40mg IV x today, follow up I&Os, daily weight. monitor rash, receiving 2nd dose Kepivance today   add Emend for 3 days and change zofran ATC. If worsening abd pain consider CT a/p oral contrast only.   - Neutropenic started on cipro once febrile will pancx and switch cipro to  Aztronam 2g Q8   - AMS this am, agonal breathing, diaphoretic, cool and clammy - responsive to sternal rub. Hypotensive, hypoxic. Placed on NRB, RRT called. Labs sent. Lactate send. PCN allergy - started on Aztreonam 2g IV q 8 hoursm Flagyl 500mg IV TID for anaerobic coverage given abdominal pain and distension, Vancomycin 1g IV x 1. CT A/P pending for 12:30 21. CE with new TWI in V2-V6 - likely demand ischemia. Repeat CE and lactate at 2pm.   - CT A/P with Terminal ileitis with associated ileus versus low-grade obstruction. Pockets of free air in the right lower quadrant adjacent to the terminal ileum concerning for bowel perforation. 2.6 cm loculated fluid adjacent to the terminal ileum and surrounding peritoneal enhancement suggestive of developing peritonitis. Surgical team consulted- transferred to 8ICU for closer monitoring.  ID consult called- ABX fredy.    - CT chest angio + Saddle embolus with right heart strain., seen by cards- started on full dose A/C, s/p IVC filter placement with thrombectomy: transfuse PLT to >50K . doppler + B/L DVT. D/C Zarxio  -early engraftment    on clear liq diet, watery diarrhea, C diff(-)   Transitioned from Heparin gtt to Lovenox - D/W ICU MD, recommend keeping keeping plt's >40k while on AC.   Pending transfer back to BMTU  - SICU care appreciated. Failed TOV - 20F coude catheter placed this am by urology. Continue lovenox for now, may change to DOAC / NOAC prior to discharge. Continue antibiotics per ID service. Engrafting.   - Lovenox switched to Eliquis    Ortega removed, passed TOV    1. Terminal ileitis / sigmoid colitis   CT A/P () with findings concerning for ileitis and Pockets of free air in the right lower quadrant adjacent to the terminal ileum concerning for bowel perforation and 2.6 cm loculated fluid adjacent to the terminal ileum and surrounding peritoneal enhancement suggestive of developing peritonitis.  SICU care / surgical input appreciated - high risk for surgical intervention, monitored clinically    Continue antibiotics - ID input appreciated   Tolerating regular diet   Repeat CT A/P 21 showed interval resolution of extraluminal free air since 21; mild wall thickening of adjacent proximal sigmoid colon, likely reactive to terminal ileitis   Abdominal exams     2. DVT / PE   Diagnosed with Saddle PE on  and required thrombectomy  Bilateral DVTs, most recent duplex LE showed no propagation of DVT   Troponins normalized - no longer trending   Continue therapeutic anticoagulation - currently receiving Lovenox 1mg/kg BID - consider changing to DOAC / NOAC prior to discharge     3. Urinary retention   Ortega initially placed in SICU - removed   Failed TOV - retaining 900ml; 20F coude catheter placed by urology 21 AM   Continue flomax     4. Neutropenic sepsis   Antibiotic management per ID service   Meropenem:  -->  Fluconazole: 11/15 -->   PO Bactrim: 11/15 -->   Ciprofloxacin:  -->   Vancomycin:   Metronidazole:   Aztreonam:     5. Infectious Disease:   acyclovir   Oral Tab/Cap 400 milliGRAM(s) Oral every 8 hours  clotrimazole Lozenge 1 Lozenge Oral five times a day  fluconAZOLE   Tablet 400 milliGRAM(s) Oral daily  meropenem  IVPB 1000 milliGRAM(s) IV Intermittent every 8 hours    6. VOD Prophylaxis: Actigall, Glutamine    7. GI Prophylaxis:  pantoprazole    Tablet 40 milliGRAM(s) Oral before breakfast    8. Mouthcare - NS / NaHCO3 rinses, Mycelex, Biotene; Skin care     9. GVHD prophylaxis - n/a     10. Transfuse & replete electrolytes prn   potassium phosphate / sodium phosphate Tablet (K-PHOS No. 2) 1 Tablet(s) Oral four times a day  potassium phosphate IVPB 15 milliMole(s) IV Intermittent once  calcium gluconate IVPB 2 Gram(s) IV Intermittent once    11. IV hydration, daily weights, strict I&O, prn diuresis     12. PO intake as tolerated, nutrition follow up as needed, MVI, folic acid     13. Antiemetics, anti-diarrhea medications:     14. OOB as tolerated, physical therapy consult if needed     15. Monitor coags / fibrinogen 2x week, vitamin K as needed     16. Monitor closely for clinical changes, monitor for fevers     17. Emotional support provided, plan of care discussed and questions addressed     18. Patient education done regarding plan of care, restrictions and discharge planning     19. Continue regular social work input     I have written the above note for Dr. Lozada who performed service with me in the room.   Elton Antonio PA-C ([phone])    I have seen and examined patient with PA, I agree with above note as scribed.                    HPC Transplant Team                                                      Critical / Counseling Time Provided: 30 minutes                                                                                                                                                        Chief Complaint: Autologous peripheral blood stem cell transplant with high dose Melphalan prep regimen for treatment of multiple myeloma    S: Patient seen and examined with HPC Transplant Team:   + feeling better  + ambulating with assistance    O: Vitals:   Vital Signs Last 24 Hrs  T(C): 36.8 (05 Dec 2021 05:00), Max: 37.2 (04 Dec 2021 22:20)  T(F): 98.2 (05 Dec 2021 05:00), Max: 99 (04 Dec 2021 22:20)  HR: 74 (05 Dec 2021 05:00) (74 - 81)  BP: 126/77 (05 Dec 2021 05:00) (105/67 - 127/79)  RR: 18 (05 Dec 2021 05:00) (18 - 18)  SpO2: 95% (05 Dec 2021 05:00) (95% - 98%)    Admit weight: 98.4  Daily Weight in k.7 (04 Dec 2021 09:00)  Daily Weight in k.4 (05 Dec 2021 12:29)    Intake / Output:   -04 @ 07:01  -  - @ 07:00  --------------------------------------------------------  IN: 1442 mL / OUT: 1900 mL / NET: -458 mL      PE:   Oropharynx: + erythema and post Kepivance coating no ulcerations   Oral Mucositis:              +                                         ndGndrndanddndend:nd nd2nd CVS: S1, S2 RRR   Lungs: CTA throughout bilaterally   Abdomen: + BS x 4, soft, mild distended,  mild tenderness   Extremities: +1/2-1 edema   Gastric Mucositis:       -                                           Grade: n/a  Intestinal Mucositis:     -                                         Grade: n/a   Skin: patchy, erythematous maculopapular rash to face, neck and upper chest and back post Kepivance   TLC: CDI   Neuro: A&O x 3  Pain:     Labs:   CBC Full  -  ( 05 Dec 2021 06:56 )  WBC Count : 3.80 K/uL  Hemoglobin : 8.0 g/dL  Hematocrit : 25.1 %  Platelet Count - Automated : 61 K/uL  Mean Cell Volume : 95.4 fl  Mean Cell Hemoglobin : 30.4 pg  Mean Cell Hemoglobin Concentration : 31.9 gm/dL  Auto Neutrophil # : x  Auto Lymphocyte # : x  Auto Monocyte # : x  Auto Eosinophil # : x  Auto Basophil # : x  Auto Neutrophil % : x  Auto Lymphocyte % : x  Auto Monocyte % : x  Auto Eosinophil % : x  Auto Basophil % : x                          8.0    3.80  )-----------( 61       ( 05 Dec 2021 06:56 )             25.1       Cultures:     GI PCR Panel, Stool (21 @ 00:23)    Culture Results:   Adenovirus 40/41  DETECTED by PCR  *******Please Note:*******  GI panel PCR evaluates for:  Campylobacter, Plesiomonas shigelloides, Salmonella,  Vibrio, Yersinia enterocolitica, Enteroaggregative  Escherichia coli (EAEC), Enteropathogenic E.coli (EPEC),  Enterotoxigenic E. coli (ETEC) lt/st, Shiga-like  toxin-producing E. coli (STEC) stx1/stx2,  Shigella/ Enteroinvasive E. coli (EIEC), Cryptosporidium,  Cyclospora cayetanensis, Entamoeba histolytica,  Giardia lamblia, Adenovirus F 40/41, Astrovirus,  Norovirus GI/GII, Rotavirus A, Sapovirus    Culture - Blood (21 @ 12:32)    Specimen Source: .Blood Blood    Culture Results:   No Growth Final    Radiology:   from: CT Abdomen and Pelvis w/ IV Cont (21 @ 10:44)   IMPRESSION:    Unchanged terminal ileitis with mild increased fluid distention of the upstream small bowel, compatible with ileus versus partial small bowel obstruction. Unchanged trace loculated ascites with peritoneal enhancement at the level of the terminal ileum, suggesting peritonitis. However, there has been interval resolution of the extraluminal free air since 2021.  Mild wall thickening of the adjacent proximal sigmoid colon, likely reactive to the terminal ileitis.  New small bilateral pleural effusions.    Meds:   Antimicrobials:   acyclovir   Oral Tab/Cap 400 milliGRAM(s) Oral every 8 hours  clotrimazole Lozenge 1 Lozenge Oral five times a day  fluconAZOLE   Tablet 400 milliGRAM(s) Oral daily  meropenem  IVPB 1000 milliGRAM(s) IV Intermittent every 8 hours      Heme / Onc:   apixaban 5 milliGRAM(s) Oral every 12 hours      GI:  pantoprazole    Tablet 40 milliGRAM(s) Oral before breakfast  sodium bicarbonate Mouth Rinse 5 milliLiter(s) Swish and Spit five times a day      Cardiovascular:   metoprolol tartrate 25 milliGRAM(s) Oral every 8 hours  tamsulosin 0.4 milliGRAM(s) Oral at bedtime      Other medications:   AQUAPHOR (petrolatum Ointment) 1 Application(s) Topical three times a day  Biotene Dry Mouth Oral Rinse 5 milliLiter(s) Swish and Spit five times a day  chlorhexidine 2% Cloths 1 Application(s) Topical <User Schedule>      PRN:   acetaminophen     Tablet .. 650 milliGRAM(s) Oral every 6 hours PRN      A/P:  66 year old male with a history of multiple myeloma s/p RVD x 6   Post Autologous PBSCT day +  16  - melphalan /; s/p melphalan hydration for 24 hours post infusion of last dose   Strict I&O, daily weights, prn diuresis   - rest day   - rest day   - s/p HPC transplant; completed transplant hydration for 24 hours post infusion of cells. Suprapubic pain in am. UA pending, follow up culture, BK virus. AXR   -vasovegal episode in AM: will monitor tele for 24hrs    d/c telemetry. Lasix 40mg IV x today, follow up I&Os, daily weight. monitor rash, receiving 2nd dose Kepivance today   add Emend for 3 days and change zofran ATC. If worsening abd pain consider CT a/p oral contrast only.   - Neutropenic started on cipro once febrile will pancx and switch cipro to  Aztronam 2g Q8   - AMS this am, agonal breathing, diaphoretic, cool and clammy - responsive to sternal rub. Hypotensive, hypoxic. Placed on NRB, RRT called. Labs sent. Lactate send. PCN allergy - started on Aztreonam 2g IV q 8 hoursm Flagyl 500mg IV TID for anaerobic coverage given abdominal pain and distension, Vancomycin 1g IV x 1. CT A/P pending for 12:30 21. CE with new TWI in V2-V6 - likely demand ischemia. Repeat CE and lactate at 2pm.   - CT A/P with Terminal ileitis with associated ileus versus low-grade obstruction. Pockets of free air in the right lower quadrant adjacent to the terminal ileum concerning for bowel perforation. 2.6 cm loculated fluid adjacent to the terminal ileum and surrounding peritoneal enhancement suggestive of developing peritonitis. Surgical team consulted- transferred to 8ICU for closer monitoring.  ID consult called- JEUSS daniel.    - CT chest angio + Saddle embolus with right heart strain., seen by cards- started on full dose A/C, s/p IVC filter placement with thrombectomy: transfuse PLT to >50K . doppler + B/L DVT. D/C Zarxio  -early engraftment    on clear liq diet, watery diarrhea, C diff(-)   Transitioned from Heparin gtt to Lovenox - D/W ICU MD, recommend keeping keeping plt's >40k while on AC.   Pending transfer back to BMTU  - SICU care appreciated. Failed TOV - 20F coude catheter placed this am by urology. Continue lovenox for now, may change to DOAC / NOAC prior to discharge. Continue antibiotics per ID service. Engrafting.   - Lovenox switched to Eliquis    Ortega removed, passed TOV    1. Terminal ileitis / sigmoid colitis   CT A/P () with findings concerning for ileitis and Pockets of free air in the right lower quadrant adjacent to the terminal ileum concerning for bowel perforation and 2.6 cm loculated fluid adjacent to the terminal ileum and surrounding peritoneal enhancement suggestive of developing peritonitis.  SICU care / surgical input appreciated - high risk for surgical intervention, monitored clinically    Continue antibiotics - ID input appreciated   Tolerating regular diet   Repeat CT A/P 21 showed interval resolution of extraluminal free air since 21; mild wall thickening of adjacent proximal sigmoid colon, likely reactive to terminal ileitis   Abdominal exams     2. DVT / PE   Diagnosed with Saddle PE on  and required thrombectomy  Bilateral DVTs, most recent duplex LE showed no propagation of DVT   Troponins normalized - no longer trending   Continue therapeutic anticoagulation - currently receiving Lovenox 1mg/kg BID - consider changing to DOAC / NOAC prior to discharge     3. Urinary retention   Ortega initially placed in SICU - removed   Failed TOV - retaining 900ml; 20F coude catheter placed by urology 21 AM   Continue flomax     4. Neutropenic sepsis   Antibiotic management per ID service   Meropenem:  -->  Fluconazole: 11/15 -->   PO Bactrim: 11/15 -->   Ciprofloxacin:  -->   Vancomycin:   Metronidazole:   Aztreonam:     5. Infectious Disease:   acyclovir   Oral Tab/Cap 400 milliGRAM(s) Oral every 8 hours  clotrimazole Lozenge 1 Lozenge Oral five times a day  fluconAZOLE   Tablet 400 milliGRAM(s) Oral daily  meropenem  IVPB 1000 milliGRAM(s) IV Intermittent every 8 hours    6. VOD Prophylaxis: Actigall, Glutamine    7. GI Prophylaxis:  pantoprazole    Tablet 40 milliGRAM(s) Oral before breakfast    8. Mouthcare - NS / NaHCO3 rinses, Mycelex, Biotene; Skin care     9. GVHD prophylaxis - n/a     10. Transfuse & replete electrolytes prn   potassium phosphate / sodium phosphate Tablet (K-PHOS No. 2) 1 Tablet(s) Oral four times a day  potassium phosphate IVPB 15 milliMole(s) IV Intermittent once  calcium gluconate IVPB 2 Gram(s) IV Intermittent once    11. IV hydration, daily weights, strict I&O, prn diuresis     12. PO intake as tolerated, nutrition follow up as needed, MVI, folic acid     13. Antiemetics, anti-diarrhea medications:     14. OOB as tolerated, physical therapy consult if needed     15. Monitor coags / fibrinogen 2x week, vitamin K as needed     16. Monitor closely for clinical changes, monitor for fevers     17. Emotional support provided, plan of care discussed and questions addressed     18. Patient education done regarding plan of care, restrictions and discharge planning     19. Continue regular social work input     I have written the above note for Dr. Lozada who performed service with me in the room.   Elton Antonio PA-C ([phone])    I have seen and examined patient with PA, I agree with above note as scribed.                    HPC Transplant Team                                                      Critical / Counseling Time Provided: 30 minutes                                                                                                                                                        Chief Complaint: Autologous peripheral blood stem cell transplant with high dose Melphalan prep regimen for treatment of multiple myeloma    S: Patient seen and examined with HPC Transplant Team:   + feeling better  + ambulating with assistance    O: Vitals:   Vital Signs Last 24 Hrs  T(C): 36.8 (05 Dec 2021 05:00), Max: 37.2 (04 Dec 2021 22:20)  T(F): 98.2 (05 Dec 2021 05:00), Max: 99 (04 Dec 2021 22:20)  HR: 74 (05 Dec 2021 05:00) (74 - 81)  BP: 126/77 (05 Dec 2021 05:00) (105/67 - 127/79)  RR: 18 (05 Dec 2021 05:00) (18 - 18)  SpO2: 95% (05 Dec 2021 05:00) (95% - 98%)    Admit weight: 98.4  Daily Weight in k.7 (04 Dec 2021 09:00)  Daily Weight in k.4 (05 Dec 2021 12:29)    Intake / Output:   -04 @ 07:01  -  - @ 07:00  --------------------------------------------------------  IN: 1442 mL / OUT: 1900 mL / NET: -458 mL      PE:   Oropharynx: + erythema and post Kepivance coating no ulcerations   Oral Mucositis:              +                                         ndGndrndanddndend:nd nd2nd CVS: S1, S2 RRR   Lungs: CTA throughout bilaterally   Abdomen: + BS x 4, soft, mild distended,  mild tenderness   Extremities: +1/2-1 edema   Gastric Mucositis:       -                                           Grade: n/a  Intestinal Mucositis:     -                                         Grade: n/a   Skin: patchy, erythematous maculopapular rash to face, neck and upper chest and back post Kepivance   TLC: CDI   Neuro: A&O x 3  Pain:     Labs:   CBC Full  -  ( 05 Dec 2021 06:56 )  WBC Count : 3.80 K/uL  Hemoglobin : 8.0 g/dL  Hematocrit : 25.1 %  Platelet Count - Automated : 61 K/uL  Mean Cell Volume : 95.4 fl  Mean Cell Hemoglobin : 30.4 pg  Mean Cell Hemoglobin Concentration : 31.9 gm/dL  Auto Neutrophil # : x  Auto Lymphocyte # : x  Auto Monocyte # : x  Auto Eosinophil # : x  Auto Basophil # : x  Auto Neutrophil % : x  Auto Lymphocyte % : x  Auto Monocyte % : x  Auto Eosinophil % : x  Auto Basophil % : x                          8.0    3.80  )-----------( 134 (blue top)       ( 05 Dec 2021 06:56 )             25.1       05 Dec 2021 06:56    138    |  109    |  6      ----------------------------<  105    4.0     |  21     |  0.59     Ca    7.3        05 Dec 2021 06:56  Phos  1.8       05 Dec 2021 06:56  Mg     1.4       05 Dec 2021 06:56    TPro  4.9    /  Alb  2.7    /  TBili  0.1    /  DBili  x      /  AST  18     /  ALT  32     /  AlkPhos  102    05 Dec 2021 06:56    LIVER FUNCTIONS - ( 05 Dec 2021 06:56 )  Alb: 2.7 g/dL / Pro: 4.9 g/dL / ALK PHOS: 102 U/L / ALT: 32 U/L / AST: 18 U/L / GGT: x           Cultures:     GI PCR Panel, Stool (21 @ 00:23)    Culture Results:   Adenovirus 40/41  DETECTED by PCR  *******Please Note:*******  GI panel PCR evaluates for:  Campylobacter, Plesiomonas shigelloides, Salmonella,  Vibrio, Yersinia enterocolitica, Enteroaggregative  Escherichia coli (EAEC), Enteropathogenic E.coli (EPEC),  Enterotoxigenic E. coli (ETEC) lt/st, Shiga-like  toxin-producing E. coli (STEC) stx1/stx2,  Shigella/ Enteroinvasive E. coli (EIEC), Cryptosporidium,  Cyclospora cayetanensis, Entamoeba histolytica,  Giardia lamblia, Adenovirus F 40/41, Astrovirus,  Norovirus GI/GII, Rotavirus A, Sapovirus    Culture - Blood (21 @ 12:32)    Specimen Source: .Blood Blood    Culture Results:   No Growth Final    Radiology:   from: CT Abdomen and Pelvis w/ IV Cont (29. @ 10:44)   IMPRESSION:    Unchanged terminal ileitis with mild increased fluid distention of the upstream small bowel, compatible with ileus versus partial small bowel obstruction. Unchanged trace loculated ascites with peritoneal enhancement at the level of the terminal ileum, suggesting peritonitis. However, there has been interval resolution of the extraluminal free air since 2021.  Mild wall thickening of the adjacent proximal sigmoid colon, likely reactive to the terminal ileitis.  New small bilateral pleural effusions.    Meds:   Antimicrobials:   acyclovir   Oral Tab/Cap 400 milliGRAM(s) Oral every 8 hours  clotrimazole Lozenge 1 Lozenge Oral five times a day  fluconAZOLE   Tablet 400 milliGRAM(s) Oral daily  meropenem  IVPB 1000 milliGRAM(s) IV Intermittent every 8 hours      Heme / Onc:   apixaban 5 milliGRAM(s) Oral every 12 hours      GI:  pantoprazole    Tablet 40 milliGRAM(s) Oral before breakfast  sodium bicarbonate Mouth Rinse 5 milliLiter(s) Swish and Spit five times a day      Cardiovascular:   metoprolol tartrate 25 milliGRAM(s) Oral every 8 hours  tamsulosin 0.4 milliGRAM(s) Oral at bedtime      Other medications:   AQUAPHOR (petrolatum Ointment) 1 Application(s) Topical three times a day  Biotene Dry Mouth Oral Rinse 5 milliLiter(s) Swish and Spit five times a day  chlorhexidine 2% Cloths 1 Application(s) Topical <User Schedule>      PRN:   acetaminophen     Tablet .. 650 milliGRAM(s) Oral every 6 hours PRN      A/P:  66 year old male with a history of multiple myeloma s/p RVD x 6   Post Autologous PBSCT day +  16  - melphalan 2/2; s/p melphalan hydration for 24 hours post infusion of last dose   Strict I&O, daily weights, prn diuresis   - rest day   - rest day   - s/p HPC transplant; completed transplant hydration for 24 hours post infusion of cells. Suprapubic pain in am. UA pending, follow up culture, BK virus. AXR   -vasovegal episode in AM: will monitor tele for 24hrs    d/c telemetry. Lasix 40mg IV x today, follow up I&Os, daily weight. monitor rash, receiving 2nd dose Kepivance today   add Emend for 3 days and change zofran ATC. If worsening abd pain consider CT a/p oral contrast only.   - Neutropenic started on cipro once febrile will pancx and switch cipro to  Aztronam 2g Q8   - AMS this am, agonal breathing, diaphoretic, cool and clammy - responsive to sternal rub. Hypotensive, hypoxic. Placed on NRB, RRT called. Labs sent. Lactate send. PCN allergy - started on Aztreonam 2g IV q 8 hoursm Flagyl 500mg IV TID for anaerobic coverage given abdominal pain and distension, Vancomycin 1g IV x 1. CT A/P pending for 12:30 21. CE with new TWI in V2-V6 - likely demand ischemia. Repeat CE and lactate at 2pm.   - CT A/P with Terminal ileitis with associated ileus versus low-grade obstruction. Pockets of free air in the right lower quadrant adjacent to the terminal ileum concerning for bowel perforation. 2.6 cm loculated fluid adjacent to the terminal ileum and surrounding peritoneal enhancement suggestive of developing peritonitis. Surgical team consulted- transferred to 8ICU for closer monitoring.  ID consult called- ABDIRK daniel.    - CT chest angio + Saddle embolus with right heart strain., seen by cards- started on full dose A/C, s/p IVC filter placement with thrombectomy: transfuse PLT to >50K . doppler + B/L DVT. D/C Zarxio  -early engraftment    on clear liq diet, watery diarrhea, C diff(-)   Transitioned from Heparin gtt to Lovenox - D/W ICU MD, recommend keeping keeping plt's >40k while on AC.   Pending transfer back to BMTU  - SICU care appreciated. Failed TOV - 20F coude catheter placed this am by urology. Continue lovenox for now, may change to DOAC / NOAC prior to discharge. Continue antibiotics per ID service. Engrafting.   - Lovenox switched to Eliquis    Ortega removed, passed TOV, PT re-consulted, follow up recommendations for discharge, Mg, Phos repleted    1. Terminal ileitis / sigmoid colitis   CT A/P () with findings concerning for ileitis and Pockets of free air in the right lower quadrant adjacent to the terminal ileum concerning for bowel perforation and 2.6 cm loculated fluid adjacent to the terminal ileum and surrounding peritoneal enhancement suggestive of developing peritonitis.  SICU care / surgical input appreciated - high risk for surgical intervention, monitored clinically    Continue antibiotics - ID input appreciated   Tolerating regular diet   Repeat CT A/P 21 showed interval resolution of extraluminal free air since 21; mild wall thickening of adjacent proximal sigmoid colon, likely reactive to terminal ileitis   Abdominal exams     2. DVT / PE   Diagnosed with Saddle PE on  and required thrombectomy  Bilateral DVTs, most recent duplex LE showed no propagation of DVT   Troponins normalized - no longer trending   Continue therapeutic anticoagulation - currently receiving Lovenox 1mg/kg BID - consider changing to DOAC / NOAC prior to discharge     3. Urinary retention   Ortega initially placed in SICU - removed   Failed TOV - retaining 900ml; 20F coude catheter placed by urology 21 AM   Continue flomax     4. Neutropenic sepsis   Antibiotic management per ID service   Meropenem:  -->  Fluconazole: 11/15 -->   PO Bactrim: 11/15 -->   Ciprofloxacin:  -->   Vancomycin:   Metronidazole:   Aztreonam:     5. Infectious Disease:   acyclovir   Oral Tab/Cap 400 milliGRAM(s) Oral every 8 hours  clotrimazole Lozenge 1 Lozenge Oral five times a day  fluconAZOLE   Tablet 400 milliGRAM(s) Oral daily  meropenem  IVPB 1000 milliGRAM(s) IV Intermittent every 8 hours    6. VOD Prophylaxis: Actigall, Glutamine    7. GI Prophylaxis:  pantoprazole    Tablet 40 milliGRAM(s) Oral before breakfast    8. Mouthcare - NS / NaHCO3 rinses, Mycelex, Biotene; Skin care     9. GVHD prophylaxis - n/a     10. Transfuse & replete electrolytes prn   potassium phosphate / sodium phosphate Tablet (K-PHOS No. 2) 1 Tablet(s) Oral four times a day  potassium phosphate IVPB 15 milliMole(s) IV Intermittent once  calcium gluconate IVPB 2 Gram(s) IV Intermittent once    . IV hydration, daily weights, strict I&O, prn diuresis     12. PO intake as tolerated, nutrition follow up as needed, MVI, folic acid     13. Antiemetics, anti-diarrhea medications:     14. OOB as tolerated, physical therapy consult if needed     15. Monitor coags / fibrinogen 2x week, vitamin K as needed     16. Monitor closely for clinical changes, monitor for fevers     17. Emotional support provided, plan of care discussed and questions addressed     18. Patient education done regarding plan of care, restrictions and discharge planning     19. Continue regular social work input     I have written the above note for Dr. Lozada who performed service with me in the room.   Elton Antonio PA-C ([phone])    I have seen and examined patient with PA, I agree with above note as scribed.

## 2021-12-06 LAB
ALBUMIN SERPL ELPH-MCNC: 2.8 G/DL — LOW (ref 3.3–5)
ALP SERPL-CCNC: 96 U/L — SIGNIFICANT CHANGE UP (ref 40–120)
ALT FLD-CCNC: 25 U/L — SIGNIFICANT CHANGE UP (ref 10–45)
ANION GAP SERPL CALC-SCNC: 10 MMOL/L — SIGNIFICANT CHANGE UP (ref 5–17)
APTT BLD: 30.1 SEC — SIGNIFICANT CHANGE UP (ref 27.5–35.5)
AST SERPL-CCNC: 17 U/L — SIGNIFICANT CHANGE UP (ref 10–40)
BASOPHILS # BLD AUTO: 0 K/UL — SIGNIFICANT CHANGE UP (ref 0–0.2)
BASOPHILS NFR BLD AUTO: 0 % — SIGNIFICANT CHANGE UP (ref 0–2)
BILIRUB DIRECT SERPL-MCNC: <0.1 MG/DL — SIGNIFICANT CHANGE UP (ref 0–0.3)
BILIRUB INDIRECT FLD-MCNC: >0.1 MG/DL — LOW (ref 0.2–1)
BILIRUB SERPL-MCNC: 0.2 MG/DL — SIGNIFICANT CHANGE UP (ref 0.2–1.2)
BLD GP AB SCN SERPL QL: NEGATIVE — SIGNIFICANT CHANGE UP
BUN SERPL-MCNC: 6 MG/DL — LOW (ref 7–23)
CALCIUM SERPL-MCNC: 7.1 MG/DL — LOW (ref 8.4–10.5)
CHLORIDE SERPL-SCNC: 105 MMOL/L — SIGNIFICANT CHANGE UP (ref 96–108)
CLOSURE TME COLL+EPINEP BLD: 154 K/UL — SIGNIFICANT CHANGE UP (ref 150–400)
CO2 SERPL-SCNC: 21 MMOL/L — LOW (ref 22–31)
CREAT SERPL-MCNC: 0.58 MG/DL — SIGNIFICANT CHANGE UP (ref 0.5–1.3)
EOSINOPHIL # BLD AUTO: 0 K/UL — SIGNIFICANT CHANGE UP (ref 0–0.5)
EOSINOPHIL NFR BLD AUTO: 0 % — SIGNIFICANT CHANGE UP (ref 0–6)
FIBRINOGEN PPP-MCNC: 808 MG/DL — HIGH (ref 290–520)
GLUCOSE SERPL-MCNC: 107 MG/DL — HIGH (ref 70–99)
HCT VFR BLD CALC: 25.5 % — LOW (ref 39–50)
HGB BLD-MCNC: 8.1 G/DL — LOW (ref 13–17)
INR BLD: 1.43 RATIO — HIGH (ref 0.88–1.16)
LYMPHOCYTES # BLD AUTO: 0.13 K/UL — LOW (ref 1–3.3)
LYMPHOCYTES # BLD AUTO: 2.6 % — LOW (ref 13–44)
MAGNESIUM SERPL-MCNC: 1.5 MG/DL — LOW (ref 1.6–2.6)
MANUAL SMEAR VERIFICATION: SIGNIFICANT CHANGE UP
MCHC RBC-ENTMCNC: 30.2 PG — SIGNIFICANT CHANGE UP (ref 27–34)
MCHC RBC-ENTMCNC: 31.8 GM/DL — LOW (ref 32–36)
MCV RBC AUTO: 95.1 FL — SIGNIFICANT CHANGE UP (ref 80–100)
METAMYELOCYTES # FLD: 0.9 % — HIGH (ref 0–0)
MONOCYTES # BLD AUTO: 0.49 K/UL — SIGNIFICANT CHANGE UP (ref 0–0.9)
MONOCYTES NFR BLD AUTO: 9.6 % — SIGNIFICANT CHANGE UP (ref 2–14)
MYELOCYTES NFR BLD: 0.9 % — HIGH (ref 0–0)
NEUTROPHILS # BLD AUTO: 4.35 K/UL — SIGNIFICANT CHANGE UP (ref 1.8–7.4)
NEUTROPHILS NFR BLD AUTO: 85.1 % — HIGH (ref 43–77)
NEUTS BAND # BLD: 0.9 % — SIGNIFICANT CHANGE UP (ref 0–8)
PHOSPHATE SERPL-MCNC: 2.2 MG/DL — LOW (ref 2.5–4.5)
PLAT MORPH BLD: NORMAL — SIGNIFICANT CHANGE UP
PLATELET # BLD AUTO: SIGNIFICANT CHANGE UP K/UL (ref 150–400)
POTASSIUM SERPL-MCNC: 3.9 MMOL/L — SIGNIFICANT CHANGE UP (ref 3.5–5.3)
POTASSIUM SERPL-SCNC: 3.9 MMOL/L — SIGNIFICANT CHANGE UP (ref 3.5–5.3)
PROT SERPL-MCNC: 5 G/DL — LOW (ref 6–8.3)
PROTHROM AB SERPL-ACNC: 16.9 SEC — HIGH (ref 10.6–13.6)
RBC # BLD: 2.68 M/UL — LOW (ref 4.2–5.8)
RBC # FLD: 14.4 % — SIGNIFICANT CHANGE UP (ref 10.3–14.5)
RBC BLD AUTO: SIGNIFICANT CHANGE UP
RH IG SCN BLD-IMP: NEGATIVE — SIGNIFICANT CHANGE UP
SODIUM SERPL-SCNC: 136 MMOL/L — SIGNIFICANT CHANGE UP (ref 135–145)
WBC # BLD: 5.06 K/UL — SIGNIFICANT CHANGE UP (ref 3.8–10.5)
WBC # FLD AUTO: 5.06 K/UL — SIGNIFICANT CHANGE UP (ref 3.8–10.5)

## 2021-12-06 PROCEDURE — 99232 SBSQ HOSP IP/OBS MODERATE 35: CPT

## 2021-12-06 PROCEDURE — 99291 CRITICAL CARE FIRST HOUR: CPT

## 2021-12-06 RX ADMIN — MEROPENEM 100 MILLIGRAM(S): 1 INJECTION INTRAVENOUS at 13:24

## 2021-12-06 RX ADMIN — Medication 1 LOZENGE: at 10:12

## 2021-12-06 RX ADMIN — Medication 1 LOZENGE: at 13:24

## 2021-12-06 RX ADMIN — Medication 1 APPLICATION(S): at 13:28

## 2021-12-06 RX ADMIN — Medication 5 MILLILITER(S): at 17:14

## 2021-12-06 RX ADMIN — Medication 400 MILLIGRAM(S): at 13:24

## 2021-12-06 RX ADMIN — Medication 25 MILLIGRAM(S): at 08:45

## 2021-12-06 RX ADMIN — Medication 5 MILLILITER(S): at 08:45

## 2021-12-06 RX ADMIN — Medication 25 MILLIGRAM(S): at 17:14

## 2021-12-06 RX ADMIN — CHLORHEXIDINE GLUCONATE 1 APPLICATION(S): 213 SOLUTION TOPICAL at 08:46

## 2021-12-06 RX ADMIN — APIXABAN 5 MILLIGRAM(S): 2.5 TABLET, FILM COATED ORAL at 06:24

## 2021-12-06 RX ADMIN — Medication 1 LOZENGE: at 06:31

## 2021-12-06 RX ADMIN — Medication 5 MILLILITER(S): at 12:15

## 2021-12-06 RX ADMIN — FLUCONAZOLE 400 MILLIGRAM(S): 150 TABLET ORAL at 13:23

## 2021-12-06 RX ADMIN — Medication 1 LOZENGE: at 21:16

## 2021-12-06 RX ADMIN — Medication 5 MILLILITER(S): at 17:15

## 2021-12-06 RX ADMIN — Medication 1 TABLET(S): at 13:23

## 2021-12-06 RX ADMIN — Medication 400 MILLIGRAM(S): at 06:24

## 2021-12-06 RX ADMIN — Medication 5 MILLILITER(S): at 13:24

## 2021-12-06 RX ADMIN — Medication 1 LOZENGE: at 17:14

## 2021-12-06 RX ADMIN — Medication 1 TABLET(S): at 08:45

## 2021-12-06 RX ADMIN — MEROPENEM 100 MILLIGRAM(S): 1 INJECTION INTRAVENOUS at 21:15

## 2021-12-06 RX ADMIN — Medication 5 MILLILITER(S): at 21:16

## 2021-12-06 RX ADMIN — Medication 1 APPLICATION(S): at 07:20

## 2021-12-06 RX ADMIN — TAMSULOSIN HYDROCHLORIDE 0.4 MILLIGRAM(S): 0.4 CAPSULE ORAL at 21:15

## 2021-12-06 RX ADMIN — Medication 400 MILLIGRAM(S): at 21:15

## 2021-12-06 RX ADMIN — Medication 1 TABLET(S): at 17:14

## 2021-12-06 RX ADMIN — Medication 5 MILLILITER(S): at 06:32

## 2021-12-06 RX ADMIN — Medication 5 MILLILITER(S): at 21:08

## 2021-12-06 RX ADMIN — MEROPENEM 100 MILLIGRAM(S): 1 INJECTION INTRAVENOUS at 06:24

## 2021-12-06 RX ADMIN — PANTOPRAZOLE SODIUM 40 MILLIGRAM(S): 20 TABLET, DELAYED RELEASE ORAL at 06:25

## 2021-12-06 RX ADMIN — APIXABAN 5 MILLIGRAM(S): 2.5 TABLET, FILM COATED ORAL at 17:14

## 2021-12-06 NOTE — PROGRESS NOTE ADULT - SUBJECTIVE AND OBJECTIVE BOX
Vascular Cardiology  Progress note  DIRECT SERVICE NUMBER: [phone]            EMAIL [email or web address]   OFFICE [phone]    CC:      INTERVAL HISTORY: Abdominal discomfort resolving, still with loose stools. No bleeding noted on Eliquis       Allergies  Omnicef (Unknown)  penicillin (Hives)    Intolerances    MEDICATIONS:  apixaban 5 milliGRAM(s) Oral every 12 hours  metoprolol tartrate 25 milliGRAM(s) Oral every 8 hours  tamsulosin 0.4 milliGRAM(s) Oral at bedtime  acyclovir   Oral Tab/Cap 400 milliGRAM(s) Oral every 8 hours  clotrimazole Lozenge 1 Lozenge Oral five times a day  fluconAZOLE   Tablet 400 milliGRAM(s) Oral daily  meropenem  IVPB 1000 milliGRAM(s) IV Intermittent every 8 hours  acetaminophen     Tablet .. 650 milliGRAM(s) Oral every 6 hours PRN  pantoprazole    Tablet 40 milliGRAM(s) Oral before breakfast  sodium bicarbonate Mouth Rinse 5 milliLiter(s) Swish and Spit five times a day    AQUAPHOR (petrolatum Ointment) 1 Application(s) Topical three times a day  Biotene Dry Mouth Oral Rinse 5 milliLiter(s) Swish and Spit five times a day  chlorhexidine 2% Cloths 1 Application(s) Topical <User Schedule>  potassium phosphate / sodium phosphate Tablet (K-PHOS No. 2) 1 Tablet(s) Oral four times a day with meals      PAST MEDICAL & SURGICAL HISTORY:  Hyperlipidemia    Shortness of breath on exertion    Lumbar herniated disc    T12 compression fracture    Acute lumbar radiculopathy    History of basal cell carcinoma    History of surgery on wrist    FAMILY HISTORY:  No pertinent family history in first degree relatives    SOCIAL HISTORY:  unchanged    REVIEW OF SYSTEMS:  CONSTITUTIONAL: No fever, weight loss, or fatigue  EYES: No eye pain, visual disturbances, or discharge  ENMT:  No difficulty hearing, tinnitus, vertigo; No sinus or throat pain  NECK: No pain or stiffness  RESPIRATORY: No cough, wheezing, chills or hemoptysis; No Shortness of Breath  CARDIOVASCULAR: No chest pain, palpitations, passing out, dizziness, or leg swelling  GASTROINTESTINAL: No abdominal or epigastric pain. No nausea, vomiting, or hematemesis; No constipation. No melena or hematochezia. +loose stools  GENITOURINARY: No dysuria, frequency, hematuria, or incontinence  NEUROLOGICAL: No headaches, memory loss, loss of strength, numbness, or tremors  SKIN: No itching, burning, rashes, or lesions   LYMPH Nodes: No enlarged glands  ENDOCRINE: No heat or cold intolerance; No hair loss  MUSCULOSKELETAL: No joint pain or swelling; No muscle, back, or extremity pain  PSYCHIATRIC: No depression, anxiety, mood swings, or difficulty sleeping  HEME/LYMPH: No easy bruising, or bleeding gums  ALLERY AND IMMUNOLOGIC: No hives or eczema	  [ x] All others negative    PHYSICAL EXAM:  T(C): 37.1 (12-06-21 @ 08:28), Max: 37.2 (12-05-21 @ 21:00)  HR: 79 (12-06-21 @ 08:28) (66 - 79)  BP: 131/79 (12-06-21 @ 08:28) (112/68 - 132/82)  RR: 18 (12-06-21 @ 08:28) (16 - 18)  SpO2: 96% (12-06-21 @ 08:28) (96% - 99%)  Wt(kg): --  I&O's Summary    05 Dec 2021 07:01  -  06 Dec 2021 07:00  --------------------------------------------------------  IN: 1618 mL / OUT: 450 mL / NET: 1168 mL    06 Dec 2021 07:01  -  06 Dec 2021 10:59  --------------------------------------------------------  IN: 87 mL / OUT: 0 mL / NET: 87 mL    Appearance: Normal	  HEENT:   Normal oral mucosa, EOMI, laying comfortably in bed  Cardiovascular: Normal S1 S2, No murmurs, No edema  Respiratory: Lungs clear to auscultation bilaterally  Psychiatry: A & O x 3, Mood & affect appropriate  Gastrointestinal: Mildly distended  Skin: No rashes, No ecchymoses, No cyanosis	  Neurologic: Non-focal  Extremities: Normal range of motion, No clubbing, cyanosis. Bilateral edema to knees improving  Vascular:   Right DP:  Palpable             Left DP:  Palpable    LABS:	 	    CBC Full  -  ( 06 Dec 2021 08:11 )  WBC Count : 5.06 K/uL  Hemoglobin : 8.1 g/dL  Hematocrit : 25.5 %  Platelet Count - Automated : Clumped K/uL  Mean Cell Volume : 95.1 fl  Mean Cell Hemoglobin : 30.2 pg  Mean Cell Hemoglobin Concentration : 31.8 gm/dL  Auto Neutrophil # : 4.35 K/uL  Auto Lymphocyte # : 0.13 K/uL  Auto Monocyte # : 0.49 K/uL  Auto Eosinophil # : 0.00 K/uL  Auto Basophil # : 0.00 K/uL  Auto Neutrophil % : 85.1 %  Auto Lymphocyte % : 2.6 %  Auto Monocyte % : 9.6 %  Auto Eosinophil % : 0.0 %  Auto Basophil % : 0.0 %    12-06    136  |  105  |  6<L>  ----------------------------<  107<H>  3.9   |  21<L>  |  0.58  12-05    138  |  109<H>  |  6<L>  ----------------------------<  105<H>  4.0   |  21<L>  |  0.59    Ca    7.1<L>      06 Dec 2021 08:09  Ca    7.3<L>      05 Dec 2021 06:56  Phos  2.2     12-06  Phos  1.8     12-05  Mg     1.5     12-06  Mg     1.4     12-05    TPro  5.0<L>  /  Alb  2.8<L>  /  TBili  0.2  /  DBili  <0.1  /  AST  17  /  ALT  25  /  AlkPhos  96  12-06  TPro  4.9<L>  /  Alb  2.7<L>  /  TBili  0.1<L>  /  DBili  x   /  AST  18  /  ALT  32  /  AlkPhos  102  12-05          Assessment:  1.            Plan:  1.          Thank you      Vascular Cardiology Service  DIRECT SERVICE NUMBER [phone]  Office [phone]  email:   [email or web address]     Vascular Cardiology  Progress note  DIRECT SERVICE NUMBER: [phone]            EMAIL [email or web address]   OFFICE [phone]    CC:      INTERVAL HISTORY: Abdominal discomfort resolving, still with loose stools. No bleeding noted on Eliquis       Allergies  Omnicef (Unknown)  penicillin (Hives)    Intolerances    MEDICATIONS:  apixaban 5 milliGRAM(s) Oral every 12 hours  metoprolol tartrate 25 milliGRAM(s) Oral every 8 hours  tamsulosin 0.4 milliGRAM(s) Oral at bedtime  acyclovir   Oral Tab/Cap 400 milliGRAM(s) Oral every 8 hours  clotrimazole Lozenge 1 Lozenge Oral five times a day  fluconAZOLE   Tablet 400 milliGRAM(s) Oral daily  meropenem  IVPB 1000 milliGRAM(s) IV Intermittent every 8 hours  acetaminophen     Tablet .. 650 milliGRAM(s) Oral every 6 hours PRN  pantoprazole    Tablet 40 milliGRAM(s) Oral before breakfast  sodium bicarbonate Mouth Rinse 5 milliLiter(s) Swish and Spit five times a day    AQUAPHOR (petrolatum Ointment) 1 Application(s) Topical three times a day  Biotene Dry Mouth Oral Rinse 5 milliLiter(s) Swish and Spit five times a day  chlorhexidine 2% Cloths 1 Application(s) Topical <User Schedule>  potassium phosphate / sodium phosphate Tablet (K-PHOS No. 2) 1 Tablet(s) Oral four times a day with meals      PAST MEDICAL & SURGICAL HISTORY:  Hyperlipidemia    Shortness of breath on exertion    Lumbar herniated disc    T12 compression fracture    Acute lumbar radiculopathy    History of basal cell carcinoma    History of surgery on wrist    FAMILY HISTORY:  No pertinent family history in first degree relatives    SOCIAL HISTORY:  unchanged    REVIEW OF SYSTEMS:  CONSTITUTIONAL: No fever, weight loss, or fatigue  EYES: No eye pain, visual disturbances, or discharge  ENMT:  No difficulty hearing, tinnitus, vertigo; No sinus or throat pain  NECK: No pain or stiffness  RESPIRATORY: No cough, wheezing, chills or hemoptysis; No Shortness of Breath  CARDIOVASCULAR: No chest pain, palpitations, passing out, dizziness, or leg swelling  GASTROINTESTINAL: No abdominal or epigastric pain. No nausea, vomiting, or hematemesis; No constipation. No melena or hematochezia. +loose stools  GENITOURINARY: No dysuria, frequency, hematuria, or incontinence  NEUROLOGICAL: No headaches, memory loss, loss of strength, numbness, or tremors  SKIN: No itching, burning, rashes, or lesions   LYMPH Nodes: No enlarged glands  ENDOCRINE: No heat or cold intolerance; No hair loss  MUSCULOSKELETAL: No joint pain or swelling; No muscle, back, or extremity pain  PSYCHIATRIC: No depression, anxiety, mood swings, or difficulty sleeping  HEME/LYMPH: No easy bruising, or bleeding gums  ALLERY AND IMMUNOLOGIC: No hives or eczema	  [ x] All others negative    PHYSICAL EXAM:  T(C): 37.1 (12-06-21 @ 08:28), Max: 37.2 (12-05-21 @ 21:00)  HR: 79 (12-06-21 @ 08:28) (66 - 79)  BP: 131/79 (12-06-21 @ 08:28) (112/68 - 132/82)  RR: 18 (12-06-21 @ 08:28) (16 - 18)  SpO2: 96% (12-06-21 @ 08:28) (96% - 99%)  Wt(kg): --  I&O's Summary    05 Dec 2021 07:01  -  06 Dec 2021 07:00  --------------------------------------------------------  IN: 1618 mL / OUT: 450 mL / NET: 1168 mL    06 Dec 2021 07:01  -  06 Dec 2021 10:59  --------------------------------------------------------  IN: 87 mL / OUT: 0 mL / NET: 87 mL    Appearance: Normal	  HEENT:   Normal oral mucosa, EOMI, laying comfortably in bed  Cardiovascular: Normal S1 S2, No murmurs, No edema  Respiratory: Lungs clear to auscultation bilaterally  Psychiatry: A & O x 3, Mood & affect appropriate  Gastrointestinal: Mildly distended  Skin: No rashes, No ecchymoses, No cyanosis	  Neurologic: Non-focal  Extremities: Normal range of motion, No clubbing, cyanosis. Bilateral edema to knees improving  Vascular:   Right DP:  Palpable             Left DP:  Palpable    LABS:	 	    CBC Full  -  ( 06 Dec 2021 08:11 )  WBC Count : 5.06 K/uL  Hemoglobin : 8.1 g/dL  Hematocrit : 25.5 %  Platelet Count - Automated : Clumped K/uL  Mean Cell Volume : 95.1 fl  Mean Cell Hemoglobin : 30.2 pg  Mean Cell Hemoglobin Concentration : 31.8 gm/dL  Auto Neutrophil # : 4.35 K/uL  Auto Lymphocyte # : 0.13 K/uL  Auto Monocyte # : 0.49 K/uL  Auto Eosinophil # : 0.00 K/uL  Auto Basophil # : 0.00 K/uL  Auto Neutrophil % : 85.1 %  Auto Lymphocyte % : 2.6 %  Auto Monocyte % : 9.6 %  Auto Eosinophil % : 0.0 %  Auto Basophil % : 0.0 %    12-06    136  |  105  |  6<L>  ----------------------------<  107<H>  3.9   |  21<L>  |  0.58  12-05    138  |  109<H>  |  6<L>  ----------------------------<  105<H>  4.0   |  21<L>  |  0.59    Ca    7.1<L>      06 Dec 2021 08:09  Ca    7.3<L>      05 Dec 2021 06:56  Phos  2.2     12-06  Phos  1.8     12-05  Mg     1.5     12-06  Mg     1.4     12-05    TPro  5.0<L>  /  Alb  2.8<L>  /  TBili  0.2  /  DBili  <0.1  /  AST  17  /  ALT  25  /  AlkPhos  96  12-06  TPro  4.9<L>  /  Alb  2.7<L>  /  TBili  0.1<L>  /  DBili  x   /  AST  18  /  ALT  32  /  AlkPhos  102  12-05

## 2021-12-06 NOTE — PROGRESS NOTE ADULT - ASSESSMENT
Assessment:  1. Submassive saddle PE s/p thrombectomy  2. Bilateral DVT  3. Plasma cell myeloma s/p SCT with pancytopenia  4. Neutropenic terminal ileitis/sigmoid colitis    Plan:  1. Continue therapeutic anticoagulation with Eliquis 5mg BID, tolerating well  2. Heme/Onc following regarding platelet goals with use of therapeutic anticoagulation, platelet count improving  3. Bilateral below knee compression stockings for symptomatic management of DVTs    Thank you    Vascular Cardiology Service  DIRECT SERVICE NUMBER [phone]  Office [phone]  email:   [email or web address]

## 2021-12-06 NOTE — PROGRESS NOTE ADULT - SUBJECTIVE AND OBJECTIVE BOX
HPC Transplant Team                                                      Critical / Counseling Time Provided: 30 minutes                                                                                                                                                        Chief Complaint: Autologous peripheral blood stem cell transplant with high dose Melphalan prep regimen for treatment of multiple myeloma    S: Patient seen and examined with HPC Transplant Team:       O: Vitals:   Vital Signs Last 24 Hrs  T(C): 37.1 (06 Dec 2021 08:28), Max: 37.2 (05 Dec 2021 21:00)  T(F): 98.8 (06 Dec 2021 08:), Max: 99 (05 Dec 2021 21:00)  HR: 79 (06 Dec 2021 08:) (66 - 79)  BP: 131/79 (06 Dec 2021 08:) (112/68 - 132/82)  BP(mean): --  RR: 18 (06 Dec 2021 08:) (16 - 18)  SpO2: 96% (06 Dec 2021 08:) (96% - 99%)    Admit weight: 98.4kg   Daily Weight in k.2 (06 Dec 2021 08:28)  Today's weight:     Intake / Output:    @ 07:  -   @ 07:00  --------------------------------------------------------  IN: 1618 mL / OUT: 450 mL / NET: 1168 mL     @ 07:01  -   @ 09:07  --------------------------------------------------------  IN: 87 mL / OUT: 0 mL / NET: 87 mL        PE:   Oropharynx: + erythema and post Kepivance coating no ulcerations   Oral Mucositis:              +                                         ndGndrndanddndend:nd nd2nd CVS: S1, S2 RRR   Lungs: CTA throughout bilaterally   Abdomen: + BS x 4, soft, mild distended,  mild tenderness   Extremities: +1/2-1 edema   Gastric Mucositis:       -                                           Grade: n/a  Intestinal Mucositis:     -                                         Grade: n/a   Skin: patchy, erythematous maculopapular rash to face, neck and upper chest and back post Kepivance   TLC: CDI   Neuro: A&O x 3  Pain:       Labs:   CBC Full  -  ( 06 Dec 2021 08:11 )  WBC Count : 5.06 K/uL  Hemoglobin : 8.1 g/dL  Hematocrit : 25.5 %  Platelet Count - Automated : Clumped K/uL  Mean Cell Volume : 95.1 fl  Mean Cell Hemoglobin : 30.2 pg  Mean Cell Hemoglobin Concentration : 31.8 gm/dL  Auto Neutrophil # : x  Auto Lymphocyte # : x  Auto Monocyte # : x  Auto Eosinophil # : x  Auto Basophil # : x  Auto Neutrophil % : x  Auto Lymphocyte % : x  Auto Monocyte % : x  Auto Eosinophil % : x  Auto Basophil % : x                          8.1    5.06  )-----------( Clumped    ( 06 Dec 2021 08:11 )             25.5     12-05    138  |  109<H>  |  6<L>  ----------------------------<  105<H>  4.0   |  21<L>  |  0.59    Ca    7.3<L>      05 Dec 2021 06:56  Phos  1.8     12-05  Mg     1.4     12-05    TPro  4.9<L>  /  Alb  2.7<L>  /  TBili  0.1<L>  /  DBili  x   /  AST  18  /  ALT  32  /  AlkPhos  102  12-05    PT/INR - ( 06 Dec 2021 08:12 )   PT: 16.9 sec;   INR: 1.43 ratio         PTT - ( 06 Dec 2021 08:12 )  PTT:30.1 sec  LIVER FUNCTIONS - ( 05 Dec 2021 06:56 )  Alb: 2.7 g/dL / Pro: 4.9 g/dL / ALK PHOS: 102 U/L / ALT: 32 U/L / AST: 18 U/L / GGT: x             Cultures:   GI PCR Panel, Stool (21 @ 00:23)    Culture Results:   Adenovirus 40/41  DETECTED by PCR  *******Please Note:*******  GI panel PCR evaluates for:  Campylobacter, Plesiomonas shigelloides, Salmonella,  Vibrio, Yersinia enterocolitica, Enteroaggregative  Escherichia coli (EAEC), Enteropathogenic E.coli (EPEC),  Enterotoxigenic E. coli (ETEC) lt/st, Shiga-like  toxin-producing E. coli (STEC) stx1/stx2,  Shigella/ Enteroinvasive E. coli (EIEC), Cryptosporidium,  Cyclospora cayetanensis, Entamoeba histolytica,  Giardia lamblia, Adenovirus F 40/41, Astrovirus,  Norovirus GI/GII, Rotavirus A, Sapovirus    Culture - Blood (21 @ 12:32)    Specimen Source: .Blood Blood    Culture Results:   No Growth Final      Radiology:   EXAM:  CT ABDOMEN AND PELVIS IC                        PROCEDURE DATE:  2021    IMPRESSION:  Unchanged terminal ileitis with mild increased fluid distention of the upstream small bowel, compatible with ileus versus partial small bowel obstruction. Unchanged trace loculated ascites with peritoneal enhancement at the level of the terminal ileum, suggesting peritonitis. However, there has been interval resolution of the extraluminal free air since 2021.  Mild wall thickening of the adjacent proximal sigmoid colon, likely reactive to the terminal ileitis.  New small bilateral pleural effusions.    EXAM:  XR CHEST PORTABLE ROUTINE 1V                        PROCEDURE DATE:  2021    IMPRESSION:  Clear lungs.    EXAM:  DUPLEX SCAN EXT VEINS LOWER BI                        PROCEDURE DATE:  2021    IMPRESSION:  There is persistent bilateral DVT in the below the knee right posterior tibial, peroneal and soleal veins and above the knee in the left popliteal vein and tibial peroneal trunk.  No new DVT is seen.    [facility]                        PROCEDURE DATE:  2021    IMPRESSION:  Saddle embolus with right heart strain.  These results have been discussed with Dr. Arellano on 2021 at 12:47 AM by on-call resident.  Pneumoperitoneum is again noted.      Meds:   Antimicrobials:   acyclovir   Oral Tab/Cap 400 milliGRAM(s) Oral every 8 hours  clotrimazole Lozenge 1 Lozenge Oral five times a day  fluconAZOLE   Tablet 400 milliGRAM(s) Oral daily  meropenem  IVPB 1000 milliGRAM(s) IV Intermittent every 8 hours      Heme / Onc:   apixaban 5 milliGRAM(s) Oral every 12 hours      GI:  pantoprazole    Tablet 40 milliGRAM(s) Oral before breakfast  sodium bicarbonate Mouth Rinse 5 milliLiter(s) Swish and Spit five times a day      Cardiovascular:   metoprolol tartrate 25 milliGRAM(s) Oral every 8 hours  tamsulosin 0.4 milliGRAM(s) Oral at bedtime      Immunologic:       Other medications:   AQUAPHOR (petrolatum Ointment) 1 Application(s) Topical three times a day  Biotene Dry Mouth Oral Rinse 5 milliLiter(s) Swish and Spit five times a day  chlorhexidine 2% Cloths 1 Application(s) Topical <User Schedule>  potassium phosphate / sodium phosphate Tablet (K-PHOS No. 2) 1 Tablet(s) Oral four times a day with meals      PRN:   acetaminophen     Tablet .. 650 milliGRAM(s) Oral every 6 hours PRN    A/P:  66 year old male with a history of multiple myeloma s/p RVD x 6   Post Autologous PBSCT day +  17  - melphalan ; s/p melphalan hydration for 24 hours post infusion of last dose   Strict I&O, daily weights, prn diuresis   - rest day   - rest day   - s/p HPC transplant; completed transplant hydration for 24 hours post infusion of cells. Suprapubic pain in am. UA pending, follow up culture, BK virus. AXR   -vasovegal episode in AM: will monitor tele for 24hrs    d/c telemetry. Lasix 40mg IV x today, follow up I&Os, daily weight. monitor rash, receiving 2nd dose Kepivance today   add Emend for 3 days and change zofran ATC. If worsening abd pain consider CT a/p oral contrast only.   - Neutropenic started on cipro once febrile will pancx and switch cipro to  Aztronam 2g Q8   - AMS this am, agonal breathing, diaphoretic, cool and clammy - responsive to sternal rub. Hypotensive, hypoxic. Placed on NRB, RRT called. Labs sent. Lactate send. PCN allergy - started on Aztreonam 2g IV q 8 hoursm Flagyl 500mg IV TID for anaerobic coverage given abdominal pain and distension, Vancomycin 1g IV x 1. CT A/P pending for 12:30 21. CE with new TWI in V2-V6 - likely demand ischemia. Repeat CE and lactate at 2pm.   - CT A/P with Terminal ileitis with associated ileus versus low-grade obstruction. Pockets of free air in the right lower quadrant adjacent to the terminal ileum concerning for bowel perforation. 2.6 cm loculated fluid adjacent to the terminal ileum and surrounding peritoneal enhancement suggestive of developing peritonitis. Surgical team consulted- transferred to 8ICU for closer monitoring.  ID consult called- JESUS daniel.    - CT chest angio + Saddle embolus with right heart strain., seen by cards- started on full dose A/C, s/p IVC filter placement with thrombectomy: transfuse PLT to >50K . doppler + B/L DVT. D/C Zarxio  -early engraftment    on clear liq diet, watery diarrhea, C diff(-)   Transitioned from Heparin gtt to Lovenox - D/W ICU MD, recommend keeping keeping plt's >40k while on AC.   Pending transfer back to BMTU  - SICU care appreciated. Failed TOV - 20F coude catheter placed this am by urology. Continue lovenox for now, may change to DOAC / NOAC prior to discharge. Continue antibiotics per ID service. Engrafting.   - Lovenox switched to Eliquis    Haile removed, passed TOV, PT re-consulted, follow up recommendations for discharge, Mg, Phos repleted  - discharge planning     1. Terminal ileitis / sigmoid colitis   CT A/P () with findings concerning for ileitis and Pockets of free air in the right lower quadrant adjacent to the terminal ileum concerning for bowel perforation and 2.6 cm loculated fluid adjacent to the terminal ileum and surrounding peritoneal enhancement suggestive of developing peritonitis.  SICU care / surgical input appreciated - high risk for surgical intervention, monitored clinically    Continue antibiotics - ID input appreciated   Tolerating regular diet   Repeat CT A/P 21 showed interval resolution of extraluminal free air since 21; mild wall thickening of adjacent proximal sigmoid colon, likely reactive to terminal ileitis   Abdominal exams improving     2. DVT / PE   Diagnosed with Saddle PE on  and required thrombectomy  Bilateral DVTs, most recent duplex LE showed no propagation of DVT   Troponins normalized - no longer trending   Continue therapeutic anticoagulation - currently receiving Lovenox 1mg/kg BID - consider changing to DOAC / NOAC prior to discharge   - lovenox changed to eliquis 21     3. Urinary retention   Haile initially placed in SICU - removed   Failed TOV - retaining 900ml; 20F coude catheter placed by urology 21 AM   Continue flomax   - haile discontinued - voiding     4. Neutropenic sepsis   Antibiotic management per ID service   Meropenem:  -->  Fluconazole: 11/15 -->   PO Bactrim: 11/15 -->   Ciprofloxacin:  -->   Vancomycin:   Metronidazole:   Aztreonam:     5. Infectious Disease:   acyclovir   Oral Tab/Cap 400 milliGRAM(s) Oral every 8 hours  clotrimazole Lozenge 1 Lozenge Oral five times a day  fluconAZOLE   Tablet 400 milliGRAM(s) Oral daily  meropenem  IVPB 1000 milliGRAM(s) IV Intermittent every 8 hours    6. VOD Prophylaxis: Actigall, Glutamine    7. GI Prophylaxis:  pantoprazole    Tablet 40 milliGRAM(s) Oral before breakfast    8. Mouthcare - NS / NaHCO3 rinses, Mycelex, Biotene; Skin care     9. GVHD prophylaxis - n/a     10. Transfuse & replete electrolytes prn     11. IV hydration, daily weights, strict I&O, prn diuresis     12. PO intake as tolerated, nutrition follow up as needed, MVI, folic acid     13. Antiemetics, anti-diarrhea medications:     14. OOB as tolerated, physical therapy consult if needed     15. Monitor coags / fibrinogen 2x week, vitamin K as needed     16. Monitor closely for clinical changes, monitor for fevers     17. Emotional support provided, plan of care discussed and questions addressed     18. Patient education done regarding plan of care, restrictions and discharge planning     19. Continue regular social work input     I have written the above note for Dr. Lozada who performed service with me in the room.   Terrie Barr NP-C ([phone])    I have seen and examined patient with NP, I agree with above note as scribed.                    HPC Transplant Team                                                      Critical / Counseling Time Provided: 30 minutes                                                                                                                                                        Chief Complaint: Autologous peripheral blood stem cell transplant with high dose Melphalan prep regimen for treatment of multiple myeloma    S: Patient seen and examined with [facility] Transplant Team:   No complaints - feels ready for discharge   Diarrhea improved   Urinating well     O: Vitals:   Vital Signs Last 24 Hrs  T(C): 37.1 (06 Dec 2021 08:28), Max: 37.2 (05 Dec 2021 21:00)  T(F): 98.8 (06 Dec 2021 08:28), Max: 99 (05 Dec 2021 21:00)  HR: 79 (06 Dec 2021 08:) (66 - 79)  BP: 131/79 (06 Dec 2021 08:28) (112/68 - 132/82)  BP(mean): --  RR: 18 (06 Dec 2021 08:28) (16 - 18)  SpO2: 96% (06 Dec 2021 08:) (96% - 99%)    Admit weight: 98.4kg   Daily Weight in k.2 (06 Dec 2021 08:28)  Today's weight: 98.2kg     Intake / Output:    @ 07:  -   @ 07:00  --------------------------------------------------------  IN: 1618 mL / OUT: 450 mL / NET: 1168 mL     @ 07: @ 09:07  --------------------------------------------------------  IN: 87 mL / OUT: 0 mL / NET: 87 mL        PE:   Oropharynx: + erythema and post Kepivance coating no ulcerations   Oral Mucositis:              +                                         ndGndrndanddndend:nd nd2nd CVS: S1, S2 RRR   Lungs: CTA throughout bilaterally   Abdomen: + BS x 4, soft, mild distended,  mild tenderness   Extremities: +1/2-1 edema   Gastric Mucositis:       -                                           Grade: n/a  Intestinal Mucositis:     -                                         Grade: n/a   Skin: patchy, erythematous maculopapular rash to face, neck and upper chest and back post Kepivance   TLC: CDI   Neuro: A&O x 3  Pain:       Labs:   CBC Full  -  ( 06 Dec 2021 08:11 )  WBC Count : 5.06 K/uL  Hemoglobin : 8.1 g/dL  Hematocrit : 25.5 %  Platelet Count - Automated : Clumped K/uL  Mean Cell Volume : 95.1 fl  Mean Cell Hemoglobin : 30.2 pg  Mean Cell Hemoglobin Concentration : 31.8 gm/dL  Auto Neutrophil # : x  Auto Lymphocyte # : x  Auto Monocyte # : x  Auto Eosinophil # : x  Auto Basophil # : x  Auto Neutrophil % : x  Auto Lymphocyte % : x  Auto Monocyte % : x  Auto Eosinophil % : x  Auto Basophil % : x                          8.1    5.06  )-----------( Clumped    ( 06 Dec 2021 08:11 )             25.5     12-05    138  |  109<H>  |  6<L>  ----------------------------<  105<H>  4.0   |  21<L>  |  0.59    Ca    7.3<L>      05 Dec 2021 06:56  Phos  1.8     12-05  Mg     1.4     12-05    TPro  4.9<L>  /  Alb  2.7<L>  /  TBili  0.1<L>  /  DBili  x   /  AST  18  /  ALT  32  /  AlkPhos  102  12-05    PT/INR - ( 06 Dec 2021 08:12 )   PT: 16.9 sec;   INR: 1.43 ratio         PTT - ( 06 Dec 2021 08:12 )  PTT:30.1 sec  LIVER FUNCTIONS - ( 05 Dec 2021 06:56 )  Alb: 2.7 g/dL / Pro: 4.9 g/dL / ALK PHOS: 102 U/L / ALT: 32 U/L / AST: 18 U/L / GGT: x             Cultures:   GI PCR Panel, Stool (21 @ 00:23)    Culture Results:   Adenovirus 40/41  DETECTED by PCR  *******Please Note:*******  GI panel PCR evaluates for:  Campylobacter, Plesiomonas shigelloides, Salmonella,  Vibrio, Yersinia enterocolitica, Enteroaggregative  Escherichia coli (EAEC), Enteropathogenic E.coli (EPEC),  Enterotoxigenic E. coli (ETEC) lt/st, Shiga-like  toxin-producing E. coli (STEC) stx1/stx2,  Shigella/ Enteroinvasive E. coli (EIEC), Cryptosporidium,  Cyclospora cayetanensis, Entamoeba histolytica,  Giardia lamblia, Adenovirus F 40/41, Astrovirus,  Norovirus GI/GII, Rotavirus A, Sapovirus    Culture - Blood (21 @ 12:32)    Specimen Source: .Blood Blood    Culture Results:   No Growth Final      Radiology:   EXAM:  CT ABDOMEN AND PELVIS IC                        PROCEDURE DATE:  2021    IMPRESSION:  Unchanged terminal ileitis with mild increased fluid distention of the upstream small bowel, compatible with ileus versus partial small bowel obstruction. Unchanged trace loculated ascites with peritoneal enhancement at the level of the terminal ileum, suggesting peritonitis. However, there has been interval resolution of the extraluminal free air since 2021.  Mild wall thickening of the adjacent proximal sigmoid colon, likely reactive to the terminal ileitis.  New small bilateral pleural effusions.    EXAM:  XR CHEST PORTABLE ROUTINE 1V                        PROCEDURE DATE:  2021    IMPRESSION:  Clear lungs.    EXAM:  DUPLEX SCAN EXT VEINS LOWER BI                        PROCEDURE DATE:  2021    IMPRESSION:  There is persistent bilateral DVT in the below the knee right posterior tibial, peroneal and soleal veins and above the knee in the left popliteal vein and tibial peroneal trunk.  No new DVT is seen.    EXAM:  CT ANGIO CHEST PULM [facility]                        PROCEDURE DATE:  2021    IMPRESSION:  Saddle embolus with right heart strain.  These results have been discussed with Dr. Arellano on 2021 at 12:47 AM by on-call resident.  Pneumoperitoneum is again noted.      Meds:   Antimicrobials:   acyclovir   Oral Tab/Cap 400 milliGRAM(s) Oral every 8 hours  clotrimazole Lozenge 1 Lozenge Oral five times a day  fluconAZOLE   Tablet 400 milliGRAM(s) Oral daily  meropenem  IVPB 1000 milliGRAM(s) IV Intermittent every 8 hours      Heme / Onc:   apixaban 5 milliGRAM(s) Oral every 12 hours      GI:  pantoprazole    Tablet 40 milliGRAM(s) Oral before breakfast  sodium bicarbonate Mouth Rinse 5 milliLiter(s) Swish and Spit five times a day      Cardiovascular:   metoprolol tartrate 25 milliGRAM(s) Oral every 8 hours  tamsulosin 0.4 milliGRAM(s) Oral at bedtime      Immunologic:       Other medications:   AQUAPHOR (petrolatum Ointment) 1 Application(s) Topical three times a day  Biotene Dry Mouth Oral Rinse 5 milliLiter(s) Swish and Spit five times a day  chlorhexidine 2% Cloths 1 Application(s) Topical <User Schedule>  potassium phosphate / sodium phosphate Tablet (K-PHOS No. 2) 1 Tablet(s) Oral four times a day with meals      PRN:   acetaminophen     Tablet .. 650 milliGRAM(s) Oral every 6 hours PRN    A/P:  66 year old male with a history of multiple myeloma s/p RVD x 6   Post Autologous PBSCT day +  17  - melphalan 2/2; s/p melphalan hydration for 24 hours post infusion of last dose   Strict I&O, daily weights, prn diuresis   - rest day   - rest day   - s/p HPC transplant; completed transplant hydration for 24 hours post infusion of cells. Suprapubic pain in am. UA pending, follow up culture, BK virus. AXR   -vasovegal episode in AM: will monitor tele for 24hrs    d/c telemetry. Lasix 40mg IV x today, follow up I&Os, daily weight. monitor rash, receiving 2nd dose Kepivance today   add Emend for 3 days and change zofran ATC. If worsening abd pain consider CT a/p oral contrast only.   - Neutropenic started on cipro once febrile will pancx and switch cipro to  Aztronam 2g Q8   - AMS this am, agonal breathing, diaphoretic, cool and clammy - responsive to sternal rub. Hypotensive, hypoxic. Placed on NRB, RRT called. Labs sent. Lactate send. PCN allergy - started on Aztreonam 2g IV q 8 hoursm Flagyl 500mg IV TID for anaerobic coverage given abdominal pain and distension, Vancomycin 1g IV x 1. CT A/P pending for 12:30 21. CE with new TWI in V2-V6 - likely demand ischemia. Repeat CE and lactate at 2pm.   - CT A/P with Terminal ileitis with associated ileus versus low-grade obstruction. Pockets of free air in the right lower quadrant adjacent to the terminal ileum concerning for bowel perforation. 2.6 cm loculated fluid adjacent to the terminal ileum and surrounding peritoneal enhancement suggestive of developing peritonitis. Surgical team consulted- transferred to 8ICU for closer monitoring.  ID consult called- ABX fredy.    - CT chest angio + Saddle embolus with right heart strain., seen by cards- started on full dose A/C, s/p IVC filter placement with thrombectomy: transfuse PLT to >50K . doppler + B/L DVT. D/C Zarxio  -early engraftment    on clear liq diet, watery diarrhea, C diff(-)   Transitioned from Heparin gtt to Lovenox - D/W ICU MD, recommend keeping keeping plt's >40k while on AC.   Pending transfer back to BMTU  - SICU care appreciated. Failed TOV - 20F coude catheter placed this am by urology. Continue lovenox for now, may change to DOAC / NOAC prior to discharge. Continue antibiotics per ID service. Engrafting.   - Lovenox switched to Eliquis    Haile removed, passed TOV, PT re-consulted, follow up recommendations for discharge, Mg, Phos repleted  - discharge planning     1. Terminal ileitis / sigmoid colitis   CT A/P () with findings concerning for ileitis and Pockets of free air in the right lower quadrant adjacent to the terminal ileum concerning for bowel perforation and 2.6 cm loculated fluid adjacent to the terminal ileum and surrounding peritoneal enhancement suggestive of developing peritonitis.  SICU care / surgical input appreciated - high risk for surgical intervention, monitored clinically    Continue antibiotics - ID input appreciated   Tolerating regular diet   Repeat CT A/P 21 showed interval resolution of extraluminal free air since 21; mild wall thickening of adjacent proximal sigmoid colon, likely reactive to terminal ileitis   Abdominal exams improving     2. DVT / PE   Diagnosed with Saddle PE on  and required thrombectomy  Bilateral DVTs, most recent duplex LE showed no propagation of DVT   Troponins normalized - no longer trending   Continue therapeutic anticoagulation - currently receiving Lovenox 1mg/kg BID - consider changing to DOAC / NOAC prior to discharge   - lovenox changed to eliquis 21     3. Urinary retention   Haile initially placed in SICU - removed   Failed TOV - retaining 900ml; 20F coude catheter placed by urology 21 AM   Continue flomax   - haile discontinued - voiding     4. Neutropenic sepsis   Antibiotic management per ID service   Meropenem:  -->  Fluconazole: 11/15 -->   PO Bactrim: 11/15 -->   Ciprofloxacin:  -->   Vancomycin:   Metronidazole:   Aztreonam:     5. Infectious Disease:   acyclovir   Oral Tab/Cap 400 milliGRAM(s) Oral every 8 hours  clotrimazole Lozenge 1 Lozenge Oral five times a day  fluconAZOLE   Tablet 400 milliGRAM(s) Oral daily  meropenem  IVPB 1000 milliGRAM(s) IV Intermittent every 8 hours    6. VOD Prophylaxis: Actigall, Glutamine    7. GI Prophylaxis:  pantoprazole    Tablet 40 milliGRAM(s) Oral before breakfast    8. Mouthcare - NS / NaHCO3 rinses, Mycelex, Biotene; Skin care     9. GVHD prophylaxis - n/a     10. Transfuse & replete electrolytes prn     11. IV hydration, daily weights, strict I&O, prn diuresis     12. PO intake as tolerated, nutrition follow up as needed, MVI, folic acid     13. Antiemetics, anti-diarrhea medications:     14. OOB as tolerated, physical therapy consult if needed     15. Monitor coags / fibrinogen 2x week, vitamin K as needed     16. Monitor closely for clinical changes, monitor for fevers     17. Emotional support provided, plan of care discussed and questions addressed     18. Patient education done regarding plan of care, restrictions and discharge planning     19. Continue regular social work input     I have written the above note for Dr. Lozada who performed service with me in the room.   Terrie Barr NP-C ([phone])    I have seen and examined patient with NP, I agree with above note as scribed.

## 2021-12-06 NOTE — PROGRESS NOTE ADULT - ATTENDING COMMENTS
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 He is now admitted for an autoSCT with high dose melphalan prep regimen. Transplant day 11/19/21. Hospital course has been complicated by vasovagal episodes on 11/19/21 and 11/23/21. On 11/23/21 developed neutropenic sepsis, improved with fluid resuscitation. CT on 11/23/21 showed Terminal ileitis with associated ileus versus low-grade obstruction with free air and possible bowel perforation, surgery consulted and transferred to SICU, managed conservatively for now. Patient was also found to have increasing troponins, echo showed heart strain and CTA chest on 11/24/21 showed a saddle PE,  LE doppler on 11/24/24 showed b/l DVT. He is now s/p thrombectomy and IVC filter (11/24/21).Patient is hemodynamically stable, mentating well.     PE:   VSS  Gen: A/O x 3, NAD, tired  RESP: CTA, no wheeze no rhonchi  CV: S1, S2 RRR  Abd:  soft, increased distention, + hyperactive BS  LE: no edema  Neuro: CNII-XII intact, no focal deficits  Psych: appropriate    MM  -dx 4/2021  -s/p RVD x 6 (5/2021 - 9/2021) -->   -admitted for autoSCT with Ivett 100mg/M2 on 11/19/21  -hold Zarxio for now given acute events / acute abdomen  Today is day + 17  -12/3/21 patient engrafted    ID  BACTERIAL:  Neutropenic sepsis (11/23/21) started on meropenem (11/23 - ) and vanco x1 (11/23)  VIRAL: c/w Acyclovir   FUNGAL: c/w Diflucan 400 mg po daily.  PCP prophylaxis: hold for now    HEME  Saddle PE s/p and b/l LE DVT (dx 11/24/21) - c/w Lovenox, s/p mechanical thrombectomy on 11/24/21  -will transition to Eliquis  -keep Hb >7  -keep plt >40; keep plt >50 for procedures  -12/2/21 plt clumping today - okay to give anticoagulation since plt have been >50k    GI:  Abd pain / bowel perforation on CT on 11/23/21 - tolerating PO diet  -c/w Abx until 12/7/21 as per surgery  -repeat CT abd (11/29/21) -  terminal ileitis with mild increased fluid distention of the upstream small bowel, compatible with ileus versus partial small bowel obstruction. Unchanged trace loculated ascites with peritoneal enhancement at the level of the terminal ileum, suggesting peritonitis. However, there has been interval resolution of the extraluminal free air since 11/23/2021.  Diarrhea - stool cx neg, c/w Abx  VOD prophylaxis - glutamine supplementation, Actigall BID   GI prophylaxis - Protonix po QD   Mouth care and skin care as per protocol.    The time was used discussed with patient, family, primary team, consultants, and acute management.

## 2021-12-07 ENCOUNTER — TRANSCRIPTION ENCOUNTER (OUTPATIENT)
Age: 66
End: 2021-12-07

## 2021-12-07 VITALS
HEART RATE: 86 BPM | DIASTOLIC BLOOD PRESSURE: 84 MMHG | TEMPERATURE: 99 F | SYSTOLIC BLOOD PRESSURE: 133 MMHG | RESPIRATION RATE: 18 BRPM | OXYGEN SATURATION: 97 %

## 2021-12-07 DIAGNOSIS — I26.99 OTHER PULMONARY EMBOLISM WITHOUT ACUTE COR PULMONALE: ICD-10-CM

## 2021-12-07 LAB
ALBUMIN SERPL ELPH-MCNC: 2.8 G/DL — LOW (ref 3.3–5)
ALP SERPL-CCNC: 96 U/L — SIGNIFICANT CHANGE UP (ref 40–120)
ALT FLD-CCNC: 24 U/L — SIGNIFICANT CHANGE UP (ref 10–45)
ANION GAP SERPL CALC-SCNC: 10 MMOL/L — SIGNIFICANT CHANGE UP (ref 5–17)
AST SERPL-CCNC: 14 U/L — SIGNIFICANT CHANGE UP (ref 10–40)
BASOPHILS # BLD AUTO: 0 K/UL — SIGNIFICANT CHANGE UP (ref 0–0.2)
BASOPHILS NFR BLD AUTO: 0 % — SIGNIFICANT CHANGE UP (ref 0–2)
BILIRUB SERPL-MCNC: 0.2 MG/DL — SIGNIFICANT CHANGE UP (ref 0.2–1.2)
BUN SERPL-MCNC: 6 MG/DL — LOW (ref 7–23)
CALCIUM SERPL-MCNC: 7.5 MG/DL — LOW (ref 8.4–10.5)
CHLORIDE SERPL-SCNC: 106 MMOL/L — SIGNIFICANT CHANGE UP (ref 96–108)
CLOSURE TME COLL+EPINEP BLD: 167 K/UL — SIGNIFICANT CHANGE UP (ref 150–400)
CO2 SERPL-SCNC: 20 MMOL/L — LOW (ref 22–31)
CREAT SERPL-MCNC: 0.62 MG/DL — SIGNIFICANT CHANGE UP (ref 0.5–1.3)
EOSINOPHIL # BLD AUTO: 0 K/UL — SIGNIFICANT CHANGE UP (ref 0–0.5)
EOSINOPHIL NFR BLD AUTO: 0 % — SIGNIFICANT CHANGE UP (ref 0–6)
GIANT PLATELETS BLD QL SMEAR: PRESENT — SIGNIFICANT CHANGE UP
GLUCOSE SERPL-MCNC: 100 MG/DL — HIGH (ref 70–99)
HCT VFR BLD CALC: 25.7 % — LOW (ref 39–50)
HGB BLD-MCNC: 8.3 G/DL — LOW (ref 13–17)
LYMPHOCYTES # BLD AUTO: 0.32 K/UL — LOW (ref 1–3.3)
LYMPHOCYTES # BLD AUTO: 6.2 % — LOW (ref 13–44)
MAGNESIUM SERPL-MCNC: 1.6 MG/DL — SIGNIFICANT CHANGE UP (ref 1.6–2.6)
MANUAL SMEAR VERIFICATION: SIGNIFICANT CHANGE UP
MCHC RBC-ENTMCNC: 30.7 PG — SIGNIFICANT CHANGE UP (ref 27–34)
MCHC RBC-ENTMCNC: 32.3 GM/DL — SIGNIFICANT CHANGE UP (ref 32–36)
MCV RBC AUTO: 95.2 FL — SIGNIFICANT CHANGE UP (ref 80–100)
MONOCYTES # BLD AUTO: 0.88 K/UL — SIGNIFICANT CHANGE UP (ref 0–0.9)
MONOCYTES NFR BLD AUTO: 16.8 % — HIGH (ref 2–14)
MYELOCYTES NFR BLD: 0.9 % — HIGH (ref 0–0)
NEUTROPHILS # BLD AUTO: 3.93 K/UL — SIGNIFICANT CHANGE UP (ref 1.8–7.4)
NEUTROPHILS NFR BLD AUTO: 74.3 % — SIGNIFICANT CHANGE UP (ref 43–77)
NEUTS BAND # BLD: 0.9 % — SIGNIFICANT CHANGE UP (ref 0–8)
PHOSPHATE SERPL-MCNC: 2.2 MG/DL — LOW (ref 2.5–4.5)
PLAT MORPH BLD: NORMAL — SIGNIFICANT CHANGE UP
PLATELET # BLD AUTO: 135 K/UL — LOW (ref 150–400)
POTASSIUM SERPL-MCNC: 3.9 MMOL/L — SIGNIFICANT CHANGE UP (ref 3.5–5.3)
POTASSIUM SERPL-SCNC: 3.9 MMOL/L — SIGNIFICANT CHANGE UP (ref 3.5–5.3)
PROMYELOCYTES # FLD: 0.9 % — HIGH (ref 0–0)
PROT SERPL-MCNC: 5 G/DL — LOW (ref 6–8.3)
RBC # BLD: 2.7 M/UL — LOW (ref 4.2–5.8)
RBC # FLD: 14.3 % — SIGNIFICANT CHANGE UP (ref 10.3–14.5)
RBC BLD AUTO: SIGNIFICANT CHANGE UP
SARS-COV-2 RNA SPEC QL NAA+PROBE: SIGNIFICANT CHANGE UP
SODIUM SERPL-SCNC: 136 MMOL/L — SIGNIFICANT CHANGE UP (ref 135–145)
TOXIC GRANULES BLD QL SMEAR: PRESENT — SIGNIFICANT CHANGE UP
WBC # BLD: 5.23 K/UL — SIGNIFICANT CHANGE UP (ref 3.8–10.5)
WBC # FLD AUTO: 5.23 K/UL — SIGNIFICANT CHANGE UP (ref 3.8–10.5)

## 2021-12-07 PROCEDURE — 99291 CRITICAL CARE FIRST HOUR: CPT

## 2021-12-07 RX ORDER — SODIUM,POTASSIUM PHOSPHATES 278-250MG
1 POWDER IN PACKET (EA) ORAL EVERY 4 HOURS
Refills: 0 | Status: COMPLETED | OUTPATIENT
Start: 2021-12-07 | End: 2021-12-07

## 2021-12-07 RX ADMIN — PANTOPRAZOLE SODIUM 40 MILLIGRAM(S): 20 TABLET, DELAYED RELEASE ORAL at 06:20

## 2021-12-07 RX ADMIN — FLUCONAZOLE 400 MILLIGRAM(S): 150 TABLET ORAL at 11:54

## 2021-12-07 RX ADMIN — Medication 5 MILLILITER(S): at 00:49

## 2021-12-07 RX ADMIN — MEROPENEM 100 MILLIGRAM(S): 1 INJECTION INTRAVENOUS at 06:20

## 2021-12-07 RX ADMIN — Medication 1 LOZENGE: at 09:53

## 2021-12-07 RX ADMIN — Medication 1 APPLICATION(S): at 08:15

## 2021-12-07 RX ADMIN — Medication 25 MILLIGRAM(S): at 08:10

## 2021-12-07 RX ADMIN — Medication 400 MILLIGRAM(S): at 06:21

## 2021-12-07 RX ADMIN — Medication 5 MILLILITER(S): at 08:08

## 2021-12-07 RX ADMIN — Medication 400 MILLIGRAM(S): at 14:30

## 2021-12-07 RX ADMIN — Medication 1 APPLICATION(S): at 12:37

## 2021-12-07 RX ADMIN — Medication 5 MILLILITER(S): at 12:33

## 2021-12-07 RX ADMIN — CHLORHEXIDINE GLUCONATE 1 APPLICATION(S): 213 SOLUTION TOPICAL at 08:08

## 2021-12-07 RX ADMIN — Medication 1 APPLICATION(S): at 00:51

## 2021-12-07 RX ADMIN — Medication 5 MILLILITER(S): at 09:52

## 2021-12-07 RX ADMIN — APIXABAN 5 MILLIGRAM(S): 2.5 TABLET, FILM COATED ORAL at 06:21

## 2021-12-07 RX ADMIN — Medication 1 TABLET(S): at 09:52

## 2021-12-07 RX ADMIN — Medication 5 MILLILITER(S): at 06:21

## 2021-12-07 RX ADMIN — Medication 1 TABLET(S): at 12:33

## 2021-12-07 RX ADMIN — Medication 1 LOZENGE: at 06:21

## 2021-12-07 RX ADMIN — Medication 1 LOZENGE: at 12:33

## 2021-12-07 RX ADMIN — Medication 25 MILLIGRAM(S): at 00:48

## 2021-12-07 NOTE — DISCHARGE NOTE PROVIDER - NSDCMRMEDTOKEN_GEN_ALL_CORE_FT
acyclovir 400 mg oral tablet: 1 tab(s) orally every 8 hours  apixaban 5 mg oral tablet: 1 tab(s) orally every 12 hours  atovaquone 750 mg/5 mL oral suspension: 5 milliliter(s) orally 2 times a day  fluconazole 200 mg oral tablet: 2 tab(s) orally once a day  folic acid 1 mg oral tablet: 1 tab(s) orally once a day  metoprolol tartrate 25 mg oral tablet: 1 tab(s) orally every 8 hours  Multiple Vitamins oral tablet: 1 tab(s) orally once a day  ondansetron 8 mg oral tablet: 1 tab(s) orally 3 times a day, As Needed  pantoprazole 40 mg oral delayed release tablet: 1 tab(s) orally once a day (before a meal)  tamsulosin 0.4 mg oral capsule: 1 cap(s) orally once a day (at bedtime)

## 2021-12-07 NOTE — DISCHARGE NOTE NURSING/CASE MANAGEMENT/SOCIAL WORK - NSDCPEFALRISK_GEN_ALL_CORE
For information on Fall & Injury Prevention, visit: [email or web address] OR  [email or web address] OR  https://www.cdc.gov/steadi/patient.html

## 2021-12-07 NOTE — PHARMACOTHERAPY INTERVENTION NOTE - COMMENTS
Allergies    Omnicef (Unknown)  penicillin (Hives)    Intolerances        MEDICATIONS  (STANDING):  acyclovir   Oral Tab/Cap 400 milliGRAM(s) Oral every 8 hours  apixaban 5 milliGRAM(s) Oral every 12 hours  AQUAPHOR (petrolatum Ointment) 1 Application(s) Topical three times a day  Biotene Dry Mouth Oral Rinse 5 milliLiter(s) Swish and Spit five times a day  chlorhexidine 2% Cloths 1 Application(s) Topical <User Schedule>  clotrimazole Lozenge 1 Lozenge Oral five times a day  fluconAZOLE   Tablet 400 milliGRAM(s) Oral daily  metoprolol tartrate 25 milliGRAM(s) Oral every 8 hours  pantoprazole    Tablet 40 milliGRAM(s) Oral before breakfast  sodium bicarbonate Mouth Rinse 5 milliLiter(s) Swish and Spit five times a day  tamsulosin 0.4 milliGRAM(s) Oral at bedtime    MEDICATIONS  (PRN):  acetaminophen     Tablet .. 650 milliGRAM(s) Oral every 6 hours PRN Mild Pain (1 - 3)    Recipient of Education:  [ x ]Patient  [  ]Family, please specify ___________  [  ]Other, please specify ____________    Readiness to Learn:  [ x ]Ready  [  ]Not ready: cognitive  [  ]Not ready: physical  [  ]Not ready: emotional  Comment(s):    Barriers to Information Exhange:  [x  ]None identified  [  ]Vision  [  ]Hearing  [  ]Language  [  ]Literacy  [  ]Memory and Learning  [  ]Culture/Mu-ism  [  ]Other, please specify ____________  Comment(s):    Patient Education Topics:  [  ]Anticoagulation  [  ]Heart Failure (HF)  [  ]Chronic Obstructive Pulmonary Disease (COPD)  [  ]Diabetes Mellitus (DM)  [  ]Stroke  [ x ]Other, please specify __post autologous stem cell transplant medication discharge education __________  Comment(s):    Medication Counseling Points:  [ x ]Medications Reviewed  [ x ]Medication Schedule  [ x ]Precautions  [ x ]Side Effects  [ x ]Indication for Use  [ x ]Drug/Drug Interactions  [ x ]Drug/Food Interactions  [  ]Other, please specify ____________  Comment(s):    Teaching Method:  [ x ]Verbal Instruction  [ x ]Written Material, please specify __medication chart, discharge booklet ____________  [  ]Skill Demonstration  [  ]Teach Back (Patient repeats in own words)  [  ]Ask Me 3  [  ]Computer/Internet  [  ]Other, please specify ____________  Comment(s):    Educational Evaluation:  [ x ]Meets goals/outcomes  [  ]Partially meets - needs review/practice  [  ]Unable to meet - needs instruction  [  ]Reinforced previously met goal  [  ]Patient declines instruction  [  ]Not applicable  Comment(s):    Time spent: _25____minutes

## 2021-12-07 NOTE — PROGRESS NOTE ADULT - ATTENDING COMMENTS
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 He is now admitted for an autoSCT with high dose melphalan prep regimen. Transplant day 11/19/21. Hospital course has been complicated by vasovagal episodes on 11/19/21 and 11/23/21. On 11/23/21 developed neutropenic sepsis, improved with fluid resuscitation. CT on 11/23/21 showed Terminal ileitis with associated ileus versus low-grade obstruction with free air and possible bowel perforation, surgery consulted and transferred to SICU, managed conservatively for now. Patient was also found to have increasing troponins, echo showed heart strain and CTA chest on 11/24/21 showed a saddle PE,  LE doppler on 11/24/24 showed b/l DVT. He is now s/p thrombectomy and IVC filter (11/24/21).Patient is hemodynamically stable, mentating well.     PE:   VSS  Gen: A/O x 3, NAD, tired  RESP: CTA, no wheeze no rhonchi  CV: S1, S2 RRR  Abd:  soft, increased distention, + hyperactive BS  LE: no edema  Neuro: CNII-XII intact, no focal deficits  Psych: appropriate    MM  -dx 4/2021  -s/p RVD x 6 (5/2021 - 9/2021) -->   -admitted for autoSCT with Ivett 100mg/M2 on 11/19/21  -hold Zarxio for now given acute events / acute abdomen  Today is day + 18  -12/3/21 patient engrafted    ID  BACTERIAL:  Neutropenic sepsis (11/23/21) started on meropenem (11/23 - ) and vanco x1 (11/23)  VIRAL: c/w Acyclovir   FUNGAL: c/w Diflucan 400 mg po daily.  PCP prophylaxis: hold for now    HEME  Saddle PE s/p and b/l LE DVT (dx 11/24/21) - c/w Lovenox, s/p mechanical thrombectomy on 11/24/21  -will transition to Eliquis  -keep Hb >7  -keep plt >40; keep plt >50 for procedures  -12/2/21 plt clumping today - okay to give anticoagulation since plt have been >50k    GI:  Abd pain / bowel perforation on CT on 11/23/21 - tolerating PO diet  -c/w Abx until 12/7/21 as per surgery  -repeat CT abd (11/29/21) -  terminal ileitis with mild increased fluid distention of the upstream small bowel, compatible with ileus versus partial small bowel obstruction. Unchanged trace loculated ascites with peritoneal enhancement at the level of the terminal ileum, suggesting peritonitis. However, there has been interval resolution of the extraluminal free air since 11/23/2021.  Diarrhea - stool cx neg, c/w Abx  VOD prophylaxis - glutamine supplementation, Actigall BID   GI prophylaxis - Protonix po QD   Mouth care and skin care as per protocol.    The time was used discussed with patient, family, primary team, consultants, and acute management. 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 He is now admitted for an autoSCT with high dose melphalan prep regimen. Transplant day 11/19/21. Hospital course has been complicated by vasovagal episodes on 11/19/21 and 11/23/21. On 11/23/21 developed neutropenic sepsis, improved with fluid resuscitation. CT on 11/23/21 showed Terminal ileitis with associated ileus versus low-grade obstruction with free air and possible bowel perforation, surgery consulted and transferred to SICU, managed conservatively for now. Patient was also found to have increasing troponins, echo showed heart strain and CTA chest on 11/24/21 showed a saddle PE,  LE doppler on 11/24/24 showed b/l DVT. He is now s/p thrombectomy and IVC filter (11/24/21).Patient is hemodynamically stable, mentating well.     PE:   VSS  Gen: A/O x 3, NAD  RESP: CTA, no wheeze no rhonchi  CV: S1, S2 RRR  Abd:  soft, increased distention, + hyperactive BS  LE: no edema  Neuro: CNII-XII intact, no focal deficits  Psych: appropriate    MM  -dx 4/2021  -s/p RVD x 6 (5/2021 - 9/2021) -->   -admitted for autoSCT with Ivett 100mg/M2 on 11/19/21  -hold Zarxio for now given acute events / acute abdomen  Today is day + 18  -12/3/21 patient engrafted    ID  BACTERIAL:  Neutropenic sepsis (11/23/21) started on meropenem (11/23 -12/7 ) and vanco x1 (11/23)  VIRAL: c/w Acyclovir   FUNGAL: c/w Diflucan 400 mg po daily.  PCP prophylaxis: hold for now    HEME  Saddle PE s/p and b/l LE DVT (dx 11/24/21) - c/w Lovenox, s/p mechanical thrombectomy on 11/24/21  -will transition to Eliquis  -keep Hb >7  -keep plt >40; keep plt >50 for procedures  -12/2/21 plt clumping today - okay to give anticoagulation since plt have been >50k    GI:  Abd pain / bowel perforation on CT on 11/23/21 - tolerating PO diet  -completed merem on 12/7/21  -repeat CT abd (11/29/21) -  terminal ileitis with mild increased fluid distention of the upstream small bowel, compatible with ileus versus partial small bowel obstruction. Unchanged trace loculated ascites with peritoneal enhancement at the level of the terminal ileum, suggesting peritonitis. However, there has been interval resolution of the extraluminal free air since 11/23/2021.  Diarrhea - stool cx neg,   GI prophylaxis - Protonix po QD       Patient stable for discharge home.     The time was used discussed with patient, family, primary team, consultants, and acute management.

## 2021-12-07 NOTE — DISCHARGE NOTE PROVIDER - HOSPITAL COURSE
Mr Julio is a 66 year old male with multiple myeloma  who in 11/15 was admitted for an autologous pbsct with high dose melphalan prep regimen. Hematologic history as follows: 11/2020 was found to have a T-12 compression fracture. He saw orthopedics 1/2021, and was referred to endocrine. 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 Serum immunofixation showed 1 IgD lambda band and free lambda light chains. Urine immunofixation negative for monoclonal band. 4/29/21 a bone marrow biopsy and aspirate was consistent with plasma cell myeloma (> 90% involvement), flow cytometry positive for monotypic plasma cells. He started cycle 1 RVD on 5/13/21. He completed 6 cycles of RVD 9/30/21. During chemotherapy he reported occasional blurry vision (negative optho workup, negative MRI brain 9/29/21) and moderate fatigue. He had no complaints on admission other than facial erythema, likely related to kepivance.     Upon admission, a TLC was placed in IR. Mr. Julio received IV hydration, pain management, anxiolysis, antiemetics, nutritional support, and antibacterial / antiviral / antifungal / GI / PCP and VOD (SOS) prophylaxis. When his ANC dropped below 500 he was started on prophylactic Ciprofloxacin. Labs were monitored on a daily basis, and he received electrolyte repletion and transfusional support as needed.    On 11/19/2021 After pre-medication  received 251 mL of, Autologous mobilized, plasma reduced, pooled, thawed, washes HCP apheresis for  over approximately 1 hr. Cell counts as follows  Total MNC( x10^8/kg)=7.22  CD34+cells ( x10^6 kg)=4.58  Cell Viability (%)= 90  Mr. Julio tolerated the infusion well with no adverse resections noted.    While admitted,    Mr Julio is a 66 year old male with multiple myeloma  who in 11/15 was admitted for an autologous pbsct with high dose melphalan prep regimen. Hematologic history as follows: 11/2020 was found to have a T-12 compression fracture. He saw orthopedics 1/2021, and was referred to endocrine. 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 Serum immunofixation showed 1 IgD lambda band and free lambda light chains. Urine immunofixation negative for monoclonal band. 4/29/21 a bone marrow biopsy and aspirate was consistent with plasma cell myeloma (> 90% involvement), flow cytometry positive for monotypic plasma cells. He started cycle 1 RVD on 5/13/21. He completed 6 cycles of RVD 9/30/21. During chemotherapy he reported occasional blurry vision (negative optho workup, negative MRI brain 9/29/21) and moderate fatigue. He had no complaints on admission other than facial erythema, likely related to kepivance.     Upon admission, a TLC was placed in IR. Mr. Julio received IV hydration, pain management, anxiolysis, antiemetics, nutritional support, and antibacterial / antiviral / antifungal / GI / PCP and VOD (SOS) prophylaxis. When his ANC dropped below 500 he was started on prophylactic Ciprofloxacin. Labs were monitored on a daily basis, and he received electrolyte repletion and transfusional support as needed.    On 11/19/2021 After pre-medication  received 251 mL of, Autologous mobilized, plasma reduced, pooled, thawed, washes HCP apheresis for  over approximately 1 hr. Cell counts as follows  Total MNC( x10^8/kg)=7.22  CD34+cells ( x10^6 kg)=4.58  Cell Viability (%)= 90  Mr. Julio tolerated the infusion well with no adverse resections noted.    While admitted, Mr. Julio experienced pancytopenia related to the high dose chemotherapy prep regimen. He was treated with transfusional support. On 11/19/21 he experienced a vasovagal episode in the bathroom that was self limiting. On 11/23/21 he had another vasovagal episode with severe abdominal pain and distention. A RRT was called. During the episode he was hypotensive, lactate was 6. A CT A/P showed pneumoperitoneum and terminal ileitis. Surgery was consulted and he was transferred to the SICU. During the episode, he made a troponin. A TTE was completed in the SICU, which showed increased pulmonary pressures. As a result, a CTA of the chest was completed which showed a saddle pulmonary embolism. He was started on full dose heparin. Lower extremity dopplers showed bilateral acute DVTs. On 11/24/21 he underwent a mechanical thrombectomy and placement of an IVC filter.     The terminal ileitis and pneumoperitoneum was treated conservatively with supportive care and antibiotics. He completed a course of Meropenem 12/7/21. A repeat CT A/P showed unchanged ileitis, with mild increased fluid distention and resolution of free air. The heparin was transitioned to full dose lovenox, and eventually Eliquis. Shortly thereafter he was downgraded back to 7M.     Currently, he is stable for discharge home with outpatient follow up at the [facility] and with vascular cardiology.

## 2021-12-07 NOTE — PROGRESS NOTE ADULT - CRITICAL CARE SERVICES PROVIDED
Critical care services provided

## 2021-12-07 NOTE — DISCHARGE NOTE PROVIDER - NSDCFUSCHEDAPPT_GEN_ALL_CORE_FT
VAL FERMIN ; 12/09/2021 ; [facility]  VAL FERMIN ; 12/13/2021 ; [facility] [address] [facility]  VAL FERMIN ; 12/14/2021 ; RAFAEL Modi

## 2021-12-07 NOTE — DISCHARGE NOTE NURSING/CASE MANAGEMENT/SOCIAL WORK - PATIENT PORTAL LINK FT
You can access the FollowMyHealth Patient Portal offered by [facility] by registering at the following website: [email or web address]. By joining [facility]’s FollowMyHealth portal, you will also be able to view your health information using other applications (apps) compatible with our system.

## 2021-12-07 NOTE — DISCHARGE NOTE PROVIDER - NSDCCPCAREPLAN_GEN_ALL_CORE_FT
PRINCIPAL DISCHARGE DIAGNOSIS  Diagnosis: Stem cells transplant status  Assessment and Plan of Treatment: Diet and activities as per [facility] discharge guidelines and safe food handling guidelines. NO RESTAURANT OR TAKE OUT FOOD AT THIS TIME, ONLY HOME COOKED PREPARED/FROZEN FOODS. You are allowed to have fresh baked pizza right out of the oven. This is the ONLY takeout food at this time.  Notify your physician if bleeding; swelling; persistent nausea and vomiting; unable to urinate; pain not relieved by medications; fever; numbness, tingling; excessive diarrhea, inability to tolerate liquids or foods; increased irritability or sluggishness, rash        SECONDARY DISCHARGE DIAGNOSES  Diagnosis: Need for prophylactic measure  Assessment and Plan of Treatment: Continue your prophylactic medications as directed by your doctor   Acyclovir - antiviral prophylaxis   Fluconazole - antfungal prophylaxis   Mepron - PCP prophylaxis   Protonix - GI prophylaxis    Diagnosis: Pulmonary embolism  Assessment and Plan of Treatment: Continue with Eliquis as directed by your doctor  Follow up as directed with vascular cardiology

## 2021-12-07 NOTE — PROGRESS NOTE ADULT - ATTENDING SUPERVISION STATEMENT
ACP
Fellow
Fellow
ACP
Fellow
ACP
ACP
Fellow
Resident
ACP
Fellow
Resident

## 2021-12-07 NOTE — DISCHARGE NOTE PROVIDER - INSTRUCTIONS
Diet and activities as per [facility] discharge guidelines and safe food handling guidelines. NO RESTAURANT OR TAKE OUT FOOD AT THIS TIME, ONLY HOME COOKED PREPARED/FROZEN FOODS. You are allowed to have fresh baked pizza right out of the oven. This is the ONLY takeout food at this time.

## 2021-12-07 NOTE — DISCHARGE NOTE PROVIDER - NSDCFUADDAPPT_GEN_ALL_CORE_FT
Vascular cardiology Dr. Holley's office telephone number is [phone]. They will contact you tomorrow to make a follow up appointment.     You have a follow up appointment with Dr. Sorenson at the [facility] on Thursday, 12/9/21 at 10am

## 2021-12-07 NOTE — DISCHARGE NOTE PROVIDER - CARE PROVIDER_API CALL
Lily Naylor)  Medical Oncology  [address] [place] [address]  Phone: [phone]  Fax: [phone]  Follow Up Time:     Rin Lozada)  Internal Medicine  [address] [place] [address]  Phone: [phone]  Fax: [phone]  Follow Up Time:

## 2021-12-07 NOTE — DISCHARGE NOTE PROVIDER - CARE PROVIDERS DIRECT ADDRESSES
[email or web address]

## 2021-12-07 NOTE — PROGRESS NOTE ADULT - SUBJECTIVE AND OBJECTIVE BOX
HPC Transplant Team                                                      Critical / Counseling Time Provided: 30 minutes                                                                                                                                                        Chief Complaint: Autologous peripheral blood stem cell transplant with high dose Melphalan prep regimen for treatment of multiple myeloma    S: Patient seen and examined with HPC Transplant Team:       O: Vitals:   Vital Signs Last 24 Hrs  T(C): 36.7 (07 Dec 2021 08:00), Max: 37.8 (06 Dec 2021 12:55)  T(F): 98.1 (07 Dec 2021 08:00), Max: 100 (06 Dec 2021 12:55)  HR: 78 (07 Dec 2021 08:00) (70 - 80)  BP: 114/72 (07 Dec 2021 08:00) (108/68 - 150/73)  BP(mean): --  RR: 18 (07 Dec 2021 08:00) (16 - 18)  SpO2: 96% (07 Dec 2021 08:00) (96% - 98%)    Admit weight: 98.4kg   Today's weight:     Intake / Output:   12-06 @ 07:01  -  12-07 @ 07:00  --------------------------------------------------------  IN: 1001 mL / OUT: 1 mL / NET: 1000 mL    12-07 @ 07:01  -  12-07 @ 08:37  --------------------------------------------------------  IN: 0 mL / OUT: 60 mL / NET: -60 mL    PE:   Oropharynx: + erythema and post Kepivance coating no ulcerations   Oral Mucositis:              +                                         ndGndrndanddndend:nd nd2nd CVS: S1, S2 RRR   Lungs: CTA throughout bilaterally   Abdomen: + BS x 4, soft, mild distended,  mild tenderness   Extremities: +1/2-1 edema   Gastric Mucositis:       -                                           Grade: n/a  Intestinal Mucositis:     -                                         Grade: n/a   Skin: patchy, erythematous maculopapular rash to face, neck and upper chest and back post Kepivance   TLC: CDI   Neuro: A&O x 3  Pain:     Labs:   CBC Full  -  ( 07 Dec 2021 07:20 )  WBC Count : 5.23 K/uL  Hemoglobin : 8.3 g/dL  Hematocrit : 25.7 %  Platelet Count - Automated : 135 K/uL  Mean Cell Volume : 95.2 fl  Mean Cell Hemoglobin : 30.7 pg  Mean Cell Hemoglobin Concentration : 32.3 gm/dL  Auto Neutrophil # : x  Auto Lymphocyte # : x  Auto Monocyte # : x  Auto Eosinophil # : x  Auto Basophil # : x  Auto Neutrophil % : x  Auto Lymphocyte % : x  Auto Monocyte % : x  Auto Eosinophil % : x  Auto Basophil % : x                          8.3    5.23  )-----------( 135      ( 07 Dec 2021 07:20 )             25.7     12-07    136  |  106  |  6<L>  ----------------------------<  100<H>  3.9   |  20<L>  |  0.62    Ca    7.5<L>      07 Dec 2021 07:18  Phos  2.2     12-07  Mg     1.6     12-07    TPro  5.0<L>  /  Alb  2.8<L>  /  TBili  0.2  /  DBili  x   /  AST  14  /  ALT  24  /  AlkPhos  96  12-07    PT/INR - ( 06 Dec 2021 08:12 )   PT: 16.9 sec;   INR: 1.43 ratio         PTT - ( 06 Dec 2021 08:12 )  PTT:30.1 sec  LIVER FUNCTIONS - ( 07 Dec 2021 07:18 )  Alb: 2.8 g/dL / Pro: 5.0 g/dL / ALK PHOS: 96 U/L / ALT: 24 U/L / AST: 14 U/L / GGT: x               Cultures:   GI PCR Panel, Stool (11.30.21 @ 00:23)    Culture Results:   Adenovirus 40/41  DETECTED by PCR  *******Please Note:*******  GI panel PCR evaluates for:  Campylobacter, Plesiomonas shigelloides, Salmonella,  Vibrio, Yersinia enterocolitica, Enteroaggregative  Escherichia coli (EAEC), Enteropathogenic E.coli (EPEC),  Enterotoxigenic E. coli (ETEC) lt/st, Shiga-like  toxin-producing E. coli (STEC) stx1/stx2,  Shigella/ Enteroinvasive E. coli (EIEC), Cryptosporidium,  Cyclospora cayetanensis, Entamoeba histolytica,  Giardia lamblia, Adenovirus F 40/41, Astrovirus,  Norovirus GI/GII, Rotavirus A, Sapovirus    Culture - Blood (11.23.21 @ 12:32)    Specimen Source: .Blood Blood    Culture Results:   No Growth Final      Radiology:   EXAM:  CT ABDOMEN AND PELVIS IC                        PROCEDURE DATE:  11/29/2021    IMPRESSION:  Unchanged terminal ileitis with mild increased fluid distention of the upstream small bowel, compatible with ileus versus partial small bowel obstruction. Unchanged trace loculated ascites with peritoneal enhancement at the level of the terminal ileum, suggesting peritonitis. However, there has been interval resolution of the extraluminal free air since 11/23/2021.  Mild wall thickening of the adjacent proximal sigmoid colon, likely reactive to the terminal ileitis.  New small bilateral pleural effusions.    EXAM:  XR CHEST PORTABLE ROUTINE 1V                        PROCEDURE DATE:  11/28/2021    IMPRESSION:  Clear lungs.    EXAM:  DUPLEX SCAN EXT VEINS LOWER BI                        PROCEDURE DATE:  11/26/2021    IMPRESSION:  There is persistent bilateral DVT in the below the knee right posterior tibial, peroneal and soleal veins and above the knee in the left popliteal vein and tibial peroneal trunk.  No new DVT is seen.    EXAM:  CT ANGIO CHEST PULM [facility]                        PROCEDURE DATE:  11/24/2021    IMPRESSION:  Saddle embolus with right heart strain.  These results have been discussed with Dr. Arellano on 11/24/2021 at 12:47 AM by on-call resident.  Pneumoperitoneum is again noted.    Meds:   Antimicrobials:   acyclovir   Oral Tab/Cap 400 milliGRAM(s) Oral every 8 hours  clotrimazole Lozenge 1 Lozenge Oral five times a day  fluconAZOLE   Tablet 400 milliGRAM(s) Oral daily      Heme / Onc:   apixaban 5 milliGRAM(s) Oral every 12 hours      GI:  pantoprazole    Tablet 40 milliGRAM(s) Oral before breakfast  sodium bicarbonate Mouth Rinse 5 milliLiter(s) Swish and Spit five times a day      Cardiovascular:   metoprolol tartrate 25 milliGRAM(s) Oral every 8 hours  tamsulosin 0.4 milliGRAM(s) Oral at bedtime      Immunologic:       Other medications:   AQUAPHOR (petrolatum Ointment) 1 Application(s) Topical three times a day  Biotene Dry Mouth Oral Rinse 5 milliLiter(s) Swish and Spit five times a day  chlorhexidine 2% Cloths 1 Application(s) Topical <User Schedule>  potassium phosphate / sodium phosphate Tablet (K-PHOS No. 2) 1 Tablet(s) Oral four times a day with meals  potassium phosphate / sodium phosphate Tablet (K-PHOS No. 2) 1 Tablet(s) Oral every 4 hours      PRN:   acetaminophen     Tablet .. 650 milliGRAM(s) Oral every 6 hours PRN    A/P:  66 year old male with a history of multiple myeloma s/p RVD x 6   Post Autologous PBSCT day +  18  11/16- melphalan 2/2; s/p melphalan hydration for 24 hours post infusion of last dose   Strict I&O, daily weights, prn diuresis   11/17- rest day   11/18- rest day   11/19- s/p HPC transplant; completed transplant hydration for 24 hours post infusion of cells. Suprapubic pain in am. UA pending, follow up culture, BK virus. AXR   11/19-vasovegal episode in AM: will monitor tele for 24hrs   11/20 d/c telemetry. Lasix 40mg IV x today, follow up I&Os, daily weight. monitor rash, receiving 2nd dose Kepivance today  11/21 add Emend for 3 days and change zofran ATC. If worsening abd pain consider CT a/p oral contrast only.   11/22- Neutropenic started on cipro once febrile will pancx and switch cipro to  Aztronam 2g Q8   11/23- AMS this am, agonal breathing, diaphoretic, cool and clammy - responsive to sternal rub. Hypotensive, hypoxic. Placed on NRB, RRT called. Labs sent. Lactate send. PCN allergy - started on Aztreonam 2g IV q 8 hoursm Flagyl 500mg IV TID for anaerobic coverage given abdominal pain and distension, Vancomycin 1g IV x 1. CT A/P pending for 12:30 11/23/21. CE with new TWI in V2-V6 - likely demand ischemia. Repeat CE and lactate at 2pm.   11/23- CT A/P with Terminal ileitis with associated ileus versus low-grade obstruction. Pockets of free air in the right lower quadrant adjacent to the terminal ileum concerning for bowel perforation. 2.6 cm loculated fluid adjacent to the terminal ileum and surrounding peritoneal enhancement suggestive of developing peritonitis. Surgical team consulted- transferred to 8ICU for closer monitoring.  ID consult called- JESUS daniel.    11/24- CT chest angio + Saddle embolus with right heart strain., seen by cards- started on full dose A/C, s/p IVC filter placement with thrombectomy: transfuse PLT to >50K . doppler + B/L DVT. D/C Zarxio  11/27-early engraftment   11/27 on clear liq diet, watery diarrhea, C diff(-)  11/28 Transitioned from Heparin gtt to Lovenox - D/W ICU MD, recommend keeping keeping plt's >40k while on AC.   Pending transfer back to BMTU  12/1- SICU care appreciated. Failed TOV - 20F coude catheter placed this am by urology. Continue lovenox for now, may change to DOAC / NOAC prior to discharge. Continue antibiotics per ID service. Engrafting.   12/2- Lovenox switched to Eliquis   12/4 Haile removed, passed TOV, PT re-consulted, follow up recommendations for discharge, Mg, Phos repleted  12/6- discharge planning     1. Terminal ileitis / sigmoid colitis   CT A/P (11/23) with findings concerning for ileitis and Pockets of free air in the right lower quadrant adjacent to the terminal ileum concerning for bowel perforation and 2.6 cm loculated fluid adjacent to the terminal ileum and surrounding peritoneal enhancement suggestive of developing peritonitis.  SICU care / surgical input appreciated - high risk for surgical intervention, monitored clinically    Continue antibiotics - ID input appreciated   Tolerating regular diet   Repeat CT A/P 11/29/21 showed interval resolution of extraluminal free air since 11/23/21; mild wall thickening of adjacent proximal sigmoid colon, likely reactive to terminal ileitis   Abdominal exams improving     2. DVT / PE   Diagnosed with Saddle PE on 11/24 and required thrombectomy  Bilateral DVTs, most recent duplex LE showed no propagation of DVT   Troponins normalized - no longer trending   Continue therapeutic anticoagulation - currently receiving Lovenox 1mg/kg BID - consider changing to DOAC / NOAC prior to discharge   12/6- lovenox changed to eliquis 12/2/21     3. Urinary retention   Haile initially placed in SICU - removed 11/30  Failed TOV - retaining 900ml; 20F coude catheter placed by urology 12/1/21 AM   Continue flomax   12/4- haile discontinued - voiding     4. Neutropenic sepsis   Antibiotic management per ID service   Meropenem: 11/23 -->12/7  Fluconazole: 11/15 -->   PO Bactrim: 11/15 --> 11/17  Ciprofloxacin: 11/22 --> 11/23  Vancomycin: 11/23  Metronidazole: 11/23  Aztreonam: 11/23    5. Infectious Disease:   acyclovir   Oral Tab/Cap 400 milliGRAM(s) Oral every 8 hours  clotrimazole Lozenge 1 Lozenge Oral five times a day  fluconAZOLE   Tablet 400 milliGRAM(s) Oral daily  meropenem  IVPB 1000 milliGRAM(s) IV Intermittent every 8 hours    6. VOD Prophylaxis: Actigall, Glutamine    7. GI Prophylaxis:  pantoprazole    Tablet 40 milliGRAM(s) Oral before breakfast    8. Mouthcare - NS / NaHCO3 rinses, Mycelex, Biotene; Skin care     9. GVHD prophylaxis - n/a     10. Transfuse & replete electrolytes prn     11. IV hydration, daily weights, strict I&O, prn diuresis     12. PO intake as tolerated, nutrition follow up as needed, MVI, folic acid     13. Antiemetics, anti-diarrhea medications:     14. OOB as tolerated, physical therapy consult if needed     15. Monitor coags / fibrinogen 2x week, vitamin K as needed     16. Monitor closely for clinical changes, monitor for fevers     17. Emotional support provided, plan of care discussed and questions addressed     18. Patient education done regarding plan of care, restrictions and discharge planning     19. Continue regular social work input     I have written the above note for Dr. Lozada who performed service with me in the room.   Terrie Barr NP-C ([phone])    I have seen and examined patient with NP, I agree with above note as scribed.                    HPC Transplant Team                                                      Critical / Counseling Time Provided: 30 minutes                                                                                                                                                        Chief Complaint: Autologous peripheral blood stem cell transplant with high dose Melphalan prep regimen for treatment of multiple myeloma    S: Patient seen and examined with HPC Transplant Team:   +fatigue  +soft stools         O: Vitals:   Vital Signs Last 24 Hrs  T(C): 36.7 (07 Dec 2021 08:00), Max: 37.8 (06 Dec 2021 12:55)  T(F): 98.1 (07 Dec 2021 08:00), Max: 100 (06 Dec 2021 12:55)  HR: 78 (07 Dec 2021 08:00) (70 - 80)  BP: 114/72 (07 Dec 2021 08:00) (108/68 - 150/73)  BP(mean): --  RR: 18 (07 Dec 2021 08:00) (16 - 18)  SpO2: 96% (07 Dec 2021 08:00) (96% - 98%)    Admit weight: 98.4kg   Today's weight: 97 kg     Intake / Output:   12-06 @ 07:01  -  12-07 @ 07:00  --------------------------------------------------------  IN: 1001 mL / OUT: 1 mL / NET: 1000 mL    12-07 @ 07:01  -  12-07 @ 08:37  --------------------------------------------------------  IN: 0 mL / OUT: 60 mL / NET: -60 mL    PE:   Oropharynx: + erythema and post Kepivance coating no ulcerations   Oral Mucositis:              +                                         ndGndrndanddndend:nd nd2nd CVS: S1, S2 RRR   Lungs: CTA throughout bilaterally   Abdomen: + BS x 4, soft, mild distended,  mild tenderness   Extremities: +1/2-1 edema   Gastric Mucositis:       -                                           Grade: n/a  Intestinal Mucositis:     -                                         Grade: n/a   Skin: patchy, erythematous maculopapular rash to face, neck and upper chest and back post Kepivance   TLC: CDI   Neuro: A&O x 3  Pain:     Labs:   CBC Full  -  ( 07 Dec 2021 07:20 )  WBC Count : 5.23 K/uL  Hemoglobin : 8.3 g/dL  Hematocrit : 25.7 %  Platelet Count - Automated : 135 K/uL  Mean Cell Volume : 95.2 fl  Mean Cell Hemoglobin : 30.7 pg  Mean Cell Hemoglobin Concentration : 32.3 gm/dL  Auto Neutrophil # : x  Auto Lymphocyte # : x  Auto Monocyte # : x  Auto Eosinophil # : x  Auto Basophil # : x  Auto Neutrophil % : x  Auto Lymphocyte % : x  Auto Monocyte % : x  Auto Eosinophil % : x  Auto Basophil % : x                          8.3    5.23  )-----------( 135      ( 07 Dec 2021 07:20 )             25.7     12-07    136  |  106  |  6<L>  ----------------------------<  100<H>  3.9   |  20<L>  |  0.62    Ca    7.5<L>      07 Dec 2021 07:18  Phos  2.2     12-07  Mg     1.6     12-07    TPro  5.0<L>  /  Alb  2.8<L>  /  TBili  0.2  /  DBili  x   /  AST  14  /  ALT  24  /  AlkPhos  96  12-07    PT/INR - ( 06 Dec 2021 08:12 )   PT: 16.9 sec;   INR: 1.43 ratio         PTT - ( 06 Dec 2021 08:12 )  PTT:30.1 sec  LIVER FUNCTIONS - ( 07 Dec 2021 07:18 )  Alb: 2.8 g/dL / Pro: 5.0 g/dL / ALK PHOS: 96 U/L / ALT: 24 U/L / AST: 14 U/L / GGT: x               Cultures:   GI PCR Panel, Stool (11.30.21 @ 00:23)    Culture Results:   Adenovirus 40/41  DETECTED by PCR  *******Please Note:*******  GI panel PCR evaluates for:  Campylobacter, Plesiomonas shigelloides, Salmonella,  Vibrio, Yersinia enterocolitica, Enteroaggregative  Escherichia coli (EAEC), Enteropathogenic E.coli (EPEC),  Enterotoxigenic E. coli (ETEC) lt/st, Shiga-like  toxin-producing E. coli (STEC) stx1/stx2,  Shigella/ Enteroinvasive E. coli (EIEC), Cryptosporidium,  Cyclospora cayetanensis, Entamoeba histolytica,  Giardia lamblia, Adenovirus F 40/41, Astrovirus,  Norovirus GI/GII, Rotavirus A, Sapovirus    Culture - Blood (11.23.21 @ 12:32)    Specimen Source: .Blood Blood    Culture Results:   No Growth Final      Radiology:   EXAM:  CT ABDOMEN AND PELVIS IC                        PROCEDURE DATE:  11/29/2021    IMPRESSION:  Unchanged terminal ileitis with mild increased fluid distention of the upstream small bowel, compatible with ileus versus partial small bowel obstruction. Unchanged trace loculated ascites with peritoneal enhancement at the level of the terminal ileum, suggesting peritonitis. However, there has been interval resolution of the extraluminal free air since 11/23/2021.  Mild wall thickening of the adjacent proximal sigmoid colon, likely reactive to the terminal ileitis.  New small bilateral pleural effusions.    EXAM:  XR CHEST PORTABLE ROUTINE 1V                        PROCEDURE DATE:  11/28/2021    IMPRESSION:  Clear lungs.    EXAM:  DUPLEX SCAN EXT VEINS LOWER BI                        PROCEDURE DATE:  11/26/2021    IMPRESSION:  There is persistent bilateral DVT in the below the knee right posterior tibial, peroneal and soleal veins and above the knee in the left popliteal vein and tibial peroneal trunk.  No new DVT is seen.    EXAM:  CT ANGIO CHEST PULM [facility]                        PROCEDURE DATE:  11/24/2021    IMPRESSION:  Saddle embolus with right heart strain.  These results have been discussed with Dr. Arellano on 11/24/2021 at 12:47 AM by on-call resident.  Pneumoperitoneum is again noted.    Meds:   Antimicrobials:   acyclovir   Oral Tab/Cap 400 milliGRAM(s) Oral every 8 hours  clotrimazole Lozenge 1 Lozenge Oral five times a day  fluconAZOLE   Tablet 400 milliGRAM(s) Oral daily      Heme / Onc:   apixaban 5 milliGRAM(s) Oral every 12 hours      GI:  pantoprazole    Tablet 40 milliGRAM(s) Oral before breakfast  sodium bicarbonate Mouth Rinse 5 milliLiter(s) Swish and Spit five times a day      Cardiovascular:   metoprolol tartrate 25 milliGRAM(s) Oral every 8 hours  tamsulosin 0.4 milliGRAM(s) Oral at bedtime      Immunologic:       Other medications:   AQUAPHOR (petrolatum Ointment) 1 Application(s) Topical three times a day  Biotene Dry Mouth Oral Rinse 5 milliLiter(s) Swish and Spit five times a day  chlorhexidine 2% Cloths 1 Application(s) Topical <User Schedule>  potassium phosphate / sodium phosphate Tablet (K-PHOS No. 2) 1 Tablet(s) Oral four times a day with meals  potassium phosphate / sodium phosphate Tablet (K-PHOS No. 2) 1 Tablet(s) Oral every 4 hours      PRN:   acetaminophen     Tablet .. 650 milliGRAM(s) Oral every 6 hours PRN    A/P:  66 year old male with a history of multiple myeloma s/p RVD x 6   Post Autologous PBSCT day +  18  11/16- melphalan 2/2; s/p melphalan hydration for 24 hours post infusion of last dose   Strict I&O, daily weights, prn diuresis   11/17- rest day   11/18- rest day   11/19- s/p HPC transplant; completed transplant hydration for 24 hours post infusion of cells. Suprapubic pain in am. UA pending, follow up culture, BK virus. AXR   11/19-vasovegal episode in AM: will monitor tele for 24hrs   11/20 d/c telemetry. Lasix 40mg IV x today, follow up I&Os, daily weight. monitor rash, receiving 2nd dose Kepivance today  11/21 add Emend for 3 days and change zofran ATC. If worsening abd pain consider CT a/p oral contrast only.   11/22- Neutropenic started on cipro once febrile will pancx and switch cipro to  Aztronam 2g Q8   11/23- AMS this am, agonal breathing, diaphoretic, cool and clammy - responsive to sternal rub. Hypotensive, hypoxic. Placed on NRB, RRT called. Labs sent. Lactate send. PCN allergy - started on Aztreonam 2g IV q 8 hoursm Flagyl 500mg IV TID for anaerobic coverage given abdominal pain and distension, Vancomycin 1g IV x 1. CT A/P pending for 12:30 11/23/21. CE with new TWI in V2-V6 - likely demand ischemia. Repeat CE and lactate at 2pm.   11/23- CT A/P with Terminal ileitis with associated ileus versus low-grade obstruction. Pockets of free air in the right lower quadrant adjacent to the terminal ileum concerning for bowel perforation. 2.6 cm loculated fluid adjacent to the terminal ileum and surrounding peritoneal enhancement suggestive of developing peritonitis. Surgical team consulted- transferred to 8ICU for closer monitoring.  ID consult called- JESUS daniel.    11/24- CT chest angio + Saddle embolus with right heart strain., seen by cards- started on full dose A/C, s/p IVC filter placement with thrombectomy: transfuse PLT to >50K . doppler + B/L DVT. D/C Zarxio  11/27-early engraftment   11/27 on clear liq diet, watery diarrhea, C diff(-)  11/28 Transitioned from Heparin gtt to Lovenox - D/W ICU MD, recommend keeping keeping plt's >40k while on AC.   Pending transfer back to BMTU  12/1- SICU care appreciated. Failed TOV - 20F coude catheter placed this am by urology. Continue lovenox for now, may change to DOAC / NOAC prior to discharge. Continue antibiotics per ID service. Engrafting.   12/2- Lovenox switched to Eliquis   12/4 Haile removed, passed TOV, PT re-consulted, follow up recommendations for discharge, Mg, Phos repleted  12/6- discharge planning     1. Terminal ileitis / sigmoid colitis   CT A/P (11/23) with findings concerning for ileitis and Pockets of free air in the right lower quadrant adjacent to the terminal ileum concerning for bowel perforation and 2.6 cm loculated fluid adjacent to the terminal ileum and surrounding peritoneal enhancement suggestive of developing peritonitis.  SICU care / surgical input appreciated - high risk for surgical intervention, monitored clinically    Continue antibiotics - ID input appreciated   Tolerating regular diet   Repeat CT A/P 11/29/21 showed interval resolution of extraluminal free air since 11/23/21; mild wall thickening of adjacent proximal sigmoid colon, likely reactive to terminal ileitis   Abdominal exams improving     2. DVT / PE   Diagnosed with Saddle PE on 11/24 and required thrombectomy  Bilateral DVTs, most recent duplex LE showed no propagation of DVT   Troponins normalized - no longer trending   Continue therapeutic anticoagulation - currently receiving Lovenox 1mg/kg BID - consider changing to DOAC / NOAC prior to discharge   12/6- lovenox changed to eliquis 12/2/21     3. Urinary retention   Haile initially placed in SICU - removed 11/30  Failed TOV - retaining 900ml; 20F coude catheter placed by urology 12/1/21 AM   Continue flomax   12/4- haile discontinued - voiding     4. Neutropenic sepsis   Antibiotic management per ID service   Meropenem: 11/23 -->12/7  Fluconazole: 11/15 -->   PO Bactrim: 11/15 --> 11/17  Ciprofloxacin: 11/22 --> 11/23  Vancomycin: 11/23  Metronidazole: 11/23  Aztreonam: 11/23    5. Infectious Disease:   acyclovir   Oral Tab/Cap 400 milliGRAM(s) Oral every 8 hours  clotrimazole Lozenge 1 Lozenge Oral five times a day  fluconAZOLE   Tablet 400 milliGRAM(s) Oral daily  meropenem  IVPB 1000 milliGRAM(s) IV Intermittent every 8 hours    6. VOD Prophylaxis: Actigall, Glutamine    7. GI Prophylaxis:  pantoprazole    Tablet 40 milliGRAM(s) Oral before breakfast    8. Mouthcare - NS / NaHCO3 rinses, Mycelex, Biotene; Skin care     9. GVHD prophylaxis - n/a     10. Transfuse & replete electrolytes prn     11. IV hydration, daily weights, strict I&O, prn diuresis     12. PO intake as tolerated, nutrition follow up as needed, MVI, folic acid     13. Antiemetics, anti-diarrhea medications:     14. OOB as tolerated, physical therapy consult if needed     15. Monitor coags / fibrinogen 2x week, vitamin K as needed     16. Monitor closely for clinical changes, monitor for fevers     17. Emotional support provided, plan of care discussed and questions addressed     18. Patient education done regarding plan of care, restrictions and discharge planning     19. Continue regular social work input     I have written the above note for Dr. Lozada who performed service with me in the room.   Terrie Barr NP-C ([phone])    I have seen and examined patient with NP, I agree with above note as scribed.

## 2021-12-08 ENCOUNTER — OUTPATIENT (OUTPATIENT)
Dept: OUTPATIENT SERVICES | Facility: HOSPITAL | Age: 66
LOS: 1 days | Discharge: HOME | End: 2021-12-08

## 2021-12-08 ENCOUNTER — NON-APPOINTMENT (OUTPATIENT)
Age: 66
End: 2021-12-08

## 2021-12-08 DIAGNOSIS — R79.89 OTHER SPECIFIED ABNORMAL FINDINGS OF BLOOD CHEMISTRY: ICD-10-CM

## 2021-12-08 DIAGNOSIS — Z98.890 OTHER SPECIFIED POSTPROCEDURAL STATES: Chronic | ICD-10-CM

## 2021-12-09 ENCOUNTER — RESULT REVIEW (OUTPATIENT)
Age: 66
End: 2021-12-09

## 2021-12-09 ENCOUNTER — APPOINTMENT (OUTPATIENT)
Dept: HEMATOLOGY ONCOLOGY | Facility: CLINIC | Age: 66
End: 2021-12-09
Payer: MEDICARE

## 2021-12-09 ENCOUNTER — NON-APPOINTMENT (OUTPATIENT)
Age: 66
End: 2021-12-09

## 2021-12-09 VITALS
HEART RATE: 87 BPM | TEMPERATURE: 97 F | OXYGEN SATURATION: 95 % | RESPIRATION RATE: 16 BRPM | DIASTOLIC BLOOD PRESSURE: 74 MMHG | WEIGHT: 211.2 LBS | BODY MASS INDEX: 28.61 KG/M2 | SYSTOLIC BLOOD PRESSURE: 122 MMHG

## 2021-12-09 DIAGNOSIS — Z01.818 ENCOUNTER FOR OTHER PREPROCEDURAL EXAMINATION: ICD-10-CM

## 2021-12-09 LAB
BASOPHILS # BLD AUTO: 0.04 K/UL — SIGNIFICANT CHANGE UP (ref 0–0.2)
BASOPHILS NFR BLD AUTO: 0.6 % — SIGNIFICANT CHANGE UP (ref 0–2)
EOSINOPHIL # BLD AUTO: 0.01 K/UL — SIGNIFICANT CHANGE UP (ref 0–0.5)
EOSINOPHIL NFR BLD AUTO: 0.1 % — SIGNIFICANT CHANGE UP (ref 0–6)
HCT VFR BLD CALC: 28.7 % — LOW (ref 39–50)
HGB BLD-MCNC: 9.4 G/DL — LOW (ref 13–17)
IMM GRANULOCYTES NFR BLD AUTO: 1.2 % — SIGNIFICANT CHANGE UP (ref 0–1.5)
LYMPHOCYTES # BLD AUTO: 0.74 K/UL — LOW (ref 1–3.3)
LYMPHOCYTES # BLD AUTO: 10.9 % — LOW (ref 13–44)
MCHC RBC-ENTMCNC: 31.2 PG — SIGNIFICANT CHANGE UP (ref 27–34)
MCHC RBC-ENTMCNC: 32.8 G/DL — SIGNIFICANT CHANGE UP (ref 32–36)
MCV RBC AUTO: 95.3 FL — SIGNIFICANT CHANGE UP (ref 80–100)
MONOCYTES # BLD AUTO: 1.43 K/UL — HIGH (ref 0–0.9)
MONOCYTES NFR BLD AUTO: 21 % — HIGH (ref 2–14)
NEUTROPHILS # BLD AUTO: 4.51 K/UL — SIGNIFICANT CHANGE UP (ref 1.8–7.4)
NEUTROPHILS NFR BLD AUTO: 66.2 % — SIGNIFICANT CHANGE UP (ref 43–77)
NRBC # BLD: 0 /100 WBCS — SIGNIFICANT CHANGE UP (ref 0–0)
PLATELET # BLD AUTO: 253 K/UL — SIGNIFICANT CHANGE UP (ref 150–400)
RBC # BLD: 3.01 M/UL — LOW (ref 4.2–5.8)
RBC # FLD: 14.2 % — SIGNIFICANT CHANGE UP (ref 10.3–14.5)
WBC # BLD: 6.81 K/UL — SIGNIFICANT CHANGE UP (ref 3.8–10.5)
WBC # FLD AUTO: 6.81 K/UL — SIGNIFICANT CHANGE UP (ref 3.8–10.5)

## 2021-12-09 PROCEDURE — 99215 OFFICE O/P EST HI 40 MIN: CPT

## 2021-12-09 RX ORDER — FILGRASTIM-SNDZ 480 UG/.8ML
480 INJECTION, SOLUTION INTRAVENOUS; SUBCUTANEOUS DAILY
Qty: 10 | Refills: 0 | Status: DISCONTINUED | COMMUNITY
Start: 2021-10-14 | End: 2021-12-09

## 2021-12-09 RX ORDER — PANTOPRAZOLE 40 MG/1
40 TABLET, DELAYED RELEASE ORAL
Qty: 30 | Refills: 1 | Status: DISCONTINUED | COMMUNITY
Start: 2021-05-05 | End: 2021-12-09

## 2021-12-09 RX ORDER — ACYCLOVIR 400 MG/1
400 TABLET ORAL TWICE DAILY
Qty: 60 | Refills: 0 | Status: DISCONTINUED | COMMUNITY
Start: 2021-05-05 | End: 2021-12-09

## 2021-12-10 ENCOUNTER — NON-APPOINTMENT (OUTPATIENT)
Age: 66
End: 2021-12-10

## 2021-12-10 LAB
ALBUMIN SERPL ELPH-MCNC: 3.1 G/DL
ALP BLD-CCNC: 102 U/L
ALT SERPL-CCNC: 25 U/L
ANION GAP SERPL CALC-SCNC: 9 MMOL/L
AST SERPL-CCNC: 19 U/L
BILIRUB SERPL-MCNC: 0.2 MG/DL
BUN SERPL-MCNC: 7 MG/DL
CALCIUM SERPL-MCNC: 8.3 MG/DL
CHLORIDE SERPL-SCNC: 105 MMOL/L
CO2 SERPL-SCNC: 24 MMOL/L
CREAT SERPL-MCNC: 0.79 MG/DL
GLUCOSE SERPL-MCNC: 120 MG/DL
LDH SERPL-CCNC: 270 U/L
MAGNESIUM SERPL-MCNC: 1.5 MG/DL
POTASSIUM SERPL-SCNC: 4.2 MMOL/L
PROT SERPL-MCNC: 5.3 G/DL
SODIUM SERPL-SCNC: 139 MMOL/L

## 2021-12-14 ENCOUNTER — APPOINTMENT (OUTPATIENT)
Dept: HEMATOLOGY ONCOLOGY | Facility: CLINIC | Age: 66
End: 2021-12-14

## 2021-12-15 RX ORDER — ICOSAPENT ETHYL 1000 MG/1
1 CAPSULE ORAL
Qty: 120 | Refills: 0 | Status: DISCONTINUED | COMMUNITY
Start: 2021-01-12 | End: 2021-12-15

## 2021-12-15 RX ORDER — PROCHLORPERAZINE MALEATE 10 MG/1
10 TABLET ORAL
Qty: 30 | Refills: 0 | Status: DISCONTINUED | COMMUNITY
Start: 2021-05-05 | End: 2021-12-15

## 2021-12-16 ENCOUNTER — RX RENEWAL (OUTPATIENT)
Age: 66
End: 2021-12-16

## 2021-12-16 ENCOUNTER — RESULT REVIEW (OUTPATIENT)
Age: 66
End: 2021-12-16

## 2021-12-16 ENCOUNTER — APPOINTMENT (OUTPATIENT)
Dept: HEMATOLOGY ONCOLOGY | Facility: CLINIC | Age: 66
End: 2021-12-16
Payer: MEDICARE

## 2021-12-16 VITALS
OXYGEN SATURATION: 98 % | HEART RATE: 81 BPM | HEIGHT: 72.05 IN | TEMPERATURE: 98 F | DIASTOLIC BLOOD PRESSURE: 82 MMHG | BODY MASS INDEX: 27.02 KG/M2 | RESPIRATION RATE: 17 BRPM | WEIGHT: 199.52 LBS | SYSTOLIC BLOOD PRESSURE: 117 MMHG

## 2021-12-16 DIAGNOSIS — M25.562 PAIN IN LEFT KNEE: ICD-10-CM

## 2021-12-16 DIAGNOSIS — E83.42 HYPOMAGNESEMIA: ICD-10-CM

## 2021-12-16 LAB
BASOPHILS # BLD AUTO: 0.12 K/UL — SIGNIFICANT CHANGE UP (ref 0–0.2)
BASOPHILS NFR BLD AUTO: 1.3 % — SIGNIFICANT CHANGE UP (ref 0–2)
EOSINOPHIL # BLD AUTO: 0.2 K/UL — SIGNIFICANT CHANGE UP (ref 0–0.5)
EOSINOPHIL NFR BLD AUTO: 2.2 % — SIGNIFICANT CHANGE UP (ref 0–6)
HCT VFR BLD CALC: 29.4 % — LOW (ref 39–50)
HGB BLD-MCNC: 9.5 G/DL — LOW (ref 13–17)
IMM GRANULOCYTES NFR BLD AUTO: 5.9 % — HIGH (ref 0–1.5)
LYMPHOCYTES # BLD AUTO: 1.38 K/UL — SIGNIFICANT CHANGE UP (ref 1–3.3)
LYMPHOCYTES # BLD AUTO: 15.1 % — SIGNIFICANT CHANGE UP (ref 13–44)
MCHC RBC-ENTMCNC: 30.5 PG — SIGNIFICANT CHANGE UP (ref 27–34)
MCHC RBC-ENTMCNC: 32.3 G/DL — SIGNIFICANT CHANGE UP (ref 32–36)
MCV RBC AUTO: 94.5 FL — SIGNIFICANT CHANGE UP (ref 80–100)
MONOCYTES # BLD AUTO: 1.54 K/UL — HIGH (ref 0–0.9)
MONOCYTES NFR BLD AUTO: 16.9 % — HIGH (ref 2–14)
NEUTROPHILS # BLD AUTO: 5.35 K/UL — SIGNIFICANT CHANGE UP (ref 1.8–7.4)
NEUTROPHILS NFR BLD AUTO: 58.6 % — SIGNIFICANT CHANGE UP (ref 43–77)
NRBC # BLD: 0 /100 WBCS — SIGNIFICANT CHANGE UP (ref 0–0)
PLATELET # BLD AUTO: 383 K/UL — SIGNIFICANT CHANGE UP (ref 150–400)
RBC # BLD: 3.11 M/UL — LOW (ref 4.2–5.8)
RBC # FLD: 14.6 % — HIGH (ref 10.3–14.5)
WBC # BLD: 9.13 K/UL — SIGNIFICANT CHANGE UP (ref 3.8–10.5)
WBC # FLD AUTO: 9.13 K/UL — SIGNIFICANT CHANGE UP (ref 3.8–10.5)

## 2021-12-16 PROCEDURE — 99214 OFFICE O/P EST MOD 30 MIN: CPT

## 2021-12-16 RX ORDER — CHLORHEXIDINE GLUCONATE, 0.12% ORAL RINSE 1.2 MG/ML
0.12 SOLUTION DENTAL
Qty: 473 | Refills: 0 | Status: DISCONTINUED | COMMUNITY
Start: 2021-10-06 | End: 2021-12-16

## 2021-12-16 NOTE — HISTORY OF PRESENT ILLNESS
[de-identified] : 11/2020-Found to have T-12 compression fracture. Saw orthopedist Dr. Candelaria in 1/2021. Referred to endocrinologist Dr. Acosta by Dr. 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 Serum immunofixation showed 1 IgD lambda band and free lambda light chains. Urine immunofixation negative for monoclonal band.\par 4/29/2021–Bone marrow biopsy and bone marrow aspirate consistent with plasma cell myeloma (greater than 90% involvement). Myeloma FISH studies showed CCND1/IGH fusion (27%). Flow cytometry positive for monotypic plasma cells. Lymphocyte immunophenotypic findings showed no diagnostic abnormalities. Cytogenetics with normal male karyotype. Congo red stain negative. Iron stains showed iron stores present. No ring sideroblasts.\par 5/13/21- C1D1 RVd\par  [de-identified] : Since initial HPC transplant consult visit on 6/7/21, he had recent hematology office visit on 8/19/21, Cycle #5 Day#15 Velcade/Decadron. Cycle #5 Revlimid as planned. He describes moderate fatigue, occasional blurry vision, insomnia with Decadron. He denies fever, chills, cough, dyspnea. He received the COVID-19 vaccine(Moderna) on 3/16, 4/13/21; he has booster vaccine on 8/31/21. The patient is very concerned about the delta variant of COVID-19 and asked to discuss isolation/restriction policies. \par \par 10/6/21:\par He completed cycle 6 RVD on 9/30/21. He has moderate fatigue and good appetite. He has occasional blurry vision and was evaluated by Optho with normal evaluation. He had MRI Brain with alton(9/29/21 at [facility])- negative. He denies fever,  chills, cough, shortness of breath. \par \par 12/9/21:\par The patient was admitted to the BMTU on 11/15/21 and received conditioning chemotherapy with Melphalan 100 mg/m2 IV x 2 consecutive days followed by autoPSCT on 11/19/21. \par \par Upon admission, a TLC was placed in IR. Mr. Julio received IV hydration, pain management, anxiolytics, antiemetics, nutritional support, and antibacterial / antiviral / antifungal / GI / PCP and VOD (SOS) prophylaxis. When his ANC dropped below 500 he was started on prophylactic Ciprofloxacin. Labs were monitored on a daily basis, and he received electrolyte repletion and transfusional support as needed. \par \par On 11/19/2021 After pre-medication  received 251 mL of, Autologous mobilized, \par plasma reduced, pooled, thawed, washes HCP apheresis for  over approximately 1 \par hr. Cell counts as follows \par Total MNC( x10^8/kg)=7.22 \par CD34+cells ( x10^6 kg)=4.58 \par Cell Viability (%)= 90 \par Mr. Julio tolerated the infusion well with no adverse resections noted. \par \par While admitted, Mr. Julio experienced pancytopenia related to the high dose chemotherapy conditioning regimen. He was treated with transfusional support. On \par 11/19/21 he experienced a vasovagal episode in the bathroom that was self limiting. On 11/23/21 he had another vasovagal episode with severe abdominal pain and distention. A RRT was called. During the episode he was hypotensive, lactate was 6. A CT A/P showed pneumoperitoneum and terminal ileitis. Surgery was consulted and he was transferred to the SICU. During the episode, he made a troponin. A TTE was completed in the SICU, which showed increased pulmonary pressures. As a result, a CTA of the chest was completed which showed a saddle pulmonary embolism. He was started on full dose heparin. Lower extremity dopplers showed bilateral acute DVTs. On 11/24/21 he underwent a mechanical thrombectomy. IVC filter placement was deferred. \par \par The terminal ileitis and pneumoperitoneum was treated conservatively with supportive care and antibiotics. He completed a course of Meropenem 12/7/21. A \par repeat CT A/P showed unchanged ileitis, with mild increased fluid distention and resolution of free air. The heparin was transitioned to full dose Lovenox, \par and eventually Eliquis. Shortly thereafter he was transferred back to 7. \par \par Currently, he is stable for discharge home with outpatient follow up at the [facility] and with vascular cardiology. \par \par Since discharge to home on 12/7/21 he has moderate fatigue and slowly improving appetite. He describes loose stool(small volume) twice daily with abdominal bloating and gassiness since discharge but denies nausea, vomiting. \par

## 2021-12-16 NOTE — REVIEW OF SYSTEMS
[Diarrhea] : diarrhea [Fever] : no fever [Chills] : no chills [Mucosal Pain] : no mucosal pain [Chest Pain] : no chest pain [Shortness Of Breath] : no shortness of breath [Cough] : no cough [Vomiting] : no vomiting [Skin Rash] : no skin rash [Dizziness] : no dizziness [Fainting] : no fainting [Easy Bleeding] : no tendency for easy bleeding [Easy Bruising] : no tendency for easy bruising [FreeTextEntry2] : + moderate fatigue [FreeTextEntry3] : + blurry vision( seen by Optho on 10/5/21) [FreeTextEntry4] : no sore throat [FreeTextEntry5] : + lower extremity edema [FreeTextEntry7] : no nausea, abd bloating [FreeTextEntry9] : no bone pain [de-identified] : no paresthesias

## 2021-12-16 NOTE — REASON FOR VISIT
[Follow-Up Visit] : a follow-up visit for [Spouse] : spouse [FreeTextEntry2] : multiple myeloma s/p high-dose chemotherapy followed by autologous peripheral blood stem cell transplant on 11/19/21

## 2021-12-16 NOTE — CONSULT LETTER
[Dear  ___] : Dear  [unfilled], [Courtesy Letter:] : I had the pleasure of seeing your patient, [unfilled], in my office today. [Please see my note below.] : Please see my note below. [Consult Closing:] : Thank you very much for allowing me to participate in the care of this patient.  If you have any questions, please do not hesitate to contact me. [Sincerely,] : Sincerely, [FreeTextEntry2] : Dyana Cheatham M.D.\par [facility]\par [address]\[place]   [address] [FreeTextEntry3] : \par Pao Sorenson M.D.\par \par  of Medicine\par [employer] [facility] [facility] [employer]\par [facility] \par [facility] [place]\par [address] [place] [address] \[place] [address] \par ph: [phone]\par fax: [phone]

## 2021-12-16 NOTE — PHYSICAL EXAM
[Ambulatory and capable of all self care but unable to carry out any work activities] : Status 2- Ambulatory and capable of all self care but unable to carry out any work activities. Up and about more than 50% of waking hours [Normal] : pharynx is unremarkable, moist mucus membrane, no oral lesions [de-identified] : alopecia [de-identified] : anicteric [de-identified] : RRR, normal S1S2 [de-identified] : + edema bilateral lower legs [de-identified] : no cervical/SCV adenopathy [de-identified] : no rash [de-identified] : A & O x 4

## 2021-12-16 NOTE — ASSESSMENT
[FreeTextEntry1] : IgD lambda myeloma s/p cycle 6 RVD on 9/30/21\par Mobilization of stem cells with Zarxio 960mcg subcutaneous daily 10/28/21-11/1/21\par He was admitted to the BMTU on 11/15/21 and received conditioning chemotherapy with Melphalan 100 mg/m2 IV x 2 consecutive days followed by autoPSCT on 11/19/21. \par Hospital course complicated by saddle pulmonary embolism and bilateral LE acute DVTs. On 11/24/21 he underwent a mechanical thrombectomy. IVC filter placement was deferred. He received anticoagulation, now on \par Eliquis. He also had terminal ileitis with ? microperforation, monitored in SICU, treated conservatively with supportive care and antibiotics. He completed a course of Meropenem 12/7/21.\par \par 1) Multiple myeloma- s/p autoPSCT on 11/19/21\par Plan- \par Continue current medications and restrictions as discussed prior to discharge from BMTU\par Continue Acyclovir, Mepron, Fluconazole, for ID prophylaxis\par Continue MVI, Folate daily\par Continue Pantoprazole daily for GI prophylaxis\par Zofran as needed for nausea/vomiting\par Eat small size frequent meals daily; avoid spicy and acidic foods\par Drink plenty of water daily\par Moisturizing cream to the skin several times per day\par Check CMP, LDH, Mg today\par Call the office immediately if fever, chills, symptoms of infection\par \par 2) H/O saddle PE s/p thrombectomy; h/o bilat LE DVT\par Plan- Continue Eliquis 5 mg po 2X/day\par Followup with Dr. Holley on 1/13/22\par \par 3) H/O terminal ileitis- s/p high-dose Melphalan chemotherapy- symptoms slowly improving. \par Plan- \par Trial of Gas-X for abd bloating with gas\par Continue PPI daily\par \par RTC in 1 week.\par

## 2021-12-16 NOTE — HISTORY OF PRESENT ILLNESS
[de-identified] : 11/2020-Found to have T-12 compression fracture. Saw orthopedist Dr. Candelaria in 1/2021. Referred to endocrinologist Dr. Acosta by Dr. 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 Serum immunofixation showed 1 IgD lambda band and free lambda light chains. Urine immunofixation negative for monoclonal band.\par 4/29/2021–Bone marrow biopsy and bone marrow aspirate consistent with plasma cell myeloma (greater than 90% involvement). Myeloma FISH studies showed CCND1/IGH fusion (27%). Flow cytometry positive for monotypic plasma cells. Lymphocyte immunophenotypic findings showed no diagnostic abnormalities. Cytogenetics with normal male karyotype. Congo red stain negative. Iron stains showed iron stores present. No ring sideroblasts.\par 5/13/21- C1D1 RVd\par  [de-identified] : Since initial HPC transplant consult visit on 6/7/21, he had recent hematology office visit on 8/19/21, Cycle #5 Day#15 Velcade/Decadron. Cycle #5 Revlimid as planned. He describes moderate fatigue, occasional blurry vision, insomnia with Decadron. He denies fever, chills, cough, dyspnea. He received the COVID-19 vaccine(Moderna) on 3/16, 4/13/21; he has booster vaccine on 8/31/21. The patient is very concerned about the delta variant of COVID-19 and asked to discuss isolation/restriction policies. \par \par 10/6/21:\par He completed cycle 6 RVD on 9/30/21. He has moderate fatigue and good appetite. He has occasional blurry vision and was evaluated by Optho with normal evaluation. He had MRI Brain with alton(9/29/21 at [facility])- negative. He denies fever,  chills, cough, shortness of breath. \par \par 12/9/21:\par The patient was admitted to the BMTU on 11/15/21 and received conditioning chemotherapy with Melphalan 100 mg/m2 IV x 2 consecutive days followed by autoPSCT on 11/19/21. \par \par Upon admission, a TLC was placed in IR. Mr. Julio received IV hydration, pain management, anxiolytics, antiemetics, nutritional support, and antibacterial / antiviral / antifungal / GI / PCP and VOD (SOS) prophylaxis. When his ANC dropped below 500 he was started on prophylactic Ciprofloxacin. Labs were monitored on a daily basis, and he received electrolyte repletion and transfusional support as needed. \par \par On 11/19/2021 After pre-medication  received 251 mL of, Autologous mobilized, \par plasma reduced, pooled, thawed, washes HCP apheresis for  over approximately 1 \par hr. Cell counts as follows \par Total MNC( x10^8/kg)=7.22 \par CD34+cells ( x10^6 kg)=4.58 \par Cell Viability (%)= 90 \par Mr. Julio tolerated the infusion well with no adverse resections noted. \par \par While admitted, Mr. Julio experienced pancytopenia related to the high dose chemotherapy conditioning regimen. He was treated with transfusional support. On \par 11/19/21 he experienced a vasovagal episode in the bathroom that was self limiting. On 11/23/21 he had another vasovagal episode with severe abdominal pain and distention. A RRT was called. During the episode he was hypotensive, lactate was 6. A CT A/P showed pneumoperitoneum and terminal ileitis. Surgery was consulted and he was transferred to the SICU. During the episode, he made a troponin. A TTE was completed in the SICU, which showed increased pulmonary pressures. As a result, a CTA of the chest was completed which showed a saddle pulmonary embolism. He was started on full dose heparin. Lower extremity dopplers showed bilateral acute DVTs. On 11/24/21 he underwent a mechanical thrombectomy. IVC filter placement was deferred. \par \par The terminal ileitis and pneumoperitoneum was treated conservatively with supportive care and antibiotics. He completed a course of Meropenem 12/7/21. A \par repeat CT A/P showed unchanged ileitis, with mild increased fluid distention and resolution of free air. The heparin was transitioned to full dose Lovenox, \par and eventually Eliquis. Shortly thereafter he was transferred back to . \par \par Currently, he is stable for discharge home with outpatient follow up at the [facility] and with vascular cardiology. \par \par Since discharge to home on 12/7/21 he has moderate fatigue and slowly improving appetite. He describes loose stool(small volume) twice daily with abdominal bloating and gassiness since discharge but denies nausea, vomiting. \par \par 12/16/21:\par He continues to have moderate fatigue and continued slow improvement in appetite. He started taking Gas-X twice daily after 12/9/21 office visit and stopped on 12/13 as his abd bloating with gassiness significantly improved. He states loose stool has improved, now only once daily since 12/17, but no nausea/vomiting. He still has bilateral lower extremity edema. He scheduled followup with vascular cardiology on 1/13/22. He denies fever, cough, shortness of breath. \par

## 2021-12-16 NOTE — PHYSICAL EXAM
[Ambulatory and capable of all self care but unable to carry out any work activities] : Status 2- Ambulatory and capable of all self care but unable to carry out any work activities. Up and about more than 50% of waking hours [Normal] : affect appropriate [de-identified] : alopecia [de-identified] : anicteric [de-identified] : RRR, normal S1S2 [de-identified] : + edema bilateral lower legs but improving [de-identified] : no cervical/SCV adenopathy [de-identified] : no rash [de-identified] : A & O x 4

## 2021-12-16 NOTE — REVIEW OF SYSTEMS
[Diarrhea] : diarrhea [Fever] : no fever [Chills] : no chills [Mucosal Pain] : no mucosal pain [Chest Pain] : no chest pain [Shortness Of Breath] : no shortness of breath [Cough] : no cough [Vomiting] : no vomiting [Skin Rash] : no skin rash [Dizziness] : no dizziness [Fainting] : no fainting [Easy Bleeding] : no tendency for easy bleeding [Easy Bruising] : no tendency for easy bruising [FreeTextEntry2] : + moderate fatigue [FreeTextEntry3] : + blurry vision( seen by Optho on 10/5/21) [FreeTextEntry4] : no sore throat [FreeTextEntry5] : + lower extremity edema [FreeTextEntry7] : no nausea [FreeTextEntry9] : no bone pain [de-identified] : no paresthesias

## 2021-12-16 NOTE — CONSULT LETTER
[Dear  ___] : Dear  [unfilled], [Courtesy Letter:] : I had the pleasure of seeing your patient, [unfilled], in my office today. [Please see my note below.] : Please see my note below. [Consult Closing:] : Thank you very much for allowing me to participate in the care of this patient.  If you have any questions, please do not hesitate to contact me. [Sincerely,] : Sincerely, [FreeTextEntry2] : Dyana Cheatham M.D.\par [facility]\par [address]\[place]   [address] [FreeTextEntry3] : \par Pao Sorenson M.D.\par \par  of Medicine\par [employer]\par [facility] \par [facility]\par [address] [place] [address] \[place] [address] \par ph: [phone]\par fax: [phone]

## 2021-12-16 NOTE — ASSESSMENT
[FreeTextEntry1] : IgD lambda myeloma s/p cycle 6 RVD on 9/30/21\par Mobilization of stem cells with Zarxio 960mcg subcutaneous daily 10/28/21-11/1/21\par He was admitted to the BMTU on 11/15/21 and received conditioning chemotherapy with Melphalan 100 mg/m2 IV x 2 consecutive days followed by autoPSCT on 11/19/21. \par Hospital course complicated by saddle pulmonary embolism and bilateral LE acute DVTs. On 11/24/21 he underwent a mechanical thrombectomy. IVC filter placement was deferred. He received anticoagulation, now on \par Eliquis. He also had terminal ileitis with ? microperforation, monitored in SICU, treated conservatively with supportive care and antibiotics. He completed a course of Meropenem 12/7/21.\par \par 1) Multiple myeloma- s/p autoPSCT on 11/19/21\par Plan- \par Continue current medications and restrictions as discussed prior to discharge from BMTU\par Continue Acyclovir, Mepron, Fluconazole, for ID prophylaxis\par Continue MVI, Folate daily\par Continue Pantoprazole daily for GI prophylaxis\par Zofran as needed for nausea/vomiting\par Eat small size frequent meals daily; avoid spicy and acidic foods\par Drink plenty of water daily\par Moisturizing cream to the skin several times per day\par Check CMP, LDH, Mg today\par Call the office immediately if fever, chills, symptoms of infection\par \par 2) H/O saddle PE s/p thrombectomy; h/o bilat LE DVT\par Plan- Continue Eliquis 5 mg po 2X/day\par \par 3) H/O terminal ileitis- s/p high-dose Melphalan chemotherapy- symptoms slowly improving. \par Plan- \par Trial of Gas-X for abd bloating with gas\par Continue PPI daily\par \par RTC in 1 week.\par

## 2021-12-17 PROBLEM — E83.42 HYPOMAGNESEMIA: Status: ACTIVE | Noted: 2021-12-17

## 2021-12-17 LAB
ALBUMIN SERPL ELPH-MCNC: 3.5 G/DL
ALP BLD-CCNC: 101 U/L
ALT SERPL-CCNC: 14 U/L
ANION GAP SERPL CALC-SCNC: 13 MMOL/L
AST SERPL-CCNC: 15 U/L
BILIRUB SERPL-MCNC: 0.2 MG/DL
BUN SERPL-MCNC: 7 MG/DL
CALCIUM SERPL-MCNC: 9.1 MG/DL
CHLORIDE SERPL-SCNC: 104 MMOL/L
CO2 SERPL-SCNC: 26 MMOL/L
CREAT SERPL-MCNC: 0.9 MG/DL
GLUCOSE SERPL-MCNC: 111 MG/DL
LDH SERPL-CCNC: 288 U/L
MAGNESIUM SERPL-MCNC: 1.3 MG/DL
POTASSIUM SERPL-SCNC: 3.4 MMOL/L
PROT SERPL-MCNC: 5.8 G/DL
SODIUM SERPL-SCNC: 143 MMOL/L

## 2021-12-20 RX ORDER — ONDANSETRON 8 MG/1
8 TABLET ORAL
Qty: 30 | Refills: 3 | Status: ACTIVE | COMMUNITY
Start: 2021-12-07 | End: 1900-01-01

## 2021-12-22 ENCOUNTER — RESULT REVIEW (OUTPATIENT)
Age: 66
End: 2021-12-22

## 2021-12-22 ENCOUNTER — APPOINTMENT (OUTPATIENT)
Dept: HEMATOLOGY ONCOLOGY | Facility: CLINIC | Age: 66
End: 2021-12-22
Payer: MEDICARE

## 2021-12-22 VITALS
RESPIRATION RATE: 16 BRPM | SYSTOLIC BLOOD PRESSURE: 147 MMHG | BODY MASS INDEX: 26.81 KG/M2 | HEART RATE: 63 BPM | WEIGHT: 197.95 LBS | OXYGEN SATURATION: 98 % | TEMPERATURE: 97 F | DIASTOLIC BLOOD PRESSURE: 82 MMHG

## 2021-12-22 LAB
BASOPHILS # BLD AUTO: 0.08 K/UL — SIGNIFICANT CHANGE UP (ref 0–0.2)
BASOPHILS NFR BLD AUTO: 1 % — SIGNIFICANT CHANGE UP (ref 0–2)
EOSINOPHIL # BLD AUTO: 0.27 K/UL — SIGNIFICANT CHANGE UP (ref 0–0.5)
EOSINOPHIL NFR BLD AUTO: 3.4 % — SIGNIFICANT CHANGE UP (ref 0–6)
HCT VFR BLD CALC: 30.6 % — LOW (ref 39–50)
HGB BLD-MCNC: 9.6 G/DL — LOW (ref 13–17)
IMM GRANULOCYTES NFR BLD AUTO: 0.8 % — SIGNIFICANT CHANGE UP (ref 0–1.5)
LYMPHOCYTES # BLD AUTO: 1.01 K/UL — SIGNIFICANT CHANGE UP (ref 1–3.3)
LYMPHOCYTES # BLD AUTO: 12.9 % — LOW (ref 13–44)
MCHC RBC-ENTMCNC: 30.5 PG — SIGNIFICANT CHANGE UP (ref 27–34)
MCHC RBC-ENTMCNC: 31.4 G/DL — LOW (ref 32–36)
MCV RBC AUTO: 97.1 FL — SIGNIFICANT CHANGE UP (ref 80–100)
MONOCYTES # BLD AUTO: 1.34 K/UL — HIGH (ref 0–0.9)
MONOCYTES NFR BLD AUTO: 17.1 % — HIGH (ref 2–14)
NEUTROPHILS # BLD AUTO: 5.07 K/UL — SIGNIFICANT CHANGE UP (ref 1.8–7.4)
NEUTROPHILS NFR BLD AUTO: 64.8 % — SIGNIFICANT CHANGE UP (ref 43–77)
NRBC # BLD: 0 /100 WBCS — SIGNIFICANT CHANGE UP (ref 0–0)
PLATELET # BLD AUTO: 377 K/UL — SIGNIFICANT CHANGE UP (ref 150–400)
RBC # BLD: 3.15 M/UL — LOW (ref 4.2–5.8)
RBC # FLD: 15.3 % — HIGH (ref 10.3–14.5)
WBC # BLD: 7.83 K/UL — SIGNIFICANT CHANGE UP (ref 3.8–10.5)
WBC # FLD AUTO: 7.83 K/UL — SIGNIFICANT CHANGE UP (ref 3.8–10.5)

## 2021-12-22 PROCEDURE — 99213 OFFICE O/P EST LOW 20 MIN: CPT

## 2021-12-22 NOTE — ASSESSMENT
[FreeTextEntry1] : IgD lambda myeloma s/p cycle 6 RVD on 9/30/21\par Mobilization of stem cells with Zarxio 960mcg subcutaneous daily 10/28/21-11/1/21\par He was admitted to the BMTU on 11/15/21 and received conditioning chemotherapy with Melphalan 100 mg/m2 IV x 2 consecutive days followed by autoPSCT on 11/19/21. \par Hospital course complicated by saddle pulmonary embolism and bilateral LE acute DVTs. On 11/24/21 he underwent a mechanical thrombectomy. IVC filter placement was deferred. He received anticoagulation, now on \par Eliquis. He also had terminal ileitis with ? microperforation, monitored in SICU, treated conservatively with supportive care and antibiotics. He completed a course of Meropenem 12/7/21.\par \par 1) Multiple myeloma- s/p autoPSCT on 11/19/21\par Plan- \par Continue current medications and restrictions as discussed prior to discharge from BMTU\par Continue Acyclovir, Mepron, Fluconazole, for ID prophylaxis\par Continue MVI, Folate daily\par Continue Pantoprazole daily for GI prophylaxis\par Zofran as needed for nausea/vomiting\par Eat small size frequent meals daily; avoid spicy and acidic foods\par Drink plenty of water daily\par Moisturizing cream to the skin several times per day\par Check CMP, LDH, Mg today\par Call the office immediately if fever, chills, symptoms of infection\par \par 2) H/O saddle PE s/p thrombectomy; h/o bilat LE DVT\par Plan- Continue Eliquis 5 mg po 2X/day\par Followup with Dr. Holley on 1/13/22\par \par 3) H/O terminal ileitis- s/p high-dose Melphalan chemotherapy- symptoms now resolved\par Plan- \par Gas-X as needed; abdominal symptoms have resolved. \par Continue PPI daily\par \par RTC in 2 weeks.\par

## 2021-12-22 NOTE — PHYSICAL EXAM
[Ambulatory and capable of all self care but unable to carry out any work activities] : Status 2- Ambulatory and capable of all self care but unable to carry out any work activities. Up and about more than 50% of waking hours [Normal] : affect appropriate [de-identified] : alopecia [de-identified] : anicteric [de-identified] : RRR, normal S1S2 [de-identified] : + 1 edema bilateral lower legs  [de-identified] : no cervical/SCV adenopathy [de-identified] : no rash [de-identified] : A & O x 4

## 2021-12-22 NOTE — REVIEW OF SYSTEMS
[Fever] : no fever [Chills] : no chills [Mucosal Pain] : no mucosal pain [Chest Pain] : no chest pain [Shortness Of Breath] : no shortness of breath [Cough] : no cough [Abdominal Pain] : no abdominal pain [Vomiting] : no vomiting [Diarrhea] : no diarrhea [Skin Rash] : no skin rash [Dizziness] : no dizziness [Fainting] : no fainting [Easy Bleeding] : no tendency for easy bleeding [Easy Bruising] : no tendency for easy bruising [FreeTextEntry2] : + mild- mod fatigue [FreeTextEntry3] : + blurry vision( seen by Optho on 10/5/21) [FreeTextEntry4] : no sore throat [FreeTextEntry5] : + lower extremity edema- less [FreeTextEntry7] : no nausea [FreeTextEntry9] : no bone pain [de-identified] : no paresthesias

## 2021-12-22 NOTE — CONSULT LETTER
[Dear  ___] : Dear  [unfilled], [Courtesy Letter:] : I had the pleasure of seeing your patient, [unfilled], in my office today. [Please see my note below.] : Please see my note below. [Consult Closing:] : Thank you very much for allowing me to participate in the care of this patient.  If you have any questions, please do not hesitate to contact me. [Sincerely,] : Sincerely, [FreeTextEntry2] : Dyana Cheatham M.D.\par [facility]\par [address]\par [place]   [address] [FreeTextEntry3] : \par Pao Sorenson M.D.\par \par  of Medicine\par [facility] [employer]\par [facility] \par [facility] [place]\par [address] [place] [address] \[place] [address] \par ph: [phone]\par fax: [phone]

## 2021-12-22 NOTE — HISTORY OF PRESENT ILLNESS
[de-identified] : 11/2020-Found to have T-12 compression fracture. Saw orthopedist Dr. Candelaria in 1/2021. Referred to endocrinologist Dr. Acosta by Dr. 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 Serum immunofixation showed 1 IgD lambda band and free lambda light chains. Urine immunofixation negative for monoclonal band.\par 4/29/2021–Bone marrow biopsy and bone marrow aspirate consistent with plasma cell myeloma (greater than 90% involvement). Myeloma FISH studies showed CCND1/IGH fusion (27%). Flow cytometry positive for monotypic plasma cells. Lymphocyte immunophenotypic findings showed no diagnostic abnormalities. Cytogenetics with normal male karyotype. Congo red stain negative. Iron stains showed iron stores present. No ring sideroblasts.\par 5/13/21- C1D1 RVd\par  [de-identified] : Since initial HPC transplant consult visit on 6/7/21, he had recent hematology office visit on 8/19/21, Cycle #5 Day#15 Velcade/Decadron. Cycle #5 Revlimid as planned. He describes moderate fatigue, occasional blurry vision, insomnia with Decadron. He denies fever, chills, cough, dyspnea. He received the COVID-19 vaccine(Moderna) on 3/16, 4/13/21; he has booster vaccine on 8/31/21. The patient is very concerned about the delta variant of COVID-19 and asked to discuss isolation/restriction policies. \par \par 10/6/21:\par He completed cycle 6 RVD on 9/30/21. He has moderate fatigue and good appetite. He has occasional blurry vision and was evaluated by Optho with normal evaluation. He had MRI Brain with alton(9/29/21 at [facility])- negative. He denies fever,  chills, cough, shortness of breath. \par \par 12/9/21:\par The patient was admitted to the BMTU on 11/15/21 and received conditioning chemotherapy with Melphalan 100 mg/m2 IV x 2 consecutive days followed by autoPSCT on 11/19/21. \par \par Upon admission, a TLC was placed in IR. Mr. Julio received IV hydration, pain management, anxiolytics, antiemetics, nutritional support, and antibacterial / antiviral / antifungal / GI / PCP and VOD (SOS) prophylaxis. When his ANC dropped below 500 he was started on prophylactic Ciprofloxacin. Labs were monitored on a daily basis, and he received electrolyte repletion and transfusional support as needed. \par \par On 11/19/2021 After pre-medication  received 251 mL of, Autologous mobilized, \par plasma reduced, pooled, thawed, washes HCP apheresis for  over approximately 1 \par hr. Cell counts as follows \par Total MNC( x10^8/kg)=7.22 \par CD34+cells ( x10^6 kg)=4.58 \par Cell Viability (%)= 90 \par Mr. Julio tolerated the infusion well with no adverse resections noted. \par \par While admitted, Mr. Julio experienced pancytopenia related to the high dose chemotherapy conditioning regimen. He was treated with transfusional support. On \par 11/19/21 he experienced a vasovagal episode in the bathroom that was self limiting. On 11/23/21 he had another vasovagal episode with severe abdominal pain and distention. A RRT was called. During the episode he was hypotensive, lactate was 6. A CT A/P showed pneumoperitoneum and terminal ileitis. Surgery was consulted and he was transferred to the SICU. During the episode, he made a troponin. A TTE was completed in the SICU, which showed increased pulmonary pressures. As a result, a CTA of the chest was completed which showed a saddle pulmonary embolism. He was started on full dose heparin. Lower extremity dopplers showed bilateral acute DVTs. On 11/24/21 he underwent a mechanical thrombectomy. IVC filter placement was deferred. \par \par The terminal ileitis and pneumoperitoneum was treated conservatively with supportive care and antibiotics. He completed a course of Meropenem 12/7/21. A \par repeat CT A/P showed unchanged ileitis, with mild increased fluid distention and resolution of free air. The heparin was transitioned to full dose Lovenox, \par and eventually Eliquis. Shortly thereafter he was transferred back to . \par \par Currently, he is stable for discharge home with outpatient follow up at the [facility] and with vascular cardiology. \par \par Since discharge to home on 12/7/21 he has moderate fatigue and slowly improving appetite. He describes loose stool(small volume) twice daily with abdominal bloating and gassiness since discharge but denies nausea, vomiting. \par \par 12/16/21:\par He continues to have moderate fatigue and continued slow improvement in appetite. He started taking Gas-X twice daily after 12/9/21 office visit and stopped on 12/13 as his abd bloating with gassiness significantly improved. He states loose stool has improved, now only once daily since 12/17, but no nausea/vomiting. He still has bilateral lower extremity edema. He scheduled followup with vascular cardiology on 1/13/22. He denies fever, cough, shortness of breath. \par \par 12/22/21:\par He has mild-moderate fatigue and good appetite. He denies abdominal pain/distension and hasn't needed Gas-X for one week. He denies nausea/vomiting, diarrhea. No fever, chills. \par

## 2021-12-23 LAB
ALBUMIN SERPL ELPH-MCNC: 3.8 G/DL
ALP BLD-CCNC: 86 U/L
ALT SERPL-CCNC: 12 U/L
ANION GAP SERPL CALC-SCNC: 11 MMOL/L
AST SERPL-CCNC: 18 U/L
BILIRUB SERPL-MCNC: 0.2 MG/DL
BUN SERPL-MCNC: 12 MG/DL
CALCIUM SERPL-MCNC: 9.2 MG/DL
CHLORIDE SERPL-SCNC: 107 MMOL/L
CO2 SERPL-SCNC: 24 MMOL/L
CREAT SERPL-MCNC: 0.84 MG/DL
GLUCOSE SERPL-MCNC: 106 MG/DL
LDH SERPL-CCNC: 253 U/L
MAGNESIUM SERPL-MCNC: 1.6 MG/DL
POTASSIUM SERPL-SCNC: 4 MMOL/L
PROT SERPL-MCNC: 5.8 G/DL
SODIUM SERPL-SCNC: 142 MMOL/L

## 2021-12-29 ENCOUNTER — APPOINTMENT (OUTPATIENT)
Dept: HEMATOLOGY ONCOLOGY | Facility: CLINIC | Age: 66
End: 2021-12-29

## 2021-12-30 PROCEDURE — 86769 SARS-COV-2 COVID-19 ANTIBODY: CPT

## 2021-12-30 PROCEDURE — U0005: CPT

## 2021-12-30 PROCEDURE — 80053 COMPREHEN METABOLIC PANEL: CPT

## 2021-12-30 PROCEDURE — 84436 ASSAY OF TOTAL THYROXINE: CPT

## 2021-12-30 PROCEDURE — 82947 ASSAY GLUCOSE BLOOD QUANT: CPT

## 2021-12-30 PROCEDURE — P9037: CPT

## 2021-12-30 PROCEDURE — C1894: CPT

## 2021-12-30 PROCEDURE — 87040 BLOOD CULTURE FOR BACTERIA: CPT

## 2021-12-30 PROCEDURE — 75743 ARTERY X-RAYS LUNGS: CPT | Mod: XU

## 2021-12-30 PROCEDURE — 85610 PROTHROMBIN TIME: CPT

## 2021-12-30 PROCEDURE — 87086 URINE CULTURE/COLONY COUNT: CPT

## 2021-12-30 PROCEDURE — 86900 BLOOD TYPING SEROLOGIC ABO: CPT

## 2021-12-30 PROCEDURE — 82784 ASSAY IGA/IGD/IGG/IGM EACH: CPT

## 2021-12-30 PROCEDURE — 84100 ASSAY OF PHOSPHORUS: CPT

## 2021-12-30 PROCEDURE — 36430 TRANSFUSION BLD/BLD COMPNT: CPT

## 2021-12-30 PROCEDURE — 37185 PRIM ART M-THRMBC SBSQ VSL: CPT

## 2021-12-30 PROCEDURE — 76937 US GUIDE VASCULAR ACCESS: CPT

## 2021-12-30 PROCEDURE — 85396 CLOTTING ASSAY WHOLE BLOOD: CPT

## 2021-12-30 PROCEDURE — 83880 ASSAY OF NATRIURETIC PEPTIDE: CPT

## 2021-12-30 PROCEDURE — 85384 FIBRINOGEN ACTIVITY: CPT

## 2021-12-30 PROCEDURE — 85018 HEMOGLOBIN: CPT

## 2021-12-30 PROCEDURE — 87507 IADNA-DNA/RNA PROBE TQ 12-25: CPT

## 2021-12-30 PROCEDURE — 38207 CRYOPRESERVE STEM CELLS: CPT

## 2021-12-30 PROCEDURE — 82955 ASSAY OF G6PD ENZYME: CPT

## 2021-12-30 PROCEDURE — 74018 RADEX ABDOMEN 1 VIEW: CPT

## 2021-12-30 PROCEDURE — 93970 EXTREMITY STUDY: CPT

## 2021-12-30 PROCEDURE — 93005 ELECTROCARDIOGRAM TRACING: CPT

## 2021-12-30 PROCEDURE — 86803 HEPATITIS C AB TEST: CPT

## 2021-12-30 PROCEDURE — 85025 COMPLETE CBC W/AUTO DIFF WBC: CPT

## 2021-12-30 PROCEDURE — 83735 ASSAY OF MAGNESIUM: CPT

## 2021-12-30 PROCEDURE — 87449 NOS EACH ORGANISM AG IA: CPT

## 2021-12-30 PROCEDURE — 87799 DETECT AGENT NOS DNA QUANT: CPT

## 2021-12-30 PROCEDURE — 84443 ASSAY THYROID STIM HORMONE: CPT

## 2021-12-30 PROCEDURE — 71275 CT ANGIOGRAPHY CHEST: CPT

## 2021-12-30 PROCEDURE — 84132 ASSAY OF SERUM POTASSIUM: CPT

## 2021-12-30 PROCEDURE — 86901 BLOOD TYPING SEROLOGIC RH(D): CPT

## 2021-12-30 PROCEDURE — 74177 CT ABD & PELVIS W/CONTRAST: CPT

## 2021-12-30 PROCEDURE — 82553 CREATINE MB FRACTION: CPT

## 2021-12-30 PROCEDURE — 85045 AUTOMATED RETICULOCYTE COUNT: CPT

## 2021-12-30 PROCEDURE — 97165 OT EVAL LOW COMPLEX 30 MIN: CPT

## 2021-12-30 PROCEDURE — 85049 AUTOMATED PLATELET COUNT: CPT

## 2021-12-30 PROCEDURE — 71045 X-RAY EXAM CHEST 1 VIEW: CPT

## 2021-12-30 PROCEDURE — 82550 ASSAY OF CK (CPK): CPT

## 2021-12-30 PROCEDURE — 80076 HEPATIC FUNCTION PANEL: CPT

## 2021-12-30 PROCEDURE — 81003 URINALYSIS AUTO W/O SCOPE: CPT

## 2021-12-30 PROCEDURE — 36014 PLACE CATHETER IN ARTERY: CPT

## 2021-12-30 PROCEDURE — 85520 HEPARIN ASSAY: CPT

## 2021-12-30 PROCEDURE — C1769: CPT

## 2021-12-30 PROCEDURE — 83615 LACTATE (LD) (LDH) ENZYME: CPT

## 2021-12-30 PROCEDURE — C1887: CPT

## 2021-12-30 PROCEDURE — 82962 GLUCOSE BLOOD TEST: CPT

## 2021-12-30 PROCEDURE — C1751: CPT

## 2021-12-30 PROCEDURE — 85027 COMPLETE CBC AUTOMATED: CPT

## 2021-12-30 PROCEDURE — 97530 THERAPEUTIC ACTIVITIES: CPT

## 2021-12-30 PROCEDURE — 86850 RBC ANTIBODY SCREEN: CPT

## 2021-12-30 PROCEDURE — 84295 ASSAY OF SERUM SODIUM: CPT

## 2021-12-30 PROCEDURE — 86905 BLOOD TYPING RBC ANTIGENS: CPT

## 2021-12-30 PROCEDURE — 82565 ASSAY OF CREATININE: CPT

## 2021-12-30 PROCEDURE — 82435 ASSAY OF BLOOD CHLORIDE: CPT

## 2021-12-30 PROCEDURE — 83605 ASSAY OF LACTIC ACID: CPT

## 2021-12-30 PROCEDURE — 36556 INSERT NON-TUNNEL CV CATH: CPT

## 2021-12-30 PROCEDURE — C1757: CPT

## 2021-12-30 PROCEDURE — 82248 BILIRUBIN DIRECT: CPT

## 2021-12-30 PROCEDURE — U0003: CPT

## 2021-12-30 PROCEDURE — 97116 GAIT TRAINING THERAPY: CPT

## 2021-12-30 PROCEDURE — 82330 ASSAY OF CALCIUM: CPT

## 2021-12-30 PROCEDURE — 85379 FIBRIN DEGRADATION QUANT: CPT

## 2021-12-30 PROCEDURE — 80048 BASIC METABOLIC PNL TOTAL CA: CPT

## 2021-12-30 PROCEDURE — 87324 CLOSTRIDIUM AG IA: CPT

## 2021-12-30 PROCEDURE — 38209 WASH HARVEST STEM CELLS: CPT

## 2021-12-30 PROCEDURE — 37184 PRIM ART M-THRMBC 1ST VSL: CPT | Mod: 50

## 2021-12-30 PROCEDURE — 84480 ASSAY TRIIODOTHYRONINE (T3): CPT

## 2021-12-30 PROCEDURE — 93306 TTE W/DOPPLER COMPLETE: CPT

## 2021-12-30 PROCEDURE — 84484 ASSAY OF TROPONIN QUANT: CPT

## 2021-12-30 PROCEDURE — 82803 BLOOD GASES ANY COMBINATION: CPT

## 2021-12-30 PROCEDURE — 77001 FLUOROGUIDE FOR VEIN DEVICE: CPT

## 2021-12-30 PROCEDURE — 36415 COLL VENOUS BLD VENIPUNCTURE: CPT

## 2021-12-30 PROCEDURE — 85730 THROMBOPLASTIN TIME PARTIAL: CPT

## 2021-12-30 PROCEDURE — 97162 PT EVAL MOD COMPLEX 30 MIN: CPT

## 2021-12-30 PROCEDURE — 85014 HEMATOCRIT: CPT

## 2021-12-30 PROCEDURE — 81001 URINALYSIS AUTO W/SCOPE: CPT

## 2021-12-30 PROCEDURE — 82785 ASSAY OF IGE: CPT

## 2022-01-05 ENCOUNTER — APPOINTMENT (OUTPATIENT)
Dept: HEMATOLOGY ONCOLOGY | Facility: CLINIC | Age: 67
End: 2022-01-05
Payer: MEDICARE

## 2022-01-05 ENCOUNTER — RESULT REVIEW (OUTPATIENT)
Age: 67
End: 2022-01-05

## 2022-01-05 VITALS
SYSTOLIC BLOOD PRESSURE: 126 MMHG | OXYGEN SATURATION: 98 % | DIASTOLIC BLOOD PRESSURE: 78 MMHG | HEART RATE: 61 BPM | WEIGHT: 195.55 LBS | HEIGHT: 72.05 IN | TEMPERATURE: 97.1 F | BODY MASS INDEX: 26.49 KG/M2 | RESPIRATION RATE: 18 BRPM

## 2022-01-05 LAB
BASOPHILS # BLD AUTO: 0.09 K/UL — SIGNIFICANT CHANGE UP (ref 0–0.2)
BASOPHILS NFR BLD AUTO: 1.5 % — SIGNIFICANT CHANGE UP (ref 0–2)
EOSINOPHIL # BLD AUTO: 0.32 K/UL — SIGNIFICANT CHANGE UP (ref 0–0.5)
EOSINOPHIL NFR BLD AUTO: 5.3 % — SIGNIFICANT CHANGE UP (ref 0–6)
HCT VFR BLD CALC: 35.1 % — LOW (ref 39–50)
HGB BLD-MCNC: 11.5 G/DL — LOW (ref 13–17)
IMM GRANULOCYTES NFR BLD AUTO: 0.3 % — SIGNIFICANT CHANGE UP (ref 0–1.5)
LYMPHOCYTES # BLD AUTO: 1.02 K/UL — SIGNIFICANT CHANGE UP (ref 1–3.3)
LYMPHOCYTES # BLD AUTO: 17 % — SIGNIFICANT CHANGE UP (ref 13–44)
MCHC RBC-ENTMCNC: 31.2 PG — SIGNIFICANT CHANGE UP (ref 27–34)
MCHC RBC-ENTMCNC: 32.8 G/DL — SIGNIFICANT CHANGE UP (ref 32–36)
MCV RBC AUTO: 95.1 FL — SIGNIFICANT CHANGE UP (ref 80–100)
MONOCYTES # BLD AUTO: 0.8 K/UL — SIGNIFICANT CHANGE UP (ref 0–0.9)
MONOCYTES NFR BLD AUTO: 13.4 % — SIGNIFICANT CHANGE UP (ref 2–14)
NEUTROPHILS # BLD AUTO: 3.74 K/UL — SIGNIFICANT CHANGE UP (ref 1.8–7.4)
NEUTROPHILS NFR BLD AUTO: 62.5 % — SIGNIFICANT CHANGE UP (ref 43–77)
NRBC # BLD: 0 /100 WBCS — SIGNIFICANT CHANGE UP (ref 0–0)
PLATELET # BLD AUTO: 266 K/UL — SIGNIFICANT CHANGE UP (ref 150–400)
RBC # BLD: 3.69 M/UL — LOW (ref 4.2–5.8)
RBC # FLD: 15.4 % — HIGH (ref 10.3–14.5)
WBC # BLD: 5.99 K/UL — SIGNIFICANT CHANGE UP (ref 3.8–10.5)
WBC # FLD AUTO: 5.99 K/UL — SIGNIFICANT CHANGE UP (ref 3.8–10.5)

## 2022-01-05 PROCEDURE — 99215 OFFICE O/P EST HI 40 MIN: CPT

## 2022-01-05 NOTE — HISTORY OF PRESENT ILLNESS
[de-identified] : 11/2020-Found to have T-12 compression fracture. Saw orthopedist Dr. Candelaria in 1/2021. Referred to endocrinologist Dr. Acosta by Dr. 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 Serum immunofixation showed 1 IgD lambda band and free lambda light chains. Urine immunofixation negative for monoclonal band.\par 4/29/2021–Bone marrow biopsy and bone marrow aspirate consistent with plasma cell myeloma (greater than 90% involvement). Myeloma FISH studies showed CCND1/IGH fusion (27%). Flow cytometry positive for monotypic plasma cells. Lymphocyte immunophenotypic findings showed no diagnostic abnormalities. Cytogenetics with normal male karyotype. Congo red stain negative. Iron stains showed iron stores present. No ring sideroblasts.\par 5/13/21- C1D1 RVd\par  [de-identified] : Since initial HPC transplant consult visit on 6/7/21, he had recent hematology office visit on 8/19/21, Cycle #5 Day#15 Velcade/Decadron. Cycle #5 Revlimid as planned. He describes moderate fatigue, occasional blurry vision, insomnia with Decadron. He denies fever, chills, cough, dyspnea. He received the COVID-19 vaccine(Moderna) on 3/16, 4/13/21; he has booster vaccine on 8/31/21. The patient is very concerned about the delta variant of COVID-19 and asked to discuss isolation/restriction policies. \par \par 10/6/21:\par He completed cycle 6 RVD on 9/30/21. He has moderate fatigue and good appetite. He has occasional blurry vision and was evaluated by Optho with normal evaluation. He had MRI Brain with alton(9/29/21 at [facility] negative. He denies fever,  chills, cough, shortness of breath. \par \par 12/9/21:\par The patient was admitted to the BMTU on 11/15/21 and received conditioning chemotherapy with Melphalan 100 mg/m2 IV x 2 consecutive days followed by autoPSCT on 11/19/21. \par \par Upon admission, a TLC was placed in IR. Mr. Julio received IV hydration, pain management, anxiolytics, antiemetics, nutritional support, and antibacterial / antiviral / antifungal / GI / PCP and VOD (SOS) prophylaxis. When his ANC dropped below 500 he was started on prophylactic Ciprofloxacin. Labs were monitored on a daily basis, and he received electrolyte repletion and transfusional support as needed. \par \par On 11/19/2021 After pre-medication  received 251 mL of, Autologous mobilized, \par plasma reduced, pooled, thawed, washes HCP apheresis for  over approximately 1 \par hr. Cell counts as follows \par Total MNC( x10^8/kg)=7.22 \par CD34+cells ( x10^6 kg)=4.58 \par Cell Viability (%)= 90 \par Mr. Julio tolerated the infusion well with no adverse resections noted. \par \par While admitted, Mr. Julio experienced pancytopenia related to the high dose chemotherapy conditioning regimen. He was treated with transfusional support. On \par 11/19/21 he experienced a vasovagal episode in the bathroom that was self limiting. On 11/23/21 he had another vasovagal episode with severe abdominal pain and distention. A RRT was called. During the episode he was hypotensive, lactate was 6. A CT A/P showed pneumoperitoneum and terminal ileitis. Surgery was consulted and he was transferred to the SICU. During the episode, he made a troponin. A TTE was completed in the SICU, which showed increased pulmonary pressures. As a result, a CTA of the chest was completed which showed a saddle pulmonary embolism. He was started on full dose heparin. Lower extremity dopplers showed bilateral acute DVTs. On 11/24/21 he underwent a mechanical thrombectomy. IVC filter placement was deferred. \par \par The terminal ileitis and pneumoperitoneum was treated conservatively with supportive care and antibiotics. He completed a course of Meropenem 12/7/21. A \par repeat CT A/P showed unchanged ileitis, with mild increased fluid distention and resolution of free air. The heparin was transitioned to full dose Lovenox, \par and eventually Eliquis. Shortly thereafter he was transferred back to . \par \par Currently, he is stable for discharge home with outpatient follow up at the [facility] and with vascular cardiology. \par \par Since discharge to home on 12/7/21 he has moderate fatigue and slowly improving appetite. He describes loose stool(small volume) twice daily with abdominal bloating and gassiness since discharge but denies nausea, vomiting. \par \par 12/16/21:\par He continues to have moderate fatigue and continued slow improvement in appetite. He started taking Gas-X twice daily after 12/9/21 office visit and stopped on 12/13 as his abd bloating with gassiness significantly improved. He states loose stool has improved, now only once daily since 12/17, but no nausea/vomiting. He still has bilateral lower extremity edema. He scheduled followup with vascular cardiology on 1/13/22. He denies fever, cough, shortness of breath. \par \par 12/22/21:\par He has mild-moderate fatigue and good appetite. He denies abdominal pain/distension and hasn't needed Gas-X for one week. He denies nausea/vomiting, diarrhea. No fever, chills. \par \par 01/05/22:\par He has mild fatigue and good appetite. He denies fever, chills, cough, dyspnea, nausea/vomiting, abdominal pain, diarrhea. He walks on treadmill daily x 20 minutes.

## 2022-01-05 NOTE — REVIEW OF SYSTEMS
[Fever] : no fever [Chills] : no chills [Mucosal Pain] : no mucosal pain [Chest Pain] : no chest pain [Shortness Of Breath] : no shortness of breath [Cough] : no cough [Abdominal Pain] : no abdominal pain [Vomiting] : no vomiting [Diarrhea] : no diarrhea [Skin Rash] : no skin rash [Dizziness] : no dizziness [Fainting] : no fainting [Easy Bleeding] : no tendency for easy bleeding [Easy Bruising] : no tendency for easy bruising [FreeTextEntry2] : + mild fatigue [FreeTextEntry3] : + blurry vision( seen by Optho on 10/5/21) [FreeTextEntry4] : no sore throat [FreeTextEntry5] : + lower extremity edema- less [FreeTextEntry7] : no nausea [FreeTextEntry9] : no bone pain [de-identified] : no paresthesias

## 2022-01-05 NOTE — CONSULT LETTER
[Dear  ___] : Dear  [unfilled], [Courtesy Letter:] : I had the pleasure of seeing your patient, [unfilled], in my office today. [Please see my note below.] : Please see my note below. [Consult Closing:] : Thank you very much for allowing me to participate in the care of this patient.  If you have any questions, please do not hesitate to contact me. [Sincerely,] : Sincerely, [FreeTextEntry2] : Dyana Cheatham M.D.\par [facility]\par [address]\par [place]   [address] [FreeTextEntry3] : \par Pao Sorenson M.D.\par \par  of Medicine\par [facility] [employer]\par [facility] \par [facility]\par [address] [place] [address] \[place] [address] \par ph: [phone]\par fax: [phone]

## 2022-01-05 NOTE — REASON FOR VISIT
[Follow-Up Visit] : a follow-up visit for [Other: _____] : [unfilled] [FreeTextEntry2] : multiple myeloma s/p high-dose chemotherapy followed by autologous peripheral blood stem cell transplant on 11/19/21

## 2022-01-05 NOTE — ASSESSMENT
[FreeTextEntry1] : IgD lambda myeloma s/p cycle 6 RVD on 9/30/21\par Mobilization of stem cells with Zarxio 960mcg subcutaneous daily 10/28/21-11/1/21\par He was admitted to the BMTU on 11/15/21 and received conditioning chemotherapy with Melphalan 100 mg/m2 IV x 2 consecutive days followed by autoPSCT on 11/19/21. \par Hospital course complicated by saddle pulmonary embolism and bilateral LE acute DVTs. On 11/24/21 he underwent a mechanical thrombectomy. IVC filter placement was deferred. He received anticoagulation, now on \par Eliquis. He also had terminal ileitis with ? microperforation, monitored in SICU, treated conservatively with supportive care and antibiotics. He completed a course of Meropenem 12/7/21.\par \par 1) Multiple myeloma- s/p autoPSCT on 11/19/21\par Plan- \par Continue current medications and restrictions as discussed prior to discharge from BMTU\par Continue Acyclovir, Mepron, Fluconazole, for ID prophylaxis\par Continue MVI, Folate daily\par Continue Pantoprazole daily for GI prophylaxis\par Zofran as needed for nausea/vomiting\par Eat small size frequent meals daily; avoid spicy and acidic foods\par Drink plenty of water daily\par Moisturizing cream to the skin several times per day\par Check CMP, LDH, Mg today\par Call the office immediately if fever, chills, symptoms of infection\par \par 2) H/O saddle PE s/p thrombectomy; h/o bilat LE DVT\par Plan- Continue Eliquis 5 mg po 2X/day\par Followup with Dr. Holley on 1/13/22\par \par 3) H/O terminal ileitis- s/p high-dose Melphalan chemotherapy- symptoms now resolved\par Plan- \par Gas-X as needed; abdominal symptoms have resolved. \par Continue PPI daily\par \par Telehealth visit in 2 weeks(1/19/22), and Labs at [facility] [place] at [address] [place] [address] on 1/18/22. Will also check COVID-19 antibody testing. \par

## 2022-01-05 NOTE — PHYSICAL EXAM
[Ambulatory and capable of all self care but unable to carry out any work activities] : Status 2- Ambulatory and capable of all self care but unable to carry out any work activities. Up and about more than 50% of waking hours [Normal] : affect appropriate [de-identified] : alopecia [de-identified] : anicteric [de-identified] : RRR, normal S1S2 [de-identified] : + 1 edema bilateral lower legs  [de-identified] : no cervical/SCV adenopathy [de-identified] : no rash [de-identified] : A & O x 4

## 2022-01-07 LAB
ALBUMIN SERPL ELPH-MCNC: 4.1 G/DL
ALP BLD-CCNC: 74 U/L
ALT SERPL-CCNC: 11 U/L
ANION GAP SERPL CALC-SCNC: 13 MMOL/L
AST SERPL-CCNC: 15 U/L
BILIRUB SERPL-MCNC: 0.2 MG/DL
BUN SERPL-MCNC: 13 MG/DL
CALCIUM SERPL-MCNC: 9.1 MG/DL
CHLORIDE SERPL-SCNC: 106 MMOL/L
CO2 SERPL-SCNC: 22 MMOL/L
CREAT SERPL-MCNC: 0.9 MG/DL
GLUCOSE SERPL-MCNC: 91 MG/DL
LDH SERPL-CCNC: 217 U/L
MAGNESIUM SERPL-MCNC: 1.8 MG/DL
POTASSIUM SERPL-SCNC: 4.6 MMOL/L
PROT SERPL-MCNC: 6 G/DL
SODIUM SERPL-SCNC: 142 MMOL/L

## 2022-01-11 ENCOUNTER — NON-APPOINTMENT (OUTPATIENT)
Age: 67
End: 2022-01-11

## 2022-01-12 ENCOUNTER — INPATIENT (INPATIENT)
Facility: HOSPITAL | Age: 67
LOS: 1 days | Discharge: HOME | DRG: 394 | End: 2022-01-14
Attending: TRANSPLANT SURGERY | Admitting: TRANSPLANT SURGERY
Payer: MEDICARE

## 2022-01-12 ENCOUNTER — NON-APPOINTMENT (OUTPATIENT)
Age: 67
End: 2022-01-12

## 2022-01-12 ENCOUNTER — APPOINTMENT (OUTPATIENT)
Dept: CT IMAGING | Facility: HOSPITAL | Age: 67
End: 2022-01-12
Payer: COMMERCIAL

## 2022-01-12 ENCOUNTER — OUTPATIENT (OUTPATIENT)
Dept: OUTPATIENT SERVICES | Facility: HOSPITAL | Age: 67
LOS: 1 days | End: 2022-01-12
Payer: MEDICARE

## 2022-01-12 VITALS
TEMPERATURE: 99 F | HEART RATE: 62 BPM | OXYGEN SATURATION: 99 % | DIASTOLIC BLOOD PRESSURE: 76 MMHG | HEIGHT: 73 IN | SYSTOLIC BLOOD PRESSURE: 156 MMHG | RESPIRATION RATE: 18 BRPM | WEIGHT: 188.94 LBS

## 2022-01-12 DIAGNOSIS — Z98.890 OTHER SPECIFIED POSTPROCEDURAL STATES: Chronic | ICD-10-CM

## 2022-01-12 DIAGNOSIS — C90.00 MULTIPLE MYELOMA NOT HAVING ACHIEVED REMISSION: ICD-10-CM

## 2022-01-12 LAB
ALBUMIN SERPL ELPH-MCNC: 4.2 G/DL — SIGNIFICANT CHANGE UP (ref 3.3–5)
ALP SERPL-CCNC: 81 U/L — SIGNIFICANT CHANGE UP (ref 40–120)
ALT FLD-CCNC: 13 U/L — SIGNIFICANT CHANGE UP (ref 10–45)
ANION GAP SERPL CALC-SCNC: 12 MMOL/L — SIGNIFICANT CHANGE UP (ref 5–17)
APTT BLD: 36.6 SEC — HIGH (ref 27.5–35.5)
AST SERPL-CCNC: 15 U/L — SIGNIFICANT CHANGE UP (ref 10–40)
BASE EXCESS BLDV CALC-SCNC: 0.1 MMOL/L — SIGNIFICANT CHANGE UP (ref -2–2)
BASOPHILS # BLD AUTO: 0.04 K/UL — SIGNIFICANT CHANGE UP (ref 0–0.2)
BASOPHILS NFR BLD AUTO: 0.3 % — SIGNIFICANT CHANGE UP (ref 0–2)
BILIRUB SERPL-MCNC: 0.3 MG/DL — SIGNIFICANT CHANGE UP (ref 0.2–1.2)
BLD GP AB SCN SERPL QL: NEGATIVE — SIGNIFICANT CHANGE UP
BUN SERPL-MCNC: 16 MG/DL — SIGNIFICANT CHANGE UP (ref 7–23)
CA-I SERPL-SCNC: 1.2 MMOL/L — SIGNIFICANT CHANGE UP (ref 1.15–1.33)
CALCIUM SERPL-MCNC: 9.6 MG/DL — SIGNIFICANT CHANGE UP (ref 8.4–10.5)
CHLORIDE BLDV-SCNC: 103 MMOL/L — SIGNIFICANT CHANGE UP (ref 96–108)
CHLORIDE SERPL-SCNC: 103 MMOL/L — SIGNIFICANT CHANGE UP (ref 96–108)
CO2 BLDV-SCNC: 27 MMOL/L — HIGH (ref 22–26)
CO2 SERPL-SCNC: 24 MMOL/L — SIGNIFICANT CHANGE UP (ref 22–31)
CREAT SERPL-MCNC: 0.86 MG/DL — SIGNIFICANT CHANGE UP (ref 0.5–1.3)
EOSINOPHIL # BLD AUTO: 0.24 K/UL — SIGNIFICANT CHANGE UP (ref 0–0.5)
EOSINOPHIL NFR BLD AUTO: 1.8 % — SIGNIFICANT CHANGE UP (ref 0–6)
GAS PNL BLDV: 137 MMOL/L — SIGNIFICANT CHANGE UP (ref 136–145)
GAS PNL BLDV: SIGNIFICANT CHANGE UP
GAS PNL BLDV: SIGNIFICANT CHANGE UP
GLUCOSE BLDV-MCNC: 122 MG/DL — HIGH (ref 70–99)
GLUCOSE SERPL-MCNC: 114 MG/DL — HIGH (ref 70–99)
HCO3 BLDV-SCNC: 25 MMOL/L — SIGNIFICANT CHANGE UP (ref 22–29)
HCT VFR BLD CALC: 38.3 % — LOW (ref 39–50)
HCT VFR BLDA CALC: 35 % — LOW (ref 39–51)
HGB BLD CALC-MCNC: 11.8 G/DL — LOW (ref 12.6–17.4)
HGB BLD-MCNC: 12.3 G/DL — LOW (ref 13–17)
IMM GRANULOCYTES NFR BLD AUTO: 0.4 % — SIGNIFICANT CHANGE UP (ref 0–1.5)
INR BLD: 1.45 RATIO — HIGH (ref 0.88–1.16)
LACTATE BLDV-MCNC: 1.4 MMOL/L — SIGNIFICANT CHANGE UP (ref 0.7–2)
LYMPHOCYTES # BLD AUTO: 0.68 K/UL — LOW (ref 1–3.3)
LYMPHOCYTES # BLD AUTO: 5 % — LOW (ref 13–44)
MCHC RBC-ENTMCNC: 30 PG — SIGNIFICANT CHANGE UP (ref 27–34)
MCHC RBC-ENTMCNC: 32.1 GM/DL — SIGNIFICANT CHANGE UP (ref 32–36)
MCV RBC AUTO: 93.4 FL — SIGNIFICANT CHANGE UP (ref 80–100)
MONOCYTES # BLD AUTO: 0.82 K/UL — SIGNIFICANT CHANGE UP (ref 0–0.9)
MONOCYTES NFR BLD AUTO: 6 % — SIGNIFICANT CHANGE UP (ref 2–14)
NEUTROPHILS # BLD AUTO: 11.82 K/UL — HIGH (ref 1.8–7.4)
NEUTROPHILS NFR BLD AUTO: 86.5 % — HIGH (ref 43–77)
NRBC # BLD: 0 /100 WBCS — SIGNIFICANT CHANGE UP (ref 0–0)
PCO2 BLDV: 43 MMHG — SIGNIFICANT CHANGE UP (ref 42–55)
PH BLDV: 7.38 — SIGNIFICANT CHANGE UP (ref 7.32–7.43)
PLATELET # BLD AUTO: 234 K/UL — SIGNIFICANT CHANGE UP (ref 150–400)
PO2 BLDV: 26 MMHG — SIGNIFICANT CHANGE UP (ref 25–45)
POTASSIUM BLDV-SCNC: 3.6 MMOL/L — SIGNIFICANT CHANGE UP (ref 3.5–5.1)
POTASSIUM SERPL-MCNC: 4 MMOL/L — SIGNIFICANT CHANGE UP (ref 3.5–5.3)
POTASSIUM SERPL-SCNC: 4 MMOL/L — SIGNIFICANT CHANGE UP (ref 3.5–5.3)
PROCALCITONIN SERPL-MCNC: 0.09 NG/ML — SIGNIFICANT CHANGE UP (ref 0.02–0.1)
PROT SERPL-MCNC: 6.8 G/DL — SIGNIFICANT CHANGE UP (ref 6–8.3)
PROTHROM AB SERPL-ACNC: 17.1 SEC — HIGH (ref 10.6–13.6)
RBC # BLD: 4.1 M/UL — LOW (ref 4.2–5.8)
RBC # FLD: 14.8 % — HIGH (ref 10.3–14.5)
RH IG SCN BLD-IMP: NEGATIVE — SIGNIFICANT CHANGE UP
SAO2 % BLDV: 41.9 % — LOW (ref 67–88)
SODIUM SERPL-SCNC: 139 MMOL/L — SIGNIFICANT CHANGE UP (ref 135–145)
WBC # BLD: 13.65 K/UL — HIGH (ref 3.8–10.5)
WBC # FLD AUTO: 13.65 K/UL — HIGH (ref 3.8–10.5)

## 2022-01-12 PROCEDURE — G1004: CPT

## 2022-01-12 PROCEDURE — 74177 CT ABD & PELVIS W/CONTRAST: CPT | Mod: ME

## 2022-01-12 PROCEDURE — 74177 CT ABD & PELVIS W/CONTRAST: CPT | Mod: 26,ME

## 2022-01-12 PROCEDURE — 99285 EMERGENCY DEPT VISIT HI MDM: CPT | Mod: FS

## 2022-01-12 PROCEDURE — 71045 X-RAY EXAM CHEST 1 VIEW: CPT | Mod: 26

## 2022-01-12 RX ORDER — TAMSULOSIN HYDROCHLORIDE 0.4 MG/1
0.4 CAPSULE ORAL AT BEDTIME
Refills: 0 | Status: DISCONTINUED | OUTPATIENT
Start: 2022-01-12 | End: 2022-01-14

## 2022-01-12 RX ORDER — ACYCLOVIR SODIUM 500 MG
400 VIAL (EA) INTRAVENOUS ONCE
Refills: 0 | Status: COMPLETED | OUTPATIENT
Start: 2022-01-12 | End: 2022-01-12

## 2022-01-12 RX ORDER — SODIUM CHLORIDE 9 MG/ML
1000 INJECTION, SOLUTION INTRAVENOUS ONCE
Refills: 0 | Status: COMPLETED | OUTPATIENT
Start: 2022-01-12 | End: 2022-01-12

## 2022-01-12 RX ORDER — ACETAMINOPHEN 500 MG
975 TABLET ORAL ONCE
Refills: 0 | Status: COMPLETED | OUTPATIENT
Start: 2022-01-12 | End: 2022-01-12

## 2022-01-12 RX ORDER — MEROPENEM 1 G/30ML
1000 INJECTION INTRAVENOUS ONCE
Refills: 0 | Status: COMPLETED | OUTPATIENT
Start: 2022-01-12 | End: 2022-01-12

## 2022-01-12 RX ORDER — ATOVAQUONE 750 MG/5ML
750 SUSPENSION ORAL ONCE
Refills: 0 | Status: COMPLETED | OUTPATIENT
Start: 2022-01-12 | End: 2022-01-12

## 2022-01-12 RX ADMIN — SODIUM CHLORIDE 1000 MILLILITER(S): 9 INJECTION, SOLUTION INTRAVENOUS at 21:59

## 2022-01-12 RX ADMIN — MEROPENEM 100 MILLIGRAM(S): 1 INJECTION INTRAVENOUS at 21:58

## 2022-01-12 NOTE — ED CLERICAL - NS ED CLERK NOTE PRE-ARRIVAL INFORMATION; ADDITIONAL PRE-ARRIVAL INFORMATION
CC/Reason For referral: nausea and vomiting with back pain  Preferred Consultant(if applicable):  Who admits for you (if needed):  Do you have documents you would like to fax over?  Would you still like to speak to an ED attending? CC/Reason For referral: acute appendicitis. pt had a catscan with showing inflaming of the appendic had a stem cell transplant on 11/19/21 with history of colitis   Preferred Consultant(if applicable):  Who admits for you (if needed):  Do you have documents you would like to fax over?  Would you still like to speak to an ED attending? yes

## 2022-01-12 NOTE — ED PROVIDER NOTE - CLINICAL SUMMARY MEDICAL DECISION MAKING FREE TEXT BOX
Attending Deann Candelario: 65 yo male with prior stem cell transplant presenting with lower abdominal pain. ct scan concerning for possible appendicitis. pt febrile in the ed. cultures performed. will start abx as immunosuppressed. will d/w surgery. tba

## 2022-01-12 NOTE — ED PROVIDER NOTE - ATTENDING CONTRIBUTION TO CARE
Attending MD Deann Candelario:   I personally have seen and examined this patient.  Physician assistant note reviewed and agree on plan of care and except where noted.  See HPI, PE, and MDM for details.

## 2022-01-12 NOTE — ED PROVIDER NOTE - PROGRESS NOTE DETAILS
Attending MD Perry: Discussed with surgery Attending Deann Candelario: pt with oral temp of 100.5. surgery aware. with h/o stem cell transplant will obtain cultures. Surgery requested input from heme/onc. I discussed the case with Dr. Sorenson, patient's oncologist and the oncology fellow. As per the onc fellow in collaboration with Dr. Sorenson, if the surgical team feels that patient has acute appendicitis pt should be admitted to surgery and the heme/onc team will follow in the morning. If the surgical team feels this is not appendicitis pt should be discharged home. Only recommending admission under the surgical service. -John Eason PA-C

## 2022-01-12 NOTE — ED ADULT NURSE NOTE - OBJECTIVE STATEMENT
67 y/o male presenting to the ER c/o RLQ pain. Pt reports RLQ pain and mild nausea x 1 day, worse upon movement, and went to oncologist for outpt CT scan which revealed enlarged appendix and sent to ER for evaluation. Pt presents to [facility] A&Ox3, ambulatory from triage, 20 G IV present in L. AC from QDoc RN, 18 G IV placed in R. AC by ER RN, pt endorsing 5/10 RLQ pain and mild nausea, aggravated by movement, was sent in by oncologist ABIMBOLA Sorenson for outpt CT scan revealing enlarged appendix. PMHx multiple myleoma, stem cell transplant, HLD. Pt denies chest pain, SOB/difficulty breathing, fever/chills, HA, vomiting/diarrhea, lightheadedness/dizziness, numbness/tingling. Pt A&Ox3, breathing spontaneous and unlabored, RLQ tenderness upon MD assessment, IV locked and patent, pt instructed on use of call bell, bed locked and in lowest position.

## 2022-01-12 NOTE — ED PROVIDER NOTE - RAPID ASSESSMENT
66M with PMH/PSH PEs, basal cell CA, acute lumbar radiculopathy, T12 compression fracture, lumbar herniated disc, HLD, s/p surgery on wrist presents to the ED with RLQ abdominal pain. Oncologist sent for CT scan which revealed appendicitis. Denies abdominal surgery.     Ye HOFFMAN) have documented this rapid assessment note under the dictation of Jenelle Perry) which has been reviewed and affirmed to be accurate. Patient was seen as a QPA patient. The patient will be seen and further worked up in the main emergency department and their care will be completed by the main emergency department team along with a thorough physical exam. Receiving team will follow up on labs, analgesia, any clinical imaging, reassess and disposition as clinically indicated, all decisions regarding the progression of care will be made at their discretion. 66M with PMH/PSH PEs, multiple myeloma s/p stem cell transplant (quarantine day #53, Onc Dr. Sorenson), basal cell CA, acute lumbar radiculopathy, T12 compression fracture, lumbar herniated disc, HLD, s/p wrist surgery presents to the ED with RLQ abdominal pain since yesterday.  Reports oncologist sent for CT scan which revealed appendicitis. Denies previous abdominal surgery but reports has had perforated bowel which was medically managed.  Review of EMR reveals CT from today: Appendix is mildly distended measuring up to   1.0 cm which measured approximately 9 mm previously. There is question of   minimally increased enhancement. There is mild surrounding soft tissue   stranding although fluid was present in this region on the prior study.   This may represent chronic changes although in a patient with right lower   quadrant pain, acute appendicitis must also be considered.. Resolution of   previously visualized distended small bowel. Previously mentioned   terminal ileitis is not well appreciated on the current study. Colonic   diverticuli predominantly in the sigmoid without gross inflammation.  Will order preop labs, paged for surgical consult, will keep patient npo and give IVFs, advised RN in triage to keep patient as isolated as possible given reported post stem cell transplant quarantine.      Attending MD Perry: The scribe's documentation has been prepared under my direction and personally reviewed by me.  I confirm that the note above accurately reflects my work, treatment, procedures, and medical decision making. This patient was seen and orders were placed as per our department's QDoc model.  Patient was to be sent to main ED for full medical evaluation and receiving team was to follow up on any labs, analgesia, clinical imaging ordered.  Any reassessment and disposition decisions were to be made by receiving team as clinically indicated, all decisions regarding the progression of care to be made at their discretion.  I did not perform a comprehensive history and physical on this patient.     IYe (Scribe) have documented this rapid assessment note under the dictation of Jenelle Perry) which has been reviewed and affirmed to be accurate. Patient was seen as a QPA patient. The patient will be seen and further worked up in the main emergency department and their care will be completed by the main emergency department team along with a thorough physical exam. Receiving team will follow up on labs, analgesia, any clinical imaging, reassess and disposition as clinically indicated, all decisions regarding the progression of care will be made at their discretion.

## 2022-01-12 NOTE — ED PROVIDER NOTE - PHYSICAL EXAMINATION
Attending Deann Candelario: Gen: NAD, heent: atrauamtic, eomi, perrla, mmm, op pink, uvula midline, neck; nttp, no nuchal rigidity, chest: nttp, no crepitus, cv: rrr, no murmurs, lungs: ctab, abd: soft, nttp rlq  no guarding, ext: wwp, neg homans, skin: no rash, neuro: awake and alert, following commands, speech clear, sensation and strength intact, no focal deficits Attending Deann Candelario: Gen: NAD, heent: atrauamtic, eomi, perrla, mmm, op pink, uvula midline, neck; nttp, no nuchal rigidity, chest: nttp, no crepitus, cv: rrr, no murmurs, lungs: ctab, abd: soft, nttp rlq  no guarding, ext: wwp, neg homans, skin: no rash, neuro: awake and alert, following commands, speech clear, sensation and strength intact, no focal deficits    GEN: Pt in NAD, A&O x3.  PSYCH: Affect appropriate.  EYES: Sclera white w/o injection.   ENT: Head NCAT. MMM. Neck supple FROM.  RESP: CTA b/l, no wheezes, rales, or rhonchi.   CARDIAC: RRR, clear distinct S1, S2, no appreciable murmurs.  ABD: Abdomen soft, RLQ ttp, no rebound or guarding, +obturator. Negative psoas and Rovsing sign. No CVAT b/l.  VASC: 2+ radial and dorsalis pedis pulses b/l. No edema or calf tenderness.  SKIN: No rashes on the trunk.

## 2022-01-12 NOTE — ED PROVIDER NOTE - OBJECTIVE STATEMENT
Attending Deann Candelario: 65 yo male with complicated pmh including h/o multiple myeloma prior PE on eliqius, with prior stem cell transplant presneting with abdominal pain. pain started yesterday. pain located in rlq and associated with  mild nausea. called his oncologist dr aguilar who arranged for him to have a ct scan which was performed. pt called with results of appendix being enlarged and recommended he come to the ed. denies any testicular pain. no fevers at home. did not take any antipyetic before arriving.

## 2022-01-13 DIAGNOSIS — K35.80 UNSPECIFIED ACUTE APPENDICITIS: ICD-10-CM

## 2022-01-13 LAB
ANION GAP SERPL CALC-SCNC: 14 MMOL/L — SIGNIFICANT CHANGE UP (ref 5–17)
APTT BLD: 48.5 SEC — HIGH (ref 27.5–35.5)
APTT BLD: 67.8 SEC — HIGH (ref 27.5–35.5)
BUN SERPL-MCNC: 16 MG/DL — SIGNIFICANT CHANGE UP (ref 7–23)
CALCIUM SERPL-MCNC: 9 MG/DL — SIGNIFICANT CHANGE UP (ref 8.4–10.5)
CHLORIDE SERPL-SCNC: 104 MMOL/L — SIGNIFICANT CHANGE UP (ref 96–108)
CO2 SERPL-SCNC: 21 MMOL/L — LOW (ref 22–31)
CREAT SERPL-MCNC: 0.84 MG/DL — SIGNIFICANT CHANGE UP (ref 0.5–1.3)
GLUCOSE SERPL-MCNC: 131 MG/DL — HIGH (ref 70–99)
HCT VFR BLD CALC: 34.5 % — LOW (ref 39–50)
HGB BLD-MCNC: 11 G/DL — LOW (ref 13–17)
MAGNESIUM SERPL-MCNC: 1.6 MG/DL — SIGNIFICANT CHANGE UP (ref 1.6–2.6)
MCHC RBC-ENTMCNC: 30 PG — SIGNIFICANT CHANGE UP (ref 27–34)
MCHC RBC-ENTMCNC: 31.9 GM/DL — LOW (ref 32–36)
MCV RBC AUTO: 94 FL — SIGNIFICANT CHANGE UP (ref 80–100)
NRBC # BLD: 0 /100 WBCS — SIGNIFICANT CHANGE UP (ref 0–0)
PHOSPHATE SERPL-MCNC: 2.7 MG/DL — SIGNIFICANT CHANGE UP (ref 2.5–4.5)
PLATELET # BLD AUTO: 209 K/UL — SIGNIFICANT CHANGE UP (ref 150–400)
POTASSIUM SERPL-MCNC: 3.7 MMOL/L — SIGNIFICANT CHANGE UP (ref 3.5–5.3)
POTASSIUM SERPL-SCNC: 3.7 MMOL/L — SIGNIFICANT CHANGE UP (ref 3.5–5.3)
RBC # BLD: 3.67 M/UL — LOW (ref 4.2–5.8)
RBC # FLD: 15 % — HIGH (ref 10.3–14.5)
SARS-COV-2 RNA SPEC QL NAA+PROBE: SIGNIFICANT CHANGE UP
SODIUM SERPL-SCNC: 139 MMOL/L — SIGNIFICANT CHANGE UP (ref 135–145)
WBC # BLD: 9.05 K/UL — SIGNIFICANT CHANGE UP (ref 3.8–10.5)
WBC # FLD AUTO: 9.05 K/UL — SIGNIFICANT CHANGE UP (ref 3.8–10.5)

## 2022-01-13 PROCEDURE — 99223 1ST HOSP IP/OBS HIGH 75: CPT | Mod: GC

## 2022-01-13 PROCEDURE — 99222 1ST HOSP IP/OBS MODERATE 55: CPT

## 2022-01-13 RX ORDER — SODIUM CHLORIDE 9 MG/ML
1000 INJECTION, SOLUTION INTRAVENOUS
Refills: 0 | Status: DISCONTINUED | OUTPATIENT
Start: 2022-01-13 | End: 2022-01-13

## 2022-01-13 RX ORDER — METOPROLOL TARTRATE 50 MG
25 TABLET ORAL EVERY 8 HOURS
Refills: 0 | Status: DISCONTINUED | OUTPATIENT
Start: 2022-01-13 | End: 2022-01-14

## 2022-01-13 RX ORDER — HEPARIN SODIUM 5000 [USP'U]/ML
1700 INJECTION INTRAVENOUS; SUBCUTANEOUS
Qty: 25000 | Refills: 0 | Status: DISCONTINUED | OUTPATIENT
Start: 2022-01-13 | End: 2022-01-14

## 2022-01-13 RX ORDER — INFLUENZA VIRUS VACCINE 15; 15; 15; 15 UG/.5ML; UG/.5ML; UG/.5ML; UG/.5ML
0.7 SUSPENSION INTRAMUSCULAR ONCE
Refills: 0 | Status: DISCONTINUED | OUTPATIENT
Start: 2022-01-13 | End: 2022-01-14

## 2022-01-13 RX ORDER — ATOVAQUONE 750 MG/5ML
750 SUSPENSION ORAL
Refills: 0 | Status: DISCONTINUED | OUTPATIENT
Start: 2022-01-13 | End: 2022-01-14

## 2022-01-13 RX ORDER — HEPARIN SODIUM 5000 [USP'U]/ML
1500 INJECTION INTRAVENOUS; SUBCUTANEOUS
Qty: 25000 | Refills: 0 | Status: DISCONTINUED | OUTPATIENT
Start: 2022-01-13 | End: 2022-01-13

## 2022-01-13 RX ORDER — MEROPENEM 1 G/30ML
1000 INJECTION INTRAVENOUS EVERY 8 HOURS
Refills: 0 | Status: DISCONTINUED | OUTPATIENT
Start: 2022-01-13 | End: 2022-01-14

## 2022-01-13 RX ORDER — MAGNESIUM SULFATE 500 MG/ML
2 VIAL (ML) INJECTION ONCE
Refills: 0 | Status: COMPLETED | OUTPATIENT
Start: 2022-01-13 | End: 2022-01-13

## 2022-01-13 RX ORDER — ACYCLOVIR SODIUM 500 MG
400 VIAL (EA) INTRAVENOUS EVERY 8 HOURS
Refills: 0 | Status: DISCONTINUED | OUTPATIENT
Start: 2022-01-13 | End: 2022-01-14

## 2022-01-13 RX ORDER — POTASSIUM PHOSPHATE, MONOBASIC POTASSIUM PHOSPHATE, DIBASIC 236; 224 MG/ML; MG/ML
15 INJECTION, SOLUTION INTRAVENOUS ONCE
Refills: 0 | Status: COMPLETED | OUTPATIENT
Start: 2022-01-13 | End: 2022-01-13

## 2022-01-13 RX ORDER — FOLIC ACID 0.8 MG
1 TABLET ORAL DAILY
Refills: 0 | Status: DISCONTINUED | OUTPATIENT
Start: 2022-01-13 | End: 2022-01-14

## 2022-01-13 RX ORDER — PANTOPRAZOLE SODIUM 20 MG/1
40 TABLET, DELAYED RELEASE ORAL DAILY
Refills: 0 | Status: DISCONTINUED | OUTPATIENT
Start: 2022-01-13 | End: 2022-01-14

## 2022-01-13 RX ORDER — FLUCONAZOLE 150 MG/1
800 TABLET ORAL EVERY 24 HOURS
Refills: 0 | Status: DISCONTINUED | OUTPATIENT
Start: 2022-01-13 | End: 2022-01-14

## 2022-01-13 RX ORDER — DEXTROSE MONOHYDRATE, SODIUM CHLORIDE, AND POTASSIUM CHLORIDE 50; .745; 4.5 G/1000ML; G/1000ML; G/1000ML
1000 INJECTION, SOLUTION INTRAVENOUS
Refills: 0 | Status: DISCONTINUED | OUTPATIENT
Start: 2022-01-13 | End: 2022-01-14

## 2022-01-13 RX ADMIN — DEXTROSE MONOHYDRATE, SODIUM CHLORIDE, AND POTASSIUM CHLORIDE 125 MILLILITER(S): 50; .745; 4.5 INJECTION, SOLUTION INTRAVENOUS at 21:33

## 2022-01-13 RX ADMIN — HEPARIN SODIUM 15 UNIT(S)/HR: 5000 INJECTION INTRAVENOUS; SUBCUTANEOUS at 04:37

## 2022-01-13 RX ADMIN — HEPARIN SODIUM 17 UNIT(S)/HR: 5000 INJECTION INTRAVENOUS; SUBCUTANEOUS at 20:46

## 2022-01-13 RX ADMIN — ATOVAQUONE 750 MILLIGRAM(S): 750 SUSPENSION ORAL at 00:11

## 2022-01-13 RX ADMIN — MEROPENEM 100 MILLIGRAM(S): 1 INJECTION INTRAVENOUS at 21:32

## 2022-01-13 RX ADMIN — HEPARIN SODIUM 17 UNIT(S)/HR: 5000 INJECTION INTRAVENOUS; SUBCUTANEOUS at 11:14

## 2022-01-13 RX ADMIN — Medication 400 MILLIGRAM(S): at 00:10

## 2022-01-13 RX ADMIN — Medication 1 TABLET(S): at 13:22

## 2022-01-13 RX ADMIN — Medication 400 MILLIGRAM(S): at 21:32

## 2022-01-13 RX ADMIN — MEROPENEM 100 MILLIGRAM(S): 1 INJECTION INTRAVENOUS at 06:13

## 2022-01-13 RX ADMIN — HEPARIN SODIUM 15 UNIT(S)/HR: 5000 INJECTION INTRAVENOUS; SUBCUTANEOUS at 05:46

## 2022-01-13 RX ADMIN — Medication 1 MILLIGRAM(S): at 13:23

## 2022-01-13 RX ADMIN — Medication 400 MILLIGRAM(S): at 13:23

## 2022-01-13 RX ADMIN — TAMSULOSIN HYDROCHLORIDE 0.4 MILLIGRAM(S): 0.4 CAPSULE ORAL at 00:10

## 2022-01-13 RX ADMIN — Medication 400 MILLIGRAM(S): at 06:13

## 2022-01-13 RX ADMIN — Medication 25 MILLIGRAM(S): at 13:23

## 2022-01-13 RX ADMIN — ATOVAQUONE 750 MILLIGRAM(S): 750 SUSPENSION ORAL at 06:14

## 2022-01-13 RX ADMIN — PANTOPRAZOLE SODIUM 40 MILLIGRAM(S): 20 TABLET, DELAYED RELEASE ORAL at 13:23

## 2022-01-13 RX ADMIN — MEROPENEM 100 MILLIGRAM(S): 1 INJECTION INTRAVENOUS at 13:22

## 2022-01-13 RX ADMIN — DEXTROSE MONOHYDRATE, SODIUM CHLORIDE, AND POTASSIUM CHLORIDE 125 MILLILITER(S): 50; .745; 4.5 INJECTION, SOLUTION INTRAVENOUS at 11:15

## 2022-01-13 RX ADMIN — SODIUM CHLORIDE 100 MILLILITER(S): 9 INJECTION, SOLUTION INTRAVENOUS at 03:47

## 2022-01-13 RX ADMIN — ATOVAQUONE 750 MILLIGRAM(S): 750 SUSPENSION ORAL at 17:14

## 2022-01-13 RX ADMIN — FLUCONAZOLE 800 MILLIGRAM(S): 150 TABLET ORAL at 06:13

## 2022-01-13 RX ADMIN — POTASSIUM PHOSPHATE, MONOBASIC POTASSIUM PHOSPHATE, DIBASIC 62.5 MILLIMOLE(S): 236; 224 INJECTION, SOLUTION INTRAVENOUS at 10:51

## 2022-01-13 RX ADMIN — Medication 975 MILLIGRAM(S): at 00:23

## 2022-01-13 RX ADMIN — Medication 25 GRAM(S): at 09:46

## 2022-01-13 RX ADMIN — Medication 25 MILLIGRAM(S): at 21:32

## 2022-01-13 RX ADMIN — TAMSULOSIN HYDROCHLORIDE 0.4 MILLIGRAM(S): 0.4 CAPSULE ORAL at 21:33

## 2022-01-13 NOTE — H&P ADULT - ASSESSMENT
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    Plan:  - Admit to Acute Care Surgery, Dr. Alaniz  - Will pursue non-operative management given patient took Eliquis this morning and is s/p SCT  - NPO/IVF  - IV abx   - Hep gtt  - Pain control PRN  - Continue home meds  - OOB/IS    D/w Dr. Alaniz   Acute Care Surgery  [id number]

## 2022-01-13 NOTE — ED ADULT NURSE REASSESSMENT NOTE - NS ED NURSE REASSESS COMMENT FT1
W/ 2 RN's at bedside pt weighed on standing scale and actual weight updated in flow sheet, surgery team paged @ [phone] regarding heparin order, pending call back from surgery team, pt resting in bed comfortably w/ VSS, denies pain/discomfort, pt OOB w/o difficulty and independently, updated on POC, pending bed assignment

## 2022-01-13 NOTE — H&P ADULT - HISTORY OF PRESENT ILLNESS
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 Pain worse with movement. No associated nausea or vomiting. Continues to tolerate PO intake. Denies fever at home. Last flatus and BM yesterday. Patient called hematologist who ordered outpatient scan and found to have possible acute appendicitis (1cm appendix compared to 9mm on prior CTAP). Denies f/c, n/v, diarrhea, constipation.     In ED, patient febrile, hemodynamically stable, WBC 13, CTAP showing dilated appendix to 1cm.

## 2022-01-13 NOTE — ED ADULT NURSE REASSESSMENT NOTE - NS ED NURSE REASSESS COMMENT FT1
Heparin infusion initiated @ 0437 w/ 2 RN's at bedside to confirm right patient and right dose, pt updated on POC and instructed to alert RN of any adverse reactions, rpt coags to be obtained @ 1037 am to check for therapeutic levels, report given to TAJ Rosas on 2 Delgado, endorsed continuum of care, pt resting in bed comfortably w/ VSS, denies pain/discomfort, safety and comfort measures maintained, bed locked and in lowest position, pending transport to admitting unit

## 2022-01-13 NOTE — PATIENT PROFILE ADULT - FALL HARM RISK - UNIVERSAL INTERVENTIONS
Bed in lowest position, wheels locked, appropriate side rails in place/Call bell, personal items and telephone in reach/Instruct patient to call for assistance before getting out of bed or chair/Non-slip footwear when patient is out of bed/[place] to call system/Physically safe environment - no spills, clutter or unnecessary equipment/Purposeful Proactive Rounding/Room/bathroom lighting operational, light cord in reach

## 2022-01-13 NOTE — H&P ADULT - NSHPPHYSICALEXAM_GEN_ALL_CORE
VITALS:   T(C): 37.4 (01-12-22 @ 18:35), Max: 37.4 (01-12-22 @ 18:35)  HR: 62 (01-12-22 @ 18:35) (62 - 62)  BP: 156/76 (01-12-22 @ 18:35) (156/76 - 156/76)  RR: 18 (01-12-22 @ 18:35) (18 - 18)  SpO2: 99% (01-12-22 @ 18:35) (99% - 99%)    GENERAL: NAD, lying in bed comfortably  HEAD:  Atraumatic, normocephalic  EYES: EOMI, PERRL  HEART: Regular rate and rhythm  LUNGS: Unlabored respirations.   ABDOMEN: Soft, RLQ tenderness, nondistended  EXTREMITIES: 2+ peripheral pulses bilaterally  NERVOUS SYSTEM:  A&Ox3, no focal deficits   SKIN: No rashes or lesions

## 2022-01-13 NOTE — CONSULT NOTE ADULT - SUBJECTIVE AND OBJECTIVE BOX
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 Pain worse with movement. No associated nausea or vomiting. Continues to tolerate PO intake. Denies fever at home. Last flatus and BM yesterday. Patient called hematologist who ordered outpatient scan and found to have possible acute appendicitis (1cm appendix compared to 9mm on prior CTAP). Denies f/c, n/v, diarrhea, constipation.     In ED, patient febrile, hemodynamically stable, WBC 13, CTAP showing dilated appendix to 1cm.  (13 Jan 2022 00:20)      PAST MEDICAL & SURGICAL HISTORY:  Hyperlipidemia    Shortness of breath on exertion    Lumbar herniated disc    T12 compression fracture    Acute lumbar radiculopathy    History of basal cell carcinoma    History of surgery on wrist        Allergies    Omnicef (Unknown)  penicillin (Hives)    Intolerances        MEDICATIONS  (STANDING):  acyclovir   Oral Tab/Cap 400 milliGRAM(s) Oral every 8 hours  atovaquone  Suspension 750 milliGRAM(s) Oral two times a day  fluconAZOLE   Tablet 800 milliGRAM(s) Oral every 24 hours  folic acid 1 milliGRAM(s) Oral daily  heparin  Infusion 1500 Unit(s)/Hr (15 mL/Hr) IV Continuous <Continuous>  influenza  Vaccine (HIGH DOSE) 0.7 milliLiter(s) IntraMuscular once  lactated ringers. 1000 milliLiter(s) (100 mL/Hr) IV Continuous <Continuous>  meropenem  IVPB 1000 milliGRAM(s) IV Intermittent every 8 hours  metoprolol tartrate 25 milliGRAM(s) Oral every 8 hours  multivitamin 1 Tablet(s) Oral daily  pantoprazole  Injectable 40 milliGRAM(s) IV Push daily  potassium phosphate IVPB 15 milliMole(s) IV Intermittent once  tamsulosin 0.4 milliGRAM(s) Oral at bedtime    MEDICATIONS  (PRN):      FAMILY HISTORY:      SOCIAL HISTORY: No EtOH, no tobacco    REVIEW OF SYSTEMS:    CONSTITUTIONAL: No weakness, fevers or chills  EYES/ENT: No visual changes;  No vertigo or throat pain   NECK: No pain or stiffness  RESPIRATORY: No cough, wheezing, hemoptysis; No shortness of breath  CARDIOVASCULAR: No chest pain or palpitations  GASTROINTESTINAL: No abdominal or epigastric pain. No nausea, vomiting, or hematemesis; No diarrhea or constipation. No melena or hematochezia.  GENITOURINARY: No dysuria, frequency or hematuria  NEUROLOGICAL: No numbness or weakness  SKIN: No itching, burning, rashes, or lesions   All other review of systems is negative unless indicated above.    Height (cm): 185.4 (01-12 @ 18:35)  Weight (kg): 88.2 (01-13 @ 02:51)  BMI (kg/m2): 25.7 (01-13 @ 02:51)  BSA (m2): 2.13 (01-13 @ 02:51)    T(F): 98.5 (01-13-22 @ 09:24), Max: 99.8 (01-13-22 @ 00:30)  HR: 100 (01-13-22 @ 09:24)  BP: 121/75 (01-13-22 @ 09:24)  RR: 16 (01-13-22 @ 09:24)  SpO2: 97% (01-13-22 @ 09:24)  Wt(kg): --    GENERAL: NAD, well-developed  HEAD:  Atraumatic, Normocephalic  EYES: EOMI, PERRLA, conjunctiva and sclera clear  NECK: Supple, No JVD  CHEST/LUNG: Clear to auscultation bilaterally; No wheeze  HEART: Regular rate and rhythm; No murmurs, rubs, or gallops  ABDOMEN: Soft, Nontender, Nondistended; Bowel sounds present  EXTREMITIES:  2+ Peripheral Pulses, No clubbing, cyanosis, or edema  NEUROLOGY: non-focal  SKIN: No rashes or lesions                          11.0   9.05  )-----------( 209      ( 13 Jan 2022 08:10 )             34.5       01-13    139  |  104  |  16  ----------------------------<  131<H>  3.7   |  21<L>  |  0.84    Ca    9.0      13 Jan 2022 08:10  Phos  2.7     01-13  Mg     1.6     01-13    TPro  6.8  /  Alb  4.2  /  TBili  0.3  /  DBili  x   /  AST  15  /  ALT  13  /  AlkPhos  81  01-12      Magnesium, Serum: 1.6 mg/dL (01-13 @ 08:10)  Phosphorus Level, Serum: 2.7 mg/dL (01-13 @ 08:10)      PT/INR - ( 12 Jan 2022 19:20 )   PT: 17.1 sec;   INR: 1.45 ratio         PTT - ( 12 Jan 2022 19:20 )  PTT:36.6 sec    .Stool Feces  11-30 @ 00:23   Adenovirus 40/41  DETECTED by PCR  *******Please Note:*******  GI panel PCR evaluates for:  Campylobacter, Plesiomonas shigelloides, Salmonella,  Vibrio, Yersinia enterocolitica, Enteroaggregative  Escherichia coli (EAEC), Enteropathogenic E.coli (EPEC),  Enterotoxigenic E. coli (ETEC) lt/st, Shiga-like  toxin-producing E. coli (STEC) stx1/stx2,  Shigella/ Enteroinvasive E. coli (EIEC), Cryptosporidium,  Cyclospora cayetanensis, Entamoeba histolytica,  Giardia lamblia, Adenovirus F 40/41, Astrovirus,  Norovirus GI/GII, Rotavirus A, Sapovirus  --  --      .Blood Blood  11-23 @ 12:32   No Growth Final  --  --      STER [id number]  11-19 @ 16:21   No growth at 14 days.  --  --      Clean Catch Clean Catch (Midstream)  11-19 @ 12:26   No growth  --  --      STER [id number]  11-03 @ 21:48   No growth at 14 days.  --  --      STER [id number]  11-02 @ 22:00   No growth at 14 days.  --    No organisms seen      STER [id number]  11-01 @ 22:14   No growth at 14 days.  --  --       HPI:  Mr. 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 Reported intermittent localized pain without radiation, rated as 3-4/10 which was aggravated with moving body position. Denied fever/chills, N/V/D, lower back/joints pain, changes with urination. He called his hematologist Dr. Sorenson yesterday in view of continuous abd pain, and outpatient CT abd/p on 1/12/22 found to have possible acute appendicitis (1cm appendix compared to 9mm on prior CT A/P). In ED, patient febrile Tmax 100.7F , hemodynamically stable, WBC 13k.     After admission, he reported RLQ abd pain has been improving. Currently rated as 0/10 at rest and 1/10 while deeply touching RLQ. Had one non-bloody normal color soft BM this morning. He is treated with NPO except med, meropenem 1g q8hr. No fever reported after admitted to floor.     Hematology&Oncology history   -11/2020 found to have T12 compression fracture.  -3/2021 found to have 2 gamma-migrating paraproteins on SPEP. Serum immunofixation showed 1 IgD lambda band and free lambda light chains. Urine immunofixation negative for monoclonal band.  -4/29/2021–Bone marrow biopsy and bone marrow aspirate consistent with plasma cell myeloma (greater than 90% involvement). Myeloma FISH studies showed CCN D1/IGH fusion (27%). Flow cytometry positive for monotypic plasma cells. Lymphocyte immunophenotypic findings showed no diagnostic abnormalities. Cytogenetics with normal male karyotype. [place] red stain negative. Iron stains showed iron stores present. No ring sideroblasts.  -5/13/21 to 9/30/21 Received VRd x6 Cycles   -11/19/21 received HPC transplant after received melphalan 2/2 on 11/16/21; rest day for 11/17 and 11/18/21.   -11/23/21 Found AMS in am as well as agonal breathing, diaphoretic, cool and clammy, hypotensive, and hypoxic. CT A/P reported terminal ileitis with associated ileus versus low-grade  obstruction. Pockets of free air in the RLQ adjacent to the terminal ileum concerning for bowel perforation. 2.6 cm loculated fluid adjacent to the terminal ileum and surrounding peritoneal enhancement suggestive of developing peritonitis. Surgical team consulted and he was transferred to 8-ICU for closer monitoring. ID recc broadening ABX   -11/24/21- Duplex positive for b/l DVT; CT chest angio positive for Saddle embolus with right heart strain. started on full dose heparin for A/C, s/p cardiac catheterization 11/24/21 for mechanical thrombectomy of submassive bilateral PE (thrombectomy of main pulmonary artery, right pulmonary artery, and left pulmonary artery with reduction in thrombus burden); venography during the procedure demonstrated no thrombus in IVC, R common femoral vein, R external iliac vein and R common iliac vein.      -11/28/21 Transitioned from Heparin gtt to Lovenox 1mg/kg bid; 12/2/21- Lovenox switched to Eliquis PO 5mg bid ;   -11/29/21 CT a/p showed interval resolution of extraluminal free air since 11/23/21; mild wall thickening of adjacent proximal sigmoid colon, likely reactive to terminal ileitis.   -during hospitalization; the use of abx as following   Meropenem: 11/23 -->12/7  Fluconazole: 11/15 -->  PO Bactrim: 11/15 --> 11/17  Ciprofloxacin: 11/22 --> 11/23  Vancomycin: 11/23  Metronidazole: 11/23  Aztreonam: 11/23  After discharge, he has been antimicrobial ppx including fluconazole, acyclovir, and atovaquone       PAST MEDICAL & SURGICAL HISTORY:  Hyperlipidemia  Lumbar herniated disc  T12 compression fracture found Nov 2020  Acute lumbar radiculopathy  History of basal cell carcinoma  History of left wrist fracture   History of left knee arthroscopic surgery     Fell off from ladder 2008 sustained acetabular frax of pelvis s/p surgery with placement of pins         Allergies    Omnicef -mild hives   penicillin (Hives)    Intolerances        MEDICATIONS  (STANDING):  acyclovir   Oral Tab/Cap 400 milliGRAM(s) Oral every 8 hours  atovaquone  Suspension 750 milliGRAM(s) Oral two times a day  fluconAZOLE   Tablet 800 milliGRAM(s) Oral every 24 hours  folic acid 1 milliGRAM(s) Oral daily  heparin  Infusion 1500 Unit(s)/Hr (15 mL/Hr) IV Continuous <Continuous>  influenza  Vaccine (HIGH DOSE) 0.7 milliLiter(s) IntraMuscular once  lactated ringers. 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 No nausea, vomiting, or hematemesis; No diarrhea or constipation. No melena or hematochezia.  GENITOURINARY: No dysuria, frequency or hematuria  NEUROLOGICAL: No numbness or weakness  SKIN: No itching, burning, rashes, or lesions   All other review of systems is negative unless indicated above.    Height (cm): 185.4 (01-12 @ 18:35)  Weight (kg): 88.2 (01-13 @ 02:51)  BMI (kg/m2): 25.7 (01-13 @ 02:51)  BSA (m2): 2.13 (01-13 @ 02:51)    T(F): 98.5 (01-13-22 @ 09:24), Max: 99.8 (01-13-22 @ 00:30)  HR: 100 (01-13-22 @ 09:24)  BP: 121/75 (01-13-22 @ 09:24)  RR: 16 (01-13-22 @ 09:24)  SpO2: 97% (01-13-22 @ 09:24)  Wt(kg): --    GENERAL: NAD, well-developed; responded verbally well; AAOX4   HEAD:  Atraumatic, Normocephalic  EYES: EOMI, PERRLA, conjunctiva and sclera clear  NECK: Supple, No JVD  CHEST/LUNG: Clear to auscultation bilaterally; No wheeze/rales/rhonchi  HEART: Regular rate and rhythm; No murmurs, rubs, or gallops  ABDOMEN: Soft, mild tenderness with deep palpation; no rebound tenderness, Nondistended; Bowel sounds present; no hepatosplenomegaly   EXTREMITIES:  2+ Peripheral Pulses, No clubbing, cyanosis, or edema  NEUROLOGY: non-focal weakness; touch sensation intact on b/l Agapito and UEx   SKIN: No rashes or lesions                          11.0   9.05  )-----------( 209      ( 13 Jan 2022 08:10 )             34.5       01-13    139  |  104  |  16  ----------------------------<  131<H>  3.7   |  21<L>  |  0.84    Ca    9.0      13 Jan 2022 08:10  Phos  2.7     01-13  Mg     1.6     01-13    TPro  6.8  /  Alb  4.2  /  TBili  0.3  /  DBili  x   /  AST  15  /  ALT  13  /  AlkPhos  81  01-12      Magnesium, Serum: 1.6 mg/dL (01-13 @ 08:10)  Phosphorus Level, Serum: 2.7 mg/dL (01-13 @ 08:10)      PT/INR - ( 12 Jan 2022 19:20 )   PT: 17.1 sec;   INR: 1.45 ratio         PTT - ( 12 Jan 2022 19:20 )  PTT:36.6 sec    .Stool Feces  11-30 @ 00:23   Adenovirus 40/41  DETECTED by PCR  *******Please Note:*******  GI panel PCR evaluates for:  Campylobacter, Plesiomonas shigelloides, Salmonella,  Vibrio, Yersinia enterocolitica, Enteroaggregative  Escherichia coli (EAEC), Enteropathogenic E.coli (EPEC),  Enterotoxigenic E. coli (ETEC) lt/st, Shiga-like  toxin-producing E. coli (STEC) stx1/stx2,  Shigella/ Enteroinvasive E. coli (EIEC), Cryptosporidium,  Cyclospora cayetanensis, Entamoeba histolytica,  Giardia lamblia, Adenovirus F 40/41, Astrovirus,  Norovirus GI/GII, Rotavirus A, Sapovirus  --  --      .Blood Blood  11-23 @ 12:32   No Growth Final  --  --      STER [id number]  11-19 @ 16:21   No growth at 14 days.  --  --      Clean Catch Clean Catch (Midstream)  11-19 @ 12:26   No growth  --  --      STER [id number]  11-03 @ 21:48   No growth at 14 days.  --  --      STER [id number]  11-02 @ 22:00   No growth at 14 days.  --    No organisms seen      STER [id number]  11-01 @ 22:14   No growth at 14 days.  --  --

## 2022-01-13 NOTE — PHARMACOTHERAPY INTERVENTION NOTE - NS PHARM COM OUTCOMES
Will change fluconazole back to 400mg q24hrs; Will need to discuss with attending in regard to de-escalating meropenem to ertapenem.

## 2022-01-13 NOTE — PHARMACOTHERAPY INTERVENTION NOTE - COMMENTS
Patient is admitted for RLQ pain, possible appendicitis. Patient has a history of BMT on 11/19/21 on fluconazole for fungal prophylaxis. We recommend to decrease fluconazole to 400mg q24hrs (prophylaxis dose for BMT) and de-escalate meropenem to ertapenem, due to low concern for pseudomonas in appendicitis. Patient has allergies to Omnicef and penicillin so carbapenem was used.

## 2022-01-13 NOTE — H&P ADULT - NSHPLABSRESULTS_GEN_ALL_CORE
----------  LABORATORY DATA:  ----------                        12.3   13.65 )-----------( 234      ( 12 Jan 2022 19:20 )             38.3               01-12    139  |  103  |  16  ----------------------------<  114<H>  4.0   |  24  |  0.86    Ca    9.6      12 Jan 2022 19:20    TPro  6.8  /  Alb  4.2  /  TBili  0.3  /  DBili  x   /  AST  15  /  ALT  13  /  AlkPhos  81  01-12    LIVER FUNCTIONS - ( 12 Jan 2022 19:20 )  Alb: 4.2 g/dL / Pro: 6.8 g/dL / ALK PHOS: 81 U/L / ALT: 13 U/L / AST: 15 U/L / GGT: x           PT/INR - ( 12 Jan 2022 19:20 )   PT: 17.1 sec;   INR: 1.45 ratio         PTT - ( 12 Jan 2022 19:20 )  PTT:36.6 sec              ----------  RADIOGRAPHIC DATA:  ---------  < from: CT Abdomen and Pelvis w/ Oral Cont and w/ IV Cont (01.12.22 @ 14:02) >    FINDINGS:  LOWER CHEST: Within normal limits.    LIVER: Within normal limits.  BILE DUCTS: Normal caliber.  GALLBLADDER: Small gallstones without gross inflammation.  SPLEEN: Within normal limits.  PANCREAS: Within normal limits.  ADRENALS: Within normal limits.  KIDNEYS/URETERS: Left renal cyst measuring up to 7.0 cm in the left lower   pole. 1.3 cm low-attenuation lesion in the lower pole the left kidney   laterally which is measuring greater than simple fluid in density.    BLADDER: Bladder is underdistended which limits evaluation.  REPRODUCTIVE ORGANS: Prominent prostate gland    BOWEL: No bowel obstruction. Appendix is mildly distended measuring up to   1.0 cm which measured approximately 9 mm previously. There is question of   minimally increased enhancement. There is mild surrounding soft tissue   stranding although fluid was present in this region on the prior study.   This may represent chronic changes although in a patient with right lower   quadrant pain, acute appendicitis must also be considered.. Resolution of   previously visualized distended small bowel. Previously mentioned   terminal ileitis is not well appreciated on the current study. Colonic   diverticuli predominantly in the sigmoid without gross inflammation.  PERITONEUM: No ascites.  VESSELS: Within normal limits.  RETROPERITONEUM/LYMPH NODES: No lymphadenopathy.  ABDOMINAL WALL: Small umbilical hernia containing fat.  BONES: Status post ORIF of the left hemipelvis with stable postsurgical   changes. Stable lucencies in bone, most pronounced and T12 and L5.   Degenerative changes of bone. Spondylolisthesis with L5 anterior to L1 of   approximately 25-50%.    IMPRESSION:    Mildly distended appendix. Mild surrounding soft tissue stranding where   fluid was seen on the prior study. This may represent chronic residual   changes although acute appendicitis must also be considered, especially   in a patient with right lower quadrant pain. This was discussed with Dr. Sorenson at 3:00 PM on 1/12/2022.    Resolution of small bowel obstruction. Stable bone findings.    < end of copied text >

## 2022-01-13 NOTE — CONSULT NOTE ADULT - ASSESSMENT
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 outpatient CT abd/p on 1/12/22 found to have possible acute appendicitis (1cm appendix compared to 9mm on prior CT A/P).    Currently symptoms has been improving with meropenem 1g q8hr. WBC trending down to 9k in this morning compared to 13k yesterday on admission. No surgical interventions are planned,  for now per surgery team.       #MM s/p chemo VRd x 6 cycles (5/13/21 to 9/30/21) and autologus HPC transplant 11/19/21; case complicated with #terminal ileitis/sigmoidal colitis concerning for perforation, and #PE/bilateral DVT s/p  thrombectomy; on eliquis 5mg bid after discharge. Details see HPI.   #Acute appendicitis   -agree with NPO except medication for now. c/w antimicrobial ppx including fluconazole, acyclovir, and atovaquone   -agree with heparin drip; hold Eliquis for now, may restart Eliquis upon discharge. Pt will f/u with Hematology/Oncology clinic at [facility] [place] with Dr. Sorenson after discharge.   -f/u daily CBC with differentiation, CMP, LDH/uric acid/phosphorus/Magnesium   -c/w abx meropenem for now; may switch PO abx after discharge  -IVF; pt reported dizziness this morning while standing/walking to bathroom; fall precautions; GI ppx.   -the rest of care per primary team. Please contact with BM transplant team if have more questions    Discussed with attending Dr. 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 outpatient CT abd/p on 1/12/22 found to have possible acute appendicitis (1cm appendix compared to 9mm on prior CT A/P).    Currently symptoms has been improving with meropenem 1g q8hr. WBC trending down to 9k in this morning compared to 13k yesterday on admission. No surgical interventions are planned,  for now per surgery team.       #MM s/p chemo VRd x 6 cycles (5/13/21 to 9/30/21) and autologous HPC transplant 11/19/21; case complicated with #terminal ileitis/sigmoidal colitis concerning for perforation, and #PE/bilateral DVT s/p  thrombectomy; on eliquis 5mg bid after discharge. Details see HPI.   #Acute appendicitis   -agree with NPO except medication for now. c/w antimicrobial ppx including fluconazole, acyclovir, and atovaquone   -agree with heparin drip; hold Eliquis for now, may restart Eliquis upon discharge. Pt will f[place] [facility] [place] after discharge.   -f/u daily CBC with differentiation, CMP, LDH/uric acid/phosphorus/Magnesium   -c/w abx meropenem for now; may switch PO abx after discharge  -IVF; pt reported dizziness this morning while standing/walking to bathroom; fall precautions; GI ppx.   -the rest of care per primary team. Please contact with BM transplant team if have more questions    Discussed with attending Dr. Delta Rose PGY4 Hematology&Oncology fellow   Pager [phone]  please contact with on call fellow or NP after 5pm or at weekends

## 2022-01-13 NOTE — CONSULT NOTE ADULT - ATTENDING COMMENTS
Multiple myeloma s/p chemo VRd x 6 cycles (5/13/21 to 9/30/21) and autologous HPC transplant 11/19/21; course complicated with terminal ileitis/sigmoid colitis concerning for microperforation, and saddle PE/bilateral DVT s/p mechanical thrombectomy; on Eliquis 5mg bid after discharge. Details see HPI.   #Acute appendicitis- patient on surgical service  -Conservative management with Meropenem IV;  -Patient is NPO, c/w antimicrobial ppx including fluconazole, acyclovir, and atovaquone   -agree with heparin drip; hold Eliquis for now, may restart Eliquis upon discharge. Pt will f/u with Hematology/Oncology clinic [facility] with Dr. Sorenson after discharge.   -f/u daily CBC with differentiation Multiple myeloma s/p chemo VRd x 6 cycles (5/13/21 to 9/30/21) and autologous HPC transplant 11/19/21; course complicated with terminal ileitis/sigmoid colitis concerning for microperforation, and saddle PE/bilateral DVT s/p mechanical thrombectomy; on Eliquis 5mg bid after discharge. Details see HPI.   #Acute appendicitis- patient on surgical service  -Conservative management with Meropenem IV; blood cultures negative to date from 1/13, CXR- clear lungs.  -Patient is NPO, c/w antimicrobial ppx including fluconazole, acyclovir, and atovaquone   -agree with heparin drip; hold Eliquis for now, may restart Eliquis upon discharge. Pt will f/u with Hematology/Oncology clinic at [facility] with Dr. Sorenson after discharge.   -f/u daily CBC with differential

## 2022-01-14 ENCOUNTER — TRANSCRIPTION ENCOUNTER (OUTPATIENT)
Age: 67
End: 2022-01-14

## 2022-01-14 VITALS
DIASTOLIC BLOOD PRESSURE: 80 MMHG | HEART RATE: 68 BPM | SYSTOLIC BLOOD PRESSURE: 127 MMHG | RESPIRATION RATE: 18 BRPM | OXYGEN SATURATION: 98 % | TEMPERATURE: 98 F

## 2022-01-14 LAB
ANION GAP SERPL CALC-SCNC: 10 MMOL/L — SIGNIFICANT CHANGE UP (ref 5–17)
APTT BLD: 72 SEC — HIGH (ref 27.5–35.5)
APTT BLD: 92.7 SEC — HIGH (ref 27.5–35.5)
BUN SERPL-MCNC: 11 MG/DL — SIGNIFICANT CHANGE UP (ref 7–23)
CALCIUM SERPL-MCNC: 8.2 MG/DL — LOW (ref 8.4–10.5)
CHLORIDE SERPL-SCNC: 110 MMOL/L — HIGH (ref 96–108)
CO2 SERPL-SCNC: 20 MMOL/L — LOW (ref 22–31)
CREAT SERPL-MCNC: 0.79 MG/DL — SIGNIFICANT CHANGE UP (ref 0.5–1.3)
GLUCOSE SERPL-MCNC: 132 MG/DL — HIGH (ref 70–99)
HCT VFR BLD CALC: 32 % — LOW (ref 39–50)
HGB BLD-MCNC: 10.2 G/DL — LOW (ref 13–17)
INR BLD: 1.21 RATIO — HIGH (ref 0.88–1.16)
MAGNESIUM SERPL-MCNC: 2 MG/DL — SIGNIFICANT CHANGE UP (ref 1.6–2.6)
MCHC RBC-ENTMCNC: 30 PG — SIGNIFICANT CHANGE UP (ref 27–34)
MCHC RBC-ENTMCNC: 31.9 GM/DL — LOW (ref 32–36)
MCV RBC AUTO: 94.1 FL — SIGNIFICANT CHANGE UP (ref 80–100)
NRBC # BLD: 0 /100 WBCS — SIGNIFICANT CHANGE UP (ref 0–0)
PHOSPHATE SERPL-MCNC: 2 MG/DL — LOW (ref 2.5–4.5)
PLATELET # BLD AUTO: 178 K/UL — SIGNIFICANT CHANGE UP (ref 150–400)
POTASSIUM SERPL-MCNC: 4 MMOL/L — SIGNIFICANT CHANGE UP (ref 3.5–5.3)
POTASSIUM SERPL-SCNC: 4 MMOL/L — SIGNIFICANT CHANGE UP (ref 3.5–5.3)
PROTHROM AB SERPL-ACNC: 14.4 SEC — HIGH (ref 10.6–13.6)
RBC # BLD: 3.4 M/UL — LOW (ref 4.2–5.8)
RBC # FLD: 14.9 % — HIGH (ref 10.3–14.5)
SODIUM SERPL-SCNC: 140 MMOL/L — SIGNIFICANT CHANGE UP (ref 135–145)
WBC # BLD: 5 K/UL — SIGNIFICANT CHANGE UP (ref 3.8–10.5)
WBC # FLD AUTO: 5 K/UL — SIGNIFICANT CHANGE UP (ref 3.8–10.5)

## 2022-01-14 PROCEDURE — 84132 ASSAY OF SERUM POTASSIUM: CPT

## 2022-01-14 PROCEDURE — 85027 COMPLETE CBC AUTOMATED: CPT

## 2022-01-14 PROCEDURE — 84145 PROCALCITONIN (PCT): CPT

## 2022-01-14 PROCEDURE — 96374 THER/PROPH/DIAG INJ IV PUSH: CPT

## 2022-01-14 PROCEDURE — 85018 HEMOGLOBIN: CPT

## 2022-01-14 PROCEDURE — 83735 ASSAY OF MAGNESIUM: CPT

## 2022-01-14 PROCEDURE — 84295 ASSAY OF SERUM SODIUM: CPT

## 2022-01-14 PROCEDURE — 80048 BASIC METABOLIC PNL TOTAL CA: CPT

## 2022-01-14 PROCEDURE — 85610 PROTHROMBIN TIME: CPT

## 2022-01-14 PROCEDURE — 80053 COMPREHEN METABOLIC PANEL: CPT

## 2022-01-14 PROCEDURE — 85730 THROMBOPLASTIN TIME PARTIAL: CPT

## 2022-01-14 PROCEDURE — 84100 ASSAY OF PHOSPHORUS: CPT

## 2022-01-14 PROCEDURE — 86850 RBC ANTIBODY SCREEN: CPT

## 2022-01-14 PROCEDURE — 86900 BLOOD TYPING SEROLOGIC ABO: CPT

## 2022-01-14 PROCEDURE — 86901 BLOOD TYPING SEROLOGIC RH(D): CPT

## 2022-01-14 PROCEDURE — 82435 ASSAY OF BLOOD CHLORIDE: CPT

## 2022-01-14 PROCEDURE — 85025 COMPLETE CBC W/AUTO DIFF WBC: CPT

## 2022-01-14 PROCEDURE — 36415 COLL VENOUS BLD VENIPUNCTURE: CPT

## 2022-01-14 PROCEDURE — 82330 ASSAY OF CALCIUM: CPT

## 2022-01-14 PROCEDURE — 99285 EMERGENCY DEPT VISIT HI MDM: CPT

## 2022-01-14 PROCEDURE — 87040 BLOOD CULTURE FOR BACTERIA: CPT

## 2022-01-14 PROCEDURE — 85014 HEMATOCRIT: CPT

## 2022-01-14 PROCEDURE — 71045 X-RAY EXAM CHEST 1 VIEW: CPT

## 2022-01-14 PROCEDURE — 82803 BLOOD GASES ANY COMBINATION: CPT

## 2022-01-14 PROCEDURE — 87635 SARS-COV-2 COVID-19 AMP PRB: CPT

## 2022-01-14 PROCEDURE — 82947 ASSAY GLUCOSE BLOOD QUANT: CPT

## 2022-01-14 PROCEDURE — 83605 ASSAY OF LACTIC ACID: CPT

## 2022-01-14 RX ORDER — SODIUM,POTASSIUM PHOSPHATES 278-250MG
2 POWDER IN PACKET (EA) ORAL ONCE
Refills: 0 | Status: COMPLETED | OUTPATIENT
Start: 2022-01-14 | End: 2022-01-14

## 2022-01-14 RX ORDER — METRONIDAZOLE 500 MG
1 TABLET ORAL
Qty: 21 | Refills: 0
Start: 2022-01-14 | End: 2022-01-20

## 2022-01-14 RX ORDER — CIPROFLOXACIN LACTATE 400MG/40ML
1 VIAL (ML) INTRAVENOUS
Qty: 14 | Refills: 0
Start: 2022-01-14 | End: 2022-01-20

## 2022-01-14 RX ORDER — METRONIDAZOLE 500 MG
500 TABLET ORAL EVERY 8 HOURS
Refills: 0 | Status: DISCONTINUED | OUTPATIENT
Start: 2022-01-14 | End: 2022-01-14

## 2022-01-14 RX ORDER — CIPROFLOXACIN LACTATE 400MG/40ML
500 VIAL (ML) INTRAVENOUS EVERY 12 HOURS
Refills: 0 | Status: DISCONTINUED | OUTPATIENT
Start: 2022-01-14 | End: 2022-01-14

## 2022-01-14 RX ORDER — APIXABAN 2.5 MG/1
5 TABLET, FILM COATED ORAL EVERY 12 HOURS
Refills: 0 | Status: DISCONTINUED | OUTPATIENT
Start: 2022-01-14 | End: 2022-01-14

## 2022-01-14 RX ADMIN — Medication 2 PACKET(S): at 11:49

## 2022-01-14 RX ADMIN — Medication 1 TABLET(S): at 11:50

## 2022-01-14 RX ADMIN — PANTOPRAZOLE SODIUM 40 MILLIGRAM(S): 20 TABLET, DELAYED RELEASE ORAL at 11:50

## 2022-01-14 RX ADMIN — MEROPENEM 100 MILLIGRAM(S): 1 INJECTION INTRAVENOUS at 05:11

## 2022-01-14 RX ADMIN — Medication 400 MILLIGRAM(S): at 05:12

## 2022-01-14 RX ADMIN — FLUCONAZOLE 800 MILLIGRAM(S): 150 TABLET ORAL at 05:13

## 2022-01-14 RX ADMIN — ATOVAQUONE 750 MILLIGRAM(S): 750 SUSPENSION ORAL at 05:11

## 2022-01-14 RX ADMIN — Medication 1 MILLIGRAM(S): at 11:50

## 2022-01-14 NOTE — DISCHARGE NOTE PROVIDER - NSDCFUADDINST_GEN_ALL_CORE_FT
-Follow up with Acute Care Surgery office in 10-14 days. Call office to schedule appointment.     -Please continue course of oral antibiotics Cipro/Flagyl until completed.     -take Tylenol as needed if you are having mild pain. Please be sure not exceed 4000mg of acetaminophen(Tylenol) in a 24 hour period.    -If you experience fever > 101, pain not controlled with Tylenol, worsening pain, persistent nausea/vomiting please call your surgeon or return to emergency room immediately.

## 2022-01-14 NOTE — PROGRESS NOTE ADULT - SUBJECTIVE AND OBJECTIVE BOX
SURGERY  Pager:    STATUS POST:      POST OPERATIVE DAY #      INTERVAL EVENTS/SUBJECTIVE: No acute events overnight.     ______________________________________________  OBJECTIVE:   T(C): 36.7 (01-14-22 @ 00:30), Max: 37.1 (01-13-22 @ 13:22)  HR: 63 (01-14-22 @ 00:30) (63 - 100)  BP: 107/61 (01-14-22 @ 00:30) (100/64 - 129/73)  RR: 18 (01-14-22 @ 00:30) (14 - 18)  SpO2: 97% (01-14-22 @ 00:30) (97% - 99%)  Wt(kg): --  CAPILLARY BLOOD GLUCOSE        I&O's Detail    12 Jan 2022 07:01  -  13 Jan 2022 07:00  --------------------------------------------------------  IN:    Heparin: 30 mL    IV PiggyBack: 50 mL    Lactated Ringers: 200 mL  Total IN: 280 mL    OUT:  Total OUT: 0 mL    Total NET: 280 mL      13 Jan 2022 07:01  -  14 Jan 2022 01:59  --------------------------------------------------------  IN:    dextrose 5% + sodium chloride 0.45% w/ Additives: 1200 mL    Heparin: 200 mL    IV PiggyBack: 50 mL  Total IN: 1450 mL    OUT:    Oral Fluid: 0 mL    Voided (mL): 501 mL  Total OUT: 501 mL    Total NET: 949 mL          GENERAL: NAD, lying in bed comfortably  HEAD:  Atraumatic, normocephalic  EYES: EOMI, PERRL  LUNGS: Unlabored respirations.   ABDOMEN: Soft, RLQ tenderness, nondistended  NERVOUS SYSTEM:  A&Ox3, no focal deficits   SKIN: No rashes or lesions  ______________________________________________  LABS:  CBC Full  -  ( 13 Jan 2022 08:10 )  WBC Count : 9.05 K/uL  RBC Count : 3.67 M/uL  Hemoglobin : 11.0 g/dL  Hematocrit : 34.5 %  Platelet Count - Automated : 209 K/uL  Mean Cell Volume : 94.0 fl  Mean Cell Hemoglobin : 30.0 pg  Mean Cell Hemoglobin Concentration : 31.9 gm/dL  Auto Neutrophil # : x  Auto Lymphocyte # : x  Auto Monocyte # : x  Auto Eosinophil # : x  Auto Basophil # : x  Auto Neutrophil % : x  Auto Lymphocyte % : x  Auto Monocyte % : x  Auto Eosinophil % : x  Auto Basophil % : x    01-13    139  |  104  |  16  ----------------------------<  131<H>  3.7   |  21<L>  |  0.84    Ca    9.0      13 Jan 2022 08:10  Phos  2.7     01-13  Mg     1.6     01-13    TPro  6.8  /  Alb  4.2  /  TBili  0.3  /  DBili  x   /  AST  15  /  ALT  13  /  AlkPhos  81  01-12    _____________________________________________  RADIOLOGY:     SURGERY  Pager: [phone]    INTERVAL EVENTS/SUBJECTIVE: No acute events overnight. Heparin gtt now therapeutic.     ______________________________________________  OBJECTIVE:   T(C): 36.7 (01-14-22 @ 00:30), Max: 37.1 (01-13-22 @ 13:22)  HR: 63 (01-14-22 @ 00:30) (63 - 100)  BP: 107/61 (01-14-22 @ 00:30) (100/64 - 129/73)  RR: 18 (01-14-22 @ 00:30) (14 - 18)  SpO2: 97% (01-14-22 @ 00:30) (97% - 99%)  Wt(kg): --  CAPILLARY BLOOD GLUCOSE        I&O's Detail    12 Jan 2022 07:01  -  13 Jan 2022 07:00  --------------------------------------------------------  IN:    Heparin: 30 mL    IV PiggyBack: 50 mL    Lactated Ringers: 200 mL  Total IN: 280 mL    OUT:  Total OUT: 0 mL    Total NET: 280 mL      13 Jan 2022 07:01  -  14 Jan 2022 01:59  --------------------------------------------------------  IN:    dextrose 5% + sodium chloride 0.45% w/ Additives: 1200 mL    Heparin: 200 mL    IV PiggyBack: 50 mL  Total IN: 1450 mL    OUT:    Oral Fluid: 0 mL    Voided (mL): 501 mL  Total OUT: 501 mL    Total NET: 949 mL          GENERAL: NAD, lying in bed comfortably  HEAD:  Atraumatic, normocephalic  EYES: EOMI, PERRL  LUNGS: Unlabored respirations.   ABDOMEN: Soft, RLQ tenderness, nondistended  NERVOUS SYSTEM:  A&Ox3, no focal deficits   SKIN: No rashes or lesions  ______________________________________________  LABS:  CBC Full  -  ( 13 Jan 2022 08:10 )  WBC Count : 9.05 K/uL  RBC Count : 3.67 M/uL  Hemoglobin : 11.0 g/dL  Hematocrit : 34.5 %  Platelet Count - Automated : 209 K/uL  Mean Cell Volume : 94.0 fl  Mean Cell Hemoglobin : 30.0 pg  Mean Cell Hemoglobin Concentration : 31.9 gm/dL  Auto Neutrophil # : x  Auto Lymphocyte # : x  Auto Monocyte # : x  Auto Eosinophil # : x  Auto Basophil # : x  Auto Neutrophil % : x  Auto Lymphocyte % : x  Auto Monocyte % : x  Auto Eosinophil % : x  Auto Basophil % : x    01-13    139  |  104  |  16  ----------------------------<  131<H>  3.7   |  21<L>  |  0.84    Ca    9.0      13 Jan 2022 08:10  Phos  2.7     01-13  Mg     1.6     01-13    TPro  6.8  /  Alb  4.2  /  TBili  0.3  /  DBili  x   /  AST  15  /  ALT  13  /  AlkPhos  81  01-12    _____________________________________________  RADIOLOGY:

## 2022-01-14 NOTE — DISCHARGE NOTE PROVIDER - HOSPITAL COURSE
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 Pain worse with movement. No associated nausea or vomiting. Continues to tolerate PO intake. Denies fever at home. Last flatus and BM yesterday. Patient called hematologist who ordered outpatient scan and found to have possible acute appendicitis (1cm appendix compared to 9mm on prior CTAP). Denies f/c, n/v, diarrhea, constipation.     In ED, patient febrile, hemodynamically stable, WBC 13, CTAP showing dilated appendix to 1cm. Patient was admitted to [facility] on 1/13 and managed non-operatively with NPO and IV abx given his use of Eliquis. On HD # 2 pain improved, diet advanced to regular and was tolerated, abx changed to PO, home Eliquis restarted and patient was discharged home with a course of PO abx (7 days total). Will follow up outpatient with ACS office.

## 2022-01-14 NOTE — DISCHARGE NOTE PROVIDER - CARE PROVIDER_API CALL
Matheus Soler)  Surgery; Surgical Critical Care  [address]  [place] [address]  Phone: [phone]  Fax: [phone]  Follow Up Time: 2 weeks

## 2022-01-14 NOTE — DISCHARGE NOTE NURSING/CASE MANAGEMENT/SOCIAL WORK - PATIENT PORTAL LINK FT
You can access the FollowMyHealth Patient Portal offered by [facility] by registering at the following website: [email or web address]. By joining [employer]’s FollowMyHealth portal, you will also be able to view your health information using other applications (apps) compatible with our system.

## 2022-01-14 NOTE — DISCHARGE NOTE NURSING/CASE MANAGEMENT/SOCIAL WORK - NSDCPEFALRISK_GEN_ALL_CORE
For information on Fall & Injury Prevention, visit: [email or web address] OR  [email or web address] OR  https://www.cdc.gov/steadi/patient.html

## 2022-01-14 NOTE — PROGRESS NOTE ADULT - ASSESSMENT
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    Plan:  - Admit to Acute Care Surgery, Dr. Alaniz  - Will pursue non-operative management given patient took Eliquis this morning and is s/p SCT  - NPO/IVF  - IV abx   - Hep gtt  - Pain control PRN  - Continue home meds  - OOB/IS    D/w Dr. 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    Plan:  - Continue nonoperative management   - NPO/IVF  - IV abx   - Hep gtt therapeutic, daily PTTs  - Pain control PRN  - Continue home meds  - OOB/IS    Acute Care Surgery  [id number]

## 2022-01-14 NOTE — DISCHARGE NOTE PROVIDER - NSDCMRMEDTOKEN_GEN_ALL_CORE_FT
acyclovir 400 mg oral tablet: 1 tab(s) orally every 8 hours  apixaban 5 mg oral tablet: 1 tab(s) orally every 12 hours  atovaquone 750 mg/5 mL oral suspension: 5 milliliter(s) orally 2 times a day  ciprofloxacin 500 mg oral tablet: 1 tab(s) orally every 12 hours  fluconazole 200 mg oral tablet: 2 tab(s) orally once a day  folic acid 1 mg oral tablet: 1 tab(s) orally once a day  metoprolol tartrate 25 mg oral tablet: 1 tab(s) orally every 8 hours  metroNIDAZOLE 500 mg oral tablet: 1 tab(s) orally every 8 hours  Multiple Vitamins oral tablet: 1 tab(s) orally once a day  pantoprazole 40 mg oral delayed release tablet: 1 tab(s) orally once a day (before a meal)  tamsulosin 0.4 mg oral capsule: 1 cap(s) orally once a day (at bedtime)

## 2022-01-17 ENCOUNTER — OUTPATIENT (OUTPATIENT)
Dept: OUTPATIENT SERVICES | Facility: HOSPITAL | Age: 67
LOS: 1 days | Discharge: HOME | End: 2022-01-17

## 2022-01-17 DIAGNOSIS — Z98.890 OTHER SPECIFIED POSTPROCEDURAL STATES: Chronic | ICD-10-CM

## 2022-01-17 DIAGNOSIS — R79.89 OTHER SPECIFIED ABNORMAL FINDINGS OF BLOOD CHEMISTRY: ICD-10-CM

## 2022-01-18 ENCOUNTER — LABORATORY RESULT (OUTPATIENT)
Age: 67
End: 2022-01-18

## 2022-01-18 LAB
ALBUMIN SERPL ELPH-MCNC: 4.3 G/DL
ALP BLD-CCNC: 75 U/L
ALT SERPL-CCNC: 23 U/L
ANION GAP SERPL CALC-SCNC: 13 MMOL/L
AST SERPL-CCNC: 31 U/L
BASOPHILS # BLD AUTO: 0.06 K/UL
BASOPHILS NFR BLD AUTO: 1.3 %
BILIRUB SERPL-MCNC: 0.2 MG/DL
BUN SERPL-MCNC: 12 MG/DL
CALCIUM SERPL-MCNC: 10 MG/DL
CHLORIDE SERPL-SCNC: 107 MMOL/L
CO2 SERPL-SCNC: 22 MMOL/L
COVID-19 SPIKE DOMAIN ANTIBODY INTERPRETATION: POSITIVE
CREAT SERPL-MCNC: 0.86 MG/DL
CULTURE RESULTS: SIGNIFICANT CHANGE UP
CULTURE RESULTS: SIGNIFICANT CHANGE UP
EOSINOPHIL # BLD AUTO: 0.3 K/UL
EOSINOPHIL NFR BLD AUTO: 6.7 %
GLUCOSE SERPL-MCNC: 126 MG/DL
HCT VFR BLD CALC: 38.4 %
HGB BLD-MCNC: 12.3 G/DL
IMM GRANULOCYTES NFR BLD AUTO: 0.2 %
LYMPHOCYTES # BLD AUTO: 1.13 K/UL
LYMPHOCYTES NFR BLD AUTO: 25.3 %
MAGNESIUM SERPL-MCNC: 1.5 MG/DL
MAN DIFF?: NORMAL
MCHC RBC-ENTMCNC: 29.5 PG
MCHC RBC-ENTMCNC: 32 GM/DL
MCV RBC AUTO: 92.1 FL
MONOCYTES # BLD AUTO: 0.57 K/UL
MONOCYTES NFR BLD AUTO: 12.8 %
NEUTROPHILS # BLD AUTO: 2.39 K/UL
NEUTROPHILS NFR BLD AUTO: 53.7 %
PLATELET # BLD AUTO: 255 K/UL
POTASSIUM SERPL-SCNC: 4.2 MMOL/L
PROT SERPL-MCNC: 6.3 G/DL
RBC # BLD: 4.17 M/UL
RBC # FLD: 14.3 %
SARS-COV-2 AB SERPL IA-ACNC: >250 U/ML
SODIUM SERPL-SCNC: 141 MMOL/L
SPECIMEN SOURCE: SIGNIFICANT CHANGE UP
SPECIMEN SOURCE: SIGNIFICANT CHANGE UP
WBC # FLD AUTO: 4.46 K/UL

## 2022-01-19 ENCOUNTER — APPOINTMENT (OUTPATIENT)
Dept: HEMATOLOGY ONCOLOGY | Facility: CLINIC | Age: 67
End: 2022-01-19
Payer: MEDICARE

## 2022-01-19 PROCEDURE — 99214 OFFICE O/P EST MOD 30 MIN: CPT | Mod: 95

## 2022-01-30 LAB
ALBUMIN MFR SERPL ELPH: 61.8 %
ALBUMIN SERPL-MCNC: 4 G/DL
ALBUMIN/GLOB SERPL: 1.7 RATIO
ALPHA1 GLOB MFR SERPL ELPH: 5.5 %
ALPHA1 GLOB SERPL ELPH-MCNC: 0.4 G/DL
ALPHA2 GLOB MFR SERPL ELPH: 13.4 %
ALPHA2 GLOB SERPL ELPH-MCNC: 0.9 G/DL
B-GLOBULIN MFR SERPL ELPH: 11 %
B-GLOBULIN SERPL ELPH-MCNC: 0.7 G/DL
DEPRECATED KAPPA LC FREE/LAMBDA SER: 0.21 RATIO
GAMMA GLOB FLD ELPH-MCNC: 0.5 G/DL
GAMMA GLOB MFR SERPL ELPH: 8.3 %
IGA SER QL IEP: 34 MG/DL
IGG SER QL IEP: 686 MG/DL
IGM SER QL IEP: 36 MG/DL
INTERPRETATION SERPL IEP-IMP: NORMAL
KAPPA LC CSF-MCNC: 2.83 MG/DL
KAPPA LC SERPL-MCNC: 0.6 MG/DL
M PROTEIN MFR SERPL ELPH: NORMAL
M PROTEIN SPEC IFE-MCNC: NORMAL
MONOCLON BAND OBS SERPL: NORMAL
PROT SERPL-MCNC: 6.4 G/DL
PROT SERPL-MCNC: 6.4 G/DL

## 2022-01-30 NOTE — ASSESSMENT
[FreeTextEntry1] : IgD lambda myeloma s/p cycle 6 RVD on 21\par Mobilization of stem cells with Zarxio 960mcg subcutaneous daily 10/28/21-21\par He was admitted to the BMTU on 11/15/21 and received conditioning chemotherapy with Melphalan 100 mg/m2 IV x 2 consecutive days followed by autoPSCT on 21. \par Hospital course complicated by saddle pulmonary embolism and bilateral LE acute DVTs. On 21 he underwent a mechanical thrombectomy. IVC filter placement was deferred. He received anticoagulation, now on \par Eliquis. He also had terminal ileitis with ? microperforation, monitored in SICU, treated conservatively with supportive care and antibiotics. He completed a course of Meropenem 21.\par \par 1) Multiple myeloma- s/p autoPSCT on 21\par Plan- \par Continue current medications and restrictions as discussed prior to discharge from BMTU\par Continue Acyclovir, Mepron, Fluconazole(discontinue when supply complete), for ID prophylaxis\par Continue MVI, Folate daily\par Continue Pantoprazole daily for GI prophylaxis- d/c when supply complete\par Zofran as needed for nausea/vomiting\par Drink plenty of water daily\par Moisturizing cream to the skin several times per day\par I reviewed lab results from  22 with patient today\par Call the office immediately if fever, chills, symptoms of infection\par \par 2) H/O saddle PE s/p thrombectomy; h/o bilat LE DVT\par Plan- Continue Eliquis 5 mg po 2X/day\par Followup with Dr. Holley in Feb 10, 2022\par \par 3) H/O terminal ileitis- s/p high-dose Melphalan chemotherapy- symptoms now resolved\par Plan- \par Gas-X as needed; abdominal symptoms have resolved. \par Continue PPI daily\par \par 4) Acute appendicitis- documented on CT Abd/pelvis done 22; s/p hospitalization with conservative management with Meropenem IV. \par Plan-\par Complete course of Cipro/Flagyl as directed\par Followup with surgical service as outpatient in 2022. \par \par I confirmed patient's name and  at beginning of Telehealth visit. Verbal consent for Telehealth services given on 22 at 2:40 pm by Jony Julio, davis. Visit start time- 2:39 pm- visit end time- 3:18 pm. Total visit time- 39 minutes. \par \par He will have labwork at [facility] prior to next Telehealth visit\par Telehealth visit on 22. \par \par

## 2022-01-30 NOTE — REVIEW OF SYSTEMS
[Fever] : no fever [Chills] : no chills [Mucosal Pain] : no mucosal pain [Chest Pain] : no chest pain [Shortness Of Breath] : no shortness of breath [Cough] : no cough [Abdominal Pain] : no abdominal pain [Vomiting] : no vomiting [Diarrhea] : no diarrhea [Skin Rash] : no skin rash [Dizziness] : no dizziness [Fainting] : no fainting [Easy Bleeding] : no tendency for easy bleeding [Easy Bruising] : no tendency for easy bruising [FreeTextEntry2] : + mild fatigue [FreeTextEntry3] : + blurry vision( seen by Optho on 10/5/21) [FreeTextEntry4] : no sore throat [FreeTextEntry5] : + lower extremity edema- stable [FreeTextEntry7] : no nausea [FreeTextEntry9] : no bone pain [de-identified] : no paresthesias

## 2022-01-30 NOTE — RESULTS/DATA
[FreeTextEntry1] : (1/18/22) WBC- 4.46, ANC- 2.39; Hgb- 12.3, Hct- 38.4; Platelets- 255K. \par ----------------------------------------------------------------------------\par ACC: [id number] EXAM: CT ABDOMEN AND PELVIS OC IC \par PROCEDURE DATE: 01/12/2022 \par INTERPRETATION: CLINICAL INFORMATION: Right lower quadrant pain for \par Multiple myeloma. Previous terminal ileitis.\par \par COMPARISON: CT scan of the abdomen and pelvis from 11/29/2021\par \par CONTRAST/COMPLICATIONS:\par IV Contrast: Omnipaque 350 90 cc administered 10 cc discarded\par Oral Contrast: Omnipaque 300\par Complications: None reported at time of study completion\par \par PROCEDURE:\par CT of the Abdomen and Pelvis was performed.\par Sagittal and coronal reformats were performed.\par \par FINDINGS:\par LOWER CHEST: Within normal limits.\par \par LIVER: Within normal limits.\par BILE DUCTS: Normal caliber.\par GALLBLADDER: Small gallstones without gross inflammation.\par SPLEEN: Within normal limits.\par PANCREAS: Within normal limits.\par ADRENALS: Within normal limits.\par KIDNEYS/URETERS: Left renal cyst measuring up to 7.0 cm in the left lower \par pole. 1.3 cm low-attenuation lesion in the lower pole the left kidney \par laterally which is measuring greater than simple fluid in density.\par \par BLADDER: Bladder is underdistended which limits evaluation.\par REPRODUCTIVE ORGANS: Prominent prostate gland\par \par BOWEL: No bowel obstruction. Appendix is mildly distended measuring up to \par 1.0 cm which measured approximately 9 mm previously. There is question of \par minimally increased enhancement. There is mild surrounding soft tissue \par stranding although fluid was present in this region on the prior study. \par This may represent chronic changes although in a patient with right lower \par quadrant pain, acute appendicitis must also be considered.. Resolution of \par previously visualized distended small bowel. Previously mentioned \par terminal ileitis is not well appreciated on the current study. Colonic \par diverticuli predominantly in the sigmoid without gross inflammation.\par PERITONEUM: No ascites.\par VESSELS: Within normal limits.\par RETROPERITONEUM/LYMPH NODES: No lymphadenopathy.\par ABDOMINAL WALL: Small umbilical hernia containing fat.\par BONES: Status post ORIF of the left hemipelvis with stable postsurgical \par changes. Stable lucencies in bone, most pronounced and T12 and L5. \par Degenerative changes of bone. Spondylolisthesis with L5 anterior to L1 of \par approximately 25-50%.\par \par IMPRESSION:\par \par Mildly distended appendix. Mild surrounding soft tissue stranding where \par fluid was seen on the prior study. This may represent chronic residual \par changes although acute appendicitis must also be considered, especially \par in a patient with right lower quadrant pain. This was discussed with Dr. libby Sorenson at 3:00 PM on 1/12/2022.\par \par Resolution of small bowel obstruction. Stable bone findings.\par \par --- End of Report ---\par \par JEFF PHAM MD; Attending Radiologist\par

## 2022-01-30 NOTE — HISTORY OF PRESENT ILLNESS
[Home] : at home, [unfilled] , at the time of the visit. [Medical Office: ([facility])___] : at the medical office located in  [Spouse] : spouse [Verbal consent obtained from patient] : the patient, [unfilled] [de-identified] : 11/2020-Found to have T-12 compression fracture. Saw orthopedist Dr. Candelaria in 1/2021. Referred to endocrinologist Dr. Acosta by Dr. 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 Serum immunofixation showed 1 IgD lambda band and free lambda light chains. Urine immunofixation negative for monoclonal band.\par 4/29/2021–Bone marrow biopsy and bone marrow aspirate consistent with plasma cell myeloma (greater than 90% involvement). Myeloma FISH studies showed CCND1/IGH fusion (27%). Flow cytometry positive for monotypic plasma cells. Lymphocyte immunophenotypic findings showed no diagnostic abnormalities. Cytogenetics with normal male karyotype. Congo red stain negative. Iron stains showed iron stores present. No ring sideroblasts.\par 5/13/21- C1D1 RVd\par  [de-identified] : Since initial HPC transplant consult visit on 6/7/21, he had recent hematology office visit on 8/19/21, Cycle #5 Day#15 Velcade/Decadron. Cycle #5 Revlimid as planned. He describes moderate fatigue, occasional blurry vision, insomnia with Decadron. He denies fever, chills, cough, dyspnea. He received the COVID-19 vaccine(Moderna) on 3/16, 4/13/21; he has booster vaccine on 8/31/21. The patient is very concerned about the delta variant of COVID-19 and asked to discuss isolation/restriction policies. \par \par 10/6/21:\par He completed cycle 6 RVD on 9/30/21. He has moderate fatigue and good appetite. He has occasional blurry vision and was evaluated by Optho with normal evaluation. He had MRI Brain with alton(9/29/21 at [facility])- negative. He denies fever,  chills, cough, shortness of breath. \par \par 12/9/21:\par The patient was admitted to the BMTU on 11/15/21 and received conditioning chemotherapy with Melphalan 100 mg/m2 IV x 2 consecutive days followed by autoPSCT on 11/19/21. \par \par Upon admission, a TLC was placed in IR. Mr. Julio received IV hydration, pain management, anxiolytics, antiemetics, nutritional support, and antibacterial / antiviral / antifungal / GI / PCP and VOD (SOS) prophylaxis. When his ANC dropped below 500 he was started on prophylactic Ciprofloxacin. Labs were monitored on a daily basis, and he received electrolyte repletion and transfusional support as needed. \par \par On 11/19/2021 After pre-medication  received 251 mL of, Autologous mobilized, \par plasma reduced, pooled, thawed, washes HCP apheresis for  over approximately 1 \par hr. Cell counts as follows \par Total MNC( x10^8/kg)=7.22 \par CD34+cells ( x10^6 kg)=4.58 \par Cell Viability (%)= 90 \par Mr. Julio tolerated the infusion well with no adverse resections noted. \par \par While admitted, Mr. Julio experienced pancytopenia related to the high dose chemotherapy conditioning regimen. He was treated with transfusional support. On \par 11/19/21 he experienced a vasovagal episode in the bathroom that was self limiting. On 11/23/21 he had another vasovagal episode with severe abdominal pain and distention. A RRT was called. During the episode he was hypotensive, lactate was 6. A CT A/P showed pneumoperitoneum and terminal ileitis. Surgery was consulted and he was transferred to the SICU. During the episode, he made a troponin. A TTE was completed in the SICU, which showed increased pulmonary pressures. As a result, a CTA of the chest was completed which showed a saddle pulmonary embolism. He was started on full dose heparin. Lower extremity dopplers showed bilateral acute DVTs. On 11/24/21 he underwent a mechanical thrombectomy. IVC filter placement was deferred. \par \par The terminal ileitis and pneumoperitoneum was treated conservatively with supportive care and antibiotics. He completed a course of Meropenem 12/7/21. A \par repeat CT A/P showed unchanged ileitis, with mild increased fluid distention and resolution of free air. The heparin was transitioned to full dose Lovenox, \par and eventually Eliquis. Shortly thereafter he was transferred back to . \par \par Currently, he is stable for discharge home with outpatient follow up at the [facility] and with vascular cardiology. \par \par Since discharge to home on 12/7/21 he has moderate fatigue and slowly improving appetite. He describes loose stool(small volume) twice daily with abdominal bloating and gassiness since discharge but denies nausea, vomiting. \par \par 12/16/21:\par He continues to have moderate fatigue and continued slow improvement in appetite. He started taking Gas-X twice daily after 12/9/21 office visit and stopped on 12/13 as his abd bloating with gassiness significantly improved. He states loose stool has improved, now only once daily since 12/17, but no nausea/vomiting. He still has bilateral lower extremity edema. He scheduled followup with vascular cardiology on 1/13/22. He denies fever, cough, shortness of breath. \par \par 12/22/21:\par He has mild-moderate fatigue and good appetite. He denies abdominal pain/distension and hasn't needed Gas-X for one week. He denies nausea/vomiting, diarrhea. No fever, chills. \par \par 01/05/22:\par He has mild fatigue and good appetite. He denies fever, chills, cough, dyspnea, nausea/vomiting, abdominal pain, diarrhea. He walks on treadmill daily x 20 minutes. \par \par 01/19/22:\par Patient called the office on 1/11/22 to discuss new onset of RLQ pain last night when sleeping and it awakened him when he was changing his position. He went back to sleep. He woke up around 6:30 am and noted sharp pain with movement, therefore, he has been trying not to move around too much. He denies nausea, vomiting, diarrhea. He has eaten breakfast and lunch without difficulty and no associated symptoms. He denies fever, chills, cough, shortness of breath. No acute swelling in lower extremities, no chest pain. \par He states pain is 3-4/10, non-radiating, and only occurs with movement. \par Plan- \par CT Abd/pelvis with oral and IV contrast. \par NPO after 9 am in case CT approved and scan can be done tomorrow afternoon. \par If he develops worsening pain, fever, chills, vomiting or other severe new symptoms he will go to [facility] immediately. \par I contacted patient on 1/12/22 to discuss results of CT Abd/pelvis(1/12/22)- acute appendicitis. Given persistent RLQ pain today, I instructed patient to go to [facility] ER for surgery evaluation. He agrees. Patient admitted to surgical service and treated conservatively with Meropenem IV. His abdominal pain resolved and he was discharged to home on 1/14/22 to finish course of antibiotics with Cipro/Flagyl.\par \par Since discharge on 1/14/22, he feels well overall with only mild fatigue, and appetite is good. He denies abdominal pain, nausea, vomiting, diarrhea, fever, chills. Weight this morning- 188.5 lbs.\par  \par

## 2022-01-30 NOTE — CONSULT LETTER
[Dear  ___] : Dear  [unfilled], [Courtesy Letter:] : I had the pleasure of seeing your patient, [unfilled], in my office today. [Please see my note below.] : Please see my note below. [Consult Closing:] : Thank you very much for allowing me to participate in the care of this patient.  If you have any questions, please do not hesitate to contact me. [Sincerely,] : Sincerely, [FreeTextEntry2] : Dyana Cheatham M.D.\par [facility]\par [address]\par [place]   [address] [FreeTextEntry3] : \par Pao Sorenson M.D.\par \par  of Medicine\par [facility] [employer]\par [facility] \par [facility]\par [address] [place] [address] \[place] [address] \par ph: [phone]\par fax: [phone]

## 2022-01-30 NOTE — REASON FOR VISIT
[Follow-Up Visit] : a follow-up visit for [FreeTextEntry2] : multiple myeloma s/p high-dose chemotherapy followed by autologous peripheral blood stem cell transplant on 11/19/21

## 2022-02-02 ENCOUNTER — APPOINTMENT (OUTPATIENT)
Dept: HEMATOLOGY ONCOLOGY | Facility: CLINIC | Age: 67
End: 2022-02-02
Payer: MEDICARE

## 2022-02-02 LAB
ALBUMIN SERPL ELPH-MCNC: 4.3 G/DL
ALP BLD-CCNC: 66 U/L
ALT SERPL-CCNC: 16 U/L
ANION GAP SERPL CALC-SCNC: 15 MMOL/L
AST SERPL-CCNC: 17 U/L
BASOPHILS # BLD AUTO: 0.03 K/UL
BASOPHILS NFR BLD AUTO: 0.8 %
BILIRUB SERPL-MCNC: 0.3 MG/DL
BUN SERPL-MCNC: 17 MG/DL
CALCIUM SERPL-MCNC: 9.6 MG/DL
CHLORIDE SERPL-SCNC: 102 MMOL/L
CO2 SERPL-SCNC: 24 MMOL/L
CREAT SERPL-MCNC: 0.88 MG/DL
EOSINOPHIL # BLD AUTO: 0.11 K/UL
EOSINOPHIL NFR BLD AUTO: 2.9 %
GLUCOSE SERPL-MCNC: 125 MG/DL
HCT VFR BLD CALC: 38 %
HGB BLD-MCNC: 12.3 G/DL
IMM GRANULOCYTES NFR BLD AUTO: 0.3 %
LYMPHOCYTES # BLD AUTO: 0.79 K/UL
LYMPHOCYTES NFR BLD AUTO: 20.7 %
MAGNESIUM SERPL-MCNC: 1.8 MG/DL
MAN DIFF?: NORMAL
MCHC RBC-ENTMCNC: 30.1 PG
MCHC RBC-ENTMCNC: 32.4 GM/DL
MCV RBC AUTO: 93.1 FL
MONOCYTES # BLD AUTO: 0.49 K/UL
MONOCYTES NFR BLD AUTO: 12.9 %
NEUTROPHILS # BLD AUTO: 2.38 K/UL
NEUTROPHILS NFR BLD AUTO: 62.4 %
PLATELET # BLD AUTO: 271 K/UL
POTASSIUM SERPL-SCNC: 4.5 MMOL/L
PROT SERPL-MCNC: 6 G/DL
RBC # BLD: 4.08 M/UL
RBC # FLD: 14.6 %
SODIUM SERPL-SCNC: 141 MMOL/L
WBC # FLD AUTO: 3.81 K/UL

## 2022-02-02 PROCEDURE — 99213 OFFICE O/P EST LOW 20 MIN: CPT | Mod: 95

## 2022-02-02 RX ORDER — CIPROFLOXACIN HYDROCHLORIDE 500 MG/1
500 TABLET, FILM COATED ORAL
Refills: 0 | Status: DISCONTINUED | COMMUNITY
End: 2022-02-02

## 2022-02-02 RX ORDER — METRONIDAZOLE 500 MG/1
500 TABLET, FILM COATED ORAL EVERY 8 HOURS
Refills: 0 | Status: DISCONTINUED | COMMUNITY
End: 2022-02-02

## 2022-02-02 NOTE — ASSESSMENT
[FreeTextEntry1] : IgD lambda myeloma s/p cycle 6 RVD on 21\par Mobilization of stem cells with Zarxio 960mcg subcutaneous daily 10/28/21-21\par He was admitted to the BMTU on 11/15/21 and received conditioning chemotherapy with Melphalan 100 mg/m2 IV x 2 consecutive days followed by autoPSCT on 21. \par Hospital course complicated by saddle pulmonary embolism and bilateral LE acute DVTs. On 21 he underwent a mechanical thrombectomy. IVC filter placement was deferred. He received anticoagulation, now on \par Eliquis. He also had terminal ileitis with ? microperforation, monitored in SICU, treated conservatively with supportive care and antibiotics. He completed a course of Meropenem 21.\par \par 1) Multiple myeloma- s/p autoPSCT on 21\par Plan- \par Continue current medications and restrictions as discussed prior to discharge from BMTU\par Continue Acyclovir, Mepron, Fluconazole(discontinue when supply complete), for ID prophylaxis\par Continue MVI, Folate daily\par Continue Pantoprazole daily for GI prophylaxis- d/c when supply complete\par Zofran as needed for nausea/vomiting\par Drink plenty of water daily\par Moisturizing cream to the skin several times per day\par I reviewed lab results from  22 with patient today\par Call the office immediately if fever, chills, symptoms of infection\par \par 2) H/O saddle PE s/p thrombectomy; h/o bilat LE DVT\par Plan- Continue Eliquis 5 mg po 2X/day\par Followup with Dr. Holley in Feb 10, 2022\par \par 3) H/O terminal ileitis- s/p high-dose Melphalan chemotherapy- symptoms now resolved\par Plan- \par Gas-X as needed; abdominal symptoms have resolved. \par Continue PPI daily\par \par 4) Acute appendicitis- documented on CT Abd/pelvis done 22; s/p hospitalization with conservative management with Meropenem IV. \par Plan-\par Completed course of Cipro/Flagyl as directed\par Followup with surgical service as outpatient on 22.\par \par I confirmed patient's name and  at beginning of Telehealth visit. Verbal consent for Telehealth services given on 22 at 3:06 pm by Jony Julio, davis. Visit start time- 3:05 pm- visit end time- 3:32 pm. Total visit time- 27 minutes. \par \par RTC in 2 weeks\par

## 2022-02-02 NOTE — REVIEW OF SYSTEMS
[Fever] : no fever [Chills] : no chills [Fatigue] : no fatigue [Mucosal Pain] : no mucosal pain [Chest Pain] : no chest pain [Shortness Of Breath] : no shortness of breath [Cough] : no cough [Abdominal Pain] : no abdominal pain [Vomiting] : no vomiting [Diarrhea] : no diarrhea [Skin Rash] : no skin rash [Dizziness] : no dizziness [Fainting] : no fainting [Easy Bleeding] : no tendency for easy bleeding [Easy Bruising] : no tendency for easy bruising [FreeTextEntry3] : + blurry vision( seen by Optho on 10/5/21) [FreeTextEntry4] : no sore throat [FreeTextEntry5] : slight edema in both ankles [FreeTextEntry7] : no nausea [FreeTextEntry9] : no bone pain [de-identified] : no paresthesias

## 2022-02-02 NOTE — HISTORY OF PRESENT ILLNESS
[Home] : at home, [unfilled] , at the time of the visit. [Medical Office: ([facility])___] : at the medical office located in  [Spouse] : spouse [Verbal consent obtained from patient] : the patient, [unfilled] [de-identified] : 11/2020-Found to have T-12 compression fracture. Saw orthopedist Dr. Candelaria in 1/2021. Referred to endocrinologist Dr. Acosta by Dr. 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 Serum immunofixation showed 1 IgD lambda band and free lambda light chains. Urine immunofixation negative for monoclonal band.\par 4/29/2021–Bone marrow biopsy and bone marrow aspirate consistent with plasma cell myeloma (greater than 90% involvement). Myeloma FISH studies showed CCND1/IGH fusion (27%). Flow cytometry positive for monotypic plasma cells. Lymphocyte immunophenotypic findings showed no diagnostic abnormalities. Cytogenetics with normal male karyotype. Congo red stain negative. Iron stains showed iron stores present. No ring sideroblasts.\par 5/13/21- C1D1 RVd\par  [de-identified] : Since initial HPC transplant consult visit on 6/7/21, he had recent hematology office visit on 8/19/21, Cycle #5 Day#15 Velcade/Decadron. Cycle #5 Revlimid as planned. He describes moderate fatigue, occasional blurry vision, insomnia with Decadron. He denies fever, chills, cough, dyspnea. He received the COVID-19 vaccine(Moderna) on 3/16, 4/13/21; he has booster vaccine on 8/31/21. The patient is very concerned about the delta variant of COVID-19 and asked to discuss isolation/restriction policies. \par \par 10/6/21:\par He completed cycle 6 RVD on 9/30/21. He has moderate fatigue and good appetite. He has occasional blurry vision and was evaluated by Optho with normal evaluation. He had MRI Brain with alton(9/29/21 at [facility])- negative. He denies fever,  chills, cough, shortness of breath. \par \par 12/9/21:\par The patient was admitted to the BMTU on 11/15/21 and received conditioning chemotherapy with Melphalan 100 mg/m2 IV x 2 consecutive days followed by autoPSCT on 11/19/21. \par \par Upon admission, a TLC was placed in IR. Mr. Julio received IV hydration, pain management, anxiolytics, antiemetics, nutritional support, and antibacterial / antiviral / antifungal / GI / PCP and VOD (SOS) prophylaxis. When his ANC dropped below 500 he was started on prophylactic Ciprofloxacin. Labs were monitored on a daily basis, and he received electrolyte repletion and transfusional support as needed. \par \par On 11/19/2021 After pre-medication  received 251 mL of, Autologous mobilized, \par plasma reduced, pooled, thawed, washes HCP apheresis for  over approximately 1 \par hr. Cell counts as follows \par Total MNC( x10^8/kg)=7.22 \par CD34+cells ( x10^6 kg)=4.58 \par Cell Viability (%)= 90 \par Mr. Julio tolerated the infusion well with no adverse resections noted. \par \par While admitted, Mr. Julio experienced pancytopenia related to the high dose chemotherapy conditioning regimen. He was treated with transfusional support. On \par 11/19/21 he experienced a vasovagal episode in the bathroom that was self limiting. On 11/23/21 he had another vasovagal episode with severe abdominal pain and distention. A RRT was called. During the episode he was hypotensive, lactate was 6. A CT A/P showed pneumoperitoneum and terminal ileitis. Surgery was consulted and he was transferred to the SICU. During the episode, he made a troponin. A TTE was completed in the SICU, which showed increased pulmonary pressures. As a result, a CTA of the chest was completed which showed a saddle pulmonary embolism. He was started on full dose heparin. Lower extremity dopplers showed bilateral acute DVTs. On 11/24/21 he underwent a mechanical thrombectomy. IVC filter placement was deferred. \par \par The terminal ileitis and pneumoperitoneum was treated conservatively with supportive care and antibiotics. He completed a course of Meropenem 12/7/21. A \par repeat CT A/P showed unchanged ileitis, with mild increased fluid distention and resolution of free air. The heparin was transitioned to full dose Lovenox, \par and eventually Eliquis. Shortly thereafter he was transferred back to . \par \par Currently, he is stable for discharge home with outpatient follow up at the [facility] and with vascular cardiology. \par \par Since discharge to home on 12/7/21 he has moderate fatigue and slowly improving appetite. He describes loose stool(small volume) twice daily with abdominal bloating and gassiness since discharge but denies nausea, vomiting. \par \par 12/16/21:\par He continues to have moderate fatigue and continued slow improvement in appetite. He started taking Gas-X twice daily after 12/9/21 office visit and stopped on 12/13 as his abd bloating with gassiness significantly improved. He states loose stool has improved, now only once daily since 12/17, but no nausea/vomiting. He still has bilateral lower extremity edema. He scheduled followup with vascular cardiology on 1/13/22. He denies fever, cough, shortness of breath. \par \par 12/22/21:\par He has mild-moderate fatigue and good appetite. He denies abdominal pain/distension and hasn't needed Gas-X for one week. He denies nausea/vomiting, diarrhea. No fever, chills. \par \par 01/05/22:\par He has mild fatigue and good appetite. He denies fever, chills, cough, dyspnea, nausea/vomiting, abdominal pain, diarrhea. He walks on treadmill daily x 20 minutes. \par \par 01/19/22:\par Patient called the office on 1/11/22 to discuss new onset of RLQ pain last night when sleeping and it awakened him when he was changing his position. He went back to sleep. He woke up around 6:30 am and noted sharp pain with movement, therefore, he has been trying not to move around too much. He denies nausea, vomiting, diarrhea. He has eaten breakfast and lunch without difficulty and no associated symptoms. He denies fever, chills, cough, shortness of breath. No acute swelling in lower extremities, no chest pain. \par He states pain is 3-4/10, non-radiating, and only occurs with movement. \par Plan- \par CT Abd/pelvis with oral and IV contrast. \par NPO after 9 am in case CT approved and scan can be done tomorrow afternoon. \par If he develops worsening pain, fever, chills, vomiting or other severe new symptoms he will go to [facility] immediately. \par I contacted patient on 1/12/22 to discuss results of CT Abd/pelvis(1/12/22)- acute appendicitis. Given persistent RLQ pain today, I instructed patient to go to [facility] for surgery evaluation. He agrees. Patient admitted to surgical service and treated conservatively with Meropenem IV. His abdominal pain resolved and he was discharged to home on 1/14/22 to finish course of antibiotics with Cipro/Flagyl.\par \par Since discharge on 1/14/22, he feels well overall with only mild fatigue, and appetite is good. He denies abdominal pain, nausea, vomiting, diarrhea, fever, chills. Weight this morning- 188.5 lbs.\par  \par 02/02/22:\par He describes good energy and good appetite. He denies fever, cough, shortness of breath, nausea, vomiting, diarrhea. Weight this morning- 189 lbs. \par

## 2022-02-02 NOTE — RESULTS/DATA
[FreeTextEntry1] : (1/18/22) WBC- 3.81, ANC- 2.4; Hgb- 12.3, Hct- 38.0; Platelets- 271K. \par ----------------------------------------------------------------------------\par ACC: [id number] EXAM: CT ABDOMEN AND PELVIS OC IC \par PROCEDURE DATE: 01/12/2022 \par INTERPRETATION: CLINICAL INFORMATION: Right lower quadrant pain for \par Multiple myeloma. Previous terminal ileitis.\par \par COMPARISON: CT scan of the abdomen and pelvis from 11/29/2021\par \par CONTRAST/COMPLICATIONS:\par IV Contrast: Omnipaque 350 90 cc administered 10 cc discarded\par Oral Contrast: Omnipaque 300\par Complications: None reported at time of study completion\par \par PROCEDURE:\par CT of the Abdomen and Pelvis was performed.\par Sagittal and coronal reformats were performed.\par \par FINDINGS:\par LOWER CHEST: Within normal limits.\par \par LIVER: Within normal limits.\par BILE DUCTS: Normal caliber.\par GALLBLADDER: Small gallstones without gross inflammation.\par SPLEEN: Within normal limits.\par PANCREAS: Within normal limits.\par ADRENALS: Within normal limits.\par KIDNEYS/URETERS: Left renal cyst measuring up to 7.0 cm in the left lower \par pole. 1.3 cm low-attenuation lesion in the lower pole the left kidney \par laterally which is measuring greater than simple fluid in density.\par \par BLADDER: Bladder is underdistended which limits evaluation.\par REPRODUCTIVE ORGANS: Prominent prostate gland\par \par BOWEL: No bowel obstruction. Appendix is mildly distended measuring up to \par 1.0 cm which measured approximately 9 mm previously. There is question of \par minimally increased enhancement. There is mild surrounding soft tissue \par stranding although fluid was present in this region on the prior study. \par This may represent chronic changes although in a patient with right lower \par quadrant pain, acute appendicitis must also be considered.. Resolution of \par previously visualized distended small bowel. Previously mentioned \par terminal ileitis is not well appreciated on the current study. Colonic \par diverticuli predominantly in the sigmoid without gross inflammation.\par PERITONEUM: No ascites.\par VESSELS: Within normal limits.\par RETROPERITONEUM/LYMPH NODES: No lymphadenopathy.\par ABDOMINAL WALL: Small umbilical hernia containing fat.\par BONES: Status post ORIF of the left hemipelvis with stable postsurgical \par changes. Stable lucencies in bone, most pronounced and T12 and L5. \par Degenerative changes of bone. Spondylolisthesis with L5 anterior to L1 of \par approximately 25-50%.\par \par IMPRESSION:\par \par Mildly distended appendix. Mild surrounding soft tissue stranding where \par fluid was seen on the prior study. This may represent chronic residual \par changes although acute appendicitis must also be considered, especially \par in a patient with right lower quadrant pain. This was discussed with Dr. libby Sorenson at 3:00 PM on 1/12/2022.\par \par Resolution of small bowel obstruction. Stable bone findings.\par \par --- End of Report ---\par \par JEFF PHAM MD; Attending Radiologist\par

## 2022-02-02 NOTE — CONSULT LETTER
[Dear  ___] : Dear  [unfilled], [Courtesy Letter:] : I had the pleasure of seeing your patient, [unfilled], in my office today. [Please see my note below.] : Please see my note below. [Consult Closing:] : Thank you very much for allowing me to participate in the care of this patient.  If you have any questions, please do not hesitate to contact me. [Sincerely,] : Sincerely, [FreeTextEntry2] : Dyana Cheatham M.D.\par [facility]\par [address]\par [place]   [address] [FreeTextEntry3] : \par Pao Sorenson M.D.\par \par  of Medicine\par [facility] [employer]\par [facility] \par [facility] [place]\par [address] [place] [address] \[place] [address] \par ph: [phone]\par fax: [phone]

## 2022-02-09 ENCOUNTER — APPOINTMENT (OUTPATIENT)
Dept: TRAUMA SURGERY | Facility: CLINIC | Age: 67
End: 2022-02-09
Payer: MEDICARE

## 2022-02-09 VITALS
TEMPERATURE: 98 F | DIASTOLIC BLOOD PRESSURE: 84 MMHG | HEART RATE: 65 BPM | SYSTOLIC BLOOD PRESSURE: 154 MMHG | BODY MASS INDEX: 26.41 KG/M2 | HEIGHT: 72 IN | WEIGHT: 195 LBS

## 2022-02-09 PROCEDURE — 99214 OFFICE O/P EST MOD 30 MIN: CPT

## 2022-02-10 ENCOUNTER — APPOINTMENT (OUTPATIENT)
Dept: CARDIOLOGY | Facility: CLINIC | Age: 67
End: 2022-02-10
Payer: MEDICARE

## 2022-02-10 VITALS
WEIGHT: 197 LBS | OXYGEN SATURATION: 98 % | HEART RATE: 57 BPM | BODY MASS INDEX: 26.68 KG/M2 | HEIGHT: 72 IN | SYSTOLIC BLOOD PRESSURE: 129 MMHG | DIASTOLIC BLOOD PRESSURE: 81 MMHG

## 2022-02-10 PROCEDURE — 99214 OFFICE O/P EST MOD 30 MIN: CPT

## 2022-02-10 NOTE — ASSESSMENT
[FreeTextEntry1] : Assessment:\par 1.  Submassive PE\par - s/p INARI mechanical thrombectomy\par - bilateral DVT\par - on eliquis\par 2. HPC transplant\par 3.  Multiple Myeloma\par 4. Appendicitis - treated \par \par Plan\par 1.  continue eliquis 5mg BID for now\par 2.  Repeat echocardiogram to re-assess RV function\par 3.  Repeat venous duplex\par 4.  Compression garments to the legs\par 5.  Daily walking\par 6.  Return in 3 months for followup visit.

## 2022-02-10 NOTE — REASON FOR VISIT
[Follow-Up - From Hospitalization] : follow-up of a recent hospitalization for [FreeTextEntry2] : PE and DVT [FreeTextEntry1] : 2/10/2022\par \par Here for followup\par In November was hospitalized found to have submassive saddle PE\par treated with INARI mechanical thrombectomy\par RV dysfunction on echo at that time and on CT\par \par Doing well with eliquis 5mg BID\par no bleeding issues\par Breathing is good\par No cp \par \par states he had a bout of appendicitis last month, treated with antibiotics, resolve.d

## 2022-02-16 ENCOUNTER — RESULT REVIEW (OUTPATIENT)
Age: 67
End: 2022-02-16

## 2022-02-16 ENCOUNTER — APPOINTMENT (OUTPATIENT)
Dept: HEMATOLOGY ONCOLOGY | Facility: CLINIC | Age: 67
End: 2022-02-16
Payer: MEDICARE

## 2022-02-16 VITALS
BODY MASS INDEX: 26.55 KG/M2 | SYSTOLIC BLOOD PRESSURE: 121 MMHG | HEART RATE: 89 BPM | WEIGHT: 195.77 LBS | OXYGEN SATURATION: 97 % | TEMPERATURE: 97.2 F | DIASTOLIC BLOOD PRESSURE: 83 MMHG | RESPIRATION RATE: 16 BRPM

## 2022-02-16 DIAGNOSIS — K35.80 UNSPECIFIED ACUTE APPENDICITIS: ICD-10-CM

## 2022-02-16 DIAGNOSIS — K50.00 CROHN'S DISEASE OF SMALL INTESTINE W/OUT COMPLICATIONS: ICD-10-CM

## 2022-02-16 LAB
BASOPHILS # BLD AUTO: 0.04 K/UL — SIGNIFICANT CHANGE UP (ref 0–0.2)
BASOPHILS NFR BLD AUTO: 0.9 % — SIGNIFICANT CHANGE UP (ref 0–2)
EOSINOPHIL # BLD AUTO: 0.07 K/UL — SIGNIFICANT CHANGE UP (ref 0–0.5)
EOSINOPHIL NFR BLD AUTO: 1.5 % — SIGNIFICANT CHANGE UP (ref 0–6)
HCT VFR BLD CALC: 38.5 % — LOW (ref 39–50)
HGB BLD-MCNC: 12.7 G/DL — LOW (ref 13–17)
IMM GRANULOCYTES NFR BLD AUTO: 0.2 % — SIGNIFICANT CHANGE UP (ref 0–1.5)
LYMPHOCYTES # BLD AUTO: 0.72 K/UL — LOW (ref 1–3.3)
LYMPHOCYTES # BLD AUTO: 15.8 % — SIGNIFICANT CHANGE UP (ref 13–44)
MCHC RBC-ENTMCNC: 30.5 PG — SIGNIFICANT CHANGE UP (ref 27–34)
MCHC RBC-ENTMCNC: 33 G/DL — SIGNIFICANT CHANGE UP (ref 32–36)
MCV RBC AUTO: 92.5 FL — SIGNIFICANT CHANGE UP (ref 80–100)
MONOCYTES # BLD AUTO: 0.61 K/UL — SIGNIFICANT CHANGE UP (ref 0–0.9)
MONOCYTES NFR BLD AUTO: 13.3 % — SIGNIFICANT CHANGE UP (ref 2–14)
NEUTROPHILS # BLD AUTO: 3.12 K/UL — SIGNIFICANT CHANGE UP (ref 1.8–7.4)
NEUTROPHILS NFR BLD AUTO: 68.3 % — SIGNIFICANT CHANGE UP (ref 43–77)
NRBC # BLD: 0 /100 WBCS — SIGNIFICANT CHANGE UP (ref 0–0)
PLATELET # BLD AUTO: 249 K/UL — SIGNIFICANT CHANGE UP (ref 150–400)
RBC # BLD: 4.16 M/UL — LOW (ref 4.2–5.8)
RBC # FLD: 14.7 % — HIGH (ref 10.3–14.5)
WBC # BLD: 4.57 K/UL — SIGNIFICANT CHANGE UP (ref 3.8–10.5)
WBC # FLD AUTO: 4.57 K/UL — SIGNIFICANT CHANGE UP (ref 3.8–10.5)

## 2022-02-16 PROCEDURE — 99214 OFFICE O/P EST MOD 30 MIN: CPT

## 2022-02-16 RX ORDER — FLUCONAZOLE 200 MG/1
200 TABLET ORAL
Qty: 30 | Refills: 0 | Status: DISCONTINUED | OUTPATIENT
Start: 2021-12-07 | End: 2022-02-16

## 2022-02-16 RX ORDER — PANTOPRAZOLE 40 MG/1
40 TABLET, DELAYED RELEASE ORAL DAILY
Qty: 30 | Refills: 3 | Status: DISCONTINUED | COMMUNITY
Start: 2021-12-07 | End: 2022-02-16

## 2022-02-16 NOTE — REVIEW OF SYSTEMS
[Fever] : no fever [Chills] : no chills [Fatigue] : no fatigue [Mucosal Pain] : no mucosal pain [Chest Pain] : no chest pain [Shortness Of Breath] : no shortness of breath [Cough] : no cough [Abdominal Pain] : no abdominal pain [Vomiting] : no vomiting [Diarrhea] : no diarrhea [Skin Rash] : no skin rash [Dizziness] : no dizziness [Fainting] : no fainting [Easy Bleeding] : no tendency for easy bleeding [Easy Bruising] : no tendency for easy bruising [FreeTextEntry3] : + blurry vision( seen by Optho on 10/5/21) [FreeTextEntry4] : no sore throat [FreeTextEntry5] : slight edema in both ankles [FreeTextEntry7] : no nausea [FreeTextEntry9] : no bone pain [de-identified] : no paresthesias

## 2022-02-16 NOTE — PHYSICAL EXAM
[Ambulatory and capable of all self care but unable to carry out any work activities] : Status 2- Ambulatory and capable of all self care but unable to carry out any work activities. Up and about more than 50% of waking hours [Normal] : affect appropriate [de-identified] : alopecia [de-identified] : anicteric [de-identified] : RRR, normal S1S2 [de-identified] : + 1 edema bilateral lower legs  [de-identified] : no cervical/SCV adenopathy [de-identified] : no rash [de-identified] : A & O x 4

## 2022-02-16 NOTE — ASSESSMENT
[FreeTextEntry1] : IgD lambda myeloma s/p cycle 6 RVD on 9/30/21\par Mobilization of stem cells with Zarxio 960mcg subcutaneous daily 10/28/21-11/1/21\par He was admitted to the BMTU on 11/15/21 and received conditioning chemotherapy with Melphalan 100 mg/m2 IV x 2 consecutive days followed by autoPSCT on 11/19/21. \par Hospital course complicated by saddle pulmonary embolism and bilateral LE acute DVTs. On 11/24/21 he underwent a mechanical thrombectomy. IVC filter placement was deferred. He received anticoagulation, now on \par Eliquis. He also had terminal ileitis with ? microperforation, monitored in SICU, treated conservatively with supportive care and antibiotics. He completed a course of Meropenem 12/7/21.\par \par 1) Multiple myeloma- s/p autoPSCT on 11/19/21\par Plan- \par Continue current medications and restrictions as discussed prior to discharge from BMTU\par Continue Acyclovir, Mepron for ID prophylaxis; Off Diflucan\par Continue MVI, Folate daily- switch to OTC MVI after supply complete\par Continue Pantoprazole daily for GI prophylaxis- d/c when supply complete\par Zofran as needed for nausea/vomiting\par Drink plenty of water daily\par Moisturizing cream to the skin several times per day\par Check CMP, Mg, IMEL, IgD level\par Will discuss maintenance therapy post autoPSCT at next office visit. \par Call the office immediately if fever, chills, symptoms of infection\par \par 2) H/O saddle PE s/p thrombectomy; h/o bilat LE DVT\par Plan- Continue Eliquis 5 mg po 2X/day\par Had followup with Dr. Holley on Feb 10, 2022\par \par RTC in 2 weeks\par

## 2022-02-16 NOTE — HISTORY OF PRESENT ILLNESS
[de-identified] : 11/2020-Found to have T-12 compression fracture. Saw orthopedist Dr. Candelaria in 1/2021. Referred to endocrinologist Dr. Acosta by Dr. 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 Serum immunofixation showed 1 IgD lambda band and free lambda light chains. Urine immunofixation negative for monoclonal band.\par 4/29/2021–Bone marrow biopsy and bone marrow aspirate consistent with plasma cell myeloma (greater than 90% involvement). Myeloma FISH studies showed CCND1/IGH fusion (27%). Flow cytometry positive for monotypic plasma cells. Lymphocyte immunophenotypic findings showed no diagnostic abnormalities. Cytogenetics with normal male karyotype. Congo red stain negative. Iron stains showed iron stores present. No ring sideroblasts.\par 5/13/21- C1D1 RVd\par  [de-identified] : Since initial HPC transplant consult visit on 6/7/21, he had recent hematology office visit on 8/19/21, Cycle #5 Day#15 Velcade/Decadron. Cycle #5 Revlimid as planned. He describes moderate fatigue, occasional blurry vision, insomnia with Decadron. He denies fever, chills, cough, dyspnea. He received the COVID-19 vaccine(Moderna) on 3/16, 4/13/21; he has booster vaccine on 8/31/21. The patient is very concerned about the delta variant of COVID-19 and asked to discuss isolation/restriction policies. \par \par 10/6/21:\par He completed cycle 6 RVD on 9/30/21. He has moderate fatigue and good appetite. He has occasional blurry vision and was evaluated by Optho with normal evaluation. He had MRI Brain with alton(9/29/21 at [facility])- negative. He denies fever,  chills, cough, shortness of breath. \par \par 12/9/21:\par The patient was admitted to the BMTU on 11/15/21 and received conditioning chemotherapy with Melphalan 100 mg/m2 IV x 2 consecutive days followed by autoPSCT on 11/19/21. \par \par Upon admission, a TLC was placed in IR. Mr. Julio received IV hydration, pain management, anxiolytics, antiemetics, nutritional support, and antibacterial / antiviral / antifungal / GI / PCP and VOD (SOS) prophylaxis. When his ANC dropped below 500 he was started on prophylactic Ciprofloxacin. Labs were monitored on a daily basis, and he received electrolyte repletion and transfusional support as needed. \par \par On 11/19/2021 After pre-medication  received 251 mL of, Autologous mobilized, \par plasma reduced, pooled, thawed, washes HCP apheresis for  over approximately 1 \par hr. Cell counts as follows \par Total MNC( x10^8/kg)=7.22 \par CD34+cells ( x10^6 kg)=4.58 \par Cell Viability (%)= 90 \par Mr. Julio tolerated the infusion well with no adverse resections noted. \par \par While admitted, Mr. Julio experienced pancytopenia related to the high dose chemotherapy conditioning regimen. He was treated with transfusional support. On \par 11/19/21 he experienced a vasovagal episode in the bathroom that was self limiting. On 11/23/21 he had another vasovagal episode with severe abdominal pain and distention. A RRT was called. During the episode he was hypotensive, lactate was 6. A CT A/P showed pneumoperitoneum and terminal ileitis. Surgery was consulted and he was transferred to the SICU. During the episode, he made a troponin. A TTE was completed in the SICU, which showed increased pulmonary pressures. As a result, a CTA of the chest was completed which showed a saddle pulmonary embolism. He was started on full dose heparin. Lower extremity dopplers showed bilateral acute DVTs. On 11/24/21 he underwent a mechanical thrombectomy. IVC filter placement was deferred. \par \par The terminal ileitis and pneumoperitoneum was treated conservatively with supportive care and antibiotics. He completed a course of Meropenem 12/7/21. A \par repeat CT A/P showed unchanged ileitis, with mild increased fluid distention and resolution of free air. The heparin was transitioned to full dose Lovenox, \par and eventually Eliquis. Shortly thereafter he was transferred back to . \par \par Currently, he is stable for discharge home with outpatient follow up at the [facility] and with vascular cardiology. \par \par Since discharge to home on 12/7/21 he has moderate fatigue and slowly improving appetite. He describes loose stool(small volume) twice daily with abdominal bloating and gassiness since discharge but denies nausea, vomiting. \par \par 12/16/21:\par He continues to have moderate fatigue and continued slow improvement in appetite. He started taking Gas-X twice daily after 12/9/21 office visit and stopped on 12/13 as his abd bloating with gassiness significantly improved. He states loose stool has improved, now only once daily since 12/17, but no nausea/vomiting. He still has bilateral lower extremity edema. He scheduled followup with vascular cardiology on 1/13/22. He denies fever, cough, shortness of breath. \par \par 12/22/21:\par He has mild-moderate fatigue and good appetite. He denies abdominal pain/distension and hasn't needed Gas-X for one week. He denies nausea/vomiting, diarrhea. No fever, chills. \par \par 01/05/22:\par He has mild fatigue and good appetite. He denies fever, chills, cough, dyspnea, nausea/vomiting, abdominal pain, diarrhea. He walks on treadmill daily x 20 minutes. \par \par 01/19/22:\par Patient called the office on 1/11/22 to discuss new onset of RLQ pain last night when sleeping and it awakened him when he was changing his position. He went back to sleep. He woke up around 6:30 am and noted sharp pain with movement, therefore, he has been trying not to move around too much. He denies nausea, vomiting, diarrhea. He has eaten breakfast and lunch without difficulty and no associated symptoms. He denies fever, chills, cough, shortness of breath. No acute swelling in lower extremities, no chest pain. \par He states pain is 3-4/10, non-radiating, and only occurs with movement. \par Plan- \par CT Abd/pelvis with oral and IV contrast. \par NPO after 9 am in case CT approved and scan can be done tomorrow afternoon. \par If he develops worsening pain, fever, chills, vomiting or other severe new symptoms he will go to [facility] immediately. \par I contacted patient on 1/12/22 to discuss results of CT Abd/pelvis(1/12/22)- acute appendicitis. Given persistent RLQ pain today, I instructed patient to go to [facility] ER for surgery evaluation. He agrees. Patient admitted to surgical service and treated conservatively with Meropenem IV. His abdominal pain resolved and he was discharged to home on 1/14/22 to finish course of antibiotics with Cipro/Flagyl.\par \par Since discharge on 1/14/22, he feels well overall with only mild fatigue, and appetite is good. He denies abdominal pain, nausea, vomiting, diarrhea, fever, chills. Weight this morning- 188.5 lbs.\par  \par 02/02/22:\par He describes good energy and good appetite. He denies fever, cough, shortness of breath, nausea, vomiting, diarrhea. Weight this morning- 189 lbs.\par \par 2/16/22:\par He describes good energy and good appetite. He denies fever, cough, shortness of breath, nausea, vomiting, diarrhea. He had Vascular Cardio followup with Echo and LE duplex study ordered. \par

## 2022-02-16 NOTE — CONSULT LETTER
[Dear  ___] : Dear  [unfilled], [Courtesy Letter:] : I had the pleasure of seeing your patient, [unfilled], in my office today. [Please see my note below.] : Please see my note below. [Consult Closing:] : Thank you very much for allowing me to participate in the care of this patient.  If you have any questions, please do not hesitate to contact me. [Sincerely,] : Sincerely, [FreeTextEntry2] : Dyana Cheatham M.D.\par [facility]\par [address]\[place]   [address] [FreeTextEntry3] : \par Pao Sorenson M.D.\par \par  of Medicine\par [facility] [employer]\par [facility] \par [facility]\par [address] [place] [address] \[place] [address] \par ph: [phone]\par fax: [phone]

## 2022-02-16 NOTE — RESULTS/DATA
[FreeTextEntry1] : (Today) WBC- 4.57, ANC- 3.1; Hgb- 12.7, Hct- 38.5; Platelets- 249K. \par ----------------------------------------------------------------------------\par ACC: [id number] EXAM: CT ABDOMEN AND PELVIS OC IC \par PROCEDURE DATE: 01/12/2022 \par INTERPRETATION: CLINICAL INFORMATION: Right lower quadrant pain for \par Multiple myeloma. Previous terminal ileitis.\par \par COMPARISON: CT scan of the abdomen and pelvis from 11/29/2021\par \par CONTRAST/COMPLICATIONS:\par IV Contrast: Omnipaque 350 90 cc administered 10 cc discarded\par Oral Contrast: Omnipaque 300\par Complications: None reported at time of study completion\par \par PROCEDURE:\par CT of the Abdomen and Pelvis was performed.\par Sagittal and coronal reformats were performed.\par \par FINDINGS:\par LOWER CHEST: Within normal limits.\par \par LIVER: Within normal limits.\par BILE DUCTS: Normal caliber.\par GALLBLADDER: Small gallstones without gross inflammation.\par SPLEEN: Within normal limits.\par PANCREAS: Within normal limits.\par ADRENALS: Within normal limits.\par KIDNEYS/URETERS: Left renal cyst measuring up to 7.0 cm in the left lower \par pole. 1.3 cm low-attenuation lesion in the lower pole the left kidney \par laterally which is measuring greater than simple fluid in density.\par \par BLADDER: Bladder is underdistended which limits evaluation.\par REPRODUCTIVE ORGANS: Prominent prostate gland\par \par BOWEL: No bowel obstruction. Appendix is mildly distended measuring up to \par 1.0 cm which measured approximately 9 mm previously. There is question of \par minimally increased enhancement. There is mild surrounding soft tissue \par stranding although fluid was present in this region on the prior study. \par This may represent chronic changes although in a patient with right lower \par quadrant pain, acute appendicitis must also be considered.. Resolution of \par previously visualized distended small bowel. Previously mentioned \par terminal ileitis is not well appreciated on the current study. Colonic \par diverticuli predominantly in the sigmoid without gross inflammation.\par PERITONEUM: No ascites.\par VESSELS: Within normal limits.\par RETROPERITONEUM/LYMPH NODES: No lymphadenopathy.\par ABDOMINAL WALL: Small umbilical hernia containing fat.\par BONES: Status post ORIF of the left hemipelvis with stable postsurgical \par changes. Stable lucencies in bone, most pronounced and T12 and L5. \par Degenerative changes of bone. Spondylolisthesis with L5 anterior to L1 of \par approximately 25-50%.\par \par IMPRESSION:\par \par Mildly distended appendix. Mild surrounding soft tissue stranding where \par fluid was seen on the prior study. This may represent chronic residual \par changes although acute appendicitis must also be considered, especially \par in a patient with right lower quadrant pain. This was discussed with Dr. libby Sorenson at 3:00 PM on 1/12/2022.\par \par Resolution of small bowel obstruction. Stable bone findings.\par \par --- End of Report ---\par \par JEFF PHAM MD; Attending Radiologist\par

## 2022-02-17 LAB
ALBUMIN SERPL ELPH-MCNC: 4.3 G/DL
ALP BLD-CCNC: 61 U/L
ALT SERPL-CCNC: 21 U/L
ANION GAP SERPL CALC-SCNC: 11 MMOL/L
AST SERPL-CCNC: 18 U/L
BILIRUB SERPL-MCNC: 0.3 MG/DL
BUN SERPL-MCNC: 19 MG/DL
CALCIUM SERPL-MCNC: 9.7 MG/DL
CHLORIDE SERPL-SCNC: 106 MMOL/L
CO2 SERPL-SCNC: 24 MMOL/L
CREAT SERPL-MCNC: 0.84 MG/DL
GLUCOSE SERPL-MCNC: 126 MG/DL
IGD SER-MCNC: 20 MG/DL
MAGNESIUM SERPL-MCNC: 1.8 MG/DL
POTASSIUM SERPL-SCNC: 4.6 MMOL/L
PROT SERPL-MCNC: 6 G/DL
SODIUM SERPL-SCNC: 141 MMOL/L

## 2022-02-21 NOTE — OCCUPATIONAL THERAPY INITIAL EVALUATION ADULT - BALANCE DISTURBANCE, SYSTEM IMPAIRMENT CONTRIBUTE, REHAB EVAL
Problem: Pain  Goal: #Acceptable pain level achieved/maintained at rest using NRS/Faces  Description: This goal is used for patients who can self-report.  Acceptable means the level is at or below the identified comfort/function goal.  Outcome: Outcome Met, Continue evaluating goal progress toward completion  Note: Prn toradol, narco. Holding off on toradol, may have GI bleed. May need another IV pain med, c/o stomach pain, jaw pain.      Problem: Activity Intolerance  Goal: # Functional status is maintained or returned to baseline  Outcome: Outcome Met, Continue evaluating goal progress toward completion  Note: Independent in room, intermittent dizziness, exhaustion. Not sleeping well. Low hgb 9-7. 1 unit blood, if higher good, if same or lower consider peptic ulcer bleed. Protonix gtt. Supplementing electrolytes. IV vs PO at this time. IV zosyn for asp pneumonitis.      Problem: Dysphagia  Goal: # Exhibits no s/s of aspiration  Description: Symptoms of aspiration include coughing, choking, throat clearing, change in voice quality, and/or a decrease in oxygen saturation by more than 2% points from baseline after swallowing.  Outcome: Outcome Met, Continue evaluating goal progress toward completion  Note: Speech eval today, clears, encourage ensure BID and something at meal times. MD notified surgeon about nutrition plan, may need TPN/PICC-holding for now. May need to clip wires and place rubber bands.       musculoskeletal

## 2022-02-22 ENCOUNTER — NON-APPOINTMENT (OUTPATIENT)
Age: 67
End: 2022-02-22

## 2022-03-02 ENCOUNTER — OUTPATIENT (OUTPATIENT)
Dept: OUTPATIENT SERVICES | Facility: HOSPITAL | Age: 67
LOS: 1 days | Discharge: HOME | End: 2022-03-02

## 2022-03-02 DIAGNOSIS — Z98.890 OTHER SPECIFIED POSTPROCEDURAL STATES: Chronic | ICD-10-CM

## 2022-03-02 DIAGNOSIS — R79.89 OTHER SPECIFIED ABNORMAL FINDINGS OF BLOOD CHEMISTRY: ICD-10-CM

## 2022-03-03 ENCOUNTER — RESULT REVIEW (OUTPATIENT)
Age: 67
End: 2022-03-03

## 2022-03-03 ENCOUNTER — APPOINTMENT (OUTPATIENT)
Dept: HEMATOLOGY ONCOLOGY | Facility: CLINIC | Age: 67
End: 2022-03-03
Payer: MEDICARE

## 2022-03-03 VITALS
TEMPERATURE: 97.1 F | HEART RATE: 54 BPM | SYSTOLIC BLOOD PRESSURE: 114 MMHG | DIASTOLIC BLOOD PRESSURE: 71 MMHG | WEIGHT: 199.96 LBS | BODY MASS INDEX: 27.12 KG/M2 | RESPIRATION RATE: 16 BRPM | OXYGEN SATURATION: 98 %

## 2022-03-03 LAB
BASOPHILS # BLD AUTO: 0.03 K/UL — SIGNIFICANT CHANGE UP (ref 0–0.2)
BASOPHILS NFR BLD AUTO: 0.6 % — SIGNIFICANT CHANGE UP (ref 0–2)
EOSINOPHIL # BLD AUTO: 0.12 K/UL — SIGNIFICANT CHANGE UP (ref 0–0.5)
EOSINOPHIL NFR BLD AUTO: 2.4 % — SIGNIFICANT CHANGE UP (ref 0–6)
HCT VFR BLD CALC: 36.5 % — LOW (ref 39–50)
HGB BLD-MCNC: 12.2 G/DL — LOW (ref 13–17)
IMM GRANULOCYTES NFR BLD AUTO: 0.4 % — SIGNIFICANT CHANGE UP (ref 0–1.5)
LYMPHOCYTES # BLD AUTO: 1.02 K/UL — SIGNIFICANT CHANGE UP (ref 1–3.3)
LYMPHOCYTES # BLD AUTO: 20.7 % — SIGNIFICANT CHANGE UP (ref 13–44)
MCHC RBC-ENTMCNC: 31.3 PG — SIGNIFICANT CHANGE UP (ref 27–34)
MCHC RBC-ENTMCNC: 33.4 G/DL — SIGNIFICANT CHANGE UP (ref 32–36)
MCV RBC AUTO: 93.6 FL — SIGNIFICANT CHANGE UP (ref 80–100)
MONOCYTES # BLD AUTO: 0.66 K/UL — SIGNIFICANT CHANGE UP (ref 0–0.9)
MONOCYTES NFR BLD AUTO: 13.4 % — SIGNIFICANT CHANGE UP (ref 2–14)
NEUTROPHILS # BLD AUTO: 3.07 K/UL — SIGNIFICANT CHANGE UP (ref 1.8–7.4)
NEUTROPHILS NFR BLD AUTO: 62.5 % — SIGNIFICANT CHANGE UP (ref 43–77)
NRBC # BLD: 0 /100 WBCS — SIGNIFICANT CHANGE UP (ref 0–0)
PLATELET # BLD AUTO: 222 K/UL — SIGNIFICANT CHANGE UP (ref 150–400)
RBC # BLD: 3.9 M/UL — LOW (ref 4.2–5.8)
RBC # FLD: 14.6 % — HIGH (ref 10.3–14.5)
WBC # BLD: 4.92 K/UL — SIGNIFICANT CHANGE UP (ref 3.8–10.5)
WBC # FLD AUTO: 4.92 K/UL — SIGNIFICANT CHANGE UP (ref 3.8–10.5)

## 2022-03-03 PROCEDURE — 99214 OFFICE O/P EST MOD 30 MIN: CPT

## 2022-03-03 NOTE — HISTORY OF PRESENT ILLNESS
[de-identified] : 11/2020-Found to have T-12 compression fracture. Saw orthopedist Dr. Candelaria in 1/2021. Referred to endocrinologist Dr. Acosta by Dr. 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 Serum immunofixation showed 1 IgD lambda band and free lambda light chains. Urine immunofixation negative for monoclonal band.\par 4/29/2021–Bone marrow biopsy and bone marrow aspirate consistent with plasma cell myeloma (greater than 90% involvement). Myeloma FISH studies showed CCND1/IGH fusion (27%). Flow cytometry positive for monotypic plasma cells. Lymphocyte immunophenotypic findings showed no diagnostic abnormalities. Cytogenetics with normal male karyotype. Congo red stain negative. Iron stains showed iron stores present. No ring sideroblasts.\par 5/13/21- C1D1 RVd\par  [de-identified] : Since initial HPC transplant consult visit on 6/7/21, he had recent hematology office visit on 8/19/21, Cycle #5 Day#15 Velcade/Decadron. Cycle #5 Revlimid as planned. He describes moderate fatigue, occasional blurry vision, insomnia with Decadron. He denies fever, chills, cough, dyspnea. He received the COVID-19 vaccine(Moderna) on 3/16, 4/13/21; he has booster vaccine on 8/31/21. The patient is very concerned about the delta variant of COVID-19 and asked to discuss isolation/restriction policies. \par \par 10/6/21:\par He completed cycle 6 RVD on 9/30/21. He has moderate fatigue and good appetite. He has occasional blurry vision and was evaluated by Optho with normal evaluation. He had MRI Brain with alton(9/29/21 at [facility])- negative. He denies fever,  chills, cough, shortness of breath. \par \par 12/9/21:\par The patient was admitted to the BMTU on 11/15/21 and received conditioning chemotherapy with Melphalan 100 mg/m2 IV x 2 consecutive days followed by autoPSCT on 11/19/21. \par \par Upon admission, a TLC was placed in IR. Mr. Julio received IV hydration, pain management, anxiolytics, antiemetics, nutritional support, and antibacterial / antiviral / antifungal / GI / PCP and VOD (SOS) prophylaxis. When his ANC dropped below 500 he was started on prophylactic Ciprofloxacin. Labs were monitored on a daily basis, and he received electrolyte repletion and transfusional support as needed. \par \par On 11/19/2021 After pre-medication  received 251 mL of, Autologous mobilized, \par plasma reduced, pooled, thawed, washes HCP apheresis for  over approximately 1 \par hr. Cell counts as follows \par Total MNC( x10^8/kg)=7.22 \par CD34+cells ( x10^6 kg)=4.58 \par Cell Viability (%)= 90 \par Mr. Julio tolerated the infusion well with no adverse resections noted. \par \par While admitted, Mr. Julio experienced pancytopenia related to the high dose chemotherapy conditioning regimen. He was treated with transfusional support. On \par 11/19/21 he experienced a vasovagal episode in the bathroom that was self limiting. On 11/23/21 he had another vasovagal episode with severe abdominal pain and distention. A RRT was called. During the episode he was hypotensive, lactate was 6. A CT A/P showed pneumoperitoneum and terminal ileitis. Surgery was consulted and he was transferred to the SICU. During the episode, he made a troponin. A TTE was completed in the SICU, which showed increased pulmonary pressures. As a result, a CTA of the chest was completed which showed a saddle pulmonary embolism. He was started on full dose heparin. Lower extremity dopplers showed bilateral acute DVTs. On 11/24/21 he underwent a mechanical thrombectomy. IVC filter placement was deferred. \par \par The terminal ileitis and pneumoperitoneum was treated conservatively with supportive care and antibiotics. He completed a course of Meropenem 12/7/21. A \par repeat CT A/P showed unchanged ileitis, with mild increased fluid distention and resolution of free air. The heparin was transitioned to full dose Lovenox, \par and eventually Eliquis. Shortly thereafter he was transferred back to . \par \par Currently, he is stable for discharge home with outpatient follow up at the [facility] and with vascular cardiology. \par \par Since discharge to home on 12/7/21 he has moderate fatigue and slowly improving appetite. He describes loose stool(small volume) twice daily with abdominal bloating and gassiness since discharge but denies nausea, vomiting. \par \par 12/16/21:\par He continues to have moderate fatigue and continued slow improvement in appetite. He started taking Gas-X twice daily after 12/9/21 office visit and stopped on 12/13 as his abd bloating with gassiness significantly improved. He states loose stool has improved, now only once daily since 12/17, but no nausea/vomiting. He still has bilateral lower extremity edema. He scheduled followup with vascular cardiology on 1/13/22. He denies fever, cough, shortness of breath. \par \par 12/22/21:\par He has mild-moderate fatigue and good appetite. He denies abdominal pain/distension and hasn't needed Gas-X for one week. He denies nausea/vomiting, diarrhea. No fever, chills. \par \par 01/05/22:\par He has mild fatigue and good appetite. He denies fever, chills, cough, dyspnea, nausea/vomiting, abdominal pain, diarrhea. He walks on treadmill daily x 20 minutes. \par \par 01/19/22:\par Patient called the office on 1/11/22 to discuss new onset of RLQ pain last night when sleeping and it awakened him when he was changing his position. He went back to sleep. He woke up around 6:30 am and noted sharp pain with movement, therefore, he has been trying not to move around too much. He denies nausea, vomiting, diarrhea. He has eaten breakfast and lunch without difficulty and no associated symptoms. He denies fever, chills, cough, shortness of breath. No acute swelling in lower extremities, no chest pain. \par He states pain is 3-4/10, non-radiating, and only occurs with movement. \par Plan- \par CT Abd/pelvis with oral and IV contrast. \par NPO after 9 am in case CT approved and scan can be done tomorrow afternoon. \par If he develops worsening pain, fever, chills, vomiting or other severe new symptoms he will go to [facility] immediately. \par I contacted patient on 1/12/22 to discuss results of CT Abd/pelvis(1/12/22)- acute appendicitis. Given persistent RLQ pain today, I instructed patient to go to [facility] ER for surgery evaluation. He agrees. Patient admitted to surgical service and treated conservatively with Meropenem IV. His abdominal pain resolved and he was discharged to home on 1/14/22 to finish course of antibiotics with Cipro/Flagyl.\par \par Since discharge on 1/14/22, he feels well overall with only mild fatigue, and appetite is good. He denies abdominal pain, nausea, vomiting, diarrhea, fever, chills. Weight this morning- 188.5 lbs.\par  \par 02/02/22:\par He describes good energy and good appetite. He denies fever, cough, shortness of breath, nausea, vomiting, diarrhea. Weight this morning- 189 lbs.\par \par 2/16/22:\par He describes good energy and good appetite. He denies fever, cough, shortness of breath, nausea, vomiting, diarrhea. He had Vascular Cardio followup with Echo and LE duplex study ordered. \par \par 03/03/22:\par He describes good energy and good appetite. He denies fever, cough, shortness of breath, nausea, vomiting, diarrhea.

## 2022-03-03 NOTE — ASSESSMENT
[FreeTextEntry1] : IgD lambda myeloma s/p cycle 6 RVD on 9/30/21\par Mobilization of stem cells with Zarxio 960mcg subcutaneous daily 10/28/21-11/1/21\par He was admitted to the BMTU on 11/15/21 and received conditioning chemotherapy with Melphalan 100 mg/m2 IV x 2 consecutive days followed by autoPSCT on 11/19/21. \par Hospital course complicated by saddle pulmonary embolism and bilateral LE acute DVTs. On 11/24/21 he underwent a mechanical thrombectomy. IVC filter placement was deferred. He received anticoagulation, now on \par Eliquis. He also had terminal ileitis with ? microperforation, monitored in SICU, treated conservatively with supportive care and antibiotics. He completed a course of Meropenem 12/7/21.\par \par 1) Multiple myeloma- s/p autoPSCT on 11/19/21\par Plan- \par Continue current medications and restrictions as discussed prior to discharge from BMTU\par Continue Acyclovir, Mepron for ID prophylaxis; Off Diflucan\par Continue MVI, Folate daily- switch to OTC MVI after supply complete\par Continue Pantoprazole daily for GI prophylaxis- d/c when supply complete\par Zofran as needed for nausea/vomiting\par Drink plenty of water daily\par Moisturizing cream to the skin several times per day\par Check CMP, Mg, IMEL, IgD level\par Discussed maintenance therapy post autoPSCT- Revlimid +/- Velcade/Dex\par Call the office immediately if fever, chills, symptoms of infection\par \par 2) H/O saddle PE s/p thrombectomy; h/o bilat LE DVT\par Plan- Continue Eliquis 5 mg po 2X/day\par Had followup with Dr. Holley on Feb 10, 2022\par \par RTC in 1- 2 weeks\par

## 2022-03-03 NOTE — PHYSICAL EXAM
[Ambulatory and capable of all self care but unable to carry out any work activities] : Status 2- Ambulatory and capable of all self care but unable to carry out any work activities. Up and about more than 50% of waking hours [Normal] : affect appropriate [de-identified] : alopecia [de-identified] : anicteric [de-identified] : RRR, normal S1S2 [de-identified] : + 1 edema bilateral lower legs  [de-identified] : no cervical/SCV adenopathy [de-identified] : no rash [de-identified] : A & O x 4

## 2022-03-03 NOTE — REVIEW OF SYSTEMS
[Fever] : no fever [Chills] : no chills [Fatigue] : no fatigue [Mucosal Pain] : no mucosal pain [Chest Pain] : no chest pain [Shortness Of Breath] : no shortness of breath [Cough] : no cough [Abdominal Pain] : no abdominal pain [Vomiting] : no vomiting [Diarrhea] : no diarrhea [Skin Rash] : no skin rash [Dizziness] : no dizziness [Fainting] : no fainting [Easy Bleeding] : no tendency for easy bleeding [Easy Bruising] : no tendency for easy bruising [FreeTextEntry3] : + blurry vision( seen by Optho on 10/5/21) [FreeTextEntry4] : no sore throat [FreeTextEntry5] : slight edema in both ankles [FreeTextEntry7] : no nausea [FreeTextEntry9] : no bone pain [de-identified] : no paresthesias

## 2022-03-03 NOTE — CONSULT LETTER
[Dear  ___] : Dear  [unfilled], [Courtesy Letter:] : I had the pleasure of seeing your patient, [unfilled], in my office today. [Please see my note below.] : Please see my note below. [Consult Closing:] : Thank you very much for allowing me to participate in the care of this patient.  If you have any questions, please do not hesitate to contact me. [Sincerely,] : Sincerely, [FreeTextEntry2] : Dyana Cheatham M.D.\par [facility]\par [address]\par [place], [place]   [address] [FreeTextEntry3] : \par Pao Sorenson M.D.\par \par  of Medicine\par [facility] [employer]\par [facility] \par [facility]\par [address] [place] [address] \[place] [address] \par ph: [phone]\par fax: [phone]

## 2022-03-03 NOTE — RESULTS/DATA
[FreeTextEntry1] : (Today) WBC- 4.92, ANC- 3.07; Hgb- 12.2, Hct- 36.5; Platelets- 222K. \par ----------------------------------------------------------------------------\par ACC: [id number] EXAM: CT ABDOMEN AND PELVIS OC IC \par PROCEDURE DATE: 01/12/2022 \par INTERPRETATION: CLINICAL INFORMATION: Right lower quadrant pain for \par Multiple myeloma. Previous terminal ileitis.\par \par COMPARISON: CT scan of the abdomen and pelvis from 11/29/2021\par \par CONTRAST/COMPLICATIONS:\par IV Contrast: Omnipaque 350 90 cc administered 10 cc discarded\par Oral Contrast: Omnipaque 300\par Complications: None reported at time of study completion\par \par PROCEDURE:\par CT of the Abdomen and Pelvis was performed.\par Sagittal and coronal reformats were performed.\par \par FINDINGS:\par LOWER CHEST: Within normal limits.\par \par LIVER: Within normal limits.\par BILE DUCTS: Normal caliber.\par GALLBLADDER: Small gallstones without gross inflammation.\par SPLEEN: Within normal limits.\par PANCREAS: Within normal limits.\par ADRENALS: Within normal limits.\par KIDNEYS/URETERS: Left renal cyst measuring up to 7.0 cm in the left lower \par pole. 1.3 cm low-attenuation lesion in the lower pole the left kidney \par laterally which is measuring greater than simple fluid in density.\par \par BLADDER: Bladder is underdistended which limits evaluation.\par REPRODUCTIVE ORGANS: Prominent prostate gland\par \par BOWEL: No bowel obstruction. Appendix is mildly distended measuring up to \par 1.0 cm which measured approximately 9 mm previously. There is question of \par minimally increased enhancement. There is mild surrounding soft tissue \par stranding although fluid was present in this region on the prior study. \par This may represent chronic changes although in a patient with right lower \par quadrant pain, acute appendicitis must also be considered.. Resolution of \par previously visualized distended small bowel. Previously mentioned \par terminal ileitis is not well appreciated on the current study. Colonic \par diverticuli predominantly in the sigmoid without gross inflammation.\par PERITONEUM: No ascites.\par VESSELS: Within normal limits.\par RETROPERITONEUM/LYMPH NODES: No lymphadenopathy.\par ABDOMINAL WALL: Small umbilical hernia containing fat.\par BONES: Status post ORIF of the left hemipelvis with stable postsurgical \par changes. Stable lucencies in bone, most pronounced and T12 and L5. \par Degenerative changes of bone. Spondylolisthesis with L5 anterior to L1 of \par approximately 25-50%.\par \par IMPRESSION:\par \par Mildly distended appendix. Mild surrounding soft tissue stranding where \par fluid was seen on the prior study. This may represent chronic residual \par changes although acute appendicitis must also be considered, especially \par in a patient with right lower quadrant pain. This was discussed with Dr. libby Sorenson at 3:00 PM on 1/12/2022.\par \par Resolution of small bowel obstruction. Stable bone findings.\par \par --- End of Report ---\par \par JEFF PHAM MD; Attending Radiologist\par

## 2022-03-09 ENCOUNTER — APPOINTMENT (OUTPATIENT)
Dept: ORTHOPEDIC SURGERY | Facility: CLINIC | Age: 67
End: 2022-03-09
Payer: MEDICARE

## 2022-03-09 VITALS
HEIGHT: 72 IN | HEART RATE: 51 BPM | WEIGHT: 198 LBS | SYSTOLIC BLOOD PRESSURE: 145 MMHG | DIASTOLIC BLOOD PRESSURE: 78 MMHG | BODY MASS INDEX: 26.82 KG/M2

## 2022-03-09 PROCEDURE — 72070 X-RAY EXAM THORAC SPINE 2VWS: CPT

## 2022-03-09 PROCEDURE — 99214 OFFICE O/P EST MOD 30 MIN: CPT

## 2022-03-11 ENCOUNTER — APPOINTMENT (OUTPATIENT)
Dept: CARDIOLOGY | Facility: CLINIC | Age: 67
End: 2022-03-11
Payer: MEDICARE

## 2022-03-11 LAB
ALBUMIN MFR SERPL ELPH: 67.3 %
ALBUMIN MFR SERPL ELPH: 70 %
ALBUMIN SERPL ELPH-MCNC: 4.2 G/DL
ALBUMIN SERPL-MCNC: 3.9 G/DL
ALBUMIN SERPL-MCNC: 4 G/DL
ALBUMIN/GLOB SERPL: 2 RATIO
ALBUMIN/GLOB SERPL: 2.3 RATIO
ALP BLD-CCNC: 61 U/L
ALPHA1 GLOB MFR SERPL ELPH: 3.6 %
ALPHA1 GLOB MFR SERPL ELPH: 4 %
ALPHA1 GLOB SERPL ELPH-MCNC: 0.2 G/DL
ALPHA1 GLOB SERPL ELPH-MCNC: 0.2 G/DL
ALPHA2 GLOB MFR SERPL ELPH: 10.7 %
ALPHA2 GLOB MFR SERPL ELPH: 9.4 %
ALPHA2 GLOB SERPL ELPH-MCNC: 0.5 G/DL
ALPHA2 GLOB SERPL ELPH-MCNC: 0.6 G/DL
ALT SERPL-CCNC: 24 U/L
ANION GAP SERPL CALC-SCNC: 10 MMOL/L
AST SERPL-CCNC: 19 U/L
B-GLOBULIN MFR SERPL ELPH: 10.2 %
B-GLOBULIN MFR SERPL ELPH: 10.4 %
B-GLOBULIN SERPL ELPH-MCNC: 0.6 G/DL
B-GLOBULIN SERPL ELPH-MCNC: 0.6 G/DL
BILIRUB SERPL-MCNC: 0.3 MG/DL
BUN SERPL-MCNC: 18 MG/DL
CALCIUM SERPL-MCNC: 9 MG/DL
CHLORIDE SERPL-SCNC: 109 MMOL/L
CO2 SERPL-SCNC: 24 MMOL/L
CREAT SERPL-MCNC: 0.79 MG/DL
DEPRECATED KAPPA LC FREE/LAMBDA SER: 0.17 RATIO
DEPRECATED KAPPA LC FREE/LAMBDA SER: 0.22 RATIO
EGFR: 99 ML/MIN/1.73M2
GAMMA GLOB FLD ELPH-MCNC: 0.4 G/DL
GAMMA GLOB FLD ELPH-MCNC: 0.5 G/DL
GAMMA GLOB MFR SERPL ELPH: 6.8 %
GAMMA GLOB MFR SERPL ELPH: 7.6 %
GLUCOSE SERPL-MCNC: 99 MG/DL
IGA SER QL IEP: 22 MG/DL
IGA SER QL IEP: 22 MG/DL
IGD SER-MCNC: 20 MG/DL
IGG SER QL IEP: 460 MG/DL
IGG SER QL IEP: 471 MG/DL
IGM SER QL IEP: 29 MG/DL
IGM SER QL IEP: 46 MG/DL
INTERPRETATION SERPL IEP-IMP: NORMAL
INTERPRETATION SERPL IEP-IMP: NORMAL
KAPPA LC CSF-MCNC: 2.33 MG/DL
KAPPA LC CSF-MCNC: 2.98 MG/DL
KAPPA LC SERPL-MCNC: 0.51 MG/DL
KAPPA LC SERPL-MCNC: 0.52 MG/DL
LDH SERPL-CCNC: 181 U/L
M PROTEIN MFR SERPL ELPH: NORMAL
M PROTEIN MFR SERPL ELPH: NORMAL
M PROTEIN SPEC IFE-MCNC: NORMAL
M PROTEIN SPEC IFE-MCNC: NORMAL
MAGNESIUM SERPL-MCNC: 1.9 MG/DL
MONOCLON BAND OBS SERPL: NORMAL
MONOCLON BAND OBS SERPL: NORMAL
POTASSIUM SERPL-SCNC: 4.3 MMOL/L
PROT SERPL-MCNC: 5.6 G/DL
PROT SERPL-MCNC: 5.6 G/DL
PROT SERPL-MCNC: 5.7 G/DL
PROT SERPL-MCNC: 6 G/DL
PROT SERPL-MCNC: 6 G/DL
SODIUM SERPL-SCNC: 143 MMOL/L

## 2022-03-11 PROCEDURE — 93306 TTE W/DOPPLER COMPLETE: CPT

## 2022-03-11 PROCEDURE — 93970 EXTREMITY STUDY: CPT

## 2022-03-22 ENCOUNTER — NON-APPOINTMENT (OUTPATIENT)
Age: 67
End: 2022-03-22

## 2022-03-29 LAB
ALBUMIN SERPL ELPH-MCNC: 4.4 G/DL
ALP BLD-CCNC: 90 U/L
ALT SERPL-CCNC: 16 U/L
ANION GAP SERPL CALC-SCNC: 13 MMOL/L
AST SERPL-CCNC: 15 U/L
BASOPHILS # BLD AUTO: 0.04 K/UL
BASOPHILS NFR BLD AUTO: 0.7 %
BILIRUB SERPL-MCNC: 0.2 MG/DL
BUN SERPL-MCNC: 22 MG/DL
CALCIUM SERPL-MCNC: 9.2 MG/DL
CHLORIDE SERPL-SCNC: 106 MMOL/L
CO2 SERPL-SCNC: 24 MMOL/L
CREAT SERPL-MCNC: 0.9 MG/DL
EGFR: 95 ML/MIN/1.73M2
EOSINOPHIL # BLD AUTO: 0.05 K/UL
EOSINOPHIL NFR BLD AUTO: 0.9 %
GLUCOSE SERPL-MCNC: 122 MG/DL
HCT VFR BLD CALC: 40.7 %
HGB BLD-MCNC: 13 G/DL
IGD SER-MCNC: 18 MG/DL
IMM GRANULOCYTES NFR BLD AUTO: 0.7 %
LYMPHOCYTES # BLD AUTO: 0.83 K/UL
LYMPHOCYTES NFR BLD AUTO: 15.3 %
MAGNESIUM SERPL-MCNC: 1.9 MG/DL
MAN DIFF?: NORMAL
MCHC RBC-ENTMCNC: 29.8 PG
MCHC RBC-ENTMCNC: 31.9 GM/DL
MCV RBC AUTO: 93.3 FL
MONOCYTES # BLD AUTO: 0.58 K/UL
MONOCYTES NFR BLD AUTO: 10.7 %
NEUTROPHILS # BLD AUTO: 3.9 K/UL
NEUTROPHILS NFR BLD AUTO: 71.7 %
PLATELET # BLD AUTO: 298 K/UL
POTASSIUM SERPL-SCNC: 4.4 MMOL/L
PROT SERPL-MCNC: 6.3 G/DL
RBC # BLD: 4.36 M/UL
RBC # FLD: 13.2 %
SODIUM SERPL-SCNC: 142 MMOL/L
WBC # FLD AUTO: 5.44 K/UL

## 2022-03-30 ENCOUNTER — APPOINTMENT (OUTPATIENT)
Dept: HEMATOLOGY ONCOLOGY | Facility: CLINIC | Age: 67
End: 2022-03-30
Payer: MEDICARE

## 2022-03-30 DIAGNOSIS — Z86.39 PERSONAL HISTORY OF OTHER ENDOCRINE, NUTRITIONAL AND METABOLIC DISEASE: ICD-10-CM

## 2022-03-30 PROCEDURE — 99214 OFFICE O/P EST MOD 30 MIN: CPT | Mod: 95

## 2022-03-30 RX ORDER — CHLORHEXIDINE GLUCONATE 4 %
400 (240 MG) LIQUID (ML) TOPICAL DAILY
Refills: 0 | Status: ACTIVE | COMMUNITY

## 2022-03-30 RX ORDER — TAMSULOSIN HYDROCHLORIDE 0.4 MG/1
0.4 CAPSULE ORAL
Qty: 30 | Refills: 3 | Status: ACTIVE | COMMUNITY
Start: 2021-12-07 | End: 1900-01-01

## 2022-03-30 RX ORDER — MAGNESIUM OXIDE 241.3 MG/1000MG
400 TABLET ORAL TWICE DAILY
Qty: 60 | Refills: 1 | Status: DISCONTINUED | COMMUNITY
Start: 2021-12-17 | End: 2022-03-30

## 2022-03-30 RX ORDER — POTASSIUM CHLORIDE 1500 MG/1
20 TABLET, FILM COATED, EXTENDED RELEASE ORAL
Qty: 30 | Refills: 3 | Status: DISCONTINUED | COMMUNITY
Start: 2021-12-17 | End: 2022-03-30

## 2022-03-30 NOTE — HISTORY OF PRESENT ILLNESS
[Home] : at home, [unfilled] , at the time of the visit. [Medical Office: ([facility])___] : at the medical office located in  [Spouse] : spouse [Verbal consent obtained from patient] : the patient, [unfilled] [Family Member] : family member [de-identified] : 11/2020-Found to have T-12 compression fracture. Saw orthopedist Dr. Candelaria in 1/2021. Referred to endocrinologist Dr. Acosta by Dr. 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 Serum immunofixation showed 1 IgD lambda band and free lambda light chains. Urine immunofixation negative for monoclonal band.\par 4/29/2021–Bone marrow biopsy and bone marrow aspirate consistent with plasma cell myeloma (greater than 90% involvement). Myeloma FISH studies showed CCND1/IGH fusion (27%). Flow cytometry positive for monotypic plasma cells. Lymphocyte immunophenotypic findings showed no diagnostic abnormalities. Cytogenetics with normal male karyotype. Congo red stain negative. Iron stains showed iron stores present. No ring sideroblasts.\par 5/13/21- C1D1 RVd\par  [de-identified] : Since initial HPC transplant consult visit on 6/7/21, he had recent hematology office visit on 8/19/21, Cycle #5 Day#15 Velcade/Decadron. Cycle #5 Revlimid as planned. He describes moderate fatigue, occasional blurry vision, insomnia with Decadron. He denies fever, chills, cough, dyspnea. He received the COVID-19 vaccine(Moderna) on 3/16, 4/13/21; he has booster vaccine on 8/31/21. The patient is very concerned about the delta variant of COVID-19 and asked to discuss isolation/restriction policies. \par \par 10/6/21:\par He completed cycle 6 RVD on 9/30/21. He has moderate fatigue and good appetite. He has occasional blurry vision and was evaluated by Optho with normal evaluation. He had MRI Brain with alton(9/29/21 at [facility])- negative. He denies fever,  chills, cough, shortness of breath. \par \par 12/9/21:\par The patient was admitted to the BMTU on 11/15/21 and received conditioning chemotherapy with Melphalan 100 mg/m2 IV x 2 consecutive days followed by autoPSCT on 11/19/21. \par \par Upon admission, a TLC was placed in IR. Mr. Julio received IV hydration, pain management, anxiolytics, antiemetics, nutritional support, and antibacterial / antiviral / antifungal / GI / PCP and VOD (SOS) prophylaxis. When his ANC dropped below 500 he was started on prophylactic Ciprofloxacin. Labs were monitored on a daily basis, and he received electrolyte repletion and transfusional support as needed. \par \par On 11/19/2021 After pre-medication  received 251 mL of, Autologous mobilized, \par plasma reduced, pooled, thawed, washes HCP apheresis for  over approximately 1 \par hr. Cell counts as follows \par Total MNC( x10^8/kg)=7.22 \par CD34+cells ( x10^6 kg)=4.58 \par Cell Viability (%)= 90 \par Mr. Julio tolerated the infusion well with no adverse resections noted. \par \par While admitted, Mr. Julio experienced pancytopenia related to the high dose chemotherapy conditioning regimen. He was treated with transfusional support. On \par 11/19/21 he experienced a vasovagal episode in the bathroom that was self limiting. On 11/23/21 he had another vasovagal episode with severe abdominal pain and distention. A RRT was called. During the episode he was hypotensive, lactate was 6. A CT A/P showed pneumoperitoneum and terminal ileitis. Surgery was consulted and he was transferred to the SICU. During the episode, he made a troponin. A TTE was completed in the SICU, which showed increased pulmonary pressures. As a result, a CTA of the chest was completed which showed a saddle pulmonary embolism. He was started on full dose heparin. Lower extremity dopplers showed bilateral acute DVTs. On 11/24/21 he underwent a mechanical thrombectomy. IVC filter placement was deferred. \par \par The terminal ileitis and pneumoperitoneum was treated conservatively with supportive care and antibiotics. He completed a course of Meropenem 12/7/21. A \par repeat CT A/P showed unchanged ileitis, with mild increased fluid distention and resolution of free air. The heparin was transitioned to full dose Lovenox, \par and eventually Eliquis. Shortly thereafter he was transferred back to . \par \par Currently, he is stable for discharge home with outpatient follow up at the [facility] and with vascular cardiology. \par \par Since discharge to home on 12/7/21 he has moderate fatigue and slowly improving appetite. He describes loose stool(small volume) twice daily with abdominal bloating and gassiness since discharge but denies nausea, vomiting. \par \par 12/16/21:\par He continues to have moderate fatigue and continued slow improvement in appetite. He started taking Gas-X twice daily after 12/9/21 office visit and stopped on 12/13 as his abd bloating with gassiness significantly improved. He states loose stool has improved, now only once daily since 12/17, but no nausea/vomiting. He still has bilateral lower extremity edema. He scheduled followup with vascular cardiology on 1/13/22. He denies fever, cough, shortness of breath. \par \par 12/22/21:\par He has mild-moderate fatigue and good appetite. He denies abdominal pain/distension and hasn't needed Gas-X for one week. He denies nausea/vomiting, diarrhea. No fever, chills. \par \par 01/05/22:\par He has mild fatigue and good appetite. He denies fever, chills, cough, dyspnea, nausea/vomiting, abdominal pain, diarrhea. He walks on treadmill daily x 20 minutes. \par \par 01/19/22:\par Patient called the office on 1/11/22 to discuss new onset of RLQ pain last night when sleeping and it awakened him when he was changing his position. He went back to sleep. He woke up around 6:30 am and noted sharp pain with movement, therefore, he has been trying not to move around too much. He denies nausea, vomiting, diarrhea. He has eaten breakfast and lunch without difficulty and no associated symptoms. He denies fever, chills, cough, shortness of breath. No acute swelling in lower extremities, no chest pain. \par He states pain is 3-4/10, non-radiating, and only occurs with movement. \par Plan- \par CT Abd/pelvis with oral and IV contrast. \par NPO after 9 am in case CT approved and scan can be done tomorrow afternoon. \par If he develops worsening pain, fever, chills, vomiting or other severe new symptoms he will go to [facility] immediately. \par I contacted patient on 1/12/22 to discuss results of CT Abd/pelvis(1/12/22)- acute appendicitis. Given persistent RLQ pain today, I instructed patient to go to [facility] ER for surgery evaluation. He agrees. Patient admitted to surgical service and treated conservatively with Meropenem IV. His abdominal pain resolved and he was discharged to home on 1/14/22 to finish course of antibiotics with Cipro/Flagyl.\par \par Since discharge on 1/14/22, he feels well overall with only mild fatigue, and appetite is good. He denies abdominal pain, nausea, vomiting, diarrhea, fever, chills. Weight this morning- 188.5 lbs.\par  \par 02/02/22:\par He describes good energy and good appetite. He denies fever, cough, shortness of breath, nausea, vomiting, diarrhea. Weight this morning- 189 lbs.\par \par 2/16/22:\par He describes good energy and good appetite. He denies fever, cough, shortness of breath, nausea, vomiting, diarrhea. He had Vascular Cardio followup with Echo and LE duplex study ordered. \par \par 03/03/22:\par He describes good energy and good appetite. He denies fever, cough, shortness of breath, nausea, vomiting, diarrhea. \par \par 03/30/22:\par Patient called the answering service on 3/19 and spoke to on call physician. He described sore throat/cough for the past three days starting 3/17. He states that the throat is getting better but just now he was febrile to 100.8. I advised going to the ER but he wants to take a Tylenol and wait it out for tonight since he is getting better in general. He agrees that if fever persists overnight, he will go to the ER. \par \par on 3/22, patient calls to give update stating that his sore throat resolved on 3/19 but he developed T- 100.8. He took Tylenol once and he states fever resolved and did not recur. He still has dry cough which is improving, and he is taking Robitussin. He states that day 1 of Revlimid maintenance was on 3/15/22. \par Plan-\par Given recent URTI symptoms with persistent cough, I instructed patient to HOLD Revlimid through 3/27/22, and resume on Monday 3/28/22. Drink plenty of water and rest. \par He will have lab work on 3/28 at local [facility] prior to Telehealth visit on 3/30/22. \par If he develops new symptoms he will call the office immediately. \par \par Today, patient states he has mild fatigue and good appetite. He has slight dry cough but he denies fever, chills, sore throat, shortness of breath, nausea, vomiting, diarrhea. 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 \par

## 2022-03-30 NOTE — CONSULT LETTER
[Dear  ___] : Dear  [unfilled], [Courtesy Letter:] : I had the pleasure of seeing your patient, [unfilled], in my office today. [Please see my note below.] : Please see my note below. [Consult Closing:] : Thank you very much for allowing me to participate in the care of this patient.  If you have any questions, please do not hesitate to contact me. [Sincerely,] : Sincerely, [FreeTextEntry2] : Dyana Cheatham M.D.\par [facility]\par [address]\[place]   [address] [FreeTextEntry3] : \par Pao Sorenson M.D.\par \par  of Medicine\par [employer] [employer]\par [facility] \par [facility]\par [address] [place] [address] \[place] [address] \par ph: [phone]\par fax: [phone]

## 2022-03-30 NOTE — REVIEW OF SYSTEMS
[Fatigue] : fatigue [Cough] : cough [Fever] : no fever [Chills] : no chills [Mucosal Pain] : no mucosal pain [Chest Pain] : no chest pain [Shortness Of Breath] : no shortness of breath [Abdominal Pain] : no abdominal pain [Vomiting] : no vomiting [Diarrhea] : no diarrhea [Skin Rash] : no skin rash [Dizziness] : no dizziness [Fainting] : no fainting [Easy Bleeding] : no tendency for easy bleeding [Easy Bruising] : no tendency for easy bruising [FreeTextEntry3] : + blurry vision( seen by Optho on 10/5/21) [FreeTextEntry4] : no sore throat [FreeTextEntry5] : slight edema in both ankles [FreeTextEntry7] : no nausea [FreeTextEntry9] : no bone pain [de-identified] : no paresthesias

## 2022-03-30 NOTE — ASSESSMENT
[FreeTextEntry1] : IgD lambda myeloma s/p cycle 6 RVD on 21\par Mobilization of stem cells with Zarxio 960mcg subcutaneous daily 10/28/21-21\par He was admitted to the BMTU on 11/15/21 and received conditioning chemotherapy with Melphalan 100 mg/m2 IV x 2 consecutive days followed by autoPSCT on 21. \par Hospital course complicated by saddle pulmonary embolism and bilateral LE acute DVTs. On 21 he underwent a mechanical thrombectomy. IVC filter placement was deferred. He received anticoagulation, now on \par Eliquis. He also had terminal ileitis with ? microperforation, monitored in SICU, treated conservatively with supportive care and antibiotics. He completed a course of Meropenem 21.\par \par 1) Multiple myeloma- s/p autoPSCT on 21\par Plan- \par Continue current medications and restrictions as discussed prior to discharge from BMTU\par Continue Acyclovir, Mepron(discontinue when supply complete) for ID prophylaxis; Off Diflucan\par Continue MVI, Folate daily- switch to OTC MVI after supply complete\par Continue Pantoprazole daily for GI prophylaxis- completed\par Zofran as needed for nausea/vomiting\par Drink plenty of water daily\par Moisturizing cream to the skin several times per day\par Labs reviewed from 3/28/22- IMEL results pending\par Discussed maintenance therapy post autoPSCT- Revlimid +/- Velcade/Dex, with potential side effects discussed during 3/3/22 office visit. \par Patient initiated Revlimid 10 mg po daily on 3/15/22, then held 3/22- 3/27 for URTI symptoms. Resumed on 3/28/22\par Patient has second opinion consult Dr. Andrew Stack at [facility] on 3/31/22. \par Call the office immediately if fever, chills, symptoms of infection\par \par 2) H/O saddle PE s/p thrombectomy; h/o bilat LE DVT\par Plan- Continue Eliquis 5 mg po 2X/day\par Had followup with Dr. Holley on Feb 10, 2022\par \par I confirmed patient's name and  at beginning of Telehealth visit. Verbal consent for Telehealth services given on 22 at 2:41 pm by Jony Julio, self. Visit start time-2:40 pm- visit end time- 3:16 pm. Total visit time- 36 minutes. \par \par RTC in 3 weeks\par \par

## 2022-03-30 NOTE — RESULTS/DATA
[FreeTextEntry1] : I reviewed lab results from 3/28/22 with patient today\par ----------------------------------------------------------------------------\par ACC: [id number] EXAM: CT ABDOMEN AND PELVIS OC IC \par PROCEDURE DATE: 01/12/2022 \par INTERPRETATION: CLINICAL INFORMATION: Right lower quadrant pain for \par Multiple myeloma. Previous terminal ileitis.\par \par COMPARISON: CT scan of the abdomen and pelvis from 11/29/2021\par \par CONTRAST/COMPLICATIONS:\par IV Contrast: Omnipaque 350 90 cc administered 10 cc discarded\par Oral Contrast: Omnipaque 300\par Complications: None reported at time of study completion\par \par PROCEDURE:\par CT of the Abdomen and Pelvis was performed.\par Sagittal and coronal reformats were performed.\par \par FINDINGS:\par LOWER CHEST: Within normal limits.\par \par LIVER: Within normal limits.\par BILE DUCTS: Normal caliber.\par GALLBLADDER: Small gallstones without gross inflammation.\par SPLEEN: Within normal limits.\par PANCREAS: Within normal limits.\par ADRENALS: Within normal limits.\par KIDNEYS/URETERS: Left renal cyst measuring up to 7.0 cm in the left lower \par pole. 1.3 cm low-attenuation lesion in the lower pole the left kidney \par laterally which is measuring greater than simple fluid in density.\par \par BLADDER: Bladder is underdistended which limits evaluation.\par REPRODUCTIVE ORGANS: Prominent prostate gland\par \par BOWEL: No bowel obstruction. Appendix is mildly distended measuring up to \par 1.0 cm which measured approximately 9 mm previously. There is question of \par minimally increased enhancement. There is mild surrounding soft tissue \par stranding although fluid was present in this region on the prior study. \par This may represent chronic changes although in a patient with right lower \par quadrant pain, acute appendicitis must also be considered.. Resolution of \par previously visualized distended small bowel. Previously mentioned \par terminal ileitis is not well appreciated on the current study. Colonic \par diverticuli predominantly in the sigmoid without gross inflammation.\par PERITONEUM: No ascites.\par VESSELS: Within normal limits.\par RETROPERITONEUM/LYMPH NODES: No lymphadenopathy.\par ABDOMINAL WALL: Small umbilical hernia containing fat.\par BONES: Status post ORIF of the left hemipelvis with stable postsurgical \par changes. Stable lucencies in bone, most pronounced and T12 and L5. \par Degenerative changes of bone. Spondylolisthesis with L5 anterior to L1 of \par approximately 25-50%.\par \par IMPRESSION:\par \par Mildly distended appendix. Mild surrounding soft tissue stranding where \par fluid was seen on the prior study. This may represent chronic residual \par changes although acute appendicitis must also be considered, especially \par in a patient with right lower quadrant pain. This was discussed with Dr. libby Sorenson at 3:00 PM on 1/12/2022.\par \par Resolution of small bowel obstruction. Stable bone findings.\par \par --- End of Report ---\par \par JEFF PHAM MD; Attending Radiologist\par

## 2022-04-06 ENCOUNTER — OUTPATIENT (OUTPATIENT)
Dept: OUTPATIENT SERVICES | Facility: HOSPITAL | Age: 67
LOS: 1 days | Discharge: HOME | End: 2022-04-06

## 2022-04-06 DIAGNOSIS — R79.89 OTHER SPECIFIED ABNORMAL FINDINGS OF BLOOD CHEMISTRY: ICD-10-CM

## 2022-04-06 DIAGNOSIS — Z98.890 OTHER SPECIFIED POSTPROCEDURAL STATES: Chronic | ICD-10-CM

## 2022-04-06 LAB
ALBUMIN MFR SERPL ELPH: 62.7 %
ALBUMIN SERPL-MCNC: 4 G/DL
ALBUMIN/GLOB SERPL: 1.7 RATIO
ALPHA1 GLOB MFR SERPL ELPH: 5.3 %
ALPHA1 GLOB SERPL ELPH-MCNC: 0.3 G/DL
ALPHA2 GLOB MFR SERPL ELPH: 13.4 %
ALPHA2 GLOB SERPL ELPH-MCNC: 0.8 G/DL
B-GLOBULIN MFR SERPL ELPH: 11.2 %
B-GLOBULIN SERPL ELPH-MCNC: 0.7 G/DL
DEPRECATED KAPPA LC FREE/LAMBDA SER: 0.36 RATIO
GAMMA GLOB FLD ELPH-MCNC: 0.5 G/DL
GAMMA GLOB MFR SERPL ELPH: 7.4 %
IGA SER QL IEP: 36 MG/DL
IGG SER QL IEP: 479 MG/DL
IGM SER QL IEP: 124 MG/DL
INTERPRETATION SERPL IEP-IMP: NORMAL
KAPPA LC CSF-MCNC: 2.81 MG/DL
KAPPA LC SERPL-MCNC: 1.02 MG/DL
M PROTEIN MFR SERPL ELPH: NORMAL
M PROTEIN SPEC IFE-MCNC: NORMAL
MONOCLON BAND OBS SERPL: NORMAL
PROT SERPL-MCNC: 6.3 G/DL
PROT SERPL-MCNC: 6.3 G/DL

## 2022-04-07 ENCOUNTER — APPOINTMENT (OUTPATIENT)
Dept: HEMATOLOGY ONCOLOGY | Facility: CLINIC | Age: 67
End: 2022-04-07
Payer: MEDICARE

## 2022-04-07 VITALS
OXYGEN SATURATION: 99 % | WEIGHT: 199.74 LBS | BODY MASS INDEX: 27.09 KG/M2 | HEART RATE: 54 BPM | DIASTOLIC BLOOD PRESSURE: 66 MMHG | SYSTOLIC BLOOD PRESSURE: 125 MMHG | RESPIRATION RATE: 16 BRPM | TEMPERATURE: 97.2 F

## 2022-04-07 PROCEDURE — 99215 OFFICE O/P EST HI 40 MIN: CPT

## 2022-04-07 RX ORDER — SODIUM FLUORIDE 6 MG/ML
1.1 PASTE DENTAL
Qty: 100 | Refills: 0 | Status: DISCONTINUED | COMMUNITY
Start: 2021-10-06 | End: 2022-04-07

## 2022-04-07 RX ORDER — FOLIC ACID 1 MG/1
1 TABLET ORAL DAILY
Qty: 30 | Refills: 3 | Status: DISCONTINUED | COMMUNITY
Start: 2021-12-07 | End: 2022-04-07

## 2022-04-07 RX ORDER — LENALIDOMIDE 10 MG/1
10 CAPSULE ORAL
Qty: 28 | Refills: 0 | Status: DISCONTINUED | COMMUNITY
Start: 2022-03-09 | End: 2022-04-07

## 2022-04-07 RX ORDER — MULTIVITAMIN
TABLET ORAL DAILY
Qty: 30 | Refills: 3 | Status: DISCONTINUED | COMMUNITY
Start: 2021-12-07 | End: 2022-04-07

## 2022-04-08 ENCOUNTER — NON-APPOINTMENT (OUTPATIENT)
Age: 67
End: 2022-04-08

## 2022-04-13 ENCOUNTER — OUTPATIENT (OUTPATIENT)
Dept: OUTPATIENT SERVICES | Facility: HOSPITAL | Age: 67
LOS: 1 days | End: 2022-04-13
Payer: MEDICARE

## 2022-04-13 ENCOUNTER — APPOINTMENT (OUTPATIENT)
Dept: RADIOLOGY | Facility: HOSPITAL | Age: 67
End: 2022-04-13
Payer: MEDICARE

## 2022-04-13 DIAGNOSIS — Z98.890 OTHER SPECIFIED POSTPROCEDURAL STATES: Chronic | ICD-10-CM

## 2022-04-13 DIAGNOSIS — C90.00 MULTIPLE MYELOMA NOT HAVING ACHIEVED REMISSION: ICD-10-CM

## 2022-04-13 PROCEDURE — 77080 DXA BONE DENSITY AXIAL: CPT

## 2022-04-13 PROCEDURE — 77080 DXA BONE DENSITY AXIAL: CPT | Mod: 26

## 2022-04-14 ENCOUNTER — APPOINTMENT (OUTPATIENT)
Dept: CARDIOLOGY | Facility: CLINIC | Age: 67
End: 2022-04-14
Payer: MEDICARE

## 2022-04-14 ENCOUNTER — NON-APPOINTMENT (OUTPATIENT)
Age: 67
End: 2022-04-14

## 2022-04-14 VITALS
OXYGEN SATURATION: 97 % | WEIGHT: 201 LBS | HEART RATE: 47 BPM | DIASTOLIC BLOOD PRESSURE: 71 MMHG | RESPIRATION RATE: 17 BRPM | SYSTOLIC BLOOD PRESSURE: 113 MMHG | BODY MASS INDEX: 27.22 KG/M2 | HEIGHT: 72 IN

## 2022-04-14 DIAGNOSIS — E78.5 HYPERLIPIDEMIA, UNSPECIFIED: ICD-10-CM

## 2022-04-14 PROCEDURE — 93000 ELECTROCARDIOGRAM COMPLETE: CPT

## 2022-04-14 PROCEDURE — 99215 OFFICE O/P EST HI 40 MIN: CPT

## 2022-04-14 NOTE — DISCUSSION/SUMMARY
[FreeTextEntry1] : Patient with know multiple myeloma (IgD and lambda light chain myeloma), now being evaluated for amyloidosis. There is currently no evidence of amyloidosis on prior biopsies or imaging.\par \par Check cardiac MRI.\par Next echo should have strain imaging.\par \par Continue current therapies per hematology. If patient is to be transitioned to regimen including carfilzomib and venetoclax. This would require additional monitoring with serial echocardiograms and strain imaging to monitor for cardiomyopathy.\par \par Case discussed with Andrew Stack MD at [facility].

## 2022-04-14 NOTE — CARDIOLOGY SUMMARY
[de-identified] : 4/14/2022, sinus bradycardia, 44 bpm, normal axis, normal R-wave progression, normal voltage ECG

## 2022-04-14 NOTE — HISTORY OF PRESENT ILLNESS
[FreeTextEntry1] : Patient is a 66 year-old gentleman with IgD lambda light chain multiple myeloma, now status post stem cell transplant complicated by acute submassive PE, now on anticoagulation, presenting for evaluation of amyloidosis.\par \par T12 compression fracture in November 2020.\par Diagnosed with multiple myeloma in April 2021\par Autologous stem cell transplant in November 2021, complicated by submassive saddle PE and bowel perforation. \par \par Currently maintained on revlimid. Being evaluated by Andrew Stack MD at [facility] as a second opinion. 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

## 2022-04-18 ENCOUNTER — APPOINTMENT (OUTPATIENT)
Dept: CARDIOLOGY | Facility: CLINIC | Age: 67
End: 2022-04-18
Payer: MEDICARE

## 2022-04-18 PROCEDURE — 93356 MYOCRD STRAIN IMG SPCKL TRCK: CPT

## 2022-04-18 PROCEDURE — 93306 TTE W/DOPPLER COMPLETE: CPT

## 2022-04-19 ENCOUNTER — NON-APPOINTMENT (OUTPATIENT)
Age: 67
End: 2022-04-19

## 2022-04-20 ENCOUNTER — APPOINTMENT (OUTPATIENT)
Dept: ORTHOPEDIC SURGERY | Facility: CLINIC | Age: 67
End: 2022-04-20
Payer: MEDICARE

## 2022-04-20 VITALS — DIASTOLIC BLOOD PRESSURE: 79 MMHG | HEART RATE: 48 BPM | SYSTOLIC BLOOD PRESSURE: 123 MMHG

## 2022-04-20 DIAGNOSIS — Z00.00 ENCOUNTER FOR GENERAL ADULT MEDICAL EXAMINATION W/OUT ABNORMAL FINDINGS: ICD-10-CM

## 2022-04-20 PROCEDURE — 99214 OFFICE O/P EST MOD 30 MIN: CPT

## 2022-04-20 RX ORDER — LENALIDOMIDE 10 MG/1
10 CAPSULE ORAL
Qty: 28 | Refills: 0 | Status: DISCONTINUED | COMMUNITY
Start: 2022-03-08 | End: 2022-04-20

## 2022-04-26 ENCOUNTER — OUTPATIENT (OUTPATIENT)
Dept: OUTPATIENT SERVICES | Facility: HOSPITAL | Age: 67
LOS: 1 days | End: 2022-04-26
Payer: MEDICARE

## 2022-04-26 ENCOUNTER — APPOINTMENT (OUTPATIENT)
Dept: MRI IMAGING | Facility: CLINIC | Age: 67
End: 2022-04-26
Payer: MEDICARE

## 2022-04-26 DIAGNOSIS — Z03.89 ENCOUNTER FOR OBSERVATION FOR OTHER SUSPECTED DISEASES AND CONDITIONS RULED OUT: ICD-10-CM

## 2022-04-26 DIAGNOSIS — Z98.890 OTHER SPECIFIED POSTPROCEDURAL STATES: Chronic | ICD-10-CM

## 2022-04-26 DIAGNOSIS — C90.00 MULTIPLE MYELOMA NOT HAVING ACHIEVED REMISSION: ICD-10-CM

## 2022-04-26 PROCEDURE — G1004: CPT

## 2022-04-26 PROCEDURE — A9585: CPT

## 2022-04-26 PROCEDURE — 75561 CARDIAC MRI FOR MORPH W/DYE: CPT | Mod: MG

## 2022-04-26 PROCEDURE — 75561 CARDIAC MRI FOR MORPH W/DYE: CPT | Mod: 26,MG

## 2022-04-27 ENCOUNTER — APPOINTMENT (OUTPATIENT)
Dept: MRI IMAGING | Facility: CLINIC | Age: 67
End: 2022-04-27
Payer: MEDICARE

## 2022-04-27 ENCOUNTER — OUTPATIENT (OUTPATIENT)
Dept: OUTPATIENT SERVICES | Facility: HOSPITAL | Age: 67
LOS: 1 days | End: 2022-04-27
Payer: MEDICARE

## 2022-04-27 DIAGNOSIS — S22.080S WEDGE COMPRESSION FRACTURE OF T11-T12 VERTEBRA, SEQUELA: ICD-10-CM

## 2022-04-27 DIAGNOSIS — Z98.890 OTHER SPECIFIED POSTPROCEDURAL STATES: Chronic | ICD-10-CM

## 2022-04-27 DIAGNOSIS — C90.00 MULTIPLE MYELOMA NOT HAVING ACHIEVED REMISSION: ICD-10-CM

## 2022-04-27 PROCEDURE — 72157 MRI CHEST SPINE W/O & W/DYE: CPT | Mod: 26,ME

## 2022-04-27 PROCEDURE — G1004: CPT

## 2022-04-27 PROCEDURE — A9585: CPT

## 2022-04-27 PROCEDURE — 72157 MRI CHEST SPINE W/O & W/DYE: CPT | Mod: ME

## 2022-05-02 ENCOUNTER — OUTPATIENT (OUTPATIENT)
Dept: OUTPATIENT SERVICES | Facility: HOSPITAL | Age: 67
LOS: 1 days | End: 2022-05-02
Payer: MEDICARE

## 2022-05-02 ENCOUNTER — APPOINTMENT (OUTPATIENT)
Dept: NUCLEAR MEDICINE | Facility: CLINIC | Age: 67
End: 2022-05-02
Payer: MEDICARE

## 2022-05-02 DIAGNOSIS — Z98.890 OTHER SPECIFIED POSTPROCEDURAL STATES: Chronic | ICD-10-CM

## 2022-05-02 DIAGNOSIS — C90.00 MULTIPLE MYELOMA NOT HAVING ACHIEVED REMISSION: ICD-10-CM

## 2022-05-02 PROCEDURE — 78816 PET IMAGE W/CT FULL BODY: CPT | Mod: 26,PS,MH

## 2022-05-02 PROCEDURE — A9552: CPT

## 2022-05-02 PROCEDURE — 78816 PET IMAGE W/CT FULL BODY: CPT

## 2022-05-04 ENCOUNTER — NON-APPOINTMENT (OUTPATIENT)
Age: 67
End: 2022-05-04

## 2022-05-04 ENCOUNTER — APPOINTMENT (OUTPATIENT)
Dept: ORTHOPEDIC SURGERY | Facility: CLINIC | Age: 67
End: 2022-05-04
Payer: MEDICARE

## 2022-05-04 VITALS
HEIGHT: 72 IN | HEART RATE: 46 BPM | WEIGHT: 198 LBS | BODY MASS INDEX: 26.82 KG/M2 | SYSTOLIC BLOOD PRESSURE: 119 MMHG | DIASTOLIC BLOOD PRESSURE: 76 MMHG

## 2022-05-04 PROCEDURE — 99214 OFFICE O/P EST MOD 30 MIN: CPT

## 2022-05-12 ENCOUNTER — APPOINTMENT (OUTPATIENT)
Dept: CARDIOLOGY | Facility: CLINIC | Age: 67
End: 2022-05-12
Payer: MEDICARE

## 2022-05-12 VITALS
HEART RATE: 66 BPM | DIASTOLIC BLOOD PRESSURE: 76 MMHG | HEIGHT: 72 IN | WEIGHT: 201 LBS | SYSTOLIC BLOOD PRESSURE: 118 MMHG | RESPIRATION RATE: 17 BRPM | BODY MASS INDEX: 27.22 KG/M2 | OXYGEN SATURATION: 97 %

## 2022-05-12 PROCEDURE — 99214 OFFICE O/P EST MOD 30 MIN: CPT

## 2022-05-12 RX ORDER — ATOVAQUONE 750 MG/5ML
750 SUSPENSION ORAL
Qty: 300 | Refills: 3 | Status: DISCONTINUED | COMMUNITY
Start: 2021-12-07 | End: 2022-05-12

## 2022-05-12 RX ORDER — METOPROLOL TARTRATE 25 MG/1
25 TABLET, FILM COATED ORAL
Qty: 90 | Refills: 3 | Status: DISCONTINUED | COMMUNITY
Start: 2021-12-07 | End: 2022-05-12

## 2022-05-18 ENCOUNTER — OUTPATIENT (OUTPATIENT)
Dept: OUTPATIENT SERVICES | Facility: HOSPITAL | Age: 67
LOS: 1 days | Discharge: HOME | End: 2022-05-18

## 2022-05-18 DIAGNOSIS — Z98.890 OTHER SPECIFIED POSTPROCEDURAL STATES: Chronic | ICD-10-CM

## 2022-05-18 DIAGNOSIS — R79.89 OTHER SPECIFIED ABNORMAL FINDINGS OF BLOOD CHEMISTRY: ICD-10-CM

## 2022-05-19 ENCOUNTER — APPOINTMENT (OUTPATIENT)
Dept: HEMATOLOGY ONCOLOGY | Facility: CLINIC | Age: 67
End: 2022-05-19
Payer: MEDICARE

## 2022-05-19 VITALS
SYSTOLIC BLOOD PRESSURE: 116 MMHG | TEMPERATURE: 96.9 F | RESPIRATION RATE: 16 BRPM | BODY MASS INDEX: 27.51 KG/M2 | WEIGHT: 202.8 LBS | DIASTOLIC BLOOD PRESSURE: 67 MMHG | HEART RATE: 57 BPM | OXYGEN SATURATION: 98 %

## 2022-05-19 PROCEDURE — 99215 OFFICE O/P EST HI 40 MIN: CPT

## 2022-05-19 RX ORDER — ACYCLOVIR 400 MG/1
400 TABLET ORAL EVERY 8 HOURS
Qty: 90 | Refills: 3 | Status: DISCONTINUED | COMMUNITY
Start: 2021-12-07 | End: 2022-05-19

## 2022-05-21 NOTE — ASSESSMENT
[FreeTextEntry1] : Assessment:\par 1.  Submassive PE\par - s/p INARI mechanical thrombectomy\par - bilateral DVT\par - on eliquis\par 2. HPC transplant\par 3.  Multiple Myeloma\par 4. Appendicitis - treated \par \par Plan\par 1.  Continue eliquis 5mg BID for now\par 2.  Appreciate Dr. Salazar followup - no amyloid seen on testing.  \par 3.  Repeat venous duplex in 3 months. \par 4.  Compression garments to the legs\par 5.  Daily walking\par 6.  Followup with hematology    \par 7.  Return in 4 months. \par 8.  Ok to hold eliquis if needed for bone marrow biopsy 2 days.\par 9.  Will change metoprolol 25 q 8 to Toprol XL 50mg daily

## 2022-05-21 NOTE — REASON FOR VISIT
[Follow-Up - Clinic] : a clinic follow-up of [FreeTextEntry2] : PE and DVT [FreeTextEntry1] : 5/12/2022\par Seen by Dr. Salazar. \par Cardiac MRI done- no amyloid\par Echo April 18th, 2022:  no amyloid.  normal RV size and function.  Normal LV size and function. 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 Brantley - for MM - no more plans for revlomid.  Plan for immunotherapy for 4 months.  \par \par Remains on eliquis 5mg BID.  No issues with bleeding.\par He has been on A/C since November.  \par \par \par Tamsulosin\par Eliquis 5mg BID\par Metoprolol 25 every 8 hours\par magnesium\par Acycolovir\par \par \par 2/10/2022\par \par Here for followup\par In November was hospitalized found to have submassive saddle PE\par treated with INARI mechanical thrombectomy\par RV dysfunction on echo at that time and on CT\par \par Doing well with eliquis 5mg BID\par no bleeding issues\par Breathing is good\par No cp \par \par states he had a bout of appendicitis last month, treated with antibiotics, resolve.d

## 2022-05-23 NOTE — CONSULT LETTER
[Dear  ___] : Dear  [unfilled], [Courtesy Letter:] : I had the pleasure of seeing your patient, [unfilled], in my office today. [Please see my note below.] : Please see my note below. [Consult Closing:] : Thank you very much for allowing me to participate in the care of this patient.  If you have any questions, please do not hesitate to contact me. [Sincerely,] : Sincerely, [DrVladislav  ___] : Dr. BELTRAN [FreeTextEntry2] : Dyana Cheatham M.D.\par [facility]\par [address]\par [place], [place]   [address] [FreeTextEntry3] : \par Pao Sorenson M.D.\par \par  of Medicine\par [facility] [employer]\par [facility] \par [facility]\par [address] [place] [address] \[place] [address] \par ph: [phone]\par fax: [phone]

## 2022-05-23 NOTE — HISTORY OF PRESENT ILLNESS
[de-identified] : 11/2020-Found to have T-12 compression fracture. Saw orthopedist Dr. Candelaria in 1/2021. Referred to endocrinologist Dr. Acosta by Dr. 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 Serum immunofixation showed 1 IgD lambda band and free lambda light chains. Urine immunofixation negative for monoclonal band.\par 4/29/2021–Bone marrow biopsy and bone marrow aspirate consistent with plasma cell myeloma (greater than 90% involvement). Myeloma FISH studies showed CCND1/IGH fusion (27%). Flow cytometry positive for monotypic plasma cells. Lymphocyte immunophenotypic findings showed no diagnostic abnormalities. Cytogenetics with normal male karyotype. Congo red stain negative. Iron stains showed iron stores present. No ring sideroblasts.\par 5/13/21- C1D1 RVd\par  [de-identified] : Since initial HPC transplant consult visit on 6/7/21, he had recent hematology office visit on 8/19/21, Cycle #5 Day#15 Velcade/Decadron. Cycle #5 Revlimid as planned. He describes moderate fatigue, occasional blurry vision, insomnia with Decadron. He denies fever, chills, cough, dyspnea. He received the COVID-19 vaccine(Moderna) on 3/16, 4/13/21; he has booster vaccine on 8/31/21. The patient is very concerned about the delta variant of COVID-19 and asked to discuss isolation/restriction policies. \par \par 10/6/21:\par He completed cycle 6 RVD on 9/30/21. He has moderate fatigue and good appetite. He has occasional blurry vision and was evaluated by Optho with normal evaluation. He had MRI Brain with alton(9/29/21 at [facility])- negative. He denies fever,  chills, cough, shortness of breath. \par \par 12/9/21:\par The patient was admitted to the BMTU on 11/15/21 and received conditioning chemotherapy with Melphalan 100 mg/m2 IV x 2 consecutive days followed by autoPSCT on 11/19/21. \par \par Upon admission, a TLC was placed in IR. Mr. Julio received IV hydration, pain management, anxiolytics, antiemetics, nutritional support, and antibacterial / antiviral / antifungal / GI / PCP and VOD (SOS) prophylaxis. When his ANC dropped below 500 he was started on prophylactic Ciprofloxacin. Labs were monitored on a daily basis, and he received electrolyte repletion and transfusional support as needed. \par \par On 11/19/2021 After pre-medication  received 251 mL of, Autologous mobilized, \par plasma reduced, pooled, thawed, washes HCP apheresis for  over approximately 1 \par hr. Cell counts as follows \par Total MNC( x10^8/kg)=7.22 \par CD34+cells ( x10^6 kg)=4.58 \par Cell Viability (%)= 90 \par Mr. Julio tolerated the infusion well with no adverse resections noted. \par \par While admitted, Mr. Julio experienced pancytopenia related to the high dose chemotherapy conditioning regimen. He was treated with transfusional support. On \par 11/19/21 he experienced a vasovagal episode in the bathroom that was self limiting. On 11/23/21 he had another vasovagal episode with severe abdominal pain and distention. A RRT was called. During the episode he was hypotensive, lactate was 6. A CT A/P showed pneumoperitoneum and terminal ileitis. Surgery was consulted and he was transferred to the SICU. During the episode, he made a troponin. A TTE was completed in the SICU, which showed increased pulmonary pressures. As a result, a CTA of the chest was completed which showed a saddle pulmonary embolism. He was started on full dose heparin. Lower extremity dopplers showed bilateral acute DVTs. On 11/24/21 he underwent a mechanical thrombectomy. IVC filter placement was deferred. \par \par The terminal ileitis and pneumoperitoneum was treated conservatively with supportive care and antibiotics. He completed a course of Meropenem 12/7/21. A \par repeat CT A/P showed unchanged ileitis, with mild increased fluid distention and resolution of free air. The heparin was transitioned to full dose Lovenox, \par and eventually Eliquis. Shortly thereafter he was transferred back to . \par \par Currently, he is stable for discharge home with outpatient follow up at the [facility] and with vascular cardiology. \par \par Since discharge to home on 12/7/21 he has moderate fatigue and slowly improving appetite. He describes loose stool(small volume) twice daily with abdominal bloating and gassiness since discharge but denies nausea, vomiting. \par \par 12/16/21:\par He continues to have moderate fatigue and continued slow improvement in appetite. He started taking Gas-X twice daily after 12/9/21 office visit and stopped on 12/13 as his abd bloating with gassiness significantly improved. He states loose stool has improved, now only once daily since 12/17, but no nausea/vomiting. He still has bilateral lower extremity edema. He scheduled followup with vascular cardiology on 1/13/22. He denies fever, cough, shortness of breath. \par \par 12/22/21:\par He has mild-moderate fatigue and good appetite. He denies abdominal pain/distension and hasn't needed Gas-X for one week. He denies nausea/vomiting, diarrhea. No fever, chills. \par \par 01/05/22:\par He has mild fatigue and good appetite. He denies fever, chills, cough, dyspnea, nausea/vomiting, abdominal pain, diarrhea. He walks on treadmill daily x 20 minutes. \par \par 01/19/22:\par Patient called the office on 1/11/22 to discuss new onset of RLQ pain last night when sleeping and it awakened him when he was changing his position. He went back to sleep. He woke up around 6:30 am and noted sharp pain with movement, therefore, he has been trying not to move around too much. He denies nausea, vomiting, diarrhea. He has eaten breakfast and lunch without difficulty and no associated symptoms. He denies fever, chills, cough, shortness of breath. No acute swelling in lower extremities, no chest pain. \par He states pain is 3-4/10, non-radiating, and only occurs with movement. \par Plan- \par CT Abd/pelvis with oral and IV contrast. \par NPO after 9 am in case CT approved and scan can be done tomorrow afternoon. \par If he develops worsening pain, fever, chills, vomiting or other severe new symptoms he will go to [facility] immediately. \par I contacted patient on 1/12/22 to discuss results of CT Abd/pelvis(1/12/22)- acute appendicitis. Given persistent RLQ pain today, I instructed patient to go to [facility] ER for surgery evaluation. He agrees. Patient admitted to surgical service and treated conservatively with Meropenem IV. His abdominal pain resolved and he was discharged to home on 1/14/22 to finish course of antibiotics with Cipro/Flagyl.\par \par Since discharge on 1/14/22, he feels well overall with only mild fatigue, and appetite is good. He denies abdominal pain, nausea, vomiting, diarrhea, fever, chills. Weight this morning- 188.5 lbs.\par  \par 02/02/22:\par He describes good energy and good appetite. He denies fever, cough, shortness of breath, nausea, vomiting, diarrhea. Weight this morning- 189 lbs.\par \par 2/16/22:\par He describes good energy and good appetite. He denies fever, cough, shortness of breath, nausea, vomiting, diarrhea. He had Vascular Cardio followup with Echo and LE duplex study ordered. \par \par 03/03/22:\par He describes good energy and good appetite. He denies fever, cough, shortness of breath, nausea, vomiting, diarrhea. \par \par 03/30/22:\par Patient called the answering service on 3/19 and spoke to on call physician. He described sore throat/cough for the past three days starting 3/17. He states that the throat is getting better but just now he was febrile to 100.8. I advised going to the ER but he wants to take a Tylenol and wait it out for tonight since he is getting better in general. He agrees that if fever persists overnight, he will go to the ER. \par \par on 3/22, patient calls to give update stating that his sore throat resolved on 3/19 but he developed T- 100.8. He took Tylenol once and he states fever resolved and did not recur. He still has dry cough which is improving, and he is taking Robitussin. He states that day 1 of Revlimid maintenance was on 3/15/22. \par Plan-\par Given recent URTI symptoms with persistent cough, I instructed patient to HOLD Revlimid through 3/27/22, and resume on Monday 3/28/22. Drink plenty of water and rest. \par He will have lab work on 3/28 at local [facility] prior to Telehealth visit on 3/30/22. \par If he develops new symptoms he will call the office immediately. \par \par Today, patient states he has mild fatigue and good appetite. He has slight dry cough but he denies fever, chills, sore throat, shortness of breath, nausea, vomiting, diarrhea. 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 \par \par 04/07/22:\par The patient describes mild fatigue and good appetite. He has occasional cough but denies fever, chills, shortness of breath, bone pain. \par

## 2022-05-23 NOTE — RESULTS/DATA
[FreeTextEntry1] : I reviewed lab results from 3/28/22 with patient today\par ----------------------------------------------------------------------------\par ACC: [id number] EXAM: CT ABDOMEN AND PELVIS OC IC \par PROCEDURE DATE: 01/12/2022 \par INTERPRETATION: CLINICAL INFORMATION: Right lower quadrant pain for \par Multiple myeloma. Previous terminal ileitis.\par \par COMPARISON: CT scan of the abdomen and pelvis from 11/29/2021\par \par CONTRAST/COMPLICATIONS:\par IV Contrast: Omnipaque 350 90 cc administered 10 cc discarded\par Oral Contrast: Omnipaque 300\par Complications: None reported at time of study completion\par \par PROCEDURE:\par CT of the Abdomen and Pelvis was performed.\par Sagittal and coronal reformats were performed.\par \par FINDINGS:\par LOWER CHEST: Within normal limits.\par \par LIVER: Within normal limits.\par BILE DUCTS: Normal caliber.\par GALLBLADDER: Small gallstones without gross inflammation.\par SPLEEN: Within normal limits.\par PANCREAS: Within normal limits.\par ADRENALS: Within normal limits.\par KIDNEYS/URETERS: Left renal cyst measuring up to 7.0 cm in the left lower \par pole. 1.3 cm low-attenuation lesion in the lower pole the left kidney \par laterally which is measuring greater than simple fluid in density.\par \par BLADDER: Bladder is underdistended which limits evaluation.\par REPRODUCTIVE ORGANS: Prominent prostate gland\par \par BOWEL: No bowel obstruction. Appendix is mildly distended measuring up to \par 1.0 cm which measured approximately 9 mm previously. There is question of \par minimally increased enhancement. There is mild surrounding soft tissue \par stranding although fluid was present in this region on the prior study. \par This may represent chronic changes although in a patient with right lower \par quadrant pain, acute appendicitis must also be considered.. Resolution of \par previously visualized distended small bowel. Previously mentioned \par terminal ileitis is not well appreciated on the current study. Colonic \par diverticuli predominantly in the sigmoid without gross inflammation.\par PERITONEUM: No ascites.\par VESSELS: Within normal limits.\par RETROPERITONEUM/LYMPH NODES: No lymphadenopathy.\par ABDOMINAL WALL: Small umbilical hernia containing fat.\par BONES: Status post ORIF of the left hemipelvis with stable postsurgical \par changes. Stable lucencies in bone, most pronounced and T12 and L5. \par Degenerative changes of bone. Spondylolisthesis with L5 anterior to L1 of \par approximately 25-50%.\par \par IMPRESSION:\par \par Mildly distended appendix. Mild surrounding soft tissue stranding where \par fluid was seen on the prior study. This may represent chronic residual \par changes although acute appendicitis must also be considered, especially \par in a patient with right lower quadrant pain. This was discussed with Dr. libby Sorenson at 3:00 PM on 1/12/2022.\par \par Resolution of small bowel obstruction. Stable bone findings.\par \par --- End of Report ---\par \par JEFF PHAM MD; Attending Radiologist\par

## 2022-05-23 NOTE — PHYSICAL EXAM
[Ambulatory and capable of all self care but unable to carry out any work activities] : Status 2- Ambulatory and capable of all self care but unable to carry out any work activities. Up and about more than 50% of waking hours [Normal] : affect appropriate [de-identified] : anicteric [de-identified] : RRR, normal S1S2 [de-identified] : trace edema bilateral ankles [de-identified] : no cervical/SCV adenopathy [de-identified] : no rash [de-identified] : A & O x 4

## 2022-05-23 NOTE — ASSESSMENT
[FreeTextEntry1] : IgD lambda myeloma s/p cycle 6 RVD on 9/30/21\par Mobilization of stem cells with Zarxio 960mcg subcutaneous daily 10/28/21-11/1/21\par He was admitted to the BMTU on 11/15/21 and received conditioning chemotherapy with Melphalan 100 mg/m2 IV x 2 consecutive days followed by autoPSCT on 11/19/21. \par Hospital course complicated by saddle pulmonary embolism and bilateral LE acute DVTs. On 11/24/21 he underwent a mechanical thrombectomy. IVC filter placement was deferred. He received anticoagulation, now on \par Eliquis. He also had terminal ileitis with ? microperforation, monitored in SICU, treated conservatively with supportive care and antibiotics. He completed a course of Meropenem 12/7/21.\par On 1/11/22, he developed new onset of RLQ pain, CT Abd/pelvis(1/12/22)- acute appendicitis. Given persistent RLQ pain on 1/12, I instructed patient to go to [facility] ER for surgery evaluation. Patient admitted to surgical service and treated conservatively with Meropenem IV. His abdominal pain resolved and he was discharged to home on 1/14/22 to finish course of antibiotics with Cipro/Flagyl.\par \par 1) Multiple myeloma- s/p autoPSCT on 11/19/21\par Plan- \par Continue current medications and restrictions as discussed prior to discharge from BMTU\par Continue Acyclovir, Mepron(discontinue when supply complete) for ID prophylaxis; Off Diflucan\par Continue MVI, Folate daily- switch to OTC MVI after supply complete\par Continue Pantoprazole daily for GI prophylaxis- completed\par Zofran as needed for nausea/vomiting\par Drink plenty of water daily\par Moisturizing cream to the skin several times per day\par Labs reviewed from 3/28/22.\par Discussed maintenance therapy post autoPSCT- Revlimid +/- Velcade/Dex, with potential side effects discussed during 3/3/22 office visit. \par Patient initiated Revlimid 10 mg po daily on 3/15/22, then held 3/22- 3/27 for URTI symptoms. Resumed on 3/28/22 and completed 28 day supply(1 cycle)\par Patient had second opinion consult with Dr. Andrew Stack at [facility] on 3/31/22. Dr. Stack contacted me on 4/1/22 to discuss his recommendations to further management of patient's myeloma. \par Given measurable Urine M-John(24 hr urine), Dr. Stack discussed combination Venetoclax daily/Carfilzomib(C1D1 20 mg/m2 with increase to 56 mg/m2 on C1D8 given on days 1, 8, 15 of 28 day cycle) consolidation x 4 cycles. The patient agrees to proceed with this treatment regimen\par He will undergo repeat bone marrow evaluation with FISH myeloma panel and NGS/RNA seq at [facility]. Plan is for early May. \par He will be referred for Cardiology consult to evaluate for cardiac amyloidosis- Echo with strain imaging and Cardiac MRI. \par Whole body PET/CT scan to assess for lytic bone disease; prior MRI T-spine(5/30/21)- mild loss of vertebral body height of T-12. \par Bone density to assess for osteoporosis- plan to begin Xgeva with consolidation therapy. \par Call the office immediately if fever, chills, symptoms of infection\par \par 2) H/O saddle PE s/p thrombectomy; h/o bilat LE DVT\par Plan- Continue Eliquis 5 mg po 2X/day\par Had followup with Dr. Holley on Feb 10, 2022\par \par RTC following bone marrow biopsy. \par \par

## 2022-05-23 NOTE — REVIEW OF SYSTEMS
[Fatigue] : fatigue [Cough] : cough [Fever] : no fever [Chills] : no chills [Vision Problems] : no vision problems [Mucosal Pain] : no mucosal pain [Chest Pain] : no chest pain [Shortness Of Breath] : no shortness of breath [Abdominal Pain] : no abdominal pain [Vomiting] : no vomiting [Diarrhea] : no diarrhea [Skin Rash] : no skin rash [Dizziness] : no dizziness [Fainting] : no fainting [Easy Bleeding] : no tendency for easy bleeding [Easy Bruising] : no tendency for easy bruising [FreeTextEntry3] : ( seen by Optho on 10/5/21) [FreeTextEntry4] : no sore throat [FreeTextEntry5] : slight edema in both ankles [FreeTextEntry7] : no nausea [FreeTextEntry9] : no bone pain [de-identified] : no paresthesias

## 2022-06-02 ENCOUNTER — NON-APPOINTMENT (OUTPATIENT)
Age: 67
End: 2022-06-02

## 2022-06-02 ENCOUNTER — RESULT REVIEW (OUTPATIENT)
Age: 67
End: 2022-06-02

## 2022-06-02 ENCOUNTER — APPOINTMENT (OUTPATIENT)
Dept: INFUSION THERAPY | Facility: HOSPITAL | Age: 67
End: 2022-06-02

## 2022-06-02 DIAGNOSIS — E86.0 DEHYDRATION: ICD-10-CM

## 2022-06-02 DIAGNOSIS — Z51.11 ENCOUNTER FOR ANTINEOPLASTIC CHEMOTHERAPY: ICD-10-CM

## 2022-06-02 DIAGNOSIS — R11.2 NAUSEA WITH VOMITING, UNSPECIFIED: ICD-10-CM

## 2022-06-02 LAB
BASOPHILS # BLD AUTO: 0.04 K/UL — SIGNIFICANT CHANGE UP (ref 0–0.2)
BASOPHILS NFR BLD AUTO: 1 % — SIGNIFICANT CHANGE UP (ref 0–2)
EOSINOPHIL # BLD AUTO: 0.1 K/UL — SIGNIFICANT CHANGE UP (ref 0–0.5)
EOSINOPHIL NFR BLD AUTO: 2.5 % — SIGNIFICANT CHANGE UP (ref 0–6)
HCT VFR BLD CALC: 36.4 % — LOW (ref 39–50)
HGB BLD-MCNC: 12.6 G/DL — LOW (ref 13–17)
IMM GRANULOCYTES NFR BLD AUTO: 1.2 % — SIGNIFICANT CHANGE UP (ref 0–1.5)
LYMPHOCYTES # BLD AUTO: 1.04 K/UL — SIGNIFICANT CHANGE UP (ref 1–3.3)
LYMPHOCYTES # BLD AUTO: 25.5 % — SIGNIFICANT CHANGE UP (ref 13–44)
MCHC RBC-ENTMCNC: 31.7 PG — SIGNIFICANT CHANGE UP (ref 27–34)
MCHC RBC-ENTMCNC: 34.6 G/DL — SIGNIFICANT CHANGE UP (ref 32–36)
MCV RBC AUTO: 91.7 FL — SIGNIFICANT CHANGE UP (ref 80–100)
MONOCYTES # BLD AUTO: 0.57 K/UL — SIGNIFICANT CHANGE UP (ref 0–0.9)
MONOCYTES NFR BLD AUTO: 14 % — SIGNIFICANT CHANGE UP (ref 2–14)
NEUTROPHILS # BLD AUTO: 2.28 K/UL — SIGNIFICANT CHANGE UP (ref 1.8–7.4)
NEUTROPHILS NFR BLD AUTO: 55.8 % — SIGNIFICANT CHANGE UP (ref 43–77)
NRBC # BLD: 0 /100 WBCS — SIGNIFICANT CHANGE UP (ref 0–0)
PLATELET # BLD AUTO: 178 K/UL — SIGNIFICANT CHANGE UP (ref 150–400)
RBC # BLD: 3.97 M/UL — LOW (ref 4.2–5.8)
RBC # FLD: 14.1 % — SIGNIFICANT CHANGE UP (ref 10.3–14.5)
WBC # BLD: 4.08 K/UL — SIGNIFICANT CHANGE UP (ref 3.8–10.5)
WBC # FLD AUTO: 4.08 K/UL — SIGNIFICANT CHANGE UP (ref 3.8–10.5)

## 2022-06-03 DIAGNOSIS — C90.00 MULTIPLE MYELOMA NOT HAVING ACHIEVED REMISSION: ICD-10-CM

## 2022-06-03 LAB
ALBUMIN SERPL ELPH-MCNC: 4.4 G/DL — SIGNIFICANT CHANGE UP (ref 3.3–5)
ALP SERPL-CCNC: 80 U/L — SIGNIFICANT CHANGE UP (ref 40–120)
ALT FLD-CCNC: 25 U/L — SIGNIFICANT CHANGE UP (ref 10–45)
ANION GAP SERPL CALC-SCNC: 12 MMOL/L — SIGNIFICANT CHANGE UP (ref 5–17)
AST SERPL-CCNC: 22 U/L — SIGNIFICANT CHANGE UP (ref 10–40)
BILIRUB SERPL-MCNC: 0.2 MG/DL — SIGNIFICANT CHANGE UP (ref 0.2–1.2)
BUN SERPL-MCNC: 17 MG/DL — SIGNIFICANT CHANGE UP (ref 7–23)
CALCIUM SERPL-MCNC: 9 MG/DL — SIGNIFICANT CHANGE UP (ref 8.4–10.5)
CHLORIDE SERPL-SCNC: 104 MMOL/L — SIGNIFICANT CHANGE UP (ref 96–108)
CO2 SERPL-SCNC: 23 MMOL/L — SIGNIFICANT CHANGE UP (ref 22–31)
CREAT SERPL-MCNC: 0.91 MG/DL — SIGNIFICANT CHANGE UP (ref 0.5–1.3)
EGFR: 94 ML/MIN/1.73M2 — SIGNIFICANT CHANGE UP
GLUCOSE SERPL-MCNC: 91 MG/DL — SIGNIFICANT CHANGE UP (ref 70–99)
IGD SER-MCNC: 18 MG/DL — HIGH
MAGNESIUM SERPL-MCNC: 2 MG/DL — SIGNIFICANT CHANGE UP (ref 1.6–2.6)
POTASSIUM SERPL-MCNC: 4.4 MMOL/L — SIGNIFICANT CHANGE UP (ref 3.5–5.3)
POTASSIUM SERPL-SCNC: 4.4 MMOL/L — SIGNIFICANT CHANGE UP (ref 3.5–5.3)
PROT SERPL-MCNC: 5.7 G/DL — LOW (ref 6–8.3)
SODIUM SERPL-SCNC: 140 MMOL/L — SIGNIFICANT CHANGE UP (ref 135–145)

## 2022-06-08 ENCOUNTER — APPOINTMENT (OUTPATIENT)
Dept: ORTHOPEDIC SURGERY | Facility: CLINIC | Age: 67
End: 2022-06-08
Payer: MEDICARE

## 2022-06-08 VITALS — HEART RATE: 52 BPM | DIASTOLIC BLOOD PRESSURE: 79 MMHG | SYSTOLIC BLOOD PRESSURE: 129 MMHG

## 2022-06-08 PROCEDURE — 99213 OFFICE O/P EST LOW 20 MIN: CPT

## 2022-06-08 RX ORDER — ATORVASTATIN CALCIUM 20 MG/1
20 TABLET, FILM COATED ORAL
Refills: 0 | Status: ACTIVE | COMMUNITY

## 2022-06-09 ENCOUNTER — RESULT REVIEW (OUTPATIENT)
Age: 67
End: 2022-06-09

## 2022-06-09 ENCOUNTER — APPOINTMENT (OUTPATIENT)
Dept: INFUSION THERAPY | Facility: HOSPITAL | Age: 67
End: 2022-06-09

## 2022-06-09 LAB
ALBUMIN SERPL ELPH-MCNC: 4.4 G/DL — SIGNIFICANT CHANGE UP (ref 3.3–5)
ALP SERPL-CCNC: 67 U/L — SIGNIFICANT CHANGE UP (ref 40–120)
ALT FLD-CCNC: 22 U/L — SIGNIFICANT CHANGE UP (ref 10–45)
ANION GAP SERPL CALC-SCNC: 13 MMOL/L — SIGNIFICANT CHANGE UP (ref 5–17)
AST SERPL-CCNC: 20 U/L — SIGNIFICANT CHANGE UP (ref 10–40)
BASOPHILS # BLD AUTO: 0.02 K/UL — SIGNIFICANT CHANGE UP (ref 0–0.2)
BASOPHILS NFR BLD AUTO: 0.5 % — SIGNIFICANT CHANGE UP (ref 0–2)
BILIRUB SERPL-MCNC: 0.4 MG/DL — SIGNIFICANT CHANGE UP (ref 0.2–1.2)
BUN SERPL-MCNC: 22 MG/DL — SIGNIFICANT CHANGE UP (ref 7–23)
CALCIUM SERPL-MCNC: 9 MG/DL — SIGNIFICANT CHANGE UP (ref 8.4–10.5)
CHLORIDE SERPL-SCNC: 103 MMOL/L — SIGNIFICANT CHANGE UP (ref 96–108)
CO2 SERPL-SCNC: 25 MMOL/L — SIGNIFICANT CHANGE UP (ref 22–31)
CREAT SERPL-MCNC: 1.07 MG/DL — SIGNIFICANT CHANGE UP (ref 0.5–1.3)
EGFR: 77 ML/MIN/1.73M2 — SIGNIFICANT CHANGE UP
EOSINOPHIL # BLD AUTO: 0.03 K/UL — SIGNIFICANT CHANGE UP (ref 0–0.5)
EOSINOPHIL NFR BLD AUTO: 0.7 % — SIGNIFICANT CHANGE UP (ref 0–6)
GLUCOSE SERPL-MCNC: 76 MG/DL — SIGNIFICANT CHANGE UP (ref 70–99)
HCT VFR BLD CALC: 36.9 % — LOW (ref 39–50)
HGB BLD-MCNC: 12.8 G/DL — LOW (ref 13–17)
IMM GRANULOCYTES NFR BLD AUTO: 0.2 % — SIGNIFICANT CHANGE UP (ref 0–1.5)
LYMPHOCYTES # BLD AUTO: 1 K/UL — SIGNIFICANT CHANGE UP (ref 1–3.3)
LYMPHOCYTES # BLD AUTO: 22.9 % — SIGNIFICANT CHANGE UP (ref 13–44)
MCHC RBC-ENTMCNC: 31.5 PG — SIGNIFICANT CHANGE UP (ref 27–34)
MCHC RBC-ENTMCNC: 34.7 G/DL — SIGNIFICANT CHANGE UP (ref 32–36)
MCV RBC AUTO: 90.9 FL — SIGNIFICANT CHANGE UP (ref 80–100)
MONOCYTES # BLD AUTO: 0.54 K/UL — SIGNIFICANT CHANGE UP (ref 0–0.9)
MONOCYTES NFR BLD AUTO: 12.4 % — SIGNIFICANT CHANGE UP (ref 2–14)
NEUTROPHILS # BLD AUTO: 2.76 K/UL — SIGNIFICANT CHANGE UP (ref 1.8–7.4)
NEUTROPHILS NFR BLD AUTO: 63.3 % — SIGNIFICANT CHANGE UP (ref 43–77)
NRBC # BLD: 0 /100 WBCS — SIGNIFICANT CHANGE UP (ref 0–0)
PLATELET # BLD AUTO: 215 K/UL — SIGNIFICANT CHANGE UP (ref 150–400)
POTASSIUM SERPL-MCNC: 4.4 MMOL/L — SIGNIFICANT CHANGE UP (ref 3.5–5.3)
POTASSIUM SERPL-SCNC: 4.4 MMOL/L — SIGNIFICANT CHANGE UP (ref 3.5–5.3)
PROT SERPL-MCNC: 5.9 G/DL — LOW (ref 6–8.3)
RBC # BLD: 4.06 M/UL — LOW (ref 4.2–5.8)
RBC # FLD: 14.3 % — SIGNIFICANT CHANGE UP (ref 10.3–14.5)
SODIUM SERPL-SCNC: 141 MMOL/L — SIGNIFICANT CHANGE UP (ref 135–145)
WBC # BLD: 4.36 K/UL — SIGNIFICANT CHANGE UP (ref 3.8–10.5)
WBC # FLD AUTO: 4.36 K/UL — SIGNIFICANT CHANGE UP (ref 3.8–10.5)

## 2022-06-16 ENCOUNTER — RESULT REVIEW (OUTPATIENT)
Age: 67
End: 2022-06-16

## 2022-06-16 ENCOUNTER — APPOINTMENT (OUTPATIENT)
Dept: INFUSION THERAPY | Facility: HOSPITAL | Age: 67
End: 2022-06-16

## 2022-06-16 LAB
BASOPHILS # BLD AUTO: 0.01 K/UL — SIGNIFICANT CHANGE UP (ref 0–0.2)
BASOPHILS NFR BLD AUTO: 0.2 % — SIGNIFICANT CHANGE UP (ref 0–2)
EOSINOPHIL # BLD AUTO: 0 K/UL — SIGNIFICANT CHANGE UP (ref 0–0.5)
EOSINOPHIL NFR BLD AUTO: 0 % — SIGNIFICANT CHANGE UP (ref 0–6)
HCT VFR BLD CALC: 37.4 % — LOW (ref 39–50)
HGB BLD-MCNC: 13.2 G/DL — SIGNIFICANT CHANGE UP (ref 13–17)
IMM GRANULOCYTES NFR BLD AUTO: 0.9 % — SIGNIFICANT CHANGE UP (ref 0–1.5)
LYMPHOCYTES # BLD AUTO: 0.68 K/UL — LOW (ref 1–3.3)
LYMPHOCYTES # BLD AUTO: 15.5 % — SIGNIFICANT CHANGE UP (ref 13–44)
MCHC RBC-ENTMCNC: 31.8 PG — SIGNIFICANT CHANGE UP (ref 27–34)
MCHC RBC-ENTMCNC: 35.3 G/DL — SIGNIFICANT CHANGE UP (ref 32–36)
MCV RBC AUTO: 90.1 FL — SIGNIFICANT CHANGE UP (ref 80–100)
MONOCYTES # BLD AUTO: 0.66 K/UL — SIGNIFICANT CHANGE UP (ref 0–0.9)
MONOCYTES NFR BLD AUTO: 15.1 % — HIGH (ref 2–14)
NEUTROPHILS # BLD AUTO: 2.99 K/UL — SIGNIFICANT CHANGE UP (ref 1.8–7.4)
NEUTROPHILS NFR BLD AUTO: 68.3 % — SIGNIFICANT CHANGE UP (ref 43–77)
NRBC # BLD: 0 /100 WBCS — SIGNIFICANT CHANGE UP (ref 0–0)
PLATELET # BLD AUTO: 167 K/UL — SIGNIFICANT CHANGE UP (ref 150–400)
RBC # BLD: 4.15 M/UL — LOW (ref 4.2–5.8)
RBC # FLD: 14.4 % — SIGNIFICANT CHANGE UP (ref 10.3–14.5)
WBC # BLD: 4.38 K/UL — SIGNIFICANT CHANGE UP (ref 3.8–10.5)
WBC # FLD AUTO: 4.38 K/UL — SIGNIFICANT CHANGE UP (ref 3.8–10.5)

## 2022-06-20 NOTE — CONSULT LETTER
[Dear  ___] : Dear  [unfilled], [Courtesy Letter:] : I had the pleasure of seeing your patient, [unfilled], in my office today. [Please see my note below.] : Please see my note below. [Consult Closing:] : Thank you very much for allowing me to participate in the care of this patient.  If you have any questions, please do not hesitate to contact me. [Sincerely,] : Sincerely, [DrVladislav  ___] : Dr. BELTRAN [FreeTextEntry2] : Dyana Cheatham M.D.\par [facility]\par [address]\[place]   [address] [FreeTextEntry3] : \par Pao Sorenson M.D.\par \par  of Medicine\par [employer]\par [facility] \par [facility]\par [address] [place] [address] \[place] [address] \par ph: [phone]\par fax: [phone]

## 2022-06-20 NOTE — REVIEW OF SYSTEMS
[Fatigue] : fatigue [Cough] : cough [Fever] : no fever [Chills] : no chills [Vision Problems] : no vision problems [Mucosal Pain] : no mucosal pain [Chest Pain] : no chest pain [Shortness Of Breath] : no shortness of breath [Abdominal Pain] : no abdominal pain [Vomiting] : no vomiting [Diarrhea] : no diarrhea [Skin Rash] : no skin rash [Dizziness] : no dizziness [Fainting] : no fainting [Easy Bleeding] : no tendency for easy bleeding [Easy Bruising] : no tendency for easy bruising [FreeTextEntry3] : ( seen by Optho on 10/5/21) [FreeTextEntry4] : no sore throat [FreeTextEntry5] : slight edema in both ankles [FreeTextEntry7] : no nausea [FreeTextEntry9] : no bone pain [de-identified] : no paresthesias

## 2022-06-20 NOTE — PHYSICAL EXAM
[Ambulatory and capable of all self care but unable to carry out any work activities] : Status 2- Ambulatory and capable of all self care but unable to carry out any work activities. Up and about more than 50% of waking hours [Normal] : affect appropriate [de-identified] : Vital signs have been completed and reviewed [de-identified] : anicteric [de-identified] : RRR, normal S1S2 [de-identified] : trace edema bilateral ankles [de-identified] : no cervical/SCV adenopathy [de-identified] : no rash [de-identified] : A & O x 4

## 2022-06-20 NOTE — RESULTS/DATA
[FreeTextEntry1] : Patient had labs drawn with 24 hr urine collection at [facility] on 5/17/22 per Dr. Stack\par ----------------------------------------------------------------------------\par ACC: [id number] EXAM: CT ABDOMEN AND PELVIS OC IC \par PROCEDURE DATE: 01/12/2022 \par INTERPRETATION: CLINICAL INFORMATION: Right lower quadrant pain for \par Multiple myeloma. Previous terminal ileitis.\par \par COMPARISON: CT scan of the abdomen and pelvis from 11/29/2021\par \par CONTRAST/COMPLICATIONS:\par IV Contrast: Omnipaque 350 90 cc administered 10 cc discarded\par Oral Contrast: Omnipaque 300\par Complications: None reported at time of study completion\par \par PROCEDURE:\par CT of the Abdomen and Pelvis was performed.\par Sagittal and coronal reformats were performed.\par \par FINDINGS:\par LOWER CHEST: Within normal limits.\par \par LIVER: Within normal limits.\par BILE DUCTS: Normal caliber.\par GALLBLADDER: Small gallstones without gross inflammation.\par SPLEEN: Within normal limits.\par PANCREAS: Within normal limits.\par ADRENALS: Within normal limits.\par KIDNEYS/URETERS: Left renal cyst measuring up to 7.0 cm in the left lower \par pole. 1.3 cm low-attenuation lesion in the lower pole the left kidney \par laterally which is measuring greater than simple fluid in density.\par \par BLADDER: Bladder is underdistended which limits evaluation.\par REPRODUCTIVE ORGANS: Prominent prostate gland\par \par BOWEL: No bowel obstruction. Appendix is mildly distended measuring up to \par 1.0 cm which measured approximately 9 mm previously. There is question of \par minimally increased enhancement. There is mild surrounding soft tissue \par stranding although fluid was present in this region on the prior study. \par This may represent chronic changes although in a patient with right lower \par quadrant pain, acute appendicitis must also be considered.. Resolution of \par previously visualized distended small bowel. Previously mentioned \par terminal ileitis is not well appreciated on the current study. Colonic \par diverticuli predominantly in the sigmoid without gross inflammation.\par PERITONEUM: No ascites.\par VESSELS: Within normal limits.\par RETROPERITONEUM/LYMPH NODES: No lymphadenopathy.\par ABDOMINAL WALL: Small umbilical hernia containing fat.\par BONES: Status post ORIF of the left hemipelvis with stable postsurgical \par changes. Stable lucencies in bone, most pronounced and T12 and L5. \par Degenerative changes of bone. Spondylolisthesis with L5 anterior to L1 of \par approximately 25-50%.\par \par IMPRESSION:\par \par Mildly distended appendix. Mild surrounding soft tissue stranding where \par fluid was seen on the prior study. This may represent chronic residual \par changes although acute appendicitis must also be considered, especially \par in a patient with right lower quadrant pain. This was discussed with Dr. libby Sorenson at 3:00 PM on 1/12/2022.\par \par Resolution of small bowel obstruction. Stable bone findings.\par \par --- End of Report ---\par \par JEFF PHAM MD; Attending Radiologist\par

## 2022-06-20 NOTE — ASSESSMENT
[FreeTextEntry1] : IgD lambda myeloma s/p cycle 6 RVD on 9/30/21\par Mobilization of stem cells with Zarxio 960mcg subcutaneous daily 10/28/21-11/1/21\par He was admitted to the BMTU on 11/15/21 and received conditioning chemotherapy with Melphalan 100 mg/m2 IV x 2 consecutive days followed by autoPSCT on 11/19/21. \par Hospital course complicated by saddle pulmonary embolism and bilateral LE acute DVTs. On 11/24/21 he underwent a mechanical thrombectomy. IVC filter placement was deferred. He received anticoagulation, now on \par Eliquis. He also had terminal ileitis with ? microperforation, monitored in SICU, treated conservatively with supportive care and antibiotics. He completed a course of Meropenem 12/7/21.\par \par 1) Multiple myeloma- s/p autoPSCT on 11/19/21\par Plan- \par Continue current medications and restrictions as discussed prior to discharge from BMTU\par Continue Acyclovir for ID prophylaxis; Off Diflucan and Mepron\par Continue MVI, Folate daily- switch to OTC MVI after supply complete\par Continue Pantoprazole daily for GI prophylaxis- completed\par Zofran as needed for nausea/vomiting\par Drink plenty of water daily\par Moisturizing cream to the skin several times per day\par Labs reviewed from 3/28/22- IMEL results pending\par Discussed maintenance therapy post autoPSCT- Revlimid +/- Velcade/Dex, with potential side effects discussed during 3/3/22 office visit. \par Patient initiated Revlimid 10 mg po daily on 3/15/22, then held 3/22- 3/27 for URTI symptoms. Resumed on 3/28/22 and completed 28 day supply(1 cycle)\par Patient had second opinion consult Dr. Andrew Stack at [facility] on 3/31/22. Dr. Stack contacted me on 4/1/22 to discuss his recommendations to further management of patient's myeloma.\par Given measurable Urine M-John(24 hr urine), myeloma with t(11;14) fusion detected, Dr. Stack discussed combination Venetoclax daily/Carfilzomib(C1D1 20 mg/m2 with increase to 56 mg/m2 on C1D8 given on days 1, 8, 15 of 28 day cycle) consolidation x 4 cycles. The patient agrees to proceed with this treatment regimen\par He will undergo repeat bone marrow evaluation with FISH myeloma panel and NGS/RNA seq at [facility]. Plan is for late May. \par He will be referred for Cardiology consult to evaluate for cardiac amyloidosis- Echo with strain imaging and Cardiac MRI- done.\par Whole body PET/CT scan to assess for lytic bone disease; prior MRI T-spine(5/30/21)- mild loss of vertebral body height of T-12. Test done on 5/2/22- no new bone lesions, no abnormal FDG activity\par Bone density to assess for osteoporosis-(4/13/22)- impression is normal study.  Plan to begin Xgeva with consolidation therapy. \par Call the office immediately if fever, chills, symptoms of infection\par \par 2) H/O saddle PE s/p thrombectomy; h/o bilat LE DVT\par Plan- Continue Eliquis 5 mg po 2X/day- he will hold Eliquis 48 hrs prior to bone marrow on 5/26/22, then resume the day after. \par Had followup with Dr. Holley on Feb 10, 2022\par \par RTC on 6/2/22 to begin Carfilzomib/Venetoclax consolidation therapy. \par \par

## 2022-06-30 ENCOUNTER — RESULT REVIEW (OUTPATIENT)
Age: 67
End: 2022-06-30

## 2022-06-30 ENCOUNTER — APPOINTMENT (OUTPATIENT)
Dept: HEMATOLOGY ONCOLOGY | Facility: CLINIC | Age: 67
End: 2022-06-30

## 2022-06-30 ENCOUNTER — APPOINTMENT (OUTPATIENT)
Dept: INFUSION THERAPY | Facility: HOSPITAL | Age: 67
End: 2022-06-30

## 2022-06-30 LAB
BASOPHILS # BLD AUTO: 0.05 K/UL — SIGNIFICANT CHANGE UP (ref 0–0.2)
BASOPHILS NFR BLD AUTO: 1.2 % — SIGNIFICANT CHANGE UP (ref 0–2)
EOSINOPHIL # BLD AUTO: 0 K/UL — SIGNIFICANT CHANGE UP (ref 0–0.5)
EOSINOPHIL NFR BLD AUTO: 0 % — SIGNIFICANT CHANGE UP (ref 0–6)
HCT VFR BLD CALC: 39.1 % — SIGNIFICANT CHANGE UP (ref 39–50)
HGB BLD-MCNC: 13.7 G/DL — SIGNIFICANT CHANGE UP (ref 13–17)
IMM GRANULOCYTES NFR BLD AUTO: 1.2 % — SIGNIFICANT CHANGE UP (ref 0–1.5)
LYMPHOCYTES # BLD AUTO: 0.59 K/UL — LOW (ref 1–3.3)
LYMPHOCYTES # BLD AUTO: 14.5 % — SIGNIFICANT CHANGE UP (ref 13–44)
MCHC RBC-ENTMCNC: 31.5 PG — SIGNIFICANT CHANGE UP (ref 27–34)
MCHC RBC-ENTMCNC: 35 G/DL — SIGNIFICANT CHANGE UP (ref 32–36)
MCV RBC AUTO: 89.9 FL — SIGNIFICANT CHANGE UP (ref 80–100)
MONOCYTES # BLD AUTO: 0.92 K/UL — HIGH (ref 0–0.9)
MONOCYTES NFR BLD AUTO: 22.5 % — HIGH (ref 2–14)
NEUTROPHILS # BLD AUTO: 2.47 K/UL — SIGNIFICANT CHANGE UP (ref 1.8–7.4)
NEUTROPHILS NFR BLD AUTO: 60.6 % — SIGNIFICANT CHANGE UP (ref 43–77)
NRBC # BLD: 0 /100 WBCS — SIGNIFICANT CHANGE UP (ref 0–0)
PLATELET # BLD AUTO: 195 K/UL — SIGNIFICANT CHANGE UP (ref 150–400)
PROT SERPL-MCNC: 6 G/DL — SIGNIFICANT CHANGE UP (ref 6–8.3)
RBC # BLD: 4.35 M/UL — SIGNIFICANT CHANGE UP (ref 4.2–5.8)
RBC # FLD: 14.1 % — SIGNIFICANT CHANGE UP (ref 10.3–14.5)
WBC # BLD: 4.08 K/UL — SIGNIFICANT CHANGE UP (ref 3.8–10.5)
WBC # FLD AUTO: 4.08 K/UL — SIGNIFICANT CHANGE UP (ref 3.8–10.5)

## 2022-07-01 ENCOUNTER — NON-APPOINTMENT (OUTPATIENT)
Age: 67
End: 2022-07-01

## 2022-07-01 LAB
ALBUMIN SERPL ELPH-MCNC: 4.4 G/DL — SIGNIFICANT CHANGE UP (ref 3.3–5)
ALP SERPL-CCNC: 90 U/L — SIGNIFICANT CHANGE UP (ref 40–120)
ALT FLD-CCNC: 39 U/L — SIGNIFICANT CHANGE UP (ref 10–45)
ANION GAP SERPL CALC-SCNC: 16 MMOL/L — SIGNIFICANT CHANGE UP (ref 5–17)
AST SERPL-CCNC: 29 U/L — SIGNIFICANT CHANGE UP (ref 10–40)
BILIRUB SERPL-MCNC: 0.3 MG/DL — SIGNIFICANT CHANGE UP (ref 0.2–1.2)
BUN SERPL-MCNC: 18 MG/DL — SIGNIFICANT CHANGE UP (ref 7–23)
CALCIUM SERPL-MCNC: 9.3 MG/DL — SIGNIFICANT CHANGE UP (ref 8.4–10.5)
CHLORIDE SERPL-SCNC: 103 MMOL/L — SIGNIFICANT CHANGE UP (ref 96–108)
CO2 SERPL-SCNC: 22 MMOL/L — SIGNIFICANT CHANGE UP (ref 22–31)
CREAT SERPL-MCNC: 0.98 MG/DL — SIGNIFICANT CHANGE UP (ref 0.5–1.3)
EGFR: 85 ML/MIN/1.73M2 — SIGNIFICANT CHANGE UP
GLUCOSE SERPL-MCNC: 103 MG/DL — HIGH (ref 70–99)
IGA FLD-MCNC: 23 MG/DL — LOW (ref 84–499)
IGD SER-MCNC: 8 MG/DL — SIGNIFICANT CHANGE UP
IGG FLD-MCNC: 379 MG/DL — LOW (ref 610–1660)
IGM SERPL-MCNC: 25 MG/DL — LOW (ref 35–242)
KAPPA LC SER QL IFE: 0.46 MG/DL — SIGNIFICANT CHANGE UP (ref 0.33–1.94)
KAPPA/LAMBDA FREE LIGHT CHAIN RATIO, SERUM: 0.53 RATIO — SIGNIFICANT CHANGE UP (ref 0.26–1.65)
LAMBDA LC SER QL IFE: 0.86 MG/DL — SIGNIFICANT CHANGE UP (ref 0.57–2.63)
MAGNESIUM SERPL-MCNC: 1.8 MG/DL — SIGNIFICANT CHANGE UP (ref 1.6–2.6)
POTASSIUM SERPL-MCNC: 4.4 MMOL/L — SIGNIFICANT CHANGE UP (ref 3.5–5.3)
POTASSIUM SERPL-SCNC: 4.4 MMOL/L — SIGNIFICANT CHANGE UP (ref 3.5–5.3)
SODIUM SERPL-SCNC: 142 MMOL/L — SIGNIFICANT CHANGE UP (ref 135–145)

## 2022-07-07 ENCOUNTER — RESULT REVIEW (OUTPATIENT)
Age: 67
End: 2022-07-07

## 2022-07-07 ENCOUNTER — APPOINTMENT (OUTPATIENT)
Dept: HEMATOLOGY ONCOLOGY | Facility: CLINIC | Age: 67
End: 2022-07-07

## 2022-07-07 ENCOUNTER — APPOINTMENT (OUTPATIENT)
Dept: INFUSION THERAPY | Facility: HOSPITAL | Age: 67
End: 2022-07-07

## 2022-07-07 LAB
BASOPHILS # BLD AUTO: 0.01 K/UL — SIGNIFICANT CHANGE UP (ref 0–0.2)
BASOPHILS NFR BLD AUTO: 0.3 % — SIGNIFICANT CHANGE UP (ref 0–2)
EOSINOPHIL # BLD AUTO: 0.04 K/UL — SIGNIFICANT CHANGE UP (ref 0–0.5)
EOSINOPHIL NFR BLD AUTO: 1.1 % — SIGNIFICANT CHANGE UP (ref 0–6)
HCT VFR BLD CALC: 36.9 % — LOW (ref 39–50)
HGB BLD-MCNC: 13.1 G/DL — SIGNIFICANT CHANGE UP (ref 13–17)
IMM GRANULOCYTES NFR BLD AUTO: 2.3 % — HIGH (ref 0–1.5)
LYMPHOCYTES # BLD AUTO: 0.67 K/UL — LOW (ref 1–3.3)
LYMPHOCYTES # BLD AUTO: 19 % — SIGNIFICANT CHANGE UP (ref 13–44)
MCHC RBC-ENTMCNC: 32.3 PG — SIGNIFICANT CHANGE UP (ref 27–34)
MCHC RBC-ENTMCNC: 35.5 G/DL — SIGNIFICANT CHANGE UP (ref 32–36)
MCV RBC AUTO: 90.9 FL — SIGNIFICANT CHANGE UP (ref 80–100)
MONOCYTES # BLD AUTO: 0.6 K/UL — SIGNIFICANT CHANGE UP (ref 0–0.9)
MONOCYTES NFR BLD AUTO: 17 % — HIGH (ref 2–14)
NEUTROPHILS # BLD AUTO: 2.13 K/UL — SIGNIFICANT CHANGE UP (ref 1.8–7.4)
NEUTROPHILS NFR BLD AUTO: 60.3 % — SIGNIFICANT CHANGE UP (ref 43–77)
NRBC # BLD: 0 /100 WBCS — SIGNIFICANT CHANGE UP (ref 0–0)
PLATELET # BLD AUTO: 131 K/UL — LOW (ref 150–400)
RBC # BLD: 4.06 M/UL — LOW (ref 4.2–5.8)
RBC # FLD: 14.4 % — SIGNIFICANT CHANGE UP (ref 10.3–14.5)
WBC # BLD: 3.53 K/UL — LOW (ref 3.8–10.5)
WBC # FLD AUTO: 3.53 K/UL — LOW (ref 3.8–10.5)

## 2022-07-07 PROCEDURE — 99214 OFFICE O/P EST MOD 30 MIN: CPT

## 2022-07-07 NOTE — REVIEW OF SYSTEMS
[Fever] : no fever [Chills] : no chills [Vision Problems] : no vision problems [Mucosal Pain] : no mucosal pain [Chest Pain] : no chest pain [Shortness Of Breath] : no shortness of breath [Cough] : no cough [Abdominal Pain] : no abdominal pain [Vomiting] : no vomiting [Diarrhea] : no diarrhea [Skin Rash] : no skin rash [Dizziness] : no dizziness [Fainting] : no fainting [Easy Bleeding] : no tendency for easy bleeding [Easy Bruising] : no tendency for easy bruising [FreeTextEntry4] : no sore throat [FreeTextEntry3] : ( seen by Optho on 10/5/21) [FreeTextEntry5] : slight edema in both ankles [FreeTextEntry7] : no nausea [FreeTextEntry9] : no bone pain [de-identified] : no paresthesias

## 2022-07-07 NOTE — HISTORY OF PRESENT ILLNESS
[de-identified] : 11/2020-Found to have T-12 compression fracture. Saw orthopedist Dr. Candelaria in 1/2021. Referred to endocrinologist Dr. Acosta by Dr. 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 Serum immunofixation showed 1 IgD lambda band and free lambda light chains. Urine immunofixation negative for monoclonal band.\par 4/29/2021–Bone marrow biopsy and bone marrow aspirate consistent with plasma cell myeloma (greater than 90% involvement). Myeloma FISH studies showed CCND1/IGH fusion (27%). Flow cytometry positive for monotypic plasma cells. Lymphocyte immunophenotypic findings showed no diagnostic abnormalities. Cytogenetics with normal male karyotype. Congo red stain negative. Iron stains showed iron stores present. No ring sideroblasts.\par 5/13/21- C1D1 RVd\par  [de-identified] : Since initial HPC transplant consult visit on 6/7/21, he had recent hematology office visit on 8/19/21, Cycle #5 Day#15 Velcade/Decadron. Cycle #5 Revlimid as planned. He describes moderate fatigue, occasional blurry vision, insomnia with Decadron. He denies fever, chills, cough, dyspnea. He received the COVID-19 vaccine(Moderna) on 3/16, 4/13/21; he has booster vaccine on 8/31/21. The patient is very concerned about the delta variant of COVID-19 and asked to discuss isolation/restriction policies. \par \par 10/6/21:\par He completed cycle 6 RVD on 9/30/21. He has moderate fatigue and good appetite. He has occasional blurry vision and was evaluated by Optho with normal evaluation. He had MRI Brain with alton(9/29/21 at [facility])- negative. He denies fever,  chills, cough, shortness of breath. \par \par 12/9/21:\par The patient was admitted to the BMTU on 11/15/21 and received conditioning chemotherapy with Melphalan 100 mg/m2 IV x 2 consecutive days followed by autoPSCT on 11/19/21. \par \par Upon admission, a TLC was placed in IR. Mr. Julio received IV hydration, pain management, anxiolytics, antiemetics, nutritional support, and antibacterial / antiviral / antifungal / GI / PCP and VOD (SOS) prophylaxis. When his ANC dropped below 500 he was started on prophylactic Ciprofloxacin. Labs were monitored on a daily basis, and he received electrolyte repletion and transfusional support as needed. \par \par On 11/19/2021 After pre-medication  received 251 mL of, Autologous mobilized, \par plasma reduced, pooled, thawed, washes HCP apheresis for  over approximately 1 \par hr. Cell counts as follows \par Total MNC( x10^8/kg)=7.22 \par CD34+cells ( x10^6 kg)=4.58 \par Cell Viability (%)= 90 \par Mr. Julio tolerated the infusion well with no adverse resections noted. \par \par While admitted, Mr. Julio experienced pancytopenia related to the high dose chemotherapy conditioning regimen. He was treated with transfusional support. On \par 11/19/21 he experienced a vasovagal episode in the bathroom that was self limiting. On 11/23/21 he had another vasovagal episode with severe abdominal pain and distention. A RRT was called. During the episode he was hypotensive, lactate was 6. A CT A/P showed pneumoperitoneum and terminal ileitis. Surgery was consulted and he was transferred to the SICU. During the episode, he made a troponin. A TTE was completed in the SICU, which showed increased pulmonary pressures. As a result, a CTA of the chest was completed which showed a saddle pulmonary embolism. He was started on full dose heparin. Lower extremity dopplers showed bilateral acute DVTs. On 11/24/21 he underwent a mechanical thrombectomy. IVC filter placement was deferred. \par \par The terminal ileitis and pneumoperitoneum was treated conservatively with supportive care and antibiotics. He completed a course of Meropenem 12/7/21. A \par repeat CT A/P showed unchanged ileitis, with mild increased fluid distention and resolution of free air. The heparin was transitioned to full dose Lovenox, \par and eventually Eliquis. Shortly thereafter he was transferred back to . \par \par Currently, he is stable for discharge home with outpatient follow up at the [facility] [place] and with vascular cardiology. \par \par Since discharge to home on 12/7/21 he has moderate fatigue and slowly improving appetite. He describes loose stool(small volume) twice daily with abdominal bloating and gassiness since discharge but denies nausea, vomiting. \par \par 12/16/21:\par He continues to have moderate fatigue and continued slow improvement in appetite. He started taking Gas-X twice daily after 12/9/21 office visit and stopped on 12/13 as his abd bloating with gassiness significantly improved. He states loose stool has improved, now only once daily since 12/17, but no nausea/vomiting. He still has bilateral lower extremity edema. He scheduled followup with vascular cardiology on 1/13/22. He denies fever, cough, shortness of breath. \par \par 12/22/21:\par He has mild-moderate fatigue and good appetite. He denies abdominal pain/distension and hasn't needed Gas-X for one week. He denies nausea/vomiting, diarrhea. No fever, chills. \par \par 01/05/22:\par He has mild fatigue and good appetite. He denies fever, chills, cough, dyspnea, nausea/vomiting, abdominal pain, diarrhea. He walks on treadmill daily x 20 minutes. \par \par 01/19/22:\par Patient called the office on 1/11/22 to discuss new onset of RLQ pain last night when sleeping and it awakened him when he was changing his position. He went back to sleep. He woke up around 6:30 am and noted sharp pain with movement, therefore, he has been trying not to move around too much. He denies nausea, vomiting, diarrhea. He has eaten breakfast and lunch without difficulty and no associated symptoms. He denies fever, chills, cough, shortness of breath. No acute swelling in lower extremities, no chest pain. \par He states pain is 3-4/10, non-radiating, and only occurs with movement. \par Plan- \par CT Abd/pelvis with oral and IV contrast. \par NPO after 9 am in case CT approved and scan can be done tomorrow afternoon. \par If he develops worsening pain, fever, chills, vomiting or other severe new symptoms he will go to [facility] immediately. \par I contacted patient on 1/12/22 to discuss results of CT Abd/pelvis(1/12/22)- acute appendicitis. Given persistent RLQ pain today, I instructed patient to go to [facility] ER for surgery evaluation. He agrees. Patient admitted to surgical service and treated conservatively with Meropenem IV. His abdominal pain resolved and he was discharged to home on 1/14/22 to finish course of antibiotics with Cipro/Flagyl.\par \par Since discharge on 1/14/22, he feels well overall with only mild fatigue, and appetite is good. He denies abdominal pain, nausea, vomiting, diarrhea, fever, chills. Weight this morning- 188.5 lbs.\par  \par 02/02/22:\par He describes good energy and good appetite. He denies fever, cough, shortness of breath, nausea, vomiting, diarrhea. Weight this morning- 189 lbs.\par \par 2/16/22:\par He describes good energy and good appetite. He denies fever, cough, shortness of breath, nausea, vomiting, diarrhea. He had Vascular Cardio followup with Echo and LE duplex study ordered. \par \par 03/03/22:\par He describes good energy and good appetite. He denies fever, cough, shortness of breath, nausea, vomiting, diarrhea. \par \par 03/30/22:\par Patient called the answering service on 3/19 and spoke to on call physician. He described sore throat/cough for the past three days starting 3/17. He states that the throat is getting better but just now he was febrile to 100.8. I advised going to the ER but he wants to take a Tylenol and wait it out for tonight since he is getting better in general. He agrees that if fever persists overnight, he will go to the ER. \par \par on 3/22, patient calls to give update stating that his sore throat resolved on 3/19 but he developed T- 100.8. He took Tylenol once and he states fever resolved and did not recur. He still has dry cough which is improving, and he is taking Robitussin. He states that day 1 of Revlimid maintenance was on 3/15/22. \par Plan-\par Given recent URTI symptoms with persistent cough, I instructed patient to HOLD Revlimid through 3/27/22, and resume on Monday 3/28/22. Drink plenty of water and rest. \par He will have lab work on 3/28 at local [facility] prior to Telehealth visit on 3/30/22. \par If he develops new symptoms he will call the office immediately. \par \par Today, patient states he has mild fatigue and good appetite. He has slight dry cough but he denies fever, chills, sore throat, shortness of breath, nausea, vomiting, diarrhea. 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 \par \par 04/07/22:\par The patient describes mild fatigue and good appetite. He has occasional cough but denies fever, chills, shortness of breath, bone pain. \par \par 05/19/22:\par Patient called the office(4/19) to inform me that he developed blurry vision and light sensitivity since 4/15, he has Optho appointment today. Also, last week he noted onset of few second "zaps" like from an electronic stimulation machine that a physical therapist uses. He feels this sensation at these sites: both ankles, lateral left knee, occasionally on left hip. He estimates total number of events as 5-10 daily. He did have similar symptoms in his right ribs last year which eventually resolved. He does not have paresthesias in his hands and feet. \par He is scheduled for bone marrow biopsy at [facility] on 5/3/22. He was seen by Cardio for evaluation of cardiac amyloid and had Echo on 4/18, and MRI Cardiac on 4/26. Bone marrow rescheduled for late May. \par He did see Optho and took his contacts out for 1 week and used prescription eye drops, his vision is now normal. The sensation of "zaps" is decreasing. \par He describes mild fatigue and good appetite. He has occasional cough but denies fever, chills, shortness of breath. He did develop low back pain after golfing, at area of prior T-12 compression fracture. He has Ortho followup on 6/8/22. \par \par 07/0722:\par Patient describes mild fatigue, also difficulty sleeping post Decadron. He denies fever, chills, cough, dyspnea. \par \par

## 2022-07-07 NOTE — CONSULT LETTER
[FreeTextEntry2] : Dyana Cheatham M.D.\par [facility]\par [address]\[place]   [id number] [FreeTextEntry3] : \par Pao Sorenson M.D.\par \par  of Medicine\par [employer]\par [facility] \par [facility]\par [address] [place] [address] \[place] [address] \par ph: [phone]\par fax: [phone]

## 2022-07-07 NOTE — ASSESSMENT
[FreeTextEntry1] : IgD lambda myeloma s/p cycle 6 RVD on 9/30/21\par Mobilization of stem cells with Zarxio 960mcg subcutaneous daily 10/28/21-11/1/21\par He was admitted to the BMTU on 11/15/21 and received conditioning chemotherapy with Melphalan 100 mg/m2 IV x 2 consecutive days followed by autoPSCT on 11/19/21. \par Hospital course complicated by saddle pulmonary embolism and bilateral LE acute DVTs. On 11/24/21 he underwent a mechanical thrombectomy. IVC filter placement was deferred. He received anticoagulation, now on \par Eliquis. He also had terminal ileitis with ? microperforation, monitored in SICU, treated conservatively with supportive care and antibiotics. He completed a course of Meropenem 12/7/21.\par \par 1) Multiple myeloma- s/p autoPSCT on 11/19/21\par Plan- \par Continue current medications and restrictions as discussed prior to discharge from BMTU\par Continue Acyclovir for ID prophylaxis; Off Diflucan and Mepron\par Continue MVI, Folate daily- switch to OTC MVI after supply complete\par Continue Pantoprazole daily for GI prophylaxis\par Zofran as needed for nausea/vomiting\par Drink plenty of water daily\par Moisturizing cream to the skin several times per day\par Labs reviewed from 3/28/22- IMEL results pending\par Discussed maintenance therapy post autoPSCT- Revlimid +/- Velcade/Dex, with potential side effects discussed during 3/3/22 office visit. \par Patient initiated Revlimid 10 mg po daily on 3/15/22, then held 3/22- 3/27 for URTI symptoms. Resumed on 3/28/22 and completed 28 day supply(1 cycle)\par Patient had second opinion consult Dr. Andrew Stack at [facility] on 3/31/22. Dr. Stack contacted me on 4/1/22 to discuss his recommendations to further management of patient's myeloma.\par Given measurable Urine M-John(24 hr urine), myeloma with t(11;14) fusion detected, Dr. Stack discussed combination Venetoclax daily/Carfilzomib(C1D1 20 mg/m2 with increase to 56 mg/m2 on C1D8 given on days 1, 8, 15 of 28 day cycle) consolidation x 4 cycles. The patient agrees to proceed with this treatment regimen\par He will undergo repeat bone marrow evaluation with FISH myeloma panel and NGS/RNA seq at [facility]. Plan is for late May. \par He will be referred for Cardiology consult to evaluate for cardiac amyloidosis- Echo with strain imaging and Cardiac MRI- done.\par Whole body PET/CT scan to assess for lytic bone disease; prior MRI T-spine(5/30/21)- mild loss of vertebral body height of T-12. Test done on 5/2/22- no new bone lesions, no abnormal FDG activity\par Bone density to assess for osteoporosis-(4/13/22)- impression is normal study.  Plan to begin Xgeva with consolidation therapy. \par Call the office immediately if fever, chills, symptoms of infection\par 07/07/22:\par Carfilzomib 56 mg/m2 IV days 1, 8, 15 of 28 day cycle) consolidation x 4 cycles. Today is C2 day 8. \par Venetoclax 400 mg po daily starting on 6/2/22, he is tolerating well; now increased to 800 mg po daily on 7/2/22. \par Continue Acyclovir prophylaxis\par Continue PPI\par Drink plenty of water daily \par I reviewed lab results from 6/30/22 with patient and his wife, IgD decreased to 8 mg/dL. \par \par 2) H/O saddle PE s/p thrombectomy; h/o bilat LE DVT\par Plan- Continue Eliquis 5 mg po 2X/day\par Had followup with Dr. Holley on Feb 10, 2022\par \par RTC in 4 - 6 weeks. \par \par

## 2022-07-07 NOTE — REASON FOR VISIT
[FreeTextEntry2] : multiple myeloma s/p high-dose chemotherapy followed by autologous peripheral blood stem cell transplant on 11/19/21

## 2022-07-07 NOTE — PHYSICAL EXAM
[de-identified] : Vital signs have been completed and reviewed in TR- T- 36.4, P- 55, RR- 18, BP- 121/72; O2sat- 97%(seen in treatment room) [de-identified] : anicteric [de-identified] : RRR, normal S1S2 [de-identified] : trace edema bilateral ankles [de-identified] : no cervical/SCV adenopathy [de-identified] : no rash [de-identified] : A & O x 4

## 2022-07-07 NOTE — RESULTS/DATA
[FreeTextEntry1] : WBC- 3.53, ANC- 2.13; Hgb- 13.1, Hct- 36.9; Platelets- 131K.\par ----------------------------------------------------------------------------\par ACC: [id number] EXAM: CT ABDOMEN AND PELVIS OC IC \par PROCEDURE DATE: 01/12/2022 \par INTERPRETATION: CLINICAL INFORMATION: Right lower quadrant pain for \par Multiple myeloma. Previous terminal ileitis.\par \par COMPARISON: CT scan of the abdomen and pelvis from 11/29/2021\par \par CONTRAST/COMPLICATIONS:\par IV Contrast: Omnipaque 350 90 cc administered 10 cc discarded\par Oral Contrast: Omnipaque 300\par Complications: None reported at time of study completion\par \par PROCEDURE:\par CT of the Abdomen and Pelvis was performed.\par Sagittal and coronal reformats were performed.\par \par FINDINGS:\par LOWER CHEST: Within normal limits.\par \par LIVER: Within normal limits.\par BILE DUCTS: Normal caliber.\par GALLBLADDER: Small gallstones without gross inflammation.\par SPLEEN: Within normal limits.\par PANCREAS: Within normal limits.\par ADRENALS: Within normal limits.\par KIDNEYS/URETERS: Left renal cyst measuring up to 7.0 cm in the left lower \par pole. 1.3 cm low-attenuation lesion in the lower pole the left kidney \par laterally which is measuring greater than simple fluid in density.\par \par BLADDER: Bladder is underdistended which limits evaluation.\par REPRODUCTIVE ORGANS: Prominent prostate gland\par \par BOWEL: No bowel obstruction. Appendix is mildly distended measuring up to \par 1.0 cm which measured approximately 9 mm previously. There is question of \par minimally increased enhancement. There is mild surrounding soft tissue \par stranding although fluid was present in this region on the prior study. \par This may represent chronic changes although in a patient with right lower \par quadrant pain, acute appendicitis must also be considered.. Resolution of \par previously visualized distended small bowel. Previously mentioned \par terminal ileitis is not well appreciated on the current study. Colonic \par diverticuli predominantly in the sigmoid without gross inflammation.\par PERITONEUM: No ascites.\par VESSELS: Within normal limits.\par RETROPERITONEUM/LYMPH NODES: No lymphadenopathy.\par ABDOMINAL WALL: Small umbilical hernia containing fat.\par BONES: Status post ORIF of the left hemipelvis with stable postsurgical \par changes. Stable lucencies in bone, most pronounced and T12 and L5. \par Degenerative changes of bone. Spondylolisthesis with L5 anterior to L1 of \par approximately 25-50%.\par \par IMPRESSION:\par \par Mildly distended appendix. Mild surrounding soft tissue stranding where \par fluid was seen on the prior study. This may represent chronic residual \par changes although acute appendicitis must also be considered, especially \par in a patient with right lower quadrant pain. This was discussed with Dr. libby Sorenson at 3:00 PM on 1/12/2022.\par \par Resolution of small bowel obstruction. Stable bone findings.\par \par --- End of Report ---\par \par JEFF PHAM MD; Attending Radiologist\par

## 2022-07-08 LAB
% ALBUMIN: 66.6 % — SIGNIFICANT CHANGE UP
% ALPHA 1: 4.6 % — SIGNIFICANT CHANGE UP
% ALPHA 2: 11.3 % — SIGNIFICANT CHANGE UP
% BETA: 11.8 % — SIGNIFICANT CHANGE UP
% GAMMA: 5.7 % — SIGNIFICANT CHANGE UP
% M SPIKE: SIGNIFICANT CHANGE UP
ALBUMIN SERPL ELPH-MCNC: 4 G/DL — SIGNIFICANT CHANGE UP (ref 3.6–5.5)
ALBUMIN/GLOB SERPL ELPH: 2 RATIO — SIGNIFICANT CHANGE UP
ALPHA1 GLOB SERPL ELPH-MCNC: 0.3 G/DL — SIGNIFICANT CHANGE UP (ref 0.1–0.4)
ALPHA2 GLOB SERPL ELPH-MCNC: 0.7 G/DL — SIGNIFICANT CHANGE UP (ref 0.5–1)
B-GLOBULIN SERPL ELPH-MCNC: 0.7 G/DL — SIGNIFICANT CHANGE UP (ref 0.5–1)
GAMMA GLOBULIN: 0.3 G/DL — LOW (ref 0.6–1.6)
INTERPRETATION SERPL IFE-IMP: SIGNIFICANT CHANGE UP
M-SPIKE: SIGNIFICANT CHANGE UP (ref 0–0)
PROT PATTERN SERPL ELPH-IMP: SIGNIFICANT CHANGE UP

## 2022-07-13 ENCOUNTER — APPOINTMENT (OUTPATIENT)
Dept: ORTHOPEDIC SURGERY | Facility: CLINIC | Age: 67
End: 2022-07-13

## 2022-07-13 ENCOUNTER — APPOINTMENT (OUTPATIENT)
Dept: MRI IMAGING | Facility: HOSPITAL | Age: 67
End: 2022-07-13

## 2022-07-13 ENCOUNTER — OUTPATIENT (OUTPATIENT)
Dept: OUTPATIENT SERVICES | Facility: HOSPITAL | Age: 67
LOS: 1 days | End: 2022-07-13
Payer: MEDICARE

## 2022-07-13 VITALS — HEART RATE: 61 BPM | SYSTOLIC BLOOD PRESSURE: 113 MMHG | DIASTOLIC BLOOD PRESSURE: 71 MMHG

## 2022-07-13 DIAGNOSIS — Z98.890 OTHER SPECIFIED POSTPROCEDURAL STATES: Chronic | ICD-10-CM

## 2022-07-13 DIAGNOSIS — S22.080S WEDGE COMPRESSION FRACTURE OF T11-T12 VERTEBRA, SEQUELA: ICD-10-CM

## 2022-07-13 PROCEDURE — 99214 OFFICE O/P EST MOD 30 MIN: CPT

## 2022-07-13 PROCEDURE — 72146 MRI CHEST SPINE W/O DYE: CPT

## 2022-07-13 PROCEDURE — 72146 MRI CHEST SPINE W/O DYE: CPT | Mod: 26,MH

## 2022-07-14 ENCOUNTER — RESULT REVIEW (OUTPATIENT)
Age: 67
End: 2022-07-14

## 2022-07-14 ENCOUNTER — APPOINTMENT (OUTPATIENT)
Dept: INFUSION THERAPY | Facility: HOSPITAL | Age: 67
End: 2022-07-14

## 2022-07-14 LAB
ALBUMIN SERPL ELPH-MCNC: 4.2 G/DL — SIGNIFICANT CHANGE UP (ref 3.3–5)
ALP SERPL-CCNC: 73 U/L — SIGNIFICANT CHANGE UP (ref 40–120)
ALT FLD-CCNC: 21 U/L — SIGNIFICANT CHANGE UP (ref 10–45)
ANION GAP SERPL CALC-SCNC: 11 MMOL/L — SIGNIFICANT CHANGE UP (ref 5–17)
AST SERPL-CCNC: 16 U/L — SIGNIFICANT CHANGE UP (ref 10–40)
BASOPHILS # BLD AUTO: 0 K/UL — SIGNIFICANT CHANGE UP (ref 0–0.2)
BASOPHILS NFR BLD AUTO: 0 % — SIGNIFICANT CHANGE UP (ref 0–2)
BILIRUB SERPL-MCNC: 0.6 MG/DL — SIGNIFICANT CHANGE UP (ref 0.2–1.2)
BUN SERPL-MCNC: 20 MG/DL — SIGNIFICANT CHANGE UP (ref 7–23)
CALCIUM SERPL-MCNC: 9.4 MG/DL — SIGNIFICANT CHANGE UP (ref 8.4–10.5)
CHLORIDE SERPL-SCNC: 105 MMOL/L — SIGNIFICANT CHANGE UP (ref 96–108)
CO2 SERPL-SCNC: 24 MMOL/L — SIGNIFICANT CHANGE UP (ref 22–31)
CREAT SERPL-MCNC: 1.06 MG/DL — SIGNIFICANT CHANGE UP (ref 0.5–1.3)
EGFR: 77 ML/MIN/1.73M2 — SIGNIFICANT CHANGE UP
EOSINOPHIL # BLD AUTO: 0 K/UL — SIGNIFICANT CHANGE UP (ref 0–0.5)
EOSINOPHIL NFR BLD AUTO: 0 % — SIGNIFICANT CHANGE UP (ref 0–6)
GLUCOSE SERPL-MCNC: 102 MG/DL — HIGH (ref 70–99)
HCT VFR BLD CALC: 35.7 % — LOW (ref 39–50)
HGB BLD-MCNC: 12.7 G/DL — LOW (ref 13–17)
LYMPHOCYTES # BLD AUTO: 0.51 K/UL — LOW (ref 1–3.3)
LYMPHOCYTES # BLD AUTO: 14 % — SIGNIFICANT CHANGE UP (ref 13–44)
MCHC RBC-ENTMCNC: 32.4 PG — SIGNIFICANT CHANGE UP (ref 27–34)
MCHC RBC-ENTMCNC: 35.6 G/DL — SIGNIFICANT CHANGE UP (ref 32–36)
MCV RBC AUTO: 91.1 FL — SIGNIFICANT CHANGE UP (ref 80–100)
MONOCYTES # BLD AUTO: 0.58 K/UL — SIGNIFICANT CHANGE UP (ref 0–0.9)
MONOCYTES NFR BLD AUTO: 16 % — HIGH (ref 2–14)
NEUTROPHILS # BLD AUTO: 2.54 K/UL — SIGNIFICANT CHANGE UP (ref 1.8–7.4)
NEUTROPHILS NFR BLD AUTO: 70 % — SIGNIFICANT CHANGE UP (ref 43–77)
NRBC # BLD: 0 /100 — SIGNIFICANT CHANGE UP (ref 0–0)
NRBC # BLD: SIGNIFICANT CHANGE UP /100 WBCS (ref 0–0)
PLAT MORPH BLD: NORMAL — SIGNIFICANT CHANGE UP
PLATELET # BLD AUTO: 112 K/UL — LOW (ref 150–400)
POTASSIUM SERPL-MCNC: 4.3 MMOL/L — SIGNIFICANT CHANGE UP (ref 3.5–5.3)
POTASSIUM SERPL-SCNC: 4.3 MMOL/L — SIGNIFICANT CHANGE UP (ref 3.5–5.3)
PROT SERPL-MCNC: 6 G/DL — SIGNIFICANT CHANGE UP (ref 6–8.3)
RBC # BLD: 3.92 M/UL — LOW (ref 4.2–5.8)
RBC # FLD: 14.5 % — SIGNIFICANT CHANGE UP (ref 10.3–14.5)
RBC BLD AUTO: NORMAL — SIGNIFICANT CHANGE UP
SODIUM SERPL-SCNC: 141 MMOL/L — SIGNIFICANT CHANGE UP (ref 135–145)
WBC # BLD: 3.63 K/UL — LOW (ref 3.8–10.5)
WBC # FLD AUTO: 3.63 K/UL — LOW (ref 3.8–10.5)

## 2022-07-18 ENCOUNTER — OUTPATIENT (OUTPATIENT)
Dept: OUTPATIENT SERVICES | Facility: HOSPITAL | Age: 67
LOS: 1 days | Discharge: HOME | End: 2022-07-18

## 2022-07-18 DIAGNOSIS — R79.89 OTHER SPECIFIED ABNORMAL FINDINGS OF BLOOD CHEMISTRY: ICD-10-CM

## 2022-07-18 DIAGNOSIS — Z98.890 OTHER SPECIFIED POSTPROCEDURAL STATES: Chronic | ICD-10-CM

## 2022-07-19 ENCOUNTER — APPOINTMENT (OUTPATIENT)
Dept: UROLOGY | Facility: CLINIC | Age: 67
End: 2022-07-19

## 2022-07-28 ENCOUNTER — RESULT REVIEW (OUTPATIENT)
Age: 67
End: 2022-07-28

## 2022-07-28 ENCOUNTER — APPOINTMENT (OUTPATIENT)
Dept: INFUSION THERAPY | Facility: HOSPITAL | Age: 67
End: 2022-07-28

## 2022-07-28 LAB
BASOPHILS # BLD AUTO: 0.02 K/UL — SIGNIFICANT CHANGE UP (ref 0–0.2)
BASOPHILS NFR BLD AUTO: 0.6 % — SIGNIFICANT CHANGE UP (ref 0–2)
EOSINOPHIL # BLD AUTO: 0 K/UL — SIGNIFICANT CHANGE UP (ref 0–0.5)
EOSINOPHIL NFR BLD AUTO: 0 % — SIGNIFICANT CHANGE UP (ref 0–6)
HCT VFR BLD CALC: 33.8 % — LOW (ref 39–50)
HGB BLD-MCNC: 12.1 G/DL — LOW (ref 13–17)
IMM GRANULOCYTES NFR BLD AUTO: 0.3 % — SIGNIFICANT CHANGE UP (ref 0–1.5)
LYMPHOCYTES # BLD AUTO: 0.55 K/UL — LOW (ref 1–3.3)
LYMPHOCYTES # BLD AUTO: 17.3 % — SIGNIFICANT CHANGE UP (ref 13–44)
MCHC RBC-ENTMCNC: 33.2 PG — SIGNIFICANT CHANGE UP (ref 27–34)
MCHC RBC-ENTMCNC: 35.8 G/DL — SIGNIFICANT CHANGE UP (ref 32–36)
MCV RBC AUTO: 92.6 FL — SIGNIFICANT CHANGE UP (ref 80–100)
MONOCYTES # BLD AUTO: 0.66 K/UL — SIGNIFICANT CHANGE UP (ref 0–0.9)
MONOCYTES NFR BLD AUTO: 20.8 % — HIGH (ref 2–14)
NEUTROPHILS # BLD AUTO: 1.94 K/UL — SIGNIFICANT CHANGE UP (ref 1.8–7.4)
NEUTROPHILS NFR BLD AUTO: 61 % — SIGNIFICANT CHANGE UP (ref 43–77)
NRBC # BLD: 0 /100 WBCS — SIGNIFICANT CHANGE UP (ref 0–0)
PLATELET # BLD AUTO: 141 K/UL — LOW (ref 150–400)
RBC # BLD: 3.65 M/UL — LOW (ref 4.2–5.8)
RBC # FLD: 13.8 % — SIGNIFICANT CHANGE UP (ref 10.3–14.5)
WBC # BLD: 3.18 K/UL — LOW (ref 3.8–10.5)
WBC # FLD AUTO: 3.18 K/UL — LOW (ref 3.8–10.5)

## 2022-07-29 DIAGNOSIS — Z51.11 ENCOUNTER FOR ANTINEOPLASTIC CHEMOTHERAPY: ICD-10-CM

## 2022-07-29 DIAGNOSIS — C90.00 MULTIPLE MYELOMA NOT HAVING ACHIEVED REMISSION: ICD-10-CM

## 2022-07-29 DIAGNOSIS — R11.2 NAUSEA WITH VOMITING, UNSPECIFIED: ICD-10-CM

## 2022-07-29 DIAGNOSIS — C79.51 SECONDARY MALIGNANT NEOPLASM OF BONE: ICD-10-CM

## 2022-07-29 LAB
ALBUMIN SERPL ELPH-MCNC: 3.9 G/DL — SIGNIFICANT CHANGE UP (ref 3.3–5)
ALP SERPL-CCNC: 83 U/L — SIGNIFICANT CHANGE UP (ref 40–120)
ALT FLD-CCNC: 30 U/L — SIGNIFICANT CHANGE UP (ref 10–45)
ANION GAP SERPL CALC-SCNC: 12 MMOL/L — SIGNIFICANT CHANGE UP (ref 5–17)
AST SERPL-CCNC: 21 U/L — SIGNIFICANT CHANGE UP (ref 10–40)
BILIRUB SERPL-MCNC: 0.5 MG/DL — SIGNIFICANT CHANGE UP (ref 0.2–1.2)
BUN SERPL-MCNC: 19 MG/DL — SIGNIFICANT CHANGE UP (ref 7–23)
CALCIUM SERPL-MCNC: 9.2 MG/DL — SIGNIFICANT CHANGE UP (ref 8.4–10.5)
CHLORIDE SERPL-SCNC: 111 MMOL/L — HIGH (ref 96–108)
CO2 SERPL-SCNC: 23 MMOL/L — SIGNIFICANT CHANGE UP (ref 22–31)
CREAT SERPL-MCNC: 1.01 MG/DL — SIGNIFICANT CHANGE UP (ref 0.5–1.3)
EGFR: 82 ML/MIN/1.73M2 — SIGNIFICANT CHANGE UP
GLUCOSE SERPL-MCNC: 94 MG/DL — SIGNIFICANT CHANGE UP (ref 70–99)
POTASSIUM SERPL-MCNC: 4.4 MMOL/L — SIGNIFICANT CHANGE UP (ref 3.5–5.3)
POTASSIUM SERPL-SCNC: 4.4 MMOL/L — SIGNIFICANT CHANGE UP (ref 3.5–5.3)
PROT SERPL-MCNC: 5.2 G/DL — LOW (ref 6–8.3)
SODIUM SERPL-SCNC: 147 MMOL/L — HIGH (ref 135–145)

## 2022-08-01 ENCOUNTER — APPOINTMENT (OUTPATIENT)
Dept: ORTHOPEDIC SURGERY | Facility: CLINIC | Age: 67
End: 2022-08-01

## 2022-08-01 VITALS
HEIGHT: 73 IN | WEIGHT: 205 LBS | DIASTOLIC BLOOD PRESSURE: 77 MMHG | HEART RATE: 53 BPM | BODY MASS INDEX: 27.17 KG/M2 | SYSTOLIC BLOOD PRESSURE: 130 MMHG

## 2022-08-01 PROCEDURE — 99214 OFFICE O/P EST MOD 30 MIN: CPT

## 2022-08-04 ENCOUNTER — RESULT REVIEW (OUTPATIENT)
Age: 67
End: 2022-08-04

## 2022-08-04 ENCOUNTER — APPOINTMENT (OUTPATIENT)
Dept: INFUSION THERAPY | Facility: HOSPITAL | Age: 67
End: 2022-08-04

## 2022-08-04 LAB
ALBUMIN SERPL ELPH-MCNC: 4.5 G/DL — SIGNIFICANT CHANGE UP (ref 3.3–5)
ALP SERPL-CCNC: 83 U/L — SIGNIFICANT CHANGE UP (ref 40–120)
ALT FLD-CCNC: 28 U/L — SIGNIFICANT CHANGE UP (ref 10–45)
ANION GAP SERPL CALC-SCNC: 11 MMOL/L — SIGNIFICANT CHANGE UP (ref 5–17)
AST SERPL-CCNC: 21 U/L — SIGNIFICANT CHANGE UP (ref 10–40)
BASOPHILS # BLD AUTO: 0.01 K/UL — SIGNIFICANT CHANGE UP (ref 0–0.2)
BASOPHILS NFR BLD AUTO: 0.3 % — SIGNIFICANT CHANGE UP (ref 0–2)
BILIRUB SERPL-MCNC: 0.7 MG/DL — SIGNIFICANT CHANGE UP (ref 0.2–1.2)
BUN SERPL-MCNC: 20 MG/DL — SIGNIFICANT CHANGE UP (ref 7–23)
CALCIUM SERPL-MCNC: 9.4 MG/DL — SIGNIFICANT CHANGE UP (ref 8.4–10.5)
CHLORIDE SERPL-SCNC: 104 MMOL/L — SIGNIFICANT CHANGE UP (ref 96–108)
CO2 SERPL-SCNC: 26 MMOL/L — SIGNIFICANT CHANGE UP (ref 22–31)
CREAT SERPL-MCNC: 0.96 MG/DL — SIGNIFICANT CHANGE UP (ref 0.5–1.3)
EGFR: 87 ML/MIN/1.73M2 — SIGNIFICANT CHANGE UP
EOSINOPHIL # BLD AUTO: 0 K/UL — SIGNIFICANT CHANGE UP (ref 0–0.5)
EOSINOPHIL NFR BLD AUTO: 0 % — SIGNIFICANT CHANGE UP (ref 0–6)
GLUCOSE SERPL-MCNC: 81 MG/DL — SIGNIFICANT CHANGE UP (ref 70–99)
HCT VFR BLD CALC: 33.7 % — LOW (ref 39–50)
HGB BLD-MCNC: 12.1 G/DL — LOW (ref 13–17)
IMM GRANULOCYTES NFR BLD AUTO: 1.3 % — SIGNIFICANT CHANGE UP (ref 0–1.5)
LYMPHOCYTES # BLD AUTO: 0.48 K/UL — LOW (ref 1–3.3)
LYMPHOCYTES # BLD AUTO: 15.4 % — SIGNIFICANT CHANGE UP (ref 13–44)
MCHC RBC-ENTMCNC: 33.3 PG — SIGNIFICANT CHANGE UP (ref 27–34)
MCHC RBC-ENTMCNC: 35.9 G/DL — SIGNIFICANT CHANGE UP (ref 32–36)
MCV RBC AUTO: 92.8 FL — SIGNIFICANT CHANGE UP (ref 80–100)
MONOCYTES # BLD AUTO: 0.8 K/UL — SIGNIFICANT CHANGE UP (ref 0–0.9)
MONOCYTES NFR BLD AUTO: 25.7 % — HIGH (ref 2–14)
NEUTROPHILS # BLD AUTO: 1.78 K/UL — LOW (ref 1.8–7.4)
NEUTROPHILS NFR BLD AUTO: 57.3 % — SIGNIFICANT CHANGE UP (ref 43–77)
NRBC # BLD: 0 /100 WBCS — SIGNIFICANT CHANGE UP (ref 0–0)
PLATELET # BLD AUTO: 117 K/UL — LOW (ref 150–400)
POTASSIUM SERPL-MCNC: 4.3 MMOL/L — SIGNIFICANT CHANGE UP (ref 3.5–5.3)
POTASSIUM SERPL-SCNC: 4.3 MMOL/L — SIGNIFICANT CHANGE UP (ref 3.5–5.3)
PROT SERPL-MCNC: 5.9 G/DL — LOW (ref 6–8.3)
RBC # BLD: 3.63 M/UL — LOW (ref 4.2–5.8)
RBC # FLD: 14.1 % — SIGNIFICANT CHANGE UP (ref 10.3–14.5)
SODIUM SERPL-SCNC: 141 MMOL/L — SIGNIFICANT CHANGE UP (ref 135–145)
WBC # BLD: 3.11 K/UL — LOW (ref 3.8–10.5)
WBC # FLD AUTO: 3.11 K/UL — LOW (ref 3.8–10.5)

## 2022-08-05 DIAGNOSIS — E86.0 DEHYDRATION: ICD-10-CM

## 2022-08-11 ENCOUNTER — APPOINTMENT (OUTPATIENT)
Dept: HEMATOLOGY ONCOLOGY | Facility: CLINIC | Age: 67
End: 2022-08-11

## 2022-08-11 ENCOUNTER — RESULT REVIEW (OUTPATIENT)
Age: 67
End: 2022-08-11

## 2022-08-11 ENCOUNTER — APPOINTMENT (OUTPATIENT)
Dept: INFUSION THERAPY | Facility: HOSPITAL | Age: 67
End: 2022-08-11

## 2022-08-11 VITALS
HEART RATE: 62 BPM | SYSTOLIC BLOOD PRESSURE: 114 MMHG | BODY MASS INDEX: 27.05 KG/M2 | WEIGHT: 204.99 LBS | OXYGEN SATURATION: 97 % | DIASTOLIC BLOOD PRESSURE: 67 MMHG | TEMPERATURE: 96.8 F | RESPIRATION RATE: 16 BRPM

## 2022-08-11 LAB
BASOPHILS # BLD AUTO: 0.01 K/UL — SIGNIFICANT CHANGE UP (ref 0–0.2)
BASOPHILS NFR BLD AUTO: 0.2 % — SIGNIFICANT CHANGE UP (ref 0–2)
EOSINOPHIL # BLD AUTO: 0 K/UL — SIGNIFICANT CHANGE UP (ref 0–0.5)
EOSINOPHIL NFR BLD AUTO: 0 % — SIGNIFICANT CHANGE UP (ref 0–6)
HCT VFR BLD CALC: 33.9 % — LOW (ref 39–50)
HGB BLD-MCNC: 12 G/DL — LOW (ref 13–17)
IMM GRANULOCYTES NFR BLD AUTO: 0.6 % — SIGNIFICANT CHANGE UP (ref 0–1.5)
LYMPHOCYTES # BLD AUTO: 0.3 K/UL — LOW (ref 1–3.3)
LYMPHOCYTES # BLD AUTO: 4.5 % — LOW (ref 13–44)
MCHC RBC-ENTMCNC: 33.6 PG — SIGNIFICANT CHANGE UP (ref 27–34)
MCHC RBC-ENTMCNC: 35.4 G/DL — SIGNIFICANT CHANGE UP (ref 32–36)
MCV RBC AUTO: 95 FL — SIGNIFICANT CHANGE UP (ref 80–100)
MONOCYTES # BLD AUTO: 1.01 K/UL — HIGH (ref 0–0.9)
MONOCYTES NFR BLD AUTO: 15.3 % — HIGH (ref 2–14)
NEUTROPHILS # BLD AUTO: 5.25 K/UL — SIGNIFICANT CHANGE UP (ref 1.8–7.4)
NEUTROPHILS NFR BLD AUTO: 79.4 % — HIGH (ref 43–77)
NRBC # BLD: 0 /100 WBCS — SIGNIFICANT CHANGE UP (ref 0–0)
PLATELET # BLD AUTO: 103 K/UL — LOW (ref 150–400)
RBC # BLD: 3.57 M/UL — LOW (ref 4.2–5.8)
RBC # FLD: 14.1 % — SIGNIFICANT CHANGE UP (ref 10.3–14.5)
WBC # BLD: 6.61 K/UL — SIGNIFICANT CHANGE UP (ref 3.8–10.5)
WBC # FLD AUTO: 6.61 K/UL — SIGNIFICANT CHANGE UP (ref 3.8–10.5)

## 2022-08-11 PROCEDURE — 99214 OFFICE O/P EST MOD 30 MIN: CPT

## 2022-08-11 RX ORDER — NITROFURANTOIN (MONOHYDRATE/MACROCRYSTALS) 25; 75 MG/1; MG/1
100 CAPSULE ORAL
Qty: 14 | Refills: 0 | Status: DISCONTINUED | COMMUNITY
Start: 2022-06-09 | End: 2022-08-11

## 2022-08-11 RX ORDER — TIZANIDINE 2 MG/1
2 TABLET ORAL EVERY 6 HOURS
Qty: 60 | Refills: 1 | Status: DISCONTINUED | COMMUNITY
Start: 2022-06-08 | End: 2022-08-11

## 2022-08-11 NOTE — CONSULT LETTER
[Dear  ___] : Dear  [unfilled], [Courtesy Letter:] : I had the pleasure of seeing your patient, [unfilled], in my office today. [Please see my note below.] : Please see my note below. [Consult Closing:] : Thank you very much for allowing me to participate in the care of this patient.  If you have any questions, please do not hesitate to contact me. [Sincerely,] : Sincerely, [DrVladislav  ___] : Dr. BELTRAN [FreeTextEntry2] : Dyana Cheatham M.D.\par [facility]\par [address]\par [place], [place]   [address] [FreeTextEntry3] : \par Pao Sorenson M.D.\par \par  of Medicine\par [employer]\par [facility] \par [facility]\par [address] [place] [address] \[place] [address] \par ph: [phone]\par fax: [phone]

## 2022-08-11 NOTE — PHYSICAL EXAM
[Ambulatory and capable of all self care but unable to carry out any work activities] : Status 2- Ambulatory and capable of all self care but unable to carry out any work activities. Up and about more than 50% of waking hours [Normal] : affect appropriate [de-identified] : anicteric [de-identified] : RRR, normal S1S2 [de-identified] : trace edema bilateral ankles [de-identified] : no cervical/SCV adenopathy [de-identified] : no rash [de-identified] : A & O x 4

## 2022-08-11 NOTE — RESULTS/DATA
[FreeTextEntry1] : WBC- 6.61, ANC- 5.25; Hgb- 12.0, Hct- 33.9; Platelets- 103K.\par ----------------------------------------------------------------------------\par ACC: [id number] EXAM: CT ABDOMEN AND PELVIS OC IC \par PROCEDURE DATE: 01/12/2022 \par INTERPRETATION: CLINICAL INFORMATION: Right lower quadrant pain for \par Multiple myeloma. Previous terminal ileitis.\par \par COMPARISON: CT scan of the abdomen and pelvis from 11/29/2021\par \par CONTRAST/COMPLICATIONS:\par IV Contrast: Omnipaque 350 90 cc administered 10 cc discarded\par Oral Contrast: Omnipaque 300\par Complications: None reported at time of study completion\par \par PROCEDURE:\par CT of the Abdomen and Pelvis was performed.\par Sagittal and coronal reformats were performed.\par \par FINDINGS:\par LOWER CHEST: Within normal limits.\par \par LIVER: Within normal limits.\par BILE DUCTS: Normal caliber.\par GALLBLADDER: Small gallstones without gross inflammation.\par SPLEEN: Within normal limits.\par PANCREAS: Within normal limits.\par ADRENALS: Within normal limits.\par KIDNEYS/URETERS: Left renal cyst measuring up to 7.0 cm in the left lower \par pole. 1.3 cm low-attenuation lesion in the lower pole the left kidney \par laterally which is measuring greater than simple fluid in density.\par \par BLADDER: Bladder is underdistended which limits evaluation.\par REPRODUCTIVE ORGANS: Prominent prostate gland\par \par BOWEL: No bowel obstruction. Appendix is mildly distended measuring up to \par 1.0 cm which measured approximately 9 mm previously. There is question of \par minimally increased enhancement. There is mild surrounding soft tissue \par stranding although fluid was present in this region on the prior study. \par This may represent chronic changes although in a patient with right lower \par quadrant pain, acute appendicitis must also be considered.. Resolution of \par previously visualized distended small bowel. Previously mentioned \par terminal ileitis is not well appreciated on the current study. Colonic \par diverticuli predominantly in the sigmoid without gross inflammation.\par PERITONEUM: No ascites.\par VESSELS: Within normal limits.\par RETROPERITONEUM/LYMPH NODES: No lymphadenopathy.\par ABDOMINAL WALL: Small umbilical hernia containing fat.\par BONES: Status post ORIF of the left hemipelvis with stable postsurgical \par changes. Stable lucencies in bone, most pronounced and T12 and L5. \par Degenerative changes of bone. Spondylolisthesis with L5 anterior to L1 of \par approximately 25-50%.\par \par IMPRESSION:\par \par Mildly distended appendix. Mild surrounding soft tissue stranding where \par fluid was seen on the prior study. This may represent chronic residual \par changes although acute appendicitis must also be considered, especially \par in a patient with right lower quadrant pain. This was discussed with Dr. libby Sorenson at 3:00 PM on 1/12/2022.\par \par Resolution of small bowel obstruction. Stable bone findings.\par \par --- End of Report ---\par \par JEFF PHAM MD; Attending Radiologist\par

## 2022-08-11 NOTE — ASSESSMENT
[FreeTextEntry1] : IgD lambda myeloma s/p cycle 6 RVD on 9/30/21\par Mobilization of stem cells with Zarxio 960mcg subcutaneous daily 10/28/21-11/1/21\par He was admitted to the BMTU on 11/15/21 and received conditioning chemotherapy with Melphalan 100 mg/m2 IV x 2 consecutive days followed by autoPSCT on 11/19/21. \par Hospital course complicated by saddle pulmonary embolism and bilateral LE acute DVTs. On 11/24/21 he underwent a mechanical thrombectomy. IVC filter placement was deferred. He received anticoagulation, now on \par Eliquis. He also had terminal ileitis with ? microperforation, monitored in SICU, treated conservatively with supportive care and antibiotics. He completed a course of Meropenem 12/7/21.\par \par 1) Multiple myeloma- s/p autoPSCT on 11/19/21\par Plan- \par Continue current medications and restrictions as discussed prior to discharge from BMTU\par Continue Acyclovir for ID prophylaxis; Off Diflucan and Mepron\par Continue MVI, Folate daily- switch to OTC MVI after supply complete\par Continue Pantoprazole daily for GI prophylaxis\par Zofran as needed for nausea/vomiting\par Drink plenty of water daily\par Moisturizing cream to the skin several times per day\par Labs reviewed from 3/28/22- IMEL results pending\par Discussed maintenance therapy post autoPSCT- Revlimid +/- Velcade/Dex, with potential side effects discussed during 3/3/22 office visit. \par Patient initiated Revlimid 10 mg po daily on 3/15/22, then held 3/22- 3/27 for URTI symptoms. Resumed on 3/28/22 and completed 28 day supply(1 cycle)\par Patient had second opinion consult Dr. Andrew Stack at [facility] on 3/31/22. Dr. Stack contacted me on 4/1/22 to discuss his recommendations to further management of patient's myeloma.\par Given measurable Urine M-John(24 hr urine), myeloma with t(11;14) fusion detected, Dr. Stack discussed combination Venetoclax daily/Carfilzomib(C1D1 20 mg/m2 with increase to 56 mg/m2 on C1D8 given on days 1, 8, 15 of 28 day cycle) consolidation x 4 cycles. The patient agrees to proceed with this treatment regimen\par He will undergo repeat bone marrow evaluation with FISH myeloma panel and NGS/RNA seq at [facility]. Plan is for late May. \par He will be referred for Cardiology consult to evaluate for cardiac amyloidosis- Echo with strain imaging and Cardiac MRI- done.\par Whole body PET/CT scan to assess for lytic bone disease; prior MRI T-spine(5/30/21)- mild loss of vertebral body height of T-12. Test done on 5/2/22- no new bone lesions, no abnormal FDG activity\par Bone density to assess for osteoporosis-(4/13/22)- impression is normal study.  Plan to begin Xgeva with consolidation therapy. \par Call the office immediately if fever, chills, symptoms of infection\par 07/07/22:\par Carfilzomib 56 mg/m2 IV days 1, 8, 15 of 28 day cycle) consolidation x 4 cycles. Today is C2 day 8. \par Venetoclax 400 mg po daily starting on 6/2/22, he is tolerating well; now increased to 800 mg po daily on 7/2/22. \par Continue Acyclovir prophylaxis\par Continue PPI\par Drink plenty of water daily \par I reviewed lab results from 6/30/22 with patient and his wife, IgD decreased to 8 mg/dL. \par 08/11/22:\par Carfilzomib 56 mg/m2 IV days 1, 8, 15 of 28 day cycle) consolidation x 4 cycles. Today is C3 day 15. \par Venetoclax 400 mg po daily starting on 6/2/22, he is tolerating well; now increased to 800 mg po daily on 7/2/22. \par Continue Acyclovir prophylaxis\par Continue PPI\par Drink plenty of water daily \par Will repeat IMEL with IgD level in 2 weeks\par \par 2) H/O saddle PE s/p thrombectomy; h/o bilat LE DVT\par Plan- Continue Eliquis 5 mg po 2X/day\par Had followup with Dr. Holley on Feb 10, 2022\par \par RTC in mid September\par \par

## 2022-08-11 NOTE — HISTORY OF PRESENT ILLNESS
[de-identified] : 11/2020-Found to have T-12 compression fracture. Saw orthopedist Dr. Candelaria in 1/2021. Referred to endocrinologist Dr. Acosta by Dr. 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 Serum immunofixation showed 1 IgD lambda band and free lambda light chains. Urine immunofixation negative for monoclonal band.\par 4/29/2021–Bone marrow biopsy and bone marrow aspirate consistent with plasma cell myeloma (greater than 90% involvement). Myeloma FISH studies showed CCND1/IGH fusion (27%). Flow cytometry positive for monotypic plasma cells. Lymphocyte immunophenotypic findings showed no diagnostic abnormalities. Cytogenetics with normal male karyotype. [place] red stain negative. Iron stains showed iron stores present. No ring sideroblasts.\par 5/13/21- C1D1 RVd\par  [de-identified] : Since initial HPC transplant consult visit on 6/7/21, he had recent hematology office visit on 8/19/21, Cycle #5 Day#15 Velcade/Decadron. Cycle #5 Revlimid as planned. He describes moderate fatigue, occasional blurry vision, insomnia with Decadron. He denies fever, chills, cough, dyspnea. He received the COVID-19 vaccine(Moderna) on 3/16, 4/13/21; he has booster vaccine on 8/31/21. The patient is very concerned about the delta variant of COVID-19 and asked to discuss isolation/restriction policies. \par \par 10/6/21:\par He completed cycle 6 RVD on 9/30/21. He has moderate fatigue and good appetite. He has occasional blurry vision and was evaluated by Optho with normal evaluation. He had MRI Brain with alton(9/29/21 at [facility])- negative. He denies fever,  chills, cough, shortness of breath. \par \par 12/9/21:\par The patient was admitted to the BMTU on 11/15/21 and received conditioning chemotherapy with Melphalan 100 mg/m2 IV x 2 consecutive days followed by autoPSCT on 11/19/21. \par \par Upon admission, a TLC was placed in IR. Mr. Julio received IV hydration, pain management, anxiolytics, antiemetics, nutritional support, and antibacterial / antiviral / antifungal / GI / PCP and VOD (SOS) prophylaxis. When his ANC dropped below 500 he was started on prophylactic Ciprofloxacin. Labs were monitored on a daily basis, and he received electrolyte repletion and transfusional support as needed. \par \par On 11/19/2021 After pre-medication  received 251 mL of, Autologous mobilized, \par plasma reduced, pooled, thawed, washes HCP apheresis for  over approximately 1 \par hr. Cell counts as follows \par Total MNC( x10^8/kg)=7.22 \par CD34+cells ( x10^6 kg)=4.58 \par Cell Viability (%)= 90 \par Mr. Julio tolerated the infusion well with no adverse resections noted. \par \par While admitted, Mr. Julio experienced pancytopenia related to the high dose chemotherapy conditioning regimen. He was treated with transfusional support. On \par 11/19/21 he experienced a vasovagal episode in the bathroom that was self limiting. On 11/23/21 he had another vasovagal episode with severe abdominal pain and distention. A RRT was called. During the episode he was hypotensive, lactate was 6. A CT A/P showed pneumoperitoneum and terminal ileitis. Surgery was consulted and he was transferred to the SICU. During the episode, he made a troponin. A TTE was completed in the SICU, which showed increased pulmonary pressures. As a result, a CTA of the chest was completed which showed a saddle pulmonary embolism. He was started on full dose heparin. Lower extremity dopplers showed bilateral acute DVTs. On 11/24/21 he underwent a mechanical thrombectomy. IVC filter placement was deferred. \par \par The terminal ileitis and pneumoperitoneum was treated conservatively with supportive care and antibiotics. He completed a course of Meropenem 12/7/21. A \par repeat CT A/P showed unchanged ileitis, with mild increased fluid distention and resolution of free air. The heparin was transitioned to full dose Lovenox, \par and eventually Eliquis. Shortly thereafter he was transferred back to . \par \par Currently, he is stable for discharge home with outpatient follow up at the [facility] and with vascular cardiology. \par \par Since discharge to home on 12/7/21 he has moderate fatigue and slowly improving appetite. He describes loose stool(small volume) twice daily with abdominal bloating and gassiness since discharge but denies nausea, vomiting. \par \par 12/16/21:\par He continues to have moderate fatigue and continued slow improvement in appetite. He started taking Gas-X twice daily after 12/9/21 office visit and stopped on 12/13 as his abd bloating with gassiness significantly improved. He states loose stool has improved, now only once daily since 12/17, but no nausea/vomiting. He still has bilateral lower extremity edema. He scheduled followup with vascular cardiology on 1/13/22. He denies fever, cough, shortness of breath. \par \par 12/22/21:\par He has mild-moderate fatigue and good appetite. He denies abdominal pain/distension and hasn't needed Gas-X for one week. He denies nausea/vomiting, diarrhea. No fever, chills. \par \par 01/05/22:\par He has mild fatigue and good appetite. He denies fever, chills, cough, dyspnea, nausea/vomiting, abdominal pain, diarrhea. He walks on treadmill daily x 20 minutes. \par \par 01/19/22:\par Patient called the office on 1/11/22 to discuss new onset of RLQ pain last night when sleeping and it awakened him when he was changing his position. He went back to sleep. He woke up around 6:30 am and noted sharp pain with movement, therefore, he has been trying not to move around too much. He denies nausea, vomiting, diarrhea. He has eaten breakfast and lunch without difficulty and no associated symptoms. He denies fever, chills, cough, shortness of breath. No acute swelling in lower extremities, no chest pain. \par He states pain is 3-4/10, non-radiating, and only occurs with movement. \par Plan- \par CT Abd/pelvis with oral and IV contrast. \par NPO after 9 am in case CT approved and scan can be done tomorrow afternoon. \par If he develops worsening pain, fever, chills, vomiting or other severe new symptoms he will go to [facility] immediately. \par I contacted patient on 1/12/22 to discuss results of CT Abd/pelvis(1/12/22)- acute appendicitis. Given persistent RLQ pain today, I instructed patient to go to [facility] ER for surgery evaluation. He agrees. Patient admitted to surgical service and treated conservatively with Meropenem IV. His abdominal pain resolved and he was discharged to home on 1/14/22 to finish course of antibiotics with Cipro/Flagyl.\par \par Since discharge on 1/14/22, he feels well overall with only mild fatigue, and appetite is good. He denies abdominal pain, nausea, vomiting, diarrhea, fever, chills. Weight this morning- 188.5 lbs.\par  \par 02/02/22:\par He describes good energy and good appetite. He denies fever, cough, shortness of breath, nausea, vomiting, diarrhea. Weight this morning- 189 lbs.\par \par 2/16/22:\par He describes good energy and good appetite. He denies fever, cough, shortness of breath, nausea, vomiting, diarrhea. He had Vascular Cardio followup with Echo and LE duplex study ordered. \par \par 03/03/22:\par He describes good energy and good appetite. He denies fever, cough, shortness of breath, nausea, vomiting, diarrhea. \par \par 03/30/22:\par Patient called the answering service on 3/19 and spoke to on call physician. He described sore throat/cough for the past three days starting 3/17. He states that the throat is getting better but just now he was febrile to 100.8. I advised going to the ER but he wants to take a Tylenol and wait it out for tonight since he is getting better in general. He agrees that if fever persists overnight, he will go to the ER. \par \par on 3/22, patient calls to give update stating that his sore throat resolved on 3/19 but he developed T- 100.8. He took Tylenol once and he states fever resolved and did not recur. He still has dry cough which is improving, and he is taking Robitussin. He states that day 1 of Revlimid maintenance was on 3/15/22. \par Plan-\par Given recent URTI symptoms with persistent cough, I instructed patient to HOLD Revlimid through 3/27/22, and resume on Monday 3/28/22. Drink plenty of water and rest. \par He will have lab work on 3/28 at local [facility] prior to Telehealth visit on 3/30/22. \par If he develops new symptoms he will call the office immediately. \par \par Today, patient states he has mild fatigue and good appetite. He has slight dry cough but he denies fever, chills, sore throat, shortness of breath, nausea, vomiting, diarrhea. 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 \par \par 04/07/22:\par The patient describes mild fatigue and good appetite. He has occasional cough but denies fever, chills, shortness of breath, bone pain. \par \par 05/19/22:\par Patient called the office(4/19) to inform me that he developed blurry vision and light sensitivity since 4/15, he has Optho appointment today. Also, last week he noted onset of few second "zaps" like from an electronic stimulation machine that a physical therapist uses. He feels this sensation at these sites: both ankles, lateral left knee, occasionally on left hip. He estimates total number of events as 5-10 daily. He did have similar symptoms in his right ribs last year which eventually resolved. He does not have paresthesias in his hands and feet. \par He is scheduled for bone marrow biopsy at [facility] on 5/3/22. He was seen by Cardio for evaluation of cardiac amyloid and had Echo on 4/18, and MRI Cardiac on 4/26. Bone marrow rescheduled for late May. \par He did see Optho and took his contacts out for 1 week and used prescription eye drops, his vision is now normal. The sensation of "zaps" is decreasing. \par He describes mild fatigue and good appetite. He has occasional cough but denies fever, chills, shortness of breath. He did develop low back pain after golfing, at area of prior T-12 compression fracture. He has Ortho followup on 6/8/22. \par \par 07/0722:\par Patient describes mild fatigue, also difficulty sleeping post Decadron. He denies fever, chills, cough, dyspnea. \par \par 08/11/22:\par Patient describes mild fatigue, also difficulty sleeping post Decadron. He saw an oral surgeon for a sore on his lower left gingiva, impression is that it is not osteonecrosis. Oral surgeon is aware he is on Xgeva. He has followup in 2 weeks. He has seen his Ortho surgeon for back spasms. He had MRI T-spine(7/13/22)- also revealed ? subacute fracture left 6th rib. He denies fever, chills, shortness of breath.

## 2022-08-11 NOTE — REVIEW OF SYSTEMS
[Fatigue] : fatigue [Fever] : no fever [Chills] : no chills [Vision Problems] : no vision problems [Mucosal Pain] : no mucosal pain [Chest Pain] : no chest pain [Shortness Of Breath] : no shortness of breath [Cough] : no cough [Abdominal Pain] : no abdominal pain [Vomiting] : no vomiting [Diarrhea] : no diarrhea [Skin Rash] : no skin rash [Dizziness] : no dizziness [Fainting] : no fainting [Easy Bleeding] : no tendency for easy bleeding [Easy Bruising] : no tendency for easy bruising [FreeTextEntry3] : ( seen by Optho on 10/5/21) [FreeTextEntry4] : no sore throat [FreeTextEntry5] : slight edema in both ankles [FreeTextEntry7] : no nausea [FreeTextEntry9] : no bone pain [de-identified] : no paresthesias

## 2022-08-17 NOTE — PATIENT PROFILE ADULT - FUNCTIONAL ASSESSMENT - BASIC MOBILITY 5.
4 = No assist / stand by assistance

## 2022-08-22 ENCOUNTER — NON-APPOINTMENT (OUTPATIENT)
Age: 67
End: 2022-08-22

## 2022-08-23 ENCOUNTER — NON-APPOINTMENT (OUTPATIENT)
Age: 67
End: 2022-08-23

## 2022-08-23 RX ORDER — NIRMATRELVIR AND RITONAVIR 300-100 MG
20 X 150 MG & KIT ORAL
Qty: 1 | Refills: 0 | Status: DISCONTINUED | COMMUNITY
Start: 2022-08-23 | End: 2022-08-23

## 2022-08-25 ENCOUNTER — APPOINTMENT (OUTPATIENT)
Dept: INFUSION THERAPY | Facility: HOSPITAL | Age: 67
End: 2022-08-25

## 2022-09-01 ENCOUNTER — APPOINTMENT (OUTPATIENT)
Dept: INFUSION THERAPY | Facility: HOSPITAL | Age: 67
End: 2022-09-01

## 2022-09-01 ENCOUNTER — RESULT REVIEW (OUTPATIENT)
Age: 67
End: 2022-09-01

## 2022-09-01 LAB
ALBUMIN SERPL ELPH-MCNC: 4.3 G/DL — SIGNIFICANT CHANGE UP (ref 3.3–5)
ALP SERPL-CCNC: 89 U/L — SIGNIFICANT CHANGE UP (ref 40–120)
ALT FLD-CCNC: 31 U/L — SIGNIFICANT CHANGE UP (ref 10–45)
ANION GAP SERPL CALC-SCNC: 13 MMOL/L — SIGNIFICANT CHANGE UP (ref 5–17)
AST SERPL-CCNC: 26 U/L — SIGNIFICANT CHANGE UP (ref 10–40)
BASOPHILS # BLD AUTO: 0.02 K/UL — SIGNIFICANT CHANGE UP (ref 0–0.2)
BASOPHILS # BLD AUTO: 0.03 K/UL — SIGNIFICANT CHANGE UP (ref 0–0.2)
BASOPHILS NFR BLD AUTO: 0.5 % — SIGNIFICANT CHANGE UP (ref 0–2)
BASOPHILS NFR BLD AUTO: 0.8 % — SIGNIFICANT CHANGE UP (ref 0–2)
BILIRUB SERPL-MCNC: 0.6 MG/DL — SIGNIFICANT CHANGE UP (ref 0.2–1.2)
BUN SERPL-MCNC: 16 MG/DL — SIGNIFICANT CHANGE UP (ref 7–23)
CALCIUM SERPL-MCNC: 9.8 MG/DL — SIGNIFICANT CHANGE UP (ref 8.4–10.5)
CHLORIDE SERPL-SCNC: 105 MMOL/L — SIGNIFICANT CHANGE UP (ref 96–108)
CO2 SERPL-SCNC: 24 MMOL/L — SIGNIFICANT CHANGE UP (ref 22–31)
CREAT SERPL-MCNC: 1 MG/DL — SIGNIFICANT CHANGE UP (ref 0.5–1.3)
EGFR: 83 ML/MIN/1.73M2 — SIGNIFICANT CHANGE UP
EOSINOPHIL # BLD AUTO: 0 K/UL — SIGNIFICANT CHANGE UP (ref 0–0.5)
EOSINOPHIL # BLD AUTO: 0 K/UL — SIGNIFICANT CHANGE UP (ref 0–0.5)
EOSINOPHIL NFR BLD AUTO: 0 % — SIGNIFICANT CHANGE UP (ref 0–6)
EOSINOPHIL NFR BLD AUTO: 0 % — SIGNIFICANT CHANGE UP (ref 0–6)
GLUCOSE SERPL-MCNC: 107 MG/DL — HIGH (ref 70–99)
HCT VFR BLD CALC: 34.1 % — LOW (ref 39–50)
HGB BLD-MCNC: 12 G/DL — LOW (ref 13–17)
IMM GRANULOCYTES NFR BLD AUTO: 0.8 % — SIGNIFICANT CHANGE UP (ref 0–1.5)
IMM GRANULOCYTES NFR BLD AUTO: 0.8 % — SIGNIFICANT CHANGE UP (ref 0–1.5)
LYMPHOCYTES # BLD AUTO: 0.72 K/UL — LOW (ref 1–3.3)
LYMPHOCYTES # BLD AUTO: 0.75 K/UL — LOW (ref 1–3.3)
LYMPHOCYTES # BLD AUTO: 18.2 % — SIGNIFICANT CHANGE UP (ref 13–44)
LYMPHOCYTES # BLD AUTO: 19.7 % — SIGNIFICANT CHANGE UP (ref 13–44)
MCHC RBC-ENTMCNC: 34.2 PG — HIGH (ref 27–34)
MCHC RBC-ENTMCNC: 35.2 G/DL — SIGNIFICANT CHANGE UP (ref 32–36)
MCV RBC AUTO: 97.2 FL — SIGNIFICANT CHANGE UP (ref 80–100)
MONOCYTES # BLD AUTO: 1.04 K/UL — HIGH (ref 0–0.9)
MONOCYTES # BLD AUTO: 1.1 K/UL — HIGH (ref 0–0.9)
MONOCYTES NFR BLD AUTO: 27.4 % — HIGH (ref 2–14)
MONOCYTES NFR BLD AUTO: 27.8 % — HIGH (ref 2–14)
NEUTROPHILS # BLD AUTO: 1.95 K/UL — SIGNIFICANT CHANGE UP (ref 1.8–7.4)
NEUTROPHILS # BLD AUTO: 2.08 K/UL — SIGNIFICANT CHANGE UP (ref 1.8–7.4)
NEUTROPHILS NFR BLD AUTO: 51.3 % — SIGNIFICANT CHANGE UP (ref 43–77)
NEUTROPHILS NFR BLD AUTO: 52.7 % — SIGNIFICANT CHANGE UP (ref 43–77)
NRBC # BLD: 0 /100 WBCS — SIGNIFICANT CHANGE UP (ref 0–0)
NRBC # BLD: 0 /100 WBCS — SIGNIFICANT CHANGE UP (ref 0–0)
PLATELET # BLD AUTO: 177 K/UL — SIGNIFICANT CHANGE UP (ref 150–400)
POTASSIUM SERPL-MCNC: 4.2 MMOL/L — SIGNIFICANT CHANGE UP (ref 3.5–5.3)
POTASSIUM SERPL-SCNC: 4.2 MMOL/L — SIGNIFICANT CHANGE UP (ref 3.5–5.3)
PROT SERPL-MCNC: 5.7 G/DL — LOW (ref 6–8.3)
PROT SERPL-MCNC: 5.8 G/DL — LOW (ref 6–8.3)
PROT SERPL-MCNC: 5.8 G/DL — LOW (ref 6–8.3)
RBC # BLD: 3.51 M/UL — LOW (ref 4.2–5.8)
RBC # FLD: 13.8 % — SIGNIFICANT CHANGE UP (ref 10.3–14.5)
SODIUM SERPL-SCNC: 142 MMOL/L — SIGNIFICANT CHANGE UP (ref 135–145)
WBC # BLD: 3.8 K/UL — SIGNIFICANT CHANGE UP (ref 3.8–10.5)
WBC # FLD AUTO: 3.8 K/UL — SIGNIFICANT CHANGE UP (ref 3.8–10.5)

## 2022-09-02 LAB
IGA FLD-MCNC: 11 MG/DL — LOW (ref 84–499)
IGD SER-MCNC: 2 MG/DL — SIGNIFICANT CHANGE UP
IGG FLD-MCNC: 310 MG/DL — LOW (ref 610–1660)
IGM SERPL-MCNC: 12 MG/DL — LOW (ref 35–242)
KAPPA LC SER QL IFE: 0.52 MG/DL — SIGNIFICANT CHANGE UP (ref 0.33–1.94)
KAPPA/LAMBDA FREE LIGHT CHAIN RATIO, SERUM: 1.08 RATIO — SIGNIFICANT CHANGE UP (ref 0.26–1.65)
LAMBDA LC SER QL IFE: 0.48 MG/DL — LOW (ref 0.57–2.63)

## 2022-09-07 ENCOUNTER — APPOINTMENT (OUTPATIENT)
Dept: ORTHOPEDIC SURGERY | Facility: CLINIC | Age: 67
End: 2022-09-07

## 2022-09-07 VITALS — HEART RATE: 61 BPM | DIASTOLIC BLOOD PRESSURE: 73 MMHG | SYSTOLIC BLOOD PRESSURE: 113 MMHG

## 2022-09-07 LAB
% ALBUMIN: 68.9 % — SIGNIFICANT CHANGE UP
% ALPHA 1: 5.3 % — SIGNIFICANT CHANGE UP
% ALPHA 2: 9.5 % — SIGNIFICANT CHANGE UP
% BETA: 11.5 % — SIGNIFICANT CHANGE UP
% GAMMA: 4.8 % — SIGNIFICANT CHANGE UP
ALBUMIN SERPL ELPH-MCNC: 4 G/DL — SIGNIFICANT CHANGE UP (ref 3.6–5.5)
ALBUMIN/GLOB SERPL ELPH: 2.2 RATIO — SIGNIFICANT CHANGE UP
ALPHA1 GLOB SERPL ELPH-MCNC: 0.3 G/DL — SIGNIFICANT CHANGE UP (ref 0.1–0.4)
ALPHA2 GLOB SERPL ELPH-MCNC: 0.6 G/DL — SIGNIFICANT CHANGE UP (ref 0.5–1)
B-GLOBULIN SERPL ELPH-MCNC: 0.7 G/DL — SIGNIFICANT CHANGE UP (ref 0.5–1)
GAMMA GLOBULIN: 0.3 G/DL — LOW (ref 0.6–1.6)
INTERPRETATION SERPL IFE-IMP: SIGNIFICANT CHANGE UP
PROT PATTERN SERPL ELPH-IMP: SIGNIFICANT CHANGE UP

## 2022-09-07 PROCEDURE — 99214 OFFICE O/P EST MOD 30 MIN: CPT

## 2022-09-08 ENCOUNTER — RESULT REVIEW (OUTPATIENT)
Age: 67
End: 2022-09-08

## 2022-09-08 ENCOUNTER — APPOINTMENT (OUTPATIENT)
Dept: INFUSION THERAPY | Facility: HOSPITAL | Age: 67
End: 2022-09-08

## 2022-09-08 LAB
BASOPHILS # BLD AUTO: 0.01 K/UL — SIGNIFICANT CHANGE UP (ref 0–0.2)
BASOPHILS NFR BLD AUTO: 0.2 % — SIGNIFICANT CHANGE UP (ref 0–2)
EOSINOPHIL # BLD AUTO: 0.02 K/UL — SIGNIFICANT CHANGE UP (ref 0–0.5)
EOSINOPHIL NFR BLD AUTO: 0.4 % — SIGNIFICANT CHANGE UP (ref 0–6)
HCT VFR BLD CALC: 33.9 % — LOW (ref 39–50)
HGB BLD-MCNC: 11.8 G/DL — LOW (ref 13–17)
IMM GRANULOCYTES NFR BLD AUTO: 0.9 % — SIGNIFICANT CHANGE UP (ref 0–1.5)
LYMPHOCYTES # BLD AUTO: 0.59 K/UL — LOW (ref 1–3.3)
LYMPHOCYTES # BLD AUTO: 12.7 % — LOW (ref 13–44)
MCHC RBC-ENTMCNC: 34.8 G/DL — SIGNIFICANT CHANGE UP (ref 32–36)
MCHC RBC-ENTMCNC: 34.9 PG — HIGH (ref 27–34)
MCV RBC AUTO: 100.3 FL — HIGH (ref 80–100)
MONOCYTES # BLD AUTO: 0.83 K/UL — SIGNIFICANT CHANGE UP (ref 0–0.9)
MONOCYTES NFR BLD AUTO: 17.8 % — HIGH (ref 2–14)
NEUTROPHILS # BLD AUTO: 3.16 K/UL — SIGNIFICANT CHANGE UP (ref 1.8–7.4)
NEUTROPHILS NFR BLD AUTO: 68 % — SIGNIFICANT CHANGE UP (ref 43–77)
NRBC # BLD: 0 /100 WBCS — SIGNIFICANT CHANGE UP (ref 0–0)
PLATELET # BLD AUTO: 131 K/UL — LOW (ref 150–400)
RBC # BLD: 3.38 M/UL — LOW (ref 4.2–5.8)
RBC # FLD: 13.5 % — SIGNIFICANT CHANGE UP (ref 10.3–14.5)
WBC # BLD: 4.65 K/UL — SIGNIFICANT CHANGE UP (ref 3.8–10.5)
WBC # FLD AUTO: 4.65 K/UL — SIGNIFICANT CHANGE UP (ref 3.8–10.5)

## 2022-09-12 ENCOUNTER — APPOINTMENT (OUTPATIENT)
Dept: MRI IMAGING | Facility: HOSPITAL | Age: 67
End: 2022-09-12

## 2022-09-12 ENCOUNTER — RESULT REVIEW (OUTPATIENT)
Age: 67
End: 2022-09-12

## 2022-09-12 ENCOUNTER — OUTPATIENT (OUTPATIENT)
Dept: OUTPATIENT SERVICES | Facility: HOSPITAL | Age: 67
LOS: 1 days | End: 2022-09-12
Payer: MEDICARE

## 2022-09-12 DIAGNOSIS — Z98.890 OTHER SPECIFIED POSTPROCEDURAL STATES: Chronic | ICD-10-CM

## 2022-09-12 DIAGNOSIS — M54.16 RADICULOPATHY, LUMBAR REGION: ICD-10-CM

## 2022-09-12 PROCEDURE — 72148 MRI LUMBAR SPINE W/O DYE: CPT

## 2022-09-12 PROCEDURE — 72148 MRI LUMBAR SPINE W/O DYE: CPT | Mod: 26,MH

## 2022-09-15 ENCOUNTER — RESULT REVIEW (OUTPATIENT)
Age: 67
End: 2022-09-15

## 2022-09-15 ENCOUNTER — APPOINTMENT (OUTPATIENT)
Dept: INFUSION THERAPY | Facility: HOSPITAL | Age: 67
End: 2022-09-15

## 2022-09-15 LAB
ALBUMIN SERPL ELPH-MCNC: 4.3 G/DL — SIGNIFICANT CHANGE UP (ref 3.3–5)
ALP SERPL-CCNC: 83 U/L — SIGNIFICANT CHANGE UP (ref 40–120)
ALT FLD-CCNC: 29 U/L — SIGNIFICANT CHANGE UP (ref 10–45)
ANION GAP SERPL CALC-SCNC: 10 MMOL/L — SIGNIFICANT CHANGE UP (ref 5–17)
AST SERPL-CCNC: 19 U/L — SIGNIFICANT CHANGE UP (ref 10–40)
BASOPHILS # BLD AUTO: 0.01 K/UL — SIGNIFICANT CHANGE UP (ref 0–0.2)
BASOPHILS NFR BLD AUTO: 0.2 % — SIGNIFICANT CHANGE UP (ref 0–2)
BILIRUB SERPL-MCNC: 0.5 MG/DL — SIGNIFICANT CHANGE UP (ref 0.2–1.2)
BUN SERPL-MCNC: 21 MG/DL — SIGNIFICANT CHANGE UP (ref 7–23)
CALCIUM SERPL-MCNC: 9.3 MG/DL — SIGNIFICANT CHANGE UP (ref 8.4–10.5)
CHLORIDE SERPL-SCNC: 104 MMOL/L — SIGNIFICANT CHANGE UP (ref 96–108)
CO2 SERPL-SCNC: 26 MMOL/L — SIGNIFICANT CHANGE UP (ref 22–31)
CREAT SERPL-MCNC: 1 MG/DL — SIGNIFICANT CHANGE UP (ref 0.5–1.3)
EGFR: 83 ML/MIN/1.73M2 — SIGNIFICANT CHANGE UP
EOSINOPHIL # BLD AUTO: 0 K/UL — SIGNIFICANT CHANGE UP (ref 0–0.5)
EOSINOPHIL NFR BLD AUTO: 0 % — SIGNIFICANT CHANGE UP (ref 0–6)
GLUCOSE SERPL-MCNC: 104 MG/DL — HIGH (ref 70–99)
HCT VFR BLD CALC: 35.4 % — LOW (ref 39–50)
HGB BLD-MCNC: 12.6 G/DL — LOW (ref 13–17)
IMM GRANULOCYTES NFR BLD AUTO: 0.6 % — SIGNIFICANT CHANGE UP (ref 0–0.9)
LYMPHOCYTES # BLD AUTO: 0.46 K/UL — LOW (ref 1–3.3)
LYMPHOCYTES # BLD AUTO: 8.7 % — LOW (ref 13–44)
MCHC RBC-ENTMCNC: 35.6 G/DL — SIGNIFICANT CHANGE UP (ref 32–36)
MCHC RBC-ENTMCNC: 35.7 PG — HIGH (ref 27–34)
MCV RBC AUTO: 100.3 FL — HIGH (ref 80–100)
MONOCYTES # BLD AUTO: 1 K/UL — HIGH (ref 0–0.9)
MONOCYTES NFR BLD AUTO: 19 % — HIGH (ref 2–14)
NEUTROPHILS # BLD AUTO: 3.76 K/UL — SIGNIFICANT CHANGE UP (ref 1.8–7.4)
NEUTROPHILS NFR BLD AUTO: 71.5 % — SIGNIFICANT CHANGE UP (ref 43–77)
NRBC # BLD: 0 /100 WBCS — SIGNIFICANT CHANGE UP (ref 0–0)
PLATELET # BLD AUTO: 120 K/UL — LOW (ref 150–400)
POTASSIUM SERPL-MCNC: 4.4 MMOL/L — SIGNIFICANT CHANGE UP (ref 3.5–5.3)
POTASSIUM SERPL-SCNC: 4.4 MMOL/L — SIGNIFICANT CHANGE UP (ref 3.5–5.3)
PROT SERPL-MCNC: 5.7 G/DL — LOW (ref 6–8.3)
RBC # BLD: 3.53 M/UL — LOW (ref 4.2–5.8)
RBC # FLD: 13.5 % — SIGNIFICANT CHANGE UP (ref 10.3–14.5)
SODIUM SERPL-SCNC: 141 MMOL/L — SIGNIFICANT CHANGE UP (ref 135–145)
WBC # BLD: 5.26 K/UL — SIGNIFICANT CHANGE UP (ref 3.8–10.5)
WBC # FLD AUTO: 5.26 K/UL — SIGNIFICANT CHANGE UP (ref 3.8–10.5)

## 2022-09-16 ENCOUNTER — OUTPATIENT (OUTPATIENT)
Dept: OUTPATIENT SERVICES | Facility: HOSPITAL | Age: 67
LOS: 1 days | Discharge: HOME | End: 2022-09-16

## 2022-09-16 DIAGNOSIS — R79.89 OTHER SPECIFIED ABNORMAL FINDINGS OF BLOOD CHEMISTRY: ICD-10-CM

## 2022-09-16 DIAGNOSIS — Z98.890 OTHER SPECIFIED POSTPROCEDURAL STATES: Chronic | ICD-10-CM

## 2022-09-19 ENCOUNTER — RESULT REVIEW (OUTPATIENT)
Age: 67
End: 2022-09-19

## 2022-09-19 ENCOUNTER — APPOINTMENT (OUTPATIENT)
Dept: HEMATOLOGY ONCOLOGY | Facility: CLINIC | Age: 67
End: 2022-09-19

## 2022-09-19 VITALS
RESPIRATION RATE: 16 BRPM | OXYGEN SATURATION: 98 % | HEART RATE: 60 BPM | SYSTOLIC BLOOD PRESSURE: 133 MMHG | WEIGHT: 211.86 LBS | BODY MASS INDEX: 27.95 KG/M2 | TEMPERATURE: 96.8 F | DIASTOLIC BLOOD PRESSURE: 82 MMHG

## 2022-09-19 DIAGNOSIS — U07.1 COVID-19: ICD-10-CM

## 2022-09-19 LAB
BASOPHILS # BLD AUTO: 0.01 K/UL — SIGNIFICANT CHANGE UP (ref 0–0.2)
BASOPHILS NFR BLD AUTO: 0.2 % — SIGNIFICANT CHANGE UP (ref 0–2)
EOSINOPHIL # BLD AUTO: 0 K/UL — SIGNIFICANT CHANGE UP (ref 0–0.5)
EOSINOPHIL NFR BLD AUTO: 0 % — SIGNIFICANT CHANGE UP (ref 0–6)
HCT VFR BLD CALC: 31.5 % — LOW (ref 39–50)
HGB BLD-MCNC: 11.3 G/DL — LOW (ref 13–17)
IMM GRANULOCYTES NFR BLD AUTO: 0.7 % — SIGNIFICANT CHANGE UP (ref 0–0.9)
LYMPHOCYTES # BLD AUTO: 0.31 K/UL — LOW (ref 1–3.3)
LYMPHOCYTES # BLD AUTO: 7.1 % — LOW (ref 13–44)
MCHC RBC-ENTMCNC: 35.6 PG — HIGH (ref 27–34)
MCHC RBC-ENTMCNC: 35.9 G/DL — SIGNIFICANT CHANGE UP (ref 32–36)
MCV RBC AUTO: 99.4 FL — SIGNIFICANT CHANGE UP (ref 80–100)
MONOCYTES # BLD AUTO: 0.73 K/UL — SIGNIFICANT CHANGE UP (ref 0–0.9)
MONOCYTES NFR BLD AUTO: 16.7 % — HIGH (ref 2–14)
NEUTROPHILS # BLD AUTO: 3.3 K/UL — SIGNIFICANT CHANGE UP (ref 1.8–7.4)
NEUTROPHILS NFR BLD AUTO: 75.3 % — SIGNIFICANT CHANGE UP (ref 43–77)
NRBC # BLD: 0 /100 WBCS — SIGNIFICANT CHANGE UP (ref 0–0)
PLATELET # BLD AUTO: 92 K/UL — LOW (ref 150–400)
RBC # BLD: 3.17 M/UL — LOW (ref 4.2–5.8)
RBC # FLD: 13.2 % — SIGNIFICANT CHANGE UP (ref 10.3–14.5)
WBC # BLD: 4.38 K/UL — SIGNIFICANT CHANGE UP (ref 3.8–10.5)
WBC # FLD AUTO: 4.38 K/UL — SIGNIFICANT CHANGE UP (ref 3.8–10.5)

## 2022-09-19 PROCEDURE — 99214 OFFICE O/P EST MOD 30 MIN: CPT

## 2022-09-19 RX ORDER — MULTIVITAMIN
TABLET ORAL DAILY
Refills: 0 | Status: ACTIVE | COMMUNITY

## 2022-09-19 RX ORDER — CALCIUM 500 MG
500 TABLET ORAL TWICE DAILY
Refills: 0 | Status: ACTIVE | COMMUNITY

## 2022-09-19 NOTE — HISTORY OF PRESENT ILLNESS
[de-identified] : 11/2020-Found to have T-12 compression fracture. Saw orthopedist Dr. Candelaria in 1/2021. Referred to endocrinologist Dr. Acosta by Dr. 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 Serum immunofixation showed 1 IgD lambda band and free lambda light chains. Urine immunofixation negative for monoclonal band.\par 4/29/2021–Bone marrow biopsy and bone marrow aspirate consistent with plasma cell myeloma (greater than 90% involvement). Myeloma FISH studies showed CCND1/IGH fusion (27%). Flow cytometry positive for monotypic plasma cells. Lymphocyte immunophenotypic findings showed no diagnostic abnormalities. Cytogenetics with normal male karyotype. Congo red stain negative. Iron stains showed iron stores present. No ring sideroblasts.\par 5/13/21- C1D1 RVd\par  [de-identified] : Since initial HPC transplant consult visit on 6/7/21, he had recent hematology office visit on 8/19/21, Cycle #5 Day#15 Velcade/Decadron. Cycle #5 Revlimid as planned. He describes moderate fatigue, occasional blurry vision, insomnia with Decadron. He denies fever, chills, cough, dyspnea. He received the COVID-19 vaccine(Moderna) on 3/16, 4/13/21; he has booster vaccine on 8/31/21. The patient is very concerned about the delta variant of COVID-19 and asked to discuss isolation/restriction policies. \par \par 10/6/21:\par He completed cycle 6 RVD on 9/30/21. He has moderate fatigue and good appetite. He has occasional blurry vision and was evaluated by Optho with normal evaluation. He had MRI Brain with alton(9/29/21 at [facility])- negative. He denies fever,  chills, cough, shortness of breath. \par \par 12/9/21:\par The patient was admitted to the BMTU on 11/15/21 and received conditioning chemotherapy with Melphalan 100 mg/m2 IV x 2 consecutive days followed by autoPSCT on 11/19/21. \par \par Upon admission, a TLC was placed in IR. Mr. Julio received IV hydration, pain management, anxiolytics, antiemetics, nutritional support, and antibacterial / antiviral / antifungal / GI / PCP and VOD (SOS) prophylaxis. When his ANC dropped below 500 he was started on prophylactic Ciprofloxacin. Labs were monitored on a daily basis, and he received electrolyte repletion and transfusional support as needed. \par \par On 11/19/2021 After pre-medication  received 251 mL of, Autologous mobilized, \par plasma reduced, pooled, thawed, washes HCP apheresis for  over approximately 1 \par hr. Cell counts as follows \par Total MNC( x10^8/kg)=7.22 \par CD34+cells ( x10^6 kg)=4.58 \par Cell Viability (%)= 90 \par Mr. Julio tolerated the infusion well with no adverse resections noted. \par \par While admitted, Mr. Julio experienced pancytopenia related to the high dose chemotherapy conditioning regimen. He was treated with transfusional support. On \par 11/19/21 he experienced a vasovagal episode in the bathroom that was self limiting. On 11/23/21 he had another vasovagal episode with severe abdominal pain and distention. A RRT was called. During the episode he was hypotensive, lactate was 6. A CT A/P showed pneumoperitoneum and terminal ileitis. Surgery was consulted and he was transferred to the SICU. During the episode, he made a troponin. A TTE was completed in the SICU, which showed increased pulmonary pressures. As a result, a CTA of the chest was completed which showed a saddle pulmonary embolism. He was started on full dose heparin. Lower extremity dopplers showed bilateral acute DVTs. On 11/24/21 he underwent a mechanical thrombectomy. IVC filter placement was deferred. \par \par The terminal ileitis and pneumoperitoneum was treated conservatively with supportive care and antibiotics. He completed a course of Meropenem 12/7/21. A \par repeat CT A/P showed unchanged ileitis, with mild increased fluid distention and resolution of free air. The heparin was transitioned to full dose Lovenox, \par and eventually Eliquis. Shortly thereafter he was transferred back to . \par \par Currently, he is stable for discharge home with outpatient follow up at the [facility] and with vascular cardiology. \par \par Since discharge to home on 12/7/21 he has moderate fatigue and slowly improving appetite. He describes loose stool(small volume) twice daily with abdominal bloating and gassiness since discharge but denies nausea, vomiting. \par \par 12/16/21:\par He continues to have moderate fatigue and continued slow improvement in appetite. He started taking Gas-X twice daily after 12/9/21 office visit and stopped on 12/13 as his abd bloating with gassiness significantly improved. He states loose stool has improved, now only once daily since 12/17, but no nausea/vomiting. He still has bilateral lower extremity edema. He scheduled followup with vascular cardiology on 1/13/22. He denies fever, cough, shortness of breath. \par \par 12/22/21:\par He has mild-moderate fatigue and good appetite. He denies abdominal pain/distension and hasn't needed Gas-X for one week. He denies nausea/vomiting, diarrhea. No fever, chills. \par \par 01/05/22:\par He has mild fatigue and good appetite. He denies fever, chills, cough, dyspnea, nausea/vomiting, abdominal pain, diarrhea. He walks on treadmill daily x 20 minutes. \par \par 01/19/22:\par Patient called the office on 1/11/22 to discuss new onset of RLQ pain last night when sleeping and it awakened him when he was changing his position. He went back to sleep. He woke up around 6:30 am and noted sharp pain with movement, therefore, he has been trying not to move around too much. He denies nausea, vomiting, diarrhea. He has eaten breakfast and lunch without difficulty and no associated symptoms. He denies fever, chills, cough, shortness of breath. No acute swelling in lower extremities, no chest pain. \par He states pain is 3-4/10, non-radiating, and only occurs with movement. \par Plan- \par CT Abd/pelvis with oral and IV contrast. \par NPO after 9 am in case CT approved and scan can be done tomorrow afternoon. \par If he develops worsening pain, fever, chills, vomiting or other severe new symptoms he will go to [facility] immediately. \par I contacted patient on 1/12/22 to discuss results of CT Abd/pelvis(1/12/22)- acute appendicitis. Given persistent RLQ pain today, I instructed patient to go to [facility] ER for surgery evaluation. He agrees. Patient admitted to surgical service and treated conservatively with Meropenem IV. His abdominal pain resolved and he was discharged to home on 1/14/22 to finish course of antibiotics with Cipro/Flagyl.\par \par Since discharge on 1/14/22, he feels well overall with only mild fatigue, and appetite is good. He denies abdominal pain, nausea, vomiting, diarrhea, fever, chills. Weight this morning- 188.5 lbs.\par  \par 02/02/22:\par He describes good energy and good appetite. He denies fever, cough, shortness of breath, nausea, vomiting, diarrhea. Weight this morning- 189 lbs.\par \par 2/16/22:\par He describes good energy and good appetite. He denies fever, cough, shortness of breath, nausea, vomiting, diarrhea. He had Vascular Cardio followup with Echo and LE duplex study ordered. \par \par 03/03/22:\par He describes good energy and good appetite. He denies fever, cough, shortness of breath, nausea, vomiting, diarrhea. \par \par 03/30/22:\par Patient called the answering service on 3/19 and spoke to on call physician. He described sore throat/cough for the past three days starting 3/17. He states that the throat is getting better but just now he was febrile to 100.8. I advised going to the ER but he wants to take a Tylenol and wait it out for tonight since he is getting better in general. He agrees that if fever persists overnight, he will go to the ER. \par \par on 3/22, patient calls to give update stating that his sore throat resolved on 3/19 but he developed T- 100.8. He took Tylenol once and he states fever resolved and did not recur. He still has dry cough which is improving, and he is taking Robitussin. He states that day 1 of Revlimid maintenance was on 3/15/22. \par Plan-\par Given recent URTI symptoms with persistent cough, I instructed patient to HOLD Revlimid through 3/27/22, and resume on Monday 3/28/22. Drink plenty of water and rest. \par He will have lab work on 3/28 at local [facility] prior to Telehealth visit on 3/30/22. \par If he develops new symptoms he will call the office immediately. \par \par Today, patient states he has mild fatigue and good appetite. He has slight dry cough but he denies fever, chills, sore throat, shortness of breath, nausea, vomiting, diarrhea. 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 \par \par 04/07/22:\par The patient describes mild fatigue and good appetite. He has occasional cough but denies fever, chills, shortness of breath, bone pain. \par \par 05/19/22:\par Patient called the office(4/19) to inform me that he developed blurry vision and light sensitivity since 4/15, he has Optho appointment today. Also, last week he noted onset of few second "zaps" like from an electronic stimulation machine that a physical therapist uses. He feels this sensation at these sites: both ankles, lateral left knee, occasionally on left hip. He estimates total number of events as 5-10 daily. He did have similar symptoms in his right ribs last year which eventually resolved. He does not have paresthesias in his hands and feet. \par He is scheduled for bone marrow biopsy at [facility] on 5/3/22. He was seen by Cardio for evaluation of cardiac amyloid and had Echo on 4/18, and MRI Cardiac on 4/26. Bone marrow rescheduled for late May. \par He did see Optho and took his contacts out for 1 week and used prescription eye drops, his vision is now normal. The sensation of "zaps" is decreasing. \par He describes mild fatigue and good appetite. He has occasional cough but denies fever, chills, shortness of breath. He did develop low back pain after golfing, at area of prior T-12 compression fracture. He has Ortho followup on 6/8/22. \par \par 07/0722:\par Patient describes mild fatigue, also difficulty sleeping post Decadron. He denies fever, chills, cough, dyspnea. \par \par 08/11/22:\par Patient describes mild fatigue, also difficulty sleeping post Decadron. He saw an oral surgeon for a sore on his lower left gingiva, impression is that it is not osteonecrosis. Oral surgeon is aware he is on Xgeva. He has followup in 2 weeks. He has seen his Ortho surgeon for back spasms. He had MRI T-spine(7/13/22)- also revealed ? subacute fracture left 6th rib. He denies fever, chills, shortness of breath. \par \par 09/19/22:\par The patient will be completing Venetoclax on 9/22/22, has completed 4 cycles of Carfilzomib/Dex on 9/15/22. He complains of moderate fatigue and good appetite. He denies fever, chills, cough, shortness of breath.

## 2022-09-19 NOTE — RESULTS/DATA
[FreeTextEntry1] : WBC- 4.38, ANC- 3.3; Hgb- 11.3, Hct- 31.5; Platelets- 92K.\par ----------------------------------------------------------------------------\par ACC: [id number] EXAM: CT ABDOMEN AND PELVIS OC IC \par PROCEDURE DATE: 01/12/2022 \par INTERPRETATION: CLINICAL INFORMATION: Right lower quadrant pain for \par Multiple myeloma. Previous terminal ileitis.\par \par COMPARISON: CT scan of the abdomen and pelvis from 11/29/2021\par \par CONTRAST/COMPLICATIONS:\par IV Contrast: Omnipaque 350 90 cc administered 10 cc discarded\par Oral Contrast: Omnipaque 300\par Complications: None reported at time of study completion\par \par PROCEDURE:\par CT of the Abdomen and Pelvis was performed.\par Sagittal and coronal reformats were performed.\par \par FINDINGS:\par LOWER CHEST: Within normal limits.\par \par LIVER: Within normal limits.\par BILE DUCTS: Normal caliber.\par GALLBLADDER: Small gallstones without gross inflammation.\par SPLEEN: Within normal limits.\par PANCREAS: Within normal limits.\par ADRENALS: Within normal limits.\par KIDNEYS/URETERS: Left renal cyst measuring up to 7.0 cm in the left lower \par pole. 1.3 cm low-attenuation lesion in the lower pole the left kidney \par laterally which is measuring greater than simple fluid in density.\par \par BLADDER: Bladder is underdistended which limits evaluation.\par REPRODUCTIVE ORGANS: Prominent prostate gland\par \par BOWEL: No bowel obstruction. Appendix is mildly distended measuring up to \par 1.0 cm which measured approximately 9 mm previously. There is question of \par minimally increased enhancement. There is mild surrounding soft tissue \par stranding although fluid was present in this region on the prior study. \par This may represent chronic changes although in a patient with right lower \par quadrant pain, acute appendicitis must also be considered.. Resolution of \par previously visualized distended small bowel. Previously mentioned \par terminal ileitis is not well appreciated on the current study. Colonic \par diverticuli predominantly in the sigmoid without gross inflammation.\par PERITONEUM: No ascites.\par VESSELS: Within normal limits.\par RETROPERITONEUM/LYMPH NODES: No lymphadenopathy.\par ABDOMINAL WALL: Small umbilical hernia containing fat.\par BONES: Status post ORIF of the left hemipelvis with stable postsurgical \par changes. Stable lucencies in bone, most pronounced and T12 and L5. \par Degenerative changes of bone. Spondylolisthesis with L5 anterior to L1 of \par approximately 25-50%.\par \par IMPRESSION:\par \par Mildly distended appendix. Mild surrounding soft tissue stranding where \par fluid was seen on the prior study. This may represent chronic residual \par changes although acute appendicitis must also be considered, especially \par in a patient with right lower quadrant pain. This was discussed with Dr. libby Sorenson at 3:00 PM on 1/12/2022.\par \par Resolution of small bowel obstruction. Stable bone findings.\par \par --- End of Report ---\par \par JEFF PHAM MD; Attending Radiologist\par

## 2022-09-19 NOTE — CONSULT LETTER
[Dear  ___] : Dear  [unfilled], [Courtesy Letter:] : I had the pleasure of seeing your patient, [unfilled], in my office today. [Please see my note below.] : Please see my note below. [Consult Closing:] : Thank you very much for allowing me to participate in the care of this patient.  If you have any questions, please do not hesitate to contact me. [Sincerely,] : Sincerely, [DrVladislav  ___] : Dr. BELTRAN [FreeTextEntry2] : Dyana Cheatham M.D.\par [facility]\par [address] [facility]\par [place]   [address] [FreeTextEntry3] : \par Pao Sorenson M.D.\par \par  of Medicine\par [employer] [facility] [employer]\par [facility] \par [facility]\par [address] [place] [address] \[place] [address] \par ph: [phone]\par fax: [phone]

## 2022-09-19 NOTE — ASSESSMENT
[FreeTextEntry1] : IgD lambda myeloma s/p cycle 6 RVD on 9/30/21\par Mobilization of stem cells with Zarxio 960mcg subcutaneous daily 10/28/21-11/1/21\par He was admitted to the BMTU on 11/15/21 and received conditioning chemotherapy with Melphalan 100 mg/m2 IV x 2 consecutive days followed by autoPSCT on 11/19/21. \par Hospital course complicated by saddle pulmonary embolism and bilateral LE acute DVTs. On 11/24/21 he underwent a mechanical thrombectomy. IVC filter placement was deferred. He received anticoagulation, now on \par Eliquis. He also had terminal ileitis with ? microperforation, monitored in SICU, treated conservatively with supportive care and antibiotics. He completed a course of Meropenem 12/7/21.\par \par 1) Multiple myeloma- s/p autoPSCT on 11/19/21\par Plan- \par Continue current medications and restrictions as discussed prior to discharge from BMTU\par Continue Acyclovir for ID prophylaxis; Off Diflucan and Mepron\par Continue MVI, Folate daily- switch to OTC MVI after supply complete\par Continue Pantoprazole daily for GI prophylaxis\par Zofran as needed for nausea/vomiting\par Drink plenty of water daily\par Moisturizing cream to the skin several times per day\par Labs reviewed from 3/28/22- IMEL results pending\par Discussed maintenance therapy post autoPSCT- Revlimid +/- Velcade/Dex, with potential side effects discussed during 3/3/22 office visit. \par Patient initiated Revlimid 10 mg po daily on 3/15/22, then held 3/22- 3/27 for URTI symptoms. Resumed on 3/28/22 and completed 28 day supply(1 cycle)\par Patient had second opinion consult Dr. Andrew Stack at [facility] on 3/31/22. Dr. Stack contacted me on 4/1/22 to discuss his recommendations to further management of patient's myeloma.\par Given measurable Urine M-John(24 hr urine), myeloma with t(11;14) fusion detected, Dr. Stack discussed combination Venetoclax daily/Carfilzomib(C1D1 20 mg/m2 with increase to 56 mg/m2 on C1D8 given on days 1, 8, 15 of 28 day cycle) consolidation x 4 cycles. The patient agrees to proceed with this treatment regimen\par He will undergo repeat bone marrow evaluation with FISH myeloma panel and NGS/RNA seq at [facility]. Plan is for late May. \par He will be referred for Cardiology consult to evaluate for cardiac amyloidosis- Echo with strain imaging and Cardiac MRI- done.\par Whole body PET/CT scan to assess for lytic bone disease; prior MRI T-spine(5/30/21)- mild loss of vertebral body height of T-12. Test done on 5/2/22- no new bone lesions, no abnormal FDG activity\par Bone density to assess for osteoporosis-(4/13/22)- impression is normal study.  Plan to begin Xgeva with consolidation therapy. \par Call the office immediately if fever, chills, symptoms of infection\par 07/07/22:\par Carfilzomib 56 mg/m2 IV days 1, 8, 15 of 28 day cycle) consolidation x 4 cycles. Today is C2 day 8. \par Venetoclax 400 mg po daily starting on 6/2/22, he is tolerating well; now increased to 800 mg po daily on 7/2/22. \par Continue Acyclovir prophylaxis\par Continue PPI\par Drink plenty of water daily \par I reviewed lab results from 6/30/22 with patient and his wife, IgD decreased to 8 mg/dL. \par 08/11/22:\par Carfilzomib 56 mg/m2 IV days 1, 8, 15 of 28 day cycle) consolidation x 4 cycles. Today is C3 day 15. \par Venetoclax 400 mg po daily starting on 6/2/22, he is tolerating well; now increased to 800 mg po daily on 7/2/22. \par Continue Acyclovir prophylaxis\par Continue PPI\par Drink plenty of water daily \par Will repeat IMEL with IgD level in 2 weeks\par 09/19/22:\par Carfilzomib 56 mg/m2 IV days 1, 8, 15 of 28 day cycle) consolidation x 4 cycles. On 9/15/22, he received C4 day 15. Decadron 40 mg po weekly with Carfilzomib completed. \par Venetoclax 400 mg po daily starting on 6/2/22, he is tolerating well; now increased to 800 mg po daily on 7/2/22 to complete course of therapy on 9/22/22. \par Continue Acyclovir prophylaxis\par Continue PPI as needed\par Drink plenty of water daily \par Will repeat IMEL with IgD level during next office visit\par He has Telehealth visit with Dr. Stack on 10/3/22 and bone marrow biopsy on 12/5/22. \par \par Of note, patient tripped over uneven ledge in a garage while on vacation in August 2022, he states any person would have tripped on this. He is Not weak, no neuropathy, no balance problems. He knows to pay attention now to where he is walking. He does require physical therapy. \par \par 2) H/O saddle PE s/p thrombectomy; h/o bilat LE DVT\par Plan- Continue Eliquis 5 mg po 2X/day\par Had followup with Dr. Holley on Feb 10, 2022\par \par RTC in 1 month \par \par

## 2022-09-19 NOTE — REVIEW OF SYSTEMS
[Fatigue] : fatigue [Muscle Pain] : muscle pain [Fever] : no fever [Chills] : no chills [Vision Problems] : no vision problems [Mucosal Pain] : no mucosal pain [Chest Pain] : no chest pain [Shortness Of Breath] : no shortness of breath [Cough] : no cough [Abdominal Pain] : no abdominal pain [Vomiting] : no vomiting [Diarrhea] : no diarrhea [Skin Rash] : no skin rash [Dizziness] : no dizziness [Fainting] : no fainting [Easy Bleeding] : no tendency for easy bleeding [Easy Bruising] : no tendency for easy bruising [FreeTextEntry3] : ( seen by [facility] in 4/2022) [FreeTextEntry4] : no sore throat [FreeTextEntry5] : slight edema in both ankles [FreeTextEntry7] : no nausea [FreeTextEntry9] : no bone pain [de-identified] : no paresthesias

## 2022-09-19 NOTE — PHYSICAL EXAM
[Ambulatory and capable of all self care but unable to carry out any work activities] : Status 2- Ambulatory and capable of all self care but unable to carry out any work activities. Up and about more than 50% of waking hours [Normal] : affect appropriate [de-identified] : anicteric [de-identified] : RRR, normal S1S2 [de-identified] : trace edema bilateral ankles [de-identified] : no cervical/SCV adenopathy [de-identified] : no rash [de-identified] : A & O x 4

## 2022-09-21 ENCOUNTER — APPOINTMENT (OUTPATIENT)
Dept: ORTHOPEDIC SURGERY | Facility: CLINIC | Age: 67
End: 2022-09-21

## 2022-09-21 VITALS — SYSTOLIC BLOOD PRESSURE: 124 MMHG | HEART RATE: 64 BPM | DIASTOLIC BLOOD PRESSURE: 74 MMHG

## 2022-09-21 DIAGNOSIS — S22.080S WEDGE COMPRESSION FRACTURE OF T11-T12 VERTEBRA, SEQUELA: ICD-10-CM

## 2022-09-21 PROCEDURE — 99213 OFFICE O/P EST LOW 20 MIN: CPT

## 2022-10-20 ENCOUNTER — RESULT REVIEW (OUTPATIENT)
Age: 67
End: 2022-10-20

## 2022-10-20 ENCOUNTER — APPOINTMENT (OUTPATIENT)
Dept: HEMATOLOGY ONCOLOGY | Facility: CLINIC | Age: 67
End: 2022-10-20

## 2022-10-20 ENCOUNTER — APPOINTMENT (OUTPATIENT)
Dept: INFUSION THERAPY | Facility: HOSPITAL | Age: 67
End: 2022-10-20

## 2022-10-20 ENCOUNTER — APPOINTMENT (OUTPATIENT)
Dept: CARDIOLOGY | Facility: CLINIC | Age: 67
End: 2022-10-20

## 2022-10-20 VITALS
DIASTOLIC BLOOD PRESSURE: 68 MMHG | WEIGHT: 206.13 LBS | HEART RATE: 67 BPM | TEMPERATURE: 96.8 F | OXYGEN SATURATION: 99 % | RESPIRATION RATE: 16 BRPM | SYSTOLIC BLOOD PRESSURE: 115 MMHG | BODY MASS INDEX: 27.2 KG/M2

## 2022-10-20 DIAGNOSIS — C79.51 SECONDARY MALIGNANT NEOPLASM OF BONE: ICD-10-CM

## 2022-10-20 DIAGNOSIS — C90.00 MULTIPLE MYELOMA NOT HAVING ACHIEVED REMISSION: ICD-10-CM

## 2022-10-20 LAB
BASOPHILS # BLD AUTO: 0.06 K/UL — SIGNIFICANT CHANGE UP (ref 0–0.2)
BASOPHILS NFR BLD AUTO: 1.2 % — SIGNIFICANT CHANGE UP (ref 0–2)
EOSINOPHIL # BLD AUTO: 0.3 K/UL — SIGNIFICANT CHANGE UP (ref 0–0.5)
EOSINOPHIL NFR BLD AUTO: 5.9 % — SIGNIFICANT CHANGE UP (ref 0–6)
HCT VFR BLD CALC: 37.1 % — LOW (ref 39–50)
HGB BLD-MCNC: 12.9 G/DL — LOW (ref 13–17)
IMM GRANULOCYTES NFR BLD AUTO: 0.4 % — SIGNIFICANT CHANGE UP (ref 0–0.9)
LYMPHOCYTES # BLD AUTO: 0.85 K/UL — LOW (ref 1–3.3)
LYMPHOCYTES # BLD AUTO: 16.8 % — SIGNIFICANT CHANGE UP (ref 13–44)
MCHC RBC-ENTMCNC: 33.9 PG — SIGNIFICANT CHANGE UP (ref 27–34)
MCHC RBC-ENTMCNC: 34.8 G/DL — SIGNIFICANT CHANGE UP (ref 32–36)
MCV RBC AUTO: 97.6 FL — SIGNIFICANT CHANGE UP (ref 80–100)
MONOCYTES # BLD AUTO: 1.03 K/UL — HIGH (ref 0–0.9)
MONOCYTES NFR BLD AUTO: 20.4 % — HIGH (ref 2–14)
NEUTROPHILS # BLD AUTO: 2.8 K/UL — SIGNIFICANT CHANGE UP (ref 1.8–7.4)
NEUTROPHILS NFR BLD AUTO: 55.3 % — SIGNIFICANT CHANGE UP (ref 43–77)
NRBC # BLD: 0 /100 WBCS — SIGNIFICANT CHANGE UP (ref 0–0)
PLATELET # BLD AUTO: 192 K/UL — SIGNIFICANT CHANGE UP (ref 150–400)
RBC # BLD: 3.8 M/UL — LOW (ref 4.2–5.8)
RBC # FLD: 13.1 % — SIGNIFICANT CHANGE UP (ref 10.3–14.5)
WBC # BLD: 5.06 K/UL — SIGNIFICANT CHANGE UP (ref 3.8–10.5)
WBC # FLD AUTO: 5.06 K/UL — SIGNIFICANT CHANGE UP (ref 3.8–10.5)

## 2022-10-20 PROCEDURE — 99214 OFFICE O/P EST MOD 30 MIN: CPT

## 2022-10-20 RX ORDER — PANTOPRAZOLE 40 MG/1
40 TABLET, DELAYED RELEASE ORAL DAILY
Qty: 30 | Refills: 3 | Status: DISCONTINUED | COMMUNITY
Start: 2022-06-02 | End: 2022-10-20

## 2022-10-20 RX ORDER — VENETOCLAX 100 MG/1
100 TABLET, FILM COATED ORAL
Qty: 128 | Refills: 0 | Status: DISCONTINUED | COMMUNITY
Start: 2022-05-27 | End: 2022-10-20

## 2022-10-20 RX ORDER — VENETOCLAX 100 MG/1
100 TABLET, FILM COATED ORAL
Qty: 120 | Refills: 0 | Status: DISCONTINUED | COMMUNITY
Start: 2022-06-01 | End: 2022-10-20

## 2022-10-20 NOTE — RESULTS/DATA
[FreeTextEntry1] : WBC- 5.06, ANC- 2.80; Hgb- 12.9, Hct- 37.1; Platelets- 192K.\par ----------------------------------------------------------------------------\par ACC: [id number] EXAM: CT ABDOMEN AND PELVIS OC IC \par PROCEDURE DATE: 01/12/2022 \par INTERPRETATION: CLINICAL INFORMATION: Right lower quadrant pain for \par Multiple myeloma. Previous terminal ileitis.\par \par COMPARISON: CT scan of the abdomen and pelvis from 11/29/2021\par \par CONTRAST/COMPLICATIONS:\par IV Contrast: Omnipaque 350 90 cc administered 10 cc discarded\par Oral Contrast: Omnipaque 300\par Complications: None reported at time of study completion\par \par PROCEDURE:\par CT of the Abdomen and Pelvis was performed.\par Sagittal and coronal reformats were performed.\par \par FINDINGS:\par LOWER CHEST: Within normal limits.\par \par LIVER: Within normal limits.\par BILE DUCTS: Normal caliber.\par GALLBLADDER: Small gallstones without gross inflammation.\par SPLEEN: Within normal limits.\par PANCREAS: Within normal limits.\par ADRENALS: Within normal limits.\par KIDNEYS/URETERS: Left renal cyst measuring up to 7.0 cm in the left lower \par pole. 1.3 cm low-attenuation lesion in the lower pole the left kidney \par laterally which is measuring greater than simple fluid in density.\par \par BLADDER: Bladder is underdistended which limits evaluation.\par REPRODUCTIVE ORGANS: Prominent prostate gland\par \par BOWEL: No bowel obstruction. Appendix is mildly distended measuring up to \par 1.0 cm which measured approximately 9 mm previously. There is question of \par minimally increased enhancement. There is mild surrounding soft tissue \par stranding although fluid was present in this region on the prior study. \par This may represent chronic changes although in a patient with right lower \par quadrant pain, acute appendicitis must also be considered.. Resolution of \par previously visualized distended small bowel. Previously mentioned \par terminal ileitis is not well appreciated on the current study. Colonic \par diverticuli predominantly in the sigmoid without gross inflammation.\par PERITONEUM: No ascites.\par VESSELS: Within normal limits.\par RETROPERITONEUM/LYMPH NODES: No lymphadenopathy.\par ABDOMINAL WALL: Small umbilical hernia containing fat.\par BONES: Status post ORIF of the left hemipelvis with stable postsurgical \par changes. Stable lucencies in bone, most pronounced and T12 and L5. \par Degenerative changes of bone. Spondylolisthesis with L5 anterior to L1 of \par approximately 25-50%.\par \par IMPRESSION:\par \par Mildly distended appendix. Mild surrounding soft tissue stranding where \par fluid was seen on the prior study. This may represent chronic residual \par changes although acute appendicitis must also be considered, especially \par in a patient with right lower quadrant pain. This was discussed with Dr. libby Sorenson at 3:00 PM on 1/12/2022.\par \par Resolution of small bowel obstruction. Stable bone findings.\par \par --- End of Report ---\par \par JEFF PHAM MD; Attending Radiologist\par

## 2022-10-20 NOTE — ASSESSMENT
[FreeTextEntry1] : IgD lambda myeloma s/p cycle 6 RVD on 9/30/21\par Mobilization of stem cells with Zarxio 960mcg subcutaneous daily 10/28/21-11/1/21\par He was admitted to the BMTU on 11/15/21 and received conditioning chemotherapy with Melphalan 100 mg/m2 IV x 2 consecutive days followed by autoPSCT on 11/19/21. \par Hospital course complicated by saddle pulmonary embolism and bilateral LE acute DVTs. On 11/24/21 he underwent a mechanical thrombectomy. IVC filter placement was deferred. He received anticoagulation, now on \par Eliquis. He also had terminal ileitis with ? microperforation, monitored in SICU, treated conservatively with supportive care and antibiotics. He completed a course of Meropenem 12/7/21.\par \par 1) Multiple myeloma- s/p autoPSCT on 11/19/21\par Plan- \par Continue current medications and restrictions as discussed prior to discharge from BMTU\par Continue Acyclovir for ID prophylaxis; Off Diflucan and Mepron\par Continue MVI, Folate daily- switch to OTC MVI after supply complete\par Continue Pantoprazole daily for GI prophylaxis\par Zofran as needed for nausea/vomiting\par Drink plenty of water daily\par Moisturizing cream to the skin several times per day\par Labs reviewed from 3/28/22- IMEL results pending\par Discussed maintenance therapy post autoPSCT- Revlimid +/- Velcade/Dex, with potential side effects discussed during 3/3/22 office visit. \par Patient initiated Revlimid 10 mg po daily on 3/15/22, then held 3/22- 3/27 for URTI symptoms. Resumed on 3/28/22 and completed 28 day supply(1 cycle)\par Patient had second opinion consult Dr. Andrew Stack at [facility] on 3/31/22. Dr. Stack contacted me on 4/1/22 to discuss his recommendations to further management of patient's myeloma.\par Given measurable Urine M-John(24 hr urine), myeloma with t(11;14) fusion detected, Dr. Stack discussed combination Venetoclax daily/Carfilzomib(C1D1 20 mg/m2 with increase to 56 mg/m2 on C1D8 given on days 1, 8, 15 of 28 day cycle) consolidation x 4 cycles. The patient agrees to proceed with this treatment regimen\par He will undergo repeat bone marrow evaluation with FISH myeloma panel and NGS/RNA seq at [facility]. Plan is for late May. \par He will be referred for Cardiology consult to evaluate for cardiac amyloidosis- Echo with strain imaging and Cardiac MRI- done.\par Whole body PET/CT scan to assess for lytic bone disease; prior MRI T-spine(5/30/21)- mild loss of vertebral body height of T-12. Test done on 5/2/22- no new bone lesions, no abnormal FDG activity\par Bone density to assess for osteoporosis-(4/13/22)- impression is normal study.  Plan to begin Xgeva with consolidation therapy. \par Call the office immediately if fever, chills, symptoms of infection\par 07/07/22:\par Carfilzomib 56 mg/m2 IV days 1, 8, 15 of 28 day cycle) consolidation x 4 cycles. Today is C2 day 8. \par Venetoclax 400 mg po daily starting on 6/2/22, he is tolerating well; now increased to 800 mg po daily on 7/2/22. \par Continue Acyclovir prophylaxis\par Continue PPI\par Drink plenty of water daily \par I reviewed lab results from 6/30/22 with patient and his wife, IgD decreased to 8 mg/dL. \par 08/11/22:\par Carfilzomib 56 mg/m2 IV days 1, 8, 15 of 28 day cycle) consolidation x 4 cycles. Today is C3 day 15. \par Venetoclax 400 mg po daily starting on 6/2/22, he is tolerating well; now increased to 800 mg po daily on 7/2/22. \par Continue Acyclovir prophylaxis\par Continue PPI\par Drink plenty of water daily \par Will repeat IMEL with IgD level in 2 weeks\par 09/19/22:\par Carfilzomib 56 mg/m2 IV days 1, 8, 15 of 28 day cycle) consolidation x 4 cycles. On 9/15/22, he received C4 day 15. Decadron 40 mg po weekly with Carfilzomib completed. \par Venetoclax 400 mg po daily starting on 6/2/22, he is tolerating well; now increased to 800 mg po daily on 7/2/22 to complete course of therapy on 9/22/22. \par Continue Acyclovir prophylaxis\par Continue PPI as needed\par Drink plenty of water daily \par Will repeat IMEL with IgD level during next office visit\par He has Telehealth visit with Dr. Stack on 10/3/22 and bone marrow biopsy on 12/5/22. \par 10/20/22:\par Carfilzomib 56 mg/m2 IV days 1, 8, 15 of 28 day cycle) consolidation x 4 cycles. On 9/15/22, he received C4 day 15. Decadron 40 mg po weekly with Carfilzomib completed. \par Venetoclax 400 mg po daily starting on 6/2/22, he is tolerating well; now increased to 800 mg po daily on 7/2/22 to complete course of therapy on 9/22/22. \par Continue Acyclovir prophylaxis\par Continue PPI as needed\par Drink plenty of water daily \par Check IMEL with IgD level, CMP today\par He had Telehealth visit with Dr. Stack on 10/13/22 and bone marrow biopsy on 12/5/22. \par \par Of note, patient tripped over uneven ledge in a garage while on vacation in August 2022, he states any person would have tripped on this. He is Not weak, no neuropathy, no balance problems. He knows to pay attention now to where he is walking. He does require physical therapy. \par \par 2) H/O saddle PE s/p thrombectomy; h/o bilat LE DVT\par Plan- Continue Eliquis 5 mg po 2X/day\par Had followup with Dr. Holley in November 2022\par \par RTC in 1 month \par \par

## 2022-10-20 NOTE — HISTORY OF PRESENT ILLNESS
[de-identified] : 11/2020-Found to have T-12 compression fracture. Saw orthopedist Dr. Candelaria in 1/2021. Referred to endocrinologist Dr. Acosta by Dr. 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 Serum immunofixation showed 1 IgD lambda band and free lambda light chains. Urine immunofixation negative for monoclonal band.\par 4/29/2021–Bone marrow biopsy and bone marrow aspirate consistent with plasma cell myeloma (greater than 90% involvement). Myeloma FISH studies showed CCND1/IGH fusion (27%). Flow cytometry positive for monotypic plasma cells. Lymphocyte immunophenotypic findings showed no diagnostic abnormalities. Cytogenetics with normal male karyotype. [place] red stain negative. Iron stains showed iron stores present. No ring sideroblasts.\par 5/13/21- C1D1 RVd\par  [de-identified] : Since initial HPC transplant consult visit on 6/7/21, he had recent hematology office visit on 8/19/21, Cycle #5 Day#15 Velcade/Decadron. Cycle #5 Revlimid as planned. He describes moderate fatigue, occasional blurry vision, insomnia with Decadron. He denies fever, chills, cough, dyspnea. He received the COVID-19 vaccine(Moderna) on 3/16, 4/13/21; he has booster vaccine on 8/31/21. The patient is very concerned about the delta variant of COVID-19 and asked to discuss isolation/restriction policies. \par \par 10/6/21:\par He completed cycle 6 RVD on 9/30/21. He has moderate fatigue and good appetite. He has occasional blurry vision and was evaluated by Optho with normal evaluation. He had MRI Brain with alton(9/29/21 at [facility])- negative. He denies fever,  chills, cough, shortness of breath. \par \par 12/9/21:\par The patient was admitted to the BMTU on 11/15/21 and received conditioning chemotherapy with Melphalan 100 mg/m2 IV x 2 consecutive days followed by autoPSCT on 11/19/21. \par \par Upon admission, a TLC was placed in IR. Mr. Julio received IV hydration, pain management, anxiolytics, antiemetics, nutritional support, and antibacterial / antiviral / antifungal / GI / PCP and VOD (SOS) prophylaxis. When his ANC dropped below 500 he was started on prophylactic Ciprofloxacin. Labs were monitored on a daily basis, and he received electrolyte repletion and transfusional support as needed. \par \par On 11/19/2021 After pre-medication  received 251 mL of, Autologous mobilized, \par plasma reduced, pooled, thawed, washes HCP apheresis for  over approximately 1 \par hr. Cell counts as follows \par Total MNC( x10^8/kg)=7.22 \par CD34+cells ( x10^6 kg)=4.58 \par Cell Viability (%)= 90 \par Mr. Julio tolerated the infusion well with no adverse resections noted. \par \par While admitted, Mr. Julio experienced pancytopenia related to the high dose chemotherapy conditioning regimen. He was treated with transfusional support. On \par 11/19/21 he experienced a vasovagal episode in the bathroom that was self limiting. On 11/23/21 he had another vasovagal episode with severe abdominal pain and distention. A RRT was called. During the episode he was hypotensive, lactate was 6. A CT A/P showed pneumoperitoneum and terminal ileitis. Surgery was consulted and he was transferred to the SICU. During the episode, he made a troponin. A TTE was completed in the SICU, which showed increased pulmonary pressures. As a result, a CTA of the chest was completed which showed a saddle pulmonary embolism. He was started on full dose heparin. Lower extremity dopplers showed bilateral acute DVTs. On 11/24/21 he underwent a mechanical thrombectomy. IVC filter placement was deferred. \par \par The terminal ileitis and pneumoperitoneum was treated conservatively with supportive care and antibiotics. He completed a course of Meropenem 12/7/21. A \par repeat CT A/P showed unchanged ileitis, with mild increased fluid distention and resolution of free air. The heparin was transitioned to full dose Lovenox, \par and eventually Eliquis. Shortly thereafter he was transferred back to . \par \par Currently, he is stable for discharge home with outpatient follow up at the [facility] and with vascular cardiology. \par \par Since discharge to home on 12/7/21 he has moderate fatigue and slowly improving appetite. He describes loose stool(small volume) twice daily with abdominal bloating and gassiness since discharge but denies nausea, vomiting. \par \par 12/16/21:\par He continues to have moderate fatigue and continued slow improvement in appetite. He started taking Gas-X twice daily after 12/9/21 office visit and stopped on 12/13 as his abd bloating with gassiness significantly improved. He states loose stool has improved, now only once daily since 12/17, but no nausea/vomiting. He still has bilateral lower extremity edema. He scheduled followup with vascular cardiology on 1/13/22. He denies fever, cough, shortness of breath. \par \par 12/22/21:\par He has mild-moderate fatigue and good appetite. He denies abdominal pain/distension and hasn't needed Gas-X for one week. He denies nausea/vomiting, diarrhea. No fever, chills. \par \par 01/05/22:\par He has mild fatigue and good appetite. He denies fever, chills, cough, dyspnea, nausea/vomiting, abdominal pain, diarrhea. He walks on treadmill daily x 20 minutes. \par \par 01/19/22:\par Patient called the office on 1/11/22 to discuss new onset of RLQ pain last night when sleeping and it awakened him when he was changing his position. He went back to sleep. He woke up around 6:30 am and noted sharp pain with movement, therefore, he has been trying not to move around too much. He denies nausea, vomiting, diarrhea. He has eaten breakfast and lunch without difficulty and no associated symptoms. He denies fever, chills, cough, shortness of breath. No acute swelling in lower extremities, no chest pain. \par He states pain is 3-4/10, non-radiating, and only occurs with movement. \par Plan- \par CT Abd/pelvis with oral and IV contrast. \par NPO after 9 am in case CT approved and scan can be done tomorrow afternoon. \par If he develops worsening pain, fever, chills, vomiting or other severe new symptoms he will go to [facility] immediately. \par I contacted patient on 1/12/22 to discuss results of CT Abd/pelvis(1/12/22)- acute appendicitis. Given persistent RLQ pain today, I instructed patient to go to [facility] ER for surgery evaluation. He agrees. Patient admitted to surgical service and treated conservatively with Meropenem IV. His abdominal pain resolved and he was discharged to home on 1/14/22 to finish course of antibiotics with Cipro/Flagyl.\par \par Since discharge on 1/14/22, he feels well overall with only mild fatigue, and appetite is good. He denies abdominal pain, nausea, vomiting, diarrhea, fever, chills. Weight this morning- 188.5 lbs.\par  \par 02/02/22:\par He describes good energy and good appetite. He denies fever, cough, shortness of breath, nausea, vomiting, diarrhea. Weight this morning- 189 lbs.\par \par 2/16/22:\par He describes good energy and good appetite. He denies fever, cough, shortness of breath, nausea, vomiting, diarrhea. He had Vascular Cardio followup with Echo and LE duplex study ordered. \par \par 03/03/22:\par He describes good energy and good appetite. He denies fever, cough, shortness of breath, nausea, vomiting, diarrhea. \par \par 03/30/22:\par Patient called the answering service on 3/19 and spoke to on call physician. He described sore throat/cough for the past three days starting 3/17. He states that the throat is getting better but just now he was febrile to 100.8. I advised going to the ER but he wants to take a Tylenol and wait it out for tonight since he is getting better in general. He agrees that if fever persists overnight, he will go to the ER. \par \par on 3/22, patient calls to give update stating that his sore throat resolved on 3/19 but he developed T- 100.8. He took Tylenol once and he states fever resolved and did not recur. He still has dry cough which is improving, and he is taking Robitussin. He states that day 1 of Revlimid maintenance was on 3/15/22. \par Plan-\par Given recent URTI symptoms with persistent cough, I instructed patient to HOLD Revlimid through 3/27/22, and resume on Monday 3/28/22. Drink plenty of water and rest. \par He will have lab work on 3/28 at [facility] prior to Telehealth visit on 3/30/22. \par If he develops new symptoms he will call the office immediately. \par \par Today, patient states he has mild fatigue and good appetite. He has slight dry cough but he denies fever, chills, sore throat, shortness of breath, nausea, vomiting, diarrhea. 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 \par \par 04/07/22:\par The patient describes mild fatigue and good appetite. He has occasional cough but denies fever, chills, shortness of breath, bone pain. \par \par 05/19/22:\par Patient called the office(4/19) to inform me that he developed blurry vision and light sensitivity since 4/15, he has Optho appointment today. Also, last week he noted onset of few second "zaps" like from an electronic stimulation machine that a physical therapist uses. He feels this sensation at these sites: both ankles, lateral left knee, occasionally on left hip. He estimates total number of events as 5-10 daily. He did have similar symptoms in his right ribs last year which eventually resolved. He does not have paresthesias in his hands and feet. \par He is scheduled for bone marrow biopsy at [facility], [place], operated by [facility] on 5/3/22. He was seen by Cardio for evaluation of cardiac amyloid and had Echo on 4/18, and MRI Cardiac on 4/26. Bone marrow rescheduled for late May. \par He did see Optho and took his contacts out for 1 week and used prescription eye drops, his vision is now normal. The sensation of "zaps" is decreasing. \par He describes mild fatigue and good appetite. He has occasional cough but denies fever, chills, shortness of breath. He did develop low back pain after golfing, at area of prior T-12 compression fracture. He has Ortho followup on 6/8/22. \par \par 07/0722:\par Patient describes mild fatigue, also difficulty sleeping post Decadron. He denies fever, chills, cough, dyspnea. \par \par 08/11/22:\par Patient describes mild fatigue, also difficulty sleeping post Decadron. He saw an oral surgeon for a sore on his lower left gingiva, impression is that it is not osteonecrosis. Oral surgeon is aware he is on Xgeva. He has followup in 2 weeks. He has seen his Ortho surgeon for back spasms. He had MRI T-spine(7/13/22)- also revealed ? subacute fracture left 6th rib. He denies fever, chills, shortness of breath. \par \par 09/19/22:\par The patient will be completing Venetoclax on 9/22/22, has completed 4 cycles of Carfilzomib/Dex on 9/15/22. He complains of moderate fatigue and good appetite. He denies fever, chills, cough, shortness of breath. \par \par 10/20/22:\par Patient had followup with Dr. Stack on 10/13/22 at [facility]. He has mild fatigue and good appetite. He had Ortho spine followup on 9/21/22. He denies fever, chills, cough, shortness of breath.

## 2022-10-20 NOTE — CONSULT LETTER
[Dear  ___] : Dear  [unfilled], [Courtesy Letter:] : I had the pleasure of seeing your patient, [unfilled], in my office today. [Please see my note below.] : Please see my note below. [Consult Closing:] : Thank you very much for allowing me to participate in the care of this patient.  If you have any questions, please do not hesitate to contact me. [Sincerely,] : Sincerely, [DrVladislav  ___] : Dr. BELTRAN [FreeTextEntry2] : Dyana Cheatham M.D.\par [facility]\par [address]\par [place], [place]   [address] [FreeTextEntry3] : \par Pao Sorenson M.D.\par \par  of Medicine\par [employer]\par [facility] \par [facility] [place]\par [address] [place] [address] \[place] [address] \par ph: [phone]\par fax: [phone]

## 2022-10-20 NOTE — PHYSICAL EXAM
[Ambulatory and capable of all self care but unable to carry out any work activities] : Status 2- Ambulatory and capable of all self care but unable to carry out any work activities. Up and about more than 50% of waking hours [Normal] : affect appropriate [de-identified] : anicteric [de-identified] : RRR, normal S1S2 [de-identified] : trace edema bilateral ankles [de-identified] : no cervical/SCV adenopathy [de-identified] : no rash [de-identified] : A & O x 4

## 2022-10-20 NOTE — REVIEW OF SYSTEMS
[Fatigue] : fatigue [Fever] : no fever [Chills] : no chills [Vision Problems] : no vision problems [Mucosal Pain] : no mucosal pain [Chest Pain] : no chest pain [Shortness Of Breath] : no shortness of breath [Cough] : no cough [Abdominal Pain] : no abdominal pain [Vomiting] : no vomiting [Diarrhea] : no diarrhea [Muscle Pain] : no muscle pain [Skin Rash] : no skin rash [Dizziness] : no dizziness [Fainting] : no fainting [Easy Bleeding] : no tendency for easy bleeding [Easy Bruising] : no tendency for easy bruising [FreeTextEntry3] : ( seen by [facility] in 4/2022) [FreeTextEntry4] : no sore throat [FreeTextEntry5] : slight edema in both ankles [FreeTextEntry7] : no nausea [FreeTextEntry9] : no bone pain; muscle spasms in back [de-identified] : no paresthesias

## 2022-10-26 LAB
ALBUMIN MFR SERPL ELPH: 65.1 %
ALBUMIN SERPL ELPH-MCNC: 4.3 G/DL
ALBUMIN SERPL-MCNC: 4.1 G/DL
ALBUMIN/GLOB SERPL: 1.9 RATIO
ALP BLD-CCNC: 82 U/L
ALPHA1 GLOB MFR SERPL ELPH: 5.7 %
ALPHA1 GLOB SERPL ELPH-MCNC: 0.4 G/DL
ALPHA2 GLOB MFR SERPL ELPH: 12.4 %
ALPHA2 GLOB SERPL ELPH-MCNC: 0.8 G/DL
ALT SERPL-CCNC: 26 U/L
ANION GAP SERPL CALC-SCNC: 11 MMOL/L
AST SERPL-CCNC: 21 U/L
B-GLOBULIN MFR SERPL ELPH: 11.7 %
B-GLOBULIN SERPL ELPH-MCNC: 0.7 G/DL
BILIRUB SERPL-MCNC: 0.2 MG/DL
BUN SERPL-MCNC: 19 MG/DL
CALCIUM SERPL-MCNC: 9.9 MG/DL
CHLORIDE SERPL-SCNC: 104 MMOL/L
CO2 SERPL-SCNC: 27 MMOL/L
CREAT SERPL-MCNC: 1.09 MG/DL
DEPRECATED KAPPA LC FREE/LAMBDA SER: 0.91 RATIO
EGFR: 75 ML/MIN/1.73M2
GAMMA GLOB FLD ELPH-MCNC: 0.3 G/DL
GAMMA GLOB MFR SERPL ELPH: 5.1 %
GLUCOSE SERPL-MCNC: 98 MG/DL
IGA SER QL IEP: 14 MG/DL
IGD SER-MCNC: <1 MG/DL
IGG SER QL IEP: 643 MG/DL
IGM SER QL IEP: 26 MG/DL
INTERPRETATION SERPL IEP-IMP: NORMAL
KAPPA LC CSF-MCNC: 0.76 MG/DL
KAPPA LC SERPL-MCNC: 0.69 MG/DL
M PROTEIN SPEC IFE-MCNC: NORMAL
POTASSIUM SERPL-SCNC: 4.5 MMOL/L
PROT SERPL-MCNC: 6.1 G/DL
PROT SERPL-MCNC: 6.3 G/DL
PROT SERPL-MCNC: 6.3 G/DL
SODIUM SERPL-SCNC: 141 MMOL/L

## 2022-11-09 ENCOUNTER — OUTPATIENT (OUTPATIENT)
Dept: OUTPATIENT SERVICES | Facility: HOSPITAL | Age: 67
LOS: 1 days | Discharge: HOME | End: 2022-11-09

## 2022-11-09 DIAGNOSIS — Z98.890 OTHER SPECIFIED POSTPROCEDURAL STATES: Chronic | ICD-10-CM

## 2022-11-09 DIAGNOSIS — R79.89 OTHER SPECIFIED ABNORMAL FINDINGS OF BLOOD CHEMISTRY: ICD-10-CM

## 2022-11-17 ENCOUNTER — LABORATORY RESULT (OUTPATIENT)
Age: 67
End: 2022-11-17

## 2022-11-17 ENCOUNTER — APPOINTMENT (OUTPATIENT)
Dept: HEMATOLOGY ONCOLOGY | Facility: CLINIC | Age: 67
End: 2022-11-17

## 2022-11-17 ENCOUNTER — APPOINTMENT (OUTPATIENT)
Dept: INFUSION THERAPY | Facility: HOSPITAL | Age: 67
End: 2022-11-17

## 2022-11-17 ENCOUNTER — RESULT REVIEW (OUTPATIENT)
Age: 67
End: 2022-11-17

## 2022-11-17 VITALS
SYSTOLIC BLOOD PRESSURE: 116 MMHG | TEMPERATURE: 97 F | HEART RATE: 51 BPM | RESPIRATION RATE: 16 BRPM | OXYGEN SATURATION: 99 % | WEIGHT: 208.31 LBS | DIASTOLIC BLOOD PRESSURE: 78 MMHG | BODY MASS INDEX: 27.49 KG/M2

## 2022-11-17 DIAGNOSIS — C90.00 MULTIPLE MYELOMA NOT HAVING ACHIEVED REMISSION: ICD-10-CM

## 2022-11-17 DIAGNOSIS — C79.51 SECONDARY MALIGNANT NEOPLASM OF BONE: ICD-10-CM

## 2022-11-17 LAB
BASOPHILS # BLD AUTO: 0.04 K/UL — SIGNIFICANT CHANGE UP (ref 0–0.2)
BASOPHILS NFR BLD AUTO: 0.8 % — SIGNIFICANT CHANGE UP (ref 0–2)
EOSINOPHIL # BLD AUTO: 0.1 K/UL — SIGNIFICANT CHANGE UP (ref 0–0.5)
EOSINOPHIL NFR BLD AUTO: 1.9 % — SIGNIFICANT CHANGE UP (ref 0–6)
HCT VFR BLD CALC: 36.3 % — LOW (ref 39–50)
HGB BLD-MCNC: 12.4 G/DL — LOW (ref 13–17)
IMM GRANULOCYTES NFR BLD AUTO: 0.2 % — SIGNIFICANT CHANGE UP (ref 0–0.9)
LYMPHOCYTES # BLD AUTO: 0.76 K/UL — LOW (ref 1–3.3)
LYMPHOCYTES # BLD AUTO: 14.6 % — SIGNIFICANT CHANGE UP (ref 13–44)
MCHC RBC-ENTMCNC: 33.2 PG — SIGNIFICANT CHANGE UP (ref 27–34)
MCHC RBC-ENTMCNC: 34.2 G/DL — SIGNIFICANT CHANGE UP (ref 32–36)
MCV RBC AUTO: 97.3 FL — SIGNIFICANT CHANGE UP (ref 80–100)
MONOCYTES # BLD AUTO: 0.79 K/UL — SIGNIFICANT CHANGE UP (ref 0–0.9)
MONOCYTES NFR BLD AUTO: 15.2 % — HIGH (ref 2–14)
NEUTROPHILS # BLD AUTO: 3.51 K/UL — SIGNIFICANT CHANGE UP (ref 1.8–7.4)
NEUTROPHILS NFR BLD AUTO: 67.3 % — SIGNIFICANT CHANGE UP (ref 43–77)
NRBC # BLD: 0 /100 WBCS — SIGNIFICANT CHANGE UP (ref 0–0)
PLATELET # BLD AUTO: 174 K/UL — SIGNIFICANT CHANGE UP (ref 150–400)
RBC # BLD: 3.73 M/UL — LOW (ref 4.2–5.8)
RBC # FLD: 13.2 % — SIGNIFICANT CHANGE UP (ref 10.3–14.5)
WBC # BLD: 5.21 K/UL — SIGNIFICANT CHANGE UP (ref 3.8–10.5)
WBC # FLD AUTO: 5.21 K/UL — SIGNIFICANT CHANGE UP (ref 3.8–10.5)

## 2022-11-17 PROCEDURE — 99214 OFFICE O/P EST MOD 30 MIN: CPT

## 2022-11-17 NOTE — CONSULT LETTER
[Dear  ___] : Dear  [unfilled], [Courtesy Letter:] : I had the pleasure of seeing your patient, [unfilled], in my office today. [Please see my note below.] : Please see my note below. [Consult Closing:] : Thank you very much for allowing me to participate in the care of this patient.  If you have any questions, please do not hesitate to contact me. [Sincerely,] : Sincerely, [DrVladislav  ___] : Dr. BELTRAN [FreeTextEntry2] : Dyana Cheatham M.D.\par [facility] [place]\par [address]\par [place]   [address] [FreeTextEntry3] : \par Pao Sorenson M.D.\par \par  of Medicine\par [employer] [employer] [employer]\par [facility] \par [facility] [place]\par [address] [place] [address] \[place] [address] \par ph: [phone]\par fax: [phone]

## 2022-11-17 NOTE — RESULTS/DATA
[FreeTextEntry1] : WBC- 5.21, ANC- 3.51; Hgb- 12.4, Hct- 36.3; Platelets- 174K.\par ----------------------------------------------------------------------------\par ACC: [id number] EXAM: CT ABDOMEN AND PELVIS OC IC \par PROCEDURE DATE: 01/12/2022 \par INTERPRETATION: CLINICAL INFORMATION: Right lower quadrant pain for \par Multiple myeloma. Previous terminal ileitis.\par \par COMPARISON: CT scan of the abdomen and pelvis from 11/29/2021\par \par CONTRAST/COMPLICATIONS:\par IV Contrast: Omnipaque 350 90 cc administered 10 cc discarded\par Oral Contrast: Omnipaque 300\par Complications: None reported at time of study completion\par \par PROCEDURE:\par CT of the Abdomen and Pelvis was performed.\par Sagittal and coronal reformats were performed.\par \par FINDINGS:\par LOWER CHEST: Within normal limits.\par \par LIVER: Within normal limits.\par BILE DUCTS: Normal caliber.\par GALLBLADDER: Small gallstones without gross inflammation.\par SPLEEN: Within normal limits.\par PANCREAS: Within normal limits.\par ADRENALS: Within normal limits.\par KIDNEYS/URETERS: Left renal cyst measuring up to 7.0 cm in the left lower \par pole. 1.3 cm low-attenuation lesion in the lower pole the left kidney \par laterally which is measuring greater than simple fluid in density.\par \par BLADDER: Bladder is underdistended which limits evaluation.\par REPRODUCTIVE ORGANS: Prominent prostate gland\par \par BOWEL: No bowel obstruction. Appendix is mildly distended measuring up to \par 1.0 cm which measured approximately 9 mm previously. There is question of \par minimally increased enhancement. There is mild surrounding soft tissue \par stranding although fluid was present in this region on the prior study. \par This may represent chronic changes although in a patient with right lower \par quadrant pain, acute appendicitis must also be considered.. Resolution of \par previously visualized distended small bowel. Previously mentioned \par terminal ileitis is not well appreciated on the current study. Colonic \par diverticuli predominantly in the sigmoid without gross inflammation.\par PERITONEUM: No ascites.\par VESSELS: Within normal limits.\par RETROPERITONEUM/LYMPH NODES: No lymphadenopathy.\par ABDOMINAL WALL: Small umbilical hernia containing fat.\par BONES: Status post ORIF of the left hemipelvis with stable postsurgical \par changes. Stable lucencies in bone, most pronounced and T12 and L5. \par Degenerative changes of bone. Spondylolisthesis with L5 anterior to L1 of \par approximately 25-50%.\par \par IMPRESSION:\par \par Mildly distended appendix. Mild surrounding soft tissue stranding where \par fluid was seen on the prior study. This may represent chronic residual \par changes although acute appendicitis must also be considered, especially \par in a patient with right lower quadrant pain. This was discussed with Dr. libby Sorenson at 3:00 PM on 1/12/2022.\par \par Resolution of small bowel obstruction. Stable bone findings.\par \par --- End of Report ---\par \par JEFF PHAM MD; Attending Radiologist\par

## 2022-11-17 NOTE — PHYSICAL EXAM
[Ambulatory and capable of all self care but unable to carry out any work activities] : Status 2- Ambulatory and capable of all self care but unable to carry out any work activities. Up and about more than 50% of waking hours [Normal] : affect appropriate [de-identified] : anicteric [de-identified] : RRR, normal S1S2 [de-identified] : trace edema bilateral ankles [de-identified] : no cervical/SCV adenopathy [de-identified] : no rash [de-identified] : A & O x 4

## 2022-11-17 NOTE — ASSESSMENT
[FreeTextEntry1] : IgD lambda myeloma s/p cycle 6 RVD on 9/30/21\par Mobilization of stem cells with Zarxio 960mcg subcutaneous daily 10/28/21-11/1/21\par He was admitted to the BMTU on 11/15/21 and received conditioning chemotherapy with Melphalan 100 mg/m2 IV x 2 consecutive days followed by autoPSCT on 11/19/21. \par Hospital course complicated by saddle pulmonary embolism and bilateral LE acute DVTs. On 11/24/21 he underwent a mechanical thrombectomy. IVC filter placement was deferred. He received anticoagulation, now on \par Eliquis. He also had terminal ileitis with ? microperforation, monitored in SICU, treated conservatively with supportive care and antibiotics. He completed a course of Meropenem 12/7/21.\par \par 1) Multiple myeloma- s/p autoPSCT on 11/19/21\par Plan- \par Continue current medications and restrictions as discussed prior to discharge from BMTU\par Continue Acyclovir for ID prophylaxis; Off Diflucan and Mepron\par Continue MVI, Folate daily- switch to OTC MVI after supply complete\par Continue Pantoprazole daily for GI prophylaxis\par Zofran as needed for nausea/vomiting\par Drink plenty of water daily\par Moisturizing cream to the skin several times per day\par Labs reviewed from 3/28/22- IMEL results pending\par Discussed maintenance therapy post autoPSCT- Revlimid +/- Velcade/Dex, with potential side effects discussed during 3/3/22 office visit. \par Patient initiated Revlimid 10 mg po daily on 3/15/22, then held 3/22- 3/27 for URTI symptoms. Resumed on 3/28/22 and completed 28 day supply(1 cycle)\par Patient had second opinion consult Dr. Andrew Stack at [facility] on 3/31/22. Dr. Stack contacted me on 4/1/22 to discuss his recommendations to further management of patient's myeloma.\par Given measurable Urine M-John(24 hr urine), myeloma with t(11;14) fusion detected, Dr. Stack discussed combination Venetoclax daily/Carfilzomib(C1D1 20 mg/m2 with increase to 56 mg/m2 on C1D8 given on days 1, 8, 15 of 28 day cycle) consolidation x 4 cycles. The patient agrees to proceed with this treatment regimen\par He will undergo repeat bone marrow evaluation with FISH myeloma panel and NGS/RNA seq at [facility]. Plan is for late May. \par He will be referred for Cardiology consult to evaluate for cardiac amyloidosis- Echo with strain imaging and Cardiac MRI- done.\par Whole body PET/CT scan to assess for lytic bone disease; prior MRI T-spine(5/30/21)- mild loss of vertebral body height of T-12. Test done on 5/2/22- no new bone lesions, no abnormal FDG activity\par Bone density to assess for osteoporosis-(4/13/22)- impression is normal study.  Plan to begin Xgeva with consolidation therapy. \par Call the office immediately if fever, chills, symptoms of infection\par 07/07/22:\par Carfilzomib 56 mg/m2 IV days 1, 8, 15 of 28 day cycle) consolidation x 4 cycles. Today is C2 day 8. \par Venetoclax 400 mg po daily starting on 6/2/22, he is tolerating well; now increased to 800 mg po daily on 7/2/22. \par Continue Acyclovir prophylaxis\par Continue PPI\par Drink plenty of water daily \par I reviewed lab results from 6/30/22 with patient and his wife, IgD decreased to 8 mg/dL. \par 08/11/22:\par Carfilzomib 56 mg/m2 IV days 1, 8, 15 of 28 day cycle) consolidation x 4 cycles. Today is C3 day 15. \par Venetoclax 400 mg po daily starting on 6/2/22, he is tolerating well; now increased to 800 mg po daily on 7/2/22. \par Continue Acyclovir prophylaxis\par Continue PPI\par Drink plenty of water daily \par Will repeat IMEL with IgD level in 2 weeks\par 09/19/22:\par Carfilzomib 56 mg/m2 IV days 1, 8, 15 of 28 day cycle) consolidation x 4 cycles. On 9/15/22, he received C4 day 15. Decadron 40 mg po weekly with Carfilzomib completed. \par Venetoclax 400 mg po daily starting on 6/2/22, he is tolerating well; now increased to 800 mg po daily on 7/2/22 to complete course of therapy on 9/22/22. \par Continue Acyclovir prophylaxis\par Continue PPI as needed\par Drink plenty of water daily \par Will repeat IMEL with IgD level during next office visit\par He has Telehealth visit with Dr. Stack on 10/3/22 and bone marrow biopsy on 12/5/22. \par 10/20/22:\par Carfilzomib 56 mg/m2 IV days 1, 8, 15 of 28 day cycle) consolidation x 4 cycles. On 9/15/22, he received C4 day 15. Decadron 40 mg po weekly with Carfilzomib completed. \par Venetoclax 400 mg po daily starting on 6/2/22, he is tolerating well; now increased to 800 mg po daily on 7/2/22 to complete course of therapy on 9/22/22. \par Continue Acyclovir prophylaxis\par Continue PPI as needed\par Drink plenty of water daily \par Check IMEL with IgD level, CMP today\par He had Telehealth visit with Dr. Stack on 10/13/22 and bone marrow biopsy on 12/5/22. \par 11/17/22:\par Completed Carfilzomib/Dex/Venetoclax consolidation x 4 cycles\par Continue Acyclovir prophylaxis\par Continue PPI as needed\par Drink plenty of water daily \par Check IMEL with IgD level, CMP, TFT's, Testosterone, Ferritin today\par Schedule PFT's at 1 yr post transplant to assess vital organ function\par Echo done by Cardiology\par He had Telehealth visit with Dr. Stack on 10/13/22 and bone marrow biopsy on 12/5/22. He has 24 hr urine for UPEP bone by Dr. Stack. \par Schedule post transplant vaccines for 12 months post autoPSCT\par \par Of note, patient tripped over uneven ledge in a garage while on vacation in August 2022, he states any person would have tripped on this. He is Not weak, no neuropathy, no balance problems. He knows to pay attention now to where he is walking. He does require physical therapy. \par \par 2) H/O saddle PE s/p thrombectomy; h/o bilat LE DVT\par Plan- Continue Eliquis 5 mg po 2X/day\par Had followup with Dr. Holley in November 2022\par \par RTC in 1 month \par \par

## 2022-11-17 NOTE — HISTORY OF PRESENT ILLNESS
[de-identified] : 11/2020-Found to have T-12 compression fracture. Saw orthopedist Dr. Candelaria in 1/2021. Referred to endocrinologist Dr. Acosta by Dr. 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 Serum immunofixation showed 1 IgD lambda band and free lambda light chains. Urine immunofixation negative for monoclonal band.\par 4/29/2021–Bone marrow biopsy and bone marrow aspirate consistent with plasma cell myeloma (greater than 90% involvement). Myeloma FISH studies showed CCND1/IGH fusion (27%). Flow cytometry positive for monotypic plasma cells. Lymphocyte immunophenotypic findings showed no diagnostic abnormalities. Cytogenetics with normal male karyotype. [place] red stain negative. Iron stains showed iron stores present. No ring sideroblasts.\par 5/13/21- C1D1 RVd\par  [de-identified] : Since initial HPC transplant consult visit on 6/7/21, he had recent hematology office visit on 8/19/21, Cycle #5 Day#15 Velcade/Decadron. Cycle #5 Revlimid as planned. He describes moderate fatigue, occasional blurry vision, insomnia with Decadron. He denies fever, chills, cough, dyspnea. He received the COVID-19 vaccine(Moderna) on 3/16, 4/13/21; he has booster vaccine on 8/31/21. The patient is very concerned about the delta variant of COVID-19 and asked to discuss isolation/restriction policies. \par \par 10/6/21:\par He completed cycle 6 RVD on 9/30/21. He has moderate fatigue and good appetite. He has occasional blurry vision and was evaluated by Optho with normal evaluation. He had MRI Brain with alton(9/29/21 at [facility])- negative. He denies fever,  chills, cough, shortness of breath. \par \par 12/9/21:\par The patient was admitted to the BMTU on 11/15/21 and received conditioning chemotherapy with Melphalan 100 mg/m2 IV x 2 consecutive days followed by autoPSCT on 11/19/21. \par \par Upon admission, a TLC was placed in IR. Mr. Julio received IV hydration, pain management, anxiolytics, antiemetics, nutritional support, and antibacterial / antiviral / antifungal / GI / PCP and VOD (SOS) prophylaxis. When his ANC dropped below 500 he was started on prophylactic Ciprofloxacin. Labs were monitored on a daily basis, and he received electrolyte repletion and transfusional support as needed. \par \par On 11/19/2021 After pre-medication  received 251 mL of, Autologous mobilized, \par plasma reduced, pooled, thawed, washes HCP apheresis for  over approximately 1 \par hr. Cell counts as follows \par Total MNC( x10^8/kg)=7.22 \par CD34+cells ( x10^6 kg)=4.58 \par Cell Viability (%)= 90 \par Mr. Julio tolerated the infusion well with no adverse resections noted. \par \par While admitted, Mr. Julio experienced pancytopenia related to the high dose chemotherapy conditioning regimen. He was treated with transfusional support. On \par 11/19/21 he experienced a vasovagal episode in the bathroom that was self limiting. On 11/23/21 he had another vasovagal episode with severe abdominal pain and distention. A RRT was called. During the episode he was hypotensive, lactate was 6. A CT A/P showed pneumoperitoneum and terminal ileitis. Surgery was consulted and he was transferred to the SICU. During the episode, he made a troponin. A TTE was completed in the SICU, which showed increased pulmonary pressures. As a result, a CTA of the chest was completed which showed a saddle pulmonary embolism. He was started on full dose heparin. Lower extremity dopplers showed bilateral acute DVTs. On 11/24/21 he underwent a mechanical thrombectomy. IVC filter placement was deferred. \par \par The terminal ileitis and pneumoperitoneum was treated conservatively with supportive care and antibiotics. He completed a course of Meropenem 12/7/21. A \par repeat CT A/P showed unchanged ileitis, with mild increased fluid distention and resolution of free air. The heparin was transitioned to full dose Lovenox, \par and eventually Eliquis. Shortly thereafter he was transferred back to . \par \par Currently, he is stable for discharge home with outpatient follow up at the [facility] and with vascular cardiology. \par \par Since discharge to home on 12/7/21 he has moderate fatigue and slowly improving appetite. He describes loose stool(small volume) twice daily with abdominal bloating and gassiness since discharge but denies nausea, vomiting. \par \par 12/16/21:\par He continues to have moderate fatigue and continued slow improvement in appetite. He started taking Gas-X twice daily after 12/9/21 office visit and stopped on 12/13 as his abd bloating with gassiness significantly improved. He states loose stool has improved, now only once daily since 12/17, but no nausea/vomiting. He still has bilateral lower extremity edema. He scheduled followup with vascular cardiology on 1/13/22. He denies fever, cough, shortness of breath. \par \par 12/22/21:\par He has mild-moderate fatigue and good appetite. He denies abdominal pain/distension and hasn't needed Gas-X for one week. He denies nausea/vomiting, diarrhea. No fever, chills. \par \par 01/05/22:\par He has mild fatigue and good appetite. He denies fever, chills, cough, dyspnea, nausea/vomiting, abdominal pain, diarrhea. He walks on treadmill daily x 20 minutes. \par \par 01/19/22:\par Patient called the office on 1/11/22 to discuss new onset of RLQ pain last night when sleeping and it awakened him when he was changing his position. He went back to sleep. He woke up around 6:30 am and noted sharp pain with movement, therefore, he has been trying not to move around too much. He denies nausea, vomiting, diarrhea. He has eaten breakfast and lunch without difficulty and no associated symptoms. He denies fever, chills, cough, shortness of breath. No acute swelling in lower extremities, no chest pain. \par He states pain is 3-4/10, non-radiating, and only occurs with movement. \par Plan- \par CT Abd/pelvis with oral and IV contrast. \par NPO after 9 am in case CT approved and scan can be done tomorrow afternoon. \par If he develops worsening pain, fever, chills, vomiting or other severe new symptoms he will go to [facility] immediately. \par I contacted patient on 1/12/22 to discuss results of CT Abd/pelvis(1/12/22)- acute appendicitis. Given persistent RLQ pain today, I instructed patient to go to [facility] ER for surgery evaluation. He agrees. Patient admitted to surgical service and treated conservatively with Meropenem IV. His abdominal pain resolved and he was discharged to home on 1/14/22 to finish course of antibiotics with Cipro/Flagyl.\par \par Since discharge on 1/14/22, he feels well overall with only mild fatigue, and appetite is good. He denies abdominal pain, nausea, vomiting, diarrhea, fever, chills. Weight this morning- 188.5 lbs.\par  \par 02/02/22:\par He describes good energy and good appetite. He denies fever, cough, shortness of breath, nausea, vomiting, diarrhea. Weight this morning- 189 lbs.\par \par 2/16/22:\par He describes good energy and good appetite. He denies fever, cough, shortness of breath, nausea, vomiting, diarrhea. He had Vascular Cardio followup with Echo and LE duplex study ordered. \par \par 03/03/22:\par He describes good energy and good appetite. He denies fever, cough, shortness of breath, nausea, vomiting, diarrhea. \par \par 03/30/22:\par Patient called the answering service on 3/19 and spoke to on call physician. He described sore throat/cough for the past three days starting 3/17. He states that the throat is getting better but just now he was febrile to 100.8. I advised going to the ER but he wants to take a Tylenol and wait it out for tonight since he is getting better in general. He agrees that if fever persists overnight, he will go to the ER. \par \par on 3/22, patient calls to give update stating that his sore throat resolved on 3/19 but he developed T- 100.8. He took Tylenol once and he states fever resolved and did not recur. He still has dry cough which is improving, and he is taking Robitussin. He states that day 1 of Revlimid maintenance was on 3/15/22. \par Plan-\par Given recent URTI symptoms with persistent cough, I instructed patient to HOLD Revlimid through 3/27/22, and resume on Monday 3/28/22. Drink plenty of water and rest. \par He will have lab work on 3/28 at [facility] prior to Telehealth visit on 3/30/22. \par If he develops new symptoms he will call the office immediately. \par \par Today, patient states he has mild fatigue and good appetite. He has slight dry cough but he denies fever, chills, sore throat, shortness of breath, nausea, vomiting, diarrhea. 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 \par \par 04/07/22:\par The patient describes mild fatigue and good appetite. He has occasional cough but denies fever, chills, shortness of breath, bone pain. \par \par 05/19/22:\par Patient called the office(4/19) to inform me that he developed blurry vision and light sensitivity since 4/15, he has Optho appointment today. Also, last week he noted onset of few second "zaps" like from an electronic stimulation machine that a physical therapist uses. He feels this sensation at these sites: both ankles, lateral left knee, occasionally on left hip. He estimates total number of events as 5-10 daily. He did have similar symptoms in his right ribs last year which eventually resolved. He does not have paresthesias in his hands and feet. \par He is scheduled for bone marrow biopsy at [facility] on 5/3/22. He was seen by Cardio for evaluation of cardiac amyloid and had Echo on 4/18, and MRI Cardiac on 4/26. Bone marrow rescheduled for late May. \par He did see Optho and took his contacts out for 1 week and used prescription eye drops, his vision is now normal. The sensation of "zaps" is decreasing. \par He describes mild fatigue and good appetite. He has occasional cough but denies fever, chills, shortness of breath. He did develop low back pain after golfing, at area of prior T-12 compression fracture. He has Ortho followup on 6/8/22. \par \par 07/0722:\par Patient describes mild fatigue, also difficulty sleeping post Decadron. He denies fever, chills, cough, dyspnea. \par \par 08/11/22:\par Patient describes mild fatigue, also difficulty sleeping post Decadron. He saw an oral surgeon for a sore on his lower left gingiva, impression is that it is not osteonecrosis. Oral surgeon is aware he is on Xgeva. He has followup in 2 weeks. He has seen his Ortho surgeon for back spasms. He had MRI T-spine(7/13/22)- also revealed ? subacute fracture left 6th rib. He denies fever, chills, shortness of breath. \par \par 09/19/22:\par The patient will be completing Venetoclax on 9/22/22, has completed 4 cycles of Carfilzomib/Dex on 9/15/22. He complains of moderate fatigue and good appetite. He denies fever, chills, cough, shortness of breath. \par \par 10/20/22:\par Patient had followup with Dr. Stack on 10/13/22 at [facility]. He has mild fatigue and good appetite. He had Ortho spine followup on 9/21/22. He denies fever, chills, cough, shortness of breath. \par \par 11/17/22:\par Patient describes increased fatigue and gastrointestinal symptoms x 8 days starting 10/31/22, now resolved. No fever, COVID-19 home test negative. He has mild fatigue and good appetite. He denies fever, cough, shortness of breath

## 2022-11-17 NOTE — REVIEW OF SYSTEMS
[Fatigue] : fatigue [Fever] : no fever [Chills] : no chills [Vision Problems] : no vision problems [Mucosal Pain] : no mucosal pain [Chest Pain] : no chest pain [Shortness Of Breath] : no shortness of breath [Cough] : no cough [Abdominal Pain] : no abdominal pain [Vomiting] : no vomiting [Diarrhea] : no diarrhea [Skin Rash] : no skin rash [Dizziness] : no dizziness [Fainting] : no fainting [Easy Bleeding] : no tendency for easy bleeding [Easy Bruising] : no tendency for easy bruising [FreeTextEntry3] : ( seen by [facility] in 4/2022) [FreeTextEntry4] : no sore throat [FreeTextEntry5] : slight edema in both ankles [FreeTextEntry7] : no nausea [FreeTextEntry9] : no bone pain; muscle spasms in back [de-identified] : no paresthesias

## 2022-11-18 LAB
ALBUMIN SERPL ELPH-MCNC: 4.1 G/DL
ALP BLD-CCNC: 91 U/L
ALT SERPL-CCNC: 37 U/L
ANION GAP SERPL CALC-SCNC: 8 MMOL/L
AST SERPL-CCNC: 23 U/L
BILIRUB SERPL-MCNC: 0.3 MG/DL
BUN SERPL-MCNC: 14 MG/DL
CALCIUM SERPL-MCNC: 9.1 MG/DL
CHLORIDE SERPL-SCNC: 106 MMOL/L
CO2 SERPL-SCNC: 27 MMOL/L
CREAT SERPL-MCNC: 1.02 MG/DL
EGFR: 81 ML/MIN/1.73M2
GLUCOSE SERPL-MCNC: 120 MG/DL
IGD SER-MCNC: <1 MG/DL
MAGNESIUM SERPL-MCNC: 1.9 MG/DL
POTASSIUM SERPL-SCNC: 4.7 MMOL/L
PROT SERPL-MCNC: 5.8 G/DL
SODIUM SERPL-SCNC: 141 MMOL/L

## 2022-11-21 ENCOUNTER — EMERGENCY (EMERGENCY)
Facility: HOSPITAL | Age: 67
LOS: 1 days | Discharge: HOME | End: 2022-11-21
Attending: EMERGENCY MEDICINE | Admitting: EMERGENCY MEDICINE
Payer: MEDICARE

## 2022-11-21 VITALS
HEART RATE: 65 BPM | SYSTOLIC BLOOD PRESSURE: 113 MMHG | OXYGEN SATURATION: 97 % | RESPIRATION RATE: 18 BRPM | DIASTOLIC BLOOD PRESSURE: 75 MMHG | TEMPERATURE: 98 F | WEIGHT: 201.94 LBS | HEIGHT: 73 IN

## 2022-11-21 VITALS
SYSTOLIC BLOOD PRESSURE: 118 MMHG | RESPIRATION RATE: 18 BRPM | OXYGEN SATURATION: 98 % | TEMPERATURE: 98 F | DIASTOLIC BLOOD PRESSURE: 80 MMHG | HEART RATE: 68 BPM

## 2022-11-21 DIAGNOSIS — Z98.890 OTHER SPECIFIED POSTPROCEDURAL STATES: Chronic | ICD-10-CM

## 2022-11-21 LAB
RAPID RVP RESULT: DETECTED
RV+EV RNA SPEC QL NAA+PROBE: DETECTED
SARS-COV-2 RNA SPEC QL NAA+PROBE: SIGNIFICANT CHANGE UP

## 2022-11-21 PROCEDURE — 99283 EMERGENCY DEPT VISIT LOW MDM: CPT | Mod: 25

## 2022-11-21 PROCEDURE — 0225U NFCT DS DNA&RNA 21 SARSCOV2: CPT

## 2022-11-21 PROCEDURE — 71046 X-RAY EXAM CHEST 2 VIEWS: CPT | Mod: 26

## 2022-11-21 PROCEDURE — 99283 EMERGENCY DEPT VISIT LOW MDM: CPT | Mod: FS

## 2022-11-21 PROCEDURE — 71046 X-RAY EXAM CHEST 2 VIEWS: CPT

## 2022-11-21 RX ORDER — ACETAMINOPHEN 500 MG
975 TABLET ORAL ONCE
Refills: 0 | Status: COMPLETED | OUTPATIENT
Start: 2022-11-21 | End: 2022-11-21

## 2022-11-21 RX ADMIN — Medication 200 MILLIGRAM(S): at 15:07

## 2022-11-21 RX ADMIN — Medication 975 MILLIGRAM(S): at 15:07

## 2022-11-21 NOTE — ED PROVIDER NOTE - CLINICAL SUMMARY MEDICAL DECISION MAKING FREE TEXT BOX
Dash Waddell for Dr. Feng : 66 y/o male with h/o multiple myeloma presents to the ED with congestion, headaches, and cough for the past 4 days. Pt had a sore throat last week which has resolved but continues to have congestion, HA, and nonproductive cough. Denies fever, chills, SOB, CP. Sick contact at home with granddaughter having RSV. Patient had a history of stem cell transplant in November 2021. Currently not on any immunosuppressant medication. Took Tylenol with HA relief.  Nonsmoker.  PE- VSS. Afebrile. NAD. heart S1, S2 regular rhythm. No murmur, gallop, or rub. Lungs clear to ascultation. Abdomen soft, nontender, no masses. Extremities no edema. Skin warm and dry, no rash.   Impression- Cough, pt with history of multiple myeloma concerned for PNA. Plan for CXR, RVP, and cough medicine. Close PCP followup.

## 2022-11-21 NOTE — ED PROVIDER NOTE - NS ED SCRIBE STATEMENT
Progress Note -Surgery PA  Emanuel Carlson 63 y o  male MRN: [id number]  Unit/Bed#: -01 Encounter: [id number]    ASSESSMENT/PLAN:  Problem List     * (Principal) Hypotension (Chronic)    Chronic kidney disease with end stage renal failure on dialysis (HCC) (Chronic)    Hyponatremia    Multiple wounds of skin    Polycystic liver disease (Chronic)    Thrombocytopenia (HCC)    Gram-negative bacteremia    Polycystic kidney disease (Chronic)    ADPKD (autosomal dominant polycystic kidney) (Chronic)    Benign essential hypertension (Chronic)    Peripheral neuropathy (Chronic)    Atrial fibrillation (HCC)    Anemia due to chronic kidney disease, on chronic dialysis (HCC) (Chronic)    Ascites   64 yo M POD #1 s/p excisional biopsy to rule out calciphylaxis  No problems with biopsy site  Doing well  No need for suture removal     · Pain control PRN   · Care per medical team  · Call surgical team if needed  VTE Pharmacologic Prophylaxis: Heparin    Subjective/Objective     Subjective: Elaina Martinez Objective/Physical Exam: Blood pressure 99/58, pulse 99, temperature 97 9 °F (36 6 °C), temperature source Oral, resp  rate 18, height 5' 8" (1 727 m), weight 94 5 kg (208 lb 5 4 oz), SpO2 94 %  ,Body mass index is 31 68 kg/m²  General appearance: alert and oriented, in no acute distress   L thigh: incision intact with suture  No edema          Current Facility-Administered Medications:     acetaminophen (TYLENOL) tablet 650 mg, 650 mg, Oral, Q6H PRN, Sanna Benitez PA-C, 650 mg at 10/10/19 1318    albumin human (FLEXBUMIN) 25 % injection 25 g, 25 g, Intravenous, PRN, Akosua Thibodeaux CRNP, Last Rate: 0 mL/hr at 10/08/19 1006, 25 g at 10/10/19 1745    b complex-vitamin C-folic acid (NEPHROCAPS) capsule 1 capsule, 1 capsule, Oral, Daily With Tato Hill PA-C, 1 capsule at 10/10/19 2012    docusate sodium (COLACE) capsule 100 mg, 100 mg, Oral, BID, Kezia Beauchamp MD, 100 mg at 10/10/19 2012    epoetin bob (EPOGEN,PROCRIT) injection 4,000 Units, 4,000 Units, Intravenous, Once per day on Tue Thu Sat, Debi Camilo, CRNP, 4,000 Units at 10/10/19 1836    gabapentin (NEURONTIN) capsule 100 mg, 100 mg, Oral, Daily, Luis Carlos Adamson PA-C, 100 mg at 10/10/19 1317    heparin (porcine) subcutaneous injection 5,000 Units, 5,000 Units, Subcutaneous, Q8H [facility], 5,000 Units at 10/11/19 0446 **AND** Platelet count, , , Once, Luis Carlos Adamson PA-C    Lidocaine Viscous HCl (XYLOCAINE) 2 % mucosal solution 15 mL, 15 mL, Swish & Spit, 4x Daily PRN, Luis Carlos Adamson PA-C    lidocaine-epinephrine (XYLOCAINE-MPF/EPINEPHRINE) 1%-1:200,000 injection 20 mL, 20 mL, Infiltration, Once, Homero Valentin DO    metoprolol tartrate (LOPRESSOR) partial tablet 12 5 mg, 12 5 mg, Oral, Q12H [facility], Luis Carlos Adamson PA-C, Stopped at 10/10/19 0900    midodrine (PROAMATINE) tablet 10 mg, 10 mg, Oral, Before Dialysis, Luis Carlos Adamson PA-C, 10 mg at 10/10/19 1457    midodrine (PROAMATINE) tablet 10 mg, 10 mg, Oral, Once in dialysis, Debi Camilo CRNP, Stopped at 10/08/19 1320    nystatin (MYCOSTATIN) powder, , Topical, BID, Nicole Curiel PA-C    ondansetron [facility]) injection 4 mg, 4 mg, Intravenous, Q6H PRN, Luis Carlos Adamson PA-C    oxyCODONE (ROXICODONE) IR tablet 5 mg, 5 mg, Oral, Q4H PRN, Luis Carlos Adamson PA-C, 5 mg at 10/10/19 0303    pantoprazole (PROTONIX) EC tablet 40 mg, 40 mg, Oral, Early Morning, Luis Carlos Adamson PA-C, 40 mg at 10/11/19 0446    sevelamer (RENAGEL) tablet 800 mg, 800 mg, Oral, TID With Meals, SAM Graves, 800 mg at 10/10/19 2012    simethicone (MYLICON) chewable tablet 80 mg, 80 mg, Oral, Q6H PRN, Luis Carlos Adamson PA-C    Lab, Imaging and other studies:    None    Ref Range & Units 10/11/19 0443 10/10/19 0442 10/8/19 0500   WBC 4 31 - 10 16 Thousand/uL 11  16High   12  69High   8 23    RBC 3 88 - 5 62 Million/uL 2  22Low   2 54Low   2 77Low     Hemoglobin 12 0 - 17 0 g/dL 7 2Low   8 1Low   8 9Low     Hematocrit 36 5 - 49 3 % 23 6Low   26 7Low   28 8Low     MCV 82 - 98 fL 106High   105High   104High     MCH 26 8 - 34 3 pg 32 4  31 9  32 1    MCHC 31 4 - 37 4 g/dL 30 5Low   30 3Low   30 9Low     RDW 11 6 - 15 1 % 16  3High   16  3High   15 9High     MPV 8 9 - 12 7 fL 10 7  10 0  10 5    Platelets 585 - 792 Thousands/uL 123Low   193  230    nRBC /100 WBCs 0   0 CM   Neutrophils Relative    75    Monocytes Absolute    1 21    Eosinophils Absolute    0 23    Basophils Absolute    0 03    Immat GRANS %    1    Lymphocytes Relative    6Low     Monocytes Relative    15High     Eosinophils Relative    3    Basophils Relative    0    Neutrophils Absolute    6 15    Immature Grans Absolute    0 09    Lymphocytes Absolute    0  52Low               Ref Range & Units 10/11/19 0443 10/9/19 1121 10/8/19 0500   Segmented % 43 - 75 % 74      Bands % 0 - 8 % 10High       Lymphocytes % 14 - 44 % 6Low       Monocytes % 4 - 12 % 8      Eosinophils, % 0 - 6 % 1   3    Basophils % 0 - 1 % 0   0    Metamyelocytes% 0 - 1 % 1      Absolute Neutrophils 1 85 - 7 62 Thousand/uL 9  37High       Lymphocytes Absolute 0 60 - 4 47 Thousand/uL 0 67      Monocytes Absolute 0 00 - 1 22 Thousand/uL 0 89      Eosinophils Absolute 0 00 - 0 40 Thousand/uL 0 11   0 23 R   Basophils Absolute 0 00 - 0 10 Thousand/uL 0 00   0 03 R   Total Counted  100  100     Platelet Estimate Adequate Adequate            Specimen Collected: 10/11/19 04:43 Last Resulted: 10/11/19 06:45              Ref Range & Units 10/11/19 0443 10/10/19 0442 10/9/19 0605   Sodium 136 - 145 mmol/L 134Low   133Low   132Low     Potassium 3 5 - 5 3 mmol/L 4 0  4 5  4 5    Chloride 100 - 108 mmol/L 98Low   96Low   93Low     CO2 21 - 32 mmol/L 28  27  24    ANION GAP 4 - 13 mmol/L 8  10  15High     BUN 5 - 25 mg/dL 55High   66High   58High     Creatinine 0 60 - 1 30 mg/dL 6  82High   8  62High  CM 7  60High  CM   Comment: Standardized to IDMS reference method   Glucose 65 - 140 mg/dL 116  120 CM 92 CM   Comment:   If the patient is fasting, the ADA then defines impaired fasting glucose as > 100 mg/dL and diabetes as > or equal to 123 mg/dL  Specimen collection should occur prior to Sulfasalazine administration due to the potential for falsely depressed results  Specimen collection should occur prior to Sulfapyridine administration due to the potential for falsely elevated results     Calcium 8 3 - 10 1 mg/dL 8 0Low   8 1Low   8 5    eGFR ml/min/1 73sq m 8  6 Attending

## 2022-11-21 NOTE — ED PROVIDER NOTE - CARE PROVIDER_API CALL
Tobias Wells (DO)  Internal Medicine  [address], [address]  [place], [place] [id number]  Phone: [phone]  Fax: [phone]  Follow Up Time: 1-3 Days

## 2022-11-21 NOTE — ED PROVIDER NOTE - PATIENT PORTAL LINK FT
You can access the FollowMyHealth Patient Portal offered by [facility] by registering at the following website: [email or web address]. By joining [employer]’s FollowMyHealth portal, you will also be able to view your health information using other applications (apps) compatible with our system.

## 2022-11-21 NOTE — ED PROVIDER NOTE - PROGRESS NOTE DETAILS
Patient stable.  No respiratory distress.  Lungs clear.  Chest x-ray shows no evidence of consolidation.  Patient tested positive for rhinovirus.  Supportive care discussed.  Tessalon Perles prescribed.  Also recommend to continue over-the-counter Tylenol, Flonase, and antihistamine.  Return precautions explained.  No tachycardia, hypoxia, or tachypnea.

## 2022-11-21 NOTE — ED PROVIDER NOTE - ENMT, MLM
Airway patent, Throat has no vesicles, no oropharyngeal exudates and uvula is midline. TM's normal in appearance.

## 2022-11-21 NOTE — ED PROVIDER NOTE - OBJECTIVE STATEMENT
67-year-old male with history of basal cell carcinoma, chronic back pain, hyperlipidemia, and history of multiple myeloma presents with congestion, headaches, and cough for the past 4 days.  Had a sore throat last week which is now resolved.  continues to have headache, congestion, and cough.  Cough is nonproductive.  No known fevers.  No chills or body aches.  No chest pain or current shortness of breath.  Patient was exposed to RSV by granddaughter last week.  Patient had a history of stem cell transplant in November 2021.  Since patient is immunosuppressed, was sent in to be swabbed for RSV by his doctor.  Patient took a home COVID test which was negative.  Has been taking over-the-counter Tylenol which helps with his headache.  Last took Tylenol earlier this morning.  Has not been take anything else for pain or symptoms.  PCP Tobias Darby

## 2022-11-21 NOTE — ED PROVIDER NOTE - PROVIDER TOKENS
SUBJECTIVE:  Mark Dias is a 14 year old male who is accompanied by:mother     Chief Complaint   Patient presents with   • Behavioral Problem     Pt here to discuss behavior concerns with school and other issues.       HPI   On ADHD medications for several years  Had complete evaluation by peds psychiatrist and started on ritalin LA as well as clonidine  More recently there have been social stressors including father remarrying    School has reported increase in behavior issues mainly impulsive and some problems with authority  This is very unlike him  He has not been taking the stimulant and often will say he takes it but puts in trash (parents have found it in the garbage)  Stopped clonidine awhile ago    Mom feels he is irritable and has had more problems with appetite when he is on the medications  Mark feels bad when when he is on the medication  He has stomachaches  There have been more tics and this seems to be worsening  They are looking to change the medication for side effects  He clearly still has a need for it with the impulsivity and attention issues more pronounced on it  He is reporting less sleep lately and this may be affecting his daytime function as well    He is not seeing a therapist at this time  The one he was seeing at [facility] stopped awhile ago when he was stable    Now lots of changes at home and Mom concerned it is affecting him  She worries about depression  One of the teachers said he has been more withdrawn    Dad lives in [place] and Mom is in [place]  He spends time at both homes    Review of Systems   Constitutional: Positive for activity change and appetite change. Negative for fatigue and unexpected weight change.   Gastrointestinal: Positive for abdominal pain.   Neurological:        Tics   Psychiatric/Behavioral: Positive for behavioral problems, decreased concentration and sleep disturbance. The patient is hyperactive.        Patient's medications, allergies, past medical,  surgical, social and family histories were reviewed and updated as appropriate.    Parents   Two households  Twin brother    ALLERGIES:  No Known Allergies  Current Outpatient Medications   Medication Sig Dispense Refill   • methylphenidate (RITALIN LA) 40 MG 24 hr capsule Take 1 capsule by mouth every morning. 30 capsule 0   • methylpheniDATE (RITALIN) 5 MG tablet Take 1 tablet by mouth daily. 30 tablet 0     No current facility-administered medications for this visit.        OBJECTIVE:  Visit Vitals  /62 (BP Location: [facility], Patient Position: Sitting, Cuff Size: Small Adult)   Pulse 98   Temp 98.3 °F (36.8 °C) (Temporal)   Resp 20   Ht 5' 6\" (1.676 m)   Wt 51.9 kg (114 lb 6.4 oz)   SpO2 98%   BMI 18.46 kg/m²     Physical Exam   Constitutional: He appears well-developed and well-nourished. No distress.   Eyes: Conjunctivae are normal.   Neck: No thyromegaly present.   Neurological: He is alert.   Psychiatric: He has a normal mood and affect.       ASSESSMENT/PLAN:    1. ADHD (attention deficit hyperactivity disorder), combined type     2. Sleep disturbance    Poor response to medication  Trial of strattera to see if better results with nonstimulant mediation  Okay to stay with stimulant until strattera is effective  Start with 25 mg of strattera for 5 days, if okay then increase to 60 mg each day  Ritalin okay to continue on school days but try off on weekends or no school days  Try to increase sleep time to help daytime function    Would get a therapist to help with social situation   should be involved  Names of therapists provided to look into    Follow up here in one month, call sooner for any problems      Continue supportive care.  Watch for worsening or symptoms that are not improving.  Call for any concerns or prolonged symptoms. Parents educated on signs/symptoms requiring immediate medical attention.     The patient and mother indicated understanding of the diagnosis and  agreed with the plan of care. All questions answered.         Stella Gibson MD             PROVIDER:[TOKEN:[[id number]],FOLLOWUP:[1-3 Days]]

## 2022-11-21 NOTE — ED PROVIDER NOTE - NSFOLLOWUPINSTRUCTIONS_ED_ALL_ED_FT
Continue Tylenol over-the-counter for pain  Tessalon Perles as needed for cough every 6-8 hours  Recommend Flonase nasal spray and antihistamine over-the-counter  Drink plenty of fluids  Have close follow-up with primary care doctor      Viral Respiratory Infection      A viral respiratory infection is an illness that affects parts of the body that are used for breathing. These include the lungs, nose, and throat. It is caused by a germ called a virus.    Some examples of this kind of infection are:  •A cold.      •The flu (influenza).      •A respiratory syncytial virus (RSV) infection.        What are the causes?    This condition is caused by a virus. It spreads from person to person. You can get the virus if:  •You breathe in droplets from someone who is sick.      •You come in contact with people who are sick.      •You touch mucus or other fluid from a person who is sick.        What are the signs or symptoms?    Symptoms of this condition include:  •A stuffy or runny nose.      •A sore throat.      •A cough.      •Shortness of breath.      •Trouble breathing.      •Yellow or green fluid in the nose.      Other symptoms may include:  •A fever.      •Sweating or chills.      •Tiredness (fatigue).      •Achy muscles.      •A headache.        How is this treated?    This condition may be treated with:  •Medicines that treat viruses.      •Medicines that make it easy to breathe.      •Medicines that are sprayed into the nose.      •Acetaminophen or NSAIDs, such as ibuprofen, to treat fever.        Follow these instructions at home:    Managing pain and congestion     •Take over-the-counter and prescription medicines only as told by your doctor.    •If you have a sore throat, gargle with salt water. Do this 3–4 times a day or as needed.  •To make salt water, dissolve ½–1 tsp (3–6 g) of salt in 1 cup (237 mL) of warm water. Make sure that all the salt dissolves.        •Use nose drops made from salt water. This helps with stuffiness (congestion). It also helps soften the skin around your nose.    •Take 2 tsp (10 mL) of honey at bedtime to lessen coughing at night.  •Do not give honey to children who are younger than 1 year old.        •Drink enough fluid to keep your pee (urine) pale yellow.        General instructions   A sign telling the reader not to smoke.   •Rest as much as possible.      • Do not drink alcohol.      • Do not smoke or use any products that contain nicotine or tobacco. If you need help quitting, ask your doctor.      •Keep all follow-up visits.        How is this prevented?       Washing hands with soap and water.       A person covering her mouth and nose with a cloth while sneezing.     •Get a flu shot every year. Ask your doctor when you should get your flu shot.    • Do not let other people get your germs. If you are sick:  •Wash your hands with soap and water often. Wash your hands after you cough or sneeze. Wash hands for at least 20 seconds. If you cannot use soap and water, use hand .      •Cover your mouth when you cough. Cover your nose and mouth when you sneeze.      •Do not share cups or eating utensils.      •Clean commonly used objects often. Clean commonly touched surfaces.      •Stay home from work or school.        •Avoid contact with people who are sick during cold and flu season. This is in fall and winter.        Get help if:    •Your symptoms last for 10 days or longer.      •Your symptoms get worse over time.      •You have very bad pain in your face or forehead.      •Parts of your jaw or neck get very swollen.      •You have shortness of breath.        Get help right away if:    •You feel pain or pressure in your chest.      •You have trouble breathing.      •You faint or feel like you will faint.      •You keep vomiting and it gets worse.      •You feel confused.      These symptoms may be an emergency. Get help right away. Call your local emergency services (911 in the [place]   • Do not wait to see if the symptoms will go away.        •  Do not drive yourself to the hospital.         Summary    •A viral respiratory infection is an illness that affects parts of the body that are used for breathing.      •Examples of this illness include a cold, the flu, and a respiratory syncytial virus (RSV) infection.      •The infection can cause a runny nose, cough, sore throat, and fever.      •Follow what your doctor tells you about taking medicines, drinking lots of fluid, washing your hands, resting at home, and avoiding people who are sick.      This information is not intended to replace advice given to you by your health care provider. Make sure you discuss any questions you have with your health care provider.

## 2022-11-24 ENCOUNTER — NON-APPOINTMENT (OUTPATIENT)
Age: 67
End: 2022-11-24

## 2022-11-30 ENCOUNTER — APPOINTMENT (OUTPATIENT)
Dept: INFUSION THERAPY | Facility: HOSPITAL | Age: 67
End: 2022-11-30

## 2022-12-07 ENCOUNTER — APPOINTMENT (OUTPATIENT)
Dept: INFUSION THERAPY | Facility: HOSPITAL | Age: 67
End: 2022-12-07

## 2022-12-12 ENCOUNTER — EMERGENCY (EMERGENCY)
Facility: HOSPITAL | Age: 67
LOS: 1 days | Discharge: HOME | End: 2022-12-12
Attending: EMERGENCY MEDICINE | Admitting: EMERGENCY MEDICINE
Payer: MEDICARE

## 2022-12-12 VITALS
OXYGEN SATURATION: 96 % | SYSTOLIC BLOOD PRESSURE: 112 MMHG | TEMPERATURE: 101 F | RESPIRATION RATE: 18 BRPM | DIASTOLIC BLOOD PRESSURE: 65 MMHG | HEART RATE: 88 BPM

## 2022-12-12 VITALS
TEMPERATURE: 99 F | OXYGEN SATURATION: 95 % | HEART RATE: 90 BPM | RESPIRATION RATE: 18 BRPM | WEIGHT: 198.42 LBS | HEIGHT: 73 IN | SYSTOLIC BLOOD PRESSURE: 131 MMHG | DIASTOLIC BLOOD PRESSURE: 71 MMHG

## 2022-12-12 DIAGNOSIS — Z98.890 OTHER SPECIFIED POSTPROCEDURAL STATES: Chronic | ICD-10-CM

## 2022-12-12 LAB
ALBUMIN SERPL ELPH-MCNC: 3.3 G/DL — SIGNIFICANT CHANGE UP (ref 3.3–5)
ALP SERPL-CCNC: 108 U/L — SIGNIFICANT CHANGE UP (ref 30–120)
ALT FLD-CCNC: 30 U/L DA — SIGNIFICANT CHANGE UP (ref 10–60)
ANION GAP SERPL CALC-SCNC: 9 MMOL/L — SIGNIFICANT CHANGE UP (ref 5–17)
APTT BLD: 36.2 SEC — HIGH (ref 27.5–35.5)
AST SERPL-CCNC: 28 U/L — SIGNIFICANT CHANGE UP (ref 10–40)
BASOPHILS # BLD AUTO: 0.01 K/UL — SIGNIFICANT CHANGE UP (ref 0–0.2)
BASOPHILS NFR BLD AUTO: 0.2 % — SIGNIFICANT CHANGE UP (ref 0–2)
BILIRUB SERPL-MCNC: 0.4 MG/DL — SIGNIFICANT CHANGE UP (ref 0.2–1.2)
BUN SERPL-MCNC: 15 MG/DL — SIGNIFICANT CHANGE UP (ref 7–23)
CALCIUM SERPL-MCNC: 8.5 MG/DL — SIGNIFICANT CHANGE UP (ref 8.4–10.5)
CHLORIDE SERPL-SCNC: 102 MMOL/L — SIGNIFICANT CHANGE UP (ref 96–108)
CO2 SERPL-SCNC: 25 MMOL/L — SIGNIFICANT CHANGE UP (ref 22–31)
CREAT SERPL-MCNC: 0.95 MG/DL — SIGNIFICANT CHANGE UP (ref 0.5–1.3)
EGFR: 88 ML/MIN/1.73M2 — SIGNIFICANT CHANGE UP
EOSINOPHIL # BLD AUTO: 0.03 K/UL — SIGNIFICANT CHANGE UP (ref 0–0.5)
EOSINOPHIL NFR BLD AUTO: 0.5 % — SIGNIFICANT CHANGE UP (ref 0–6)
GLUCOSE SERPL-MCNC: 132 MG/DL — HIGH (ref 70–99)
HCOV PNL SPEC NAA+PROBE: DETECTED
HCT VFR BLD CALC: 34.1 % — LOW (ref 39–50)
HGB BLD-MCNC: 11.4 G/DL — LOW (ref 13–17)
IMM GRANULOCYTES NFR BLD AUTO: 0.2 % — SIGNIFICANT CHANGE UP (ref 0–0.9)
INR BLD: 1.34 RATIO — HIGH (ref 0.88–1.16)
LACTATE SERPL-SCNC: 1.9 MMOL/L — SIGNIFICANT CHANGE UP (ref 0.7–2)
LYMPHOCYTES # BLD AUTO: 0.71 K/UL — LOW (ref 1–3.3)
LYMPHOCYTES # BLD AUTO: 11.2 % — LOW (ref 13–44)
MCHC RBC-ENTMCNC: 32.4 PG — SIGNIFICANT CHANGE UP (ref 27–34)
MCHC RBC-ENTMCNC: 33.4 GM/DL — SIGNIFICANT CHANGE UP (ref 32–36)
MCV RBC AUTO: 96.9 FL — SIGNIFICANT CHANGE UP (ref 80–100)
MONOCYTES # BLD AUTO: 0.76 K/UL — SIGNIFICANT CHANGE UP (ref 0–0.9)
MONOCYTES NFR BLD AUTO: 11.9 % — SIGNIFICANT CHANGE UP (ref 2–14)
NEUTROPHILS # BLD AUTO: 4.84 K/UL — SIGNIFICANT CHANGE UP (ref 1.8–7.4)
NEUTROPHILS NFR BLD AUTO: 76 % — SIGNIFICANT CHANGE UP (ref 43–77)
NRBC # BLD: 0 /100 WBCS — SIGNIFICANT CHANGE UP (ref 0–0)
PLATELET # BLD AUTO: 164 K/UL — SIGNIFICANT CHANGE UP (ref 150–400)
POTASSIUM SERPL-MCNC: 4.1 MMOL/L — SIGNIFICANT CHANGE UP (ref 3.5–5.3)
POTASSIUM SERPL-SCNC: 4.1 MMOL/L — SIGNIFICANT CHANGE UP (ref 3.5–5.3)
PROT SERPL-MCNC: 6.5 G/DL — SIGNIFICANT CHANGE UP (ref 6–8.3)
PROTHROM AB SERPL-ACNC: 15.9 SEC — HIGH (ref 10.5–13.4)
RAPID RVP RESULT: DETECTED
RBC # BLD: 3.52 M/UL — LOW (ref 4.2–5.8)
RBC # FLD: 13.7 % — SIGNIFICANT CHANGE UP (ref 10.3–14.5)
RSV RNA SPEC QL NAA+PROBE: DETECTED
SARS-COV-2 RNA SPEC QL NAA+PROBE: SIGNIFICANT CHANGE UP
SODIUM SERPL-SCNC: 136 MMOL/L — SIGNIFICANT CHANGE UP (ref 135–145)
WBC # BLD: 6.36 K/UL — SIGNIFICANT CHANGE UP (ref 3.8–10.5)
WBC # FLD AUTO: 6.36 K/UL — SIGNIFICANT CHANGE UP (ref 3.8–10.5)

## 2022-12-12 PROCEDURE — 71046 X-RAY EXAM CHEST 2 VIEWS: CPT | Mod: 26

## 2022-12-12 PROCEDURE — 93010 ELECTROCARDIOGRAM REPORT: CPT

## 2022-12-12 PROCEDURE — 93005 ELECTROCARDIOGRAM TRACING: CPT

## 2022-12-12 PROCEDURE — 83605 ASSAY OF LACTIC ACID: CPT

## 2022-12-12 PROCEDURE — 87040 BLOOD CULTURE FOR BACTERIA: CPT

## 2022-12-12 PROCEDURE — 80053 COMPREHEN METABOLIC PANEL: CPT

## 2022-12-12 PROCEDURE — 85025 COMPLETE CBC W/AUTO DIFF WBC: CPT

## 2022-12-12 PROCEDURE — 94640 AIRWAY INHALATION TREATMENT: CPT

## 2022-12-12 PROCEDURE — 71046 X-RAY EXAM CHEST 2 VIEWS: CPT

## 2022-12-12 PROCEDURE — 96374 THER/PROPH/DIAG INJ IV PUSH: CPT

## 2022-12-12 PROCEDURE — 99285 EMERGENCY DEPT VISIT HI MDM: CPT | Mod: FS

## 2022-12-12 PROCEDURE — 85610 PROTHROMBIN TIME: CPT

## 2022-12-12 PROCEDURE — 0225U NFCT DS DNA&RNA 21 SARSCOV2: CPT

## 2022-12-12 PROCEDURE — 94664 DEMO&/EVAL PT USE INHALER: CPT | Mod: XU

## 2022-12-12 PROCEDURE — 85730 THROMBOPLASTIN TIME PARTIAL: CPT

## 2022-12-12 PROCEDURE — 36415 COLL VENOUS BLD VENIPUNCTURE: CPT

## 2022-12-12 PROCEDURE — 96361 HYDRATE IV INFUSION ADD-ON: CPT

## 2022-12-12 PROCEDURE — 99285 EMERGENCY DEPT VISIT HI MDM: CPT | Mod: 25

## 2022-12-12 RX ORDER — SODIUM CHLORIDE 9 MG/ML
1000 INJECTION INTRAMUSCULAR; INTRAVENOUS; SUBCUTANEOUS ONCE
Refills: 0 | Status: COMPLETED | OUTPATIENT
Start: 2022-12-12 | End: 2022-12-12

## 2022-12-12 RX ORDER — CODEINE PHOSPHATE/GUAIFENESIN
10 SYRUP ORAL
Qty: 100 | Refills: 0
Start: 2022-12-12 | End: 2022-12-16

## 2022-12-12 RX ORDER — AZITHROMYCIN 500 MG/1
1 TABLET, FILM COATED ORAL
Qty: 1 | Refills: 0
Start: 2022-12-12 | End: 2022-12-16

## 2022-12-12 RX ORDER — IPRATROPIUM/ALBUTEROL SULFATE 18-103MCG
3 AEROSOL WITH ADAPTER (GRAM) INHALATION ONCE
Refills: 0 | Status: COMPLETED | OUTPATIENT
Start: 2022-12-12 | End: 2022-12-12

## 2022-12-12 RX ORDER — ACETAMINOPHEN 500 MG
975 TABLET ORAL ONCE
Refills: 0 | Status: COMPLETED | OUTPATIENT
Start: 2022-12-12 | End: 2022-12-12

## 2022-12-12 RX ORDER — PSEUDOEPHEDRINE HCL 30 MG
30 TABLET ORAL ONCE
Refills: 0 | Status: COMPLETED | OUTPATIENT
Start: 2022-12-12 | End: 2022-12-12

## 2022-12-12 RX ORDER — ALBUTEROL 90 UG/1
1 AEROSOL, METERED ORAL
Qty: 1 | Refills: 0
Start: 2022-12-12 | End: 2022-12-16

## 2022-12-12 RX ORDER — AZITHROMYCIN 500 MG/1
500 TABLET, FILM COATED ORAL ONCE
Refills: 0 | Status: COMPLETED | OUTPATIENT
Start: 2022-12-12 | End: 2022-12-12

## 2022-12-12 RX ORDER — AZITHROMYCIN 500 MG/1
500 TABLET, FILM COATED ORAL ONCE
Refills: 0 | Status: DISCONTINUED | OUTPATIENT
Start: 2022-12-12 | End: 2022-12-12

## 2022-12-12 RX ADMIN — SODIUM CHLORIDE 1000 MILLILITER(S): 9 INJECTION INTRAMUSCULAR; INTRAVENOUS; SUBCUTANEOUS at 14:20

## 2022-12-12 RX ADMIN — Medication 30 MILLIGRAM(S): at 15:06

## 2022-12-12 RX ADMIN — SODIUM CHLORIDE 1000 MILLILITER(S): 9 INJECTION INTRAMUSCULAR; INTRAVENOUS; SUBCUTANEOUS at 13:27

## 2022-12-12 RX ADMIN — Medication 975 MILLIGRAM(S): at 15:07

## 2022-12-12 RX ADMIN — Medication 125 MILLIGRAM(S): at 13:27

## 2022-12-12 RX ADMIN — Medication 3 MILLILITER(S): at 13:49

## 2022-12-12 RX ADMIN — AZITHROMYCIN 500 MILLIGRAM(S): 500 TABLET, FILM COATED ORAL at 15:26

## 2022-12-12 NOTE — ED PROVIDER NOTE - EKG #1 DATE/TIME
Patient Summary Document

 Created on:2019



Patient:KISHOR PEACOCK

Sex:Female

:1956

External Reference #:[id number]





Demographics







 Address  [address] [place] [id number]

 

 Home Phone  [phone]

 

 Email Address  NONE

 

 Preferred Language  Unknown

 

 Marital Status  Unknown

 

 Latter-day Affiliation  Unknown

 

 Race  Unknown

 

 Additional Race(s)  Unavailable

 

 Ethnic Group  Unknown









Author







 Organization  [facility]

 

 Address  [address] [place] [address] Dr. Mccoy [address] [place] [id number]

 

 Phone  [phone]









Care Team Providers







 Name  Role  Phone

 

 Unavailable  Unavailable  Unavailable









Problems

This patient has no known problems.



Allergies, Adverse Reactions, Alerts

This patient has no known allergies or adverse reactions.



Medications

This patient has no known medications. 12-Dec-2022 13:11

## 2022-12-12 NOTE — CONSULT NOTE ADULT - SUBJECTIVE AND OBJECTIVE BOX
Date/Time Patient Seen:  		  Referring MD:   Data Reviewed	       Patient is a 67y old  Male who presents with a chief complaint of     Subjective/HPI    in bed  seen and examined  cough reported  congestion reported  med hx reviewed  s/p amoxil x 1 week    alert  oriented  non smoker  non drinker         PAST MEDICAL & SURGICAL HISTORY:  No pertinent past medical history    Hyperlipidemia    Shortness of breath on exertion    Lumbar herniated disc    T12 compression fracture    Acute lumbar radiculopathy    History of basal cell carcinoma    No significant past surgical history    History of surgery on wrist          Medication list         MEDICATIONS  (STANDING):  albuterol/ipratropium for Nebulization. 3 milliLiter(s) Nebulizer once  methylPREDNISolone sodium succinate Injectable 125 milliGRAM(s) IV Push Once  sodium chloride 0.9% Bolus 1000 milliLiter(s) IV Bolus once    MEDICATIONS  (PRN):         Vitals log        ICU Vital Signs Last 24 Hrs  T(C): 37.4 (12 Dec 2022 12:14), Max: 37.4 (12 Dec 2022 12:14)  T(F): 99.4 (12 Dec 2022 12:14), Max: 99.4 (12 Dec 2022 12:14)  HR: 90 (12 Dec 2022 12:14) (90 - 90)  BP: 131/71 (12 Dec 2022 12:14) (131/71 - 131/71)  BP(mean): --  ABP: --  ABP(mean): --  RR: 18 (12 Dec 2022 12:14) (18 - 18)  SpO2: 95% (12 Dec 2022 12:14) (95% - 95%)    O2 Parameters below as of 12 Dec 2022 12:14  Patient On (Oxygen Delivery Method): room air                 Input and Output:  I&O's Detail      Lab Data                  Review of Systems	  cough  congestion      Objective     Physical Examination  heart s1s2  lung dec BS  head nc  verbal  alert  cough  on room air        Pertinent Lab findings & Imaging      Ortega:  NO   Adequate UO     I&O's Detail           Discussed with:     Cultures:	        Radiology        ACC: [id number] EXAM:  XR CHEST PA LAT 2V                          PROCEDURE DATE:  11/21/2022          INTERPRETATION:  PA and lateral chest on November 21, 2022 at 12:53 PM.   Patient has cough.    Patient has IgG myeloma and had treatment with chemotherapy and   autologous stem cell transplant in November 2021.    Heart size is within normal limits.    Lung fields and pleural surfaces are unremarkable.    Chest is similar to January 12 this year.    IMPRESSION: Negative chest.    --- End of Report ---            DAVID WESLEY MD; Attending Radiologist  This document has been electronically signed. Nov 22 2022  4:11PM

## 2022-12-12 NOTE — CONSULT NOTE ADULT - ASSESSMENT
· Chief Complaint: The patient is a 67y Male complaining of cough.    uri  bronchitis  hx of MM  hx of Stem cell transplant Nov 2021 at [facility]  sinusitis    rvp noted  URI  cxr NAPD  labs reviewed    sudafed 1 dose in ED  prednisone 20 mg PO daily for 5 days total  proventil PRN for sob and or wheeze  Z - pita x 1  tylenol  mucinex  hydration  steam inhalation  rest  follow up with PMD -   VSS in ED

## 2022-12-12 NOTE — ED ADULT TRIAGE NOTE - CHIEF COMPLAINT QUOTE
" I was here 3 weeks ago for the same symptoms , coughing - I can't break it. I still have th cough " Pt was on oral amoxicillin 2 weeks ago RX by urgent care.

## 2022-12-12 NOTE — ED ADULT NURSE NOTE - OBJECTIVE STATEMENT
67-year-old male presents with continued cough and congestion the past 3 weeks.  Patient states he was seen here 3 weeks ago when cough and congestion started.  Was exposed to granddaughter with RSV at that time.  Patient tested positive for enterovirus/rhinovirus.  Symptoms continued and then was seen in urgent care a couple weeks ago.  Was prescribed amoxicillin.  Thought he was starting to feel slightly better last week but then cough and congestion returned.  No longer taking antibiotics.  Patient reports hard time sleeping at night due to increased cough and congestion.  Reports occasional wheezing.  No known fevers.  Denies chest pain.  Slight shortness of breath and cough is present.  Denies any abdominal pain, nausea, vomiting, diarrhea.

## 2022-12-12 NOTE — ED PROVIDER NOTE - PATIENT PORTAL LINK FT
You can access the FollowMyHealth Patient Portal offered by [facility] by registering at the following website: [email or web address]. By joining [employer]’s FollowMyHealth portal, you will also be able to view your health information using other applications (apps) compatible with our system.

## 2022-12-12 NOTE — ED PROVIDER NOTE - PROGRESS NOTE DETAILS
Patient stable.  Overall symptoms improved.  Denies chest pain or current shortness of breath.  No tachycardia, tachypnea, hypoxia.  Was seen and evaluated by pulmonary, Dr. Van.   Recommend single dose of Sudafed in the emergency room.  Also recommend Zithromax to cover atypicals as patient was recently on amoxicillin.  Also recommends 20 mg of prednisone daily next 5 days and Proventil inhaler.  Supportive care discussed.  No evidence of pneumonia on chest x-ray.  Do not suspect pulmonary embolism.  Patient on Eliquis and is anticoagulated.  Suspect symptoms related to previous enterovirus/rhinovirus as he tested positive for this 3 weeks ago.

## 2022-12-12 NOTE — ED PROVIDER NOTE - NSFOLLOWUPINSTRUCTIONS_ED_ALL_ED_FT
Drink plenty of fluids  Steam mist humidification  Continue Mucinex over-the-counter  Continue Tylenol over-the-counter  Zithromax next 5 days as prescribed  Prednisone, 20 mg once a day for 5 days  Proventil inhaler, 2 puffs every 4-6 hours  Robitussin with codeine cough syrup at night as needed  Have close follow-up with primary care doctor      Viral Respiratory Infection      A respiratory infection is an illness that affects part of the respiratory system, such as the lungs, nose, or throat. A respiratory infection that is caused by a virus is called a viral respiratory infection.    Common types of viral respiratory infections include:  •A cold.      •The flu (influenza).      •A respiratory syncytial virus (RSV) infection.        What are the causes?    This condition is caused by a virus. The virus may spread through contact with droplets or direct contact with infected people or their mucus or secretions. The virus may spread from person to person (is contagious).      What are the signs or symptoms?    Symptoms of this condition include:  •A stuffy or runny nose.      •A sore throat or cough.      •Shortness of breath or difficulty breathing.      •Yellow or green mucus (sputum).      Other symptoms may include:  •A fever.      •Sweating or chills.      •Fatigue.      •Achy muscles.      •A headache.        How is this diagnosed?    This condition may be diagnosed based on:  •Your symptoms.      •A physical exam.      •Testing of secretions from the nose or throat.      •Chest X-ray.        How is this treated?    This condition may be treated with medicines, such as:  •Antiviral medicine. This may shorten the length of time a person has symptoms.      •Expectorants. These make it easier to cough up mucus.      •Decongestant nasal sprays.      •Acetaminophen or NSAIDs, such as ibuprofen, to relieve fever and pain.      Antibiotic medicines are not prescribed for viral infections.This is because antibiotics are designed to kill bacteria. They do not kill viruses.      Follow these instructions at home:    Managing pain and congestion     •Take over-the-counter and prescription medicines only as told by your health care provider.      •If you have a sore throat, gargle with a mixture of salt and water 3–4 times a day or as needed. To make salt water, completely dissolve ½–1 tsp (3–6 g) of salt in 1 cup (237 mL) of warm water.      •Use nose drops made from salt water to ease congestion and soften raw skin around your nose.    •Take 2 tsp (10 mL) of honey at bedtime to lessen coughing at night.  •Do not give honey to children who are younger than 1 year.        •Drink enough fluid to keep your urine pale yellow. This helps prevent dehydration and helps loosen up mucus.        General instructions   A sign telling the reader not to smoke.   •Rest as much as possible.      • Do not drink alcohol.      • Do not use any products that contain nicotine or tobacco. These products include cigarettes, chewing tobacco, and vaping devices, such as e-cigarettes. If you need help quitting, ask your health care provider.      •Keep all follow-up visits. This is important.        How is this prevented?      Washing hands with soap and water.       A person covering her mouth and nose with a cloth while sneezing.     •Get an annual flu shot. You may get the flu shot in late summer, fall, or winter. Ask your health care provider when you should get your flu shot.    •Avoid spreading your infection to other people. If you are sick:  •Wash your hands with soap and water often, especially after you cough or sneeze. Wash for at least 20 seconds. If soap and water are not available, use alcohol-based hand .      •Cover your mouth when you cough. Cover your nose and mouth when you sneeze.      •Do not share cups or eating utensils.      •Clean commonly used objects often. Clean commonly touched surfaces.      •Stay home from work or school as told by your health care provider.        •Avoid contact with people who are sick during cold and flu season. This is generally fall and winter.        Contact a health care provider if:    •Your symptoms last for 10 days or longer.      •Your symptoms get worse over time.      •You have severe sinus pain in your face or forehead.      •The glands in your jaw or neck become very swollen.      •You have shortness of breath.        Get help right away if you:    •Feel pain or pressure in your chest.      •Have trouble breathing.      •Faint or feel like you will faint.      •Have severe and persistent vomiting.      •Feel confused or disoriented.      These symptoms may represent a serious problem that is an emergency. Do not wait to see if the symptoms will go away. Get medical help right away. Call your local emergency services (911 in the [place] Do not drive yourself to the hospital.       Summary    •A respiratory infection is an illness that affects part of the respiratory system, such as the lungs, nose, or throat. A respiratory infection that is caused by a virus is called a viral respiratory infection.      •Common types of viral respiratory infections include a cold, influenza, and respiratory syncytial virus (RSV) infection.      •Symptoms of this condition include a stuffy or runny nose, cough, fatigue, achy muscles, sore throat, and fevers or chills.      •Antibiotic medicines are not prescribed for viral infections. This is because antibiotics are designed to kill bacteria. They are not effective against viruses.      This information is not intended to replace advice given to you by your health care provider. Make sure you discuss any questions you have with your health care provider.      Document Revised: 03/24/2022 Document Reviewed: 03/24/2022

## 2022-12-12 NOTE — ED PROVIDER NOTE - OBJECTIVE STATEMENT
67-year-old male with history of basal cell carcinoma, history of lumbar back pain, hyperlipidemia, and history of multiple myeloma (stem cell transplant in November 2021) presents with continued cough and congestion the past 3 weeks.  Patient states he was seen here 3 days ago when cough and congestion started.  Was exposed to granddaughter with RSV at that time.  Patient tested positive for enterovirus/rhinovirus.  Symptoms continued and then was seen in urgent care a couple weeks ago.  Was prescribed amoxicillin.  Thought he was starting to feel slightly better last week but then cough and congestion returned.  No longer taking antibiotics.  Patient reports hard time sleeping at night due to increased cough and congestion.  Reports occasional wheezing.  No known fevers.  Denies chest pain.  Slight shortness of breath and cough is present.  Denies any abdominal pain, nausea, vomiting, diarrhea.  PCP Tobias Hoffman

## 2022-12-12 NOTE — ED PROVIDER NOTE - CARE PROVIDER_API CALL
Tobias Wells (DO)  Internal Medicine  [address], [address]  [place], [place] [id number]  Phone: [phone]  Fax: [phone]  Follow Up Time: 1-3 Days

## 2022-12-12 NOTE — ED PROVIDER NOTE - CLINICAL SUMMARY MEDICAL DECISION MAKING FREE TEXT BOX
67-year-old male with history of basal cell carcinoma, multiple myeloma, hyperlipidemia, pulmonary embolism on Eliquis presents with cough/URI symptoms over past 3 weeks.  Patient was initially seen in the ER and tested positive for entero-/rhinovirus patient was discharged with supportive care.  Then 2 weeks ago, patient was seen by urgent care and given a course of amoxicillin with some improvement.  Patient states has been again feeling worse over past several days.  No acute abdominal pain.  No vomiting or diarrhea.  No neck pain or stiffness.  No photophobia.  No acute headache.  No dysuria/hematuria.  No aggravating or alleviating factors otherwise noted.  No other acute complaints at this time.  Patient previously vaccinated for COVID, no known exposures.  Exam: Nontoxic, well-appearing.  Mild diffuse expiratory wheezes bilaterally.  No accessory muscle use.  Abdomen soft, nontender, nondistended.  No C/C/E.  Normal nonfocal neuro exam.  No other acute findings on exam.  Acute URI symptoms over past 3 weeks, with some shortness of breath.  Some wheezing.  Check labs, x-ray, RVP, IV steroids, reeval.

## 2022-12-15 ENCOUNTER — APPOINTMENT (OUTPATIENT)
Dept: HEMATOLOGY ONCOLOGY | Facility: CLINIC | Age: 67
End: 2022-12-15
Payer: MEDICARE

## 2022-12-15 PROCEDURE — 99215 OFFICE O/P EST HI 40 MIN: CPT | Mod: 95

## 2022-12-16 ENCOUNTER — APPOINTMENT (OUTPATIENT)
Dept: CARDIOLOGY | Facility: CLINIC | Age: 67
End: 2022-12-16

## 2022-12-17 LAB
CULTURE RESULTS: SIGNIFICANT CHANGE UP
CULTURE RESULTS: SIGNIFICANT CHANGE UP
SPECIMEN SOURCE: SIGNIFICANT CHANGE UP
SPECIMEN SOURCE: SIGNIFICANT CHANGE UP

## 2022-12-22 ENCOUNTER — APPOINTMENT (OUTPATIENT)
Dept: INFUSION THERAPY | Facility: HOSPITAL | Age: 67
End: 2022-12-22

## 2022-12-30 LAB
ALBUMIN MFR SERPL ELPH: 66.7 %
ALBUMIN SERPL-MCNC: 3.8 G/DL
ALBUMIN/GLOB SERPL: 2 RATIO
ALPHA1 GLOB MFR SERPL ELPH: 5.7 %
ALPHA1 GLOB SERPL ELPH-MCNC: 0.3 G/DL
ALPHA2 GLOB MFR SERPL ELPH: 11.7 %
ALPHA2 GLOB SERPL ELPH-MCNC: 0.7 G/DL
B-GLOBULIN MFR SERPL ELPH: 10.6 %
B-GLOBULIN SERPL ELPH-MCNC: 0.6 G/DL
DEPRECATED KAPPA LC FREE/LAMBDA SER: 1.03 RATIO
GAMMA GLOB FLD ELPH-MCNC: 0.3 G/DL
GAMMA GLOB MFR SERPL ELPH: 5.3 %
IGA SER QL IEP: 17 MG/DL
IGG SER QL IEP: 336 MG/DL
IGM SER QL IEP: 37 MG/DL
INTERPRETATION SERPL IEP-IMP: NORMAL
KAPPA LC CSF-MCNC: 0.75 MG/DL
KAPPA LC SERPL-MCNC: 0.77 MG/DL
M PROTEIN MFR SERPL ELPH: NORMAL
M PROTEIN SPEC IFE-MCNC: NORMAL
MONOCLON BAND OBS SERPL: NORMAL
PROT SERPL-MCNC: 5.7 G/DL
PROT SERPL-MCNC: 5.7 G/DL

## 2023-01-06 ENCOUNTER — OUTPATIENT (OUTPATIENT)
Dept: OUTPATIENT SERVICES | Facility: HOSPITAL | Age: 68
LOS: 1 days | Discharge: HOME | End: 2023-01-06

## 2023-01-06 DIAGNOSIS — Z98.890 OTHER SPECIFIED POSTPROCEDURAL STATES: Chronic | ICD-10-CM

## 2023-01-06 DIAGNOSIS — R79.89 OTHER SPECIFIED ABNORMAL FINDINGS OF BLOOD CHEMISTRY: ICD-10-CM

## 2023-01-12 ENCOUNTER — APPOINTMENT (OUTPATIENT)
Dept: INFUSION THERAPY | Facility: HOSPITAL | Age: 68
End: 2023-01-12
Payer: MEDICARE

## 2023-01-12 PROCEDURE — 90472 IMMUNIZATION ADMIN EACH ADD: CPT

## 2023-01-12 PROCEDURE — 90714 TD VACC NO PRESV 7 YRS+ IM: CPT

## 2023-01-12 PROCEDURE — 90733 MPSV4 VACCINE SUBQ: CPT

## 2023-01-12 PROCEDURE — 90471 IMMUNIZATION ADMIN: CPT

## 2023-01-12 PROCEDURE — 90713 POLIOVIRUS IPV SC/IM: CPT

## 2023-01-18 DIAGNOSIS — C90.00 MULTIPLE MYELOMA NOT HAVING ACHIEVED REMISSION: ICD-10-CM

## 2023-01-18 DIAGNOSIS — C79.51 SECONDARY MALIGNANT NEOPLASM OF BONE: ICD-10-CM

## 2023-01-19 ENCOUNTER — APPOINTMENT (OUTPATIENT)
Dept: INFUSION THERAPY | Facility: HOSPITAL | Age: 68
End: 2023-01-19

## 2023-01-31 NOTE — REVIEW OF SYSTEMS
[Fatigue] : fatigue [Fever] : no fever [Chills] : no chills [Vision Problems] : no vision problems [Mucosal Pain] : no mucosal pain [Chest Pain] : no chest pain [Shortness Of Breath] : no shortness of breath [Cough] : no cough [Abdominal Pain] : no abdominal pain [Vomiting] : no vomiting [Diarrhea] : no diarrhea [Skin Rash] : no skin rash [Dizziness] : no dizziness [Fainting] : no fainting [Easy Bleeding] : no tendency for easy bleeding [Easy Bruising] : no tendency for easy bruising [FreeTextEntry3] : ( seen by [facility] in 4/2022) [FreeTextEntry4] : no sore throat [FreeTextEntry5] : slight edema in both ankles [FreeTextEntry7] : no nausea [FreeTextEntry9] : no bone pain; muscle spasms in back [de-identified] : no paresthesias

## 2023-01-31 NOTE — HISTORY OF PRESENT ILLNESS
[Home] : at home, [unfilled] , at the time of the visit. [Medical Office: ([facility])___] : at the medical office located in  [Verbal consent obtained from patient] : the patient, [unfilled] [de-identified] : 11/2020-Found to have T-12 compression fracture. Saw orthopedist Dr. Candelaria in 1/2021. Referred to endocrinologist Dr. Acosta by Dr. 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 Serum immunofixation showed 1 IgD lambda band and free lambda light chains. Urine immunofixation negative for monoclonal band.\par 4/29/2021–Bone marrow biopsy and bone marrow aspirate consistent with plasma cell myeloma (greater than 90% involvement). Myeloma FISH studies showed CCND1/IGH fusion (27%). Flow cytometry positive for monotypic plasma cells. Lymphocyte immunophenotypic findings showed no diagnostic abnormalities. Cytogenetics with normal male karyotype. Congo red stain negative. Iron stains showed iron stores present. No ring sideroblasts.\par 5/13/21- C1D1 RVd\par  [de-identified] : Since initial HPC transplant consult visit on 6/7/21, he had recent hematology office visit on 8/19/21, Cycle #5 Day#15 Velcade/Decadron. Cycle #5 Revlimid as planned. He describes moderate fatigue, occasional blurry vision, insomnia with Decadron. He denies fever, chills, cough, dyspnea. He received the COVID-19 vaccine(Moderna) on 3/16, 4/13/21; he has booster vaccine on 8/31/21. The patient is very concerned about the delta variant of COVID-19 and asked to discuss isolation/restriction policies. \par \par 10/6/21:\par He completed cycle 6 RVD on 9/30/21. He has moderate fatigue and good appetite. He has occasional blurry vision and was evaluated by Optho with normal evaluation. He had MRI Brain with alton(9/29/21 at [facility])- negative. He denies fever,  chills, cough, shortness of breath. \par \par 12/9/21:\par The patient was admitted to the BMTU on 11/15/21 and received conditioning chemotherapy with Melphalan 100 mg/m2 IV x 2 consecutive days followed by autoPSCT on 11/19/21. \par \par Upon admission, a TLC was placed in IR. Mr. Julio received IV hydration, pain management, anxiolytics, antiemetics, nutritional support, and antibacterial / antiviral / antifungal / GI / PCP and VOD (SOS) prophylaxis. When his ANC dropped below 500 he was started on prophylactic Ciprofloxacin. Labs were monitored on a daily basis, and he received electrolyte repletion and transfusional support as needed. \par \par On 11/19/2021 After pre-medication  received 251 mL of, Autologous mobilized, \par plasma reduced, pooled, thawed, washes HCP apheresis for  over approximately 1 \par hr. Cell counts as follows \par Total MNC( x10^8/kg)=7.22 \par CD34+cells ( x10^6 kg)=4.58 \par Cell Viability (%)= 90 \par Mr. Julio tolerated the infusion well with no adverse resections noted. \par \par While admitted, Mr. Julio experienced pancytopenia related to the high dose chemotherapy conditioning regimen. He was treated with transfusional support. On \par 11/19/21 he experienced a vasovagal episode in the bathroom that was self limiting. On 11/23/21 he had another vasovagal episode with severe abdominal pain and distention. A RRT was called. During the episode he was hypotensive, lactate was 6. A CT A/P showed pneumoperitoneum and terminal ileitis. Surgery was consulted and he was transferred to the SICU. During the episode, he made a troponin. A TTE was completed in the SICU, which showed increased pulmonary pressures. As a result, a CTA of the chest was completed which showed a saddle pulmonary embolism. He was started on full dose heparin. Lower extremity dopplers showed bilateral acute DVTs. On 11/24/21 he underwent a mechanical thrombectomy. IVC filter placement was deferred. \par \par The terminal ileitis and pneumoperitoneum was treated conservatively with supportive care and antibiotics. He completed a course of Meropenem 12/7/21. A \par repeat CT A/P showed unchanged ileitis, with mild increased fluid distention and resolution of free air. The heparin was transitioned to full dose Lovenox, \par and eventually Eliquis. Shortly thereafter he was transferred back to . \par \par Currently, he is stable for discharge home with outpatient follow up at the [facility] and with vascular cardiology. \par \par Since discharge to home on 12/7/21 he has moderate fatigue and slowly improving appetite. He describes loose stool(small volume) twice daily with abdominal bloating and gassiness since discharge but denies nausea, vomiting. \par \par 12/16/21:\par He continues to have moderate fatigue and continued slow improvement in appetite. He started taking Gas-X twice daily after 12/9/21 office visit and stopped on 12/13 as his abd bloating with gassiness significantly improved. He states loose stool has improved, now only once daily since 12/17, but no nausea/vomiting. He still has bilateral lower extremity edema. He scheduled followup with vascular cardiology on 1/13/22. He denies fever, cough, shortness of breath. \par \par 12/22/21:\par He has mild-moderate fatigue and good appetite. He denies abdominal pain/distension and hasn't needed Gas-X for one week. He denies nausea/vomiting, diarrhea. No fever, chills. \par \par 01/05/22:\par He has mild fatigue and good appetite. He denies fever, chills, cough, dyspnea, nausea/vomiting, abdominal pain, diarrhea. He walks on treadmill daily x 20 minutes. \par \par 01/19/22:\par Patient called the office on 1/11/22 to discuss new onset of RLQ pain last night when sleeping and it awakened him when he was changing his position. He went back to sleep. He woke up around 6:30 am and noted sharp pain with movement, therefore, he has been trying not to move around too much. He denies nausea, vomiting, diarrhea. He has eaten breakfast and lunch without difficulty and no associated symptoms. He denies fever, chills, cough, shortness of breath. No acute swelling in lower extremities, no chest pain. \par He states pain is 3-4/10, non-radiating, and only occurs with movement. \par Plan- \par CT Abd/pelvis with oral and IV contrast. \par NPO after 9 am in case CT approved and scan can be done tomorrow afternoon. \par If he develops worsening pain, fever, chills, vomiting or other severe new symptoms he will go to [facility] immediately. \par I contacted patient on 1/12/22 to discuss results of CT Abd/pelvis(1/12/22)- acute appendicitis. Given persistent RLQ pain today, I instructed patient to go to [facility] ER for surgery evaluation. He agrees. Patient admitted to surgical service and treated conservatively with Meropenem IV. His abdominal pain resolved and he was discharged to home on 1/14/22 to finish course of antibiotics with Cipro/Flagyl.\par \par Since discharge on 1/14/22, he feels well overall with only mild fatigue, and appetite is good. He denies abdominal pain, nausea, vomiting, diarrhea, fever, chills. Weight this morning- 188.5 lbs.\par  \par 02/02/22:\par He describes good energy and good appetite. He denies fever, cough, shortness of breath, nausea, vomiting, diarrhea. Weight this morning- 189 lbs.\par \par 2/16/22:\par He describes good energy and good appetite. He denies fever, cough, shortness of breath, nausea, vomiting, diarrhea. He had Vascular Cardio followup with Echo and LE duplex study ordered. \par \par 03/03/22:\par He describes good energy and good appetite. He denies fever, cough, shortness of breath, nausea, vomiting, diarrhea. \par \par 03/30/22:\par Patient called the answering service on 3/19 and spoke to on call physician. He described sore throat/cough for the past three days starting 3/17. He states that the throat is getting better but just now he was febrile to 100.8. I advised going to the ER but he wants to take a Tylenol and wait it out for tonight since he is getting better in general. He agrees that if fever persists overnight, he will go to the ER. \par \par on 3/22, patient calls to give update stating that his sore throat resolved on 3/19 but he developed T- 100.8. He took Tylenol once and he states fever resolved and did not recur. He still has dry cough which is improving, and he is taking Robitussin. He states that day 1 of Revlimid maintenance was on 3/15/22. \par Plan-\par Given recent URTI symptoms with persistent cough, I instructed patient to HOLD Revlimid through 3/27/22, and resume on Monday 3/28/22. Drink plenty of water and rest. \par He will have lab work on 3/28 at local [facility] prior to Telehealth visit on 3/30/22. \par If he develops new symptoms he will call the office immediately. \par \par Today, patient states he has mild fatigue and good appetite. He has slight dry cough but he denies fever, chills, sore throat, shortness of breath, nausea, vomiting, diarrhea. 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 \par \par 04/07/22:\par The patient describes mild fatigue and good appetite. He has occasional cough but denies fever, chills, shortness of breath, bone pain. \par \par 05/19/22:\par Patient called the office(4/19) to inform me that he developed blurry vision and light sensitivity since 4/15, he has Optho appointment today. Also, last week he noted onset of few second "zaps" like from an electronic stimulation machine that a physical therapist uses. He feels this sensation at these sites: both ankles, lateral left knee, occasionally on left hip. He estimates total number of events as 5-10 daily. He did have similar symptoms in his right ribs last year which eventually resolved. He does not have paresthesias in his hands and feet. \par He is scheduled for bone marrow biopsy at [facility] on 5/3/22. He was seen by Cardio for evaluation of cardiac amyloid and had Echo on 4/18, and MRI Cardiac on 4/26. Bone marrow rescheduled for late May. \par He did see Optho and took his contacts out for 1 week and used prescription eye drops, his vision is now normal. The sensation of "zaps" is decreasing. \par He describes mild fatigue and good appetite. He has occasional cough but denies fever, chills, shortness of breath. He did develop low back pain after golfing, at area of prior T-12 compression fracture. He has Ortho followup on 6/8/22. \par \par 07/0722:\par Patient describes mild fatigue, also difficulty sleeping post Decadron. He denies fever, chills, cough, dyspnea. \par \par 08/11/22:\par Patient describes mild fatigue, also difficulty sleeping post Decadron. He saw an oral surgeon for a sore on his lower left gingiva, impression is that it is not osteonecrosis. Oral surgeon is aware he is on Xgeva. He has followup in 2 weeks. He has seen his Ortho surgeon for back spasms. He had MRI T-spine(7/13/22)- also revealed ? subacute fracture left 6th rib. He denies fever, chills, shortness of breath. \par \par 09/19/22:\par The patient will be completing Venetoclax on 9/22/22, has completed 4 cycles of Carfilzomib/Dex on 9/15/22. He complains of moderate fatigue and good appetite. He denies fever, chills, cough, shortness of breath. \par \par 10/20/22:\par Patient had followup with Dr. Stack on 10/13/22 at [facility]. He has mild fatigue and good appetite. He had Ortho spine followup on 9/21/22. He denies fever, chills, cough, shortness of breath. \par \par 11/17/22:\par Patient describes increased fatigue and gastrointestinal symptoms x 8 days starting 10/31/22, now resolved. No fever, COVID-19 home test negative. He has mild fatigue and good appetite. He denies fever, cough, shortness of breath\par \par 12/15/22:\par Patient developed cough, congestion, and headache x 4 days post exposure to his sick grandchild. He went to [facility] ED on 11/21/22. Evaluation included RVP- positive for entero/rhinovirus; COVID-19 PCR- Not detected; CXR- clear lungs. He was discharged to home with Yanick Alexis, instructions for supportive care. His symptoms continued and then he was seen in [facility] on 11/26, discharge on Amoxicillin x 7 days. He thought he was starting to feel slightly better last week but then cough and congestion returned.  No longer taking antibiotics.  Patient reports hard time sleeping at night due to increased cough and congestion.  Reports occasional wheezing.  No known fevers.  Denies chest pain.  Slight shortness of breath and cough is present.  Denies any abdominal pain, nausea, vomiting, diarrhea. He returned to [facility] ED on 12/12/22, at this time RVP- positive for RSV, and Coronavirus(Not COVID-19). Blood cultures negative; CXR- clear lungs, O2 sat- 95% RA. Seen by Pulmonary consult. Discharged to home on Zpac, Prednisone 20 mg po x 5 days, Proventil MDI, Mucinex, Tylenol prn. \par

## 2023-01-31 NOTE — RESULTS/DATA
[FreeTextEntry1] : I reviewed labs from 12/12/22 with patient today\par ----------------------------------------------------------------------------\par ACC: [id number] EXAM: CT ABDOMEN AND PELVIS OC IC \par PROCEDURE DATE: 01/12/2022 \par INTERPRETATION: CLINICAL INFORMATION: Right lower quadrant pain for \par Multiple myeloma. Previous terminal ileitis.\par \par COMPARISON: CT scan of the abdomen and pelvis from 11/29/2021\par \par CONTRAST/COMPLICATIONS:\par IV Contrast: Omnipaque 350 90 cc administered 10 cc discarded\par Oral Contrast: Omnipaque 300\par Complications: None reported at time of study completion\par \par PROCEDURE:\par CT of the Abdomen and Pelvis was performed.\par Sagittal and coronal reformats were performed.\par \par FINDINGS:\par LOWER CHEST: Within normal limits.\par \par LIVER: Within normal limits.\par BILE DUCTS: Normal caliber.\par GALLBLADDER: Small gallstones without gross inflammation.\par SPLEEN: Within normal limits.\par PANCREAS: Within normal limits.\par ADRENALS: Within normal limits.\par KIDNEYS/URETERS: Left renal cyst measuring up to 7.0 cm in the left lower \par pole. 1.3 cm low-attenuation lesion in the lower pole the left kidney \par laterally which is measuring greater than simple fluid in density.\par \par BLADDER: Bladder is underdistended which limits evaluation.\par REPRODUCTIVE ORGANS: Prominent prostate gland\par \par BOWEL: No bowel obstruction. Appendix is mildly distended measuring up to \par 1.0 cm which measured approximately 9 mm previously. There is question of \par minimally increased enhancement. There is mild surrounding soft tissue \par stranding although fluid was present in this region on the prior study. \par This may represent chronic changes although in a patient with right lower \par quadrant pain, acute appendicitis must also be considered.. Resolution of \par previously visualized distended small bowel. Previously mentioned \par terminal ileitis is not well appreciated on the current study. Colonic \par diverticuli predominantly in the sigmoid without gross inflammation.\par PERITONEUM: No ascites.\par VESSELS: Within normal limits.\par RETROPERITONEUM/LYMPH NODES: No lymphadenopathy.\par ABDOMINAL WALL: Small umbilical hernia containing fat.\par BONES: Status post ORIF of the left hemipelvis with stable postsurgical \par changes. Stable lucencies in bone, most pronounced and T12 and L5. \par Degenerative changes of bone. Spondylolisthesis with L5 anterior to L1 of \par approximately 25-50%.\par \par IMPRESSION:\par \par Mildly distended appendix. Mild surrounding soft tissue stranding where \par fluid was seen on the prior study. This may represent chronic residual \par changes although acute appendicitis must also be considered, especially \par in a patient with right lower quadrant pain. This was discussed with Dr. libby Sorenson at 3:00 PM on 1/12/2022.\par \par Resolution of small bowel obstruction. Stable bone findings.\par \par --- End of Report ---\par \par JEFF PHAM MD; Attending Radiologist\par

## 2023-01-31 NOTE — ASSESSMENT
[FreeTextEntry1] : IgD lambda myeloma s/p cycle 6 RVD on 21\par Mobilization of stem cells with Zarxio 960mcg subcutaneous daily 10/28/21-21\par He was admitted to the [facility] on 11/15/21 and received conditioning chemotherapy with Melphalan 100 mg/m2 IV x 2 consecutive days followed by autoPSCT on 21. \par Hospital course complicated by saddle pulmonary embolism and bilateral LE acute DVTs. On 21 he underwent a mechanical thrombectomy. IVC filter placement was deferred. He received anticoagulation, now on \par Eliquis. He also had terminal ileitis with ? microperforation, monitored in SICU, treated conservatively with supportive care and antibiotics. He completed a course of Meropenem 21.\par \par 1) Multiple myeloma- s/p autoPSCT on 21\par Plan- \par Continue current medications and restrictions as discussed prior to discharge from [facility]MTU\par Continue Acyclovir for ID prophylaxis; Off Diflucan and Mepron\par Continue MVI, Folate daily- switch to OTC MVI after supply complete\par Continue Pantoprazole daily for GI prophylaxis\par Zofran as needed for nausea/vomiting\par Drink plenty of water daily\par Moisturizing cream to the skin several times per day\par Labs reviewed from 3/28/22- IMEL results pending\par Discussed maintenance therapy post autoPSCT- Revlimid +/- Velcade/Dex, with potential side effects discussed during 3/3/22 office visit. \par Patient initiated Revlimid 10 mg po daily on 3/15/22, then held 3/22- 3/27 for URTI symptoms. Resumed on 3/28/22 and completed 28 day supply(1 cycle)\par Patient had second opinion consult Dr. Andrew Stack at [facility] on 3/31/22. Dr. Stack contacted me on 22 to discuss his recommendations to further management of patient's myeloma.\par Given measurable Urine M-John(24 hr urine), myeloma with t(11;14) fusion detected, Dr. Stack discussed combination Venetoclax daily/Carfilzomib(C1D1 20 mg/m2 with increase to 56 mg/m2 on C1D8 given on days 1, 8, 15 of 28 day cycle) consolidation x 4 cycles. The patient agrees to proceed with this treatment regimen\par He will undergo repeat bone marrow evaluation with FISH myeloma panel and NGS/RNA seq at [facility]. Plan is for late May. \par He will be referred for Cardiology consult to evaluate for cardiac amyloidosis- Echo with strain imaging and Cardiac MRI- done.\par Whole body PET/CT scan to assess for lytic bone disease; prior MRI T-spine(21)- mild loss of vertebral body height of T-12. Test done on 22- no new bone lesions, no abnormal FDG activity\par Bone density to assess for osteoporosis-(22)- impression is normal study.  Plan to begin Xgeva with consolidation therapy. \par Call the office immediately if fever, chills, symptoms of infection\par 22:\par Carfilzomib 56 mg/m2 IV days 1, 8, 15 of 28 day cycle) consolidation x 4 cycles. Today is C2 day 8. \par Venetoclax 400 mg po daily starting on 22, he is tolerating well; now increased to 800 mg po daily on 22. \par Continue Acyclovir prophylaxis\par Continue PPI\par Drink plenty of water daily \par I reviewed lab results from 22 with patient and his wife, IgD decreased to 8 mg/dL. \par 22:\par Carfilzomib 56 mg/m2 IV days 1, 8, 15 of 28 day cycle) consolidation x 4 cycles. Today is C3 day 15. \par Venetoclax 400 mg po daily starting on 22, he is tolerating well; now increased to 800 mg po daily on 22. \par Continue Acyclovir prophylaxis\par Continue PPI\par Drink plenty of water daily \par Will repeat IMEL with IgD level in 2 weeks\par 22:\par Carfilzomib 56 mg/m2 IV days 1, 8, 15 of 28 day cycle) consolidation x 4 cycles. On 9/15/22, he received C4 day 15. Decadron 40 mg po weekly with Carfilzomib completed. \par Venetoclax 400 mg po daily starting on 22, he is tolerating well; now increased to 800 mg po daily on 22 to complete course of therapy on 22. \par Continue Acyclovir prophylaxis\par Continue PPI as needed\par Drink plenty of water daily \par Will repeat IMEL with IgD level during next office visit\par He has Telehealth visit with Dr. Stack on 10/3/22 and bone marrow biopsy on 22. \par 10/20/22:\par Carfilzomib 56 mg/m2 IV days 1, 8, 15 of 28 day cycle) consolidation x 4 cycles. On 9/15/22, he received C4 day 15. Decadron 40 mg po weekly with Carfilzomib completed. \par Venetoclax 400 mg po daily starting on 22, he is tolerating well; now increased to 800 mg po daily on 22 to complete course of therapy on 22. \par Continue Acyclovir prophylaxis\par Continue PPI as needed\par Drink plenty of water daily \par Check IMEL with IgD level, CMP today\par He had Telehealth visit with Dr. Stack on 10/13/22 and bone marrow biopsy on 22. \par 22:\par Completed Carfilzomib/Dex/Venetoclax consolidation x 4 cycles\par Continue Acyclovir prophylaxis\par Continue PPI as needed\par Drink plenty of water daily \par Check IMEL with IgD level, CMP, TFT's, Testosterone, Ferritin today\par Schedule PFT's at 1 yr post transplant to assess vital organ function\par Echo done by Cardiology\par He had Telehealth visit with Dr. Stack on 10/13/22 and bone marrow biopsy on 22. He has 24 hr urine for UPEP bone by Dr. Stack. \par Schedule post transplant vaccines for 12 months post autoPSCT\par 22:\par Completed Carfilzomib/Dex/Venetoclax consolidation x 4 cycles\par Continue Acyclovir prophylaxis\par Continue PPI as needed\par Drink plenty of water daily \par Check IMEL with IgD level, CMP, TFT's, Testosterone, Ferritin on 22\par Schedule PFT's at 1 yr post transplant to assess vital organ function\par Echo done by Cardiology\par He had Telehealth visit with Dr. Stack on 10/13/22 and bone marrow biopsy on 22. He has 24 hr urine for UPEP bone by Dr. Stack. \par Schedule post transplant vaccines for 12 months post autoPSCT\par \par Of note, patient tripped over uneven ledge in a garage while on vacation in 2022, he states any person would have tripped on this. He is Not weak, no neuropathy, no balance problems. He knows to pay attention now to where he is walking. He does require physical therapy. \par \par 2) H/O saddle PE s/p thrombectomy; h/o bilat LE DVT\par Plan- Continue Eliquis 5 mg po 2X/day\par Had followup with Dr. Holley in 2022\par \par 3) RSV infection- cough, congestion; prior exposure to grandchild. Evaluated at [facility] [place] [facility] on 22, at this time RVP- positive for RSV, and Coronavirus(Not COVID-19). Blood cultures negative; CXR- clear lungs, O2 sat- 95% RA. Seen by Pulmonary consult. Discharged to home on Zpac, Prednisone 20 mg po x 5 days, Proventil MDI, Mucinex, Tylenol prn. \par Plan- continue supportive care. \par Inform family members living in same household. \par \par I confirmed patient's name and  at beginning of consult visit. Verbal consent for Telehealth visit given on 22 at 2:55 pm by Jony Julio, self. Visit start time- 2:53 pm- visit end time- 3:54 pm. Total visit time- 61 minutes. \par \par RTC in 2 months \par \par \par \par

## 2023-01-31 NOTE — CONSULT LETTER
[Dear  ___] : Dear  [unfilled], [Courtesy Letter:] : I had the pleasure of seeing your patient, [unfilled], in my office today. [Please see my note below.] : Please see my note below. [Consult Closing:] : Thank you very much for allowing me to participate in the care of this patient.  If you have any questions, please do not hesitate to contact me. [Sincerely,] : Sincerely, [DrVladislav  ___] : Dr. BELTRAN [FreeTextEntry2] : Dyana Cheatham M.D.\par [facility]\par [address]\par [place], [place]   [id number] [FreeTextEntry3] : \par Pao Sorenson M.D.\par \par  of Medicine\par [employer]\par [facility] \par [facility]\par [address] [place] [address] \[place] [address] \par ph: [phone]\par fax: [phone]

## 2023-02-09 ENCOUNTER — APPOINTMENT (OUTPATIENT)
Dept: HEMATOLOGY ONCOLOGY | Facility: CLINIC | Age: 68
End: 2023-02-09
Payer: MEDICARE

## 2023-02-09 ENCOUNTER — RESULT REVIEW (OUTPATIENT)
Age: 68
End: 2023-02-09

## 2023-02-09 ENCOUNTER — LABORATORY RESULT (OUTPATIENT)
Age: 68
End: 2023-02-09

## 2023-02-09 ENCOUNTER — APPOINTMENT (OUTPATIENT)
Dept: INFUSION THERAPY | Facility: HOSPITAL | Age: 68
End: 2023-02-09

## 2023-02-09 VITALS
HEART RATE: 53 BPM | TEMPERATURE: 97.3 F | HEIGHT: 72.99 IN | RESPIRATION RATE: 16 BRPM | SYSTOLIC BLOOD PRESSURE: 118 MMHG | BODY MASS INDEX: 27.9 KG/M2 | DIASTOLIC BLOOD PRESSURE: 76 MMHG | OXYGEN SATURATION: 97 % | WEIGHT: 210.54 LBS

## 2023-02-09 LAB
BASOPHILS # BLD AUTO: 0.04 K/UL — SIGNIFICANT CHANGE UP (ref 0–0.2)
BASOPHILS NFR BLD AUTO: 1 % — SIGNIFICANT CHANGE UP (ref 0–2)
EOSINOPHIL # BLD AUTO: 0.15 K/UL — SIGNIFICANT CHANGE UP (ref 0–0.5)
EOSINOPHIL NFR BLD AUTO: 3.8 % — SIGNIFICANT CHANGE UP (ref 0–6)
HCT VFR BLD CALC: 39.3 % — SIGNIFICANT CHANGE UP (ref 39–50)
HGB BLD-MCNC: 13.4 G/DL — SIGNIFICANT CHANGE UP (ref 13–17)
IMM GRANULOCYTES NFR BLD AUTO: 0.5 % — SIGNIFICANT CHANGE UP (ref 0–0.9)
LYMPHOCYTES # BLD AUTO: 1.05 K/UL — SIGNIFICANT CHANGE UP (ref 1–3.3)
LYMPHOCYTES # BLD AUTO: 26.6 % — SIGNIFICANT CHANGE UP (ref 13–44)
MCHC RBC-ENTMCNC: 32.4 PG — SIGNIFICANT CHANGE UP (ref 27–34)
MCHC RBC-ENTMCNC: 34.1 G/DL — SIGNIFICANT CHANGE UP (ref 32–36)
MCV RBC AUTO: 94.9 FL — SIGNIFICANT CHANGE UP (ref 80–100)
MONOCYTES # BLD AUTO: 0.66 K/UL — SIGNIFICANT CHANGE UP (ref 0–0.9)
MONOCYTES NFR BLD AUTO: 16.7 % — HIGH (ref 2–14)
NEUTROPHILS # BLD AUTO: 2.03 K/UL — SIGNIFICANT CHANGE UP (ref 1.8–7.4)
NEUTROPHILS NFR BLD AUTO: 51.4 % — SIGNIFICANT CHANGE UP (ref 43–77)
NRBC # BLD: 0 /100 WBCS — SIGNIFICANT CHANGE UP (ref 0–0)
PLATELET # BLD AUTO: 164 K/UL — SIGNIFICANT CHANGE UP (ref 150–400)
RBC # BLD: 4.14 M/UL — LOW (ref 4.2–5.8)
RBC # FLD: 14.1 % — SIGNIFICANT CHANGE UP (ref 10.3–14.5)
WBC # BLD: 3.95 K/UL — SIGNIFICANT CHANGE UP (ref 3.8–10.5)
WBC # FLD AUTO: 3.95 K/UL — SIGNIFICANT CHANGE UP (ref 3.8–10.5)

## 2023-02-09 PROCEDURE — 99215 OFFICE O/P EST HI 40 MIN: CPT

## 2023-02-09 RX ORDER — SULFAMETHOXAZOLE AND TRIMETHOPRIM 800; 160 MG/1; MG/1
800-160 TABLET ORAL
Qty: 8 | Refills: 0 | Status: DISCONTINUED | COMMUNITY
Start: 2022-10-31 | End: 2023-02-09

## 2023-02-09 NOTE — REVIEW OF SYSTEMS
[Fatigue] : fatigue [Fever] : no fever [Chills] : no chills [Vision Problems] : no vision problems [Mucosal Pain] : no mucosal pain [Chest Pain] : no chest pain [Shortness Of Breath] : no shortness of breath [Abdominal Pain] : no abdominal pain [Vomiting] : no vomiting [Diarrhea] : no diarrhea [Skin Rash] : no skin rash [Dizziness] : no dizziness [Fainting] : no fainting [Easy Bleeding] : no tendency for easy bleeding [Easy Bruising] : no tendency for easy bruising [FreeTextEntry3] : ( seen by [employer] in 4/2022) [FreeTextEntry4] : no sore throat [FreeTextEntry5] : slight edema in both ankles [FreeTextEntry6] : occasional cough [FreeTextEntry7] : no nausea [FreeTextEntry9] : no bone pain; muscle spasms in back [de-identified] : no paresthesias

## 2023-02-09 NOTE — RESULTS/DATA
[FreeTextEntry1] : I reviewed labs from 02/09/23 with patient today\par ----------------------------------------------------------------------------\par ACC: [id number] EXAM: CT ABDOMEN AND PELVIS OC IC \par PROCEDURE DATE: 01/12/2022 \par INTERPRETATION: CLINICAL INFORMATION: Right lower quadrant pain for \par Multiple myeloma. Previous terminal ileitis.\par \par COMPARISON: CT scan of the abdomen and pelvis from 11/29/2021\par \par CONTRAST/COMPLICATIONS:\par IV Contrast: Omnipaque 350 90 cc administered 10 cc discarded\par Oral Contrast: Omnipaque 300\par Complications: None reported at time of study completion\par \par PROCEDURE:\par CT of the Abdomen and Pelvis was performed.\par Sagittal and coronal reformats were performed.\par \par FINDINGS:\par LOWER CHEST: Within normal limits.\par \par LIVER: Within normal limits.\par BILE DUCTS: Normal caliber.\par GALLBLADDER: Small gallstones without gross inflammation.\par SPLEEN: Within normal limits.\par PANCREAS: Within normal limits.\par ADRENALS: Within normal limits.\par KIDNEYS/URETERS: Left renal cyst measuring up to 7.0 cm in the left lower \par pole. 1.3 cm low-attenuation lesion in the lower pole the left kidney \par laterally which is measuring greater than simple fluid in density.\par \par BLADDER: Bladder is underdistended which limits evaluation.\par REPRODUCTIVE ORGANS: Prominent prostate gland\par \par BOWEL: No bowel obstruction. Appendix is mildly distended measuring up to \par 1.0 cm which measured approximately 9 mm previously. There is question of \par minimally increased enhancement. There is mild surrounding soft tissue \par stranding although fluid was present in this region on the prior study. \par This may represent chronic changes although in a patient with right lower \par quadrant pain, acute appendicitis must also be considered.. Resolution of \par previously visualized distended small bowel. Previously mentioned \par terminal ileitis is not well appreciated on the current study. Colonic \par diverticuli predominantly in the sigmoid without gross inflammation.\par PERITONEUM: No ascites.\par VESSELS: Within normal limits.\par RETROPERITONEUM/LYMPH NODES: No lymphadenopathy.\par ABDOMINAL WALL: Small umbilical hernia containing fat.\par BONES: Status post ORIF of the left hemipelvis with stable postsurgical \par changes. Stable lucencies in bone, most pronounced and T12 and L5. \par Degenerative changes of bone. Spondylolisthesis with L5 anterior to L1 of \par approximately 25-50%.\par \par IMPRESSION:\par \par Mildly distended appendix. Mild surrounding soft tissue stranding where \par fluid was seen on the prior study. This may represent chronic residual \par changes although acute appendicitis must also be considered, especially \par in a patient with right lower quadrant pain. This was discussed with Dr. libby Sorenson at 3:00 PM on 1/12/2022.\par \par Resolution of small bowel obstruction. Stable bone findings.\par \par --- End of Report ---\par \par JEFF PHAM MD; Attending Radiologist\par

## 2023-02-09 NOTE — PHYSICAL EXAM
[Ambulatory and capable of all self care but unable to carry out any work activities] : Status 2- Ambulatory and capable of all self care but unable to carry out any work activities. Up and about more than 50% of waking hours [Normal] : affect appropriate [de-identified] : anicteric [de-identified] : RRR, normal S1S2 [de-identified] : trace edema bilateral ankles [de-identified] : no cervical/SCV adenopathy [de-identified] : no rash [de-identified] : A & O x 4

## 2023-02-09 NOTE — HISTORY OF PRESENT ILLNESS
[de-identified] : 11/2020-Found to have T-12 compression fracture. Saw orthopedist Dr. Candelaria in 1/2021. Referred to endocrinologist Dr. Acosta by Dr. 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 Serum immunofixation showed 1 IgD lambda band and free lambda light chains. Urine immunofixation negative for monoclonal band.\par 4/29/2021–Bone marrow biopsy and bone marrow aspirate consistent with plasma cell myeloma (greater than 90% involvement). Myeloma FISH studies showed CCND1/IGH fusion (27%). Flow cytometry positive for monotypic plasma cells. Lymphocyte immunophenotypic findings showed no diagnostic abnormalities. Cytogenetics with normal male karyotype. Congo red stain negative. Iron stains showed iron stores present. No ring sideroblasts.\par 5/13/21- C1D1 RVd\par  [de-identified] : Since initial HPC transplant consult visit on 6/7/21, he had recent hematology office visit on 8/19/21, Cycle #5 Day#15 Velcade/Decadron. Cycle #5 Revlimid as planned. He describes moderate fatigue, occasional blurry vision, insomnia with Decadron. He denies fever, chills, cough, dyspnea. He received the COVID-19 vaccine(Moderna) on 3/16, 4/13/21; he has booster vaccine on 8/31/21. The patient is very concerned about the delta variant of COVID-19 and asked to discuss isolation/restriction policies. \par \par 10/6/21:\par He completed cycle 6 RVD on 9/30/21. He has moderate fatigue and good appetite. He has occasional blurry vision and was evaluated by Optho with normal evaluation. He had MRI Brain with alton(9/29/21 at [facility])- negative. He denies fever,  chills, cough, shortness of breath. \par \par 12/9/21:\par The patient was admitted to the BMTU on 11/15/21 and received conditioning chemotherapy with Melphalan 100 mg/m2 IV x 2 consecutive days followed by autoPSCT on 11/19/21. \par \par Upon admission, a TLC was placed in IR. Mr. Julio received IV hydration, pain management, anxiolytics, antiemetics, nutritional support, and antibacterial / antiviral / antifungal / GI / PCP and VOD (SOS) prophylaxis. When his ANC dropped below 500 he was started on prophylactic Ciprofloxacin. Labs were monitored on a daily basis, and he received electrolyte repletion and transfusional support as needed. \par \par On 11/19/2021 After pre-medication  received 251 mL of, Autologous mobilized, \par plasma reduced, pooled, thawed, washes HCP apheresis for  over approximately 1 \par hr. Cell counts as follows \par Total MNC( x10^8/kg)=7.22 \par CD34+cells ( x10^6 kg)=4.58 \par Cell Viability (%)= 90 \par Mr. Julio tolerated the infusion well with no adverse resections noted. \par \par While admitted, Mr. Julio experienced pancytopenia related to the high dose chemotherapy conditioning regimen. He was treated with transfusional support. On \par 11/19/21 he experienced a vasovagal episode in the bathroom that was self limiting. On 11/23/21 he had another vasovagal episode with severe abdominal pain and distention. A RRT was called. During the episode he was hypotensive, lactate was 6. A CT A/P showed pneumoperitoneum and terminal ileitis. Surgery was consulted and he was transferred to the SICU. During the episode, he made a troponin. A TTE was completed in the SICU, which showed increased pulmonary pressures. As a result, a CTA of the chest was completed which showed a saddle pulmonary embolism. He was started on full dose heparin. Lower extremity dopplers showed bilateral acute DVTs. On 11/24/21 he underwent a mechanical thrombectomy. IVC filter placement was deferred. \par \par The terminal ileitis and pneumoperitoneum was treated conservatively with supportive care and antibiotics. He completed a course of Meropenem 12/7/21. A \par repeat CT A/P showed unchanged ileitis, with mild increased fluid distention and resolution of free air. The heparin was transitioned to full dose Lovenox, \par and eventually Eliquis. Shortly thereafter he was transferred back to . \par \par Currently, he is stable for discharge home with outpatient follow up at the [facility] and with vascular cardiology. \par \par Since discharge to home on 12/7/21 he has moderate fatigue and slowly improving appetite. He describes loose stool(small volume) twice daily with abdominal bloating and gassiness since discharge but denies nausea, vomiting. \par \par 12/16/21:\par He continues to have moderate fatigue and continued slow improvement in appetite. He started taking Gas-X twice daily after 12/9/21 office visit and stopped on 12/13 as his abd bloating with gassiness significantly improved. He states loose stool has improved, now only once daily since 12/17, but no nausea/vomiting. He still has bilateral lower extremity edema. He scheduled followup with vascular cardiology on 1/13/22. He denies fever, cough, shortness of breath. \par \par 12/22/21:\par He has mild-moderate fatigue and good appetite. He denies abdominal pain/distension and hasn't needed Gas-X for one week. He denies nausea/vomiting, diarrhea. No fever, chills. \par \par 01/05/22:\par He has mild fatigue and good appetite. He denies fever, chills, cough, dyspnea, nausea/vomiting, abdominal pain, diarrhea. He walks on treadmill daily x 20 minutes. \par \par 01/19/22:\par Patient called the office on 1/11/22 to discuss new onset of RLQ pain last night when sleeping and it awakened him when he was changing his position. He went back to sleep. He woke up around 6:30 am and noted sharp pain with movement, therefore, he has been trying not to move around too much. He denies nausea, vomiting, diarrhea. He has eaten breakfast and lunch without difficulty and no associated symptoms. He denies fever, chills, cough, shortness of breath. No acute swelling in lower extremities, no chest pain. \par He states pain is 3-4/10, non-radiating, and only occurs with movement. \par Plan- \par CT Abd/pelvis with oral and IV contrast. \par NPO after 9 am in case CT approved and scan can be done tomorrow afternoon. \par If he develops worsening pain, fever, chills, vomiting or other severe new symptoms he will go to [facility] immediately. \par I contacted patient on 1/12/22 to discuss results of CT Abd/pelvis(1/12/22)- acute appendicitis. Given persistent RLQ pain today, I instructed patient to go to [facility] ER for surgery evaluation. He agrees. Patient admitted to surgical service and treated conservatively with Meropenem IV. His abdominal pain resolved and he was discharged to home on 1/14/22 to finish course of antibiotics with Cipro/Flagyl.\par \par Since discharge on 1/14/22, he feels well overall with only mild fatigue, and appetite is good. He denies abdominal pain, nausea, vomiting, diarrhea, fever, chills. Weight this morning- 188.5 lbs.\par  \par 02/02/22:\par He describes good energy and good appetite. He denies fever, cough, shortness of breath, nausea, vomiting, diarrhea. Weight this morning- 189 lbs.\par \par 2/16/22:\par He describes good energy and good appetite. He denies fever, cough, shortness of breath, nausea, vomiting, diarrhea. He had Vascular Cardio followup with Echo and LE duplex study ordered. \par \par 03/03/22:\par He describes good energy and good appetite. He denies fever, cough, shortness of breath, nausea, vomiting, diarrhea. \par \par 03/30/22:\par Patient called the answering service on 3/19 and spoke to on call physician. He described sore throat/cough for the past three days starting 3/17. He states that the throat is getting better but just now he was febrile to 100.8. I advised going to the ER but he wants to take a Tylenol and wait it out for tonight since he is getting better in general. He agrees that if fever persists overnight, he will go to the ER. \par \par on 3/22, patient calls to give update stating that his sore throat resolved on 3/19 but he developed T- 100.8. He took Tylenol once and he states fever resolved and did not recur. He still has dry cough which is improving, and he is taking Robitussin. He states that day 1 of Revlimid maintenance was on 3/15/22. \par Plan-\par Given recent URTI symptoms with persistent cough, I instructed patient to HOLD Revlimid through 3/27/22, and resume on Monday 3/28/22. Drink plenty of water and rest. \par He will have lab work on 3/28 at [facility] prior to Telehealth visit on 3/30/22. \par If he develops new symptoms he will call the office immediately. \par \par Today, patient states he has mild fatigue and good appetite. He has slight dry cough but he denies fever, chills, sore throat, shortness of breath, nausea, vomiting, diarrhea. 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 \par \par 04/07/22:\par The patient describes mild fatigue and good appetite. He has occasional cough but denies fever, chills, shortness of breath, bone pain. \par \par 05/19/22:\par Patient called the office(4/19) to inform me that he developed blurry vision and light sensitivity since 4/15, he has Optho appointment today. Also, last week he noted onset of few second "zaps" like from an electronic stimulation machine that a physical therapist uses. He feels this sensation at these sites: both ankles, lateral left knee, occasionally on left hip. He estimates total number of events as 5-10 daily. He did have similar symptoms in his right ribs last year which eventually resolved. He does not have paresthesias in his hands and feet. \par He is scheduled for bone marrow biopsy at [facility] on 5/3/22. He was seen by Cardio for evaluation of cardiac amyloid and had Echo on 4/18, and MRI Cardiac on 4/26. Bone marrow rescheduled for late May. \par He did see Optho and took his contacts out for 1 week and used prescription eye drops, his vision is now normal. The sensation of "zaps" is decreasing. \par He describes mild fatigue and good appetite. He has occasional cough but denies fever, chills, shortness of breath. He did develop low back pain after golfing, at area of prior T-12 compression fracture. He has Ortho followup on 6/8/22. \par \par 07/0722:\par Patient describes mild fatigue, also difficulty sleeping post Decadron. He denies fever, chills, cough, dyspnea. \par \par 08/11/22:\par Patient describes mild fatigue, also difficulty sleeping post Decadron. He saw an oral surgeon for a sore on his lower left gingiva, impression is that it is not osteonecrosis. Oral surgeon is aware he is on Xgeva. He has followup in 2 weeks. He has seen his Ortho surgeon for back spasms. He had MRI T-spine(7/13/22)- also revealed ? subacute fracture left 6th rib. He denies fever, chills, shortness of breath. \par \par 09/19/22:\par The patient will be completing Venetoclax on 9/22/22, has completed 4 cycles of Carfilzomib/Dex on 9/15/22. He complains of moderate fatigue and good appetite. He denies fever, chills, cough, shortness of breath. \par \par 10/20/22:\par Patient had followup with Dr. Stack on 10/13/22 at [facility]. He has mild fatigue and good appetite. He had Ortho spine followup on 9/21/22. He denies fever, chills, cough, shortness of breath. \par \par 11/17/22:\par Patient describes increased fatigue and gastrointestinal symptoms x 8 days starting 10/31/22, now resolved. No fever, COVID-19 home test negative. He has mild fatigue and good appetite. He denies fever, cough, shortness of breath\par \par 12/15/22:\par Patient developed cough, congestion, and headache x 4 days post exposure to his sick grandchild. He went to [facility] ED on 11/21/22. Evaluation included RVP- positive for entero/rhinovirus; COVID-19 PCR- Not detected; CXR- clear lungs. He was discharged to home with Yanick Alexis, instructions for supportive care. His symptoms continued and then he was seen in [facility] on 11/26, discharge on Amoxicillin x 7 days. He thought he was starting to feel slightly better last week but then cough and congestion returned.  No longer taking antibiotics.  Patient reports hard time sleeping at night due to increased cough and congestion.  Reports occasional wheezing.  No known fevers.  Denies chest pain.  Slight shortness of breath and cough is present.  Denies any abdominal pain, nausea, vomiting, diarrhea. He returned to [facility] ED on 12/12/22, at this time RVP- positive for RSV, and Coronavirus(Not COVID-19). Blood cultures negative; CXR- clear lungs, O2 sat- 95% RA. Seen by Pulmonary consult. Discharged to home on Zpac, Prednisone 20 mg po x 5 days, Proventil MDI, Mucinex, Tylenol prn. \par \par 02/09/23:\par He has mild fatigue and good appetite. He denies fever, chills, shortness of breath, diarrhea. He has followed by Urology for elevated PSA. He underwent prostate biopsy on 1/31/23- adenocarcinoma, Crosby score 7.  He has appointment with Rad Onc and Oncologist on 2/15/23. \par

## 2023-02-09 NOTE — ASSESSMENT
[FreeTextEntry1] : IgD lambda myeloma s/p cycle 6 RVD on 9/30/21\par Mobilization of stem cells with Zarxio 960mcg subcutaneous daily 10/28/21-11/1/21\par He was admitted to the BMTU on 11/15/21 and received conditioning chemotherapy with Melphalan 100 mg/m2 IV x 2 consecutive days followed by autoPSCT on 11/19/21. \par Hospital course complicated by saddle pulmonary embolism and bilateral LE acute DVTs. On 11/24/21 he underwent a mechanical thrombectomy. IVC filter placement was deferred. He received anticoagulation, now on \par Eliquis. He also had terminal ileitis with ? microperforation, monitored in SICU, treated conservatively with supportive care and antibiotics. He completed a course of Meropenem 12/7/21.\par \par 1) Multiple myeloma- s/p autoPSCT on 11/19/21\par Plan- \par Continue current medications and restrictions as discussed prior to discharge from BMTU\par Continue Acyclovir for ID prophylaxis; Off Diflucan and Mepron\par Continue MVI, Folate daily- switch to OTC MVI after supply complete\par Continue Pantoprazole daily for GI prophylaxis\par Zofran as needed for nausea/vomiting\par Drink plenty of water daily\par Moisturizing cream to the skin several times per day\par Labs reviewed from 3/28/22- IMEL results pending\par Discussed maintenance therapy post autoPSCT- Revlimid +/- Velcade/Dex, with potential side effects discussed during 3/3/22 office visit. \par Patient initiated Revlimid 10 mg po daily on 3/15/22, then held 3/22- 3/27 for URTI symptoms. Resumed on 3/28/22 and completed 28 day supply(1 cycle)\par Patient had second opinion consult Dr. Andrew Stack at [facility] on 3/31/22. Dr. Stack contacted me on 4/1/22 to discuss his recommendations to further management of patient's myeloma.\par Given measurable Urine M-John(24 hr urine), myeloma with t(11;14) fusion detected, Dr. Stack discussed combination Venetoclax daily/Carfilzomib(C1D1 20 mg/m2 with increase to 56 mg/m2 on C1D8 given on days 1, 8, 15 of 28 day cycle) consolidation x 4 cycles. The patient agrees to proceed with this treatment regimen\par He will undergo repeat bone marrow evaluation with FISH myeloma panel and NGS/RNA seq at [facility]. Plan is for late May. \par He will be referred for Cardiology consult to evaluate for cardiac amyloidosis- Echo with strain imaging and Cardiac MRI- done.\par Whole body PET/CT scan to assess for lytic bone disease; prior MRI T-spine(5/30/21)- mild loss of vertebral body height of T-12. Test done on 5/2/22- no new bone lesions, no abnormal FDG activity\par Bone density to assess for osteoporosis-(4/13/22)- impression is normal study.  Plan to begin Xgeva with consolidation therapy. \par Call the office immediately if fever, chills, symptoms of infection\par 07/07/22:\par Carfilzomib 56 mg/m2 IV days 1, 8, 15 of 28 day cycle) consolidation x 4 cycles. Today is C2 day 8. \par Venetoclax 400 mg po daily starting on 6/2/22, he is tolerating well; now increased to 800 mg po daily on 7/2/22. \par Continue Acyclovir prophylaxis\par Continue PPI\par Drink plenty of water daily \par I reviewed lab results from 6/30/22 with patient and his wife, IgD decreased to 8 mg/dL. \par 08/11/22:\par Carfilzomib 56 mg/m2 IV days 1, 8, 15 of 28 day cycle) consolidation x 4 cycles. Today is C3 day 15. \par Venetoclax 400 mg po daily starting on 6/2/22, he is tolerating well; now increased to 800 mg po daily on 7/2/22. \par Continue Acyclovir prophylaxis\par Continue PPI\par Drink plenty of water daily \par Will repeat IMEL with IgD level in 2 weeks\par 09/19/22:\par Carfilzomib 56 mg/m2 IV days 1, 8, 15 of 28 day cycle) consolidation x 4 cycles. On 9/15/22, he received C4 day 15. Decadron 40 mg po weekly with Carfilzomib completed. \par Venetoclax 400 mg po daily starting on 6/2/22, he is tolerating well; now increased to 800 mg po daily on 7/2/22 to complete course of therapy on 9/22/22. \par Continue Acyclovir prophylaxis\par Continue PPI as needed\par Drink plenty of water daily \par Will repeat IMEL with IgD level during next office visit\par He has Telehealth visit with Dr. Stack on 10/3/22 and bone marrow biopsy on 12/5/22. \par 10/20/22:\par Carfilzomib 56 mg/m2 IV days 1, 8, 15 of 28 day cycle) consolidation x 4 cycles. On 9/15/22, he received C4 day 15. Decadron 40 mg po weekly with Carfilzomib completed. \par Venetoclax 400 mg po daily starting on 6/2/22, he is tolerating well; now increased to 800 mg po daily on 7/2/22 to complete course of therapy on 9/22/22. \par Continue Acyclovir prophylaxis\par Continue PPI as needed\par Drink plenty of water daily \par Check IMEL with IgD level, CMP today\par He had Telehealth visit with Dr. Stack on 10/13/22 and bone marrow biopsy on 12/5/22. \par 11/17/22:\par Completed Carfilzomib/Dex/Venetoclax consolidation x 4 cycles\par Continue Acyclovir prophylaxis\par Continue PPI as needed\par Drink plenty of water daily \par Check IMEL with IgD level, CMP, TFT's, Testosterone, Ferritin today\par Schedule PFT's at 1 yr post transplant to assess vital organ function\par Echo done by Cardiology\par He had Telehealth visit with Dr. Stack on 10/13/22 and bone marrow biopsy on 12/5/22. He has 24 hr urine for UPEP bone by Dr. Stack. \par Schedule post transplant vaccines for 12 months post autoPSCT\par 12/15/22:\par Completed Carfilzomib/Dex/Venetoclax consolidation x 4 cycles\par Continue Acyclovir prophylaxis\par Continue PPI as needed\par Drink plenty of water daily \par Check IMEL with IgD level, CMP, TFT's, Testosterone, Ferritin on 11/17/22\par Schedule PFT's at 1 yr post transplant to assess vital organ function\par Echo done by Cardiology\par He had Telehealth visit with Dr. Stack on 10/13/22 and bone marrow biopsy on 12/5/22. He has 24 hr urine for UPEP bone by Dr. Stack. \par Schedule post transplant vaccines for 12 months post autoPSCT\par 02/09/23:\par Completed Carfilzomib/Dex/Venetoclax consolidation x 4 cycles\par Continue Xgeva monthly; check Vit D level, continue Calcium\par Continue Acyclovir prophylaxis\par Continue PPI as needed\par Drink plenty of water daily \par Check IMEL with IgD level, CMP, Mg; repeat Testosterone level\par Schedule PFT's at 1 yr post transplant to assess vital organ function- still pending, will hold off until prostate cancer treatment completed. \par Echo done by Cardiology\par He had Telehealth visit with Dr. Stack on 10/13/22 and bone marrow biopsy on 2/23/23. He has 24 hr urine for UPEP bone by Dr. Stack. \par Scheduled for post transplant vaccines for 14 months post autoPSCT; he completed 12 month vaccines\par \par Of note, patient tripped over uneven ledge in a garage while on vacation in August 2022, he states any person would have tripped on this. He is Not weak, no neuropathy, no balance problems. He knows to pay attention now to where he is walking. He does require physical therapy. \par \par 2) H/O saddle PE s/p thrombectomy; h/o bilat LE DVT\par Plan- Continue Eliquis 5 mg po 2X/day\par Had followup with Dr. Holley in November 2022\par \par 3) History of  RSV infection- cough, congestion; prior exposure to grandchild. Evaluated at [facility] ED on 12/12/22, at this time RVP- positive for RSV, and Coronavirus(Not COVID-19). Blood cultures negative; CXR- clear lungs, O2 sat- 95% RA. Seen by Pulmonary consult. Discharged to home on Zpac, Prednisone 20 mg po x 5 days, Proventil MDI, Mucinex, Tylenol prn. \par Plan- continue supportive care. \par Inform family members living in same household. \par \par RTC in 3 months \par \par \par \par

## 2023-02-09 NOTE — CONSULT LETTER
[Dear  ___] : Dear  [unfilled], [Courtesy Letter:] : I had the pleasure of seeing your patient, [unfilled], in my office today. [Please see my note below.] : Please see my note below. [Consult Closing:] : Thank you very much for allowing me to participate in the care of this patient.  If you have any questions, please do not hesitate to contact me. [Sincerely,] : Sincerely, [DrVladislav  ___] : Dr. BELTRAN [FreeTextEntry2] : Dyana Cheatham M.D.\par [facility]\par [address]\par [place]   [address] [FreeTextEntry3] : \par Pao Sorenson M.D.\par \par  of Medicine\par [employer]\par [facility] \par [facility]\par [address] [place] [address] \[place] [address] \par ph: [phone]\par fax: [phone]

## 2023-02-17 LAB
ALBUMIN MFR SERPL ELPH: 65.6 %
ALBUMIN SERPL ELPH-MCNC: 4.5 G/DL
ALBUMIN SERPL-MCNC: 4.2 G/DL
ALBUMIN/GLOB SERPL: 1.9 RATIO
ALP BLD-CCNC: 88 U/L
ALPHA1 GLOB MFR SERPL ELPH: 4.5 %
ALPHA1 GLOB SERPL ELPH-MCNC: 0.3 G/DL
ALPHA2 GLOB MFR SERPL ELPH: 10.3 %
ALPHA2 GLOB SERPL ELPH-MCNC: 0.7 G/DL
ALT SERPL-CCNC: 54 U/L
ANION GAP SERPL CALC-SCNC: 12 MMOL/L
AST SERPL-CCNC: 33 U/L
B-GLOBULIN MFR SERPL ELPH: 11.4 %
B-GLOBULIN SERPL ELPH-MCNC: 0.7 G/DL
BILIRUB SERPL-MCNC: 0.4 MG/DL
BUN SERPL-MCNC: 17 MG/DL
CALCIUM SERPL-MCNC: 10 MG/DL
CHLORIDE SERPL-SCNC: 104 MMOL/L
CO2 SERPL-SCNC: 25 MMOL/L
CREAT SERPL-MCNC: 1.04 MG/DL
DEPRECATED KAPPA LC FREE/LAMBDA SER: 1.69 RATIO
EGFR: 79 ML/MIN/1.73M2
GAMMA GLOB FLD ELPH-MCNC: 0.5 G/DL
GAMMA GLOB MFR SERPL ELPH: 8.2 %
GLUCOSE SERPL-MCNC: 82 MG/DL
IGA SER QL IEP: 38 MG/DL
IGD SER-MCNC: 12 MG/DL
IGG SER QL IEP: 551 MG/DL
IGM SER QL IEP: 36 MG/DL
INTERPRETATION SERPL IEP-IMP: NORMAL
KAPPA LC CSF-MCNC: 0.98 MG/DL
KAPPA LC SERPL-MCNC: 1.66 MG/DL
M PROTEIN MFR SERPL ELPH: NORMAL
M PROTEIN SPEC IFE-MCNC: NORMAL
MAGNESIUM SERPL-MCNC: 1.9 MG/DL
MONOCLON BAND OBS SERPL: NORMAL
POTASSIUM SERPL-SCNC: 4.5 MMOL/L
PROT SERPL-MCNC: 6.4 G/DL
SODIUM SERPL-SCNC: 141 MMOL/L
TESTOST SERPL-MCNC: 162 NG/DL

## 2023-02-18 ENCOUNTER — NON-APPOINTMENT (OUTPATIENT)
Age: 68
End: 2023-02-18

## 2023-03-01 ENCOUNTER — OUTPATIENT (OUTPATIENT)
Dept: OUTPATIENT SERVICES | Facility: HOSPITAL | Age: 68
LOS: 1 days | Discharge: HOME | End: 2023-03-01

## 2023-03-01 DIAGNOSIS — R79.89 OTHER SPECIFIED ABNORMAL FINDINGS OF BLOOD CHEMISTRY: ICD-10-CM

## 2023-03-01 DIAGNOSIS — Z98.890 OTHER SPECIFIED POSTPROCEDURAL STATES: Chronic | ICD-10-CM

## 2023-03-09 ENCOUNTER — APPOINTMENT (OUTPATIENT)
Dept: INFUSION THERAPY | Facility: HOSPITAL | Age: 68
End: 2023-03-09
Payer: MEDICARE

## 2023-03-09 DIAGNOSIS — C79.51 SECONDARY MALIGNANT NEOPLASM OF BONE: ICD-10-CM

## 2023-03-09 DIAGNOSIS — C90.00 MULTIPLE MYELOMA NOT HAVING ACHIEVED REMISSION: ICD-10-CM

## 2023-03-09 PROCEDURE — G0009: CPT

## 2023-03-09 PROCEDURE — 90647 HIB PRP-OMP VACC 3 DOSE IM: CPT

## 2023-03-09 PROCEDURE — 90472 IMMUNIZATION ADMIN EACH ADD: CPT

## 2023-03-09 PROCEDURE — 90732 PPSV23 VACC 2 YRS+ SUBQ/IM: CPT

## 2023-03-16 ENCOUNTER — APPOINTMENT (OUTPATIENT)
Dept: INFUSION THERAPY | Facility: HOSPITAL | Age: 68
End: 2023-03-16
Payer: MEDICARE

## 2023-03-16 ENCOUNTER — RX RENEWAL (OUTPATIENT)
Age: 68
End: 2023-03-16

## 2023-03-16 PROCEDURE — 90472 IMMUNIZATION ADMIN EACH ADD: CPT

## 2023-03-16 PROCEDURE — 90471 IMMUNIZATION ADMIN: CPT

## 2023-03-16 PROCEDURE — 90714 TD VACC NO PRESV 7 YRS+ IM: CPT

## 2023-03-16 PROCEDURE — 90713 POLIOVIRUS IPV SC/IM: CPT

## 2023-03-27 ENCOUNTER — RX RENEWAL (OUTPATIENT)
Age: 68
End: 2023-03-27

## 2023-04-06 ENCOUNTER — RESULT REVIEW (OUTPATIENT)
Age: 68
End: 2023-04-06

## 2023-04-06 ENCOUNTER — APPOINTMENT (OUTPATIENT)
Dept: INFUSION THERAPY | Facility: HOSPITAL | Age: 68
End: 2023-04-06

## 2023-04-06 LAB
ANION GAP SERPL CALC-SCNC: 12 MMOL/L — SIGNIFICANT CHANGE UP (ref 5–17)
BUN SERPL-MCNC: 16 MG/DL — SIGNIFICANT CHANGE UP (ref 7–23)
CALCIUM SERPL-MCNC: 9.6 MG/DL — SIGNIFICANT CHANGE UP (ref 8.4–10.5)
CHLORIDE SERPL-SCNC: 106 MMOL/L — SIGNIFICANT CHANGE UP (ref 96–108)
CO2 SERPL-SCNC: 24 MMOL/L — SIGNIFICANT CHANGE UP (ref 22–31)
CREAT SERPL-MCNC: 0.93 MG/DL — SIGNIFICANT CHANGE UP (ref 0.5–1.3)
EGFR: 91 ML/MIN/1.73M2 — SIGNIFICANT CHANGE UP
GLUCOSE SERPL-MCNC: 85 MG/DL — SIGNIFICANT CHANGE UP (ref 70–99)
POTASSIUM SERPL-MCNC: 4.2 MMOL/L — SIGNIFICANT CHANGE UP (ref 3.5–5.3)
POTASSIUM SERPL-SCNC: 4.2 MMOL/L — SIGNIFICANT CHANGE UP (ref 3.5–5.3)
SODIUM SERPL-SCNC: 142 MMOL/L — SIGNIFICANT CHANGE UP (ref 135–145)

## 2023-04-26 ENCOUNTER — OUTPATIENT (OUTPATIENT)
Dept: OUTPATIENT SERVICES | Facility: HOSPITAL | Age: 68
LOS: 1 days | Discharge: HOME | End: 2023-04-26

## 2023-04-26 ENCOUNTER — RX RENEWAL (OUTPATIENT)
Age: 68
End: 2023-04-26

## 2023-04-26 DIAGNOSIS — Z98.890 OTHER SPECIFIED POSTPROCEDURAL STATES: Chronic | ICD-10-CM

## 2023-04-26 DIAGNOSIS — R79.89 OTHER SPECIFIED ABNORMAL FINDINGS OF BLOOD CHEMISTRY: ICD-10-CM

## 2023-05-04 ENCOUNTER — APPOINTMENT (OUTPATIENT)
Dept: INFUSION THERAPY | Facility: HOSPITAL | Age: 68
End: 2023-05-04

## 2023-05-08 NOTE — ED ADULT TRIAGE NOTE - NSWEIGHTCALCTOOLDRUG_GEN_A_CORE
used
You can access the FollowMyHealth Patient Portal offered by [facility] by registering at the following website: [email or web address]. By joining [facility]’s FollowMyHealth portal, you will also be able to view your health information using other applications (apps) compatible with our system.

## 2023-05-11 ENCOUNTER — APPOINTMENT (OUTPATIENT)
Dept: HEMATOLOGY ONCOLOGY | Facility: CLINIC | Age: 68
End: 2023-05-11
Payer: MEDICARE

## 2023-05-11 DIAGNOSIS — Z86.19 PERSONAL HISTORY OF OTHER INFECTIOUS AND PARASITIC DISEASES: ICD-10-CM

## 2023-05-11 PROCEDURE — 99214 OFFICE O/P EST MOD 30 MIN: CPT | Mod: 95

## 2023-05-11 NOTE — RESULTS/DATA
[FreeTextEntry1] : I am waiting for lab results from 5/4/23, ordered by Dr. Stack\par ----------------------------------------------------------------------------\par ACC: [id number] EXAM: CT ABDOMEN AND PELVIS OC IC \par PROCEDURE DATE: 01/12/2022 \par INTERPRETATION: CLINICAL INFORMATION: Right lower quadrant pain for \par Multiple myeloma. Previous terminal ileitis.\par \par COMPARISON: CT scan of the abdomen and pelvis from 11/29/2021\par \par CONTRAST/COMPLICATIONS:\par IV Contrast: Omnipaque 350 90 cc administered 10 cc discarded\par Oral Contrast: Omnipaque 300\par Complications: None reported at time of study completion\par \par PROCEDURE:\par CT of the Abdomen and Pelvis was performed.\par Sagittal and coronal reformats were performed.\par \par FINDINGS:\par LOWER CHEST: Within normal limits.\par \par LIVER: Within normal limits.\par BILE DUCTS: Normal caliber.\par GALLBLADDER: Small gallstones without gross inflammation.\par SPLEEN: Within normal limits.\par PANCREAS: Within normal limits.\par ADRENALS: Within normal limits.\par KIDNEYS/URETERS: Left renal cyst measuring up to 7.0 cm in the left lower \par pole. 1.3 cm low-attenuation lesion in the lower pole the left kidney \par laterally which is measuring greater than simple fluid in density.\par \par BLADDER: Bladder is underdistended which limits evaluation.\par REPRODUCTIVE ORGANS: Prominent prostate gland\par \par BOWEL: No bowel obstruction. Appendix is mildly distended measuring up to \par 1.0 cm which measured approximately 9 mm previously. There is question of \par minimally increased enhancement. There is mild surrounding soft tissue \par stranding although fluid was present in this region on the prior study. \par This may represent chronic changes although in a patient with right lower \par quadrant pain, acute appendicitis must also be considered.. Resolution of \par previously visualized distended small bowel. Previously mentioned \par terminal ileitis is not well appreciated on the current study. Colonic \par diverticuli predominantly in the sigmoid without gross inflammation.\par PERITONEUM: No ascites.\par VESSELS: Within normal limits.\par RETROPERITONEUM/LYMPH NODES: No lymphadenopathy.\par ABDOMINAL WALL: Small umbilical hernia containing fat.\par BONES: Status post ORIF of the left hemipelvis with stable postsurgical \par changes. Stable lucencies in bone, most pronounced and T12 and L5. \par Degenerative changes of bone. Spondylolisthesis with L5 anterior to L1 of \par approximately 25-50%.\par \par IMPRESSION:\par \par Mildly distended appendix. Mild surrounding soft tissue stranding where \par fluid was seen on the prior study. This may represent chronic residual \par changes although acute appendicitis must also be considered, especially \par in a patient with right lower quadrant pain. This was discussed with Dr. libby Sorenson at 3:00 PM on 1/12/2022.\par \par Resolution of small bowel obstruction. Stable bone findings.\par \par --- End of Report ---\par \par JEFF PHAM MD; Attending Radiologist\par

## 2023-05-11 NOTE — REVIEW OF SYSTEMS
[Fever] : no fever [Chills] : no chills [Fatigue] : fatigue [Vision Problems] : no vision problems [Mucosal Pain] : no mucosal pain [Chest Pain] : no chest pain [Shortness Of Breath] : no shortness of breath [Cough] : no cough [Abdominal Pain] : no abdominal pain [Vomiting] : no vomiting [Diarrhea: Grade 0] : Diarrhea: Grade 0 [Skin Rash] : no skin rash [Dizziness] : no dizziness [Fainting] : no fainting [Easy Bleeding] : no tendency for easy bleeding [Easy Bruising] : no tendency for easy bruising [FreeTextEntry3] : ( seen by [facility] in 4/2022) [FreeTextEntry4] : no sore throat [FreeTextEntry5] : slight edema in both ankles [FreeTextEntry7] : no nausea [FreeTextEntry9] : no bone pain; muscle spasms in back [de-identified] : no paresthesias

## 2023-05-11 NOTE — ASSESSMENT
[FreeTextEntry1] : IgD lambda myeloma s/p cycle 6 RVD on 21\par Mobilization of stem cells with Zarxio 960mcg subcutaneous daily 10/28/21-21\par He was admitted to the [facility] on 11/15/21 and received conditioning chemotherapy with Melphalan 100 mg/m2 IV x 2 consecutive days followed by autoPSCT on 21. \par Hospital course complicated by saddle pulmonary embolism and bilateral LE acute DVTs. On 21 he underwent a mechanical thrombectomy. IVC filter placement was deferred. He received anticoagulation, now on \par Eliquis. He also had terminal ileitis with ? microperforation, monitored in SICU, treated conservatively with supportive care and antibiotics. He completed a course of Meropenem 21.\par \par 1) Multiple myeloma- s/p autoPSCT on 21\par Plan- \par Continue current medications and restrictions as discussed prior to discharge from [facility]MT[facility]\par Continue Acyclovir for ID prophylaxis; Off Diflucan and Mepron\par Continue MVI, Folate daily- switch to OTC MVI after supply complete\par Continue Pantoprazole daily for GI prophylaxis\par Zofran as needed for nausea/vomiting\par Drink plenty of water daily\par Moisturizing cream to the skin several times per day\par Labs reviewed from 3/28/22- IMEL results pending\par Discussed maintenance therapy post autoPSCT- Revlimid +/- Velcade/Dex, with potential side effects discussed during 3/3/22 office visit. \par Patient initiated Revlimid 10 mg po daily on 3/15/22, then held 3/22- 3/27 for URTI symptoms. Resumed on 3/28/22 and completed 28 day supply(1 cycle)\par Patient had second opinion consult Dr. Andrew Stack at [facility] on 3/31/22. Dr. Stack contacted me on 22 to discuss his recommendations to further management of patient's myeloma.\par Given measurable Urine M-John(24 hr urine), myeloma with t(11;14) fusion detected, Dr. Stack discussed combination Venetoclax daily/Carfilzomib(C1D1 20 mg/m2 with increase to 56 mg/m2 on C1D8 given on days 1, 8, 15 of 28 day cycle) consolidation x 4 cycles. The patient agrees to proceed with this treatment regimen\par He will undergo repeat bone marrow evaluation with FISH myeloma panel and NGS/RNA seq at [facility]. Plan is for late May. \par He will be referred for Cardiology consult to evaluate for cardiac amyloidosis- Echo with strain imaging and Cardiac MRI- done.\par Whole body PET/CT scan to assess for lytic bone disease; prior MRI T-spine(21)- mild loss of vertebral body height of T-12. Test done on 22- no new bone lesions, no abnormal FDG activity\par Bone density to assess for osteoporosis-(22)- impression is normal study.  Plan to begin Xgeva with consolidation therapy. \par Call the office immediately if fever, chills, symptoms of infection\par 22:\par Carfilzomib 56 mg/m2 IV days 1, 8, 15 of 28 day cycle) consolidation x 4 cycles. Today is C2 day 8. \par Venetoclax 400 mg po daily starting on 22, he is tolerating well; now increased to 800 mg po daily on 22. \par Continue Acyclovir prophylaxis\par Continue PPI\par Drink plenty of water daily \par I reviewed lab results from 22 with patient and his wife, IgD decreased to 8 mg/dL. \par 22:\par Carfilzomib 56 mg/m2 IV days 1, 8, 15 of 28 day cycle) consolidation x 4 cycles. Today is C3 day 15. \par Venetoclax 400 mg po daily starting on 22, he is tolerating well; now increased to 800 mg po daily on 22. \par Continue Acyclovir prophylaxis\par Continue PPI\par Drink plenty of water daily \par Will repeat IMEL with IgD level in 2 weeks\par 22:\par Carfilzomib 56 mg/m2 IV days 1, 8, 15 of 28 day cycle) consolidation x 4 cycles. On 9/15/22, he received C4 day 15. Decadron 40 mg po weekly with Carfilzomib completed. \par Venetoclax 400 mg po daily starting on 22, he is tolerating well; now increased to 800 mg po daily on 22 to complete course of therapy on 22. \par Continue Acyclovir prophylaxis\par Continue PPI as needed\par Drink plenty of water daily \par Will repeat IMEL with IgD level during next office visit\par He has Telehealth visit with Dr. Stack on 10/3/22 and bone marrow biopsy on 22. \par 10/20/22:\par Carfilzomib 56 mg/m2 IV days 1, 8, 15 of 28 day cycle) consolidation x 4 cycles. On 9/15/22, he received C4 day 15. Decadron 40 mg po weekly with Carfilzomib completed. \par Venetoclax 400 mg po daily starting on 22, he is tolerating well; now increased to 800 mg po daily on 22 to complete course of therapy on 22. \par Continue Acyclovir prophylaxis\par Continue PPI as needed\par Drink plenty of water daily \par Check IMEL with IgD level, CMP today\par He had Telehealth visit with Dr. Stack on 10/13/22 and bone marrow biopsy on 22. \par 22:\par Completed Carfilzomib/Dex/Venetoclax consolidation x 4 cycles\par Continue Acyclovir prophylaxis\par Continue PPI as needed\par Drink plenty of water daily \par Check IMEL with IgD level, CMP, TFT's, Testosterone, Ferritin today\par Schedule PFT's at 1 yr post transplant to assess vital organ function\par Echo done by Cardiology\par He had Telehealth visit with Dr. Stack on 10/13/22 and bone marrow biopsy on 22. He has 24 hr urine for UPEP bone by Dr. Stack. \par Schedule post transplant vaccines for 12 months post autoPSCT\par 12/15/22:\par Completed Carfilzomib/Dex/Venetoclax consolidation x 4 cycles\par Continue Acyclovir prophylaxis\par Continue PPI as needed\par Drink plenty of water daily \par Check IMEL with IgD level, CMP, TFT's, Testosterone, Ferritin on 22\par Schedule PFT's at 1 yr post transplant to assess vital organ function\par Echo done by Cardiology\par He had Telehealth visit with Dr. Stack on 10/13/22 and bone marrow biopsy on 22. He has 24 hr urine for UPEP bone by Dr. Stack. \par Schedule post transplant vaccines for 12 months post autoPSCT\par 23:\par Completed Carfilzomib/Dex/Venetoclax consolidation x 4 cycles\par Continue Xgeva monthly; check Vit D level, continue Calcium\par Continue Acyclovir prophylaxis\par Continue PPI as needed\par Drink plenty of water daily \par Check IMEL with IgD level, CMP, Mg; repeat Testosterone level\par Schedule PFT's at 1 yr post transplant to assess vital organ function- still pending, will hold off until prostate cancer treatment completed. \par Echo done by Cardiology\par He had Telehealth visit with Dr. Stack on 10/13/22 and bone marrow biopsy on 23. He has 24 hr urine for UPEP bone by Dr. Stack. \par Scheduled for post transplant vaccines for 14 months post autoPSCT; he completed 12 month vaccines\par \par Of note, patient tripped over uneven ledge in a garage while on vacation in 2022, he states any person would have tripped on this. He is Not weak, no neuropathy, no balance problems. He knows to pay attention now to where he is walking. He does require physical therapy. \par \par 23:\par Completed Carfilzomib/Dex/Venetoclax consolidation x 4 cycles\par Continue Xgeva monthly; check Vit D level, continue Calcium. Xgeva on HOLD pending oral surgery evaluation\par Continue Acyclovir prophylaxis; he received Shingrix vaccine dose #1 in April, second dose in 2023. He can discontinue Acyclovir 2 weeks after 2nd vaccine. \par Continue PPI as needed\par Drink plenty of water daily \par Schedule PFT's at 1 yr post transplant to assess vital organ function- still pending, will hold off until prostate cancer treatment completed. \par Echo done by Cardiology\par He had Telehealth visit with Dr. Stack on 10/13/22 and bone marrow biopsy on 23. He has 24 hr urine for UPEP bone by Dr. Stack. \par He completed 12 month vaccines(22, 23; then 14 month vaccines(3/9 and 3/16/23). Last set at 2 yrs post transplant. \par \par 2) H/O saddle PE s/p thrombectomy; h/o bilat LE DVT\par Plan- Continue Eliquis 5 mg po 2X/day\par He will followup with Dr. Holley as directed\par \par 3) History of  RSV infection- cough, congestion; prior exposure to grandchild. Evaluated at [facility] on 22, at this time RVP- positive for RSV, and Coronavirus(Not COVID-19). Blood cultures negative; CXR- clear lungs, O2 sat- 95% RA. Seen by Pulmonary consult. Discharged to home on Zpac, Prednisone 20 mg po x 5 days, Proventil MDI, Mucinex, Tylenol prn. \par Plan- continue supportive care. \par Inform family members living in same household. \par \par All pulmonary symptoms of RSV infection have resolved.\par \par RTC in 3 months and with Dr. Stack in mid 2023\par \par I confirmed patient's name and  at beginning of consult visit. Verbal consent for Telehealth visit given on 23 at 3:36 pm by davis Villagomez. Visit start time- 3:35 pm- visit end time- 4:06 pm. Total visit time- 31 minutes. \par \par \par \par

## 2023-05-11 NOTE — CONSULT LETTER
[Dear  ___] : Dear  [unfilled], [Courtesy Letter:] : I had the pleasure of seeing your patient, [unfilled], in my office today. [Please see my note below.] : Please see my note below. [Consult Closing:] : Thank you very much for allowing me to participate in the care of this patient.  If you have any questions, please do not hesitate to contact me. [Sincerely,] : Sincerely, [FreeTextEntry2] : Dyana Cheatham M.D.\par [facility]\par [address]\par [place], [place]   [address] [FreeTextEntry3] : \par Pao Sorenson M.D.\par \par  of Medicine\par [employer] [facility]\par [facility] [place] \par [facility]\par [address] [place] [address] \[place] [address] \par ph: [phone]\par fax: [phone] [DrVladislav  ___] : Dr. BELTRAN

## 2023-05-11 NOTE — HISTORY OF PRESENT ILLNESS
[Home] : at home, [unfilled] , at the time of the visit. [Medical Office: ([facility])___] : at the medical office located in  [Spouse] : spouse [Verbal consent obtained from patient] : the patient, [unfilled] [de-identified] : 11/2020-Found to have T-12 compression fracture. Saw orthopedist Dr. Candelaria in 1/2021. Referred to endocrinologist Dr. Acosta by Dr. 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 Serum immunofixation showed 1 IgD lambda band and free lambda light chains. Urine immunofixation negative for monoclonal band.\par 4/29/2021–Bone marrow biopsy and bone marrow aspirate consistent with plasma cell myeloma (greater than 90% involvement). Myeloma FISH studies showed CCND1/IGH fusion (27%). Flow cytometry positive for monotypic plasma cells. Lymphocyte immunophenotypic findings showed no diagnostic abnormalities. Cytogenetics with normal male karyotype. Congo red stain negative. Iron stains showed iron stores present. No ring sideroblasts.\par 5/13/21- C1D1 RVd\par  [de-identified] : Since initial [facility] transplant consult visit on 6/7/21, he had recent hematology office visit on 8/19/21, Cycle #5 Day#15 Velcade/Decadron. Cycle #5 Revlimid as planned. He describes moderate fatigue, occasional blurry vision, insomnia with Decadron. He denies fever, chills, cough, dyspnea. He received the COVID-19 vaccine(Moderna) on 3/16, 4/13/21; he has booster vaccine on 8/31/21. The patient is very concerned about the delta variant of COVID-19 and asked to discuss isolation/restriction policies. \par \par 10/6/21:\par He completed cycle 6 RVD on 9/30/21. He has moderate fatigue and good appetite. He has occasional blurry vision and was evaluated by Optho with normal evaluation. He had MRI Brain with alton(9/29/21 at [facility])- negative. He denies fever,  chills, cough, shortness of breath. \par \par 12/9/21:\par The patient was admitted to the [facility] on 11/15/21 and received conditioning chemotherapy with Melphalan 100 mg/m2 IV x 2 consecutive days followed by autoPSCT on 11/19/21. \par \par Upon admission, a TLC was placed in IR. Mr. Julio received IV hydration, pain management, anxiolytics, antiemetics, nutritional support, and antibacterial / antiviral / antifungal / GI / PCP and VOD (SOS) prophylaxis. When his ANC dropped below 500 he was started on prophylactic Ciprofloxacin. Labs were monitored on a daily basis, and he received electrolyte repletion and transfusional support as needed. \par \par On 11/19/2021 After pre-medication  received 251 mL of, Autologous mobilized, \par plasma reduced, pooled, thawed, washes HCP apheresis for  over approximately 1 \par hr. Cell counts as follows \par Total MNC( x10^8/kg)=7.22 \par CD34+cells ( x10^6 kg)=4.58 \par Cell Viability (%)= 90 \par Mr. Julio tolerated the infusion well with no adverse resections noted. \par \par While admitted, Mr. Julio experienced pancytopenia related to the high dose chemotherapy conditioning regimen. He was treated with transfusional support. On \par 11/19/21 he experienced a vasovagal episode in the bathroom that was self limiting. On 11/23/21 he had another vasovagal episode with severe abdominal pain and distention. A RRT was called. During the episode he was hypotensive, lactate was 6. A CT A/P showed pneumoperitoneum and terminal ileitis. Surgery was consulted and he was transferred to the SICU. During the episode, he made a troponin. A TTE was completed in the SICU, which showed increased pulmonary pressures. As a result, a CTA of the chest was completed which showed a saddle pulmonary embolism. He was started on full dose heparin. Lower extremity dopplers showed bilateral acute DVTs. On 11/24/21 he underwent a mechanical thrombectomy. IVC filter placement was deferred. \par \par The terminal ileitis and pneumoperitoneum was treated conservatively with supportive care and antibiotics. He completed a course of Meropenem 12/7/21. A \par repeat CT A/P showed unchanged ileitis, with mild increased fluid distention and resolution of free air. The heparin was transitioned to full dose Lovenox, \par and eventually Eliquis. Shortly thereafter he was transferred back to . \par \par Currently, he is stable for discharge home with outpatient follow up at the [facility] and with vascular cardiology. \par \par Since discharge to home on 12/7/21 he has moderate fatigue and slowly improving appetite. He describes loose stool(small volume) twice daily with abdominal bloating and gassiness since discharge but denies nausea, vomiting. \par \par 12/16/21:\par He continues to have moderate fatigue and continued slow improvement in appetite. He started taking Gas-X twice daily after 12/9/21 office visit and stopped on 12/13 as his abd bloating with gassiness significantly improved. He states loose stool has improved, now only once daily since 12/17, but no nausea/vomiting. He still has bilateral lower extremity edema. He scheduled followup with vascular cardiology on 1/13/22. He denies fever, cough, shortness of breath. \par \par 12/22/21:\par He has mild-moderate fatigue and good appetite. He denies abdominal pain/distension and hasn't needed Gas-X for one week. He denies nausea/vomiting, diarrhea. No fever, chills. \par \par 01/05/22:\par He has mild fatigue and good appetite. He denies fever, chills, cough, dyspnea, nausea/vomiting, abdominal pain, diarrhea. He walks on treadmill daily x 20 minutes. \par \par 01/19/22:\par Patient called the office on 1/11/22 to discuss new onset of RLQ pain last night when sleeping and it awakened him when he was changing his position. He went back to sleep. He woke up around 6:30 am and noted sharp pain with movement, therefore, he has been trying not to move around too much. He denies nausea, vomiting, diarrhea. He has eaten breakfast and lunch without difficulty and no associated symptoms. He denies fever, chills, cough, shortness of breath. No acute swelling in lower extremities, no chest pain. \par He states pain is 3-4/10, non-radiating, and only occurs with movement. \par Plan- \par CT Abd/pelvis with oral and IV contrast. \par NPO after 9 am in case CT approved and scan can be done tomorrow afternoon. \par If he develops worsening pain, fever, chills, vomiting or other severe new symptoms he will go to [facility] immediately. \par I contacted patient on 1/12/22 to discuss results of CT Abd/pelvis(1/12/22)- acute appendicitis. Given persistent RLQ pain today, I instructed patient to go to [facility] ER for surgery evaluation. He agrees. Patient admitted to surgical service and treated conservatively with Meropenem IV. His abdominal pain resolved and he was discharged to home on 1/14/22 to finish course of antibiotics with Cipro/Flagyl.\par \par Since discharge on 1/14/22, he feels well overall with only mild fatigue, and appetite is good. He denies abdominal pain, nausea, vomiting, diarrhea, fever, chills. Weight this morning- 188.5 lbs.\par  \par 02/02/22:\par He describes good energy and good appetite. He denies fever, cough, shortness of breath, nausea, vomiting, diarrhea. Weight this morning- 189 lbs.\par \par 2/16/22:\par He describes good energy and good appetite. He denies fever, cough, shortness of breath, nausea, vomiting, diarrhea. He had Vascular Cardio followup with Echo and LE duplex study ordered. \par \par 03/03/22:\par He describes good energy and good appetite. He denies fever, cough, shortness of breath, nausea, vomiting, diarrhea. \par \par 03/30/22:\par Patient called the answering service on 3/19 and spoke to on call physician. He described sore throat/cough for the past three days starting 3/17. He states that the throat is getting better but just now he was febrile to 100.8. I advised going to the ER but he wants to take a Tylenol and wait it out for tonight since he is getting better in general. He agrees that if fever persists overnight, he will go to the ER. \par \par on 3/22, patient calls to give update stating that his sore throat resolved on 3/19 but he developed T- 100.8. He took Tylenol once and he states fever resolved and did not recur. He still has dry cough which is improving, and he is taking Robitussin. He states that day 1 of Revlimid maintenance was on 3/15/22. \par Plan-\par Given recent URTI symptoms with persistent cough, I instructed patient to HOLD Revlimid through 3/27/22, and resume on Monday 3/28/22. Drink plenty of water and rest. \par He will have lab work on 3/28 at local [facility] prior to Telehealth visit on 3/30/22. \par If he develops new symptoms he will call the office immediately. \par \par Today, patient states he has mild fatigue and good appetite. He has slight dry cough but he denies fever, chills, sore throat, shortness of breath, nausea, vomiting, diarrhea. 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 \par \par 04/07/22:\par The patient describes mild fatigue and good appetite. He has occasional cough but denies fever, chills, shortness of breath, bone pain. \par \par 05/19/22:\par Patient called the office(4/19) to inform me that he developed blurry vision and light sensitivity since 4/15, he has Optho appointment today. Also, last week he noted onset of few second "zaps" like from an electronic stimulation machine that a physical therapist uses. He feels this sensation at these sites: both ankles, lateral left knee, occasionally on left hip. He estimates total number of events as 5-10 daily. He did have similar symptoms in his right ribs last year which eventually resolved. He does not have paresthesias in his hands and feet. \par He is scheduled for bone marrow biopsy at [facility] on 5/3/22. He was seen by Cardio for evaluation of cardiac amyloid and had Echo on 4/18, and MRI Cardiac on 4/26. Bone marrow rescheduled for late May. \par He did see Optho and took his contacts out for 1 week and used prescription eye drops, his vision is now normal. The sensation of "zaps" is decreasing. \par He describes mild fatigue and good appetite. He has occasional cough but denies fever, chills, shortness of breath. He did develop low back pain after golfing, at area of prior T-12 compression fracture. He has Ortho followup on 6/8/22. \par \par 07/0722:\par Patient describes mild fatigue, also difficulty sleeping post Decadron. He denies fever, chills, cough, dyspnea. \par \par 08/11/22:\par Patient describes mild fatigue, also difficulty sleeping post Decadron. He saw an oral surgeon for a sore on his lower left gingiva, impression is that it is not osteonecrosis. Oral surgeon is aware he is on Xgeva. He has followup in 2 weeks. He has seen his Ortho surgeon for back spasms. He had MRI T-spine(7/13/22)- also revealed ? subacute fracture left 6th rib. He denies fever, chills, shortness of breath. \par \par 09/19/22:\par The patient will be completing Venetoclax on 9/22/22, has completed 4 cycles of Carfilzomib/Dex on 9/15/22. He complains of moderate fatigue and good appetite. He denies fever, chills, cough, shortness of breath. \par \par 10/20/22:\par Patient had followup with Dr. Stack on 10/13/22 at [facility]. He has mild fatigue and good appetite. He had Ortho spine followup on 9/21/22. He denies fever, chills, cough, shortness of breath. \par \par 11/17/22:\par Patient describes increased fatigue and gastrointestinal symptoms x 8 days starting 10/31/22, now resolved. No fever, COVID-19 home test negative. He has mild fatigue and good appetite. He denies fever, cough, shortness of breath\par \par 12/15/22:\par Patient developed cough, congestion, and headache x 4 days post exposure to his sick grandchild. He went to [facility] ED on 11/21/22. Evaluation included RVP- positive for entero/rhinovirus; COVID-19 PCR- Not detected; CXR- clear lungs. He was discharged to home with Yanick Alexis, instructions for supportive care. His symptoms continued and then he was seen in [facility]) on 11/26, discharge on Amoxicillin x 7 days. He thought he was starting to feel slightly better last week but then cough and congestion returned.  No longer taking antibiotics.  Patient reports hard time sleeping at night due to increased cough and congestion.  Reports occasional wheezing.  No known fevers.  Denies chest pain.  Slight shortness of breath and cough is present.  Denies any abdominal pain, nausea, vomiting, diarrhea. He returned to [facility] ED on 12/12/22, at this time RVP- positive for RSV, and Coronavirus(Not COVID-19). Blood cultures negative; CXR- clear lungs, O2 sat- 95% RA. Seen by Pulmonary consult. Discharged to home on Zpac, Prednisone 20 mg po x 5 days, Proventil MDI, Mucinex, Tylenol prn. \par \par 02/09/23:\par He has mild fatigue and good appetite. He denies fever, chills, shortness of breath, diarrhea. He has followed by Urology for elevated PSA. He underwent prostate biopsy on 1/31/23- adenocarcinoma, [place]bridge score 7.  He has appointment with Rad Onc and Oncologist on 2/15/23. \par \par 05/11/123:\par The patient had Telehealth office visit with Dr. Stack from transplant service at [facility]. Lab work completed on 5/4/23, and discussed with patient. He was told lab work is stable. He has mild fatigue and good appetite. He denies fever, chills, shortness of breath, diarrhea. He has Cyberknife surgery at [facility] for prostate cancer on 8/7- 8/11/23. He also saw his dentist and then oral surgeon recently on 4/27, with biopsy done of bone. Therefore, Xgeva is on HOLD pending completion of dental evaluation. \par

## 2023-05-24 NOTE — ED ADULT TRIAGE NOTE - HEIGHT IN INCHES
[facility]NCOUN[facility]  PULMONARY FOLLOW UP OFFICE VISIT    IMPRESSION:   1. LAKESHIA (obstructive sleep apnea)    2. Heart failure with preserved ejection fraction, unspecified HF chronicity (CMD)    3. Hemidiaphragm paralysis    4. Pericarditis, unspecified chronicity, unspecified type           PLAN:   Cont apap nightly, call our office if any breakthrough snoring or daytime symptoms develop  Continue to receive supplies/ per DME/Insurance guidelines  Residual download is < 5, excellent usage  Doing fine with reduced pressures    cont cardiac regimen, follow with cardiology, diuretics being adjusted    PFTs reviewed, no obstruction/restriction, isolated reduction in DLCO of unknown significance, no evidence of ILD on previous imaging    Dyspnea reflects weight gain and fluid retentions, he is going to focus on diet and have diuretics adjusted. No respiratory abnormalities on physical exam.     I have reviewed all results as well as discussed management and plan with the patient.     Return in about 6 months (around 11/24/2023).        CHIEF COMPLAINT:  Chief Complaint   Patient presents with   • Office Visit   • Follow-up     Cpap           Trevin Gasca returns for follow up. I last saw Trevin J Campos six months ago.  In the interval follow up, he has had some issues with weight gain and fluid retention.  He has been seen by the cardiologists and echocardiogram showed no change compared to November.  He had an ultrasound of his abdomen with no evidence of ascites.  Recent liver MRI did show grade 1 to fibrosis and cirrhotic morphology.  He continues on his diuretics and these are being adjusted by Cardiology.  He continues on CPAP averaging just under 9 hours per night.  Residual AHI is 2.6.  He has no barriers to mask or machine usage.  He does note some increase in dyspnea related to weight gain and fluid retention..  He has no fevers, chills or sweats.                                       HISTORIES:    Past Medical History:   Diagnosis Date   • Adherent pericardium 03/08/2022   • Congestive cardiac failure (CMD)    • Depression    • Generalized anxiety disorder 8/28/2022   • GERD (gastroesophageal reflux disease)    • H/O complete pericardiectomy 03/08/2022    Dr. 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      ALLERGIES:   Allergen Reactions   • Penicillins HIVES     \"near anaphylaxis\"  Tolerates meropenem   • Tramadol SHORTNESS OF BREATH      Social History     Tobacco Use   • Smoking status: Never   • Smokeless tobacco: Never   Substance Use Topics   • Alcohol use: Not Currently     Comment: None since 5/21, 1/week priior      Family History   Problem Relation Age of Onset   • Stroke Mother    • Autism spectrum disorder Mother    • Hypertension Father    • Diabetes Father    • Aneurysm Father    • Diabetes Maternal Grandmother    • Diabetes Maternal Grandfather    • Diabetes Paternal Grandfather            Current Outpatient Medications   Medication Sig Dispense Refill   • potassium CHLORIDE (KLOR-CON M) 20 MEQ edith ER tablet Take 5 tablets by mouth in the morning and 5 tablets in the evening. And as directed by heart failure clinic 300 tablet 11   • spironolactone (ALDACTONE) 50 MG tablet Take 1 tablet by mouth daily. 90 tablet 3   • torsemide (DEMADEX) 20 MG tablet Take 3 tablets by mouth daily. 3 tabs=60 mg 270 tablet 3   • gabapentin (NEURONTIN) 300 MG  capsule Two capsule in the morning, one capsule in the afternoon and 2 at bedtime 150 capsule 5   • empagliflozin (JARDIANCE) 10 MG tablet Take 1 tablet by mouth daily (before breakfast). 90 tablet 3   • atorvastatin (LIPITOR) 20 MG tablet Take 1 tablet by mouth daily. 90 tablet 3   • Cholecalciferol (VITAMIN D3 PO) 1000 units each. 2 tablets=2000 units daily     • Multiple Vitamin (ONE-A-DAY MENS PO) Take by mouth daily.     • Ferrous Sulfate 142 (45 Fe) MG Tab CR Take 1 tablet by mouth every other day.     • desvenlafaxine (PRISTIQ) 100 MG 24 hr tablet Take by mouth every morning.     • allopurinol (ZYLOPRIM) 100 MG tablet Take 1 tablet by mouth daily. 90 tablet 3   • lansoprazole (PREVACID) 15 MG capsule Take 30 mg by mouth daily. 2 tablets=30 mg daily       No current facility-administered medications for this visit.        REVIEW OF SYSTEMS:    All systems reviewed and are negative with the exception of the findings noted in the History of Present Illness.      Vital Signs:  Visit Vitals  /66   Pulse 68   Resp 14   Ht 5' 8\" (1.727 m)   Wt 118 kg (260 lb 2.3 oz)   SpO2 97% Comment: at rest on room air   BMI 39.55 kg/m²             PHYSICAL EXAM:  Constitutional:  obese, no acute distress, non-toxic appearance speaking in full sentences.  HEENT:  Atraumatic, PERRL, conjunctivae normal, external ears normal, nose normal.  Neck- Normal range of motion, no accessory muscle use. Trachea midline.  Respiratory: Clear bilaterally no wheezing rhonchi or rales diminished at the right base  Cardiovascular: Normal rate, normal rhythm, no murmurs, no gallops, no rubs.  Musculoskeletal:  Normal gait and station. Bilateral upper and lower extremity symmetry. No obvious defects. No evidence of atrophy, spasticity.   Extremities: No cyanosis, clubbing, 1+ edema bilateral LE      LABORATORY DATA:    Lab Results   Component Value Date    FSTS1 1 04/29/2023    FSTS1 4 04/18/2023    FSTS1 0 03/30/2023    SODIUM 138 04/29/2023     SODIUM 132 (L) 04/18/2023    SODIUM 135 03/30/2023    POTASSIUM 4.4 04/29/2023    POTASSIUM 3.3 (L) 04/18/2023    POTASSIUM 3.2 (L) 03/30/2023    CHLORIDE 101 04/29/2023    CHLORIDE 92 (L) 04/18/2023    CHLORIDE 97 03/30/2023    CO2 31 04/29/2023    CO2 33 (H) 04/18/2023    CO2 34 (H) 03/30/2023    ANIONGAP 10 04/29/2023    ANIONGAP 10 04/18/2023    ANIONGAP 7 03/30/2023    GLUCOSE 108 (H) 04/29/2023    GLUCOSE 93 04/18/2023    GLUCOSE 110 (H) 03/30/2023    BUN 23 (H) 04/29/2023    BUN 27 (H) 04/18/2023    BUN 25 (H) 03/30/2023    CREATININE 0.98 04/29/2023    CREATININE 0.93 04/18/2023    CREATININE 1.05 03/30/2023    CALCIUM 9.4 04/29/2023    CALCIUM 9.5 04/18/2023    CALCIUM 9.8 03/30/2023    BILIRUBIN 0.5 03/30/2023    BILIRUBIN 0.7 12/27/2022    BILIRUBIN 0.6 11/10/2022    AST 20 03/30/2023    AST 29 12/27/2022    AST 29 11/10/2022    GPT 43 03/30/2023    GPT 61 12/27/2022    GPT 62 11/10/2022    ALKPT 178 (H) 03/30/2023    ALKPT 196 (H) 12/27/2022    ALKPT 163 (H) 11/10/2022    TOTPROTEIN 6.7 03/30/2023    TOTPROTEIN 7.5 12/27/2022    TOTPROTEIN 7.5 11/10/2022    ALBUMIN 4.1 03/30/2023    ALBUMIN 4.3 12/27/2022    ALBUMIN 4.5 11/10/2022    GLOB 2.6 03/30/2023    GLOB 3.2 12/27/2022    GLOB 3.0 11/10/2022    AGR 1.6 03/30/2023    AGR 1.3 12/27/2022    AGR 1.5 11/10/2022     Lab Results   Component Value Date    WBC 5.9 03/30/2023    WBC 4.0 (L) 12/27/2022    WBC 4.6 12/10/2022    HCT 42.5 03/30/2023    HCT 48.8 12/27/2022    HCT 46.2 12/10/2022    HGB 14.6 03/30/2023    HGB 16.0 12/27/2022    HGB 14.9 12/10/2022     03/30/2023     12/27/2022     12/10/2022         IMAGING STUDIES:   Radiographic images reviewed personally.    Results for orders placed during the hospital encounter of 02/24/22    CT CHEST ABDOMEN PELVIS W WO CONTRAST    Impression  1. Cirrhotic liver morphology with moderate volume ascites and mild  splenomegaly.  2. No acute hyperenhancing hepatic lesions.  3. No  concerning lesions of the chest.  4. Small right pleural effusion.      Results for orders placed during the hospital encounter of 02/03/22    XR CHEST PA AND LATERAL    Impression  *  Unchanged small right pleural effusion.  Results for orders placed during the hospital encounter of 03/08/22    XR Chest AP or PA    Impression  1.  Interval removal of bilateral chest tubes and mediastinal drains. No  pneumothorax.  2.  Small bilateral pleural effusions with adjacent bibasilar and  retrocardiac atelectasis.        I, Attending Radiologist Aubrey Lorenzo MD, have reviewed the images and  report and concur with these findings interpreted by Resident Radiologist,  Chi Blake MD.  Results for orders placed during the hospital encounter of 07/22/21    CT Chest PE Imaging    Impression  1. No evidence of pulmonary embolus.  2. Large right pleural effusion with trace left pleural effusion and  bibasilar atelectasis. These findings can also be seen on the previous  study.  3. Enlarged central pulmonary arteries which can be seen in pulmonary  artery hypertension.  4. Small pericardial effusion similar to patient's previous study.  5. Small amount of perihepatic and perisplenic ascites is noted and is new  from the patient's previous study.  6. No focal consolidation.  Results for orders placed during the hospital encounter of 05/11/21    CT Angiogram Chest PE Imaging- 3D    Impression  1. No evidence of pulmonary embolism.    2. Moderate to large effusion in the RIGHT lung with compressive  atelectasis of the RIGHT LOWER LOBE.    There is a small LEFT pleural effusion.    3. Subtle groundglass diffuse opacities in the upper lobe/apex could be  inflammatory oral reflect mild edema    4. Small pericardial effusion  Results for orders placed during the hospital encounter of 12/28/21    CT CHEST WO CONTRAST    Impression  1. Uniform visceral and parietal pericardial thickening is present  measuring 2-3 mm. No  pericardial calcification is present. Pericardial  fluid is at the upper limits of normal.  2. Small to moderate volume right-sided pleural effusion, stable.  3. Small volume ascites.  No results found for this or any previous visit.      PULMONARY DATA:     All Pulmonary/Sleep Data reviewed personally.    SLEEP STUDY:    IMPRESSION:   This study is consistent with severe obstructive sleep apnea.  The patient had an AHI of 51.4 with a supine index of 49.7 associated with severe oxygen desaturation.  CPAP was successful in decreasing event, but not completely eliminated.  He continued to have residual events at CPAP of 12.     Recommend weight loss.  Consideration for CPAP therapy.  I would initiate the patient on auto CPAP pressure 11 through 20, with close clinical followup as he might need further in-lab titration study.  Clinical correlation is also required.       PAP Download:  Settings: 11-20 cm H2O  Usage Nights > 4 hours:  100%  Residual AHI: 2.6  DME: [facility]  Mask: full face    I have reviewed the results form each unique test that was performed, independently interpreted tests that I have not performed, reviewed prior external notes from unique sources and I have ordered each unique test noted above.     Instructions provided as documented in the After Visit Summary.    The patient indicated understanding of the diagnosis and agreed with the plan of care.          Mulugeta Hendricks MD                           1

## 2023-06-01 ENCOUNTER — APPOINTMENT (OUTPATIENT)
Dept: INFUSION THERAPY | Facility: HOSPITAL | Age: 68
End: 2023-06-01

## 2023-06-12 ENCOUNTER — RESULT REVIEW (OUTPATIENT)
Age: 68
End: 2023-06-12

## 2023-06-12 ENCOUNTER — OUTPATIENT (OUTPATIENT)
Dept: OUTPATIENT SERVICES | Facility: HOSPITAL | Age: 68
LOS: 1 days | End: 2023-06-12
Payer: MEDICARE

## 2023-06-12 ENCOUNTER — APPOINTMENT (OUTPATIENT)
Dept: ORTHOPEDIC SURGERY | Facility: CLINIC | Age: 68
End: 2023-06-12
Payer: MEDICARE

## 2023-06-12 ENCOUNTER — APPOINTMENT (OUTPATIENT)
Dept: MRI IMAGING | Facility: HOSPITAL | Age: 68
End: 2023-06-12
Payer: MEDICARE

## 2023-06-12 ENCOUNTER — NON-APPOINTMENT (OUTPATIENT)
Age: 68
End: 2023-06-12

## 2023-06-12 VITALS
HEART RATE: 68 BPM | HEIGHT: 73 IN | SYSTOLIC BLOOD PRESSURE: 122 MMHG | DIASTOLIC BLOOD PRESSURE: 70 MMHG | BODY MASS INDEX: 27.83 KG/M2 | WEIGHT: 210 LBS

## 2023-06-12 DIAGNOSIS — M43.17 SPONDYLOLISTHESIS, LUMBOSACRAL REGION: ICD-10-CM

## 2023-06-12 DIAGNOSIS — M54.16 RADICULOPATHY, LUMBAR REGION: ICD-10-CM

## 2023-06-12 DIAGNOSIS — M51.26 OTHER INTERVERTEBRAL DISC DISPLACEMENT, LUMBAR REGION: ICD-10-CM

## 2023-06-12 DIAGNOSIS — M51.36 OTHER INTERVERTEBRAL DISC DEGENERATION, LUMBAR REGION: ICD-10-CM

## 2023-06-12 DIAGNOSIS — Z98.890 OTHER SPECIFIED POSTPROCEDURAL STATES: Chronic | ICD-10-CM

## 2023-06-12 PROCEDURE — 72148 MRI LUMBAR SPINE W/O DYE: CPT | Mod: 26,MH

## 2023-06-12 PROCEDURE — 72110 X-RAY EXAM L-2 SPINE 4/>VWS: CPT

## 2023-06-12 PROCEDURE — 99214 OFFICE O/P EST MOD 30 MIN: CPT

## 2023-06-12 PROCEDURE — 72148 MRI LUMBAR SPINE W/O DYE: CPT

## 2023-06-12 NOTE — PHYSICAL EXAM
[LE] : Sensory: Intact in bilateral lower extremities [Knee] : patellar 2+ and symmetric bilaterally [Normal RLE] : Right Lower Extremity: No scars, rashes, lesions, ulcers, skin intact [Normal LLE] : Left Lower Extremity: No scars, rashes, lesions, ulcers, skin intact [Normal] : No costovertebral angle tenderness and no spinal tenderness [Poor Appearance] : well-appearing [Acute Distress] : not in acute distress [Obese] : not obese [de-identified] : negative tension sign\par diminished left ankle reflex\par unable to heel and toe walk [de-identified] : diminished left ankle reflex [de-identified] : xrays of the lumbar spine demonstrating good alignment of the lumbar spine. Lateral views with no instability on flexion and extension views. Spondylolisthesis at L5-S1 and an old T12 compression fracture. No new obvious fracture, no bony tumor, no infection.

## 2023-06-12 NOTE — DISCUSSION/SUMMARY
[Medication Risks Reviewed] : Medication risks reviewed [2 Weeks] : in 2 weeks [de-identified] : MRI of the lumbar spine to evaluate for the diminished left ankle reflex and unable to lift off from bilateral feet.

## 2023-06-12 NOTE — REASON FOR VISIT
[Follow-Up Visit] : a follow-up visit for [FreeTextEntry2] : Lumbar DDD & S/p T12 Compression Fracture

## 2023-06-12 NOTE — HISTORY OF PRESENT ILLNESS
[3] : a current pain level of 3/10 [Pain Location] : pain [] : left buttock [C-Spine] : C-spine region [Lumbar] : lumbar region [Worsening] : worsening [___ mths] : [unfilled] month(s) ago [Walking] : walking [Prolonged Standing] : worsened by prolonged standing [Exercise Regimen] : relieved by exercise regimen [Rest] : relieved by rest [de-identified] : Patient presents a follow-up visit for lumbar spondylolisthesis with radiculopathy into the left buttock.\par  Patient is currently seeing a hematologist for Prostate Cancer since March 2023. \par The patient states he is experiencing more severe pain and increased muscle spasms to his mid & lower back at this time, like pitching sensation.  The patient feels this may be due to the fact that he can no longer perform abdominal strengthening exercises due to her recent discovery of her rectus abdominis hernia.\par Patient had a x1 episode of a pop into his left calf at this time. \par The patient currently walks with a hunched posture and is currently on Eliquis. \par Patient is unable to strengthen the core with crunching secondary to a midline rectus hernia, and MD asked patient to stop the crunch exercises. Patient does bicycle and maintain increased activity.

## 2023-06-21 ENCOUNTER — APPOINTMENT (OUTPATIENT)
Dept: ORTHOPEDIC SURGERY | Facility: CLINIC | Age: 68
End: 2023-06-21
Payer: MEDICARE

## 2023-06-21 PROCEDURE — 99214 OFFICE O/P EST MOD 30 MIN: CPT

## 2023-06-21 NOTE — PHYSICAL EXAM
[LE] : Sensory: Intact in bilateral lower extremities [Knee] : patellar 2+ and symmetric bilaterally [Normal] : No costovertebral angle tenderness and no spinal tenderness [Poor Appearance] : well-appearing [Acute Distress] : not in acute distress [Obese] : not obese [de-identified] : diminished left ankle reflex [de-identified] : MRI Evaluation of the Lumbar Spine performed on 6/12/23  shows No high-grade lumbar spinal canal stenosis. Chronic mild compression fracture of the superior endplate of T12 without fragment retropulsion. At L5-S1 grade 2 anterolisthesis due to chronic bilateral pars interarticularis defects at L5 and facet arthrosis/pseudoarthrosis contribute to moderate to severe left worse than right neural canal stenosis. Correlate for possible left-sided L5 radiculopathy.

## 2023-06-21 NOTE — DISCUSSION/SUMMARY
[Other: ____] : in [unfilled] [de-identified] : I discussed the underlying pathophysiology of the patient's condition in great detail with the patient. I expressed to the patient that base don his MRI findings, he currently has no new issues at this time. He continues to have mild spinal stenosis & a Spondylolisthesis in his Lumbar Spine. Activity modifications were reviewed with the patient at length. I recommended that the patient takes antiinflammatory & focus on walking and stretching to aid his current symptoms. I suggested that the patient would be a great candidate for physical therapy. I provided the patient with a detailed prescription for physical therapy.  The patient will be re-evaluated for physical therapy on his return. The patient should follow-up with me in 8 Weeks.

## 2023-06-21 NOTE — HISTORY OF PRESENT ILLNESS
[de-identified] : Patient presents a follow-up visit to review the results of a recent MRI of the Lumbar Spine. The patient reports no significant changes to their symptoms to his lower back since their last visit. He has been experiencing tenderness to the back of his left knee / calf area. The patient states he has no swelling noticed with his pain. Patient is currently on Blood thinner (Eliquis).\par \par

## 2023-06-21 NOTE — REASON FOR VISIT
[Follow-Up Visit] : a follow-up visit for [Spinal Stenosis] : spinal stenosis [Spondylolistheses] : spondylolistheses [FreeTextEntry2] : MRI Review; Lumbar DDD

## 2023-06-21 NOTE — ADDENDUM
[FreeTextEntry1] : I, Eyad April Amaya, acted solely as a scribe for Dr. Denny Candelaria on this date 06/21/2023 .\par \par All medical record entries made by the Scribe were at my, Dr. Denny Candelaria, direction and personally dictated by me on 06/21/2023 . I have reviewed the chart and agree that the record accurately reflects my personal performance of the history, physical exam, assessment and plan. I have also personally directed, reviewed, and agreed with the chart.

## 2023-06-22 ENCOUNTER — APPOINTMENT (OUTPATIENT)
Dept: INFUSION THERAPY | Facility: HOSPITAL | Age: 68
End: 2023-06-22

## 2023-06-22 DIAGNOSIS — C79.51 SECONDARY MALIGNANT NEOPLASM OF BONE: ICD-10-CM

## 2023-06-22 DIAGNOSIS — C90.00 MULTIPLE MYELOMA NOT HAVING ACHIEVED REMISSION: ICD-10-CM

## 2023-07-12 ENCOUNTER — APPOINTMENT (OUTPATIENT)
Dept: CARDIOLOGY | Facility: CLINIC | Age: 68
End: 2023-07-12
Payer: MEDICARE

## 2023-07-12 VITALS
WEIGHT: 210 LBS | BODY MASS INDEX: 27.83 KG/M2 | HEART RATE: 60 BPM | DIASTOLIC BLOOD PRESSURE: 74 MMHG | OXYGEN SATURATION: 96 % | HEIGHT: 73 IN | SYSTOLIC BLOOD PRESSURE: 112 MMHG

## 2023-07-12 PROCEDURE — 99214 OFFICE O/P EST MOD 30 MIN: CPT

## 2023-07-12 NOTE — REASON FOR VISIT
[Follow-Up - Clinic] : a clinic follow-up of [FreeTextEntry2] : PE and DVT [FreeTextEntry1] : 7/12/2023\par \par Prostate CA - Dr. Angeles - [place]\par Biospy done, planning on cyberknife \par Following with Dr. Vernon for MM.\par He remains on eliqus 5mg BID \par Multiple myeloma controlled/ in remission. \par \par \par Meds\par Metoprolol 50 daily \par Eliquis 5mg BID\par Atorvastatin 20mg daily \par Magnesium\par Tamsulosin \par acyclovir\par \par \par \par \par 5/12/2022\par Seen by Dr. Salazar. \par Cardiac MRI done- no amyloid\par Echo April 18th, 2022:  no amyloid.  normal RV size and function.  Normal LV size and function. 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 Brantley - for MM - no more plans for revlomid.  Plan for immunotherapy for 4 months.  \par \par Remains on eliquis 5mg BID.  No issues with bleeding.\par He has been on A/C since November.  \par \par \par Tamsulosin\par Eliquis 5mg BID\par Metoprolol 25 every 8 hours\par magnesium\par Acycolovir\par \par \par 2/10/2022\par \par Here for followup\par In November was hospitalized found to have submassive saddle PE\par treated with INARI mechanical thrombectomy\par RV dysfunction on echo at that time and on CT\par \par Doing well with eliquis 5mg BID\par no bleeding issues\par Breathing is good\par No cp \par \par states he had a bout of appendicitis last month, treated with antibiotics, resolve.d

## 2023-07-12 NOTE — ASSESSMENT
[FreeTextEntry1] : Assessment:\par 1.  Submassive PE\par - s/p INARI mechanical thrombectomy\par - bilateral DVT\par - on eliquis\par 2. HPC transplant\par 3.  Multiple Myeloma\par 4. Appendicitis - treated \par \par Plan\par 1.  Continue eliquis 5mg BID for now\par 2.  Repeat venous duplex \par 4.  Compression garments to the legs/ pneumatic pump shown to patient \par 5.  Daily walking\par 6.  Followup with hematology    \par 7.   Ok to hold eliquis if needed for proceudres\par 8 .  Cont Toprol XL 50mg daily   \par 9.  Next visit, depending on cancer status (MM and prostate) if both are stable, will consider reduction to half dose apixaban \par 10.  RTC in 6 months

## 2023-07-12 NOTE — PHYSICAL EXAM
[General Appearance - Well Developed] : well developed [Normal Appearance] : normal appearance [Well Groomed] : well groomed [General Appearance - Well Nourished] : well nourished [No Deformities] : no deformities [General Appearance - In No Acute Distress] : no acute distress [Normal Conjunctiva] : the conjunctiva exhibited no abnormalities [Eyelids - No Xanthelasma] : the eyelids demonstrated no xanthelasmas [Normal Oral Mucosa] : normal oral mucosa [No Oral Pallor] : no oral pallor [No Oral Cyanosis] : no oral cyanosis [Normal Jugular Venous A Waves Present] : normal jugular venous A waves present [Normal Jugular Venous V Waves Present] : normal jugular venous V waves present [No Jugular Venous Navarro A Waves] : no jugular venous navarro A waves [Respiration, Rhythm And Depth] : normal respiratory rhythm and effort [Auscultation Breath Sounds / Voice Sounds] : lungs were clear to auscultation bilaterally [Exaggerated Use Of Accessory Muscles For Inspiration] : no accessory muscle use [Heart Rate And Rhythm] : heart rate and rhythm were normal [Heart Sounds] : normal S1 and S2 [Murmurs] : no murmurs present [Abdomen Soft] : soft [Abdomen Tenderness] : non-tender [Abdomen Mass (___ Cm)] : no abdominal mass palpated [Abnormal Walk] : normal gait [Gait - Sufficient For Exercise Testing] : the gait was sufficient for exercise testing [Nail Clubbing] : no clubbing of the fingernails [Cyanosis, Localized] : no localized cyanosis [Petechial Hemorrhages (___cm)] : no petechial hemorrhages [Skin Color & Pigmentation] : normal skin color and pigmentation [] : no rash [No Venous Stasis] : no venous stasis [Skin Lesions] : no skin lesions [No Skin Ulcers] : no skin ulcer [No Xanthoma] : no  xanthoma was observed [Oriented To Time, Place, And Person] : oriented to person, place, and time [Affect] : the affect was normal [Mood] : the mood was normal [No Anxiety] : not feeling anxious

## 2023-07-31 ENCOUNTER — RX RENEWAL (OUTPATIENT)
Age: 68
End: 2023-07-31

## 2023-08-09 ENCOUNTER — OUTPATIENT (OUTPATIENT)
Dept: OUTPATIENT SERVICES | Facility: HOSPITAL | Age: 68
LOS: 1 days | Discharge: HOME | End: 2023-08-09

## 2023-08-09 DIAGNOSIS — Z98.890 OTHER SPECIFIED POSTPROCEDURAL STATES: Chronic | ICD-10-CM

## 2023-08-09 DIAGNOSIS — R79.89 OTHER SPECIFIED ABNORMAL FINDINGS OF BLOOD CHEMISTRY: ICD-10-CM

## 2023-08-16 ENCOUNTER — APPOINTMENT (OUTPATIENT)
Dept: ORTHOPEDIC SURGERY | Facility: CLINIC | Age: 68
End: 2023-08-16
Payer: MEDICARE

## 2023-08-16 VITALS
SYSTOLIC BLOOD PRESSURE: 114 MMHG | BODY MASS INDEX: 27.83 KG/M2 | HEIGHT: 73 IN | WEIGHT: 210 LBS | DIASTOLIC BLOOD PRESSURE: 70 MMHG | HEART RATE: 62 BPM

## 2023-08-16 DIAGNOSIS — M43.17 SPONDYLOLISTHESIS, LUMBOSACRAL REGION: ICD-10-CM

## 2023-08-16 PROCEDURE — 99214 OFFICE O/P EST MOD 30 MIN: CPT

## 2023-08-16 NOTE — PHYSICAL EXAM
[LE] : Sensory: Intact in bilateral lower extremities [Knee] : patellar 2+ and symmetric bilaterally [Poor Appearance] : well-appearing [Acute Distress] : not in acute distress [Obese] : not obese [Normal] : No costovertebral angle tenderness and no spinal tenderness [de-identified] : diminished left ankle reflex

## 2023-08-16 NOTE — HISTORY OF PRESENT ILLNESS
[de-identified] : Patient returns for follow-up and states that he continues to have significant issues concerning the lower back on the right side.  Physical therapy evaluation recommends further therapies to the quadratus muscle and iliac us on the right.  Patient on MRI evaluation has L5-S1 foraminal stenosis due to a spondylolisthesis worse on the left.  He has morning stiffness that he states that the pain is a 4 out of 10 on a visual pain analog scale.  Patient has been using a lumbar supporting chair.  He presents for consultation. [4] : a current pain level of 4/10

## 2023-08-16 NOTE — DISCUSSION/SUMMARY
[de-identified] : Benefits and risks of suspending physical therapy versus continuing with therapy versus pain management and then a return to therapy was reviewed with the patient.  Patient does not want to stop at this point and states that he can live with his current level of pain and functioning.  He is going to continue with physical therapy and follow-up with us in 4 weeks.  We will reconsider pain management if needed at that time.

## 2023-08-17 ENCOUNTER — APPOINTMENT (OUTPATIENT)
Dept: INFUSION THERAPY | Facility: HOSPITAL | Age: 68
End: 2023-08-17

## 2023-08-17 ENCOUNTER — APPOINTMENT (OUTPATIENT)
Dept: HEMATOLOGY ONCOLOGY | Facility: CLINIC | Age: 68
End: 2023-08-17

## 2023-08-23 DIAGNOSIS — C90.00 MULTIPLE MYELOMA NOT HAVING ACHIEVED REMISSION: ICD-10-CM

## 2023-08-23 DIAGNOSIS — C79.51 SECONDARY MALIGNANT NEOPLASM OF BONE: ICD-10-CM

## 2023-09-20 NOTE — DIETITIAN INITIAL EVALUATION ADULT. - CALCULATED TO (G/KG)
Care Transitions Program Week 3 Follow-up Call    Current Issues/Problems reviewed on call: Patient feels he is doing okay. He has one more visit with home care next week and plans to start cardiac rehab next week. He has no new or worsening issues or concerns today.     Details of Interventions/Education completed on call: All questions answered    Review of Systems:   Care Transition System Evaluation:    Fever: is not present   Pain:   (\"a little sore\")  Pain Location: surgical  General Symptoms: WDL (absence of symptoms)  Neurologic/Neuromuscular Symptoms: Weakness  ENT Symptoms: WDL (absence of symptoms)  Respiratory Symptoms: WDL (absence of symptoms)  Cardiovascular Symptoms: WDL (absence of symptoms)  GI Symptoms: WDL (absence of symptoms)   Symptoms: WDL (absence of symptoms)  Skin Symptoms: Wound  Wound Type: Surgical Incision  Sleeping Behaviors:Sleeps in recliner  Current Lines/Drains:No    The patient istaking all medications as prescribed. The patient did not have questions or concerns regarding the medications prescribed.    Transitional Care Management (TCM) appointment was completed.  TCM appointment plan of care and upcoming appointments were reviewed with patient.  Transportation to upcoming appointments to be provided by patient.    Next Care Transitions follow-up call will be within 2 weeks.    Plan for next follow-up call: Follow up assessment.   
137.06

## 2023-10-08 ENCOUNTER — NON-APPOINTMENT (OUTPATIENT)
Age: 68
End: 2023-10-08

## 2023-10-15 ENCOUNTER — OUTPATIENT (OUTPATIENT)
Dept: OUTPATIENT SERVICES | Facility: HOSPITAL | Age: 68
LOS: 1 days | Discharge: HOME | End: 2023-10-15

## 2023-10-15 DIAGNOSIS — Z98.890 OTHER SPECIFIED POSTPROCEDURAL STATES: Chronic | ICD-10-CM

## 2023-10-15 DIAGNOSIS — C79.51 SECONDARY MALIGNANT NEOPLASM OF BONE: ICD-10-CM

## 2023-10-15 DIAGNOSIS — R79.89 OTHER SPECIFIED ABNORMAL FINDINGS OF BLOOD CHEMISTRY: ICD-10-CM

## 2023-10-16 ENCOUNTER — APPOINTMENT (OUTPATIENT)
Dept: HEMATOLOGY ONCOLOGY | Facility: CLINIC | Age: 68
End: 2023-10-16

## 2023-10-23 ENCOUNTER — APPOINTMENT (OUTPATIENT)
Dept: RADIOLOGY | Facility: HOSPITAL | Age: 68
End: 2023-10-23
Payer: MEDICARE

## 2023-10-23 ENCOUNTER — OUTPATIENT (OUTPATIENT)
Dept: OUTPATIENT SERVICES | Facility: HOSPITAL | Age: 68
LOS: 1 days | End: 2023-10-23
Payer: MEDICARE

## 2023-10-23 DIAGNOSIS — Z98.890 OTHER SPECIFIED POSTPROCEDURAL STATES: Chronic | ICD-10-CM

## 2023-10-23 DIAGNOSIS — Z00.8 ENCOUNTER FOR OTHER GENERAL EXAMINATION: ICD-10-CM

## 2023-10-23 PROCEDURE — 71046 X-RAY EXAM CHEST 2 VIEWS: CPT | Mod: 26

## 2023-10-23 PROCEDURE — 71046 X-RAY EXAM CHEST 2 VIEWS: CPT

## 2023-11-16 ENCOUNTER — APPOINTMENT (OUTPATIENT)
Dept: HEMATOLOGY ONCOLOGY | Facility: CLINIC | Age: 68
End: 2023-11-16

## 2023-11-30 ENCOUNTER — APPOINTMENT (OUTPATIENT)
Dept: HEMATOLOGY ONCOLOGY | Facility: CLINIC | Age: 68
End: 2023-11-30
Payer: MEDICARE

## 2023-11-30 PROCEDURE — 99213 OFFICE O/P EST LOW 20 MIN: CPT | Mod: 95

## 2023-11-30 RX ORDER — TIZANIDINE HYDROCHLORIDE 2 MG/1
2 CAPSULE ORAL
Qty: 40 | Refills: 1 | Status: DISCONTINUED | COMMUNITY
Start: 2023-06-12 | End: 2023-11-30

## 2023-11-30 RX ORDER — ACYCLOVIR 400 MG/1
400 TABLET ORAL
Qty: 180 | Refills: 1 | Status: DISCONTINUED | COMMUNITY
Start: 2022-05-19 | End: 2023-11-30

## 2023-11-30 RX ORDER — TOBRAMYCIN AND DEXAMETHASONE 3; 1 MG/ML; MG/ML
0.3-0.1 SUSPENSION/ DROPS OPHTHALMIC
Qty: 5 | Refills: 0 | Status: DISCONTINUED | COMMUNITY
Start: 2022-04-19 | End: 2023-11-30

## 2023-12-04 ENCOUNTER — APPOINTMENT (OUTPATIENT)
Dept: INFUSION THERAPY | Facility: HOSPITAL | Age: 68
End: 2023-12-04

## 2023-12-07 ENCOUNTER — RESULT REVIEW (OUTPATIENT)
Age: 68
End: 2023-12-07

## 2023-12-07 ENCOUNTER — APPOINTMENT (OUTPATIENT)
Dept: HEMATOLOGY ONCOLOGY | Facility: CLINIC | Age: 68
End: 2023-12-07

## 2023-12-07 ENCOUNTER — APPOINTMENT (OUTPATIENT)
Dept: INFUSION THERAPY | Facility: HOSPITAL | Age: 68
End: 2023-12-07

## 2023-12-07 LAB
BASOPHILS # BLD AUTO: 0.03 K/UL — SIGNIFICANT CHANGE UP (ref 0–0.2)
BASOPHILS # BLD AUTO: 0.03 K/UL — SIGNIFICANT CHANGE UP (ref 0–0.2)
BASOPHILS NFR BLD AUTO: 0.7 % — SIGNIFICANT CHANGE UP (ref 0–2)
BASOPHILS NFR BLD AUTO: 0.7 % — SIGNIFICANT CHANGE UP (ref 0–2)
EOSINOPHIL # BLD AUTO: 0.12 K/UL — SIGNIFICANT CHANGE UP (ref 0–0.5)
EOSINOPHIL # BLD AUTO: 0.12 K/UL — SIGNIFICANT CHANGE UP (ref 0–0.5)
EOSINOPHIL NFR BLD AUTO: 2.7 % — SIGNIFICANT CHANGE UP (ref 0–6)
EOSINOPHIL NFR BLD AUTO: 2.7 % — SIGNIFICANT CHANGE UP (ref 0–6)
HCT VFR BLD CALC: 38.2 % — LOW (ref 39–50)
HCT VFR BLD CALC: 38.2 % — LOW (ref 39–50)
HGB BLD-MCNC: 13.2 G/DL — SIGNIFICANT CHANGE UP (ref 13–17)
HGB BLD-MCNC: 13.2 G/DL — SIGNIFICANT CHANGE UP (ref 13–17)
IMM GRANULOCYTES NFR BLD AUTO: 0.9 % — SIGNIFICANT CHANGE UP (ref 0–0.9)
IMM GRANULOCYTES NFR BLD AUTO: 0.9 % — SIGNIFICANT CHANGE UP (ref 0–0.9)
LYMPHOCYTES # BLD AUTO: 1.54 K/UL — SIGNIFICANT CHANGE UP (ref 1–3.3)
LYMPHOCYTES # BLD AUTO: 1.54 K/UL — SIGNIFICANT CHANGE UP (ref 1–3.3)
LYMPHOCYTES # BLD AUTO: 34.8 % — SIGNIFICANT CHANGE UP (ref 13–44)
LYMPHOCYTES # BLD AUTO: 34.8 % — SIGNIFICANT CHANGE UP (ref 13–44)
MCHC RBC-ENTMCNC: 33.4 PG — SIGNIFICANT CHANGE UP (ref 27–34)
MCHC RBC-ENTMCNC: 33.4 PG — SIGNIFICANT CHANGE UP (ref 27–34)
MCHC RBC-ENTMCNC: 34.6 G/DL — SIGNIFICANT CHANGE UP (ref 32–36)
MCHC RBC-ENTMCNC: 34.6 G/DL — SIGNIFICANT CHANGE UP (ref 32–36)
MCV RBC AUTO: 96.7 FL — SIGNIFICANT CHANGE UP (ref 80–100)
MCV RBC AUTO: 96.7 FL — SIGNIFICANT CHANGE UP (ref 80–100)
MONOCYTES # BLD AUTO: 0.58 K/UL — SIGNIFICANT CHANGE UP (ref 0–0.9)
MONOCYTES # BLD AUTO: 0.58 K/UL — SIGNIFICANT CHANGE UP (ref 0–0.9)
MONOCYTES NFR BLD AUTO: 13.1 % — SIGNIFICANT CHANGE UP (ref 2–14)
MONOCYTES NFR BLD AUTO: 13.1 % — SIGNIFICANT CHANGE UP (ref 2–14)
NEUTROPHILS # BLD AUTO: 2.11 K/UL — SIGNIFICANT CHANGE UP (ref 1.8–7.4)
NEUTROPHILS # BLD AUTO: 2.11 K/UL — SIGNIFICANT CHANGE UP (ref 1.8–7.4)
NEUTROPHILS NFR BLD AUTO: 47.8 % — SIGNIFICANT CHANGE UP (ref 43–77)
NEUTROPHILS NFR BLD AUTO: 47.8 % — SIGNIFICANT CHANGE UP (ref 43–77)
NRBC # BLD: 0 /100 WBCS — SIGNIFICANT CHANGE UP (ref 0–0)
NRBC # BLD: 0 /100 WBCS — SIGNIFICANT CHANGE UP (ref 0–0)
PLATELET # BLD AUTO: 176 K/UL — SIGNIFICANT CHANGE UP (ref 150–400)
PLATELET # BLD AUTO: 176 K/UL — SIGNIFICANT CHANGE UP (ref 150–400)
RBC # BLD: 3.95 M/UL — LOW (ref 4.2–5.8)
RBC # BLD: 3.95 M/UL — LOW (ref 4.2–5.8)
RBC # FLD: 13.6 % — SIGNIFICANT CHANGE UP (ref 10.3–14.5)
RBC # FLD: 13.6 % — SIGNIFICANT CHANGE UP (ref 10.3–14.5)
WBC # BLD: 4.42 K/UL — SIGNIFICANT CHANGE UP (ref 3.8–10.5)
WBC # BLD: 4.42 K/UL — SIGNIFICANT CHANGE UP (ref 3.8–10.5)
WBC # FLD AUTO: 4.42 K/UL — SIGNIFICANT CHANGE UP (ref 3.8–10.5)
WBC # FLD AUTO: 4.42 K/UL — SIGNIFICANT CHANGE UP (ref 3.8–10.5)

## 2023-12-08 DIAGNOSIS — Z23 ENCOUNTER FOR IMMUNIZATION: ICD-10-CM

## 2023-12-08 DIAGNOSIS — C90.00 MULTIPLE MYELOMA NOT HAVING ACHIEVED REMISSION: ICD-10-CM

## 2023-12-14 ENCOUNTER — APPOINTMENT (OUTPATIENT)
Dept: INFUSION THERAPY | Facility: HOSPITAL | Age: 68
End: 2023-12-14

## 2023-12-15 DIAGNOSIS — C91.00 ACUTE LYMPHOBLASTIC LEUKEMIA NOT HAVING ACHIEVED REMISSION: ICD-10-CM

## 2023-12-19 ENCOUNTER — OUTPATIENT (OUTPATIENT)
Dept: OUTPATIENT SERVICES | Facility: HOSPITAL | Age: 68
LOS: 1 days | Discharge: HOME | End: 2023-12-19

## 2023-12-19 DIAGNOSIS — Z98.890 OTHER SPECIFIED POSTPROCEDURAL STATES: Chronic | ICD-10-CM

## 2023-12-19 DIAGNOSIS — R79.89 OTHER SPECIFIED ABNORMAL FINDINGS OF BLOOD CHEMISTRY: ICD-10-CM

## 2023-12-21 ENCOUNTER — APPOINTMENT (OUTPATIENT)
Dept: INFUSION THERAPY | Facility: HOSPITAL | Age: 68
End: 2023-12-21

## 2023-12-22 DIAGNOSIS — C90.00 MULTIPLE MYELOMA NOT HAVING ACHIEVED REMISSION: ICD-10-CM

## 2023-12-22 DIAGNOSIS — Z23 ENCOUNTER FOR IMMUNIZATION: ICD-10-CM

## 2024-01-09 ENCOUNTER — APPOINTMENT (OUTPATIENT)
Dept: CARDIOLOGY | Facility: CLINIC | Age: 69
End: 2024-01-09

## 2024-01-10 ENCOUNTER — APPOINTMENT (OUTPATIENT)
Dept: CARDIOLOGY | Facility: CLINIC | Age: 69
End: 2024-01-10
Payer: MEDICARE

## 2024-01-10 VITALS
OXYGEN SATURATION: 95 % | HEIGHT: 73 IN | WEIGHT: 215 LBS | DIASTOLIC BLOOD PRESSURE: 83 MMHG | SYSTOLIC BLOOD PRESSURE: 122 MMHG | HEART RATE: 62 BPM | BODY MASS INDEX: 28.49 KG/M2

## 2024-01-10 DIAGNOSIS — I82.403 ACUTE EMBOLISM AND THROMBOSIS OF UNSPECIFIED DEEP VEINS OF LOWER EXTREMITY, BILATERAL: ICD-10-CM

## 2024-01-10 DIAGNOSIS — I26.92 SADDLE EMBOLUS OF PULMONARY ARTERY W/OUT ACUTE COR PULMONALE: ICD-10-CM

## 2024-01-10 LAB
ALBUMIN SERPL ELPH-MCNC: 4.5 G/DL
ALP BLD-CCNC: 78 U/L
ALT SERPL-CCNC: 41 U/L
ANION GAP SERPL CALC-SCNC: 11 MMOL/L
AST SERPL-CCNC: 27 U/L
BILIRUB SERPL-MCNC: 0.3 MG/DL
BUN SERPL-MCNC: 18 MG/DL
CALCIUM SERPL-MCNC: 9.9 MG/DL
CHLORIDE SERPL-SCNC: 103 MMOL/L
CO2 SERPL-SCNC: 27 MMOL/L
CREAT SERPL-MCNC: 0.89 MG/DL
EGFR: 94 ML/MIN/1.73M2
GLUCOSE SERPL-MCNC: 86 MG/DL
POTASSIUM SERPL-SCNC: 4.4 MMOL/L
PROT SERPL-MCNC: 6.5 G/DL
SODIUM SERPL-SCNC: 141 MMOL/L

## 2024-01-10 PROCEDURE — 99214 OFFICE O/P EST MOD 30 MIN: CPT

## 2024-01-10 RX ORDER — METOPROLOL SUCCINATE 50 MG/1
50 TABLET, EXTENDED RELEASE ORAL
Qty: 90 | Refills: 3 | Status: ACTIVE | COMMUNITY
Start: 2022-05-12 | End: 1900-01-01

## 2024-01-10 RX ORDER — APIXABAN 5 MG/1
5 TABLET, FILM COATED ORAL
Qty: 180 | Refills: 3 | Status: ACTIVE | COMMUNITY
Start: 2021-12-07 | End: 1900-01-01

## 2024-01-10 NOTE — PHYSICAL EXAM
[General Appearance - Well Developed] : well developed [Normal Appearance] : normal appearance [Well Groomed] : well groomed [General Appearance - Well Nourished] : well nourished [No Deformities] : no deformities [General Appearance - In No Acute Distress] : no acute distress [Normal Conjunctiva] : the conjunctiva exhibited no abnormalities [Eyelids - No Xanthelasma] : the eyelids demonstrated no xanthelasmas [Normal Oral Mucosa] : normal oral mucosa [No Oral Pallor] : no oral pallor [No Oral Cyanosis] : no oral cyanosis [Normal Jugular Venous A Waves Present] : normal jugular venous A waves present [Normal Jugular Venous V Waves Present] : normal jugular venous V waves present [No Jugular Venous Navarro A Waves] : no jugular venous navarro A waves [Respiration, Rhythm And Depth] : normal respiratory rhythm and effort [Exaggerated Use Of Accessory Muscles For Inspiration] : no accessory muscle use [Auscultation Breath Sounds / Voice Sounds] : lungs were clear to auscultation bilaterally [Heart Rate And Rhythm] : heart rate and rhythm were normal [Heart Sounds] : normal S1 and S2 [Murmurs] : no murmurs present [Abdomen Soft] : soft [Abdomen Tenderness] : non-tender [Abdomen Mass (___ Cm)] : no abdominal mass palpated [Abnormal Walk] : normal gait [Gait - Sufficient For Exercise Testing] : the gait was sufficient for exercise testing [Nail Clubbing] : no clubbing of the fingernails [Cyanosis, Localized] : no localized cyanosis [Petechial Hemorrhages (___cm)] : no petechial hemorrhages [Skin Color & Pigmentation] : normal skin color and pigmentation [No Venous Stasis] : no venous stasis [] : no rash [No Skin Ulcers] : no skin ulcer [Skin Lesions] : no skin lesions [No Xanthoma] : no  xanthoma was observed [Oriented To Time, Place, And Person] : oriented to person, place, and time [Affect] : the affect was normal [Mood] : the mood was normal [No Anxiety] : not feeling anxious

## 2024-01-10 NOTE — ASSESSMENT
[FreeTextEntry1] : 1. Submassive PE - s/p INARI mechanical thrombectomy - bilateral DVT - on eliquis 2. HPC transplant 3. Multiple Myeloma 4. Appendicitis - treated  Plan 1. Continue eliquis 5mg BID for now 2.  Compression garments as needed 3  Daily walking 4 Followup with hematology 5. Ok to hold eliquis if needed for proceudres 8. Cont Toprol XL 50mg daily - BP is well controlled 9. Next visit, depending on cancer status (MM and prostate) if both are stable, will consider reduction to half dose apixaban.  Not yet, as he is on hormonal therapy  10. RTC in 1 year

## 2024-01-10 NOTE — REASON FOR VISIT
[Follow-Up - Clinic] : a clinic follow-up of [FreeTextEntry2] : PE and DVT [FreeTextEntry1] : 1/10/2024 Remains on eliquis 5mg BID cyberknife Sept on hormonal therapy in [facility] once every 6 months Dr. Devries urology - [place]  Seeing Dr. Sorenson / [place] - for MM - in remission he states    Meds Metoprolol 50 daily Eliquis 5mg BID Atorvastatin 20mg daily Magnesium Tamsulosin   7/12/2023  Prostate CA - Dr. Angeles - [facility] done, planning on cyberknife  Following with Dr. Vernon for MM. He remains on eliqus 5mg BID  Multiple myeloma controlled/ in remission.    Meds Metoprolol 50 daily  Eliquis 5mg BID Atorvastatin 20mg daily  Magnesium Tamsulosin  acyclovir     5/12/2022 Seen by Dr. Salazar.  Cardiac MRI done- no amyloid Echo April 18th, 2022:  no amyloid.  normal RV size and function.  Normal LV size and function. , mild AI, mild MR Venous duplex March 11,2022:  Chronic DVT in the L tibioperoneal trunk Seeing Dr. Brantley - for MM - no more plans for revlomid.  Plan for immunotherapy for 4 months.    Remains on eliquis 5mg BID.  No issues with bleeding. He has been on A/C since November.     Tamsulosin Eliquis 5mg BID Metoprolol 25 every 8 hours magnesium Acycolovir   2/10/2022  Here for followup In November was hospitalized found to have submassive saddle PE treated with INARI mechanical thrombectomy RV dysfunction on echo at that time and on CT  Doing well with eliquis 5mg BID no bleeding issues Breathing is good No cp   states he had a bout of appendicitis last month, treated with antibiotics, resolve.d

## 2024-01-21 NOTE — ED PROVIDER NOTE - EXITCARE/DISCHARGE INSTRUCTIONS
You can access the FollowMyHealth Patient Portal offered by [facility] by registering at the following website: [email or web address]. By joining [employer]’s FollowMyHealth portal, you will also be able to view your health information using other applications (apps) compatible with our system.
Launch [facility] and print the 'Prescriptions from this Visit' Report

## 2024-01-29 ENCOUNTER — APPOINTMENT (OUTPATIENT)
Dept: ORTHOPEDIC SURGERY | Facility: CLINIC | Age: 69
End: 2024-01-29
Payer: MEDICARE

## 2024-01-29 VITALS
HEIGHT: 73 IN | HEART RATE: 51 BPM | BODY MASS INDEX: 28.76 KG/M2 | DIASTOLIC BLOOD PRESSURE: 85 MMHG | WEIGHT: 217 LBS | SYSTOLIC BLOOD PRESSURE: 143 MMHG

## 2024-01-29 PROCEDURE — 99214 OFFICE O/P EST MOD 30 MIN: CPT

## 2024-01-29 NOTE — ADDENDUM
[FreeTextEntry1] : I, Eyad Chen Jr, acted solely as a scribe for Dr. Denny Candelaria on this date 01/29/2024 .  All medical record entries made by the Scribe were at my, Dr. Denny Candelaria, direction and personally dictated by me on 01/29/2024 . I have reviewed the chart and agree that the record accurately reflects my personal performance of the history, physical exam, assessment and plan. I have also personally directed, reviewed, and agreed with the chart.

## 2024-01-29 NOTE — HISTORY OF PRESENT ILLNESS
[2] : a current pain level of 2/10 [Walking] : walking [Standing] : standing [Intermit.] : ~He/She~ states the symptoms seem to be intermittent [de-identified] : Patient presents a follow-up visit for Lumbar radiculopathy & Spondylolisthesis. The patient states that he continues to have meghann pertaining to his lower extremities, mostly at night. He states that he had a fracture in his lower back. The patient denies any symptoms of numbness, tingling, or weakness. He denies any radiating pain at this time.  He also notes a pinching sensation at night which prevents him from sleeping comfortably. He would like to return to physical therapy at this time. The [patient currently takes Cyclobenzaprine to aid his symptoms.

## 2024-01-29 NOTE — PHYSICAL EXAM
[LE] : Sensory: Intact in bilateral lower extremities [Normal] : Oriented to person, place, and time, insight and judgement were intact and the affect was normal [de-identified] : No pain to palpation or percussion to of the Thoracolumbar Spine. Minimal irritation at the lower thoracic area.  Spasm of the right flank in the Thoracolumbar area.

## 2024-01-29 NOTE — REASON FOR VISIT
[Follow-Up Visit] : a follow-up visit for [FreeTextEntry2] : Lumbar radiculopathy & Spondylolisthesis

## 2024-01-29 NOTE — DISCUSSION/SUMMARY
[Medication Risks Reviewed] : Medication risks reviewed [6 Weeks] : in 6 weeks [de-identified] : I discussed the underlying pathophysiology of the patient's condition. At this time, I encouraged the patient to return to physical therapy, if he has found alleviation with his symptoms previously. Activity modifications were reviewed with the patient at length. I advised the patient that he should focus on core strengthening and range of motion exercises. I provided the patient with a detailed prescription for physical therapy. The patient should follow-up with me in 6 weeks for further assessment of his symptoms. He should continue to take the Cyclobenzaprine as needed.

## 2024-03-05 ENCOUNTER — OUTPATIENT (OUTPATIENT)
Dept: OUTPATIENT SERVICES | Facility: HOSPITAL | Age: 69
LOS: 1 days | Discharge: HOME | End: 2024-03-05

## 2024-03-05 DIAGNOSIS — Z98.890 OTHER SPECIFIED POSTPROCEDURAL STATES: Chronic | ICD-10-CM

## 2024-03-05 DIAGNOSIS — R79.89 OTHER SPECIFIED ABNORMAL FINDINGS OF BLOOD CHEMISTRY: ICD-10-CM

## 2024-03-07 ENCOUNTER — RESULT REVIEW (OUTPATIENT)
Age: 69
End: 2024-03-07

## 2024-03-07 ENCOUNTER — APPOINTMENT (OUTPATIENT)
Dept: HEMATOLOGY ONCOLOGY | Facility: CLINIC | Age: 69
End: 2024-03-07

## 2024-03-07 LAB
BASOPHILS # BLD AUTO: 0.02 K/UL — SIGNIFICANT CHANGE UP (ref 0–0.2)
BASOPHILS NFR BLD AUTO: 0.4 % — SIGNIFICANT CHANGE UP (ref 0–2)
EOSINOPHIL # BLD AUTO: 0.13 K/UL — SIGNIFICANT CHANGE UP (ref 0–0.5)
EOSINOPHIL NFR BLD AUTO: 2.8 % — SIGNIFICANT CHANGE UP (ref 0–6)
HCT VFR BLD CALC: 39 % — SIGNIFICANT CHANGE UP (ref 39–50)
HGB BLD-MCNC: 13.3 G/DL — SIGNIFICANT CHANGE UP (ref 13–17)
IMM GRANULOCYTES NFR BLD AUTO: 0.2 % — SIGNIFICANT CHANGE UP (ref 0–0.9)
LYMPHOCYTES # BLD AUTO: 1.59 K/UL — SIGNIFICANT CHANGE UP (ref 1–3.3)
LYMPHOCYTES # BLD AUTO: 34.3 % — SIGNIFICANT CHANGE UP (ref 13–44)
MCHC RBC-ENTMCNC: 33.3 PG — SIGNIFICANT CHANGE UP (ref 27–34)
MCHC RBC-ENTMCNC: 34.1 G/DL — SIGNIFICANT CHANGE UP (ref 32–36)
MCV RBC AUTO: 97.5 FL — SIGNIFICANT CHANGE UP (ref 80–100)
MONOCYTES # BLD AUTO: 0.57 K/UL — SIGNIFICANT CHANGE UP (ref 0–0.9)
MONOCYTES NFR BLD AUTO: 12.3 % — SIGNIFICANT CHANGE UP (ref 2–14)
NEUTROPHILS # BLD AUTO: 2.32 K/UL — SIGNIFICANT CHANGE UP (ref 1.8–7.4)
NEUTROPHILS NFR BLD AUTO: 50 % — SIGNIFICANT CHANGE UP (ref 43–77)
NRBC # BLD: 0 /100 WBCS — SIGNIFICANT CHANGE UP (ref 0–0)
PLATELET # BLD AUTO: 168 K/UL — SIGNIFICANT CHANGE UP (ref 150–400)
RBC # BLD: 4 M/UL — LOW (ref 4.2–5.8)
RBC # FLD: 13.5 % — SIGNIFICANT CHANGE UP (ref 10.3–14.5)
WBC # BLD: 4.64 K/UL — SIGNIFICANT CHANGE UP (ref 3.8–10.5)
WBC # FLD AUTO: 4.64 K/UL — SIGNIFICANT CHANGE UP (ref 3.8–10.5)

## 2024-03-11 ENCOUNTER — APPOINTMENT (OUTPATIENT)
Dept: ORTHOPEDIC SURGERY | Facility: CLINIC | Age: 69
End: 2024-03-11
Payer: MEDICARE

## 2024-03-11 VITALS
DIASTOLIC BLOOD PRESSURE: 74 MMHG | HEIGHT: 73 IN | SYSTOLIC BLOOD PRESSURE: 113 MMHG | BODY MASS INDEX: 28.49 KG/M2 | WEIGHT: 215 LBS | HEART RATE: 54 BPM

## 2024-03-11 DIAGNOSIS — M51.34 OTHER INTERVERTEBRAL DISC DEGENERATION, THORACIC REGION: ICD-10-CM

## 2024-03-11 PROCEDURE — 99213 OFFICE O/P EST LOW 20 MIN: CPT

## 2024-03-11 NOTE — HISTORY OF PRESENT ILLNESS
[de-identified] : Patient presents a follow-up visit for Thoracic & Lumbar DDD.  Patient is doing well with physical therapy.  Occasionally with exertion will have radicular symptoms into the right buttock and leg.  He denies any numbness or tingling.  He is not taking any medication at this time is inquiring about options. [Improving] : improving [2] : a current pain level of 2/10

## 2024-03-11 NOTE — DISCUSSION/SUMMARY
[Medication Risks Reviewed] : Medication risks reviewed [de-identified] : Patient will continue to exercise and follow-up with us in 3 months.  Will get a hold off on pain management at this time.  He is going to try going back to golf and continue exercising on his own.  If symptoms worsen the patient will call us and he will be seen sooner.

## 2024-03-11 NOTE — PHYSICAL EXAM
[LE] : Sensory: Intact in bilateral lower extremities [Normal] : Oriented to person, place, and time, insight and judgement were intact and the affect was normal [de-identified] : Mild spasm of the right flank in the Thoracolumbar area.

## 2024-03-11 NOTE — REASON FOR VISIT
[Follow-Up Visit] : a follow-up visit for [FreeTextEntry2] : Thoracic & Lumbar DDD Anesthesia Volume In Cc: 6

## 2024-03-14 ENCOUNTER — APPOINTMENT (OUTPATIENT)
Dept: INFUSION THERAPY | Facility: HOSPITAL | Age: 69
End: 2024-03-14

## 2024-03-14 DIAGNOSIS — Z51.11 ENCOUNTER FOR ANTINEOPLASTIC CHEMOTHERAPY: ICD-10-CM

## 2024-03-14 DIAGNOSIS — C90.00 MULTIPLE MYELOMA NOT HAVING ACHIEVED REMISSION: ICD-10-CM

## 2024-03-14 DIAGNOSIS — C79.51 SECONDARY MALIGNANT NEOPLASM OF BONE: ICD-10-CM

## 2024-03-14 DIAGNOSIS — C91.00 ACUTE LYMPHOBLASTIC LEUKEMIA NOT HAVING ACHIEVED REMISSION: ICD-10-CM

## 2024-04-06 ENCOUNTER — NON-APPOINTMENT (OUTPATIENT)
Age: 69
End: 2024-04-06

## 2024-04-07 ENCOUNTER — INPATIENT (INPATIENT)
Facility: HOSPITAL | Age: 69
LOS: 0 days | Discharge: HOME | DRG: 195 | End: 2024-04-08
Attending: FAMILY MEDICINE | Admitting: FAMILY MEDICINE
Payer: MEDICARE

## 2024-04-07 VITALS
SYSTOLIC BLOOD PRESSURE: 102 MMHG | TEMPERATURE: 102 F | WEIGHT: 214.95 LBS | OXYGEN SATURATION: 96 % | HEIGHT: 73 IN | DIASTOLIC BLOOD PRESSURE: 69 MMHG | RESPIRATION RATE: 15 BRPM | HEART RATE: 114 BPM

## 2024-04-07 DIAGNOSIS — Z98.890 OTHER SPECIFIED POSTPROCEDURAL STATES: Chronic | ICD-10-CM

## 2024-04-07 DIAGNOSIS — J18.9 PNEUMONIA, UNSPECIFIED ORGANISM: ICD-10-CM

## 2024-04-07 LAB
ALBUMIN SERPL ELPH-MCNC: 3.1 G/DL — LOW (ref 3.3–5)
ALP SERPL-CCNC: 86 U/L — SIGNIFICANT CHANGE UP (ref 30–120)
ALT FLD-CCNC: 48 U/L — SIGNIFICANT CHANGE UP (ref 10–60)
ANION GAP SERPL CALC-SCNC: 12 MMOL/L — SIGNIFICANT CHANGE UP (ref 5–17)
APTT BLD: 34.3 SEC — SIGNIFICANT CHANGE UP (ref 24.5–35.6)
AST SERPL-CCNC: 31 U/L — SIGNIFICANT CHANGE UP (ref 10–40)
BASOPHILS # BLD AUTO: 0 K/UL — SIGNIFICANT CHANGE UP (ref 0–0.2)
BASOPHILS NFR BLD AUTO: 0 % — SIGNIFICANT CHANGE UP (ref 0–2)
BILIRUB SERPL-MCNC: 1.2 MG/DL — SIGNIFICANT CHANGE UP (ref 0.2–1.2)
BUN SERPL-MCNC: 26 MG/DL — HIGH (ref 7–23)
CALCIUM SERPL-MCNC: 8.7 MG/DL — SIGNIFICANT CHANGE UP (ref 8.4–10.5)
CHLORIDE SERPL-SCNC: 98 MMOL/L — SIGNIFICANT CHANGE UP (ref 96–108)
CO2 SERPL-SCNC: 22 MMOL/L — SIGNIFICANT CHANGE UP (ref 22–31)
CREAT SERPL-MCNC: 1.38 MG/DL — HIGH (ref 0.5–1.3)
CRP SERPL-MCNC: 228 MG/L — HIGH
EGFR: 56 ML/MIN/1.73M2 — LOW
EOSINOPHIL # BLD AUTO: 0.13 K/UL — SIGNIFICANT CHANGE UP (ref 0–0.5)
EOSINOPHIL NFR BLD AUTO: 1 % — SIGNIFICANT CHANGE UP (ref 0–6)
FLUAV AG NPH QL: SIGNIFICANT CHANGE UP
FLUBV AG NPH QL: SIGNIFICANT CHANGE UP
GLUCOSE SERPL-MCNC: 156 MG/DL — HIGH (ref 70–99)
HCT VFR BLD CALC: 36.3 % — LOW (ref 39–50)
HGB BLD-MCNC: 12.4 G/DL — LOW (ref 13–17)
INR BLD: 1.61 RATIO — HIGH (ref 0.85–1.18)
LACTATE SERPL-SCNC: 1.9 MMOL/L — SIGNIFICANT CHANGE UP (ref 0.7–2)
LYMPHOCYTES # BLD AUTO: 1.18 K/UL — SIGNIFICANT CHANGE UP (ref 1–3.3)
LYMPHOCYTES # BLD AUTO: 9 % — LOW (ref 13–44)
MCHC RBC-ENTMCNC: 32.6 PG — SIGNIFICANT CHANGE UP (ref 27–34)
MCHC RBC-ENTMCNC: 34.2 GM/DL — SIGNIFICANT CHANGE UP (ref 32–36)
MCV RBC AUTO: 95.5 FL — SIGNIFICANT CHANGE UP (ref 80–100)
MONOCYTES # BLD AUTO: 1.45 K/UL — HIGH (ref 0–0.9)
MONOCYTES NFR BLD AUTO: 11 % — SIGNIFICANT CHANGE UP (ref 2–14)
NEUTROPHILS # BLD AUTO: 10.13 K/UL — HIGH (ref 1.8–7.4)
NEUTROPHILS NFR BLD AUTO: 70 % — SIGNIFICANT CHANGE UP (ref 43–77)
NRBC # BLD: SIGNIFICANT CHANGE UP /100 WBCS (ref 0–0)
PLATELET # BLD AUTO: 140 K/UL — LOW (ref 150–400)
POTASSIUM SERPL-MCNC: 4.4 MMOL/L — SIGNIFICANT CHANGE UP (ref 3.5–5.3)
POTASSIUM SERPL-SCNC: 4.4 MMOL/L — SIGNIFICANT CHANGE UP (ref 3.5–5.3)
PROCALCITONIN SERPL-MCNC: 3.78 NG/ML — HIGH (ref 0.02–0.1)
PROT SERPL-MCNC: 6.5 G/DL — SIGNIFICANT CHANGE UP (ref 6–8.3)
PROTHROM AB SERPL-ACNC: 17.3 SEC — HIGH (ref 9.5–13)
RAPID RVP RESULT: SIGNIFICANT CHANGE UP
RBC # BLD: 3.8 M/UL — LOW (ref 4.2–5.8)
RBC # FLD: 14 % — SIGNIFICANT CHANGE UP (ref 10.3–14.5)
RSV RNA NPH QL NAA+NON-PROBE: SIGNIFICANT CHANGE UP
SARS-COV-2 RNA SPEC QL NAA+PROBE: SIGNIFICANT CHANGE UP
SARS-COV-2 RNA SPEC QL NAA+PROBE: SIGNIFICANT CHANGE UP
SODIUM SERPL-SCNC: 132 MMOL/L — LOW (ref 135–145)
WBC # BLD: 13.15 K/UL — HIGH (ref 3.8–10.5)
WBC # FLD AUTO: 13.15 K/UL — HIGH (ref 3.8–10.5)

## 2024-04-07 PROCEDURE — 99285 EMERGENCY DEPT VISIT HI MDM: CPT | Mod: FS

## 2024-04-07 PROCEDURE — 93010 ELECTROCARDIOGRAM REPORT: CPT

## 2024-04-07 RX ORDER — PANTOPRAZOLE SODIUM 20 MG/1
40 TABLET, DELAYED RELEASE ORAL
Refills: 0 | Status: DISCONTINUED | OUTPATIENT
Start: 2024-04-07 | End: 2024-04-08

## 2024-04-07 RX ORDER — SODIUM CHLORIDE 9 MG/ML
1000 INJECTION INTRAMUSCULAR; INTRAVENOUS; SUBCUTANEOUS ONCE
Refills: 0 | Status: COMPLETED | OUTPATIENT
Start: 2024-04-07 | End: 2024-04-07

## 2024-04-07 RX ORDER — SODIUM CHLORIDE 9 MG/ML
2000 INJECTION INTRAMUSCULAR; INTRAVENOUS; SUBCUTANEOUS ONCE
Refills: 0 | Status: COMPLETED | OUTPATIENT
Start: 2024-04-07 | End: 2024-04-07

## 2024-04-07 RX ORDER — APIXABAN 2.5 MG/1
5 TABLET, FILM COATED ORAL EVERY 12 HOURS
Refills: 0 | Status: CANCELLED | OUTPATIENT
Start: 2024-04-14 | End: 2024-04-08

## 2024-04-07 RX ORDER — ALBUTEROL 90 UG/1
2 AEROSOL, METERED ORAL EVERY 6 HOURS
Refills: 0 | Status: DISCONTINUED | OUTPATIENT
Start: 2024-04-07 | End: 2024-04-08

## 2024-04-07 RX ORDER — ACETAMINOPHEN 500 MG
1000 TABLET ORAL ONCE
Refills: 0 | Status: COMPLETED | OUTPATIENT
Start: 2024-04-07 | End: 2024-04-07

## 2024-04-07 RX ORDER — TAMSULOSIN HYDROCHLORIDE 0.4 MG/1
0.4 CAPSULE ORAL AT BEDTIME
Refills: 0 | Status: DISCONTINUED | OUTPATIENT
Start: 2024-04-07 | End: 2024-04-08

## 2024-04-07 RX ORDER — METOPROLOL TARTRATE 50 MG
25 TABLET ORAL EVERY 8 HOURS
Refills: 0 | Status: DISCONTINUED | OUTPATIENT
Start: 2024-04-07 | End: 2024-04-08

## 2024-04-07 RX ADMIN — SODIUM CHLORIDE 1000 MILLILITER(S): 9 INJECTION INTRAMUSCULAR; INTRAVENOUS; SUBCUTANEOUS at 16:04

## 2024-04-07 RX ADMIN — Medication 1000 MILLIGRAM(S): at 23:29

## 2024-04-07 RX ADMIN — Medication 25 MILLIGRAM(S): at 22:34

## 2024-04-07 RX ADMIN — TAMSULOSIN HYDROCHLORIDE 0.4 MILLIGRAM(S): 0.4 CAPSULE ORAL at 22:31

## 2024-04-07 RX ADMIN — Medication 400 MILLIGRAM(S): at 13:58

## 2024-04-07 RX ADMIN — SODIUM CHLORIDE 1000 MILLILITER(S): 9 INJECTION INTRAMUSCULAR; INTRAVENOUS; SUBCUTANEOUS at 13:41

## 2024-04-07 NOTE — H&P ADULT - HISTORY OF PRESENT ILLNESS
68-year-old male with history of PE on Eliquis, multiple myeloma with history of stem transplant, hypertension, hyperlipidemia, prostate CA sent from urgent care for evaluation and treatment of pneumonia.  Patient states that he has had cough, fever (Tmax-102.7F), body aches, shortness of breath and generalized weakness x 2 days.  States that he was seen at [facility] urgent care today and had chest x-ray which showed " right upper lobe consolidation" (imaging in PACS) and O2 sat was 92%, hence sent to ER.  Took last dose of Tylenol at 7 AM this morning.

## 2024-04-07 NOTE — ED ADULT NURSE NOTE - OBJECTIVE STATEMENT
Pt is a 68yr old male, c/o fever since Friday. Pt denies sick contacts, recent travel. Pt is tachy and febrile in triage. Pt is A+Ox3, calm and cooperative, able to move all extremities. Unlabored breathing at this time. No distress noted. MD evaluation in progress.

## 2024-04-07 NOTE — ED PROVIDER NOTE - OBJECTIVE STATEMENT
68-year-old male with history of PE on Eliquis, multiple myeloma with history of stem transplant, hypertension, hyperlipidemia, prostate CA sent from urgent care for evaluation and treatment of pneumonia.  Patient states that he has had cough, fever (Tmax-102.7F), body aches, shortness of breath and generalized weakness x 2 days.  States that he was seen at [facility] urgent care today and had chest x-ray which showed " right upper lobe consolidation" and sent to ER for evaluation.  Took last dose of Tylenol at 7 AM this morning.  Denies recent travel, sick contacts, abdominal pain, nausea, vomiting, diarrhea, dysuria/frequency/hematuria, chest pain or other symptoms. 68-year-old male with history of PE on Eliquis, multiple myeloma with history of stem transplant, hypertension, hyperlipidemia, prostate CA sent from urgent care for evaluation and treatment of pneumonia.  Patient states that he has had cough, fever (Tmax-102.7F), body aches, shortness of breath and generalized weakness x 2 days.  States that he was seen at [facility] urgent care today and had chest x-ray which showed " right upper lobe consolidation" (imaging in PACS) and O2 sat was 92%, hence sent to ER.  Took last dose of Tylenol at 7 AM this morning.  Denies recent travel, sick contacts, abdominal pain, nausea, vomiting, diarrhea, dysuria/frequency/hematuria, chest pain or other symptoms.  pcp: Dr. Tobias Dale

## 2024-04-07 NOTE — CONSULT NOTE ADULT - SUBJECTIVE AND OBJECTIVE BOX
Date/Time Patient Seen:  		  Referring MD:   Data Reviewed	       Patient is a 68y old  Male who presents with a chief complaint of     Subjective/HPI   · Chief Complaint: The patient is a 68y Male complaining of fever.  · HPI Objective Statement: 68-year-old male with history of PE on Eliquis, multiple myeloma with history of stem transplant, hypertension, hyperlipidemia, prostate CA sent from urgent care for evaluation and treatment of pneumonia.  Patient states that he has had cough, fever (Tmax-102.7F), body aches, shortness of breath and generalized weakness x 2 days.  States that he was seen at [facility] urgent care today and had chest x-ray which showed " right upper lobe consolidation" (imaging in PACS) and O2 sat was 92%, hence sent to ER.  Took last dose of Tylenol at 7 AM this morning.  Denies recent travel, sick contacts, abdominal pain, nausea, vomiting, diarrhea, dysuria/frequency/hematuria, chest pain or other symptoms.  pcp: Dr. Tobias Dale    PAST MEDICAL & SURGICAL HISTORY:  No pertinent past medical history    Hyperlipidemia    Shortness of breath on exertion    Lumbar herniated disc    T12 compression fracture    Acute lumbar radiculopathy    History of basal cell carcinoma    No significant past surgical history    History of surgery on wrist      PAST SURGICAL HISTORY:  History of surgery on wrist.     Tobacco Usage:  · Tobacco Usage	Unknown if ever smoked    · Attestation Comment: I have reviewed and confirmed nurses' notes for patient's medications, allergies, medical history, and surgical history.    ALLERGIES AND HOME MEDICATIONS:   Allergies:        Allergies:  	penicillin: Drug, Hives  	Omnicef: Drug, Unknown, Originally Entered as [Unknown] reaction to [OMNICEF]    Home Medications:   * Patient Currently Takes Medications as of 12-Dec-2022 14:50 documented in Structured Notes  · 	guaiFENesin-codeine 100 mg-10 mg/5 mL oral syrup: 10 milliliter(s) orally once a day (at bedtime) MDD:20 ml  · 	predniSONE 20 mg oral tablet: 1 tab(s) orally once a day   · 	Proventil HFA 90 mcg/inh inhalation aerosol: 1 puff(s) inhaled 4 times a day   · 	Zithromax Z-Golden 250 mg oral tablet: 1 packet(s) orally once a day   · 	benzonatate 200 mg oral capsule: 1 cap(s) orally every 8 hours   · 	ciprofloxacin 500 mg oral tablet: 1 tab(s) orally every 12 hours  · 	metroNIDAZOLE 500 mg oral tablet: 1 tab(s) orally every 8 hours  · 	folic acid 1 mg oral tablet: 1 tab(s) orally once a day  · 	Multiple Vitamins oral tablet: 1 tab(s) orally once a day  · 	pantoprazole 40 mg oral delayed release tablet: 1 tab(s) orally once a day (before a meal)  · 	atovaquone 750 mg/5 mL oral suspension: 5 milliliter(s) orally 2 times a day  · 	metoprolol tartrate 25 mg oral tablet: 1 tab(s) orally every 8 hours  · 	acyclovir 400 mg oral tablet: 1 tab(s) orally every 8 hours  · 	fluconazole 200 mg oral tablet: 2 tab(s) orally once a day  · 	apixaban 5 mg oral tablet: 1 tab(s) orally every 12 hours  · 	tamsulosin 0.4 mg oral capsule: 1 cap(s) orally once a day (at bedtime)    REVIEW OF SYSTEMS:    Review of Systems:  · CONSTITUTIONAL: - - -  · Constitutional [+]: CHILLS, FEVER, weakness  · CARDIOVASCULAR: no chest pain and no edema.  · RESPIRATORY: - - -  · Respiratory [+]: COUGH, SHORTNESS OF BREATH  · GASTROINTESTINAL: no abdominal pain, no bloating, no constipation, no diarrhea, no nausea and no vomiting.  · MUSCULOSKELETAL: - - -  · Musculoskeletal [+]: body aches  · ROS STATEMENT: all other ROS negative except as per HPI        Medication list         MEDICATIONS  (STANDING):  sodium chloride 0.9% Bolus 1000 milliLiter(s) IV Bolus once    MEDICATIONS  (PRN):         Vitals log        ICU Vital Signs Last 24 Hrs  T(C): 39.1 (07 Apr 2024 12:58), Max: 39.1 (07 Apr 2024 12:58)  T(F): 102.3 (07 Apr 2024 12:58), Max: 102.3 (07 Apr 2024 12:58)  HR: 114 (07 Apr 2024 12:58) (114 - 114)  BP: 102/69 (07 Apr 2024 12:58) (102/69 - 102/69)  BP(mean): --  ABP: --  ABP(mean): --  RR: 15 (07 Apr 2024 12:58) (15 - 15)  SpO2: 96% (07 Apr 2024 12:58) (96% - 96%)    O2 Parameters below as of 07 Apr 2024 12:58  Patient On (Oxygen Delivery Method): room air                 Input and Output:  I&O's Detail      Lab Data                        12.4   13.15 )-----------( 140      ( 07 Apr 2024 13:46 )             36.3     04-07    132<L>  |  98  |  26<H>  ----------------------------<  156<H>  4.4   |  22  |  1.38<H>    Ca    8.7      07 Apr 2024 13:45    TPro  6.5  /  Alb  3.1<L>  /  TBili  1.2  /  DBili  x   /  AST  31  /  ALT  48  /  AlkPhos  86  04-07            Review of Systems	  weakness  on RA      Objective     Physical Examination  heart s1s2  lung dc BS  head nc  verbal  alert  cn grossly int        Pertinent Lab findings & Imaging      Ortega:  NO   Adequate UO     I&O's Detail           Discussed with:     Cultures:	        Radiology        Conclusions:  1. Normal mitral valve. Mild mitral regurgitation.  2. Septal flattening consistent with right ventricular  overload. Grossly normal left ventricular systolic  function. No segmental wall motion abnormalities.  3. Moderate right atrial enlargement.  4. Right ventricular enlargement with mildly decreased  right ventricular systolic function.  5. Normal tricuspid valve. Moderate tricuspid  regurgitation.  Discussed findings with On-call cardiology Fellow.  *** Compared with echocardiogram of 9/28/2021, no  significant changes noted.  ADDENDUM 11/23/2021: Significant changes compared with  prior study done 09/28/2021-new significant RV dilatation  with worsening RV function.  The report was finalized before the above could be  documented due to technical glitch.

## 2024-04-07 NOTE — CONSULT NOTE ADULT - ASSESSMENT
68-year-old male with history of PE on Eliquis, multiple myeloma with history of stem transplant, hypertension, hyperlipidemia, prostate CA sent from urgent care for evaluation and treatment of pneumonia.  Patient states that he has had cough, fever (Tmax-102.7F), body aches, shortness of breath and generalized weakness x 2 days.    hx of MM  PNA  CKD  hx of PE  HTN  HLD  Prostate Ca    CXR c/w PNA  emp ABX  cx - biomarkers - mrsa screen - strep and legionella ag -   pt has hx of SCT in 2021 and follows with ONC as outpatient  labs and imaging reviewed  old records reviewed  on RA  VS noted  cough rx regimen PRN  tylenol PRN

## 2024-04-07 NOTE — H&P ADULT - ASSESSMENT
68-year-old male with history of PE on Eliquis, multiple myeloma with history of stem transplant, hypertension, hyperlipidemia, prostate CA sent from urgent care for evaluation and treatment of pneumonia.        Pneumonia   iv Levaquin   Follow up blood cx   Pulm consult appreciated         PE Continue with Eliquis    Multiple Myeloma s/p stem cell ttransplant     HTN Continue with Metoprolol  BPH Tamulosin

## 2024-04-07 NOTE — ED PROVIDER NOTE - CLINICAL SUMMARY MEDICAL DECISION MAKING FREE TEXT BOX
68-year-old male with history of PE on Eliquis, multiple myeloma with history of stem transplant, hypertension, hyperlipidemia, prostate CA sent from urgent care for evaluation and treatment of pneumonia.  Patient states that he has had cough, fever (Tmax-102.7F), body aches, shortness of breath and generalized weakness x 2 days.  States that he was seen at [facility] urgent care today and had chest x-ray which showed " right upper lobe consolidation" (imaging in PACS) and O2 sat was 92%, hence sent to ER.  Took last dose of Tylenol at 7 AM this morning.  Denies recent travel, sick contacts, abdominal pain, nausea, vomiting, diarrhea, dysuria/frequency/hematuria, chest pain or other symptoms.  pcp: Dr. Tobias Dale    VSS Afebrile, NAD  HEENT - clear  PERRL EOMI  Neck supple  lungs clear  Cor S1S2 RR - MGR  Abd soft nontender, no mass or HSM, no rebound  Ext FROM intact, no edema  Neuro Intact, no deficits.  Skin Warm and dry no rash.  Imp- PNA, hypoxia  Plan - sepsis workup, IV antibiotics and admit.

## 2024-04-08 ENCOUNTER — TRANSCRIPTION ENCOUNTER (OUTPATIENT)
Age: 69
End: 2024-04-08

## 2024-04-08 VITALS
RESPIRATION RATE: 15 BRPM | HEART RATE: 59 BPM | OXYGEN SATURATION: 95 % | DIASTOLIC BLOOD PRESSURE: 66 MMHG | SYSTOLIC BLOOD PRESSURE: 103 MMHG | TEMPERATURE: 98 F

## 2024-04-08 LAB
ANION GAP SERPL CALC-SCNC: 10 MMOL/L — SIGNIFICANT CHANGE UP (ref 5–17)
APPEARANCE UR: CLEAR — SIGNIFICANT CHANGE UP
BASOPHILS # BLD AUTO: 0.07 K/UL — SIGNIFICANT CHANGE UP (ref 0–0.2)
BASOPHILS NFR BLD AUTO: 0.7 % — SIGNIFICANT CHANGE UP (ref 0–2)
BILIRUB UR-MCNC: NEGATIVE — SIGNIFICANT CHANGE UP
BUN SERPL-MCNC: 17 MG/DL — SIGNIFICANT CHANGE UP (ref 7–23)
CALCIUM SERPL-MCNC: 8.4 MG/DL — SIGNIFICANT CHANGE UP (ref 8.4–10.5)
CHLORIDE SERPL-SCNC: 107 MMOL/L — SIGNIFICANT CHANGE UP (ref 96–108)
CO2 SERPL-SCNC: 22 MMOL/L — SIGNIFICANT CHANGE UP (ref 22–31)
COLOR SPEC: YELLOW — SIGNIFICANT CHANGE UP
CREAT SERPL-MCNC: 0.81 MG/DL — SIGNIFICANT CHANGE UP (ref 0.5–1.3)
DIFF PNL FLD: NEGATIVE — SIGNIFICANT CHANGE UP
EGFR: 97 ML/MIN/1.73M2 — SIGNIFICANT CHANGE UP
EOSINOPHIL # BLD AUTO: 0.04 K/UL — SIGNIFICANT CHANGE UP (ref 0–0.5)
EOSINOPHIL NFR BLD AUTO: 0.4 % — SIGNIFICANT CHANGE UP (ref 0–6)
GLUCOSE SERPL-MCNC: 148 MG/DL — HIGH (ref 70–99)
GLUCOSE UR QL: NEGATIVE MG/DL — SIGNIFICANT CHANGE UP
HCT VFR BLD CALC: 32.3 % — LOW (ref 39–50)
HGB BLD-MCNC: 10.8 G/DL — LOW (ref 13–17)
IMM GRANULOCYTES NFR BLD AUTO: 1.7 % — HIGH (ref 0–0.9)
KETONES UR-MCNC: NEGATIVE MG/DL — SIGNIFICANT CHANGE UP
LEUKOCYTE ESTERASE UR-ACNC: NEGATIVE — SIGNIFICANT CHANGE UP
LYMPHOCYTES # BLD AUTO: 1.36 K/UL — SIGNIFICANT CHANGE UP (ref 1–3.3)
LYMPHOCYTES # BLD AUTO: 13.3 % — SIGNIFICANT CHANGE UP (ref 13–44)
MAGNESIUM SERPL-MCNC: 2.1 MG/DL — SIGNIFICANT CHANGE UP (ref 1.6–2.6)
MCHC RBC-ENTMCNC: 32.9 PG — SIGNIFICANT CHANGE UP (ref 27–34)
MCHC RBC-ENTMCNC: 33.4 GM/DL — SIGNIFICANT CHANGE UP (ref 32–36)
MCV RBC AUTO: 98.5 FL — SIGNIFICANT CHANGE UP (ref 80–100)
MONOCYTES # BLD AUTO: 0.37 K/UL — SIGNIFICANT CHANGE UP (ref 0–0.9)
MONOCYTES NFR BLD AUTO: 3.6 % — SIGNIFICANT CHANGE UP (ref 2–14)
MRSA PCR RESULT.: SIGNIFICANT CHANGE UP
NEUTROPHILS # BLD AUTO: 8.24 K/UL — HIGH (ref 1.8–7.4)
NEUTROPHILS NFR BLD AUTO: 80.3 % — HIGH (ref 43–77)
NITRITE UR-MCNC: NEGATIVE — SIGNIFICANT CHANGE UP
NRBC # BLD: 0 /100 WBCS — SIGNIFICANT CHANGE UP (ref 0–0)
PH UR: 7.5 — SIGNIFICANT CHANGE UP (ref 5–8)
PHOSPHATE SERPL-MCNC: 1.4 MG/DL — LOW (ref 2.5–4.5)
PLATELET # BLD AUTO: 121 K/UL — LOW (ref 150–400)
POTASSIUM SERPL-MCNC: 4.8 MMOL/L — SIGNIFICANT CHANGE UP (ref 3.5–5.3)
POTASSIUM SERPL-SCNC: 4.8 MMOL/L — SIGNIFICANT CHANGE UP (ref 3.5–5.3)
PROT UR-MCNC: SIGNIFICANT CHANGE UP MG/DL
RBC # BLD: 3.28 M/UL — LOW (ref 4.2–5.8)
RBC # FLD: 14.2 % — SIGNIFICANT CHANGE UP (ref 10.3–14.5)
S AUREUS DNA NOSE QL NAA+PROBE: SIGNIFICANT CHANGE UP
SODIUM SERPL-SCNC: 139 MMOL/L — SIGNIFICANT CHANGE UP (ref 135–145)
SP GR SPEC: 1.01 — SIGNIFICANT CHANGE UP (ref 1–1.03)
UROBILINOGEN FLD QL: 1 MG/DL — SIGNIFICANT CHANGE UP (ref 0.2–1)
WBC # BLD: 10.25 K/UL — SIGNIFICANT CHANGE UP (ref 3.8–10.5)
WBC # FLD AUTO: 10.25 K/UL — SIGNIFICANT CHANGE UP (ref 3.8–10.5)

## 2024-04-08 PROCEDURE — 85610 PROTHROMBIN TIME: CPT

## 2024-04-08 PROCEDURE — 84100 ASSAY OF PHOSPHORUS: CPT

## 2024-04-08 PROCEDURE — 96375 TX/PRO/DX INJ NEW DRUG ADDON: CPT

## 2024-04-08 PROCEDURE — 87040 BLOOD CULTURE FOR BACTERIA: CPT

## 2024-04-08 PROCEDURE — 36415 COLL VENOUS BLD VENIPUNCTURE: CPT

## 2024-04-08 PROCEDURE — 85025 COMPLETE CBC W/AUTO DIFF WBC: CPT

## 2024-04-08 PROCEDURE — 99285 EMERGENCY DEPT VISIT HI MDM: CPT

## 2024-04-08 PROCEDURE — 83605 ASSAY OF LACTIC ACID: CPT

## 2024-04-08 PROCEDURE — 93005 ELECTROCARDIOGRAM TRACING: CPT

## 2024-04-08 PROCEDURE — 84145 PROCALCITONIN (PCT): CPT

## 2024-04-08 PROCEDURE — 99239 HOSP IP/OBS DSCHRG MGMT >30: CPT

## 2024-04-08 PROCEDURE — 96374 THER/PROPH/DIAG INJ IV PUSH: CPT

## 2024-04-08 PROCEDURE — 86140 C-REACTIVE PROTEIN: CPT

## 2024-04-08 PROCEDURE — 81003 URINALYSIS AUTO W/O SCOPE: CPT

## 2024-04-08 PROCEDURE — 85730 THROMBOPLASTIN TIME PARTIAL: CPT

## 2024-04-08 PROCEDURE — 83735 ASSAY OF MAGNESIUM: CPT

## 2024-04-08 PROCEDURE — 87637 SARSCOV2&INF A&B&RSV AMP PRB: CPT

## 2024-04-08 PROCEDURE — 80048 BASIC METABOLIC PNL TOTAL CA: CPT

## 2024-04-08 PROCEDURE — 87899 AGENT NOS ASSAY W/OPTIC: CPT

## 2024-04-08 PROCEDURE — 87641 MR-STAPH DNA AMP PROBE: CPT

## 2024-04-08 PROCEDURE — 80053 COMPREHEN METABOLIC PANEL: CPT

## 2024-04-08 PROCEDURE — 87449 NOS EACH ORGANISM AG IA: CPT

## 2024-04-08 PROCEDURE — 0225U NFCT DS DNA&RNA 21 SARSCOV2: CPT

## 2024-04-08 PROCEDURE — 87086 URINE CULTURE/COLONY COUNT: CPT

## 2024-04-08 PROCEDURE — 87640 STAPH A DNA AMP PROBE: CPT

## 2024-04-08 RX ORDER — ALBUTEROL 90 UG/1
2 AEROSOL, METERED ORAL
Qty: 0 | Refills: 0 | DISCHARGE
Start: 2024-04-08

## 2024-04-08 RX ORDER — SODIUM,POTASSIUM PHOSPHATES 278-250MG
1 POWDER IN PACKET (EA) ORAL
Qty: 28 | Refills: 0
Start: 2024-04-08 | End: 2024-04-14

## 2024-04-08 RX ORDER — METOPROLOL TARTRATE 50 MG
1 TABLET ORAL
Refills: 0 | DISCHARGE

## 2024-04-08 RX ORDER — LEVOFLOXACIN 5 MG/ML
1 INJECTION, SOLUTION INTRAVENOUS
Qty: 3 | Refills: 0
Start: 2024-04-08 | End: 2024-04-10

## 2024-04-08 RX ORDER — ATORVASTATIN CALCIUM 80 MG/1
1 TABLET, FILM COATED ORAL
Refills: 0 | DISCHARGE

## 2024-04-08 RX ADMIN — PANTOPRAZOLE SODIUM 40 MILLIGRAM(S): 20 TABLET, DELAYED RELEASE ORAL at 06:29

## 2024-04-08 RX ADMIN — Medication 25 MILLIGRAM(S): at 06:29

## 2024-04-08 NOTE — DISCHARGE NOTE PROVIDER - HOSPITAL COURSE
HPI:  68-year-old male with history of PE on Eliquis, multiple myeloma with history of stem transplant, hypertension, hyperlipidemia, prostate CA sent from urgent care for evaluation and treatment of pneumonia.  Patient states that he has had cough, fever (Tmax-102.7F), body aches, shortness of breath and generalized weakness x 2 days.  States that he was seen at [facility] urgent care today and had chest x-ray which showed " right upper lobe consolidation" (imaging in PACS) and O2 sat was 92%, hence sent to ER.  Took last dose of Tylenol at 7 AM this morning.   (07 Apr 2024 20:54)    Course: Patient was started on Levaquin 750mg IV daily and WBC has normalized. Patient states he feels much better. Phos was 1.4 and will send PhosK to his pharmacy and to follow up with his PCP. Pt was also evaluated by Pulm. Pt is stable and able to continue antibiotics at home with follow up with PCP on Friday.

## 2024-04-08 NOTE — CARE COORDINATION ASSESSMENT. - NSCAREPROVIDERS_GEN_ALL_CORE_FT
CARE PROVIDERS:  Accepting Physician: Ruben Georges  Administration: Dwight Hughes  Admitting: Ruben Georges  Attending: Ruben Georges  Case Management: Lala Stevenson  Consultant: Chepe Van  Consultant: Weil, Patricia ED ACP: Harriet Lewis ED Attending: Rakan Feng ED Nurse: Claus Wheeler  Infection Control: Alyson Lane  Nurse: Reggie Diaz  Nurse: Claus Wheeler  Oncology: Cali Harvey  Oncology: Tom Rose  Oncology: Rin Lozada  Oncology: Pao Sorenson  Oncology: Anshul Saucedo  Oncology: Lisandro Flores  Ordered: Physician, Ordering  Outpatient Provider: Josr Henson  Outpatient Provider: Andrew Salazar  PCA/Nursing Assistant: Edvin Segura  PCA/Nursing Assistant: Angelito Mendez  Primary Team: Sandy Boswell  Primary Team: Jennifer Turner  Primary Team: Annie Horner  Registered Dietitian: Arely Pena  : Danika Alcala  : Joanie Bryan  : Kev Allen  : Myriam Jauregui  : Pao Gary  Team: NIKI  Hospitalists, Team

## 2024-04-08 NOTE — DISCHARGE NOTE PROVIDER - CARE PROVIDER_API CALL
Tobias Wells  Internal Medicine  [address] [place] [address], [address]  [place] [address]  Phone: [phone]  Fax: [phone]  Established Patient  Scheduled Appointment: 04/12/2024 12:30 PM

## 2024-04-08 NOTE — CARE COORDINATION ASSESSMENT. - NSPASTMEDSURGHISTORY_GEN_ALL_CORE_FT
PAST MEDICAL & SURGICAL HISTORY:  Hyperlipidemia      History of basal cell carcinoma      Acute lumbar radiculopathy      T12 compression fracture      Lumbar herniated disc      Shortness of breath on exertion      History of surgery on wrist

## 2024-04-08 NOTE — DISCHARGE NOTE PROVIDER - NSDCMRMEDTOKEN_GEN_ALL_CORE_FT
albuterol 90 mcg/inh inhalation aerosol: 2 puff(s) inhaled every 6 hours As needed Shortness of Breath and/or Wheezing  apixaban 5 mg oral tablet: 1 tab(s) orally every 12 hours  atorvastatin 20 mg oral tablet: 1 tab(s) orally once a day (at bedtime)  K-Phos Neutral oral tablet: 1 tab(s) orally 4 times a day  levoFLOXacin 750 mg oral tablet: 1 tab(s) orally once a day  metoprolol succinate 50 mg oral tablet, extended release: 1 tab(s) orally once a day  Multiple Vitamins oral tablet: 1 tab(s) orally once a day  tamsulosin 0.4 mg oral capsule: 1 cap(s) orally once a day (at bedtime)

## 2024-04-08 NOTE — CARE COORDINATION ASSESSMENT. - NSDCPLANSERVICES_GEN_ALL_CORE
. To ED from home PNA Levaquin  68-y hx PE on Eliquis, multiple myeloma with history of stem transplant, hypertension, hyperlipidemia, prostate CA sent from urgent care for evaluation and treatment of pneumonia.   -----  Spoke with Hospitalist states pt. will d/c today from ED on po Levaquin.    Pt. is a  CMS STAR PNA Lace 9 pt. was  given yellow card.  Pt. will make own appointment decline home care offered.  Pt. mad an appointment :  Pt. has an appointment with PCP Tobias Wells  [phone] located at [address] [place] [address] on Friday 4/12/24 at 12:30 pm.     Dr. Pao Varela at [facility] is oncologist  Dr. Andrew Stack at [facility] in [place] is a specialist in Multiple Myeloma.    Pharmacy is [facility] in [place].      CM offered home care VN and he Declined offer for home care will follow up with PCP this week.  Given [facility] of care information.  Pt. verbalized good understanding and will get d/c instruction from primary nurse.    Pt. declined offer for CM to Contact spouse pt. is self directing and independent.  Spouse will transport pt. home today.  CM available./Patient/Family Declined Referral

## 2024-04-08 NOTE — PATIENT PROFILE ADULT - FALL HARM RISK - UNIVERSAL INTERVENTIONS
Bed in lowest position, wheels locked, appropriate side rails in place/Call bell, personal items and telephone in reach/Instruct patient to call for assistance before getting out of bed or chair/Non-slip footwear when patient is out of bed/[place] to call system/Physically safe environment - no spills, clutter or unnecessary equipment/Purposeful Proactive Rounding/Room/bathroom lighting operational, light cord in reach

## 2024-04-08 NOTE — DISCHARGE NOTE NURSING/CASE MANAGEMENT/SOCIAL WORK - NSDCVIVACCINE_GEN_ALL_CORE_FT
Hep B, adult; 07-Dec-2022 16:44; Diane Ramirez (RN); [employer]; [id number] (Exp. Date: 25-Feb-2024); IntraMuscular; Deltoid Right.; 1 milliLiter(s); VIS (VIS Published: 07-Dec-2022, VIS Presented: 07-Dec-2022);   Hep B, adult; 09-Mar-2023 16:21; Ryan Eubanks (RN); [employer]; [id number] (Exp. Date: 09-Sep-2024); IntraMuscular; Deltoid Left.; 1 milliLiter(s); VIS (VIS Published: 09-Mar-2023, VIS Presented: 09-Mar-2023);   Hep B, adult; 07-Dec-2023 17:32; Brigette Hobson (RN); [employer]; [id number] (Exp. Date: 17-Mar-2025); IntraMuscular; Deltoid Right.; 1 milliLiter(s); VIS (VIS Published: 07-Dec-2023, VIS Presented: 07-Dec-2023);   Hib (PRP-T); 07-Dec-2022 16:55; Diane Ramirez (TAJ); [employer]; [id number] (Exp. Date: 30-Jan-2023); IntraMuscular; Deltoid Left.; 0.5 milliLiter(s); VIS (VIS Published: 07-Dec-2022, VIS Presented: 07-Dec-2022);   Hib (PRP-T); 09-Mar-2023 16:32; Ryan Eubanks (TAJ); [employer]; [id number] (Exp. Date: 26-Mar-2024); IntraMuscular; Deltoid Left.; 0.5 milliLiter(s); VIS (VIS Published: 03-Sep-2023, VIS Presented: 09-Mar-2023);   Hib (PRP-T); 07-Dec-2023 17:41; Brigette Hobson (RN); [employer]; [id number] (Exp. Date: 29-Mar-2024); IntraMuscular; Deltoid Left.; 0.5 milliLiter(s); VIS (VIS Published: 07-Dec-2023, VIS Presented: 07-Dec-2023);   IPV; 12-Jan-2023 16:59; Tisha Mckeon (RN); [employer]; [id number] (Exp. Date: 01-Jun-2024); SubCutaneous; Arm Left; 0.5 milliLiter(s); VIS (VIS Published: 12-Jan-2023, VIS Presented: 12-Jan-2023);   IPV; 16-Mar-2023 16:28; Ryan Eubanks (RN); [employer]; [id number] (Exp. Date: 06-Jan-2024); SubCutaneous; Arm Left; 0.5 milliLiter(s); VIS (VIS Published: 16-Mar-2023, VIS Presented: 16-Mar-2023);   IPV; 14-Dec-2023 16:26; Pao Blackwell (RN); [employer]; [id number] (Exp. Date: 14-Dec-2023); SubCutaneous; Arm Left; 0.5 milliLiter(s); VIS (VIS Published: 14-Dec-2023, VIS Presented: 14-Dec-2023);   influenza, high-dose, quadrivalent; 07-Dec-2022 16:52; Diane Ramirez (RN); [employer]; [id number] (Exp. Date: 30-Jun-2023); IntraMuscular; Deltoid Left.; 0.7 milliLiter(s); VIS (VIS Published: 06-Aug-2021, VIS Presented: 07-Dec-2022);   Meningococcal MCV4O; 12-Jan-2023 17:00; Tisha Mckeon (RN); [employer]; [id number] (Exp. Date: 23-Oct-2023); IntraMuscular; Deltoid Left.; 0.5 milliLiter(s); VIS (VIS Published: 12-Jan-2023, VIS Presented: 12-Jan-2023);   MMR; 21-Dec-2023 17:11; Charito Tatum (RN); [employer] [id number] (Exp. Date: 06-Nov-2024); SubCutaneous; Arm Left; 0.5 milliLiter(s); VIS (VIS Published: 20-Apr-2012, VIS Presented: 21-Dec-2023);   pneumococcal polysaccharide PPV23; 09-Mar-2023 16:23; Ryan Eubanks (RN); [employer] [id number] (Exp. Date: 20-Oct-2024); SubCutaneous; Arm Right; 0.5 milliLiter(s); VIS (VIS Published: 06-Oct-2009, VIS Presented: 09-Mar-2023);   pneumococcal polysaccharide PPV23; 07-Dec-2023 17:31; Brigette Hobson (RN); [employer] [id number] (Exp. Date: 07-Oct-2024); SubCutaneous; Arm Left; 0.5 milliLiter(s); VIS (VIS Published: 06-Oct-2009, VIS Presented: 07-Dec-2023);   Pneumococcal conjugate PCV 13; 07-Dec-2022 16:48; Diane Ramirez (RN); [facility]; [id number] (Exp. Date: 01-Apr-2024); IntraMuscular; Deltoid Right.; 0.5 milliLiter(s); VIS (VIS Published: 05-Nov-2015, VIS Presented: 07-Dec-2022);   Tdap; 12-Jan-2023 16:48; Tisha Mckeon (RN); [employer]; [id number] (Exp. Date: 08-Dec-2024); IntraMuscular; Deltoid Right.; 0.5 milliLiter(s); VIS (VIS Published: 12-Jan-2023, VIS Presented: 12-Jan-2023);   Tdap; 16-Mar-2023 16:19; Ryan Eubanks (RN); [employer]; [id number] (Exp. Date: 08-Dec-2024); IntraMuscular; Deltoid Left.; 0.5 milliLiter(s); VIS (VIS Published: 16-Mar-2023, VIS Presented: 16-Mar-2023);   Tdap; 14-Dec-2023 16:22; Pao Blackwell (RN); [employer]; [id number] (Exp. Date: 14-Dec-2023); IntraMuscular; Deltoid Left.; 0.5 milliLiter(s); VIS (VIS Published: 08-Mar-2025, VIS Presented: 14-Dec-2023);

## 2024-04-08 NOTE — CARE COORDINATION ASSESSMENT. - PRO ARRIVE FROM
CM met with pt. at bedside in ED.  Pt. A&O x4 CM explained CM role and availability to assist with transition planning and provided d/c resource folder and list of [facility]/LUNA/Pvt hire if needed.  Pt. states he lives with spouse in a Private home with no steps to enter and is on main level ( 2 steps to living-room level.)  States he is independent and drive/home CM met with pt. at bedside in ED.  Pt. A&O x4 CM explained CM role and availability to assist with transition planning and provided d/c resource folder and list of [facility]HA/LUNA/Pvt hire if needed.  Pt. states he lives with spouse in a Private home with  he has 2 steps to enter 13 to bedroom.  States he is independent and drives works.    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  Pt. informed .  Informed  pt. will need a follow up MD appointment post acute within a week of going home.  Offered to help with Appointment if needed.  Offered pt. home care services and given a discharge planning resource folder. Explained home care process expectations./home

## 2024-04-08 NOTE — DISCHARGE NOTE PROVIDER - PROVIDER TOKENS
PROVIDER:[TOKEN:[[id number]],SCHEDULEDAPPT:[04/12/2024],SCHEDULEDAPPTTIME:[12:30 PM],ESTABLISHEDPATIENT:[T]]

## 2024-04-08 NOTE — PATIENT PROFILE ADULT - NSPRESCRALCAMT_GEN_A_NUR
Detail Level: Detailed Size Of Lesion: 6 mm x 5 mm Morphology Per Location (Optional): Brown macule, reticular pattern, see photo 06/22/2020. Has lymphedema in this leg 3 or 4

## 2024-04-08 NOTE — DISCHARGE NOTE PROVIDER - NSDCFUADDAPPT_GEN_ALL_CORE_FT
Pt. has an appointment with PCP Tobias Wells  [phone] located at [address] [place] [address] on Friday 4/12/24 at 12:30 pm.

## 2024-04-08 NOTE — DISCHARGE NOTE PROVIDER - ATTENDING DISCHARGE PHYSICAL EXAMINATION:
T(C): 36.7 (04-08-24 @ 11:22), Max: 36.7 (04-07-24 @ 23:26)  HR: 59 (04-08-24 @ 11:22) (59 - 73)  BP: 103/66 (04-08-24 @ 11:22) (103/66 - 119/75)  RR: 15 (04-08-24 @ 11:22) (14 - 15)  SpO2: 95% (04-08-24 @ 11:22) (95% - 96%)    General: No apparent distress  Head: normocephalic, atraumatic  Eyes: EOMI, anicteric  ENT: moist mucous membranes, no pharyngeal exudates  Heart: rrr, S1, S2, no murmurs  Chest: CTA b/l, no rales, rhonchi, or wheezes  Abd: BS+, soft, NT, ND  Extr: no edema or cyanosis  Neuro: AA&Ox3, no focal weakness, sensation to light touch intact  Psych: normal affect

## 2024-04-08 NOTE — PROGRESS NOTE ADULT - SUBJECTIVE AND OBJECTIVE BOX
Date/Time Patient Seen:  		  Referring MD:   Data Reviewed	       Patient is a 68y old  Male who presents with a chief complaint of Referred form UC for Pneumonia (07 Apr 2024 20:54)      Subjective/HPI     PAST MEDICAL & SURGICAL HISTORY:  No pertinent past medical history    Hyperlipidemia    Shortness of breath on exertion    Lumbar herniated disc    T12 compression fracture    Acute lumbar radiculopathy    History of basal cell carcinoma    No significant past surgical history    History of surgery on wrist          Medication list         MEDICATIONS  (STANDING):  levoFLOXacin IVPB 750 milliGRAM(s) IV Intermittent every 24 hours  metoprolol tartrate 25 milliGRAM(s) Oral every 8 hours  pantoprazole    Tablet 40 milliGRAM(s) Oral before breakfast  tamsulosin 0.4 milliGRAM(s) Oral at bedtime    MEDICATIONS  (PRN):  albuterol    90 MICROgram(s) HFA Inhaler 2 Puff(s) Inhalation every 6 hours PRN Shortness of Breath and/or Wheezing  bisacodyl 5 milliGRAM(s) Oral every 12 hours PRN Constipation  guaiFENesin Oral Liquid (Sugar-Free) 200 milliGRAM(s) Oral every 6 hours PRN Cough         Vitals log        ICU Vital Signs Last 24 Hrs  T(C): 36.7 (07 Apr 2024 23:26), Max: 39.1 (07 Apr 2024 12:58)  T(F): 98.1 (07 Apr 2024 23:26), Max: 102.3 (07 Apr 2024 12:58)  HR: 67 (07 Apr 2024 23:26) (67 - 114)  BP: 108/63 (07 Apr 2024 23:26) (102/69 - 108/63)  BP(mean): 78 (07 Apr 2024 23:26) (78 - 78)  ABP: --  ABP(mean): --  RR: 14 (07 Apr 2024 23:26) (14 - 15)  SpO2: 95% (07 Apr 2024 23:26) (95% - 96%)    O2 Parameters below as of 07 Apr 2024 23:26  Patient On (Oxygen Delivery Method): room air                 Input and Output:  I&O's Detail      Lab Data                        12.4   13.15 )-----------( 140      ( 07 Apr 2024 13:46 )             36.3     04-07    132<L>  |  98  |  26<H>  ----------------------------<  156<H>  4.4   |  22  |  1.38<H>    Ca    8.7      07 Apr 2024 13:45    TPro  6.5  /  Alb  3.1<L>  /  TBili  1.2  /  DBili  x   /  AST  31  /  ALT  48  /  AlkPhos  86  04-07            Review of Systems	      Objective     Physical Examination    heart s1s2  lung dc bS  head nc      Pertinent Lab findings & Imaging      Jordan:  NO   Adequate UO     I&O's Detail           Discussed with:     Cultures:	        Radiology

## 2024-04-08 NOTE — DISCHARGE NOTE NURSING/CASE MANAGEMENT/SOCIAL WORK - NSSCNAMETXT_GEN_ALL_CORE
Declined offer for home care will follow up with PCP this week.  [facility] Transitions of care information.

## 2024-04-08 NOTE — DISCHARGE NOTE NURSING/CASE MANAGEMENT/SOCIAL WORK - PATIENT PORTAL LINK FT
You can access the FollowMyHealth Patient Portal offered by [facility] by registering at the following website: [email or web address]. By joining [employer]’s FollowMyHealth portal, you will also be able to view your health information using other applications (apps) compatible with our system.

## 2024-04-08 NOTE — DISCHARGE NOTE PROVIDER - NSDCFUSCHEDAPPT_GEN_ALL_CORE_FT
Pao Sorenson  [facility] Physician [facility]  [facility]  Scheduled Appointment: 05/09/2024    [facility]  Scheduled Appointment: 05/16/2024    Denny Candelaria  [facility]  [facility] [address] [facility]  Scheduled Appointment: 06/12/2024

## 2024-04-08 NOTE — DISCHARGE NOTE NURSING/CASE MANAGEMENT/SOCIAL WORK - NSDCFUADDAPPT_GEN_ALL_CORE_FT
Pt. has an appointment with PCP Tobias Wells  [phone] located at [address] [place] [address] on Friday 4/12/24 at 12:30 pm.

## 2024-04-08 NOTE — CARE COORDINATION ASSESSMENT. - SOURCES OF SUPPORT FOR PATIENT
Declined offer for CM to contact spouse.  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  Wife to transport home./spouse

## 2024-04-09 LAB
CULTURE RESULTS: NO GROWTH — SIGNIFICANT CHANGE UP
LEGIONELLA AG UR QL: NEGATIVE — SIGNIFICANT CHANGE UP
S PNEUM AG UR QL: NEGATIVE — SIGNIFICANT CHANGE UP
SPECIMEN SOURCE: SIGNIFICANT CHANGE UP

## 2024-04-10 LAB
ALBUMIN SERPL ELPH-MCNC: 4.5 G/DL
ALP BLD-CCNC: 81 U/L
ALT SERPL-CCNC: 36 U/L
ANION GAP SERPL CALC-SCNC: 10 MMOL/L
AST SERPL-CCNC: 25 U/L
BILIRUB SERPL-MCNC: 0.3 MG/DL
BUN SERPL-MCNC: 24 MG/DL
CALCIUM SERPL-MCNC: 10.1 MG/DL
CHLORIDE SERPL-SCNC: 104 MMOL/L
CO2 SERPL-SCNC: 27 MMOL/L
CREAT SERPL-MCNC: 0.93 MG/DL
EGFR: 90 ML/MIN/1.73M2
GLUCOSE SERPL-MCNC: 106 MG/DL
POTASSIUM SERPL-SCNC: 4.6 MMOL/L
PROT SERPL-MCNC: 6.3 G/DL
SODIUM SERPL-SCNC: 141 MMOL/L

## 2024-04-16 ENCOUNTER — TRANSCRIPTION ENCOUNTER (OUTPATIENT)
Age: 69
End: 2024-04-16

## 2024-04-29 ENCOUNTER — OUTPATIENT (OUTPATIENT)
Dept: OUTPATIENT SERVICES | Facility: HOSPITAL | Age: 69
LOS: 1 days | End: 2024-04-29
Payer: MEDICARE

## 2024-04-29 ENCOUNTER — APPOINTMENT (OUTPATIENT)
Dept: RADIOLOGY | Facility: HOSPITAL | Age: 69
End: 2024-04-29
Payer: MEDICARE

## 2024-04-29 DIAGNOSIS — Z00.8 ENCOUNTER FOR OTHER GENERAL EXAMINATION: ICD-10-CM

## 2024-04-29 DIAGNOSIS — Z98.890 OTHER SPECIFIED POSTPROCEDURAL STATES: Chronic | ICD-10-CM

## 2024-04-29 PROCEDURE — 71046 X-RAY EXAM CHEST 2 VIEWS: CPT

## 2024-04-29 PROCEDURE — 71046 X-RAY EXAM CHEST 2 VIEWS: CPT | Mod: 26

## 2024-05-01 ENCOUNTER — OUTPATIENT (OUTPATIENT)
Dept: OUTPATIENT SERVICES | Facility: HOSPITAL | Age: 69
LOS: 1 days | Discharge: HOME | End: 2024-05-01

## 2024-05-01 DIAGNOSIS — R79.89 OTHER SPECIFIED ABNORMAL FINDINGS OF BLOOD CHEMISTRY: ICD-10-CM

## 2024-05-01 DIAGNOSIS — Z98.890 OTHER SPECIFIED POSTPROCEDURAL STATES: Chronic | ICD-10-CM

## 2024-05-03 ENCOUNTER — TRANSCRIPTION ENCOUNTER (OUTPATIENT)
Age: 69
End: 2024-05-03

## 2024-05-09 ENCOUNTER — RESULT REVIEW (OUTPATIENT)
Age: 69
End: 2024-05-09

## 2024-05-09 ENCOUNTER — APPOINTMENT (OUTPATIENT)
Dept: HEMATOLOGY ONCOLOGY | Facility: CLINIC | Age: 69
End: 2024-05-09

## 2024-05-09 LAB
BASOPHILS # BLD AUTO: 0.02 K/UL — SIGNIFICANT CHANGE UP (ref 0–0.2)
BASOPHILS NFR BLD AUTO: 0.4 % — SIGNIFICANT CHANGE UP (ref 0–2)
EOSINOPHIL # BLD AUTO: 0.07 K/UL — SIGNIFICANT CHANGE UP (ref 0–0.5)
EOSINOPHIL NFR BLD AUTO: 1.5 % — SIGNIFICANT CHANGE UP (ref 0–6)
HCT VFR BLD CALC: 39.4 % — SIGNIFICANT CHANGE UP (ref 39–50)
HGB BLD-MCNC: 13.3 G/DL — SIGNIFICANT CHANGE UP (ref 13–17)
IMM GRANULOCYTES NFR BLD AUTO: 0.4 % — SIGNIFICANT CHANGE UP (ref 0–0.9)
LYMPHOCYTES # BLD AUTO: 1.36 K/UL — SIGNIFICANT CHANGE UP (ref 1–3.3)
LYMPHOCYTES # BLD AUTO: 28.9 % — SIGNIFICANT CHANGE UP (ref 13–44)
MCHC RBC-ENTMCNC: 32.7 PG — SIGNIFICANT CHANGE UP (ref 27–34)
MCHC RBC-ENTMCNC: 33.8 G/DL — SIGNIFICANT CHANGE UP (ref 32–36)
MCV RBC AUTO: 96.8 FL — SIGNIFICANT CHANGE UP (ref 80–100)
MONOCYTES # BLD AUTO: 0.49 K/UL — SIGNIFICANT CHANGE UP (ref 0–0.9)
MONOCYTES NFR BLD AUTO: 10.4 % — SIGNIFICANT CHANGE UP (ref 2–14)
NEUTROPHILS # BLD AUTO: 2.75 K/UL — SIGNIFICANT CHANGE UP (ref 1.8–7.4)
NEUTROPHILS NFR BLD AUTO: 58.4 % — SIGNIFICANT CHANGE UP (ref 43–77)
NRBC # BLD: 0 /100 WBCS — SIGNIFICANT CHANGE UP (ref 0–0)
PLATELET # BLD AUTO: 163 K/UL — SIGNIFICANT CHANGE UP (ref 150–400)
RBC # BLD: 4.07 M/UL — LOW (ref 4.2–5.8)
RBC # FLD: 13.9 % — SIGNIFICANT CHANGE UP (ref 10.3–14.5)
WBC # BLD: 4.71 K/UL — SIGNIFICANT CHANGE UP (ref 3.8–10.5)
WBC # FLD AUTO: 4.71 K/UL — SIGNIFICANT CHANGE UP (ref 3.8–10.5)

## 2024-05-12 ENCOUNTER — NON-APPOINTMENT (OUTPATIENT)
Age: 69
End: 2024-05-12

## 2024-05-16 ENCOUNTER — APPOINTMENT (OUTPATIENT)
Dept: RADIOLOGY | Facility: HOSPITAL | Age: 69
End: 2024-05-16

## 2024-05-16 ENCOUNTER — APPOINTMENT (OUTPATIENT)
Dept: INFUSION THERAPY | Facility: HOSPITAL | Age: 69
End: 2024-05-16

## 2024-05-22 ENCOUNTER — APPOINTMENT (OUTPATIENT)
Dept: PULMONOLOGY | Facility: CLINIC | Age: 69
End: 2024-05-22
Payer: MEDICARE

## 2024-05-22 VITALS
WEIGHT: 210 LBS | TEMPERATURE: 96.3 F | SYSTOLIC BLOOD PRESSURE: 114 MMHG | RESPIRATION RATE: 16 BRPM | HEIGHT: 73 IN | HEART RATE: 53 BPM | BODY MASS INDEX: 27.83 KG/M2 | DIASTOLIC BLOOD PRESSURE: 70 MMHG | OXYGEN SATURATION: 97 %

## 2024-05-22 DIAGNOSIS — R06.83 SNORING: ICD-10-CM

## 2024-05-22 DIAGNOSIS — R05.3 CHRONIC COUGH: ICD-10-CM

## 2024-05-22 DIAGNOSIS — J45.50 SEVERE PERSISTENT ASTHMA, UNCOMPLICATED: ICD-10-CM

## 2024-05-22 DIAGNOSIS — J30.2 OTHER SEASONAL ALLERGIC RHINITIS: ICD-10-CM

## 2024-05-22 DIAGNOSIS — J30.89 OTHER ALLERGIC RHINITIS: ICD-10-CM

## 2024-05-22 DIAGNOSIS — Z87.01 PERSONAL HISTORY OF PNEUMONIA (RECURRENT): ICD-10-CM

## 2024-05-22 DIAGNOSIS — Z82.62 FAMILY HISTORY OF OSTEOPOROSIS: ICD-10-CM

## 2024-05-22 DIAGNOSIS — R06.02 SHORTNESS OF BREATH: ICD-10-CM

## 2024-05-22 PROCEDURE — 94729 DIFFUSING CAPACITY: CPT

## 2024-05-22 PROCEDURE — 71046 X-RAY EXAM CHEST 2 VIEWS: CPT

## 2024-05-22 PROCEDURE — 94727 GAS DIL/WSHOT DETER LNG VOL: CPT

## 2024-05-22 PROCEDURE — 94618 PULMONARY STRESS TESTING: CPT

## 2024-05-22 PROCEDURE — 94060 EVALUATION OF WHEEZING: CPT

## 2024-05-22 PROCEDURE — 99204 OFFICE O/P NEW MOD 45 MIN: CPT | Mod: 25

## 2024-05-22 PROCEDURE — 95012 NITRIC OXIDE EXP GAS DETER: CPT

## 2024-05-22 RX ORDER — AMITRIPTYLINE HYDROCHLORIDE 10 MG/1
10 TABLET, FILM COATED ORAL 3 TIMES DAILY
Qty: 90 | Refills: 5 | Status: ACTIVE | COMMUNITY
Start: 2024-05-22 | End: 1900-01-01

## 2024-05-22 RX ORDER — BUDESONIDE, GLYCOPYRROLATE, AND FORMOTEROL FUMARATE 160; 9; 4.8 UG/1; UG/1; UG/1
160-9-4.8 AEROSOL, METERED RESPIRATORY (INHALATION)
Qty: 3 | Refills: 1 | Status: ACTIVE | COMMUNITY
Start: 2024-05-22 | End: 1900-01-01

## 2024-05-22 RX ORDER — AZITHROMYCIN 500 MG/1
500 TABLET, FILM COATED ORAL DAILY
Qty: 7 | Refills: 0 | Status: ACTIVE | COMMUNITY
Start: 2024-05-22 | End: 1900-01-01

## 2024-05-22 RX ORDER — PREDNISONE 10 MG/1
10 TABLET ORAL
Qty: 50 | Refills: 0 | Status: ACTIVE | COMMUNITY
Start: 2024-05-22 | End: 1900-01-01

## 2024-05-22 NOTE — PROCEDURE
[FreeTextEntry1] : RVP and Legionella negative (4/2024)  CXR 04/7/2024 reveals a normal sized heart; no evidence of infiltrate or effusion -- Right lower lobe consolidation consistent with Pneumonia  CXR 04/29/2024 reveals a normal sized heart; no evidence of effusion -- improving Right upper lobe infiltrate  CXR 05/13/2024 reveals a normal sized heart; no evidence of infiltrate or effusion -- a normal appearing chest radiograph  CXR 05/22/2024 reveals a normal sized heart; no evidence of infiltrate or effusion -- a normal appearing chest radiograph  Full PFT reveals mild obstruction at mid to low volumes; FEV1 was 2.68 L which is 70% of predicted with a 22% improvement at mid to low volumes after using a bronchodilator; normal lung volumes; normal diffusion at 18.6, which is 75% of predicted; normal flow volume loop. PFTs were performed to evaluate for SOB  6 minute walk test reveals a low saturation of 92% with no evidence of dyspnea or fatigue; walked 440.1 meters  FENO was 18; a normal value being less than 25 Fractional exhaled nitric oxide (FENO) is regarded as a simple, noninvasive method for assessing eosinophilic airway inflammation. Produced by a variety of cells within the lung, nitric oxide (NO) concentrations are generally low in healthy individuals. However, high concentrations of NO appear to be involved in nonspecific host defense mechanisms and chronic inflammatory diseases such as asthma. The American Thoracic Society (ATS) therefore has strongly recommended using FENO to aid in the assessment, management, and long-term monitoring of eosinophilic airway inflammation and asthma, and for identifying steroid responsive individuals whose chronic respiratory symptoms may be caused by airway inflammation. In their 2011 clinical practice guideline, the ATS emphasizes the importance of using FENO.

## 2024-05-22 NOTE — ADDENDUM
[FreeTextEntry1] : Documented by Supa Shannon acting as a scribe for Dr. Abimael Patten on 05/22/2024.   All medical record entries made by the Scribe were at my, Dr. Abimael Patten's, direction and personally dictated by me on 05/22/2024. I have reviewed the chart and agree that the record accurately reflects my personal performance of the history, physical exam, assessment and plan. I have also personally directed, reviewed, and agree with the discharge instructions.

## 2024-05-22 NOTE — PHYSICAL EXAM

## 2024-05-22 NOTE — REASON FOR VISIT
[Initial] : an initial visit [TextBox_44] : SOB, post RUL PNA Bronchospasm, ?TBM, Underlying Remote Environmental and Seasonal Allergies, ?ANNE

## 2024-05-22 NOTE — HISTORY OF PRESENT ILLNESS
[TextBox_4] : Mr. FERMIN  is a 68 year old male with a history of presenting to the office today for an initial pulmonary evaluation. His chief complaint is   - he notes previously having PNA 4/2024 - he notes for 6 weeks he has still not felt 100% and has had a cough - he notes he was sent out of the hospital with Amoxicillin and steroids   - he notes he would be SOB climbing stairs - he notes he cannot take a deep breath without coughing - he notes he has never had wheezing before getting PNA but has since been experiencing it  - he notes after the hospital he had hives after eating dinner for a week which resolved on its own  - he notes he has done 2 nebulizer treatments over the past couple of days - he notes finishing a course of Prednisone a few days ago  - he notes his energy levels are good at 8/10 - he notes his voice was very deep until he took his nebulizer which helped clear it  - he notes his cough is not made worse when supine - he notes he snored in the past - he notes he could fall asleep while watching a boring TV show - he notes he would not fall asleep in the car - he notes nocturia once a night - He notes memory and concentration are good - he notes he may have been exposed to Covid recently from his daughter  - he notes having allergies as a kid for which he took shots  - he notes he has been getting hormone injections  -he denies any headaches, nausea, emesis, fever, chills, sweats, chest pain, chest pressure, palpitations, constipation, diarrhea, vertigo, dysphagia, heartburn, reflux, itchy eyes, itchy ears, leg swelling, leg pain, arthralgias, myalgias, or sour taste in the mouth.

## 2024-05-22 NOTE — ASSESSMENT
[FreeTextEntry1] : Mr. FERMIN  is a 68 year old male with a history of multiple myeloma s/p stem cell transplant (2021), prostate cancer, basal cell carcinoma, saddle pulmonary emboli (2021), bilateral DVTs, perpetual blood thinners, herniated discs, HLD, high cholesterol, adenomatous polyp of colon, remote environmental and seasonal allergies, Covid-19 (2022), RSV (2022), s/p PNA (4/2024) who now comes to the office for an initial pulmonary evaluation for SOB, post RUL PNA Bronchospasm, ?TBM, Underlying Remote Environmental and Seasonal Allergies, ?ANNE   His shortness of breath is multifactorial due to: -poor mechanics of breathing -out of shape -over weight -pulmonary disease   -post PNA Bronchospasm   problem 1: post PNA RADs/Bronchospasm ?Etiology (likely Mycoplasma or Pertussis) -get blood work to include: mycoplasma, chlamydia pneumoniae, and pertussis titers -add Breztri 2 puffs BID -add Zithromax 500 mg x 7 days  -add Prednisone 30 mg x 5 days, 20 mg x 5 days, 10 mg x 5 days Information sheet given to the patient to be reviewed, this medication is never to be used without consulting the prescribing physician. Proper dietary restraint is necessary specifically salt containing foods, if any reaction may occur should be reported. Your chest xray/CT reveals an infiltrate c/w pneumonia; this based on your clinical scenario required additional therapy. Any change in your status will require hospitalization at the nearest hospital and al local ambulance will need to be called. Our group is on staff at [facility] and [facility] as- consultants. The patient/family is instructed to notify our office with any change in condition.  problem 2: ?TBM -get Dynamic CT -add Amitriptyline 10 mg up to TID, QHS Tracheomalacia is usually acquired in adults and common causes include damage by tracheostomy or endotracheal intubation damaging the tracheal cartilage with increase risk with multiple intubations, prolonged intubation, and concurrent high dose steroid therapy; external chest wall trauma and surgery; chronic compression of the trachea by benign etiologies (eg, benign mediastinal goiter) or malignancy; relapsing polychondritis; or recurrent infection. Tracheomalacia can be asymptomatic, however signs or symptoms can develop as the severity of the airway narrowing progresses with major symptoms include dyspnea, cough, and sputum retention. Other symptoms include severe paroxysms of coughing, wheezing or stridor, barking cough and may be exacerbated by forced expiration, cough, and valsalva maneuver. Tracheomalacia is diagnosed by a bronchoscopic visualization of dynamic airway collapse on dynamic chest CT. Therapy is warranted in symptomatic patients with severe tracheomalacia and includes surgical repair as tracheobronchoplasty. The patient was referred to Dr. Robb Lloyd or Dr. Fred Yee, ([facility]) for a surgical consult.   Problem 3: Hx of Saddle Pulmonary Emboli / Bilateral DVTs -on Eliquis  problem 4: Allergies / Sinus -get blood work to include: asthma panel, food IgE panel, IgE level, eosinophil level, vitamin D level Environmental measures for allergies were encouraged including mattress and pillow covers, air purifier, and environmental controls.   Problem 5: ?ANNE (risk factors: elevated Mallampati class, snoring) -get home sleep study  -Sleep apnea is associated with adverse clinical consequences which can affect most organ systems. Cardiovascular disease risk includes arrhythmias, atrial fibrillation, hypertension, coronary artery disease, and stroke. Metabolic disorders include diabetes type 2, non-alcoholic fatty liver disease. Mood disorder especially depression; and cognitive decline especially in the elderly. Associations with chronic reflux/Oscar's esophagus some but now all inclusive. -Reasons include arousal consistent with hypopnea; respiratory events most prominent in REM sleep or supine position; therefore sleep staging and body position are important for accurate diagnosis and estimation of AHI.   Problem 6 poor breathing mechanics -Proper breathing techniques were reviewed with an emphasis of exhalation. Patient instructed to breath in for 1 second and out for four seconds. Patient was encouraged to not talk while walking.   problem 7: overweight / out of shape -Weight loss, exercise, and diet control were discussed and are highly encouraged. Treatment options are given such as, aqua therapy, and contacting a nutritionist. Recommended to use the elliptical, stationary bike, less use of treadmill.   problem 8: health maintenance -recommended yearly flu shot after October 15 -recommended strep pneumonia vaccines: Prevnar-20 vaccine, followed by Pneumo vaccine 23 one year following after 65 years old. -recommended early intervention for Upper Respiratory Infections (URIs) -recommended regular osteoporosis evaluations -recommended early dermatological evaluations -recommended after the age of 50 to the age of 70, colonoscopy every 5 years   F/U in 6-8 weeks. He is encouraged to call with any changes, concerns, or questions

## 2024-05-27 LAB
ALBUMIN MFR SERPL ELPH: 63.8 %
ALBUMIN SERPL ELPH-MCNC: 4.6 G/DL
ALBUMIN SERPL-MCNC: 4 G/DL
ALBUMIN/GLOB SERPL: 1.7 RATIO
ALP BLD-CCNC: 84 U/L
ALPHA1 GLOB MFR SERPL ELPH: 4.2 %
ALPHA1 GLOB SERPL ELPH-MCNC: 0.3 G/DL
ALPHA2 GLOB MFR SERPL ELPH: 9.7 %
ALPHA2 GLOB SERPL ELPH-MCNC: 0.6 G/DL
ALT SERPL-CCNC: 32 U/L
ANION GAP SERPL CALC-SCNC: 10 MMOL/L
AST SERPL-CCNC: 25 U/L
B-GLOBULIN MFR SERPL ELPH: 11.9 %
B-GLOBULIN SERPL ELPH-MCNC: 0.7 G/DL
BILIRUB SERPL-MCNC: 0.4 MG/DL
BUN SERPL-MCNC: 24 MG/DL
CALCIUM SERPL-MCNC: 9.5 MG/DL
CHLORIDE SERPL-SCNC: 104 MMOL/L
CO2 SERPL-SCNC: 27 MMOL/L
CREAT SERPL-MCNC: 1 MG/DL
DEPRECATED KAPPA LC FREE/LAMBDA SER: 0.1 RATIO
EGFR: 82 ML/MIN/1.73M2
GAMMA GLOB FLD ELPH-MCNC: 0.7 G/DL
GAMMA GLOB MFR SERPL ELPH: 10.4 %
GLUCOSE SERPL-MCNC: 156 MG/DL
IGA SER QL IEP: 45 MG/DL
IGG SER QL IEP: 563 MG/DL
IGM SER QL IEP: 34 MG/DL
INTERPRETATION SERPL IEP-IMP: NORMAL
KAPPA LC CSF-MCNC: 10.49 MG/DL
KAPPA LC SERPL-MCNC: 1.04 MG/DL
M PROTEIN MFR SERPL ELPH: 2.8 %
M PROTEIN SPEC IFE-MCNC: NORMAL
MAGNESIUM SERPL-MCNC: 2 MG/DL
MONOCLON BAND OBS SERPL: 0.2 G/DL
POTASSIUM SERPL-SCNC: 4.2 MMOL/L
PROT SERPL-MCNC: 6.3 G/DL
SODIUM SERPL-SCNC: 141 MMOL/L

## 2024-05-30 ENCOUNTER — APPOINTMENT (OUTPATIENT)
Dept: INFUSION THERAPY | Facility: HOSPITAL | Age: 69
End: 2024-05-30

## 2024-05-30 ENCOUNTER — APPOINTMENT (OUTPATIENT)
Dept: HEMATOLOGY ONCOLOGY | Facility: CLINIC | Age: 69
End: 2024-05-30

## 2024-06-12 ENCOUNTER — APPOINTMENT (OUTPATIENT)
Dept: ORTHOPEDIC SURGERY | Facility: CLINIC | Age: 69
End: 2024-06-12
Payer: MEDICARE

## 2024-06-12 VITALS — HEART RATE: 57 BPM | SYSTOLIC BLOOD PRESSURE: 137 MMHG | DIASTOLIC BLOOD PRESSURE: 80 MMHG

## 2024-06-12 DIAGNOSIS — M51.26 OTHER INTERVERTEBRAL DISC DISPLACEMENT, LUMBAR REGION: ICD-10-CM

## 2024-06-12 DIAGNOSIS — M51.36 OTHER INTERVERTEBRAL DISC DEGENERATION, LUMBAR REGION: ICD-10-CM

## 2024-06-12 DIAGNOSIS — M54.16 RADICULOPATHY, LUMBAR REGION: ICD-10-CM

## 2024-06-12 DIAGNOSIS — C90.00 MULTIPLE MYELOMA NOT HAVING ACHIEVED REMISSION: ICD-10-CM

## 2024-06-12 DIAGNOSIS — Z94.84 STEM CELLS TRANSPLANT STATUS: ICD-10-CM

## 2024-06-12 PROCEDURE — 99214 OFFICE O/P EST MOD 30 MIN: CPT

## 2024-06-12 RX ORDER — METHYLPREDNISOLONE 4 MG/1
4 TABLET ORAL
Qty: 1 | Refills: 0 | Status: ACTIVE | COMMUNITY
Start: 2024-06-12 | End: 1900-01-01

## 2024-06-12 NOTE — HISTORY OF PRESENT ILLNESS
[3] : a current pain level of 3/10 [Intermit.] : ~He/She~ states the symptoms seem to be intermittent [de-identified] : Patient presents a follow-up visit for Thoracic & Lumbar DDD. The patient has seen significant improvement to his symptoms after consulting with physical therapy and acupuncture.  The patient states he went for acupuncture and that helped significantly. The patient states that the physical therapist notices issue with the SI joint. He has been recently limiting his physical activity.  Patient notes that he has played golf recently which did cause him significant amount of pain after golf fora bout one week, but has resided.

## 2024-06-12 NOTE — DISCUSSION/SUMMARY
[Medication Risks Reviewed] : Medication risks reviewed [PRN] : PRN [de-identified] : I discussed the underlying pathophysiology of the patient's condition. I advised that the patient should continue their current regimen if he has found some alleviation to his symptoms. Activity modifications were reviewed with the patient at length. I provided the patient with a detailed prescription for physical therapy along with a Medrol Dose pack to take as needed, as the patient plans on going on a vacation soon. The patient should continue to remain active and exercise regularly. If the patient begins to experience any changes or severe exacerbation of his symptoms, he should reach out to me as soon as possible. Otherwise, he should return to me as needed.

## 2024-06-12 NOTE — PHYSICAL EXAM
[LE] : Sensory: Intact in bilateral lower extremities [Normal] : Oriented to person, place, and time, insight and judgement were intact and the affect was normal [de-identified] : Mild spasm of the right flank in the Thoracolumbar area.

## 2024-06-12 NOTE — ADDENDUM
[FreeTextEntry1] : I, Eyad Chen Jr, acted solely as a scribe for Dr. Denny Candelaria on this date 06/12/2024 .  All medical record entries made by the Scribe were at my, Dr. Denny Candelaria, direction and personally dictated by me on 06/12/2024 . I have reviewed the chart and agree that the record accurately reflects my personal performance of the history, physical exam, assessment and plan. I have also personally directed, reviewed, and agreed with the chart.

## 2024-06-13 ENCOUNTER — APPOINTMENT (OUTPATIENT)
Dept: HEMATOLOGY ONCOLOGY | Facility: CLINIC | Age: 69
End: 2024-06-13

## 2024-06-13 ENCOUNTER — RESULT REVIEW (OUTPATIENT)
Age: 69
End: 2024-06-13

## 2024-06-13 LAB
ALBUMIN SERPL ELPH-MCNC: 4.2 G/DL
ALP BLD-CCNC: 88 U/L
ALT SERPL-CCNC: 31 U/L
ANION GAP SERPL CALC-SCNC: 9 MMOL/L
AST SERPL-CCNC: 20 U/L
BASOPHILS # BLD AUTO: 0.03 K/UL — SIGNIFICANT CHANGE UP (ref 0–0.2)
BASOPHILS NFR BLD AUTO: 0.7 % — SIGNIFICANT CHANGE UP (ref 0–2)
BILIRUB SERPL-MCNC: 0.3 MG/DL
BUN SERPL-MCNC: 21 MG/DL
CALCIUM SERPL-MCNC: 9.3 MG/DL
CHLORIDE SERPL-SCNC: 104 MMOL/L
CO2 SERPL-SCNC: 24 MMOL/L
CREAT SERPL-MCNC: 1 MG/DL
EGFR: 82 ML/MIN/1.73M2
EOSINOPHIL # BLD AUTO: 0.14 K/UL — SIGNIFICANT CHANGE UP (ref 0–0.5)
EOSINOPHIL NFR BLD AUTO: 3.5 % — SIGNIFICANT CHANGE UP (ref 0–6)
GLUCOSE SERPL-MCNC: 95 MG/DL
HCT VFR BLD CALC: 36.6 % — LOW (ref 39–50)
HGB BLD-MCNC: 12.6 G/DL — LOW (ref 13–17)
IMM GRANULOCYTES NFR BLD AUTO: 0.2 % — SIGNIFICANT CHANGE UP (ref 0–0.9)
LYMPHOCYTES # BLD AUTO: 1.45 K/UL — SIGNIFICANT CHANGE UP (ref 1–3.3)
LYMPHOCYTES # BLD AUTO: 36 % — SIGNIFICANT CHANGE UP (ref 13–44)
MAGNESIUM SERPL-MCNC: 1.7 MG/DL
MCHC RBC-ENTMCNC: 32.9 PG — SIGNIFICANT CHANGE UP (ref 27–34)
MCHC RBC-ENTMCNC: 34.4 G/DL — SIGNIFICANT CHANGE UP (ref 32–36)
MCV RBC AUTO: 95.6 FL — SIGNIFICANT CHANGE UP (ref 80–100)
MONOCYTES # BLD AUTO: 0.49 K/UL — SIGNIFICANT CHANGE UP (ref 0–0.9)
MONOCYTES NFR BLD AUTO: 12.2 % — SIGNIFICANT CHANGE UP (ref 2–14)
NEUTROPHILS # BLD AUTO: 1.91 K/UL — SIGNIFICANT CHANGE UP (ref 1.8–7.4)
NEUTROPHILS NFR BLD AUTO: 47.4 % — SIGNIFICANT CHANGE UP (ref 43–77)
NRBC # BLD: 0 /100 WBCS — SIGNIFICANT CHANGE UP (ref 0–0)
PLATELET # BLD AUTO: 139 K/UL — LOW (ref 150–400)
POTASSIUM SERPL-SCNC: 3.9 MMOL/L
PROT SERPL-MCNC: 6 G/DL
RBC # BLD: 3.83 M/UL — LOW (ref 4.2–5.8)
RBC # FLD: 14.4 % — SIGNIFICANT CHANGE UP (ref 10.3–14.5)
SODIUM SERPL-SCNC: 138 MMOL/L
WBC # BLD: 4.03 K/UL — SIGNIFICANT CHANGE UP (ref 3.8–10.5)
WBC # FLD AUTO: 4.03 K/UL — SIGNIFICANT CHANGE UP (ref 3.8–10.5)

## 2024-06-13 NOTE — ED ADULT NURSE NOTE - CADM POA CENTRAL LINE
Date of Surgery  6/13/2024    Preoperative diagnosis:   Right index finger abscess with potential PIP pyogenic arthritis    Postoperative diagnosis:   Right index finger subcutaneous abscess    Procedure    Right index finger irrigation and debridement, deep down to bone and joint    Surgeon:   Dr. SELMA Blackwell    Assistant:   None    Anesthesia:   MAC and regional block    History of present illness and indications:   51-year-old female presented to clinic and saw Dr. Lambert yesterday after she developed redness pain and swelling of the index finger.  She believes she had a bug bite along the ulnar aspect of her finger that occurred over the weekend.  A day or 2 after the bite she noticed increasing redness and warmth.  It then began to swell and become very painful and limited range of motion.  There is concern for infection.  She was sent to the hospital.  Lab work showed an elevated white blood count.  MRI showed some subcutaneous nonorganized fluid accumulation that was concerning for cellulitis but no obvious abscess.  Unfortunately she did not improve with antibiotics and there is ongoing swelling enlarging predominately over the ulnar aspect of the hand.  We therefore elected to move forward with right index finger irrigation and debridement.    We discussed the risks and benefits of the procedure which include but are not limited to need for further surgeries to control the infection, dehiscence, arthritis, tendon adhesions, stiffness, ongoing pain, bleeding, injury to blood vessels, tendons, nerves, scarring, and stiffness.  All questions were answered and operative consents were signed preoperatively.    Procedure note:  Patient was greeted in preoperative hold.  Consent was signed and surgical site was marked.  She was brought to the operating suite after anesthesia provided a regional block to the right upper extremity.  She was brought to the operating suite.  She was positioned supine with right upper  No extremity outstretched on a hand table.  Tourniquet was applied to the arm.  The right upper extremity was then prepped and draped in standard sterile fashion.  WHO timeout was performed.    Right upper extremity was elevated and examined with Esmarch.  Tourniquet inflated to 50 mmHg.  Curvilinear incision was made over the dorsal aspect of the right index finger curving ulnarly around the PIP joint.  The skin was incised.  There was irruption of purulence in the subcutaneous tissue.  A sample of this was obtained and sent for both aerobic and anaerobic culture.  Skin flaps were elevated.  There is significant purulence within the subcutaneous tissue superficial to the extensor tendon predominantly on the ulnar aspect of the finger.  This was evacuated.  The soft tissue was debrided of all infectious/necrotic appearing tissue.  There was a small penetrating poke hole along the ulnar aspect of the finger through the skin consistent with possible bug bite.  The underlying extensor mechanism was intact without damage.  Small arthrotomy was made over the ulnar aspect of the PIP joint just lateral to the ulnar lateral slip and above the collateral ligament.  The cartilage appeared pristine.  There was no purulence or murky fluid within the joint suggestive of septic arthritis.  On the volar aspect of the finger the flexor tendon sheath was opened a small amount near the A3 pulley.  There was no purulent fluid from within the sheath.  The wound was then thoroughly irrigated with 3000 cc of normal saline while performing a rough mechanical debridement using a Ray-Danielle sponge.  The wound was then explored again.  The wound appeared clean and no obvious infectious material remaining.  Tourniquet was let down.  Normal perfusion return to the fingers.  Hemostasis achieved with bipolar cautery.  The skin was closed in a simple interrupted fashion using 5-0 Prolene.  Sterile dressing was applied consisting of bacitracin Adaptic  gauze.  A volar resting splint was applied incorporating the index through small finger.  She was taken to PACU without complication.    Post Op Plan  Remain in splint at all times.  Keep clean and dry.  Rest ice and elevation.  Elevate the right upper extremity is much as possible.  Tomorrow morning we will plan to remove the splint and start 3-4 times daily soaks in warm soapy water.  She will soak for 15 to 20 minutes and perform active range of motion exercises while soaking.  Between soaks a new clean sterile dressing will be applied.  She does not need to use the splint after tomorrow morning, only for comfort.  Will follow-up on the antibiotic culture results.  She will continue with vancomycin and cefepime for the time being and adjust pending the sensitivities of the culture results.    Brian Blackwell MD  6/13/2024  4:09 PM

## 2024-06-17 LAB
ALBUMIN MFR SERPL ELPH: 63.7 %
ALBUMIN SERPL-MCNC: 3.8 G/DL
ALBUMIN/GLOB SERPL: 1.7 RATIO
ALPHA1 GLOB MFR SERPL ELPH: 4.9 %
ALPHA1 GLOB SERPL ELPH-MCNC: 0.3 G/DL
ALPHA2 GLOB MFR SERPL ELPH: 10 %
ALPHA2 GLOB SERPL ELPH-MCNC: 0.6 G/DL
B-GLOBULIN MFR SERPL ELPH: 12.4 %
B-GLOBULIN SERPL ELPH-MCNC: 0.7 G/DL
DEPRECATED KAPPA LC FREE/LAMBDA SER: 0.07 RATIO
GAMMA GLOB FLD ELPH-MCNC: 0.5 G/DL
GAMMA GLOB MFR SERPL ELPH: 9 %
IGA SER QL IEP: 28 MG/DL
IGG SER QL IEP: 432 MG/DL
IGM SER QL IEP: 26 MG/DL
INTERPRETATION SERPL IEP-IMP: NORMAL
KAPPA LC CSF-MCNC: 11.51 MG/DL
KAPPA LC SERPL-MCNC: 0.83 MG/DL
M PROTEIN MFR SERPL ELPH: 3.5 %
M PROTEIN SPEC IFE-MCNC: NORMAL
MONOCLON BAND OBS SERPL: 0.2 G/DL
PROT SERPL-MCNC: 6 G/DL
PROT SERPL-MCNC: 6 G/DL

## 2024-06-18 NOTE — DISCHARGE NOTE NURSING/CASE MANAGEMENT/SOCIAL WORK - NSDCPEELIQUISFU_GEN_ALL_CORE
Biopsy Photograph Reviewed: Yes Size Of Lesion In Cm: 0.8 X Size Of Lesion In Cm (Optional): 0 Size Of Margin In Cm: 0.4 Was An Eye Clamp Used?: No Eye Clamp Note Details: An eye clamp was used during the procedure. Excision Method: Elliptical Saucerization Depth: dermis and superficial adipose tissue Go for blood tests as directed. Your doctor will do lab tests at regular visits to monitor the effects of this medicine. Please follow up with your doctor and keep your health care provider appointments. Repair Type: Complex Intermediate / Complex Repair - Final Wound Length In Cm: 4 Suturegard Retention Suture: 2-0 Nylon Retention Suture Bite Size: 3 mm Length To Time In Minutes Device Was In Place: 10 Number Of Hemigard Strips Per Side: 1 Width Of Defect Perpendicular To Closure In Cm (Required): 1.6 Distance Of Undermining In Cm (Required): 1.7 Undermining Type: Entire Wound Debridement Text: The wound edges were debrided prior to proceeding with the closure to facilitate wound healing. Helical Rim Text: The closure involved the helical rim. Vermilion Border Text: The closure involved the vermilion border. Nostril Rim Text: The closure involved the nostril rim. Retention Suture Text: Retention sutures were placed to support the closure and prevent dehiscence. Suture Removal: 10 days Lab: [id number] Lab Facility: [id number] Graft Donor Site Bandage (Optional-Leave Blank If You Don't Want In Note): Steri-strips and a pressure bandage were applied to the donor site. Epidermal Closure Graft Donor Site (Optional): simple interrupted Billing Type: Third-Party Bill Excision Depth: adipose tissue Scalpel Size: 15 blade Anesthesia Type: 1% lidocaine with epinephrine Additional Anesthesia Volume In Cc: 6 Hemostasis: Electrocautery Estimated Blood Loss (Cc): minimal Detail Level: Detailed Repair Depth: use same depth as excision depth Deep Sutures: 4-0 PDO Epidermal Sutures: 4-0 Prolene Epidermal Closure: running Wound Care: Petrolatum Dressing: pressure dressing Suturegard Intro: Intraoperative tissue expansion was performed, utilizing the SUTUREGARD device, in order to reduce wound tension. Suturegard Body: The suture ends were repeatedly re-tightened and re-clamped to achieve the desired tissue expansion. Hemigard Intro: Due to skin fragility and wound tension, it was decided to use HEMIGARD adhesive retention suture devices to permit a linear closure. The skin was cleaned and dried for a 6cm distance away from the wound. Excessive hair, if present, was removed to allow for adhesion. Hemigard Postcare Instructions: The HEMIGARD strips are to remain completely dry for at least 5-7 days. Positioning (Leave Blank If You Do Not Want): The patient was placed in a comfortable position exposing the surgical site. Pre-Excision Curettage Text (Leave Blank If You Do Not Want): Prior to drawing the surgical margin the visible lesion was removed with electrodesiccation and curettage to clearly define the lesion size. Complex Repair Preamble Text (Leave Blank If You Do Not Want): Extensive wide undermining was performed. Intermediate Repair Preamble Text (Leave Blank If You Do Not Want): Undermining was performed with blunt dissection. Curvilinear Excision Additional Text (Leave Blank If You Do Not Want): The margin was drawn around the clinically apparent lesion.  A curvilinear shape was then drawn on the skin incorporating the lesion and margins.  Incisions were then made along these lines to the appropriate tissue plane and the lesion was extirpated. Fusiform Excision Additional Text (Leave Blank If You Do Not Want): The margin was drawn around the clinically apparent lesion.  A fusiform shape was then drawn on the skin incorporating the lesion and margins.  Incisions were then made along these lines to the appropriate tissue plane and the lesion was extirpated. Elliptical Excision Additional Text (Leave Blank If You Do Not Want): The margin was drawn around the clinically apparent lesion.  An elliptical shape was then drawn on the skin incorporating the lesion and margins.  Incisions were then made along these lines to the appropriate tissue plane and the lesion was extirpated. Saucerization Excision Additional Text (Leave Blank If You Do Not Want): The margin was drawn around the clinically apparent lesion.  Incisions were then made along these lines, in a tangential fashion, to the appropriate tissue plane and the lesion was extirpated. Slit Excision Additional Text (Leave Blank If You Do Not Want): A linear line was drawn on the skin overlying the lesion. An incision was made slowly until the lesion was visualized.  Once visualized, the lesion was removed with blunt dissection. Excisional Biopsy Additional Text (Leave Blank If You Do Not Want): The margin was drawn around the clinically apparent lesion. An elliptical shape was then drawn on the skin incorporating the lesion and margins.  Incisions were then made along these lines to the appropriate tissue plane and the lesion was extirpated. Perilesional Excision Additional Text (Leave Blank If You Do Not Want): The margin was drawn around the clinically apparent lesion. Incisions were then made along these lines to the appropriate tissue plane and the lesion was extirpated. Repair Performed By Another Provider Text (Leave Blank If You Do Not Want): After the tissue was excised the defect was repaired by another provider. No Repair - Repaired With Adjacent Surgical Defect Text (Leave Blank If You Do Not Want): After the excision the defect was repaired concurrently with another surgical defect which was in close approximation. Adjacent Tissue Transfer Text: The defect edges were debeveled with a #15 scalpel blade.  Given the location of the defect and the proximity to free margins an adjacent tissue transfer was deemed most appropriate.  Using a sterile surgical marker, an appropriate flap was drawn incorporating the defect and placing the expected incisions within the relaxed skin tension lines where possible.    The area thus outlined was incised deep to adipose tissue with a #15 scalpel blade.  The skin margins were undermined to an appropriate distance in all directions utilizing iris scissors. Advancement Flap (Single) Text: The defect edges were debeveled with a #15 scalpel blade.  Given the location of the defect and the proximity to free margins a single advancement flap was deemed most appropriate.  Using a sterile surgical marker, an appropriate advancement flap was drawn incorporating the defect and placing the expected incisions within the relaxed skin tension lines where possible.    The area thus outlined was incised deep to adipose tissue with a #15 scalpel blade.  The skin margins were undermined to an appropriate distance in all directions utilizing iris scissors. Advancement Flap (Double) Text: The defect edges were debeveled with a #15 scalpel blade.  Given the location of the defect and the proximity to free margins a double advancement flap was deemed most appropriate.  Using a sterile surgical marker, the appropriate advancement flaps were drawn incorporating the defect and placing the expected incisions within the relaxed skin tension lines where possible.    The area thus outlined was incised deep to adipose tissue with a #15 scalpel blade.  The skin margins were undermined to an appropriate distance in all directions utilizing iris scissors. Burow's Advancement Flap Text: The defect edges were debeveled with a #15 scalpel blade.  Given the location of the defect and the proximity to free margins a Burow's advancement flap was deemed most appropriate.  Using a sterile surgical marker, the appropriate advancement flap was drawn incorporating the defect and placing the expected incisions within the relaxed skin tension lines where possible.    The area thus outlined was incised deep to adipose tissue with a #15 scalpel blade.  The skin margins were undermined to an appropriate distance in all directions utilizing iris scissors. Chonodrocutaneous Helical Advancement Flap Text: The defect edges were debeveled with a #15 scalpel blade.  Given the location of the defect and the proximity to free margins a chondrocutaneous helical advancement flap was deemed most appropriate.  Using a sterile surgical marker, the appropriate advancement flap was drawn incorporating the defect and placing the expected incisions within the relaxed skin tension lines where possible.    The area thus outlined was incised deep to adipose tissue with a #15 scalpel blade.  The skin margins were undermined to an appropriate distance in all directions utilizing iris scissors. Crescentic Advancement Flap Text: The defect edges were debeveled with a #15 scalpel blade.  Given the location of the defect and the proximity to free margins a crescentic advancement flap was deemed most appropriate.  Using a sterile surgical marker, the appropriate advancement flap was drawn incorporating the defect and placing the expected incisions within the relaxed skin tension lines where possible.    The area thus outlined was incised deep to adipose tissue with a #15 scalpel blade.  The skin margins were undermined to an appropriate distance in all directions utilizing iris scissors. A-T Advancement Flap Text: The defect edges were debeveled with a #15 scalpel blade.  Given the location of the defect, shape of the defect and the proximity to free margins an A-T advancement flap was deemed most appropriate.  Using a sterile surgical marker, an appropriate advancement flap was drawn incorporating the defect and placing the expected incisions within the relaxed skin tension lines where possible.    The area thus outlined was incised deep to adipose tissue with a #15 scalpel blade.  The skin margins were undermined to an appropriate distance in all directions utilizing iris scissors. O-T Advancement Flap Text: The defect edges were debeveled with a #15 scalpel blade.  Given the location of the defect, shape of the defect and the proximity to free margins an O-T advancement flap was deemed most appropriate.  Using a sterile surgical marker, an appropriate advancement flap was drawn incorporating the defect and placing the expected incisions within the relaxed skin tension lines where possible.    The area thus outlined was incised deep to adipose tissue with a #15 scalpel blade.  The skin margins were undermined to an appropriate distance in all directions utilizing iris scissors. O-L Flap Text: The defect edges were debeveled with a #15 scalpel blade.  Given the location of the defect, shape of the defect and the proximity to free margins an O-L flap was deemed most appropriate.  Using a sterile surgical marker, an appropriate advancement flap was drawn incorporating the defect and placing the expected incisions within the relaxed skin tension lines where possible.    The area thus outlined was incised deep to adipose tissue with a #15 scalpel blade.  The skin margins were undermined to an appropriate distance in all directions utilizing iris scissors. O-Z Flap Text: The defect edges were debeveled with a #15 scalpel blade.  Given the location of the defect, shape of the defect and the proximity to free margins an O-Z flap was deemed most appropriate.  Using a sterile surgical marker, an appropriate transposition flap was drawn incorporating the defect and placing the expected incisions within the relaxed skin tension lines where possible. The area thus outlined was incised deep to adipose tissue with a #15 scalpel blade.  The skin margins were undermined to an appropriate distance in all directions utilizing iris scissors. Double O-Z Flap Text: The defect edges were debeveled with a #15 scalpel blade.  Given the location of the defect, shape of the defect and the proximity to free margins a Double O-Z flap was deemed most appropriate.  Using a sterile surgical marker, an appropriate transposition flap was drawn incorporating the defect and placing the expected incisions within the relaxed skin tension lines where possible. The area thus outlined was incised deep to adipose tissue with a #15 scalpel blade.  The skin margins were undermined to an appropriate distance in all directions utilizing iris scissors. V-Y Flap Text: The defect edges were debeveled with a #15 scalpel blade.  Given the location of the defect, shape of the defect and the proximity to free margins a V-Y flap was deemed most appropriate.  Using a sterile surgical marker, an appropriate advancement flap was drawn incorporating the defect and placing the expected incisions within the relaxed skin tension lines where possible.    The area thus outlined was incised deep to adipose tissue with a #15 scalpel blade.  The skin margins were undermined to an appropriate distance in all directions utilizing iris scissors. Advancement-Rotation Flap Text: The defect edges were debeveled with a #15 scalpel blade.  Given the location of the defect, shape of the defect and the proximity to free margins an advancement-rotation flap was deemed most appropriate.  Using a sterile surgical marker, an appropriate flap was drawn incorporating the defect and placing the expected incisions within the relaxed skin tension lines where possible. The area thus outlined was incised deep to adipose tissue with a #15 scalpel blade.  The skin margins were undermined to an appropriate distance in all directions utilizing iris scissors. Mercedes Flap Text: The defect edges were debeveled with a #15 scalpel blade.  Given the location of the defect, shape of the defect and the proximity to free margins a Mercedes flap was deemed most appropriate.  Using a sterile surgical marker, an appropriate advancement flap was drawn incorporating the defect and placing the expected incisions within the relaxed skin tension lines where possible. The area thus outlined was incised deep to adipose tissue with a #15 scalpel blade.  The skin margins were undermined to an appropriate distance in all directions utilizing iris scissors. Modified Advancement Flap Text: The defect edges were debeveled with a #15 scalpel blade.  Given the location of the defect, shape of the defect and the proximity to free margins a modified advancement flap was deemed most appropriate.  Using a sterile surgical marker, an appropriate advancement flap was drawn incorporating the defect and placing the expected incisions within the relaxed skin tension lines where possible.    The area thus outlined was incised deep to adipose tissue with a #15 scalpel blade.  The skin margins were undermined to an appropriate distance in all directions utilizing iris scissors. Mucosal Advancement Flap Text: Given the location of the defect, shape of the defect and the proximity to free margins a mucosal advancement flap was deemed most appropriate. Incisions were made with a 15 blade scalpel in the appropriate fashion along the cutaneous vermilion border and the mucosal lip. The remaining actinically damaged mucosal tissue was excised.  The mucosal advancement flap was then elevated to the gingival sulcus with care taken to preserve the neurovascular structures and advanced into the primary defect. Care was taken to ensure that precise realignment of the vermilion border was achieved. Peng Advancement Flap Text: The defect edges were debeveled with a #15 scalpel blade.  Given the location of the defect, shape of the defect and the proximity to free margins a Peng advancement flap was deemed most appropriate.  Using a sterile surgical marker, an appropriate advancement flap was drawn incorporating the defect and placing the expected incisions within the relaxed skin tension lines where possible. The area thus outlined was incised deep to adipose tissue with a #15 scalpel blade.  The skin margins were undermined to an appropriate distance in all directions utilizing iris scissors. Hatchet Flap Text: The defect edges were debeveled with a #15 scalpel blade.  Given the location of the defect, shape of the defect and the proximity to free margins a hatchet flap was deemed most appropriate.  Using a sterile surgical marker, an appropriate hatchet flap was drawn incorporating the defect and placing the expected incisions within the relaxed skin tension lines where possible.    The area thus outlined was incised deep to adipose tissue with a #15 scalpel blade.  The skin margins were undermined to an appropriate distance in all directions utilizing iris scissors. Rotation Flap Text: The defect edges were debeveled with a #15 scalpel blade.  Given the location of the defect, shape of the defect and the proximity to free margins a rotation flap was deemed most appropriate.  Using a sterile surgical marker, an appropriate rotation flap was drawn incorporating the defect and placing the expected incisions within the relaxed skin tension lines where possible.    The area thus outlined was incised deep to adipose tissue with a #15 scalpel blade.  The skin margins were undermined to an appropriate distance in all directions utilizing iris scissors. Bilateral Rotation Flap Text: The defect edges were debeveled with a #15 scalpel blade. Given the location of the defect, shape of the defect and the proximity to free margins a bilateral rotation flap was deemed most appropriate. Using a sterile surgical marker, an appropriate rotation flap was drawn incorporating the defect and placing the expected incisions within the relaxed skin tension lines where possible. The area thus outlined was incised deep to adipose tissue with a #15 scalpel blade. The skin margins were undermined to an appropriate distance in all directions utilizing iris scissors. Following this, the designed flap was carried over into the primary defect and sutured into place. Spiral Flap Text: The defect edges were debeveled with a #15 scalpel blade.  Given the location of the defect, shape of the defect and the proximity to free margins a spiral flap was deemed most appropriate.  Using a sterile surgical marker, an appropriate rotation flap was drawn incorporating the defect and placing the expected incisions within the relaxed skin tension lines where possible. The area thus outlined was incised deep to adipose tissue with a #15 scalpel blade.  The skin margins were undermined to an appropriate distance in all directions utilizing iris scissors. Staged Advancement Flap Text: The defect edges were debeveled with a #15 scalpel blade.  Given the location of the defect, shape of the defect and the proximity to free margins a staged advancement flap was deemed most appropriate.  Using a sterile surgical marker, an appropriate advancement flap was drawn incorporating the defect and placing the expected incisions within the relaxed skin tension lines where possible. The area thus outlined was incised deep to adipose tissue with a #15 scalpel blade.  The skin margins were undermined to an appropriate distance in all directions utilizing iris scissors. Star Wedge Flap Text: The defect edges were debeveled with a #15 scalpel blade.  Given the location of the defect, shape of the defect and the proximity to free margins a star wedge flap was deemed most appropriate.  Using a sterile surgical marker, an appropriate rotation flap was drawn incorporating the defect and placing the expected incisions within the relaxed skin tension lines where possible. The area thus outlined was incised deep to adipose tissue with a #15 scalpel blade.  The skin margins were undermined to an appropriate distance in all directions utilizing iris scissors. Transposition Flap Text: The defect edges were debeveled with a #15 scalpel blade.  Given the location of the defect and the proximity to free margins a transposition flap was deemed most appropriate.  Using a sterile surgical marker, an appropriate transposition flap was drawn incorporating the defect.    The area thus outlined was incised deep to adipose tissue with a #15 scalpel blade.  The skin margins were undermined to an appropriate distance in all directions utilizing iris scissors. Muscle Hinge Flap Text: The defect edges were debeveled with a #15 scalpel blade.  Given the size, depth and location of the defect and the proximity to free margins a muscle hinge flap was deemed most appropriate.  Using a sterile surgical marker, an appropriate hinge flap was drawn incorporating the defect. The area thus outlined was incised with a #15 scalpel blade.  The skin margins were undermined to an appropriate distance in all directions utilizing iris scissors. Mustarde Flap Text: The defect edges were debeveled with a #15 scalpel blade.  Given the size, depth and location of the defect and the proximity to free margins a Mustarde flap was deemed most appropriate.  Using a sterile surgical marker, an appropriate flap was drawn incorporating the defect. The area thus outlined was incised with a #15 scalpel blade.  The skin margins were undermined to an appropriate distance in all directions utilizing iris scissors. Nasal Turnover Hinge Flap Text: The defect edges were debeveled with a #15 scalpel blade.  Given the size, depth, location of the defect and the defect being full thickness a nasal turnover hinge flap was deemed most appropriate.  Using a sterile surgical marker, an appropriate hinge flap was drawn incorporating the defect. The area thus outlined was incised with a #15 scalpel blade. The flap was designed to recreate the nasal mucosal lining and the alar rim. The skin margins were undermined to an appropriate distance in all directions utilizing iris scissors. Nasalis-Muscle-Based Myocutaneous Island Pedicle Flap Text: Using a #15 blade, an incision was made around the donor flap to the level of the nasalis muscle. Wide lateral undermining was then performed in both the subcutaneous plane above the nasalis muscle, and in a submuscular plane just above periosteum. This allowed the formation of a free nasalis muscle axial pedicle (based on the angular artery) which was still attached to the actual cutaneous flap, increasing its mobility and vascular viability. Hemostasis was obtained with pinpoint electrocoagulation. The flap was mobilized into position and the pivotal anchor points positioned and stabilized with buried interrupted sutures. Subcutaneous and dermal tissues were closed in a multilayered fashion with sutures. Tissue redundancies were excised, and the epidermal edges were apposed without significant tension and sutured with sutures. Nasalis Myocutaneous Flap Text: Using a #15 blade, an incision was made around the donor flap to the level of the nasalis muscle. Wide lateral undermining was then performed in both the subcutaneous plane above the nasalis muscle, and in a submuscular plane just above periosteum. This allowed the formation of a free nasalis muscle axial pedicle which was still attached to the actual cutaneous flap, increasing its mobility and vascular viability. Hemostasis was obtained with pinpoint electrocoagulation. The flap was mobilized into position and the pivotal anchor points positioned and stabilized with buried interrupted sutures. Subcutaneous and dermal tissues were closed in a multilayered fashion with sutures. Tissue redundancies were excised, and the epidermal edges were apposed without significant tension and sutured with sutures. Nasolabial Transposition Flap Text: The defect edges were debeveled with a #15 scalpel blade.  Given the size, depth and location of the defect and the proximity to free margins a nasolabial transposition flap was deemed most appropriate. Using a sterile surgical marker, an appropriate flap was drawn incorporating the defect. The area thus outlined was incised with a #15 scalpel blade. The skin margins were undermined to an appropriate distance in all directions utilizing iris scissors. Following this, the designed flap was carried into the primary defect and sutured into place. Orbicularis Oris Muscle Flap Text: The defect edges were debeveled with a #15 scalpel blade.  Given that the defect affected the competency of the oral sphincter an orbicularis oris muscle flap was deemed most appropriate to restore this competency and normal muscle function.  Using a sterile surgical marker, an appropriate flap was drawn incorporating the defect. The area thus outlined was incised with a #15 scalpel blade. Melolabial Transposition Flap Text: The defect edges were debeveled with a #15 scalpel blade.  Given the location of the defect and the proximity to free margins a melolabial flap was deemed most appropriate.  Using a sterile surgical marker, an appropriate melolabial transposition flap was drawn incorporating the defect.    The area thus outlined was incised deep to adipose tissue with a #15 scalpel blade.  The skin margins were undermined to an appropriate distance in all directions utilizing iris scissors. Rectangular Flap Text: The defect edges were debeveled with a #15 scalpel blade. Given the location of the defect and the proximity to free margins a rectangular flap was deemed most appropriate. Using a sterile surgical marker, an appropriate rectangular flap was drawn incorporating the defect. The area thus outlined was incised deep to adipose tissue with a #15 scalpel blade. The skin margins were undermined to an appropriate distance in all directions utilizing iris scissors. Following this, the designed flap was carried over into the primary defect and sutured into place. Rhombic Flap Text: The defect edges were debeveled with a #15 scalpel blade.  Given the location of the defect and the proximity to free margins a rhombic flap was deemed most appropriate.  Using a sterile surgical marker, an appropriate rhombic flap was drawn incorporating the defect.    The area thus outlined was incised deep to adipose tissue with a #15 scalpel blade.  The skin margins were undermined to an appropriate distance in all directions utilizing iris scissors. Rhomboid Transposition Flap Text: The defect edges were debeveled with a #15 scalpel blade.  Given the location of the defect and the proximity to free margins a rhomboid transposition flap was deemed most appropriate.  Using a sterile surgical marker, an appropriate rhomboid flap was drawn incorporating the defect.    The area thus outlined was incised deep to adipose tissue with a #15 scalpel blade.  The skin margins were undermined to an appropriate distance in all directions utilizing iris scissors. Bi-Rhombic Flap Text: The defect edges were debeveled with a #15 scalpel blade.  Given the location of the defect and the proximity to free margins a bi-rhombic flap was deemed most appropriate.  Using a sterile surgical marker, an appropriate rhombic flap was drawn incorporating the defect. The area thus outlined was incised deep to adipose tissue with a #15 scalpel blade.  The skin margins were undermined to an appropriate distance in all directions utilizing iris scissors. Helical Rim Advancement Flap Text: The defect edges were debeveled with a #15 blade scalpel.  Given the location of the defect and the proximity to free margins (helical rim) a double helical rim advancement flap was deemed most appropriate.  Using a sterile surgical marker, the appropriate advancement flaps were drawn incorporating the defect and placing the expected incisions between the helical rim and antihelix where possible.  The area thus outlined was incised through and through with a #15 scalpel blade.  With a skin hook and iris scissors, the flaps were gently and sharply undermined and freed up. Bilateral Helical Rim Advancement Flap Text: The defect edges were debeveled with a #15 blade scalpel.  Given the location of the defect and the proximity to free margins (helical rim) a bilateral helical rim advancement flap was deemed most appropriate.  Using a sterile surgical marker, the appropriate advancement flaps were drawn incorporating the defect and placing the expected incisions between the helical rim and antihelix where possible.  The area thus outlined was incised through and through with a #15 scalpel blade.  With a skin hook and iris scissors, the flaps were gently and sharply undermined and freed up. Ear Star Wedge Flap Text: The defect edges were debeveled with a #15 blade scalpel.  Given the location of the defect and the proximity to free margins (helical rim) an ear star wedge flap was deemed most appropriate.  Using a sterile surgical marker, the appropriate flap was drawn incorporating the defect and placing the expected incisions between the helical rim and antihelix where possible.  The area thus outlined was incised through and through with a #15 scalpel blade. Banner Transposition Flap Text: The defect edges were debeveled with a #15 scalpel blade.  Given the location of the defect and the proximity to free margins a Banner transposition flap was deemed most appropriate.  Using a sterile surgical marker, an appropriate flap drawn around the defect. The area thus outlined was incised deep to adipose tissue with a #15 scalpel blade.  The skin margins were undermined to an appropriate distance in all directions utilizing iris scissors. Bilobed Flap Text: The defect edges were debeveled with a #15 scalpel blade.  Given the location of the defect and the proximity to free margins a bilobe flap was deemed most appropriate.  Using a sterile surgical marker, an appropriate bilobe flap drawn around the defect.    The area thus outlined was incised deep to adipose tissue with a #15 scalpel blade.  The skin margins were undermined to an appropriate distance in all directions utilizing iris scissors. Bilobed Transposition Flap Text: The defect edges were debeveled with a #15 scalpel blade.  Given the location of the defect and the proximity to free margins a bilobed transposition flap was deemed most appropriate.  Using a sterile surgical marker, an appropriate bilobe flap drawn around the defect.    The area thus outlined was incised deep to adipose tissue with a #15 scalpel blade.  The skin margins were undermined to an appropriate distance in all directions utilizing iris scissors. Trilobed Flap Text: The defect edges were debeveled with a #15 scalpel blade.  Given the location of the defect and the proximity to free margins a trilobed flap was deemed most appropriate.  Using a sterile surgical marker, an appropriate trilobed flap drawn around the defect.    The area thus outlined was incised deep to adipose tissue with a #15 scalpel blade.  The skin margins were undermined to an appropriate distance in all directions utilizing iris scissors. Dorsal Nasal Flap Text: The defect edges were debeveled with a #15 scalpel blade.  Given the location of the defect and the proximity to free margins a dorsal nasal flap was deemed most appropriate.  Using a sterile surgical marker, an appropriate dorsal nasal flap was drawn around the defect.    The area thus outlined was incised deep to adipose tissue with a #15 scalpel blade.  The skin margins were undermined to an appropriate distance in all directions utilizing iris scissors. Island Pedicle Flap Text: The defect edges were debeveled with a #15 scalpel blade.  Given the location of the defect, shape of the defect and the proximity to free margins an island pedicle advancement flap was deemed most appropriate.  Using a sterile surgical marker, an appropriate advancement flap was drawn incorporating the defect, outlining the appropriate donor tissue and placing the expected incisions within the relaxed skin tension lines where possible.    The area thus outlined was incised deep to adipose tissue with a #15 scalpel blade.  The skin margins were undermined to an appropriate distance in all directions around the primary defect and laterally outward around the island pedicle utilizing iris scissors.  There was minimal undermining beneath the pedicle flap. Island Pedicle Flap With Canthal Suspension Text: The defect edges were debeveled with a #15 scalpel blade.  Given the location of the defect, shape of the defect and the proximity to free margins an island pedicle advancement flap was deemed most appropriate.  Using a sterile surgical marker, an appropriate advancement flap was drawn incorporating the defect, outlining the appropriate donor tissue and placing the expected incisions within the relaxed skin tension lines where possible. The area thus outlined was incised deep to adipose tissue with a #15 scalpel blade.  The skin margins were undermined to an appropriate distance in all directions around the primary defect and laterally outward around the island pedicle utilizing iris scissors.  There was minimal undermining beneath the pedicle flap. A suspension suture was placed in the canthal tendon to prevent tension and prevent ectropion. Alar Island Pedicle Flap Text: The defect edges were debeveled with a #15 scalpel blade.  Given the location of the defect, shape of the defect and the proximity to the alar rim an island pedicle advancement flap was deemed most appropriate.  Using a sterile surgical marker, an appropriate advancement flap was drawn incorporating the defect, outlining the appropriate donor tissue and placing the expected incisions within the nasal ala running parallel to the alar rim. The area thus outlined was incised with a #15 scalpel blade.  The skin margins were undermined minimally to an appropriate distance in all directions around the primary defect and laterally outward around the island pedicle utilizing iris scissors.  There was minimal undermining beneath the pedicle flap. Double Island Pedicle Flap Text: The defect edges were debeveled with a #15 scalpel blade.  Given the location of the defect, shape of the defect and the proximity to free margins a double island pedicle advancement flap was deemed most appropriate.  Using a sterile surgical marker, an appropriate advancement flap was drawn incorporating the defect, outlining the appropriate donor tissue and placing the expected incisions within the relaxed skin tension lines where possible.    The area thus outlined was incised deep to adipose tissue with a #15 scalpel blade.  The skin margins were undermined to an appropriate distance in all directions around the primary defect and laterally outward around the island pedicle utilizing iris scissors.  There was minimal undermining beneath the pedicle flap. Island Pedicle Flap-Requiring Vessel Identification Text: The defect edges were debeveled with a #15 scalpel blade.  Given the location of the defect, shape of the defect and the proximity to free margins an island pedicle advancement flap was deemed most appropriate.  Using a sterile surgical marker, an appropriate advancement flap was drawn, based on the axial vessel mentioned above, incorporating the defect, outlining the appropriate donor tissue and placing the expected incisions within the relaxed skin tension lines where possible.    The area thus outlined was incised deep to adipose tissue with a #15 scalpel blade.  The skin margins were undermined to an appropriate distance in all directions around the primary defect and laterally outward around the island pedicle utilizing iris scissors.  There was minimal undermining beneath the pedicle flap. Keystone Flap Text: The defect edges were debeveled with a #15 scalpel blade.  Given the location of the defect, shape of the defect a keystone flap was deemed most appropriate.  Using a sterile surgical marker, an appropriate keystone flap was drawn incorporating the defect, outlining the appropriate donor tissue and placing the expected incisions within the relaxed skin tension lines where possible. The area thus outlined was incised deep to adipose tissue with a #15 scalpel blade.  The skin margins were undermined to an appropriate distance in all directions around the primary defect and laterally outward around the flap utilizing iris scissors. O-T Plasty Text: The defect edges were debeveled with a #15 scalpel blade.  Given the location of the defect, shape of the defect and the proximity to free margins an O-T plasty was deemed most appropriate.  Using a sterile surgical marker, an appropriate O-T plasty was drawn incorporating the defect and placing the expected incisions within the relaxed skin tension lines where possible.    The area thus outlined was incised deep to adipose tissue with a #15 scalpel blade.  The skin margins were undermined to an appropriate distance in all directions utilizing iris scissors. O-Z Plasty Text: The defect edges were debeveled with a #15 scalpel blade.  Given the location of the defect, shape of the defect and the proximity to free margins an O-Z plasty (double transposition flap) was deemed most appropriate.  Using a sterile surgical marker, the appropriate transposition flaps were drawn incorporating the defect and placing the expected incisions within the relaxed skin tension lines where possible.    The area thus outlined was incised deep to adipose tissue with a #15 scalpel blade.  The skin margins were undermined to an appropriate distance in all directions utilizing iris scissors.  Hemostasis was achieved with electrocautery.  The flaps were then transposed into place, one clockwise and the other counterclockwise, and anchored with interrupted buried subcutaneous sutures. Double O-Z Plasty Text: The defect edges were debeveled with a #15 scalpel blade.  Given the location of the defect, shape of the defect and the proximity to free margins a Double O-Z plasty (double transposition flap) was deemed most appropriate.  Using a sterile surgical marker, the appropriate transposition flaps were drawn incorporating the defect and placing the expected incisions within the relaxed skin tension lines where possible. The area thus outlined was incised deep to adipose tissue with a #15 scalpel blade.  The skin margins were undermined to an appropriate distance in all directions utilizing iris scissors.  Hemostasis was achieved with electrocautery.  The flaps were then transposed into place, one clockwise and the other counterclockwise, and anchored with interrupted buried subcutaneous sutures. V-Y Plasty Text: The defect edges were debeveled with a #15 scalpel blade.  Given the location of the defect, shape of the defect and the proximity to free margins an V-Y advancement flap was deemed most appropriate.  Using a sterile surgical marker, an appropriate advancement flap was drawn incorporating the defect and placing the expected incisions within the relaxed skin tension lines where possible.    The area thus outlined was incised deep to adipose tissue with a #15 scalpel blade.  The skin margins were undermined to an appropriate distance in all directions utilizing iris scissors. H Plasty Text: Given the location of the defect, shape of the defect and the proximity to free margins a H-plasty was deemed most appropriate for repair.  Using a sterile surgical marker, the appropriate advancement arms of the H-plasty were drawn incorporating the defect and placing the expected incisions within the relaxed skin tension lines where possible. The area thus outlined was incised deep to adipose tissue with a #15 scalpel blade. The skin margins were undermined to an appropriate distance in all directions utilizing iris scissors.  The opposing advancement arms were then advanced into place in opposite direction and anchored with interrupted buried subcutaneous sutures. W Plasty Text: The lesion was extirpated to the level of the fat with a #15 scalpel blade.  Given the location of the defect, shape of the defect and the proximity to free margins a W-plasty was deemed most appropriate for repair.  Using a sterile surgical marker, the appropriate transposition arms of the W-plasty were drawn incorporating the defect and placing the expected incisions within the relaxed skin tension lines where possible.    The area thus outlined was incised deep to adipose tissue with a #15 scalpel blade.  The skin margins were undermined to an appropriate distance in all directions utilizing iris scissors.  The opposing transposition arms were then transposed into place in opposite direction and anchored with interrupted buried subcutaneous sutures. Z Plasty Text: The lesion was extirpated to the level of the fat with a #15 scalpel blade.  Given the location of the defect, shape of the defect and the proximity to free margins a Z-plasty was deemed most appropriate for repair.  Using a sterile surgical marker, the appropriate transposition arms of the Z-plasty were drawn incorporating the defect and placing the expected incisions within the relaxed skin tension lines where possible.    The area thus outlined was incised deep to adipose tissue with a #15 scalpel blade.  The skin margins were undermined to an appropriate distance in all directions utilizing iris scissors.  The opposing transposition arms were then transposed into place in opposite direction and anchored with interrupted buried subcutaneous sutures. Double Z Plasty Text: The lesion was extirpated to the level of the fat with a #15 scalpel blade. Given the location of the defect, shape of the defect and the proximity to free margins a double Z-plasty was deemed most appropriate for repair. Using a sterile surgical marker, the appropriate transposition arms of the double Z-plasty were drawn incorporating the defect and placing the expected incisions within the relaxed skin tension lines where possible. The area thus outlined was incised deep to adipose tissue with a #15 scalpel blade. The skin margins were undermined to an appropriate distance in all directions utilizing iris scissors. The opposing transposition arms were then transposed and carried over into place in opposite direction and anchored with interrupted buried subcutaneous sutures. Zygomaticofacial Flap Text: Given the location of the defect, shape of the defect and the proximity to free margins a zygomaticofacial flap was deemed most appropriate for repair.  Using a sterile surgical marker, the appropriate flap was drawn incorporating the defect and placing the expected incisions within the relaxed skin tension lines where possible. The area thus outlined was incised deep to adipose tissue with a #15 scalpel blade with preservation of a vascular pedicle.  The skin margins were undermined to an appropriate distance in all directions utilizing iris scissors.  The flap was then placed into the defect and anchored with interrupted buried subcutaneous sutures. Cheek Interpolation Flap Text: A decision was made to reconstruct the defect utilizing an interpolation axial flap and a staged reconstruction.  A telfa template was made of the defect.  This telfa template was then used to outline the Cheek Interpolation flap.  The donor area for the pedicle flap was then injected with anesthesia.  The flap was excised through the skin and subcutaneous tissue down to the layer of the underlying musculature.  The interpolation flap was carefully excised within this deep plane to maintain its blood supply.  The edges of the donor site were undermined.   The donor site was closed in a primary fashion.  The pedicle was then rotated into position and sutured.  Once the tube was sutured into place, adequate blood supply was confirmed with blanching and refill.  The pedicle was then wrapped with xeroform gauze and dressed appropriately with a telfa and gauze bandage to ensure continued blood supply and protect the attached pedicle. Cheek-To-Nose Interpolation Flap Text: A decision was made to reconstruct the defect utilizing an interpolation axial flap and a staged reconstruction.  A telfa template was made of the defect.  This telfa template was then used to outline the Cheek-To-Nose Interpolation flap.  The donor area for the pedicle flap was then injected with anesthesia.  The flap was excised through the skin and subcutaneous tissue down to the layer of the underlying musculature.  The interpolation flap was carefully excised within this deep plane to maintain its blood supply.  The edges of the donor site were undermined.   The donor site was closed in a primary fashion.  The pedicle was then rotated into position and sutured.  Once the tube was sutured into place, adequate blood supply was confirmed with blanching and refill.  The pedicle was then wrapped with xeroform gauze and dressed appropriately with a telfa and gauze bandage to ensure continued blood supply and protect the attached pedicle. Interpolation Flap Text: A decision was made to reconstruct the defect utilizing an interpolation axial flap and a staged reconstruction.  A telfa template was made of the defect.  This telfa template was then used to outline the interpolation flap.  The donor area for the pedicle flap was then injected with anesthesia.  The flap was excised through the skin and subcutaneous tissue down to the layer of the underlying musculature.  The interpolation flap was carefully excised within this deep plane to maintain its blood supply.  The edges of the donor site were undermined.   The donor site was closed in a primary fashion.  The pedicle was then rotated into position and sutured.  Once the tube was sutured into place, adequate blood supply was confirmed with blanching and refill.  The pedicle was then wrapped with xeroform gauze and dressed appropriately with a telfa and gauze bandage to ensure continued blood supply and protect the attached pedicle. Melolabial Interpolation Flap Text: A decision was made to reconstruct the defect utilizing an interpolation axial flap and a staged reconstruction.  A telfa template was made of the defect.  This telfa template was then used to outline the melolabial interpolation flap.  The donor area for the pedicle flap was then injected with anesthesia.  The flap was excised through the skin and subcutaneous tissue down to the layer of the underlying musculature.  The pedicle flap was carefully excised within this deep plane to maintain its blood supply.  The edges of the donor site were undermined.   The donor site was closed in a primary fashion.  The pedicle was then rotated into position and sutured.  Once the tube was sutured into place, adequate blood supply was confirmed with blanching and refill.  The pedicle was then wrapped with xeroform gauze and dressed appropriately with a telfa and gauze bandage to ensure continued blood supply and protect the attached pedicle. Mastoid Interpolation Flap Text: A decision was made to reconstruct the defect utilizing an interpolation axial flap and a staged reconstruction.  A telfa template was made of the defect.  This telfa template was then used to outline the mastoid interpolation flap.  The donor area for the pedicle flap was then injected with anesthesia.  The flap was excised through the skin and subcutaneous tissue down to the layer of the underlying musculature.  The pedicle flap was carefully excised within this deep plane to maintain its blood supply.  The edges of the donor site were undermined.   The donor site was closed in a primary fashion.  The pedicle was then rotated into position and sutured.  Once the tube was sutured into place, adequate blood supply was confirmed with blanching and refill.  The pedicle was then wrapped with xeroform gauze and dressed appropriately with a telfa and gauze bandage to ensure continued blood supply and protect the attached pedicle. Posterior Auricular Interpolation Flap Text: A decision was made to reconstruct the defect utilizing an interpolation axial flap and a staged reconstruction.  A telfa template was made of the defect.  This telfa template was then used to outline the posterior auricular interpolation flap.  The donor area for the pedicle flap was then injected with anesthesia.  The flap was excised through the skin and subcutaneous tissue down to the layer of the underlying musculature.  The pedicle flap was carefully excised within this deep plane to maintain its blood supply.  The edges of the donor site were undermined.   The donor site was closed in a primary fashion.  The pedicle was then rotated into position and sutured.  Once the tube was sutured into place, adequate blood supply was confirmed with blanching and refill.  The pedicle was then wrapped with xeroform gauze and dressed appropriately with a telfa and gauze bandage to ensure continued blood supply and protect the attached pedicle. Paramedian Forehead Flap Text: A decision was made to reconstruct the defect utilizing an interpolation axial flap and a staged reconstruction.  A telfa template was made of the defect.  This telfa template was then used to outline the paramedian forehead pedicle flap.  The donor area for the pedicle flap was then injected with anesthesia.  The flap was excised through the skin and subcutaneous tissue down to the layer of the underlying musculature.  The pedicle flap was carefully excised within this deep plane to maintain its blood supply.  The edges of the donor site were undermined.   The donor site was closed in a primary fashion.  The pedicle was then rotated into position and sutured.  Once the tube was sutured into place, adequate blood supply was confirmed with blanching and refill.  The pedicle was then wrapped with xeroform gauze and dressed appropriately with a telfa and gauze bandage to ensure continued blood supply and protect the attached pedicle. Abbe Flap (Upper To Lower Lip) Text: The defect of the lower lip was assessed and measured.  Given the location and size of the defect, an Abbe flap was deemed most appropriate.  Using a sterile surgical marker, an appropriate Abbe flap was measured and drawn on the upper lip. Local anesthesia was then infiltrated.  A scalpel was then used to incise the upper lip through and through the skin, vermilion, muscle and mucosa, leaving the flap pedicled on the opposite side.  The flap was then rotated and transferred to the lower lip defect.  The flap was then sutured into place with a three layer technique, closing the orbicularis oris muscle layer with subcutaneous buried sutures, followed by a mucosal layer and an epidermal layer. Abbe Flap (Lower To Upper Lip) Text: The defect of the upper lip was assessed and measured.  Given the location and size of the defect, an Abbe flap was deemed most appropriate.  Using a sterile surgical marker, an appropriate Abbe flap was measured and drawn on the lower lip. Local anesthesia was then infiltrated. A scalpel was then used to incise the upper lip through and through the skin, vermilion, muscle and mucosa, leaving the flap pedicled on the opposite side.  The flap was then rotated and transferred to the lower lip defect.  The flap was then sutured into place with a three layer technique, closing the orbicularis oris muscle layer with subcutaneous buried sutures, followed by a mucosal layer and an epidermal layer. Estlander Flap (Upper To Lower Lip) Text: The defect of the lower lip was assessed and measured.  Given the location and size of the defect, an Estlander flap was deemed most appropriate.  Using a sterile surgical marker, an appropriate Estlander flap was measured and drawn on the upper lip. Local anesthesia was then infiltrated. A scalpel was then used to incise the lateral aspect of the flap, through skin, muscle and mucosa, leaving the flap pedicled medially.  The flap was then rotated and positioned to fill the lower lip defect.  The flap was then sutured into place with a three layer technique, closing the orbicularis oris muscle layer with subcutaneous buried sutures, followed by a mucosal layer and an epidermal layer. Lip Wedge Excision Repair Text: Given the location of the defect and the proximity to free margins a full thickness wedge repair was deemed most appropriate.  Using a sterile surgical marker, the appropriate repair was drawn incorporating the defect and placing the expected incisions perpendicular to the vermilion border.  The vermilion border was also meticulously outlined to ensure appropriate reapproximation during the repair.  The area thus outlined was incised through and through with a #15 scalpel blade.  The muscularis and dermis were reaproximated with deep sutures following hemostasis. Care was taken to realign the vermilion border before proceeding with the superficial closure.  Once the vermilion was realigned the superfical and mucosal closure was finished. Ftsg Text: The defect edges were debeveled with a #15 scalpel blade.  Given the location of the defect, shape of the defect and the proximity to free margins a full thickness skin graft was deemed most appropriate.  Using a sterile surgical marker, the primary defect shape was transferred to the donor site. The area thus outlined was incised deep to adipose tissue with a #15 scalpel blade.  The harvested graft was then trimmed of adipose tissue until only dermis and epidermis was left.  The skin margins of the secondary defect were undermined to an appropriate distance in all directions utilizing iris scissors.  The secondary defect was closed with interrupted buried subcutaneous sutures.  The skin edges were then re-apposed with running  sutures.  The skin graft was then placed in the primary defect and oriented appropriately. Split-Thickness Skin Graft Text: The defect edges were debeveled with a #15 scalpel blade.  Given the location of the defect, shape of the defect and the proximity to free margins a split thickness skin graft was deemed most appropriate.  Using a sterile surgical marker, the primary defect shape was transferred to the donor site. The split thickness graft was then harvested.  The skin graft was then placed in the primary defect and oriented appropriately. Pinch Graft Text: The defect edges were debeveled with a #15 scalpel blade. Given the location of the defect, shape of the defect and the proximity to free margins a pinch graft was deemed most appropriate. Using a sterile surgical marker, the primary defect shape was transferred to the donor site. The area thus outlined was incised deep to adipose tissue with a #15 scalpel blade.  The harvested graft was then trimmed of adipose tissue until only dermis and epidermis was left. The skin margins of the secondary defect were undermined to an appropriate distance in all directions utilizing iris scissors.  The secondary defect was closed with interrupted buried subcutaneous sutures.  The skin edges were then re-apposed with running  sutures.  The skin graft was then placed in the primary defect and oriented appropriately. Burow's Graft Text: The defect edges were debeveled with a #15 scalpel blade.  Given the location of the defect, shape of the defect, the proximity to free margins and the presence of a standing cone deformity a Burow's skin graft was deemed most appropriate. The standing cone was removed and this tissue was then trimmed to the shape of the primary defect. The adipose tissue was also removed until only dermis and epidermis were left.  The skin margins of the secondary defect were undermined to an appropriate distance in all directions utilizing iris scissors.  The secondary defect was closed with interrupted buried subcutaneous sutures.  The skin edges were then re-apposed with running  sutures.  The skin graft was then placed in the primary defect and oriented appropriately. Cartilage Graft Text: The defect edges were debeveled with a #15 scalpel blade.  Given the location of the defect, shape of the defect, the fact the defect involved a full thickness cartilage defect a cartilage graft was deemed most appropriate.  An appropriate donor site was identified, cleansed, and anesthetized. The cartilage graft was then harvested and transferred to the recipient site, oriented appropriately and then sutured into place.  The secondary defect was then repaired using a primary closure. Composite Graft Text: The defect edges were debeveled with a #15 scalpel blade.  Given the location of the defect, shape of the defect, the proximity to free margins and the fact the defect was full thickness a composite graft was deemed most appropriate.  The defect was outline and then transferred to the donor site.  A full thickness graft was then excised from the donor site. The graft was then placed in the primary defect, oriented appropriately and then sutured into place.  The secondary defect was then repaired using a primary closure. Epidermal Autograft Text: The defect edges were debeveled with a #15 scalpel blade.  Given the location of the defect, shape of the defect and the proximity to free margins an epidermal autograft was deemed most appropriate.  Using a sterile surgical marker, the primary defect shape was transferred to the donor site. The epidermal graft was then harvested.  The skin graft was then placed in the primary defect and oriented appropriately. Dermal Autograft Text: The defect edges were debeveled with a #15 scalpel blade.  Given the location of the defect, shape of the defect and the proximity to free margins a dermal autograft was deemed most appropriate.  Using a sterile surgical marker, the primary defect shape was transferred to the donor site. The area thus outlined was incised deep to adipose tissue with a #15 scalpel blade.  The harvested graft was then trimmed of adipose and epidermal tissue until only dermis was left.  The skin graft was then placed in the primary defect and oriented appropriately. Skin Substitute Text: The defect edges were debeveled with a #15 scalpel blade.  Given the location of the defect, shape of the defect and the proximity to free margins a skin substitute graft was deemed most appropriate.  The graft material was trimmed to fit the size of the defect. The graft was then placed in the primary defect and oriented appropriately. Tissue Cultured Epidermal Autograft Text: The defect edges were debeveled with a #15 scalpel blade.  Given the location of the defect, shape of the defect and the proximity to free margins a tissue cultured epidermal autograft was deemed most appropriate.  The graft was then trimmed to fit the size of the defect.  The graft was then placed in the primary defect and oriented appropriately. Xenograft Text: The defect edges were debeveled with a #15 scalpel blade.  Given the location of the defect, shape of the defect and the proximity to free margins a xenograft was deemed most appropriate.  The graft was then trimmed to fit the size of the defect.  The graft was then placed in the primary defect and oriented appropriately. Purse String (Intermediate) Text: Given the location of the defect and the characteristics of the surrounding skin a purse string intermediate closure was deemed most appropriate.  Undermining was performed circumferentially around the surgical defect.  A purse string suture was then placed and tightened. Purse String (Simple) Text: Given the location of the defect and the characteristics of the surrounding skin a purse string simple closure was deemed most appropriate.  Undermining was performed circumferentially around the surgical defect.  A purse string suture was then placed and tightened. Partial Purse String (Intermediate) Text: Given the location of the defect and the characteristics of the surrounding skin an intermediate purse string closure was deemed most appropriate.  Undermining was performed circumferentially around the surgical defect.  A purse string suture was then placed and tightened. Wound tension of the circular defect prevented complete closure of the wound. Partial Purse String (Simple) Text: Given the location of the defect and the characteristics of the surrounding skin a simple purse string closure was deemed most appropriate.  Undermining was performed circumferentially around the surgical defect.  A purse string suture was then placed and tightened. Wound tension of the circular defect prevented complete closure of the wound. Complex Repair And Single Advancement Flap Text: The defect edges were debeveled with a #15 scalpel blade.  The primary defect was closed partially with a complex linear closure.  Given the location of the remaining defect, shape of the defect and the proximity to free margins a single advancement flap was deemed most appropriate for complete closure of the defect.  Using a sterile surgical marker, an appropriate advancement flap was drawn incorporating the defect and placing the expected incisions within the relaxed skin tension lines where possible.    The area thus outlined was incised deep to adipose tissue with a #15 scalpel blade.  The skin margins were undermined to an appropriate distance in all directions utilizing iris scissors. Complex Repair And Double Advancement Flap Text: The defect edges were debeveled with a #15 scalpel blade.  The primary defect was closed partially with a complex linear closure.  Given the location of the remaining defect, shape of the defect and the proximity to free margins a double advancement flap was deemed most appropriate for complete closure of the defect.  Using a sterile surgical marker, an appropriate advancement flap was drawn incorporating the defect and placing the expected incisions within the relaxed skin tension lines where possible.    The area thus outlined was incised deep to adipose tissue with a #15 scalpel blade.  The skin margins were undermined to an appropriate distance in all directions utilizing iris scissors. Complex Repair And Modified Advancement Flap Text: The defect edges were debeveled with a #15 scalpel blade.  The primary defect was closed partially with a complex linear closure.  Given the location of the remaining defect, shape of the defect and the proximity to free margins a modified advancement flap was deemed most appropriate for complete closure of the defect.  Using a sterile surgical marker, an appropriate advancement flap was drawn incorporating the defect and placing the expected incisions within the relaxed skin tension lines where possible.    The area thus outlined was incised deep to adipose tissue with a #15 scalpel blade.  The skin margins were undermined to an appropriate distance in all directions utilizing iris scissors. Complex Repair And A-T Advancement Flap Text: The defect edges were debeveled with a #15 scalpel blade.  The primary defect was closed partially with a complex linear closure.  Given the location of the remaining defect, shape of the defect and the proximity to free margins an A-T advancement flap was deemed most appropriate for complete closure of the defect.  Using a sterile surgical marker, an appropriate advancement flap was drawn incorporating the defect and placing the expected incisions within the relaxed skin tension lines where possible.    The area thus outlined was incised deep to adipose tissue with a #15 scalpel blade.  The skin margins were undermined to an appropriate distance in all directions utilizing iris scissors. Complex Repair And O-T Advancement Flap Text: The defect edges were debeveled with a #15 scalpel blade.  The primary defect was closed partially with a complex linear closure.  Given the location of the remaining defect, shape of the defect and the proximity to free margins an O-T advancement flap was deemed most appropriate for complete closure of the defect.  Using a sterile surgical marker, an appropriate advancement flap was drawn incorporating the defect and placing the expected incisions within the relaxed skin tension lines where possible.    The area thus outlined was incised deep to adipose tissue with a #15 scalpel blade.  The skin margins were undermined to an appropriate distance in all directions utilizing iris scissors. Complex Repair And O-L Flap Text: The defect edges were debeveled with a #15 scalpel blade.  The primary defect was closed partially with a complex linear closure.  Given the location of the remaining defect, shape of the defect and the proximity to free margins an O-L flap was deemed most appropriate for complete closure of the defect.  Using a sterile surgical marker, an appropriate flap was drawn incorporating the defect and placing the expected incisions within the relaxed skin tension lines where possible.    The area thus outlined was incised deep to adipose tissue with a #15 scalpel blade.  The skin margins were undermined to an appropriate distance in all directions utilizing iris scissors. Complex Repair And Bilobe Flap Text: The defect edges were debeveled with a #15 scalpel blade.  The primary defect was closed partially with a complex linear closure.  Given the location of the remaining defect, shape of the defect and the proximity to free margins a bilobe flap was deemed most appropriate for complete closure of the defect.  Using a sterile surgical marker, an appropriate advancement flap was drawn incorporating the defect and placing the expected incisions within the relaxed skin tension lines where possible.    The area thus outlined was incised deep to adipose tissue with a #15 scalpel blade.  The skin margins were undermined to an appropriate distance in all directions utilizing iris scissors. Complex Repair And Melolabial Flap Text: The defect edges were debeveled with a #15 scalpel blade.  The primary defect was closed partially with a complex linear closure.  Given the location of the remaining defect, shape of the defect and the proximity to free margins a melolabial flap was deemed most appropriate for complete closure of the defect.  Using a sterile surgical marker, an appropriate advancement flap was drawn incorporating the defect and placing the expected incisions within the relaxed skin tension lines where possible.    The area thus outlined was incised deep to adipose tissue with a #15 scalpel blade.  The skin margins were undermined to an appropriate distance in all directions utilizing iris scissors. Complex Repair And Rotation Flap Text: The defect edges were debeveled with a #15 scalpel blade.  The primary defect was closed partially with a complex linear closure.  Given the location of the remaining defect, shape of the defect and the proximity to free margins a rotation flap was deemed most appropriate for complete closure of the defect.  Using a sterile surgical marker, an appropriate advancement flap was drawn incorporating the defect and placing the expected incisions within the relaxed skin tension lines where possible.    The area thus outlined was incised deep to adipose tissue with a #15 scalpel blade.  The skin margins were undermined to an appropriate distance in all directions utilizing iris scissors. Complex Repair And Rhombic Flap Text: The defect edges were debeveled with a #15 scalpel blade.  The primary defect was closed partially with a complex linear closure.  Given the location of the remaining defect, shape of the defect and the proximity to free margins a rhombic flap was deemed most appropriate for complete closure of the defect.  Using a sterile surgical marker, an appropriate advancement flap was drawn incorporating the defect and placing the expected incisions within the relaxed skin tension lines where possible.    The area thus outlined was incised deep to adipose tissue with a #15 scalpel blade.  The skin margins were undermined to an appropriate distance in all directions utilizing iris scissors. Complex Repair And Transposition Flap Text: The defect edges were debeveled with a #15 scalpel blade.  The primary defect was closed partially with a complex linear closure.  Given the location of the remaining defect, shape of the defect and the proximity to free margins a transposition flap was deemed most appropriate for complete closure of the defect.  Using a sterile surgical marker, an appropriate advancement flap was drawn incorporating the defect and placing the expected incisions within the relaxed skin tension lines where possible.    The area thus outlined was incised deep to adipose tissue with a #15 scalpel blade.  The skin margins were undermined to an appropriate distance in all directions utilizing iris scissors. Complex Repair And V-Y Plasty Text: The defect edges were debeveled with a #15 scalpel blade.  The primary defect was closed partially with a complex linear closure.  Given the location of the remaining defect, shape of the defect and the proximity to free margins a V-Y plasty was deemed most appropriate for complete closure of the defect.  Using a sterile surgical marker, an appropriate advancement flap was drawn incorporating the defect and placing the expected incisions within the relaxed skin tension lines where possible.    The area thus outlined was incised deep to adipose tissue with a #15 scalpel blade.  The skin margins were undermined to an appropriate distance in all directions utilizing iris scissors. Complex Repair And M Plasty Text: The defect edges were debeveled with a #15 scalpel blade.  The primary defect was closed partially with a complex linear closure.  Given the location of the remaining defect, shape of the defect and the proximity to free margins an M plasty was deemed most appropriate for complete closure of the defect.  Using a sterile surgical marker, an appropriate advancement flap was drawn incorporating the defect and placing the expected incisions within the relaxed skin tension lines where possible.    The area thus outlined was incised deep to adipose tissue with a #15 scalpel blade.  The skin margins were undermined to an appropriate distance in all directions utilizing iris scissors. Complex Repair And Double M Plasty Text: The defect edges were debeveled with a #15 scalpel blade.  The primary defect was closed partially with a complex linear closure.  Given the location of the remaining defect, shape of the defect and the proximity to free margins a double M plasty was deemed most appropriate for complete closure of the defect.  Using a sterile surgical marker, an appropriate advancement flap was drawn incorporating the defect and placing the expected incisions within the relaxed skin tension lines where possible.    The area thus outlined was incised deep to adipose tissue with a #15 scalpel blade.  The skin margins were undermined to an appropriate distance in all directions utilizing iris scissors. Complex Repair And W Plasty Text: The defect edges were debeveled with a #15 scalpel blade.  The primary defect was closed partially with a complex linear closure.  Given the location of the remaining defect, shape of the defect and the proximity to free margins a W plasty was deemed most appropriate for complete closure of the defect.  Using a sterile surgical marker, an appropriate advancement flap was drawn incorporating the defect and placing the expected incisions within the relaxed skin tension lines where possible.    The area thus outlined was incised deep to adipose tissue with a #15 scalpel blade.  The skin margins were undermined to an appropriate distance in all directions utilizing iris scissors. Complex Repair And Z Plasty Text: The defect edges were debeveled with a #15 scalpel blade.  The primary defect was closed partially with a complex linear closure.  Given the location of the remaining defect, shape of the defect and the proximity to free margins a Z plasty was deemed most appropriate for complete closure of the defect.  Using a sterile surgical marker, an appropriate advancement flap was drawn incorporating the defect and placing the expected incisions within the relaxed skin tension lines where possible.    The area thus outlined was incised deep to adipose tissue with a #15 scalpel blade.  The skin margins were undermined to an appropriate distance in all directions utilizing iris scissors. Complex Repair And Dorsal Nasal Flap Text: The defect edges were debeveled with a #15 scalpel blade.  The primary defect was closed partially with a complex linear closure.  Given the location of the remaining defect, shape of the defect and the proximity to free margins a dorsal nasal flap was deemed most appropriate for complete closure of the defect.  Using a sterile surgical marker, an appropriate flap was drawn incorporating the defect and placing the expected incisions within the relaxed skin tension lines where possible.    The area thus outlined was incised deep to adipose tissue with a #15 scalpel blade.  The skin margins were undermined to an appropriate distance in all directions utilizing iris scissors. Complex Repair And Ftsg Text: The defect edges were debeveled with a #15 scalpel blade.  The primary defect was closed partially with a complex linear closure.  Given the location of the defect, shape of the defect and the proximity to free margins a full thickness skin graft was deemed most appropriate to repair the remaining defect.  The graft was trimmed to fit the size of the remaining defect.  The graft was then placed in the primary defect, oriented appropriately, and sutured into place. Complex Repair And Burow's Graft Text: The defect edges were debeveled with a #15 scalpel blade.  The primary defect was closed partially with a complex linear closure.  Given the location of the defect, shape of the defect, the proximity to free margins and the presence of a standing cone deformity a Burow's graft was deemed most appropriate to repair the remaining defect.  The graft was trimmed to fit the size of the remaining defect.  The graft was then placed in the primary defect, oriented appropriately, and sutured into place. Complex Repair And Split-Thickness Skin Graft Text: The defect edges were debeveled with a #15 scalpel blade.  The primary defect was closed partially with a complex linear closure.  Given the location of the defect, shape of the defect and the proximity to free margins a split thickness skin graft was deemed most appropriate to repair the remaining defect.  The graft was trimmed to fit the size of the remaining defect.  The graft was then placed in the primary defect, oriented appropriately, and sutured into place. Complex Repair And Epidermal Autograft Text: The defect edges were debeveled with a #15 scalpel blade.  The primary defect was closed partially with a complex linear closure.  Given the location of the defect, shape of the defect and the proximity to free margins an epidermal autograft was deemed most appropriate to repair the remaining defect.  The graft was trimmed to fit the size of the remaining defect.  The graft was then placed in the primary defect, oriented appropriately, and sutured into place. Complex Repair And Dermal Autograft Text: The defect edges were debeveled with a #15 scalpel blade.  The primary defect was closed partially with a complex linear closure.  Given the location of the defect, shape of the defect and the proximity to free margins an dermal autograft was deemed most appropriate to repair the remaining defect.  The graft was trimmed to fit the size of the remaining defect.  The graft was then placed in the primary defect, oriented appropriately, and sutured into place. Complex Repair And Tissue Cultured Epidermal Autograft Text: The defect edges were debeveled with a #15 scalpel blade.  The primary defect was closed partially with a complex linear closure.  Given the location of the defect, shape of the defect and the proximity to free margins an tissue cultured epidermal autograft was deemed most appropriate to repair the remaining defect.  The graft was trimmed to fit the size of the remaining defect.  The graft was then placed in the primary defect, oriented appropriately, and sutured into place. Complex Repair And Xenograft Text: The defect edges were debeveled with a #15 scalpel blade.  The primary defect was closed partially with a complex linear closure.  Given the location of the defect, shape of the defect and the proximity to free margins a xenograft was deemed most appropriate to repair the remaining defect.  The graft was trimmed to fit the size of the remaining defect.  The graft was then placed in the primary defect, oriented appropriately, and sutured into place. Complex Repair And Skin Substitute Graft Text: The defect edges were debeveled with a #15 scalpel blade.  The primary defect was closed partially with a complex linear closure.  Given the location of the remaining defect, shape of the defect and the proximity to free margins a skin substitute graft was deemed most appropriate to repair the remaining defect.  The graft was trimmed to fit the size of the remaining defect.  The graft was then placed in the primary defect, oriented appropriately, and sutured into place. Path Notes (To The Dermatopathologist): Please check margins. Marking stitch at superior tip. Consent was obtained from the patient. The risks and benefits to therapy were discussed in detail. Specifically, the risks of infection, scarring, bleeding, prolonged wound healing, incomplete removal, allergy to anesthesia, nerve injury and recurrence were addressed. Prior to the procedure, the treatment site was clearly identified and confirmed by the patient. All components of Universal Protocol/PAUSE Rule completed. Post-Care Instructions: I reviewed with the patient in detail post-care instructions. Patient is not to engage in any heavy lifting, exercise, or swimming for the next 14 days. Should the patient develop any fevers, chills, bleeding, severe pain patient will contact the office immediately. Home Suture Removal Text: Patient was provided a home suture removal kit and will remove their sutures at home.  If they have any questions or difficulties they will call the office. Where Do You Want The Question To Include Opioid Counseling Located?: Case Summary Tab Information: Selecting Yes will display possible errors in your note based on the variables you have selected. This validation is only offered as a suggestion for you. PLEASE NOTE THAT THE VALIDATION TEXT WILL BE REMOVED WHEN YOU FINALIZE YOUR NOTE. IF YOU WANT TO FAX A PRELIMINARY NOTE YOU WILL NEED TO TOGGLE THIS TO 'NO' IF YOU DO NOT WANT IT IN YOUR FAXED NOTE.

## 2024-06-20 ENCOUNTER — APPOINTMENT (OUTPATIENT)
Dept: INFUSION THERAPY | Facility: HOSPITAL | Age: 69
End: 2024-06-20

## 2024-06-21 DIAGNOSIS — C79.51 SECONDARY MALIGNANT NEOPLASM OF BONE: ICD-10-CM

## 2024-06-21 DIAGNOSIS — C91.00 ACUTE LYMPHOBLASTIC LEUKEMIA NOT HAVING ACHIEVED REMISSION: ICD-10-CM

## 2024-06-21 DIAGNOSIS — C90.00 MULTIPLE MYELOMA NOT HAVING ACHIEVED REMISSION: ICD-10-CM

## 2024-07-05 ENCOUNTER — APPOINTMENT (OUTPATIENT)
Dept: PULMONOLOGY | Facility: CLINIC | Age: 69
End: 2024-07-05
Payer: MEDICARE

## 2024-07-05 ENCOUNTER — RESULT REVIEW (OUTPATIENT)
Age: 69
End: 2024-07-05

## 2024-07-05 ENCOUNTER — APPOINTMENT (OUTPATIENT)
Dept: HEMATOLOGY ONCOLOGY | Facility: CLINIC | Age: 69
End: 2024-07-05
Payer: MEDICARE

## 2024-07-05 ENCOUNTER — OUTPATIENT (OUTPATIENT)
Dept: OUTPATIENT SERVICES | Facility: HOSPITAL | Age: 69
LOS: 1 days | End: 2024-07-05
Payer: MEDICARE

## 2024-07-05 ENCOUNTER — OUTPATIENT (OUTPATIENT)
Dept: OUTPATIENT SERVICES | Facility: HOSPITAL | Age: 69
LOS: 1 days | Discharge: HOME | End: 2024-07-05

## 2024-07-05 VITALS
TEMPERATURE: 98.5 F | WEIGHT: 219.14 LBS | OXYGEN SATURATION: 97 % | RESPIRATION RATE: 16 BRPM | BODY MASS INDEX: 28.91 KG/M2 | DIASTOLIC BLOOD PRESSURE: 78 MMHG | HEART RATE: 57 BPM | SYSTOLIC BLOOD PRESSURE: 123 MMHG

## 2024-07-05 DIAGNOSIS — Z98.890 OTHER SPECIFIED POSTPROCEDURAL STATES: Chronic | ICD-10-CM

## 2024-07-05 DIAGNOSIS — R79.89 OTHER SPECIFIED ABNORMAL FINDINGS OF BLOOD CHEMISTRY: ICD-10-CM

## 2024-07-05 DIAGNOSIS — C90.00 MULTIPLE MYELOMA NOT HAVING ACHIEVED REMISSION: ICD-10-CM

## 2024-07-05 LAB
BASOPHILS # BLD AUTO: 0.03 K/UL — SIGNIFICANT CHANGE UP (ref 0–0.2)
BASOPHILS NFR BLD AUTO: 0.7 % — SIGNIFICANT CHANGE UP (ref 0–2)
EOSINOPHIL # BLD AUTO: 0.09 K/UL — SIGNIFICANT CHANGE UP (ref 0–0.5)
EOSINOPHIL NFR BLD AUTO: 2.1 % — SIGNIFICANT CHANGE UP (ref 0–6)
HCT VFR BLD CALC: 37.3 % — LOW (ref 39–50)
HGB BLD-MCNC: 12.6 G/DL — LOW (ref 13–17)
IMM GRANULOCYTES NFR BLD AUTO: 0.2 % — SIGNIFICANT CHANGE UP (ref 0–0.9)
LYMPHOCYTES # BLD AUTO: 1.63 K/UL — SIGNIFICANT CHANGE UP (ref 1–3.3)
LYMPHOCYTES # BLD AUTO: 37.8 % — SIGNIFICANT CHANGE UP (ref 13–44)
MCHC RBC-ENTMCNC: 33.3 PG — SIGNIFICANT CHANGE UP (ref 27–34)
MCHC RBC-ENTMCNC: 33.8 G/DL — SIGNIFICANT CHANGE UP (ref 32–36)
MCV RBC AUTO: 98.7 FL — SIGNIFICANT CHANGE UP (ref 80–100)
MONOCYTES # BLD AUTO: 0.48 K/UL — SIGNIFICANT CHANGE UP (ref 0–0.9)
MONOCYTES NFR BLD AUTO: 11.1 % — SIGNIFICANT CHANGE UP (ref 2–14)
NEUTROPHILS # BLD AUTO: 2.07 K/UL — SIGNIFICANT CHANGE UP (ref 1.8–7.4)
NEUTROPHILS NFR BLD AUTO: 48.1 % — SIGNIFICANT CHANGE UP (ref 43–77)
NRBC # BLD: 0 /100 WBCS — SIGNIFICANT CHANGE UP (ref 0–0)
PLATELET # BLD AUTO: 161 K/UL — SIGNIFICANT CHANGE UP (ref 150–400)
RBC # BLD: 3.78 M/UL — LOW (ref 4.2–5.8)
RBC # FLD: 15 % — HIGH (ref 10.3–14.5)
WBC # BLD: 4.31 K/UL — SIGNIFICANT CHANGE UP (ref 3.8–10.5)
WBC # FLD AUTO: 4.31 K/UL — SIGNIFICANT CHANGE UP (ref 3.8–10.5)

## 2024-07-05 PROCEDURE — 99215 OFFICE O/P EST HI 40 MIN: CPT

## 2024-07-05 PROCEDURE — 86901 BLOOD TYPING SEROLOGIC RH(D): CPT

## 2024-07-05 PROCEDURE — 86900 BLOOD TYPING SEROLOGIC ABO: CPT

## 2024-07-05 PROCEDURE — 86850 RBC ANTIBODY SCREEN: CPT

## 2024-07-05 PROCEDURE — 99441: CPT | Mod: 93

## 2024-07-05 RX ORDER — VALACYCLOVIR 500 MG/1
500 TABLET, FILM COATED ORAL DAILY
Qty: 30 | Refills: 5 | Status: ACTIVE | COMMUNITY
Start: 2024-07-05 | End: 1900-01-01

## 2024-07-09 LAB
ALBUMIN MFR SERPL ELPH: 65.3 %
ALBUMIN SERPL ELPH-MCNC: 4.4 G/DL
ALBUMIN SERPL-MCNC: 4 G/DL
ALBUMIN/GLOB SERPL: 1.9 RATIO
ALP BLD-CCNC: 77 U/L
ALPHA1 GLOB MFR SERPL ELPH: 4.5 %
ALPHA1 GLOB SERPL ELPH-MCNC: 0.3 G/DL
ALPHA2 GLOB MFR SERPL ELPH: 9.1 %
ALPHA2 GLOB SERPL ELPH-MCNC: 0.6 G/DL
ALT SERPL-CCNC: 28 U/L
ANION GAP SERPL CALC-SCNC: 14 MMOL/L
AST SERPL-CCNC: 23 U/L
B-GLOBULIN MFR SERPL ELPH: 11.8 %
B-GLOBULIN SERPL ELPH-MCNC: 0.7 G/DL
B2 MICROGLOB SERPL-MCNC: 2.3 MG/L
BILIRUB SERPL-MCNC: 0.4 MG/DL
BUN SERPL-MCNC: 20 MG/DL
CALCIUM SERPL-MCNC: 9.8 MG/DL
CHLORIDE SERPL-SCNC: 104 MMOL/L
CO2 SERPL-SCNC: 23 MMOL/L
CREAT SERPL-MCNC: 0.91 MG/DL
DEPRECATED KAPPA LC FREE/LAMBDA SER: 0.04 RATIO
EGFR: 92 ML/MIN/1.73M2
GAMMA GLOB FLD ELPH-MCNC: 0.6 G/DL
GAMMA GLOB MFR SERPL ELPH: 9.3 %
GLUCOSE SERPL-MCNC: 128 MG/DL
IGA SER QL IEP: 34 MG/DL
IGD SER-MCNC: 386 MG/DL
IGG SER QL IEP: 399 MG/DL
IGM SER QL IEP: 21 MG/DL
INTERPRETATION SERPL IEP-IMP: NORMAL
KAPPA LC CSF-MCNC: 19.73 MG/DL
KAPPA LC SERPL-MCNC: 0.78 MG/DL
LDH SERPL-CCNC: 193 U/L
M PROTEIN MFR SERPL ELPH: 3.3 %
M PROTEIN SPEC IFE-MCNC: NORMAL
MAGNESIUM SERPL-MCNC: 1.9 MG/DL
MONOCLON BAND OBS SERPL: 0.2 G/DL
POTASSIUM SERPL-SCNC: 4.6 MMOL/L
PROT SERPL-MCNC: 6 G/DL
PROT SERPL-MCNC: 6.1 G/DL
PROT SERPL-MCNC: 6.1 G/DL
SODIUM SERPL-SCNC: 142 MMOL/L

## 2024-07-09 RX ORDER — LENALIDOMIDE 25 MG/1
25 CAPSULE ORAL
Qty: 21 | Refills: 0 | Status: ACTIVE | COMMUNITY
Start: 2024-07-09 | End: 1900-01-01

## 2024-07-10 ENCOUNTER — APPOINTMENT (OUTPATIENT)
Dept: ORTHOPEDIC SURGERY | Facility: CLINIC | Age: 69
End: 2024-07-10
Payer: MEDICARE

## 2024-07-10 VITALS — SYSTOLIC BLOOD PRESSURE: 107 MMHG | HEART RATE: 50 BPM | DIASTOLIC BLOOD PRESSURE: 70 MMHG

## 2024-07-10 DIAGNOSIS — M43.17 SPONDYLOLISTHESIS, LUMBOSACRAL REGION: ICD-10-CM

## 2024-07-10 DIAGNOSIS — M51.34 OTHER INTERVERTEBRAL DISC DEGENERATION, THORACIC REGION: ICD-10-CM

## 2024-07-10 PROCEDURE — 99213 OFFICE O/P EST LOW 20 MIN: CPT

## 2024-07-11 ENCOUNTER — NON-APPOINTMENT (OUTPATIENT)
Age: 69
End: 2024-07-11

## 2024-07-11 ENCOUNTER — RESULT REVIEW (OUTPATIENT)
Age: 69
End: 2024-07-11

## 2024-07-11 ENCOUNTER — APPOINTMENT (OUTPATIENT)
Dept: INFUSION THERAPY | Facility: HOSPITAL | Age: 69
End: 2024-07-11

## 2024-07-11 ENCOUNTER — OUTPATIENT (OUTPATIENT)
Dept: OUTPATIENT SERVICES | Facility: HOSPITAL | Age: 69
LOS: 1 days | End: 2024-07-11
Payer: MEDICARE

## 2024-07-11 ENCOUNTER — APPOINTMENT (OUTPATIENT)
Dept: HEMATOLOGY ONCOLOGY | Facility: CLINIC | Age: 69
End: 2024-07-11

## 2024-07-11 ENCOUNTER — APPOINTMENT (OUTPATIENT)
Dept: CT IMAGING | Facility: HOSPITAL | Age: 69
End: 2024-07-11
Payer: MEDICARE

## 2024-07-11 DIAGNOSIS — C90.00 MULTIPLE MYELOMA NOT HAVING ACHIEVED REMISSION: ICD-10-CM

## 2024-07-11 DIAGNOSIS — Z94.84 STEM CELLS TRANSPLANT STATUS: ICD-10-CM

## 2024-07-11 DIAGNOSIS — Z98.890 OTHER SPECIFIED POSTPROCEDURAL STATES: Chronic | ICD-10-CM

## 2024-07-11 DIAGNOSIS — J45.50 SEVERE PERSISTENT ASTHMA, UNCOMPLICATED: ICD-10-CM

## 2024-07-11 PROCEDURE — 71250 CT THORAX DX C-: CPT | Mod: MH

## 2024-07-11 PROCEDURE — 71250 CT THORAX DX C-: CPT | Mod: 26,MH

## 2024-07-12 DIAGNOSIS — Z51.11 ENCOUNTER FOR ANTINEOPLASTIC CHEMOTHERAPY: ICD-10-CM

## 2024-07-12 DIAGNOSIS — C91.00 ACUTE LYMPHOBLASTIC LEUKEMIA NOT HAVING ACHIEVED REMISSION: ICD-10-CM

## 2024-07-12 DIAGNOSIS — C79.51 SECONDARY MALIGNANT NEOPLASM OF BONE: ICD-10-CM

## 2024-07-12 DIAGNOSIS — R11.2 NAUSEA WITH VOMITING, UNSPECIFIED: ICD-10-CM

## 2024-07-18 ENCOUNTER — RESULT REVIEW (OUTPATIENT)
Age: 69
End: 2024-07-18

## 2024-07-18 ENCOUNTER — NON-APPOINTMENT (OUTPATIENT)
Age: 69
End: 2024-07-18

## 2024-07-18 ENCOUNTER — APPOINTMENT (OUTPATIENT)
Dept: INFUSION THERAPY | Facility: HOSPITAL | Age: 69
End: 2024-07-18

## 2024-07-18 ENCOUNTER — APPOINTMENT (OUTPATIENT)
Dept: HEMATOLOGY ONCOLOGY | Facility: CLINIC | Age: 69
End: 2024-07-18

## 2024-07-18 LAB
BASOPHILS # BLD AUTO: 0.01 K/UL — SIGNIFICANT CHANGE UP (ref 0–0.2)
BASOPHILS NFR BLD AUTO: 0.2 % — SIGNIFICANT CHANGE UP (ref 0–2)
EOSINOPHIL # BLD AUTO: 0.17 K/UL — SIGNIFICANT CHANGE UP (ref 0–0.5)
EOSINOPHIL NFR BLD AUTO: 3.7 % — SIGNIFICANT CHANGE UP (ref 0–6)
HBV CORE IGG+IGM SER QL: NONREACTIVE
HBV CORE IGM SER QL: NONREACTIVE
HBV SURFACE AB SER QL: REACTIVE
HBV SURFACE AB SERPL IA-ACNC: 90.3 MIU/ML
HBV SURFACE AG SER QL: NONREACTIVE
HCT VFR BLD CALC: 36.5 % — LOW (ref 39–50)
HEPB DNA PCR INT: NOT DETECTED
HEPB DNA PCR LOG: NOT DETECTED LOGIU/ML
HGB BLD-MCNC: 12.7 G/DL — LOW (ref 13–17)
IMM GRANULOCYTES NFR BLD AUTO: 0.4 % — SIGNIFICANT CHANGE UP (ref 0–0.9)
LYMPHOCYTES # BLD AUTO: 0.88 K/UL — LOW (ref 1–3.3)
LYMPHOCYTES # BLD AUTO: 19.2 % — SIGNIFICANT CHANGE UP (ref 13–44)
MCHC RBC-ENTMCNC: 33.3 PG — SIGNIFICANT CHANGE UP (ref 27–34)
MCHC RBC-ENTMCNC: 34.8 G/DL — SIGNIFICANT CHANGE UP (ref 32–36)
MCV RBC AUTO: 95.8 FL — SIGNIFICANT CHANGE UP (ref 80–100)
MONOCYTES # BLD AUTO: 0.38 K/UL — SIGNIFICANT CHANGE UP (ref 0–0.9)
MONOCYTES NFR BLD AUTO: 8.3 % — SIGNIFICANT CHANGE UP (ref 2–14)
NEUTROPHILS # BLD AUTO: 3.13 K/UL — SIGNIFICANT CHANGE UP (ref 1.8–7.4)
NEUTROPHILS NFR BLD AUTO: 68.2 % — SIGNIFICANT CHANGE UP (ref 43–77)
NRBC # BLD: 0 /100 WBCS — SIGNIFICANT CHANGE UP (ref 0–0)
PLATELET # BLD AUTO: 151 K/UL — SIGNIFICANT CHANGE UP (ref 150–400)
RBC # BLD: 3.81 M/UL — LOW (ref 4.2–5.8)
RBC # FLD: 14.8 % — HIGH (ref 10.3–14.5)
WBC # BLD: 4.59 K/UL — SIGNIFICANT CHANGE UP (ref 3.8–10.5)
WBC # FLD AUTO: 4.59 K/UL — SIGNIFICANT CHANGE UP (ref 3.8–10.5)

## 2024-07-19 ENCOUNTER — APPOINTMENT (OUTPATIENT)
Dept: PULMONOLOGY | Facility: CLINIC | Age: 69
End: 2024-07-19
Payer: MEDICARE

## 2024-07-19 VITALS
TEMPERATURE: 97.3 F | HEART RATE: 70 BPM | SYSTOLIC BLOOD PRESSURE: 126 MMHG | RESPIRATION RATE: 16 BRPM | WEIGHT: 215 LBS | HEIGHT: 73 IN | OXYGEN SATURATION: 96 % | BODY MASS INDEX: 28.49 KG/M2 | DIASTOLIC BLOOD PRESSURE: 72 MMHG

## 2024-07-19 DIAGNOSIS — Z87.01 PERSONAL HISTORY OF PNEUMONIA (RECURRENT): ICD-10-CM

## 2024-07-19 DIAGNOSIS — R06.02 SHORTNESS OF BREATH: ICD-10-CM

## 2024-07-19 DIAGNOSIS — R06.83 SNORING: ICD-10-CM

## 2024-07-19 DIAGNOSIS — J30.89 OTHER ALLERGIC RHINITIS: ICD-10-CM

## 2024-07-19 DIAGNOSIS — J45.50 SEVERE PERSISTENT ASTHMA, UNCOMPLICATED: ICD-10-CM

## 2024-07-19 DIAGNOSIS — G47.33 OBSTRUCTIVE SLEEP APNEA (ADULT) (PEDIATRIC): ICD-10-CM

## 2024-07-19 PROCEDURE — 94010 BREATHING CAPACITY TEST: CPT

## 2024-07-19 PROCEDURE — 95012 NITRIC OXIDE EXP GAS DETER: CPT

## 2024-07-19 PROCEDURE — 99215 OFFICE O/P EST HI 40 MIN: CPT | Mod: 25

## 2024-07-25 NOTE — PHARMACOTHERAPY INTERVENTION NOTE - INTERVENTION CATEGORIES
Rx Refill Note  Requested Prescriptions     Pending Prescriptions Disp Refills    gabapentin (NEURONTIN) 400 MG capsule 90 capsule 0     Sig: Take 1 capsule by mouth 3 (Three) Times a Day.      Last office visit with prescribing clinician: 6/4/2024   Last telemedicine visit with prescribing clinician: 5/14/2024   Next office visit with prescribing clinician: 9/5/2024                         Would you like a call back once the refill request has been completed: [] Yes [] No    If the office needs to give you a call back, can they leave a voicemail: [] Yes [] No    Lisa Vicente MA  07/25/24, 11:25 EDT     Patient Education

## 2024-08-05 ENCOUNTER — NON-APPOINTMENT (OUTPATIENT)
Age: 69
End: 2024-08-05

## 2024-08-06 ENCOUNTER — APPOINTMENT (OUTPATIENT)
Dept: PULMONOLOGY | Facility: CLINIC | Age: 69
End: 2024-08-06

## 2024-08-07 ENCOUNTER — APPOINTMENT (OUTPATIENT)
Dept: PULMONOLOGY | Facility: CLINIC | Age: 69
End: 2024-08-07

## 2024-08-07 PROBLEM — R05.1 ACUTE COUGH: Status: ACTIVE | Noted: 2024-08-07

## 2024-08-07 PROBLEM — U07.1 COVID-19 VIRUS DETECTED: Status: ACTIVE | Noted: 2024-08-07

## 2024-08-07 PROBLEM — J18.9 PNEUMONIA OF LEFT LOWER LOBE DUE TO INFECTIOUS ORGANISM: Status: ACTIVE | Noted: 2024-08-07

## 2024-08-07 PROCEDURE — 99214 OFFICE O/P EST MOD 30 MIN: CPT

## 2024-08-07 NOTE — PHYSICAL EXAM
[No Acute Distress] : no acute distress [No Deformities] : no deformities [No Resp Distress] : no resp distress [Normal Color/ Pigmentation] : normal color/ pigmentation [Oriented x3] : oriented x3 [Normal Affect] : normal affect

## 2024-08-07 NOTE — REVIEW OF SYSTEMS
[Fatigue] : fatigue [Nasal Congestion] : nasal congestion [Cough] : cough [Sputum] : sputum [Wheezing] : wheezing [SOB on Exertion] : sob on exertion [Negative] : Endocrine [Fever] : no fever [Chills] : no chills [Poor Appetite] : no poor appetite [Dyspnea] : no dyspnea

## 2024-08-07 NOTE — ASSESSMENT
[FreeTextEntry1] : Mr. 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 He presents via video for an acute care visit. His chief concern is testing positive for Covid-19 infection 8/6/2024 with symptom onset 8/1/2024 (6 days ago).   1. Cough: - likely RADS/bronchospasm d/t Covid infection and pneumonia.  2. Abnormal CXR at Urgent Care (likely superimposed bacterial pneumonia in LLL given recent Covid infection and immune suppression): - Patient was RX'ed antibiotic - advised to  from pharmacy and initiate care. He is advised to contact office with the name of the abx. he was RXed.   3.  Infection d/t Covid-19: - Symptomatic care with OTC ([place] Cold & Flu). - Increase PO Hydration and increase rest. - Activity as tolerated.  4. RADs: - After completion of 5 day course of prednisone at 50 mg patient is advised to take 20 mg x 7 days then increase to 10 mg x 7 days then discontinue. -Continue albuterol via nebulizer 4 times daily. Continue Breztri 2 puffs twice daily.  5. Hx of Saddle Pulmonary Emboli / Bilateral DVTs -on Eliquis BID   team tasked to obtain recent urgent care summary as it is not available in EMR.  Patient to follow up with Dr. Patten as scheduled. Patient to call with further questions and concerns. Patient verbalizes understanding of care plan and is agreeable.

## 2024-08-07 NOTE — HISTORY OF PRESENT ILLNESS
[TextBox_4] : Mr. FERMIN is a 69-year-old male with a history of multiple myeloma s/p stem cell transplant (2021), , recent active stage of Multiple myeloma currently on chemo regimen including dexamethasone, prostate cancer, basal cell carcinoma, saddle pulmonary emboli (2021), bilateral DVTs, perpetual blood thinners, herniated discs, HLD, high cholesterol, adenomatous polyp of colon, remote environmental and seasonal allergies, Covid-19 (2022), RSV (2022), s/p PNA (4/2024), SOB, post RUL PNA Bronchospasm, ?TBM, Underlying Remote Environmental and Seasonal Allergies, mild ANNE. He presents via video for an acute care visit.  His chief concern is testing positive for Covid-19 infection 8/6/2024 with symptom onset 8/1/2024 (6 days ago).   Patient states he just returned from vacation.  He notes he was on a cruise that started in [place] and stopped at 9 reports for the past 10 days. He admits to symptom onset 6 days ago while on the boat.  He states symptoms of fatigue, cough, and increased shortness of breath on exertion.  He states upon returning home he presented to [facility] urgent care where he was tested positive for COVID infection.  He states he also had a chest x-ray which showed pneumonia in the left lower lobe.  He states he was prescribed 50 mg of prednisone x 5 days during the visit.  He states he was contacted post visit after the radiologist interpreted the chest x-ray and then he was prescribed antibiotic therapy.  He states he will be picking up antibiotic this morning but does not recall the name of the antibiotic.  Patient states since being home he was able to initiate nebulizer 3 times daily which she does feel benefit from. He states he is compliant on Breztri 2 puffs twice daily.  Patient denies fever/chills, decreased appetite, wheezing, shortness of breath at rest, or chest tightness at this time.

## 2024-08-07 NOTE — DISCUSSION/SUMMARY
[FreeTextEntry1] : 8/06/2024 CXR 2V performed at Urgent Care:  COMPARISON: CT chest 7/11/2024.  IMPRESSION: There is an opacity in the left lower lobe which is of uncertain etiology and new from the prior CT chest. No pleural effusion. No pneumothorax. Heart size is normal. Degenerative changes of the thoracic spine.

## 2024-08-07 NOTE — REASON FOR VISIT
[Home] : at home, [unfilled] , at the time of the visit. [Patient] : the patient [Medical Office: ([facility])___] : at the medical office located in  [Acute] : an acute visit [Cough] : cough [Shortness of Breath] : shortness of breath

## 2024-08-08 ENCOUNTER — APPOINTMENT (OUTPATIENT)
Dept: HEMATOLOGY ONCOLOGY | Facility: CLINIC | Age: 69
End: 2024-08-08

## 2024-08-08 ENCOUNTER — APPOINTMENT (OUTPATIENT)
Dept: INFUSION THERAPY | Facility: HOSPITAL | Age: 69
End: 2024-08-08

## 2024-08-13 RX ORDER — ALBUTEROL SULFATE 2.5 MG/3ML
(2.5 MG/3ML) SOLUTION RESPIRATORY (INHALATION)
Qty: 120 | Refills: 3 | Status: ACTIVE | COMMUNITY
Start: 2024-08-13 | End: 1900-01-01

## 2024-08-14 ENCOUNTER — APPOINTMENT (OUTPATIENT)
Dept: ORTHOPEDIC SURGERY | Facility: CLINIC | Age: 69
End: 2024-08-14
Payer: MEDICARE

## 2024-08-14 VITALS
HEIGHT: 73 IN | WEIGHT: 210 LBS | DIASTOLIC BLOOD PRESSURE: 78 MMHG | SYSTOLIC BLOOD PRESSURE: 120 MMHG | HEART RATE: 59 BPM | BODY MASS INDEX: 27.83 KG/M2

## 2024-08-14 DIAGNOSIS — M47.812 SPONDYLOSIS W/OUT MYELOPATHY OR RADICULOPATHY, CERVICAL REGION: ICD-10-CM

## 2024-08-14 DIAGNOSIS — M51.34 OTHER INTERVERTEBRAL DISC DEGENERATION, THORACIC REGION: ICD-10-CM

## 2024-08-14 PROCEDURE — 99214 OFFICE O/P EST MOD 30 MIN: CPT

## 2024-08-14 NOTE — HISTORY OF PRESENT ILLNESS
[2] : a current pain level of 2/10 [Intermit.] : ~He/She~ states the symptoms seem to be intermittent [de-identified] : Patient presents a follow-up visit for Spondylolisthesis & Thoracic DDD. The patient states that he did find alleviation to his symptoms with the Medrol dose pack but continues to have residual symptoms to his mid back area. He states after his recent cruise, he had severe pain that radiated into his chest area. The patient was seen at a [facility] Urgent care and was provided X-Rays of his Chest which shown thoracic degeneration. The patient denies any symptoms of numbness, tingling, or weakness. He denies any radicular symptoms. He would like to restart a course of physical therapy at this time.

## 2024-08-14 NOTE — ADDENDUM
[FreeTextEntry1] : I, Eyad Chen Jr, acted solely as a scribe for Dr. Denny Candelaria on this date 08/14/2024 .  All medical record entries made by the Scribe were at my, Dr. Denny Candelaria, direction and personally dictated by me on 08/14/2024 . I have reviewed the chart and agree that the record accurately reflects my personal performance of the history, physical exam, assessment and plan. I have also personally directed, reviewed, and agreed with the chart.

## 2024-08-14 NOTE — PHYSICAL EXAM
[UE] : Sensory: Intact in bilateral upper extremities [Normal RUE] : Right Upper Extremity: No scars, rashes, lesions, ulcers, skin intact [Normal LUE] : Left Upper Extremity: No scars, rashes, lesions, ulcers, skin intact [Normal] : Oriented to person, place, and time, insight and judgement were intact and the affect was normal [de-identified] : No tenderness to palpation or to percussion over the midline bony structure of the thoracic spine. [de-identified] : AP & Lateral Flexion Extension X-Rays of the Chest performed on 08/05/2024 shows There is an opacity in the left lower lobe which is of uncertain etiology and new from the prior CT chest. No pleural effusion. No pneumothorax. Heart size is normal. Degenerative changes of the thoracic spine.   Personal Review shows Bridging syndesmophytes and significant degeneration behind the atrium of the heart.    Compared X-Rays to CT scan that was performed on 7/11/2024, no significant changes.

## 2024-08-14 NOTE — DISCUSSION/SUMMARY
[de-identified] : I discussed the underlying pathophysiology of the patient's condition & X-Rays findings. I expressed to the patient that based on his X-Ray findings, his degenerative arthritis has not worsened at this time.  It appears that the patient's discomfort was triggered by sleeping on the airplane during a transatlantic flight.  The patient and I discussed his options regarding treatment. Patient was advised to continue with observation of his symptoms. I provided the patient with a detailed prescription for physical therapy for core strengthening and posture. If the patient begins to experience any changes or severe exacerbation of his symptoms, he should reach out to me as soon as possible. Otherwise, he should return to me in two months.

## 2024-08-15 ENCOUNTER — RESULT REVIEW (OUTPATIENT)
Age: 69
End: 2024-08-15

## 2024-08-15 ENCOUNTER — APPOINTMENT (OUTPATIENT)
Dept: INFUSION THERAPY | Facility: HOSPITAL | Age: 69
End: 2024-08-15

## 2024-08-15 ENCOUNTER — APPOINTMENT (OUTPATIENT)
Dept: HEMATOLOGY ONCOLOGY | Facility: CLINIC | Age: 69
End: 2024-08-15
Payer: MEDICARE

## 2024-08-15 VITALS
TEMPERATURE: 97.7 F | WEIGHT: 209 LBS | SYSTOLIC BLOOD PRESSURE: 126 MMHG | BODY MASS INDEX: 27.7 KG/M2 | RESPIRATION RATE: 16 BRPM | DIASTOLIC BLOOD PRESSURE: 77 MMHG | HEIGHT: 73 IN | OXYGEN SATURATION: 98 % | HEART RATE: 80 BPM

## 2024-08-15 DIAGNOSIS — J18.9 PNEUMONIA, UNSPECIFIED ORGANISM: ICD-10-CM

## 2024-08-15 DIAGNOSIS — U07.1 COVID-19: ICD-10-CM

## 2024-08-15 DIAGNOSIS — C90.00 MULTIPLE MYELOMA NOT HAVING ACHIEVED REMISSION: ICD-10-CM

## 2024-08-15 DIAGNOSIS — Z94.84 STEM CELLS TRANSPLANT STATUS: ICD-10-CM

## 2024-08-15 LAB
ALBUMIN SERPL ELPH-MCNC: 4 G/DL
ALP BLD-CCNC: 104 U/L
ALT SERPL-CCNC: 24 U/L
ANION GAP SERPL CALC-SCNC: 15 MMOL/L
AST SERPL-CCNC: 14 U/L
BASOPHILS # BLD AUTO: 0.01 K/UL — SIGNIFICANT CHANGE UP (ref 0–0.2)
BASOPHILS NFR BLD AUTO: 0.2 % — SIGNIFICANT CHANGE UP (ref 0–2)
BILIRUB SERPL-MCNC: 0.2 MG/DL
BUN SERPL-MCNC: 24 MG/DL
CALCIUM SERPL-MCNC: 9.3 MG/DL
CHLORIDE SERPL-SCNC: 104 MMOL/L
CO2 SERPL-SCNC: 21 MMOL/L
CREAT SERPL-MCNC: 0.97 MG/DL
EGFR: 85 ML/MIN/1.73M2
EOSINOPHIL # BLD AUTO: 0 K/UL — SIGNIFICANT CHANGE UP (ref 0–0.5)
EOSINOPHIL NFR BLD AUTO: 0 % — SIGNIFICANT CHANGE UP (ref 0–6)
GLUCOSE SERPL-MCNC: 157 MG/DL
HCT VFR BLD CALC: 36 % — LOW (ref 39–50)
HGB BLD-MCNC: 12.1 G/DL — LOW (ref 13–17)
IGD SER-MCNC: 298 MG/DL
IMM GRANULOCYTES NFR BLD AUTO: 0.5 % — SIGNIFICANT CHANGE UP (ref 0–0.9)
LYMPHOCYTES # BLD AUTO: 0.82 K/UL — LOW (ref 1–3.3)
LYMPHOCYTES # BLD AUTO: 13.6 % — SIGNIFICANT CHANGE UP (ref 13–44)
MCHC RBC-ENTMCNC: 33.1 PG — SIGNIFICANT CHANGE UP (ref 27–34)
MCHC RBC-ENTMCNC: 33.6 G/DL — SIGNIFICANT CHANGE UP (ref 32–36)
MCV RBC AUTO: 98.4 FL — SIGNIFICANT CHANGE UP (ref 80–100)
MONOCYTES # BLD AUTO: 0.38 K/UL — SIGNIFICANT CHANGE UP (ref 0–0.9)
MONOCYTES NFR BLD AUTO: 6.3 % — SIGNIFICANT CHANGE UP (ref 2–14)
NEUTROPHILS # BLD AUTO: 4.8 K/UL — SIGNIFICANT CHANGE UP (ref 1.8–7.4)
NEUTROPHILS NFR BLD AUTO: 79.4 % — HIGH (ref 43–77)
NRBC # BLD: 0 /100 WBCS — SIGNIFICANT CHANGE UP (ref 0–0)
PLATELET # BLD AUTO: 166 K/UL — SIGNIFICANT CHANGE UP (ref 150–400)
POTASSIUM SERPL-SCNC: 4.8 MMOL/L
PROT SERPL-MCNC: 5.8 G/DL
RBC # BLD: 3.66 M/UL — LOW (ref 4.2–5.8)
RBC # FLD: 14.6 % — HIGH (ref 10.3–14.5)
SODIUM SERPL-SCNC: 140 MMOL/L
WBC # BLD: 6.04 K/UL — SIGNIFICANT CHANGE UP (ref 3.8–10.5)
WBC # FLD AUTO: 6.04 K/UL — SIGNIFICANT CHANGE UP (ref 3.8–10.5)

## 2024-08-15 PROCEDURE — 99214 OFFICE O/P EST MOD 30 MIN: CPT

## 2024-08-15 NOTE — CONSULT LETTER
[Dear  ___] : Dear  [unfilled], [Courtesy Letter:] : I had the pleasure of seeing your patient, [unfilled], in my office today. [Please see my note below.] : Please see my note below. [Consult Closing:] : Thank you very much for allowing me to participate in the care of this patient.  If you have any questions, please do not hesitate to contact me. [Sincerely,] : Sincerely, [DrVladislav  ___] : Dr. BELTRAN [FreeTextEntry2] : Dyana Cheatham M.D.\par  [facility]\par  [address]\par  [place], [place]   [address] [FreeTextEntry3] : \par  Pao Sorenson M.D.\par  \par   of Medicine\par  [employer]\par  [facility] \par  [facility]\par  [address] [place] [address] \[place] [address] \par  ph: [phone]\par  fax: [phone]

## 2024-08-15 NOTE — PHYSICAL EXAM
[Normal] : normoactive bowel sounds, soft and nontender, no hepatosplenomegaly or masses appreciated [de-identified] : anicteric [de-identified] : RRR, normal S1S2 [de-identified] : trace edema bilateral  [de-identified] : no cervical/SCV adenopathy [de-identified] : no rash [de-identified] : A & O x 4

## 2024-08-15 NOTE — REVIEW OF SYSTEMS
[Fatigue] : fatigue [Diarrhea: Grade 0] : Diarrhea: Grade 0 [Hot Flashes] : hot flashes [Shortness Of Breath] : shortness of breath [Cough] : cough [Fever] : no fever [Chills] : no chills [Mucosal Pain] : no mucosal pain [Chest Pain] : no chest pain [Abdominal Pain] : no abdominal pain [Vomiting] : no vomiting [Skin Rash] : no skin rash [Dizziness] : no dizziness [Fainting] : no fainting [Easy Bleeding] : no tendency for easy bleeding [Easy Bruising] : no tendency for easy bruising [FreeTextEntry3] : ( seen by [facility] in 4/2022) [FreeTextEntry4] : no sore throat [FreeTextEntry5] : slight edema in both ankles [FreeTextEntry7] : no nausea; no diarrhea [FreeTextEntry9] : no new bone pain; +muscle spasms in back [de-identified] : no paresthesias

## 2024-08-15 NOTE — ASSESSMENT
[FreeTextEntry1] : IgD lambda myeloma s/p cycle 6 RVD on 9/30/21  Mobilization of stem cells with Zarxio 960mcg subcutaneous daily 10/28/21-11/1/21  He was admitted to the BMTU on 11/15/21 and received conditioning chemotherapy with Melphalan 100 mg/m2 IV x 2 consecutive days followed by autoPSCT on 11/19/21.  Hospital course complicated by saddle pulmonary embolism and bilateral LE acute DVTs. On 11/24/21 he underwent a mechanical thrombectomy. IVC filter placement was deferred. He received anticoagulation, now on Eliquis. He also had terminal ileitis with ? microperforation, monitored in SICU, treated conservatively with supportive care and antibiotics. He completed a course of Meropenem 12/7/21.  1) Multiple myeloma- s/p autoPSCT on 11/19/21 Plan- Discussed maintenance therapy post autoPSCT- Revlimid +/- Velcade/Dex, with potential side effects discussed during 3/3/22 office visit.  Patient initiated Revlimid 10 mg po daily on 3/15/22, then held 3/22- 3/27 for URTI symptoms. Resumed on 3/28/22 and completed 28 day supply(1 cycle)  Patient had second opinion consult Dr. Andrew Stack at [facility] on 3/31/22. Dr. Stack contacted me on 4/1/22 to discuss his recommendations to further management of patient's myeloma.  Given measurable Urine M-John(24 hr urine), myeloma with t(11;14) fusion detected, Dr. Stack discussed combination Venetoclax daily/Carfilzomib(C1D1 20 mg/m2 with increase to 56 mg/m2 on C1D8 given on days 1, 8, 15 of 28 day cycle) consolidation x 4 cycles. The patient agrees to proceed with this treatment regimen with C1D1 on 6/2/22.   He will undergo repeat bone marrow evaluation with FISH myeloma panel and NGS/RNA seq at [facility]. Plan is for late May. He will be referred for Cardiology consult to evaluate for cardiac amyloidosis- Echo with strain imaging and Cardiac MRI- done. Whole body PET/CT scan to assess for lytic bone disease; prior MRI T-spine(5/30/21)- mild loss of vertebral body height of T-12. Test done on 5/2/22- no new bone lesions, no abnormal FDG activity Bone density to assess for osteoporosis-(4/13/22)- impression is normal study. Plan to begin Xgeva with consolidation therapy.  07/07/22: Carfilzomib 56 mg/m2 IV days 1, 8, 15 of 28 day cycle) consolidation x 4 cycles. Today is C2 day 8. Venetoclax 400 mg po daily starting on 6/2/22, he is tolerating well; now increased to 800 mg po daily on 7/2/22. Continue Acyclovir prophylaxis I reviewed lab results from 6/30/22 with patient and his wife, IgD decreased to 8 mg/dL.  08/11/22: Carfilzomib 56 mg/m2 IV days 1, 8, 15 of 28 day cycle) consolidation x 4 cycles. Today is C3 day 15. Venetoclax 400 mg po daily starting on 6/2/22, he is tolerating well; now increased to 800 mg po daily on 7/2/22. Continue Acyclovir prophylaxis  09/19/22: Carfilzomib 56 mg/m2 IV days 1, 8, 15 of 28 day cycle) consolidation x 4 cycles. On 9/15/22, he received C4 day 15. Decadron 40 mg po weekly with Carfilzomib completed. Venetoclax 400 mg po daily starting on 6/2/22, he is tolerating well; now increased to 800 mg po daily on 7/2/22 to complete course of therapy on 9/22/22. Continue Acyclovir prophylaxis He has Telehealth visit with Dr. Stack on 10/3/22 and bone marrow biopsy on 12/5/22.  10/20/22: Carfilzomib 56 mg/m2 IV days 1, 8, 15 of 28 day cycle) consolidation x 4 cycles. On 9/15/22, he received C4 day 15. Decadron 40 mg po weekly with Carfilzomib completed. Venetoclax 400 mg po daily starting on 6/2/22, he is tolerating well; now increased to 800 mg po daily on 7/2/22 to complete course of therapy on 9/22/22. Continue Acyclovir prophylaxis He had Telehealth visit with Dr. Stack on 10/13/22 and bone marrow biopsy on 12/5/22.  11/17/22: Completed Carfilzomib/Dex/Venetoclax consolidation x 4 cycles Continue Acyclovir prophylaxis He had Telehealth visit with Dr. Stack on 10/13/22 and bone marrow biopsy on 12/5/22. He has 24 hr urine for UPEP bone by Dr. Stack. Schedule post transplant vaccines for 12 months post autoPSCT  02/09/23: Completed Carfilzomib/Dex/Venetoclax consolidation x 4 cycles Continue Xgeva monthly; check Vit D level, continue Calcium Continue Acyclovir prophylaxis Continue PPI as needed Drink plenty of water daily Check IMEL with IgD level, CMP, Mg; repeat Testosterone level Schedule PFT's at 1 yr post transplant to assess vital organ function- still pending, will hold off until prostate cancer treatment completed. Echo done by Cardiology He had Telehealth visit with Dr. Stack on 10/13/22 and bone marrow biopsy on 2/23/23. He has 24 hr urine for UPEP bone by Dr. Stack. Scheduled for post transplant vaccines for 14 months post autoPSCT; he completed 12 month vaccines  Of note, patient tripped over uneven ledge in a garage while on vacation in August 2022, he states any person would have tripped on this. He is Not weak, no neuropathy, no balance problems. He knows to pay attention now to where he is walking. He does require physical therapy.  11/30/23: Completed Carfilzomib/Dex/Venetoclax consolidation x 4 cycles Continue Xgeva every 3 months, adjusted per Dr. Stack; check Vit D level, continue Calcium. Completed Acyclovir prophylaxis; he received Shingrix vaccine dose #1 in April, second dose in July 2023. He can discontinue Acyclovir 2 weeks after 2nd vaccine. Continue PPI as needed Drink plenty of water daily Schedule PFT's at 1 yr post transplant to assess vital organ function- still pending, will hold off until prostate cancer treatment completed. Echo done by Cardiology He had Telehealth visit with Dr. Stack on 10/13/22 and bone marrow biopsy on 2/23/23. He has 24 hr urine for UPEP bone by Dr. Stack. He completed 12 month vaccines(12/7/22, 1/12/23; then 14 month vaccines(3/9 and 3/16/23). Last set at 2 yrs post transplant. He is due for post-transplant vaccines in December 07/05/24: He has recently been diagnosed with POD with rising IgD level and LFLC. Dr. Stack recommends Stephanie-Rd for treatment, and he will receive it at [facility] To begin D-Rd for POD on 7/11/24 Darzalex Faspro as per standard guidelines Lenalidomide 25 mg po days 1-21, followed by 7 days off. Dexamethasone 40 mg po on days 1, 8, 15, 22 I reviewed calendar for above treatment regimen provided to patient by Dr. Stack's office I discussed potential benefits as well as side effects for D-Rd regimen and after all questions addressed, patient signed Chemotherapy/Immunotherapy informed consent.  OF NOTE, the patient has already scheduled very important family vacation to take a cruise and he states this family vacation is extremely important to him and for his quality of life. He will proceed with D-Rd on 7/11 and will receive Darzalex on 7/18. He will then STOP Lenalidomide one day before he is leaving on his cruise and not resume it until he returns home and if he has no symptoms of infection. I had a discussion with patient about the Lenalidomide and since it is New medication with this regimen and he will be leaving the [place] it is a mutual decision to Hold Lenalidomide for the duration of his vacation for safety reasons. He will resume chemotherapy on 8/8/24.  Continue Valtrex prophylaxis He will remain on Eliquis for h/o saddle PE Continue Xgeva q 3 months Check CMP, LDH, Mg, IMEL, IgD level, B2M today  08/15/24: HOLD chemotherapy in setting of recent LLL pneumonia superimposed with COVID infection Hold Revlimid Complete steroid taper Check CMP, IMEL, IgD level Continue Valtrex F/U with Dr. Stack for bone marrow biopsy results on Telehealth  2) H/O saddle PE s/p thrombectomy; h/o bilat LE DVT Plan- Continue Eliquis 5 mg po 2X/day He will followup with Dr. Holley as directed  RTC for Darzalex in 2 weeks

## 2024-08-15 NOTE — HISTORY OF PRESENT ILLNESS
[de-identified] : 11/2020-Found to have T-12 compression fracture. Saw orthopedist Dr. Candelaria in 1/2021. Referred to endocrinologist Dr. Acosta by Dr. 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 Serum immunofixation showed 1 IgD lambda band and free lambda light chains. Urine immunofixation negative for monoclonal band.\par  4/29/2021-Bone marrow biopsy and bone marrow aspirate consistent with plasma cell myeloma (greater than 90% involvement). Myeloma FISH studies showed CCND1/IGH fusion (27%). Flow cytometry positive for monotypic plasma cells. Lymphocyte immunophenotypic findings showed no diagnostic abnormalities. Cytogenetics with normal male karyotype. [place] red stain negative. Iron stains showed iron stores present. No ring sideroblasts.\par  5/13/21- C1D1 RVd\par   [de-identified] : Since initial [facility] transplant consult visit on 6/7/21, he had recent hematology office visit on 8/19/21, Cycle #5 Day#15 Velcade/Decadron. Cycle #5 Revlimid as planned. He describes moderate fatigue, occasional blurry vision, insomnia with Decadron. He denies fever, chills, cough, dyspnea. He received the COVID-19 vaccine(Moderna) on 3/16, 4/13/21; he has booster vaccine on 8/31/21. The patient is very concerned about the delta variant of COVID-19 and asked to discuss isolation/restriction policies.   10/6/21: He completed cycle 6 RVD on 9/30/21. He has moderate fatigue and good appetite. He has occasional blurry vision and was evaluated by Optho with normal evaluation. He had MRI Brain with alton(9/29/21 at [facility])- negative. He denies fever,  chills, cough, shortness of breath.   12/9/21: The patient was admitted to the BMTU on 11/15/21 and received conditioning chemotherapy with Melphalan 100 mg/m2 IV x 2 consecutive days followed by autoPSCT on 11/19/21.   Upon admission, a TLC was placed in IR. Mr. Julio received IV hydration, pain management, anxiolytics, antiemetics, nutritional support, and antibacterial / antiviral / antifungal / GI / PCP and VOD (SOS) prophylaxis. When his ANC dropped below 500 he was started on prophylactic Ciprofloxacin. Labs were monitored on a daily basis, and he received electrolyte repletion and transfusional support as needed.   On 11/19/2021 After pre-medication  received 251 mL of, Autologous mobilized,  plasma reduced, pooled, thawed, washes HCP apheresis for  over approximately 1  hr. Cell counts as follows  Total MNC( x10^8/kg)=7.22  CD34+cells ( x10^6 kg)=4.58  Cell Viability (%)= 90  Mr. Julio tolerated the infusion well with no adverse resections noted.   While admitted, Mr. Julio experienced pancytopenia related to the high dose chemotherapy conditioning regimen. He was treated with transfusional support. On  11/19/21 he experienced a vasovagal episode in the bathroom that was self limiting. On 11/23/21 he had another vasovagal episode with severe abdominal pain and distention. A RRT was called. During the episode he was hypotensive, lactate was 6. A CT A/P showed pneumoperitoneum and terminal ileitis. Surgery was consulted and he was transferred to the SICU. During the episode, he made a troponin. A TTE was completed in the SICU, which showed increased pulmonary pressures. As a result, a CTA of the chest was completed which showed a saddle pulmonary embolism. He was started on full dose heparin. Lower extremity dopplers showed bilateral acute DVTs. On 11/24/21 he underwent a mechanical thrombectomy. IVC filter placement was deferred.   The terminal ileitis and pneumoperitoneum was treated conservatively with supportive care and antibiotics. He completed a course of Meropenem 12/7/21. A  repeat CT A/P showed unchanged ileitis, with mild increased fluid distention and resolution of free air. The heparin was transitioned to full dose Lovenox,  and eventually Eliquis. Shortly thereafter he was transferred back to .   Currently, he is stable for discharge home with outpatient follow up at the [facility] and with vascular cardiology.   Since discharge to home on 12/7/21 he has moderate fatigue and slowly improving appetite. He describes loose stool(small volume) twice daily with abdominal bloating and gassiness since discharge but denies nausea, vomiting.   12/16/21: He continues to have moderate fatigue and continued slow improvement in appetite. He started taking Gas-X twice daily after 12/9/21 office visit and stopped on 12/13 as his abd bloating with gassiness significantly improved. He states loose stool has improved, now only once daily since 12/17, but no nausea/vomiting. He still has bilateral lower extremity edema. He scheduled followup with vascular cardiology on 1/13/22. He denies fever, cough, shortness of breath.   12/22/21: He has mild-moderate fatigue and good appetite. He denies abdominal pain/distension and hasn't needed Gas-X for one week. He denies nausea/vomiting, diarrhea. No fever, chills.   01/05/22: He has mild fatigue and good appetite. He denies fever, chills, cough, dyspnea, nausea/vomiting, abdominal pain, diarrhea. He walks on treadmill daily x 20 minutes.   01/19/22: Patient called the office on 1/11/22 to discuss new onset of RLQ pain last night when sleeping and it awakened him when he was changing his position. He went back to sleep. He woke up around 6:30 am and noted sharp pain with movement, therefore, he has been trying not to move around too much. He denies nausea, vomiting, diarrhea. He has eaten breakfast and lunch without difficulty and no associated symptoms. He denies fever, chills, cough, shortness of breath. No acute swelling in lower extremities, no chest pain.  He states pain is 3-4/10, non-radiating, and only occurs with movement.  Plan-  CT Abd/pelvis with oral and IV contrast.  NPO after 9 am in case CT approved and scan can be done tomorrow afternoon.  If he develops worsening pain, fever, chills, vomiting or other severe new symptoms he will go to [facility] immediately.  I contacted patient on 1/12/22 to discuss results of CT Abd/pelvis(1/12/22)- acute appendicitis. Given persistent RLQ pain today, I instructed patient to go to [facility] ER for surgery evaluation. He agrees. Patient admitted to surgical service and treated conservatively with Meropenem IV. His abdominal pain resolved and he was discharged to home on 1/14/22 to finish course of antibiotics with Cipro/Flagyl.  Since discharge on 1/14/22, he feels well overall with only mild fatigue, and appetite is good. He denies abdominal pain, nausea, vomiting, diarrhea, fever, chills. Weight this morning- 188.5 lbs.   02/02/22: He describes good energy and good appetite. He denies fever, cough, shortness of breath, nausea, vomiting, diarrhea. Weight this morning- 189 lbs.  2/16/22: He describes good energy and good appetite. He denies fever, cough, shortness of breath, nausea, vomiting, diarrhea. He had Vascular Cardio followup with Echo and LE duplex study ordered.   03/03/22: He describes good energy and good appetite. He denies fever, cough, shortness of breath, nausea, vomiting, diarrhea.   03/30/22: Patient called the answering service on 3/19 and spoke to on call physician. He described sore throat/cough for the past three days starting 3/17. He states that the throat is getting better but just now he was febrile to 100.8. I advised going to the ER but he wants to take a Tylenol and wait it out for tonight since he is getting better in general. He agrees that if fever persists overnight, he will go to the ER.   on 3/22, patient calls to give update stating that his sore throat resolved on 3/19 but he developed T- 100.8. He took Tylenol once and he states fever resolved and did not recur. He still has dry cough which is improving, and he is taking Robitussin. He states that day 1 of Revlimid maintenance was on 3/15/22.  Plan- Given recent URTI symptoms with persistent cough, I instructed patient to HOLD Revlimid through 3/27/22, and resume on Monday 3/28/22. Drink plenty of water and rest.  He will have lab work on 3/28 at local [facility] prior to Telehealth visit on 3/30/22.  If he develops new symptoms he will call the office immediately.   Today, patient states he has mild fatigue and good appetite. He has slight dry cough but he denies fever, chills, sore throat, shortness of breath, nausea, vomiting, diarrhea.  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   04/07/22: The patient describes mild fatigue and good appetite. He has occasional cough but denies fever, chills, shortness of breath, bone pain.   05/19/22: Patient called the office(4/19) to inform me that he developed blurry vision and light sensitivity since 4/15, he has Optho appointment today. Also, last week he noted onset of few second "zaps" like from an electronic stimulation machine that a physical therapist uses. He feels this sensation at these sites: both ankles, lateral left knee, occasionally on left hip. He estimates total number of events as 5-10 daily. He did have similar symptoms in his right ribs last year which eventually resolved. He does not have paresthesias in his hands and feet.  He is scheduled for bone marrow biopsy at [facility] on 5/3/22. He was seen by Cardio for evaluation of cardiac amyloid and had Echo on 4/18, and MRI Cardiac on 4/26. Bone marrow rescheduled for late May.  He did see Optho and took his contacts out for 1 week and used prescription eye drops, his vision is now normal. The sensation of "zaps" is decreasing.  He describes mild fatigue and good appetite. He has occasional cough but denies fever, chills, shortness of breath. He did develop low back pain after golfing, at area of prior T-12 compression fracture. He has Ortho followup on 6/8/22.   07/0722: Patient describes mild fatigue, also difficulty sleeping post Decadron. He denies fever, chills, cough, dyspnea.   08/11/22: Patient describes mild fatigue, also difficulty sleeping post Decadron. He saw an oral surgeon for a sore on his lower left gingiva, impression is that it is not osteonecrosis. Oral surgeon is aware he is on Xgeva. He has followup in 2 weeks. He has seen his Ortho surgeon for back spasms. He had MRI T-spine(7/13/22)- also revealed ? subacute fracture left 6th rib. He denies fever, chills, shortness of breath.   09/19/22: The patient will be completing Venetoclax on 9/22/22, has completed 4 cycles of Carfilzomib/Dex on 9/15/22. He complains of moderate fatigue and good appetite. He denies fever, chills, cough, shortness of breath.   10/20/22: Patient had followup with Dr. Stack on 10/13/22 at [facility]. He has mild fatigue and good appetite. He had Ortho spine followup on 9/21/22. He denies fever, chills, cough, shortness of breath.   11/17/22: Patient describes increased fatigue and gastrointestinal symptoms x 8 days starting 10/31/22, now resolved. No fever, COVID-19 home test negative. He has mild fatigue and good appetite. He denies fever, cough, shortness of breath  12/15/22: Patient developed cough, congestion, and headache x 4 days post exposure to his sick grandchild. He went to [facility] ED on 11/21/22. Evaluation included RVP- positive for entero/rhinovirus; COVID-19 PCR- Not detected; CXR- clear lungs. He was discharged to home with Yanick Alexis, instructions for supportive care. His symptoms continued and then he was seen in [facility] on 11/26, discharge on Amoxicillin x 7 days. He thought he was starting to feel slightly better last week but then cough and congestion returned.  No longer taking antibiotics.  Patient reports hard time sleeping at night due to increased cough and congestion.  Reports occasional wheezing.  No known fevers.  Denies chest pain.  Slight shortness of breath and cough is present.  Denies any abdominal pain, nausea, vomiting, diarrhea. He returned to [facility] ED on 12/12/22, at this time RVP- positive for RSV, and Coronavirus(Not COVID-19). Blood cultures negative; CXR- clear lungs, O2 sat- 95% RA. Seen by Pulmonary consult. Discharged to home on Zpac, Prednisone 20 mg po x 5 days, Proventil MDI, Mucinex, Tylenol prn.   02/09/23: He has mild fatigue and good appetite. He denies fever, chills, shortness of breath, diarrhea. He has followed by Urology for elevated PSA. He underwent prostate biopsy on 1/31/23- adenocarcinoma, Nissa score 7.  He has appointment with Rad Onc and Oncologist on 2/15/23.   05/11/123: The patient had Telehealth office visit with Dr. Stack from transplant service at [facility]. Lab work completed on 5/4/23, and discussed with patient. He was told lab work is stable. He has mild fatigue and good appetite. He denies fever, chills, shortness of breath, diarrhea. He has Cyberknife surgery at [facility] for prostate cancer on 8/7- 8/11/23. He also saw his dentist and then oral surgeon recently on 4/27, with biopsy done of bone. Therefore, Xgeva is on HOLD pending completion of dental evaluation.   11/30/23: Patient is followed closely by Dr. Andrew Stack at [facility] in [place] He has Telehealth visit with him on 12/19/23, with lab work on 12/12/23 done locally at [facility]. He feels well with good energy level and good appetite. He denies fever, chills, shortness of breath, diarrhea. He has completed Cyberknife surgery at [facility] for prostate cancer and has followup on 12/26 with Dr. Devries.   07/5/24: The patient has been following with Dr. Andrew Stack at [facility] in [place] He has recently been diagnosed with POD with rising IgD level and LFLC. He has moderate fatigue and good appetite. He has joint pains(chronic) and some increased mid- low back pain but denies any new areas of bone pain. No paresthesia. He also is being treated for prostate cancer by Dr. Devries at [facility] and is receiving hormonal therapy. Dr. Stack recommends Stephanie-Rd for treatment, and he will receive it at [facility]  08/15/24: He had last Darzalex on 7/18, prior to plane travel to [place] on 7/24. He developed severe pain in pectoral muscles of chest. He took Medrol pack that was given by Ortho for back pain. He developed COVID19 at end of trip and tested positive at [facility] on 8/6, also CXR- LLL infiltrate, + COVID test. He took Doxycycline through 8/14 and steroid pack. He is still on steroid taper. He denies fever, chills. He has less cough and shortness of breath.

## 2024-08-15 NOTE — RESULTS/DATA
[FreeTextEntry1] : I reviewed lab results from 08/15/24, ordered by Dr. Stack ---------------------------------------------------------------------------- ACC: [id number] EXAM: CT ABDOMEN AND PELVIS OC IC  PROCEDURE DATE: 01/12/2022  INTERPRETATION: CLINICAL INFORMATION: Right lower quadrant pain for  Multiple myeloma. Previous terminal ileitis.  COMPARISON: CT scan of the abdomen and pelvis from 11/29/2021  CONTRAST/COMPLICATIONS: IV Contrast: Omnipaque 350 90 cc administered 10 cc discarded Oral Contrast: Omnipaque 300 Complications: None reported at time of study completion  PROCEDURE: CT of the Abdomen and Pelvis was performed. Sagittal and coronal reformats were performed.  FINDINGS: LOWER CHEST: Within normal limits.  LIVER: Within normal limits. BILE DUCTS: Normal caliber. GALLBLADDER: Small gallstones without gross inflammation. SPLEEN: Within normal limits. PANCREAS: Within normal limits. ADRENALS: Within normal limits. KIDNEYS/URETERS: Left renal cyst measuring up to 7.0 cm in the left lower  pole. 1.3 cm low-attenuation lesion in the lower pole the left kidney  laterally which is measuring greater than simple fluid in density.  BLADDER: Bladder is underdistended which limits evaluation. REPRODUCTIVE ORGANS: Prominent prostate gland  BOWEL: No bowel obstruction. Appendix is mildly distended measuring up to  1.0 cm which measured approximately 9 mm previously. There is question of  minimally increased enhancement. There is mild surrounding soft tissue  stranding although fluid was present in this region on the prior study.  This may represent chronic changes although in a patient with right lower  quadrant pain, acute appendicitis must also be considered.. Resolution of  previously visualized distended small bowel. Previously mentioned  terminal ileitis is not well appreciated on the current study. Colonic  diverticuli predominantly in the sigmoid without gross inflammation. PERITONEUM: No ascites. VESSELS: Within normal limits. RETROPERITONEUM/LYMPH NODES: No lymphadenopathy. ABDOMINAL WALL: Small umbilical hernia containing fat. BONES: Status post ORIF of the left hemipelvis with stable postsurgical  changes. Stable lucencies in bone, most pronounced and T12 and L5.  Degenerative changes of bone. Spondylolisthesis with L5 anterior to L1 of  approximately 25-50%.  IMPRESSION:  Mildly distended appendix. Mild surrounding soft tissue stranding where  fluid was seen on the prior study. This may represent chronic residual  changes although acute appendicitis must also be considered, especially  in a patient with right lower quadrant pain. This was discussed with Dr. Sorenson at 3:00 PM on 1/12/2022.  Resolution of small bowel obstruction. Stable bone findings.  --- End of Report ---  JEFF PHAM MD; Attending Radiologist

## 2024-08-15 NOTE — REASON FOR VISIT
[Follow-Up Visit] : a follow-up visit for [FreeTextEntry2] : multiple myeloma s/p high-dose chemotherapy followed by autologous peripheral blood stem cell transplant on 11/19/21, now with POD

## 2024-08-17 LAB
ALBUMIN MFR SERPL ELPH: 58.2 %
ALBUMIN SERPL-MCNC: 3.4 G/DL
ALBUMIN/GLOB SERPL: 1.4 RATIO
ALPHA1 GLOB MFR SERPL ELPH: 6.4 %
ALPHA1 GLOB SERPL ELPH-MCNC: 0.4 G/DL
ALPHA2 GLOB MFR SERPL ELPH: 15.8 %
ALPHA2 GLOB SERPL ELPH-MCNC: 0.9 G/DL
B-GLOBULIN MFR SERPL ELPH: 11.9 %
B-GLOBULIN SERPL ELPH-MCNC: 0.7 G/DL
DEPRECATED KAPPA LC FREE/LAMBDA SER: 0.01 RATIO
GAMMA GLOB FLD ELPH-MCNC: 0.4 G/DL
GAMMA GLOB MFR SERPL ELPH: 7.7 %
IGA SER QL IEP: 13 MG/DL
IGG SER QL IEP: 303 MG/DL
IGM SER QL IEP: 22 MG/DL
INTERPRETATION SERPL IEP-IMP: NORMAL
KAPPA LC CSF-MCNC: 21.18 MG/DL
KAPPA LC SERPL-MCNC: 0.2 MG/DL
M PROTEIN MFR SERPL ELPH: 3.2 %
M PROTEIN SPEC IFE-MCNC: NORMAL
MONOCLON BAND OBS SERPL: 0.2 G/DL
PROT SERPL-MCNC: 5.8 G/DL
PROT SERPL-MCNC: 5.8 G/DL

## 2024-08-22 ENCOUNTER — APPOINTMENT (OUTPATIENT)
Dept: HEMATOLOGY ONCOLOGY | Facility: CLINIC | Age: 69
End: 2024-08-22

## 2024-08-22 ENCOUNTER — APPOINTMENT (OUTPATIENT)
Dept: INFUSION THERAPY | Facility: HOSPITAL | Age: 69
End: 2024-08-22

## 2024-08-29 ENCOUNTER — RESULT REVIEW (OUTPATIENT)
Age: 69
End: 2024-08-29

## 2024-08-29 ENCOUNTER — APPOINTMENT (OUTPATIENT)
Dept: INFUSION THERAPY | Facility: HOSPITAL | Age: 69
End: 2024-08-29

## 2024-08-29 ENCOUNTER — APPOINTMENT (OUTPATIENT)
Dept: HEMATOLOGY ONCOLOGY | Facility: CLINIC | Age: 69
End: 2024-08-29

## 2024-08-29 LAB
BASOPHILS # BLD AUTO: 0.02 K/UL — SIGNIFICANT CHANGE UP (ref 0–0.2)
BASOPHILS NFR BLD AUTO: 0.5 % — SIGNIFICANT CHANGE UP (ref 0–2)
EOSINOPHIL # BLD AUTO: 0.08 K/UL — SIGNIFICANT CHANGE UP (ref 0–0.5)
EOSINOPHIL NFR BLD AUTO: 2 % — SIGNIFICANT CHANGE UP (ref 0–6)
HCT VFR BLD CALC: 33.6 % — LOW (ref 39–50)
HGB BLD-MCNC: 11.6 G/DL — LOW (ref 13–17)
IMM GRANULOCYTES NFR BLD AUTO: 0.5 % — SIGNIFICANT CHANGE UP (ref 0–0.9)
LYMPHOCYTES # BLD AUTO: 1.21 K/UL — SIGNIFICANT CHANGE UP (ref 1–3.3)
LYMPHOCYTES # BLD AUTO: 30.6 % — SIGNIFICANT CHANGE UP (ref 13–44)
MCHC RBC-ENTMCNC: 33.8 PG — SIGNIFICANT CHANGE UP (ref 27–34)
MCHC RBC-ENTMCNC: 34.5 G/DL — SIGNIFICANT CHANGE UP (ref 32–36)
MCV RBC AUTO: 98 FL — SIGNIFICANT CHANGE UP (ref 80–100)
MONOCYTES # BLD AUTO: 0.56 K/UL — SIGNIFICANT CHANGE UP (ref 0–0.9)
MONOCYTES NFR BLD AUTO: 14.1 % — HIGH (ref 2–14)
NEUTROPHILS # BLD AUTO: 2.07 K/UL — SIGNIFICANT CHANGE UP (ref 1.8–7.4)
NEUTROPHILS NFR BLD AUTO: 52.3 % — SIGNIFICANT CHANGE UP (ref 43–77)
NRBC # BLD: 0 /100 WBCS — SIGNIFICANT CHANGE UP (ref 0–0)
PLATELET # BLD AUTO: 129 K/UL — LOW (ref 150–400)
RBC # BLD: 3.43 M/UL — LOW (ref 4.2–5.8)
RBC # FLD: 15.4 % — HIGH (ref 10.3–14.5)
WBC # BLD: 3.96 K/UL — SIGNIFICANT CHANGE UP (ref 3.8–10.5)
WBC # FLD AUTO: 3.96 K/UL — SIGNIFICANT CHANGE UP (ref 3.8–10.5)

## 2024-08-30 DIAGNOSIS — C90.00 MULTIPLE MYELOMA NOT HAVING ACHIEVED REMISSION: ICD-10-CM

## 2024-08-30 DIAGNOSIS — Z94.84 STEM CELLS TRANSPLANT STATUS: ICD-10-CM

## 2024-09-03 ENCOUNTER — OUTPATIENT (OUTPATIENT)
Dept: OUTPATIENT SERVICES | Facility: HOSPITAL | Age: 69
LOS: 1 days | Discharge: HOME | End: 2024-09-03

## 2024-09-03 DIAGNOSIS — R79.89 OTHER SPECIFIED ABNORMAL FINDINGS OF BLOOD CHEMISTRY: ICD-10-CM

## 2024-09-05 ENCOUNTER — APPOINTMENT (OUTPATIENT)
Dept: HEMATOLOGY ONCOLOGY | Facility: CLINIC | Age: 69
End: 2024-09-05

## 2024-09-05 ENCOUNTER — RESULT REVIEW (OUTPATIENT)
Age: 69
End: 2024-09-05

## 2024-09-05 ENCOUNTER — APPOINTMENT (OUTPATIENT)
Dept: INFUSION THERAPY | Facility: HOSPITAL | Age: 69
End: 2024-09-05

## 2024-09-05 LAB
BASOPHILS # BLD AUTO: 0.02 K/UL — SIGNIFICANT CHANGE UP (ref 0–0.2)
BASOPHILS NFR BLD AUTO: 0.5 % — SIGNIFICANT CHANGE UP (ref 0–2)
EOSINOPHIL # BLD AUTO: 0.1 K/UL — SIGNIFICANT CHANGE UP (ref 0–0.5)
EOSINOPHIL NFR BLD AUTO: 2.4 % — SIGNIFICANT CHANGE UP (ref 0–6)
HCT VFR BLD CALC: 34.7 % — LOW (ref 39–50)
HGB BLD-MCNC: 12 G/DL — LOW (ref 13–17)
IMM GRANULOCYTES NFR BLD AUTO: 1.9 % — HIGH (ref 0–0.9)
LYMPHOCYTES # BLD AUTO: 0.74 K/UL — LOW (ref 1–3.3)
LYMPHOCYTES # BLD AUTO: 17.9 % — SIGNIFICANT CHANGE UP (ref 13–44)
MCHC RBC-ENTMCNC: 33.9 PG — SIGNIFICANT CHANGE UP (ref 27–34)
MCHC RBC-ENTMCNC: 34.6 G/DL — SIGNIFICANT CHANGE UP (ref 32–36)
MCV RBC AUTO: 98 FL — SIGNIFICANT CHANGE UP (ref 80–100)
MONOCYTES # BLD AUTO: 0.51 K/UL — SIGNIFICANT CHANGE UP (ref 0–0.9)
MONOCYTES NFR BLD AUTO: 12.3 % — SIGNIFICANT CHANGE UP (ref 2–14)
NEUTROPHILS # BLD AUTO: 2.69 K/UL — SIGNIFICANT CHANGE UP (ref 1.8–7.4)
NEUTROPHILS NFR BLD AUTO: 65 % — SIGNIFICANT CHANGE UP (ref 43–77)
NRBC # BLD: 0 /100 WBCS — SIGNIFICANT CHANGE UP (ref 0–0)
PLATELET # BLD AUTO: 124 K/UL — LOW (ref 150–400)
RBC # BLD: 3.54 M/UL — LOW (ref 4.2–5.8)
RBC # FLD: 15.9 % — HIGH (ref 10.3–14.5)
WBC # BLD: 4.14 K/UL — SIGNIFICANT CHANGE UP (ref 3.8–10.5)
WBC # FLD AUTO: 4.14 K/UL — SIGNIFICANT CHANGE UP (ref 3.8–10.5)

## 2024-09-06 DIAGNOSIS — R11.2 NAUSEA WITH VOMITING, UNSPECIFIED: ICD-10-CM

## 2024-09-06 DIAGNOSIS — Z51.11 ENCOUNTER FOR ANTINEOPLASTIC CHEMOTHERAPY: ICD-10-CM

## 2024-09-06 DIAGNOSIS — C91.00 ACUTE LYMPHOBLASTIC LEUKEMIA NOT HAVING ACHIEVED REMISSION: ICD-10-CM

## 2024-09-12 ENCOUNTER — RESULT REVIEW (OUTPATIENT)
Age: 69
End: 2024-09-12

## 2024-09-12 ENCOUNTER — APPOINTMENT (OUTPATIENT)
Dept: HEMATOLOGY ONCOLOGY | Facility: CLINIC | Age: 69
End: 2024-09-12

## 2024-09-12 ENCOUNTER — APPOINTMENT (OUTPATIENT)
Dept: INFUSION THERAPY | Facility: HOSPITAL | Age: 69
End: 2024-09-12

## 2024-09-12 DIAGNOSIS — Z94.84 STEM CELLS TRANSPLANT STATUS: ICD-10-CM

## 2024-09-12 LAB
BASOPHILS # BLD AUTO: 0.02 K/UL — SIGNIFICANT CHANGE UP (ref 0–0.2)
BASOPHILS NFR BLD AUTO: 0.5 % — SIGNIFICANT CHANGE UP (ref 0–2)
EOSINOPHIL # BLD AUTO: 0.32 K/UL — SIGNIFICANT CHANGE UP (ref 0–0.5)
EOSINOPHIL NFR BLD AUTO: 8 % — HIGH (ref 0–6)
HCT VFR BLD CALC: 35.4 % — LOW (ref 39–50)
HGB BLD-MCNC: 12.3 G/DL — LOW (ref 13–17)
IMM GRANULOCYTES NFR BLD AUTO: 1.3 % — HIGH (ref 0–0.9)
LYMPHOCYTES # BLD AUTO: 0.74 K/UL — LOW (ref 1–3.3)
LYMPHOCYTES # BLD AUTO: 18.5 % — SIGNIFICANT CHANGE UP (ref 13–44)
MCHC RBC-ENTMCNC: 34.3 PG — HIGH (ref 27–34)
MCHC RBC-ENTMCNC: 34.7 G/DL — SIGNIFICANT CHANGE UP (ref 32–36)
MCV RBC AUTO: 98.6 FL — SIGNIFICANT CHANGE UP (ref 80–100)
MONOCYTES # BLD AUTO: 0.6 K/UL — SIGNIFICANT CHANGE UP (ref 0–0.9)
MONOCYTES NFR BLD AUTO: 15 % — HIGH (ref 2–14)
NEUTROPHILS # BLD AUTO: 2.26 K/UL — SIGNIFICANT CHANGE UP (ref 1.8–7.4)
NEUTROPHILS NFR BLD AUTO: 56.7 % — SIGNIFICANT CHANGE UP (ref 43–77)
NRBC # BLD: 0 /100 WBCS — SIGNIFICANT CHANGE UP (ref 0–0)
PLATELET # BLD AUTO: 117 K/UL — LOW (ref 150–400)
RBC # BLD: 3.59 M/UL — LOW (ref 4.2–5.8)
RBC # FLD: 15.8 % — HIGH (ref 10.3–14.5)
WBC # BLD: 3.99 K/UL — SIGNIFICANT CHANGE UP (ref 3.8–10.5)
WBC # FLD AUTO: 3.99 K/UL — SIGNIFICANT CHANGE UP (ref 3.8–10.5)

## 2024-09-17 ENCOUNTER — NON-APPOINTMENT (OUTPATIENT)
Age: 69
End: 2024-09-17

## 2024-09-20 ENCOUNTER — APPOINTMENT (OUTPATIENT)
Dept: INFUSION THERAPY | Facility: HOSPITAL | Age: 69
End: 2024-09-20

## 2024-09-20 ENCOUNTER — APPOINTMENT (OUTPATIENT)
Dept: HEMATOLOGY ONCOLOGY | Facility: CLINIC | Age: 69
End: 2024-09-20

## 2024-09-20 DIAGNOSIS — C90.00 MULTIPLE MYELOMA NOT HAVING ACHIEVED REMISSION: ICD-10-CM

## 2024-09-23 ENCOUNTER — APPOINTMENT (OUTPATIENT)
Dept: ORTHOPEDIC SURGERY | Facility: CLINIC | Age: 69
End: 2024-09-23
Payer: MEDICARE

## 2024-09-23 DIAGNOSIS — M43.17 SPONDYLOLISTHESIS, LUMBOSACRAL REGION: ICD-10-CM

## 2024-09-23 PROCEDURE — 99214 OFFICE O/P EST MOD 30 MIN: CPT

## 2024-09-23 RX ORDER — GABAPENTIN 300 MG/1
300 CAPSULE ORAL
Qty: 30 | Refills: 0 | Status: ACTIVE | COMMUNITY
Start: 2024-09-23 | End: 1900-01-01

## 2024-09-24 ENCOUNTER — APPOINTMENT (OUTPATIENT)
Dept: MRI IMAGING | Facility: HOSPITAL | Age: 69
End: 2024-09-24
Payer: MEDICARE

## 2024-09-24 ENCOUNTER — OUTPATIENT (OUTPATIENT)
Dept: OUTPATIENT SERVICES | Facility: HOSPITAL | Age: 69
LOS: 1 days | End: 2024-09-24
Payer: MEDICARE

## 2024-09-24 DIAGNOSIS — Z98.890 OTHER SPECIFIED POSTPROCEDURAL STATES: Chronic | ICD-10-CM

## 2024-09-24 DIAGNOSIS — M51.34 OTHER INTERVERTEBRAL DISC DEGENERATION, THORACIC REGION: ICD-10-CM

## 2024-09-24 DIAGNOSIS — S22.080S WEDGE COMPRESSION FRACTURE OF T11-T12 VERTEBRA, SEQUELA: ICD-10-CM

## 2024-09-24 PROCEDURE — 72146 MRI CHEST SPINE W/O DYE: CPT

## 2024-09-24 PROCEDURE — 72146 MRI CHEST SPINE W/O DYE: CPT | Mod: 26,MH

## 2024-09-24 RX ORDER — DEXAMETHASONE 4 MG/1
4 TABLET ORAL
Qty: 40 | Refills: 3 | Status: ACTIVE | COMMUNITY
Start: 2024-09-24 | End: 1900-01-01

## 2024-09-25 NOTE — PHYSICAL EXAM
[Normal] : No costovertebral angle tenderness and no spinal tenderness [Antalgic] : antalgic [LE] : Sensory: Intact in bilateral lower extremities [de-identified] : No palpable tenderness over the thoracic spine nor is there any pain on percussion [de-identified] : No new x-rays taken in the office today

## 2024-09-25 NOTE — DISCUSSION/SUMMARY
[Medication Risks Reviewed] : Medication risks reviewed [Surgical risks reviewed] : Surgical risks reviewed [de-identified] : Prescription for gabapentin 300 mg before bedtime was provided at today's office visit Prescription for an MRI of the thoracic spine to rule out vertebral fracture, patient's past medical history of a T12 fracture in the past, and the resuming of his multiple myeloma treatments.  I, Dr. Denny Candelaria, personally performed the evaluation and management (E/M) services for this new patient.  That E/M includes conducting the initial examination, assessing all conditions, and establishing a plan of care.  Today, my ACP, Mana Elizabeth, was here to observe my evaluation and management services for this patient to be followed going forward.

## 2024-09-25 NOTE — DISCUSSION/SUMMARY
[Medication Risks Reviewed] : Medication risks reviewed [Surgical risks reviewed] : Surgical risks reviewed [de-identified] : Prescription for gabapentin 300 mg before bedtime was provided at today's office visit Prescription for an MRI of the thoracic spine to rule out vertebral fracture, patient's past medical history of a T12 fracture in the past, and the resuming of his multiple myeloma treatments.  I, Dr. Denny Candelaria, personally performed the evaluation and management (E/M) services for this new patient.  That E/M includes conducting the initial examination, assessing all conditions, and establishing a plan of care.  Today, my ACP, Mana Elizabeth, was here to observe my evaluation and management services for this patient to be followed going forward.

## 2024-09-25 NOTE — HISTORY OF PRESENT ILLNESS
[Pain Location] : pain [T-Spine] : T-spine region [Worsening] : worsening [3] : a current pain level of 3/10 [None] : No relieving factors are noted [de-identified] : Patient is a 69-year-old male follow-up for spondylolisthesis and thoracic degenerative disc disease.  Patient has been provided physical therapy prescription on his last office visit which she was unable to attend secondary to increasing fatigue and symptoms secondary to his multiple myeloma treatment. The patient has resumed oncology treatments and has been unfortunately medical issues such as pneumonia and a recent treatment of double ear infection. No radiculopathy radiating into the ribs to the sternum.

## 2024-09-25 NOTE — HISTORY OF PRESENT ILLNESS
[Pain Location] : pain [T-Spine] : T-spine region [Worsening] : worsening [3] : a current pain level of 3/10 [None] : No relieving factors are noted [de-identified] : Patient is a 69-year-old male follow-up for spondylolisthesis and thoracic degenerative disc disease.  Patient has been provided physical therapy prescription on his last office visit which she was unable to attend secondary to increasing fatigue and symptoms secondary to his multiple myeloma treatment. The patient has resumed oncology treatments and has been unfortunately medical issues such as pneumonia and a recent treatment of double ear infection. No radiculopathy radiating into the ribs to the sternum.

## 2024-09-25 NOTE — PHYSICAL EXAM
[Normal] : No costovertebral angle tenderness and no spinal tenderness [Antalgic] : antalgic [LE] : Sensory: Intact in bilateral lower extremities [de-identified] : No palpable tenderness over the thoracic spine nor is there any pain on percussion [de-identified] : No new x-rays taken in the office today

## 2024-09-25 NOTE — PHYSICAL EXAM
[Normal] : No costovertebral angle tenderness and no spinal tenderness [Antalgic] : antalgic [LE] : Sensory: Intact in bilateral lower extremities [de-identified] : No palpable tenderness over the thoracic spine nor is there any pain on percussion [de-identified] : No new x-rays taken in the office today

## 2024-09-25 NOTE — DISCUSSION/SUMMARY
[Medication Risks Reviewed] : Medication risks reviewed [Surgical risks reviewed] : Surgical risks reviewed [de-identified] : Prescription for gabapentin 300 mg before bedtime was provided at today's office visit Prescription for an MRI of the thoracic spine to rule out vertebral fracture, patient's past medical history of a T12 fracture in the past, and the resuming of his multiple myeloma treatments.  I, Dr. Denny Candelaria, personally performed the evaluation and management (E/M) services for this new patient.  That E/M includes conducting the initial examination, assessing all conditions, and establishing a plan of care.  Today, my ACP, Mana Elizabeth, was here to observe my evaluation and management services for this patient to be followed going forward.

## 2024-09-25 NOTE — HISTORY OF PRESENT ILLNESS
[Pain Location] : pain [T-Spine] : T-spine region [Worsening] : worsening [3] : a current pain level of 3/10 [None] : No relieving factors are noted [de-identified] : Patient is a 69-year-old male follow-up for spondylolisthesis and thoracic degenerative disc disease.  Patient has been provided physical therapy prescription on his last office visit which she was unable to attend secondary to increasing fatigue and symptoms secondary to his multiple myeloma treatment. The patient has resumed oncology treatments and has been unfortunately medical issues such as pneumonia and a recent treatment of double ear infection. No radiculopathy radiating into the ribs to the sternum.

## 2024-09-26 ENCOUNTER — RESULT REVIEW (OUTPATIENT)
Age: 69
End: 2024-09-26

## 2024-09-26 ENCOUNTER — APPOINTMENT (OUTPATIENT)
Dept: HEMATOLOGY ONCOLOGY | Facility: CLINIC | Age: 69
End: 2024-09-26

## 2024-09-26 ENCOUNTER — APPOINTMENT (OUTPATIENT)
Dept: INFUSION THERAPY | Facility: HOSPITAL | Age: 69
End: 2024-09-26

## 2024-09-26 LAB
BASOPHILS # BLD AUTO: 0.02 K/UL — SIGNIFICANT CHANGE UP (ref 0–0.2)
BASOPHILS NFR BLD AUTO: 0.3 % — SIGNIFICANT CHANGE UP (ref 0–2)
EOSINOPHIL # BLD AUTO: 0 K/UL — SIGNIFICANT CHANGE UP (ref 0–0.5)
EOSINOPHIL NFR BLD AUTO: 0 % — SIGNIFICANT CHANGE UP (ref 0–6)
HCT VFR BLD CALC: 34 % — LOW (ref 39–50)
HGB BLD-MCNC: 12.1 G/DL — LOW (ref 13–17)
IMM GRANULOCYTES NFR BLD AUTO: 1.2 % — HIGH (ref 0–0.9)
LYMPHOCYTES # BLD AUTO: 0.54 K/UL — LOW (ref 1–3.3)
LYMPHOCYTES # BLD AUTO: 9.4 % — LOW (ref 13–44)
MCHC RBC-ENTMCNC: 34 PG — SIGNIFICANT CHANGE UP (ref 27–34)
MCHC RBC-ENTMCNC: 35.6 G/DL — SIGNIFICANT CHANGE UP (ref 32–36)
MCV RBC AUTO: 95.5 FL — SIGNIFICANT CHANGE UP (ref 80–100)
MONOCYTES # BLD AUTO: 0.1 K/UL — SIGNIFICANT CHANGE UP (ref 0–0.9)
MONOCYTES NFR BLD AUTO: 1.7 % — LOW (ref 2–14)
NEUTROPHILS # BLD AUTO: 5 K/UL — SIGNIFICANT CHANGE UP (ref 1.8–7.4)
NEUTROPHILS NFR BLD AUTO: 87.4 % — HIGH (ref 43–77)
NRBC # BLD: 0 /100 WBCS — SIGNIFICANT CHANGE UP (ref 0–0)
PLATELET # BLD AUTO: 186 K/UL — SIGNIFICANT CHANGE UP (ref 150–400)
RBC # BLD: 3.56 M/UL — LOW (ref 4.2–5.8)
RBC # FLD: 15 % — HIGH (ref 10.3–14.5)
WBC # BLD: 5.73 K/UL — SIGNIFICANT CHANGE UP (ref 3.8–10.5)
WBC # FLD AUTO: 5.73 K/UL — SIGNIFICANT CHANGE UP (ref 3.8–10.5)

## 2024-09-27 DIAGNOSIS — Z94.84 STEM CELLS TRANSPLANT STATUS: ICD-10-CM

## 2024-09-27 DIAGNOSIS — C79.51 SECONDARY MALIGNANT NEOPLASM OF BONE: ICD-10-CM

## 2024-09-30 ENCOUNTER — APPOINTMENT (OUTPATIENT)
Dept: ORTHOPEDIC SURGERY | Facility: CLINIC | Age: 69
End: 2024-09-30
Payer: MEDICARE

## 2024-09-30 DIAGNOSIS — M51.34 OTHER INTERVERTEBRAL DISC DEGENERATION, THORACIC REGION: ICD-10-CM

## 2024-09-30 DIAGNOSIS — S22.080S WEDGE COMPRESSION FRACTURE OF T11-T12 VERTEBRA, SEQUELA: ICD-10-CM

## 2024-09-30 PROCEDURE — 99214 OFFICE O/P EST MOD 30 MIN: CPT

## 2024-09-30 NOTE — PHYSICAL EXAM
[Antalgic] : antalgic [LE] : Sensory: Intact in bilateral lower extremities [Normal] : No costovertebral angle tenderness and no spinal tenderness [de-identified] : No palpable tenderness over the thoracic spine nor is there any pain on percussion [de-identified] : MRI Evaluation of the Thoracic Spine taken on 09/24/2024 shows __  FINDINGS:  LOCALIZER: Bilateral renal cysts.  OSSEOUS STRUCTURES: No acute fracture. Chronic compression deformity of the superior endplate of T12, unchanged compared to prior study. Interval increase in T2 hyperintense lesions within the thoracic vertebral bodies, most prominent at T8. Mild compression the inferior endplate of T8 and T7. When compared to the prior exam, the previous T2 hyperintense signal in the posterior left sixth rib is less apparent.  INTERVERTEBRAL DISCS: Discs are normal in height. T2 hyperintense signal of the T7-T8 and the T11-T12 intervertebral disc.  ALIGNMENT: Minimal anterolisthesis of T1-T2 and T2-T3.  THORACIC CORD: Cord is of normal caliber and signal characteristics.  INTRAABDOMINAL/INTRATHORACIC SOFT TISSUES: Small right upper renal cysts.  PARASPINAL MUSCLE AND SOFT TISSUES: Symmetric appearance of paraspinal musculature.  EXTRASPINAL: Normal.  INDIVIDUAL LEVELS: T1/T2: Mild osseous ridging and disc bulging. Moderate to severe facet arthrosis. No foraminal narrowing. No central canal narrowing. T2/T3: Moderate facet arthrosis. Mild anterolisthesis. Mild osseous ridging and disc bulging. Moderate right and no left foraminal narrowing. Mild central canal narrowing. T3/T4: Moderate facet arthrosis. Mild right foraminal narrowing. No central canal or left foraminal narrowing. T4/T5: No central canal or foraminal narrowing. T5/T6: No central canal or foraminal narrowing. T6/T7: No central canal or foraminal narrowing. T7/T8: No central canal or foraminal narrowing. T8/T9: No central canal or foraminal narrowing. T9/T10: No central canal or foraminal narrowing. T10/T11: Mild facet arthrosis. Mild osseous ridging. No central canal or foraminal narrowing. T11/T12: Mild to moderate left facet and mild right facet arthrosis. Mild right foraminal narrowing. No central canal or left foraminal narrowing. T12/L1: No central canal or foraminal narrowing.  IMPRESSION:  1.  Scattered areas of signal abnormality including T8, T12 and the posterior T6 left rib. The findings are nonspecific but most consistent with the patient's known diagnosis of myeloma. 2.  Chronic compression deformities of T7, T8 and T12 vertebra. No new compression fracture appreciated. 3.  Thoracic spondylosis as described above.  --- End of Report ---

## 2024-09-30 NOTE — ADDENDUM
[FreeTextEntry1] :  I, Amparo Cancinod, acted solely as a scribe for Dr. Denny Candelaria on this date 09/30/2024 .  All medical records entries made by the Scribe were at my Dr. Denny Candelaria direction and personally dictated by me on 09/30/2024. I have reviewed the chart and agree that the record accurately reflects my personal performance of the history, physical exam, assessment and plan. I have also personally directed, reviewed, and agreed with the chart.

## 2024-09-30 NOTE — HISTORY OF PRESENT ILLNESS
[Pain Location] : pain [T-Spine] : T-spine region [Stable] : stable [3] : a current pain level of 3/10 [de-identified] : Patient is here today to review the recent results of an MRI for Thoracic Spine.  The patient continues to have symptoms pertaining to Thoracic spine. The patient reports no significant changes in their symptoms since their last visit. The patient states that we he takes steroid medication he notes alleviation in pain symptoms.

## 2024-09-30 NOTE — DISCUSSION/SUMMARY
[Other: ____] : in [unfilled] [de-identified] :  I have discussed the underlying pathophysiology of the patient's condition and MRI findings. We discussed that most of his pain symptoms has been due to his treatment Myeloma.  Activity modifications were reviewed with the patient at length. I have expressed to the patient he should limit lifting and if pain symptoms continue for about a month, he should receive with consult for a kyphoplasty. The patient should follow up with me in 8 weeks for further assessment of symptoms.

## 2024-10-03 ENCOUNTER — APPOINTMENT (OUTPATIENT)
Dept: HEMATOLOGY ONCOLOGY | Facility: CLINIC | Age: 69
End: 2024-10-03

## 2024-10-03 ENCOUNTER — RESULT REVIEW (OUTPATIENT)
Age: 69
End: 2024-10-03

## 2024-10-03 ENCOUNTER — NON-APPOINTMENT (OUTPATIENT)
Age: 69
End: 2024-10-03

## 2024-10-03 ENCOUNTER — APPOINTMENT (OUTPATIENT)
Dept: INFUSION THERAPY | Facility: HOSPITAL | Age: 69
End: 2024-10-03

## 2024-10-03 LAB
BASOPHILS # BLD AUTO: 0.01 K/UL — SIGNIFICANT CHANGE UP (ref 0–0.2)
BASOPHILS NFR BLD AUTO: 0.2 % — SIGNIFICANT CHANGE UP (ref 0–2)
EOSINOPHIL # BLD AUTO: 0 K/UL — SIGNIFICANT CHANGE UP (ref 0–0.5)
EOSINOPHIL NFR BLD AUTO: 0 % — SIGNIFICANT CHANGE UP (ref 0–6)
HCT VFR BLD CALC: 35.7 % — LOW (ref 39–50)
HGB BLD-MCNC: 12.5 G/DL — LOW (ref 13–17)
IMM GRANULOCYTES NFR BLD AUTO: 0.8 % — SIGNIFICANT CHANGE UP (ref 0–0.9)
LYMPHOCYTES # BLD AUTO: 0.4 K/UL — LOW (ref 1–3.3)
LYMPHOCYTES # BLD AUTO: 6.6 % — LOW (ref 13–44)
MCHC RBC-ENTMCNC: 34 PG — SIGNIFICANT CHANGE UP (ref 27–34)
MCHC RBC-ENTMCNC: 35 G/DL — SIGNIFICANT CHANGE UP (ref 32–36)
MCV RBC AUTO: 97 FL — SIGNIFICANT CHANGE UP (ref 80–100)
MONOCYTES # BLD AUTO: 0.16 K/UL — SIGNIFICANT CHANGE UP (ref 0–0.9)
MONOCYTES NFR BLD AUTO: 2.6 % — SIGNIFICANT CHANGE UP (ref 2–14)
NEUTROPHILS # BLD AUTO: 5.48 K/UL — SIGNIFICANT CHANGE UP (ref 1.8–7.4)
NEUTROPHILS NFR BLD AUTO: 89.8 % — HIGH (ref 43–77)
NRBC # BLD: 0 /100 WBCS — SIGNIFICANT CHANGE UP (ref 0–0)
PLATELET # BLD AUTO: 137 K/UL — LOW (ref 150–400)
RBC # BLD: 3.68 M/UL — LOW (ref 4.2–5.8)
RBC # FLD: 15.3 % — HIGH (ref 10.3–14.5)
WBC # BLD: 6.1 K/UL — SIGNIFICANT CHANGE UP (ref 3.8–10.5)
WBC # FLD AUTO: 6.1 K/UL — SIGNIFICANT CHANGE UP (ref 3.8–10.5)

## 2024-10-09 ENCOUNTER — APPOINTMENT (OUTPATIENT)
Dept: GASTROENTEROLOGY | Facility: CLINIC | Age: 69
End: 2024-10-09
Payer: MEDICARE

## 2024-10-09 VITALS
TEMPERATURE: 97.5 F | DIASTOLIC BLOOD PRESSURE: 70 MMHG | WEIGHT: 211 LBS | BODY MASS INDEX: 27.96 KG/M2 | HEIGHT: 73 IN | SYSTOLIC BLOOD PRESSURE: 120 MMHG | OXYGEN SATURATION: 97 % | HEART RATE: 55 BPM

## 2024-10-09 DIAGNOSIS — C90.00 MULTIPLE MYELOMA NOT HAVING ACHIEVED REMISSION: ICD-10-CM

## 2024-10-09 DIAGNOSIS — D12.6 BENIGN NEOPLASM OF COLON, UNSPECIFIED: ICD-10-CM

## 2024-10-09 PROCEDURE — 99203 OFFICE O/P NEW LOW 30 MIN: CPT

## 2024-10-10 ENCOUNTER — RESULT REVIEW (OUTPATIENT)
Age: 69
End: 2024-10-10

## 2024-10-10 ENCOUNTER — APPOINTMENT (OUTPATIENT)
Dept: HEMATOLOGY ONCOLOGY | Facility: CLINIC | Age: 69
End: 2024-10-10

## 2024-10-10 ENCOUNTER — APPOINTMENT (OUTPATIENT)
Dept: INFUSION THERAPY | Facility: HOSPITAL | Age: 69
End: 2024-10-10

## 2024-10-10 DIAGNOSIS — Z94.84 STEM CELLS TRANSPLANT STATUS: ICD-10-CM

## 2024-10-10 LAB
BASOPHILS # BLD AUTO: 0 K/UL — SIGNIFICANT CHANGE UP (ref 0–0.2)
BASOPHILS NFR BLD AUTO: 0 % — SIGNIFICANT CHANGE UP (ref 0–2)
EOSINOPHIL # BLD AUTO: 0 K/UL — SIGNIFICANT CHANGE UP (ref 0–0.5)
EOSINOPHIL NFR BLD AUTO: 0 % — SIGNIFICANT CHANGE UP (ref 0–6)
HCT VFR BLD CALC: 36.4 % — LOW (ref 39–50)
HGB BLD-MCNC: 12.5 G/DL — LOW (ref 13–17)
IMM GRANULOCYTES NFR BLD AUTO: 0.4 % — SIGNIFICANT CHANGE UP (ref 0–0.9)
LYMPHOCYTES # BLD AUTO: 0.28 K/UL — LOW (ref 1–3.3)
LYMPHOCYTES # BLD AUTO: 11 % — LOW (ref 13–44)
MCHC RBC-ENTMCNC: 33.6 PG — SIGNIFICANT CHANGE UP (ref 27–34)
MCHC RBC-ENTMCNC: 34.3 G/DL — SIGNIFICANT CHANGE UP (ref 32–36)
MCV RBC AUTO: 97.8 FL — SIGNIFICANT CHANGE UP (ref 80–100)
MONOCYTES # BLD AUTO: 0.12 K/UL — SIGNIFICANT CHANGE UP (ref 0–0.9)
MONOCYTES NFR BLD AUTO: 4.7 % — SIGNIFICANT CHANGE UP (ref 2–14)
NEUTROPHILS # BLD AUTO: 2.13 K/UL — SIGNIFICANT CHANGE UP (ref 1.8–7.4)
NEUTROPHILS NFR BLD AUTO: 83.9 % — HIGH (ref 43–77)
NRBC # BLD: 0 /100 WBCS — SIGNIFICANT CHANGE UP (ref 0–0)
PLATELET # BLD AUTO: 99 K/UL — LOW (ref 150–400)
RBC # BLD: 3.72 M/UL — LOW (ref 4.2–5.8)
RBC # FLD: 15 % — HIGH (ref 10.3–14.5)
WBC # BLD: 2.54 K/UL — LOW (ref 3.8–10.5)
WBC # FLD AUTO: 2.54 K/UL — LOW (ref 3.8–10.5)

## 2024-10-17 ENCOUNTER — RESULT REVIEW (OUTPATIENT)
Age: 69
End: 2024-10-17

## 2024-10-17 ENCOUNTER — APPOINTMENT (OUTPATIENT)
Dept: INFUSION THERAPY | Facility: HOSPITAL | Age: 69
End: 2024-10-17

## 2024-10-17 ENCOUNTER — APPOINTMENT (OUTPATIENT)
Dept: HEMATOLOGY ONCOLOGY | Facility: CLINIC | Age: 69
End: 2024-10-17

## 2024-10-17 LAB
BASOPHILS # BLD AUTO: 0 K/UL — SIGNIFICANT CHANGE UP (ref 0–0.2)
BASOPHILS NFR BLD AUTO: 0 % — SIGNIFICANT CHANGE UP (ref 0–2)
EOSINOPHIL # BLD AUTO: 0 K/UL — SIGNIFICANT CHANGE UP (ref 0–0.5)
EOSINOPHIL NFR BLD AUTO: 0 % — SIGNIFICANT CHANGE UP (ref 0–6)
HCT VFR BLD CALC: 34.7 % — LOW (ref 39–50)
HGB BLD-MCNC: 12.4 G/DL — LOW (ref 13–17)
LYMPHOCYTES # BLD AUTO: 0.3 K/UL — LOW (ref 1–3.3)
LYMPHOCYTES # BLD AUTO: 11 % — LOW (ref 13–44)
MCHC RBC-ENTMCNC: 34 PG — SIGNIFICANT CHANGE UP (ref 27–34)
MCHC RBC-ENTMCNC: 35.7 G/DL — SIGNIFICANT CHANGE UP (ref 32–36)
MCV RBC AUTO: 95.1 FL — SIGNIFICANT CHANGE UP (ref 80–100)
MONOCYTES # BLD AUTO: 0.08 K/UL — SIGNIFICANT CHANGE UP (ref 0–0.9)
MONOCYTES NFR BLD AUTO: 3 % — SIGNIFICANT CHANGE UP (ref 2–14)
NEUTROPHILS # BLD AUTO: 2.31 K/UL — SIGNIFICANT CHANGE UP (ref 1.8–7.4)
NEUTROPHILS NFR BLD AUTO: 86 % — HIGH (ref 43–77)
NRBC # BLD: 0 /100 WBCS — SIGNIFICANT CHANGE UP (ref 0–0)
NRBC # BLD: SIGNIFICANT CHANGE UP /100 WBCS (ref 0–0)
PLAT MORPH BLD: NORMAL — SIGNIFICANT CHANGE UP
PLATELET # BLD AUTO: 113 K/UL — LOW (ref 150–400)
RBC # BLD: 3.65 M/UL — LOW (ref 4.2–5.8)
RBC # FLD: 15.1 % — HIGH (ref 10.3–14.5)
RBC BLD AUTO: SIGNIFICANT CHANGE UP
WBC # BLD: 2.69 K/UL — LOW (ref 3.8–10.5)
WBC # FLD AUTO: 2.69 K/UL — LOW (ref 3.8–10.5)

## 2024-10-18 LAB
ALBUMIN MFR SERPL ELPH: 64 %
ALBUMIN SERPL ELPH-MCNC: 4.1 G/DL
ALBUMIN SERPL-MCNC: 3.8 G/DL
ALBUMIN/GLOB SERPL: 1.8 RATIO
ALP BLD-CCNC: 104 U/L
ALPHA1 GLOB MFR SERPL ELPH: 5.2 %
ALPHA1 GLOB SERPL ELPH-MCNC: 0.3 G/DL
ALPHA2 GLOB MFR SERPL ELPH: 12 %
ALPHA2 GLOB SERPL ELPH-MCNC: 0.7 G/DL
ALT SERPL-CCNC: 28 U/L
ANION GAP SERPL CALC-SCNC: 17 MMOL/L
AST SERPL-CCNC: 18 U/L
B-GLOBULIN MFR SERPL ELPH: 12.5 %
B-GLOBULIN SERPL ELPH-MCNC: 0.7 G/DL
BILIRUB SERPL-MCNC: 0.5 MG/DL
BUN SERPL-MCNC: 18 MG/DL
CALCIUM SERPL-MCNC: 9 MG/DL
CHLORIDE SERPL-SCNC: 100 MMOL/L
CO2 SERPL-SCNC: 20 MMOL/L
CREAT SERPL-MCNC: 0.9 MG/DL
DEPRECATED KAPPA LC FREE/LAMBDA SER: 0.11 RATIO
EGFR: 93 ML/MIN/1.73M2
GAMMA GLOB FLD ELPH-MCNC: 0.4 G/DL
GAMMA GLOB MFR SERPL ELPH: 6.3 %
GLUCOSE SERPL-MCNC: 172 MG/DL
IGA SER QL IEP: 16 MG/DL
IGG SER QL IEP: 286 MG/DL
IGM SER QL IEP: 20 MG/DL
INTERPRETATION SERPL IEP-IMP: NORMAL
KAPPA LC CSF-MCNC: 2.71 MG/DL
KAPPA LC SERPL-MCNC: 0.3 MG/DL
M PROTEIN MFR SERPL ELPH: NORMAL
M PROTEIN SPEC IFE-MCNC: NORMAL
MONOCLON BAND OBS SERPL: NORMAL
POTASSIUM SERPL-SCNC: 4.4 MMOL/L
PROT SERPL-MCNC: 5.9 G/DL
SODIUM SERPL-SCNC: 137 MMOL/L

## 2024-10-24 ENCOUNTER — RESULT REVIEW (OUTPATIENT)
Age: 69
End: 2024-10-24

## 2024-10-24 ENCOUNTER — APPOINTMENT (OUTPATIENT)
Dept: INFUSION THERAPY | Facility: HOSPITAL | Age: 69
End: 2024-10-24

## 2024-10-24 ENCOUNTER — APPOINTMENT (OUTPATIENT)
Dept: HEMATOLOGY ONCOLOGY | Facility: CLINIC | Age: 69
End: 2024-10-24

## 2024-10-24 LAB
BASOPHILS # BLD AUTO: 0.01 K/UL — SIGNIFICANT CHANGE UP (ref 0–0.2)
BASOPHILS NFR BLD AUTO: 0.3 % — SIGNIFICANT CHANGE UP (ref 0–2)
EOSINOPHIL # BLD AUTO: 0 K/UL — SIGNIFICANT CHANGE UP (ref 0–0.5)
EOSINOPHIL NFR BLD AUTO: 0 % — SIGNIFICANT CHANGE UP (ref 0–6)
HCT VFR BLD CALC: 34.8 % — LOW (ref 39–50)
HGB BLD-MCNC: 12.4 G/DL — LOW (ref 13–17)
IMM GRANULOCYTES NFR BLD AUTO: 0.6 % — SIGNIFICANT CHANGE UP (ref 0–0.9)
LYMPHOCYTES # BLD AUTO: 0.36 K/UL — LOW (ref 1–3.3)
LYMPHOCYTES # BLD AUTO: 11.2 % — LOW (ref 13–44)
MCHC RBC-ENTMCNC: 33.9 PG — SIGNIFICANT CHANGE UP (ref 27–34)
MCHC RBC-ENTMCNC: 35.6 G/DL — SIGNIFICANT CHANGE UP (ref 32–36)
MCV RBC AUTO: 95.1 FL — SIGNIFICANT CHANGE UP (ref 80–100)
MONOCYTES # BLD AUTO: 0.15 K/UL — SIGNIFICANT CHANGE UP (ref 0–0.9)
MONOCYTES NFR BLD AUTO: 4.7 % — SIGNIFICANT CHANGE UP (ref 2–14)
NEUTROPHILS # BLD AUTO: 2.68 K/UL — SIGNIFICANT CHANGE UP (ref 1.8–7.4)
NEUTROPHILS NFR BLD AUTO: 83.2 % — HIGH (ref 43–77)
NRBC # BLD: 0 /100 WBCS — SIGNIFICANT CHANGE UP (ref 0–0)
PLATELET # BLD AUTO: 140 K/UL — LOW (ref 150–400)
RBC # BLD: 3.66 M/UL — LOW (ref 4.2–5.8)
RBC # FLD: 14.9 % — HIGH (ref 10.3–14.5)
WBC # BLD: 3.22 K/UL — LOW (ref 3.8–10.5)
WBC # FLD AUTO: 3.22 K/UL — LOW (ref 3.8–10.5)

## 2024-10-31 ENCOUNTER — RESULT REVIEW (OUTPATIENT)
Age: 69
End: 2024-10-31

## 2024-10-31 ENCOUNTER — APPOINTMENT (OUTPATIENT)
Dept: HEMATOLOGY ONCOLOGY | Facility: CLINIC | Age: 69
End: 2024-10-31

## 2024-10-31 ENCOUNTER — NON-APPOINTMENT (OUTPATIENT)
Age: 69
End: 2024-10-31

## 2024-10-31 ENCOUNTER — APPOINTMENT (OUTPATIENT)
Dept: INFUSION THERAPY | Facility: HOSPITAL | Age: 69
End: 2024-10-31

## 2024-10-31 LAB
BASOPHILS # BLD AUTO: 0 K/UL — SIGNIFICANT CHANGE UP (ref 0–0.2)
BASOPHILS NFR BLD AUTO: 0 % — SIGNIFICANT CHANGE UP (ref 0–2)
EOSINOPHIL # BLD AUTO: 0 K/UL — SIGNIFICANT CHANGE UP (ref 0–0.5)
EOSINOPHIL NFR BLD AUTO: 0 % — SIGNIFICANT CHANGE UP (ref 0–6)
HCT VFR BLD CALC: 34.7 % — LOW (ref 39–50)
HGB BLD-MCNC: 12.2 G/DL — LOW (ref 13–17)
IMM GRANULOCYTES NFR BLD AUTO: 0.6 % — SIGNIFICANT CHANGE UP (ref 0–0.9)
LYMPHOCYTES # BLD AUTO: 0.34 K/UL — LOW (ref 1–3.3)
LYMPHOCYTES # BLD AUTO: 6.9 % — LOW (ref 13–44)
MCHC RBC-ENTMCNC: 33.8 PG — SIGNIFICANT CHANGE UP (ref 27–34)
MCHC RBC-ENTMCNC: 35.2 G/DL — SIGNIFICANT CHANGE UP (ref 32–36)
MCV RBC AUTO: 96.1 FL — SIGNIFICANT CHANGE UP (ref 80–100)
MONOCYTES # BLD AUTO: 0.11 K/UL — SIGNIFICANT CHANGE UP (ref 0–0.9)
MONOCYTES NFR BLD AUTO: 2.2 % — SIGNIFICANT CHANGE UP (ref 2–14)
NEUTROPHILS # BLD AUTO: 4.46 K/UL — SIGNIFICANT CHANGE UP (ref 1.8–7.4)
NEUTROPHILS NFR BLD AUTO: 90.3 % — HIGH (ref 43–77)
NRBC # BLD: 0 /100 WBCS — SIGNIFICANT CHANGE UP (ref 0–0)
PLATELET # BLD AUTO: 128 K/UL — LOW (ref 150–400)
RBC # BLD: 3.61 M/UL — LOW (ref 4.2–5.8)
RBC # FLD: 14.6 % — HIGH (ref 10.3–14.5)
WBC # BLD: 4.94 K/UL — SIGNIFICANT CHANGE UP (ref 3.8–10.5)
WBC # FLD AUTO: 4.94 K/UL — SIGNIFICANT CHANGE UP (ref 3.8–10.5)

## 2024-11-06 ENCOUNTER — RX RENEWAL (OUTPATIENT)
Age: 69
End: 2024-11-06

## 2024-11-07 ENCOUNTER — APPOINTMENT (OUTPATIENT)
Dept: HEMATOLOGY ONCOLOGY | Facility: CLINIC | Age: 69
End: 2024-11-07

## 2024-11-07 ENCOUNTER — APPOINTMENT (OUTPATIENT)
Dept: INFUSION THERAPY | Facility: HOSPITAL | Age: 69
End: 2024-11-07

## 2024-11-08 DIAGNOSIS — Z94.84 STEM CELLS TRANSPLANT STATUS: ICD-10-CM

## 2024-11-08 DIAGNOSIS — C90.00 MULTIPLE MYELOMA NOT HAVING ACHIEVED REMISSION: ICD-10-CM

## 2024-11-10 ENCOUNTER — OUTPATIENT (OUTPATIENT)
Dept: OUTPATIENT SERVICES | Facility: HOSPITAL | Age: 69
LOS: 1 days | Discharge: HOME | End: 2024-11-10

## 2024-11-10 DIAGNOSIS — Z98.890 OTHER SPECIFIED POSTPROCEDURAL STATES: Chronic | ICD-10-CM

## 2024-11-10 DIAGNOSIS — R79.89 OTHER SPECIFIED ABNORMAL FINDINGS OF BLOOD CHEMISTRY: ICD-10-CM

## 2024-11-14 ENCOUNTER — APPOINTMENT (OUTPATIENT)
Dept: HEMATOLOGY ONCOLOGY | Facility: CLINIC | Age: 69
End: 2024-11-14

## 2024-11-14 ENCOUNTER — RESULT REVIEW (OUTPATIENT)
Age: 69
End: 2024-11-14

## 2024-11-14 ENCOUNTER — APPOINTMENT (OUTPATIENT)
Dept: INFUSION THERAPY | Facility: HOSPITAL | Age: 69
End: 2024-11-14

## 2024-11-14 LAB
BASOPHILS # BLD AUTO: 0.01 K/UL — SIGNIFICANT CHANGE UP (ref 0–0.2)
BASOPHILS NFR BLD AUTO: 0.3 % — SIGNIFICANT CHANGE UP (ref 0–2)
EOSINOPHIL # BLD AUTO: 0 K/UL — SIGNIFICANT CHANGE UP (ref 0–0.5)
EOSINOPHIL NFR BLD AUTO: 0 % — SIGNIFICANT CHANGE UP (ref 0–6)
HCT VFR BLD CALC: 34.6 % — LOW (ref 39–50)
HGB BLD-MCNC: 12.1 G/DL — LOW (ref 13–17)
IMM GRANULOCYTES NFR BLD AUTO: 0.7 % — SIGNIFICANT CHANGE UP (ref 0–0.9)
LYMPHOCYTES # BLD AUTO: 0.26 K/UL — LOW (ref 1–3.3)
LYMPHOCYTES # BLD AUTO: 8.5 % — LOW (ref 13–44)
MCHC RBC-ENTMCNC: 34 PG — SIGNIFICANT CHANGE UP (ref 27–34)
MCHC RBC-ENTMCNC: 35 G/DL — SIGNIFICANT CHANGE UP (ref 32–36)
MCV RBC AUTO: 97.2 FL — SIGNIFICANT CHANGE UP (ref 80–100)
MONOCYTES # BLD AUTO: 0.09 K/UL — SIGNIFICANT CHANGE UP (ref 0–0.9)
MONOCYTES NFR BLD AUTO: 2.9 % — SIGNIFICANT CHANGE UP (ref 2–14)
NEUTROPHILS # BLD AUTO: 2.68 K/UL — SIGNIFICANT CHANGE UP (ref 1.8–7.4)
NEUTROPHILS NFR BLD AUTO: 87.6 % — HIGH (ref 43–77)
NRBC # BLD: 0 /100 WBCS — SIGNIFICANT CHANGE UP (ref 0–0)
PLATELET # BLD AUTO: 152 K/UL — SIGNIFICANT CHANGE UP (ref 150–400)
RBC # BLD: 3.56 M/UL — LOW (ref 4.2–5.8)
RBC # FLD: 14.6 % — HIGH (ref 10.3–14.5)
WBC # BLD: 3.06 K/UL — LOW (ref 3.8–10.5)
WBC # FLD AUTO: 3.06 K/UL — LOW (ref 3.8–10.5)

## 2024-11-15 DIAGNOSIS — C79.51 SECONDARY MALIGNANT NEOPLASM OF BONE: ICD-10-CM

## 2024-11-15 DIAGNOSIS — C90.00 MULTIPLE MYELOMA NOT HAVING ACHIEVED REMISSION: ICD-10-CM

## 2024-11-15 DIAGNOSIS — Z51.11 ENCOUNTER FOR ANTINEOPLASTIC CHEMOTHERAPY: ICD-10-CM

## 2024-11-15 LAB
ALBUMIN SERPL ELPH-MCNC: 4.1 G/DL
ALP BLD-CCNC: 98 U/L
ALT SERPL-CCNC: 36 U/L
ANION GAP SERPL CALC-SCNC: 17 MMOL/L
AST SERPL-CCNC: 22 U/L
BILIRUB SERPL-MCNC: 0.4 MG/DL
BUN SERPL-MCNC: 16 MG/DL
CALCIUM SERPL-MCNC: 9.2 MG/DL
CHLORIDE SERPL-SCNC: 104 MMOL/L
CO2 SERPL-SCNC: 19 MMOL/L
CREAT SERPL-MCNC: 0.82 MG/DL
EGFR: 96 ML/MIN/1.73M2
GLUCOSE SERPL-MCNC: 212 MG/DL
IGD SER-MCNC: 75 MG/DL
POTASSIUM SERPL-SCNC: 4.4 MMOL/L
PROT SERPL-MCNC: 5.7 G/DL
SODIUM SERPL-SCNC: 140 MMOL/L

## 2024-11-18 LAB
ALBUMIN MFR SERPL ELPH: 63.1 %
ALBUMIN SERPL-MCNC: 3.6 G/DL
ALBUMIN/GLOB SERPL: 1.7 RATIO
ALPHA1 GLOB MFR SERPL ELPH: 6.2 %
ALPHA1 GLOB SERPL ELPH-MCNC: 0.4 G/DL
ALPHA2 GLOB MFR SERPL ELPH: 12.2 %
ALPHA2 GLOB SERPL ELPH-MCNC: 0.7 G/DL
B-GLOBULIN MFR SERPL ELPH: 13.1 %
B-GLOBULIN SERPL ELPH-MCNC: 0.7 G/DL
DEPRECATED KAPPA LC FREE/LAMBDA SER: 0.23 RATIO
GAMMA GLOB FLD ELPH-MCNC: 0.3 G/DL
GAMMA GLOB MFR SERPL ELPH: 5.4 %
IGA SER QL IEP: 27 MG/DL
IGG SER QL IEP: 289 MG/DL
IGM SER QL IEP: 26 MG/DL
INTERPRETATION SERPL IEP-IMP: NORMAL
KAPPA LC CSF-MCNC: 1.8 MG/DL
KAPPA LC SERPL-MCNC: 0.42 MG/DL
M PROTEIN MFR SERPL ELPH: NORMAL
M PROTEIN SPEC IFE-MCNC: NORMAL
MONOCLON BAND OBS SERPL: NORMAL
PROT SERPL-MCNC: 5.7 G/DL
PROT SERPL-MCNC: 5.7 G/DL

## 2024-11-21 ENCOUNTER — APPOINTMENT (OUTPATIENT)
Dept: HEMATOLOGY ONCOLOGY | Facility: CLINIC | Age: 69
End: 2024-11-21

## 2024-11-21 ENCOUNTER — APPOINTMENT (OUTPATIENT)
Dept: INFUSION THERAPY | Facility: HOSPITAL | Age: 69
End: 2024-11-21

## 2024-11-25 ENCOUNTER — APPOINTMENT (OUTPATIENT)
Dept: ORTHOPEDIC SURGERY | Facility: CLINIC | Age: 69
End: 2024-11-25
Payer: MEDICARE

## 2024-11-25 DIAGNOSIS — M51.34 OTHER INTERVERTEBRAL DISC DEGENERATION, THORACIC REGION: ICD-10-CM

## 2024-11-25 DIAGNOSIS — M43.17 SPONDYLOLISTHESIS, LUMBOSACRAL REGION: ICD-10-CM

## 2024-11-25 PROCEDURE — 99213 OFFICE O/P EST LOW 20 MIN: CPT

## 2024-11-27 ENCOUNTER — NON-APPOINTMENT (OUTPATIENT)
Age: 69
End: 2024-11-27

## 2024-11-27 ENCOUNTER — EMERGENCY (EMERGENCY)
Facility: HOSPITAL | Age: 69
LOS: 1 days | Discharge: HOME | End: 2024-11-27
Attending: STUDENT IN AN ORGANIZED HEALTH CARE EDUCATION/TRAINING PROGRAM | Admitting: STUDENT IN AN ORGANIZED HEALTH CARE EDUCATION/TRAINING PROGRAM
Payer: MEDICARE

## 2024-11-27 VITALS
RESPIRATION RATE: 18 BRPM | DIASTOLIC BLOOD PRESSURE: 72 MMHG | TEMPERATURE: 98 F | HEART RATE: 67 BPM | SYSTOLIC BLOOD PRESSURE: 122 MMHG | OXYGEN SATURATION: 98 % | HEIGHT: 73 IN | WEIGHT: 210.1 LBS

## 2024-11-27 DIAGNOSIS — Z98.890 OTHER SPECIFIED POSTPROCEDURAL STATES: Chronic | ICD-10-CM

## 2024-11-27 DIAGNOSIS — C90.00 MULTIPLE MYELOMA NOT HAVING ACHIEVED REMISSION: ICD-10-CM

## 2024-11-27 DIAGNOSIS — Z94.84 STEM CELLS TRANSPLANT STATUS: ICD-10-CM

## 2024-11-27 LAB
ALBUMIN SERPL ELPH-MCNC: 2.8 G/DL — LOW (ref 3.3–5)
ALP SERPL-CCNC: 97 U/L — SIGNIFICANT CHANGE UP (ref 40–120)
ALT FLD-CCNC: 31 U/L — SIGNIFICANT CHANGE UP (ref 10–45)
ANION GAP SERPL CALC-SCNC: 5 MMOL/L — SIGNIFICANT CHANGE UP (ref 5–17)
AST SERPL-CCNC: 17 U/L — SIGNIFICANT CHANGE UP (ref 10–40)
BASOPHILS # BLD AUTO: 0.06 K/UL — SIGNIFICANT CHANGE UP (ref 0–0.2)
BASOPHILS NFR BLD AUTO: 1.1 % — SIGNIFICANT CHANGE UP (ref 0–2)
BILIRUB SERPL-MCNC: 0.7 MG/DL — SIGNIFICANT CHANGE UP (ref 0.2–1.2)
BUN SERPL-MCNC: 11 MG/DL — SIGNIFICANT CHANGE UP (ref 7–23)
CALCIUM SERPL-MCNC: 8.7 MG/DL — SIGNIFICANT CHANGE UP (ref 8.4–10.5)
CHLORIDE SERPL-SCNC: 104 MMOL/L — SIGNIFICANT CHANGE UP (ref 96–108)
CO2 SERPL-SCNC: 30 MMOL/L — SIGNIFICANT CHANGE UP (ref 22–31)
CREAT SERPL-MCNC: 0.98 MG/DL — SIGNIFICANT CHANGE UP (ref 0.5–1.3)
EGFR: 83 ML/MIN/1.73M2 — SIGNIFICANT CHANGE UP
EGFR: 83 ML/MIN/1.73M2 — SIGNIFICANT CHANGE UP
EOSINOPHIL # BLD AUTO: 0.17 K/UL — SIGNIFICANT CHANGE UP (ref 0–0.5)
EOSINOPHIL NFR BLD AUTO: 3.2 % — SIGNIFICANT CHANGE UP (ref 0–6)
GLUCOSE SERPL-MCNC: 133 MG/DL — HIGH (ref 70–99)
HCT VFR BLD CALC: 34.6 % — LOW (ref 39–50)
HGB BLD-MCNC: 11.6 G/DL — LOW (ref 13–17)
IMM GRANULOCYTES NFR BLD AUTO: 0.6 % — SIGNIFICANT CHANGE UP (ref 0–0.9)
LIDOCAIN IGE QN: 27 U/L — SIGNIFICANT CHANGE UP (ref 16–77)
LYMPHOCYTES # BLD AUTO: 0.78 K/UL — LOW (ref 1–3.3)
LYMPHOCYTES # BLD AUTO: 14.7 % — SIGNIFICANT CHANGE UP (ref 13–44)
MCHC RBC-ENTMCNC: 32.7 PG — SIGNIFICANT CHANGE UP (ref 27–34)
MCHC RBC-ENTMCNC: 33.5 G/DL — SIGNIFICANT CHANGE UP (ref 32–36)
MCV RBC AUTO: 97.5 FL — SIGNIFICANT CHANGE UP (ref 80–100)
MONOCYTES # BLD AUTO: 0.32 K/UL — SIGNIFICANT CHANGE UP (ref 0–0.9)
MONOCYTES NFR BLD AUTO: 6 % — SIGNIFICANT CHANGE UP (ref 2–14)
NEUTROPHILS # BLD AUTO: 3.95 K/UL — SIGNIFICANT CHANGE UP (ref 1.8–7.4)
NEUTROPHILS NFR BLD AUTO: 74.4 % — SIGNIFICANT CHANGE UP (ref 43–77)
NRBC # BLD: 0 /100 WBCS — SIGNIFICANT CHANGE UP (ref 0–0)
NRBC BLD-RTO: 0 /100 WBCS — SIGNIFICANT CHANGE UP (ref 0–0)
PLATELET # BLD AUTO: 104 K/UL — LOW (ref 150–400)
POTASSIUM SERPL-MCNC: 3.4 MMOL/L — LOW (ref 3.5–5.3)
POTASSIUM SERPL-SCNC: 3.4 MMOL/L — LOW (ref 3.5–5.3)
PROT SERPL-MCNC: 6.3 G/DL — SIGNIFICANT CHANGE UP (ref 6–8.3)
RBC # BLD: 3.55 M/UL — LOW (ref 4.2–5.8)
RBC # FLD: 14.4 % — SIGNIFICANT CHANGE UP (ref 10.3–14.5)
SODIUM SERPL-SCNC: 139 MMOL/L — SIGNIFICANT CHANGE UP (ref 135–145)
WBC # BLD: 5.31 K/UL — SIGNIFICANT CHANGE UP (ref 3.8–10.5)
WBC # FLD AUTO: 5.31 K/UL — SIGNIFICANT CHANGE UP (ref 3.8–10.5)

## 2024-11-27 PROCEDURE — 96375 TX/PRO/DX INJ NEW DRUG ADDON: CPT

## 2024-11-27 PROCEDURE — 36415 COLL VENOUS BLD VENIPUNCTURE: CPT

## 2024-11-27 PROCEDURE — 74177 CT ABD & PELVIS W/CONTRAST: CPT | Mod: MC

## 2024-11-27 PROCEDURE — 96374 THER/PROPH/DIAG INJ IV PUSH: CPT | Mod: XU

## 2024-11-27 PROCEDURE — 80053 COMPREHEN METABOLIC PANEL: CPT

## 2024-11-27 PROCEDURE — 74177 CT ABD & PELVIS W/CONTRAST: CPT | Mod: 26,MC

## 2024-11-27 PROCEDURE — 83690 ASSAY OF LIPASE: CPT

## 2024-11-27 PROCEDURE — 99285 EMERGENCY DEPT VISIT HI MDM: CPT | Mod: GC

## 2024-11-27 PROCEDURE — 85025 COMPLETE CBC W/AUTO DIFF WBC: CPT

## 2024-11-27 PROCEDURE — 99284 EMERGENCY DEPT VISIT MOD MDM: CPT | Mod: 25

## 2024-11-27 RX ORDER — ACETAMINOPHEN 500 MG/5ML
1000 LIQUID (ML) ORAL ONCE
Refills: 0 | Status: COMPLETED | OUTPATIENT
Start: 2024-11-27 | End: 2024-11-27

## 2024-11-27 RX ORDER — CIPROFLOXACIN HCL 250 MG
1 TABLET ORAL
Qty: 14 | Refills: 0
Start: 2024-11-27 | End: 2024-12-03

## 2024-11-27 RX ORDER — METRONIDAZOLE 250 MG
1 TABLET ORAL
Qty: 14 | Refills: 0
Start: 2024-11-27 | End: 2024-12-03

## 2024-11-27 RX ORDER — METRONIDAZOLE 250 MG
500 TABLET ORAL ONCE
Refills: 0 | Status: COMPLETED | OUTPATIENT
Start: 2024-11-27 | End: 2024-11-27

## 2024-11-27 RX ORDER — CIPROFLOXACIN HCL 250 MG
400 TABLET ORAL ONCE
Refills: 0 | Status: COMPLETED | OUTPATIENT
Start: 2024-11-27 | End: 2024-11-27

## 2024-11-27 RX ADMIN — Medication 400 MILLIGRAM(S): at 14:28

## 2024-11-27 RX ADMIN — Medication 100 MILLIEQUIVALENT(S): at 16:23

## 2024-11-27 RX ADMIN — Medication 100 MILLIGRAM(S): at 17:33

## 2024-11-27 RX ADMIN — Medication 1000 MILLILITER(S): at 14:28

## 2024-11-27 RX ADMIN — Medication 200 MILLIGRAM(S): at 16:23

## 2024-11-27 RX ADMIN — Medication 1000 MILLIGRAM(S): at 16:51

## 2024-12-02 ENCOUNTER — APPOINTMENT (OUTPATIENT)
Dept: INFUSION THERAPY | Facility: HOSPITAL | Age: 69
End: 2024-12-02

## 2024-12-02 ENCOUNTER — APPOINTMENT (OUTPATIENT)
Dept: HEMATOLOGY ONCOLOGY | Facility: CLINIC | Age: 69
End: 2024-12-02

## 2024-12-05 ENCOUNTER — APPOINTMENT (OUTPATIENT)
Dept: HEMATOLOGY ONCOLOGY | Facility: CLINIC | Age: 69
End: 2024-12-05

## 2024-12-05 ENCOUNTER — APPOINTMENT (OUTPATIENT)
Dept: INFUSION THERAPY | Facility: HOSPITAL | Age: 69
End: 2024-12-05

## 2024-12-06 ENCOUNTER — APPOINTMENT (OUTPATIENT)
Dept: PULMONOLOGY | Facility: CLINIC | Age: 69
End: 2024-12-06
Payer: MEDICARE

## 2024-12-06 VITALS
OXYGEN SATURATION: 96 % | DIASTOLIC BLOOD PRESSURE: 70 MMHG | RESPIRATION RATE: 16 BRPM | HEART RATE: 54 BPM | WEIGHT: 212 LBS | BODY MASS INDEX: 28.1 KG/M2 | HEIGHT: 73 IN | TEMPERATURE: 97 F | SYSTOLIC BLOOD PRESSURE: 110 MMHG

## 2024-12-06 DIAGNOSIS — R06.02 SHORTNESS OF BREATH: ICD-10-CM

## 2024-12-06 DIAGNOSIS — I82.403 ACUTE EMBOLISM AND THROMBOSIS OF UNSPECIFIED DEEP VEINS OF LOWER EXTREMITY, BILATERAL: ICD-10-CM

## 2024-12-06 DIAGNOSIS — R05.1 ACUTE COUGH: ICD-10-CM

## 2024-12-06 DIAGNOSIS — J45.50 SEVERE PERSISTENT ASTHMA, UNCOMPLICATED: ICD-10-CM

## 2024-12-06 DIAGNOSIS — J30.89 OTHER ALLERGIC RHINITIS: ICD-10-CM

## 2024-12-06 DIAGNOSIS — G47.33 OBSTRUCTIVE SLEEP APNEA (ADULT) (PEDIATRIC): ICD-10-CM

## 2024-12-06 PROBLEM — C90.00 MULTIPLE MYELOMA NOT HAVING ACHIEVED REMISSION: Chronic | Status: ACTIVE | Noted: 2024-11-27

## 2024-12-06 PROCEDURE — 94010 BREATHING CAPACITY TEST: CPT

## 2024-12-06 PROCEDURE — 95012 NITRIC OXIDE EXP GAS DETER: CPT

## 2024-12-06 PROCEDURE — ZZZZZ: CPT

## 2024-12-06 PROCEDURE — 99214 OFFICE O/P EST MOD 30 MIN: CPT | Mod: 25

## 2024-12-06 PROCEDURE — 94729 DIFFUSING CAPACITY: CPT

## 2024-12-06 PROCEDURE — 94727 GAS DIL/WSHOT DETER LNG VOL: CPT

## 2024-12-18 DIAGNOSIS — Z94.84 STEM CELLS TRANSPLANT STATUS: ICD-10-CM

## 2024-12-18 DIAGNOSIS — C90.00 MULTIPLE MYELOMA NOT HAVING ACHIEVED REMISSION: ICD-10-CM

## 2024-12-19 ENCOUNTER — RESULT REVIEW (OUTPATIENT)
Age: 69
End: 2024-12-19

## 2024-12-19 ENCOUNTER — APPOINTMENT (OUTPATIENT)
Dept: INFUSION THERAPY | Facility: HOSPITAL | Age: 69
End: 2024-12-19

## 2024-12-19 ENCOUNTER — APPOINTMENT (OUTPATIENT)
Dept: HEMATOLOGY ONCOLOGY | Facility: CLINIC | Age: 69
End: 2024-12-19

## 2024-12-19 ENCOUNTER — NON-APPOINTMENT (OUTPATIENT)
Age: 69
End: 2024-12-19

## 2024-12-19 LAB
ALBUMIN SERPL ELPH-MCNC: 4.2 G/DL
ALP BLD-CCNC: 89 U/L
ALT SERPL-CCNC: 19 U/L
ANION GAP SERPL CALC-SCNC: 16 MMOL/L
AST SERPL-CCNC: 14 U/L
BASOPHILS # BLD AUTO: 0.01 K/UL — SIGNIFICANT CHANGE UP (ref 0–0.2)
BASOPHILS NFR BLD AUTO: 0.3 % — SIGNIFICANT CHANGE UP (ref 0–2)
BILIRUB SERPL-MCNC: 0.5 MG/DL
BUN SERPL-MCNC: 16 MG/DL
CALCIUM SERPL-MCNC: 9.4 MG/DL
CHLORIDE SERPL-SCNC: 101 MMOL/L
CO2 SERPL-SCNC: 21 MMOL/L
CREAT SERPL-MCNC: 0.73 MG/DL
DACRYOCYTES BLD QL SMEAR: SLIGHT — SIGNIFICANT CHANGE UP
EGFR: 99 ML/MIN/1.73M2
ELLIPTOCYTES BLD QL SMEAR: SLIGHT — SIGNIFICANT CHANGE UP
EOSINOPHIL # BLD AUTO: 0 K/UL — SIGNIFICANT CHANGE UP (ref 0–0.5)
EOSINOPHIL NFR BLD AUTO: 0 % — SIGNIFICANT CHANGE UP (ref 0–6)
GLUCOSE SERPL-MCNC: 202 MG/DL
HCT VFR BLD CALC: 36.6 % — LOW (ref 39–50)
HGB BLD-MCNC: 12.6 G/DL — LOW (ref 13–17)
IMM GRANULOCYTES NFR BLD AUTO: 0.7 % — SIGNIFICANT CHANGE UP (ref 0–0.9)
LYMPHOCYTES # BLD AUTO: 0.32 K/UL — LOW (ref 1–3.3)
LYMPHOCYTES # BLD AUTO: 11 % — LOW (ref 13–44)
MCHC RBC-ENTMCNC: 32.6 PG — SIGNIFICANT CHANGE UP (ref 27–34)
MCHC RBC-ENTMCNC: 34.4 G/DL — SIGNIFICANT CHANGE UP (ref 32–36)
MCV RBC AUTO: 94.8 FL — SIGNIFICANT CHANGE UP (ref 80–100)
MONOCYTES # BLD AUTO: 0.15 K/UL — SIGNIFICANT CHANGE UP (ref 0–0.9)
MONOCYTES NFR BLD AUTO: 5.2 % — SIGNIFICANT CHANGE UP (ref 2–14)
NEUTROPHILS # BLD AUTO: 2.4 K/UL — SIGNIFICANT CHANGE UP (ref 1.8–7.4)
NEUTROPHILS NFR BLD AUTO: 82.8 % — HIGH (ref 43–77)
NRBC # BLD: 0 /100 WBCS — SIGNIFICANT CHANGE UP (ref 0–0)
PLAT MORPH BLD: NORMAL — SIGNIFICANT CHANGE UP
PLATELET # BLD AUTO: 151 K/UL — SIGNIFICANT CHANGE UP (ref 150–400)
POIKILOCYTOSIS BLD QL AUTO: SIGNIFICANT CHANGE UP
POTASSIUM SERPL-SCNC: 4.3 MMOL/L
PROT SERPL-MCNC: 6.3 G/DL
RBC # BLD: 3.86 M/UL — LOW (ref 4.2–5.8)
RBC # FLD: 14 % — SIGNIFICANT CHANGE UP (ref 10.3–14.5)
RBC BLD AUTO: ABNORMAL
SODIUM SERPL-SCNC: 139 MMOL/L
WBC # BLD: 2.9 K/UL — LOW (ref 3.8–10.5)
WBC # FLD AUTO: 2.9 K/UL — LOW (ref 3.8–10.5)

## 2024-12-20 LAB — IGD SER-MCNC: 63 MG/DL

## 2024-12-26 LAB
ALBUMIN MFR SERPL ELPH: 62.5 %
ALBUMIN SERPL-MCNC: 3.9 G/DL
ALBUMIN/GLOB SERPL: 1.7 RATIO
ALPHA1 GLOB MFR SERPL ELPH: 6.2 %
ALPHA1 GLOB SERPL ELPH-MCNC: 0.4 G/DL
ALPHA2 GLOB MFR SERPL ELPH: 13.2 %
ALPHA2 GLOB SERPL ELPH-MCNC: 0.8 G/DL
B-GLOBULIN MFR SERPL ELPH: 13 %
B-GLOBULIN SERPL ELPH-MCNC: 0.8 G/DL
DEPRECATED KAPPA LC FREE/LAMBDA SER: 0.19 RATIO
GAMMA GLOB FLD ELPH-MCNC: 0.3 G/DL
GAMMA GLOB MFR SERPL ELPH: 5.1 %
IGA SER QL IEP: 31 MG/DL
IGG SER QL IEP: 290 MG/DL
IGM SER QL IEP: 26 MG/DL
INTERPRETATION SERPL IEP-IMP: NORMAL
KAPPA LC CSF-MCNC: 2.05 MG/DL
KAPPA LC SERPL-MCNC: 0.38 MG/DL
M PROTEIN MFR SERPL ELPH: NORMAL
M PROTEIN SPEC IFE-MCNC: NORMAL
MONOCLON BAND OBS SERPL: NORMAL
PROT SERPL-MCNC: 6.2 G/DL
PROT SERPL-MCNC: 6.2 G/DL

## 2025-01-02 ENCOUNTER — RESULT REVIEW (OUTPATIENT)
Age: 70
End: 2025-01-02

## 2025-01-02 ENCOUNTER — APPOINTMENT (OUTPATIENT)
Dept: INFUSION THERAPY | Facility: HOSPITAL | Age: 70
End: 2025-01-02

## 2025-01-02 ENCOUNTER — APPOINTMENT (OUTPATIENT)
Dept: HEMATOLOGY ONCOLOGY | Facility: CLINIC | Age: 70
End: 2025-01-02

## 2025-01-02 ENCOUNTER — APPOINTMENT (OUTPATIENT)
Dept: HEMATOLOGY ONCOLOGY | Facility: CLINIC | Age: 70
End: 2025-01-02
Payer: MEDICARE

## 2025-01-02 DIAGNOSIS — I26.92 SADDLE EMBOLUS OF PULMONARY ARTERY W/OUT ACUTE COR PULMONALE: ICD-10-CM

## 2025-01-02 DIAGNOSIS — C90.00 MULTIPLE MYELOMA NOT HAVING ACHIEVED REMISSION: ICD-10-CM

## 2025-01-02 DIAGNOSIS — J18.9 PNEUMONIA, UNSPECIFIED ORGANISM: ICD-10-CM

## 2025-01-02 DIAGNOSIS — Z94.84 STEM CELLS TRANSPLANT STATUS: ICD-10-CM

## 2025-01-02 DIAGNOSIS — I82.403 ACUTE EMBOLISM AND THROMBOSIS OF UNSPECIFIED DEEP VEINS OF LOWER EXTREMITY, BILATERAL: ICD-10-CM

## 2025-01-02 LAB
BASOPHILS # BLD AUTO: 0.01 K/UL — SIGNIFICANT CHANGE UP (ref 0–0.2)
BASOPHILS NFR BLD AUTO: 0.3 % — SIGNIFICANT CHANGE UP (ref 0–2)
EOSINOPHIL # BLD AUTO: 0 K/UL — SIGNIFICANT CHANGE UP (ref 0–0.5)
EOSINOPHIL NFR BLD AUTO: 0 % — SIGNIFICANT CHANGE UP (ref 0–6)
HCT VFR BLD CALC: 36 % — LOW (ref 39–50)
HGB BLD-MCNC: 13 G/DL — SIGNIFICANT CHANGE UP (ref 13–17)
IMM GRANULOCYTES NFR BLD AUTO: 0.6 % — SIGNIFICANT CHANGE UP (ref 0–0.9)
LYMPHOCYTES # BLD AUTO: 0.25 K/UL — LOW (ref 1–3.3)
LYMPHOCYTES # BLD AUTO: 7.1 % — LOW (ref 13–44)
MCHC RBC-ENTMCNC: 33.4 PG — SIGNIFICANT CHANGE UP (ref 27–34)
MCHC RBC-ENTMCNC: 36.1 G/DL — HIGH (ref 32–36)
MCV RBC AUTO: 92.5 FL — SIGNIFICANT CHANGE UP (ref 80–100)
MONOCYTES # BLD AUTO: 0.09 K/UL — SIGNIFICANT CHANGE UP (ref 0–0.9)
MONOCYTES NFR BLD AUTO: 2.5 % — SIGNIFICANT CHANGE UP (ref 2–14)
NEUTROPHILS # BLD AUTO: 3.17 K/UL — SIGNIFICANT CHANGE UP (ref 1.8–7.4)
NEUTROPHILS NFR BLD AUTO: 89.5 % — HIGH (ref 43–77)
NRBC # BLD: 0 /100 WBCS — SIGNIFICANT CHANGE UP (ref 0–0)
PLATELET # BLD AUTO: 86 K/UL — LOW (ref 150–400)
RBC # BLD: 3.89 M/UL — LOW (ref 4.2–5.8)
RBC # FLD: 14.1 % — SIGNIFICANT CHANGE UP (ref 10.3–14.5)
WBC # BLD: 3.54 K/UL — LOW (ref 3.8–10.5)
WBC # FLD AUTO: 3.54 K/UL — LOW (ref 3.8–10.5)

## 2025-01-02 PROCEDURE — 99215 OFFICE O/P EST HI 40 MIN: CPT

## 2025-01-15 ENCOUNTER — OUTPATIENT (OUTPATIENT)
Dept: OUTPATIENT SERVICES | Facility: HOSPITAL | Age: 70
LOS: 1 days | Discharge: HOME | End: 2025-01-15

## 2025-01-15 DIAGNOSIS — R79.89 OTHER SPECIFIED ABNORMAL FINDINGS OF BLOOD CHEMISTRY: ICD-10-CM

## 2025-01-15 DIAGNOSIS — Z98.890 OTHER SPECIFIED POSTPROCEDURAL STATES: Chronic | ICD-10-CM

## 2025-01-15 NOTE — ED ADULT NURSE NOTE - PRO INTERPRETER NEED 2
Subjective   Patient ID: Melany Boyer is a 31 y.o. female who presents for DERMATITIS IN EARS.    HPI    Patient presents today for chronic pruritus of the ear canal skin.  She states that she does like to did get the ears accordingly because of this.  She denies any fidelia pain or drainage.  No change in hearing.  No vertigo or tinnitus.  As an additional symptomatology regarding ENT she also has some sensation of thickness in her throat with a rare episode of dysphagia.  All remaining is clear.      Review of Systems   Constitutional: Negative.    HENT: Negative.     Respiratory: Negative.     Cardiovascular: Negative.    Neurological: Negative.            Physical Exam    General appearance: No acute distress. Normal facies. Symmetric facial movement. No gross lesions of the face are noted.  Ear exam noted for some wax along with low-grade chronic otitis externa.  Ears debrided.  Nothing otherwise more worrisome.  Nasal exam is clear anteriorly. The septum is relatively straight and the nasal mucosa is grossly unremarkable without evidence of any worrisome infection or polyp or mass. The oral cavity and oropharynx are unremarkable. There is no evidence of any gross lesion or mucosal irregularity throughout the structures. The neck is negative for mass or lymphadenopathy. The trachea and parotid region are clear free of obvious mass or tumor. Facial structures are grossly otherwise unremarkable as well.      Assessment/Plan     31-year-old female with evidence of chronic otitis externa variant.  Ears debrided and she will be treated with Elocon cream with prescription sent along with appropriate instruction for maximal benefit.  Regarding the throat we see nothing worrisome on exam and we do favor observation.  Certainly should the symptoms get worse I wish to see her in an expeditious manner.  All questions were answered in this regard accordingly.  
English

## 2025-01-16 ENCOUNTER — APPOINTMENT (OUTPATIENT)
Dept: INFUSION THERAPY | Facility: HOSPITAL | Age: 70
End: 2025-01-16

## 2025-01-16 ENCOUNTER — APPOINTMENT (OUTPATIENT)
Dept: HEMATOLOGY ONCOLOGY | Facility: CLINIC | Age: 70
End: 2025-01-16

## 2025-01-16 ENCOUNTER — RESULT REVIEW (OUTPATIENT)
Age: 70
End: 2025-01-16

## 2025-01-16 ENCOUNTER — NON-APPOINTMENT (OUTPATIENT)
Age: 70
End: 2025-01-16

## 2025-01-16 DIAGNOSIS — C90.00 MULTIPLE MYELOMA NOT HAVING ACHIEVED REMISSION: ICD-10-CM

## 2025-01-16 LAB
BASOPHILS # BLD AUTO: 0.02 K/UL — SIGNIFICANT CHANGE UP (ref 0–0.2)
BASOPHILS NFR BLD AUTO: 0.4 % — SIGNIFICANT CHANGE UP (ref 0–2)
EOSINOPHIL # BLD AUTO: 0 K/UL — SIGNIFICANT CHANGE UP (ref 0–0.5)
EOSINOPHIL NFR BLD AUTO: 0 % — SIGNIFICANT CHANGE UP (ref 0–6)
HCT VFR BLD CALC: 35.3 % — LOW (ref 39–50)
HGB BLD-MCNC: 12.7 G/DL — LOW (ref 13–17)
IMM GRANULOCYTES NFR BLD AUTO: 0.6 % — SIGNIFICANT CHANGE UP (ref 0–0.9)
LYMPHOCYTES # BLD AUTO: 0.32 K/UL — LOW (ref 1–3.3)
LYMPHOCYTES # BLD AUTO: 6.6 % — LOW (ref 13–44)
MCHC RBC-ENTMCNC: 33.2 PG — SIGNIFICANT CHANGE UP (ref 27–34)
MCHC RBC-ENTMCNC: 36 G/DL — SIGNIFICANT CHANGE UP (ref 32–36)
MCV RBC AUTO: 92.4 FL — SIGNIFICANT CHANGE UP (ref 80–100)
MONOCYTES # BLD AUTO: 0.14 K/UL — SIGNIFICANT CHANGE UP (ref 0–0.9)
MONOCYTES NFR BLD AUTO: 2.9 % — SIGNIFICANT CHANGE UP (ref 2–14)
NEUTROPHILS # BLD AUTO: 4.33 K/UL — SIGNIFICANT CHANGE UP (ref 1.8–7.4)
NEUTROPHILS NFR BLD AUTO: 89.5 % — HIGH (ref 43–77)
NRBC # BLD: 0 /100 WBCS — SIGNIFICANT CHANGE UP (ref 0–0)
NRBC BLD-RTO: 0 /100 WBCS — SIGNIFICANT CHANGE UP (ref 0–0)
PLATELET # BLD AUTO: 134 K/UL — LOW (ref 150–400)
RBC # BLD: 3.82 M/UL — LOW (ref 4.2–5.8)
RBC # FLD: 14.5 % — SIGNIFICANT CHANGE UP (ref 10.3–14.5)
WBC # BLD: 4.84 K/UL — SIGNIFICANT CHANGE UP (ref 3.8–10.5)
WBC # FLD AUTO: 4.84 K/UL — SIGNIFICANT CHANGE UP (ref 3.8–10.5)

## 2025-01-17 DIAGNOSIS — R11.2 NAUSEA WITH VOMITING, UNSPECIFIED: ICD-10-CM

## 2025-01-17 DIAGNOSIS — C90.00 MULTIPLE MYELOMA NOT HAVING ACHIEVED REMISSION: ICD-10-CM

## 2025-01-17 DIAGNOSIS — C79.51 SECONDARY MALIGNANT NEOPLASM OF BONE: ICD-10-CM

## 2025-01-30 ENCOUNTER — RESULT REVIEW (OUTPATIENT)
Age: 70
End: 2025-01-30

## 2025-01-30 ENCOUNTER — APPOINTMENT (OUTPATIENT)
Dept: HEMATOLOGY ONCOLOGY | Facility: CLINIC | Age: 70
End: 2025-01-30

## 2025-01-30 ENCOUNTER — APPOINTMENT (OUTPATIENT)
Dept: INFUSION THERAPY | Facility: HOSPITAL | Age: 70
End: 2025-01-30

## 2025-01-30 DIAGNOSIS — C90.00 MULTIPLE MYELOMA NOT HAVING ACHIEVED REMISSION: ICD-10-CM

## 2025-01-30 LAB
BASOPHILS # BLD AUTO: 0.01 K/UL — SIGNIFICANT CHANGE UP (ref 0–0.2)
BASOPHILS NFR BLD AUTO: 0.3 % — SIGNIFICANT CHANGE UP (ref 0–2)
EOSINOPHIL # BLD AUTO: 0 K/UL — SIGNIFICANT CHANGE UP (ref 0–0.5)
EOSINOPHIL NFR BLD AUTO: 0 % — SIGNIFICANT CHANGE UP (ref 0–6)
HCT VFR BLD CALC: 34.7 % — LOW (ref 39–50)
HGB BLD-MCNC: 12.2 G/DL — LOW (ref 13–17)
IMM GRANULOCYTES NFR BLD AUTO: 0.8 % — SIGNIFICANT CHANGE UP (ref 0–0.9)
LYMPHOCYTES # BLD AUTO: 0.22 K/UL — LOW (ref 1–3.3)
LYMPHOCYTES # BLD AUTO: 6 % — LOW (ref 13–44)
MCHC RBC-ENTMCNC: 32.7 PG — SIGNIFICANT CHANGE UP (ref 27–34)
MCHC RBC-ENTMCNC: 35.2 G/DL — SIGNIFICANT CHANGE UP (ref 32–36)
MCV RBC AUTO: 93 FL — SIGNIFICANT CHANGE UP (ref 80–100)
MONOCYTES # BLD AUTO: 0.11 K/UL — SIGNIFICANT CHANGE UP (ref 0–0.9)
MONOCYTES NFR BLD AUTO: 3 % — SIGNIFICANT CHANGE UP (ref 2–14)
NEUTROPHILS # BLD AUTO: 3.3 K/UL — SIGNIFICANT CHANGE UP (ref 1.8–7.4)
NEUTROPHILS NFR BLD AUTO: 89.9 % — HIGH (ref 43–77)
NRBC # BLD: 0 /100 WBCS — SIGNIFICANT CHANGE UP (ref 0–0)
NRBC BLD-RTO: 0 /100 WBCS — SIGNIFICANT CHANGE UP (ref 0–0)
PLATELET # BLD AUTO: 93 K/UL — LOW (ref 150–400)
RBC # BLD: 3.73 M/UL — LOW (ref 4.2–5.8)
RBC # FLD: 14.5 % — SIGNIFICANT CHANGE UP (ref 10.3–14.5)
WBC # BLD: 3.67 K/UL — LOW (ref 3.8–10.5)
WBC # FLD AUTO: 3.67 K/UL — LOW (ref 3.8–10.5)

## 2025-02-13 ENCOUNTER — APPOINTMENT (OUTPATIENT)
Dept: HEMATOLOGY ONCOLOGY | Facility: CLINIC | Age: 70
End: 2025-02-13

## 2025-02-13 ENCOUNTER — RESULT REVIEW (OUTPATIENT)
Age: 70
End: 2025-02-13

## 2025-02-13 ENCOUNTER — APPOINTMENT (OUTPATIENT)
Dept: INFUSION THERAPY | Facility: HOSPITAL | Age: 70
End: 2025-02-13

## 2025-02-13 LAB
BASOPHILS # BLD AUTO: 0.02 K/UL — SIGNIFICANT CHANGE UP (ref 0–0.2)
BASOPHILS NFR BLD AUTO: 0.5 % — SIGNIFICANT CHANGE UP (ref 0–2)
EOSINOPHIL # BLD AUTO: 0 K/UL — SIGNIFICANT CHANGE UP (ref 0–0.5)
EOSINOPHIL NFR BLD AUTO: 0 % — SIGNIFICANT CHANGE UP (ref 0–6)
HCT VFR BLD CALC: 34.5 % — LOW (ref 39–50)
HGB BLD-MCNC: 12.2 G/DL — LOW (ref 13–17)
IMM GRANULOCYTES NFR BLD AUTO: 0.5 % — SIGNIFICANT CHANGE UP (ref 0–0.9)
LYMPHOCYTES # BLD AUTO: 0.35 K/UL — LOW (ref 1–3.3)
LYMPHOCYTES # BLD AUTO: 9.2 % — LOW (ref 13–44)
MCHC RBC-ENTMCNC: 32.5 PG — SIGNIFICANT CHANGE UP (ref 27–34)
MCHC RBC-ENTMCNC: 35.4 G/DL — SIGNIFICANT CHANGE UP (ref 32–36)
MCV RBC AUTO: 92 FL — SIGNIFICANT CHANGE UP (ref 80–100)
MONOCYTES # BLD AUTO: 0.12 K/UL — SIGNIFICANT CHANGE UP (ref 0–0.9)
MONOCYTES NFR BLD AUTO: 3.1 % — SIGNIFICANT CHANGE UP (ref 2–14)
NEUTROPHILS # BLD AUTO: 3.3 K/UL — SIGNIFICANT CHANGE UP (ref 1.8–7.4)
NEUTROPHILS NFR BLD AUTO: 86.7 % — HIGH (ref 43–77)
NRBC BLD AUTO-RTO: 0 /100 WBCS — SIGNIFICANT CHANGE UP (ref 0–0)
PLATELET # BLD AUTO: 114 K/UL — LOW (ref 150–400)
RBC # BLD: 3.75 M/UL — LOW (ref 4.2–5.8)
RBC # FLD: 14.6 % — HIGH (ref 10.3–14.5)
WBC # BLD: 3.81 K/UL — SIGNIFICANT CHANGE UP (ref 3.8–10.5)
WBC # FLD AUTO: 3.81 K/UL — SIGNIFICANT CHANGE UP (ref 3.8–10.5)

## 2025-02-14 DIAGNOSIS — Z51.11 ENCOUNTER FOR ANTINEOPLASTIC CHEMOTHERAPY: ICD-10-CM

## 2025-02-19 ENCOUNTER — RX RENEWAL (OUTPATIENT)
Age: 70
End: 2025-02-19

## 2025-02-19 RX ORDER — APIXABAN 5 MG/1
5 TABLET, FILM COATED ORAL
Qty: 180 | Refills: 3 | Status: ACTIVE | COMMUNITY
Start: 2025-02-19 | End: 1900-01-01

## 2025-02-20 ENCOUNTER — APPOINTMENT (OUTPATIENT)
Dept: CARDIOLOGY | Facility: CLINIC | Age: 70
End: 2025-02-20
Payer: MEDICARE

## 2025-02-20 VITALS
BODY MASS INDEX: 27.83 KG/M2 | DIASTOLIC BLOOD PRESSURE: 70 MMHG | HEIGHT: 73 IN | OXYGEN SATURATION: 96 % | SYSTOLIC BLOOD PRESSURE: 103 MMHG | HEART RATE: 55 BPM | WEIGHT: 210 LBS

## 2025-02-20 DIAGNOSIS — C90.00 MULTIPLE MYELOMA NOT HAVING ACHIEVED REMISSION: ICD-10-CM

## 2025-02-20 PROCEDURE — 99214 OFFICE O/P EST MOD 30 MIN: CPT

## 2025-02-20 PROCEDURE — G2211 COMPLEX E/M VISIT ADD ON: CPT

## 2025-02-26 ENCOUNTER — APPOINTMENT (OUTPATIENT)
Dept: ORTHOPEDIC SURGERY | Facility: CLINIC | Age: 70
End: 2025-02-26
Payer: MEDICARE

## 2025-02-26 DIAGNOSIS — M54.16 RADICULOPATHY, LUMBAR REGION: ICD-10-CM

## 2025-02-26 DIAGNOSIS — M51.26 OTHER INTERVERTEBRAL DISC DISPLACEMENT, LUMBAR REGION: ICD-10-CM

## 2025-02-26 DIAGNOSIS — M51.34 OTHER INTERVERTEBRAL DISC DEGENERATION, THORACIC REGION: ICD-10-CM

## 2025-02-26 DIAGNOSIS — M51.369: ICD-10-CM

## 2025-02-26 PROCEDURE — 99214 OFFICE O/P EST MOD 30 MIN: CPT

## 2025-02-27 ENCOUNTER — APPOINTMENT (OUTPATIENT)
Dept: INFUSION THERAPY | Facility: HOSPITAL | Age: 70
End: 2025-02-27

## 2025-02-27 ENCOUNTER — RESULT REVIEW (OUTPATIENT)
Age: 70
End: 2025-02-27

## 2025-02-27 ENCOUNTER — APPOINTMENT (OUTPATIENT)
Dept: HEMATOLOGY ONCOLOGY | Facility: CLINIC | Age: 70
End: 2025-02-27

## 2025-02-27 LAB
BASOPHILS # BLD AUTO: 0.01 K/UL — SIGNIFICANT CHANGE UP (ref 0–0.2)
BASOPHILS NFR BLD AUTO: 0.3 % — SIGNIFICANT CHANGE UP (ref 0–2)
EOSINOPHIL # BLD AUTO: 0.01 K/UL — SIGNIFICANT CHANGE UP (ref 0–0.5)
EOSINOPHIL NFR BLD AUTO: 0.3 % — SIGNIFICANT CHANGE UP (ref 0–6)
HCT VFR BLD CALC: 35 % — LOW (ref 39–50)
HGB BLD-MCNC: 12.6 G/DL — LOW (ref 13–17)
IMM GRANULOCYTES NFR BLD AUTO: 0.3 % — SIGNIFICANT CHANGE UP (ref 0–0.9)
LYMPHOCYTES # BLD AUTO: 0.24 K/UL — LOW (ref 1–3.3)
LYMPHOCYTES # BLD AUTO: 6.6 % — LOW (ref 13–44)
MCHC RBC-ENTMCNC: 33.5 PG — SIGNIFICANT CHANGE UP (ref 27–34)
MCHC RBC-ENTMCNC: 36 G/DL — SIGNIFICANT CHANGE UP (ref 32–36)
MCV RBC AUTO: 93.1 FL — SIGNIFICANT CHANGE UP (ref 80–100)
MONOCYTES # BLD AUTO: 0.08 K/UL — SIGNIFICANT CHANGE UP (ref 0–0.9)
MONOCYTES NFR BLD AUTO: 2.2 % — SIGNIFICANT CHANGE UP (ref 2–14)
NEUTROPHILS # BLD AUTO: 3.26 K/UL — SIGNIFICANT CHANGE UP (ref 1.8–7.4)
NEUTROPHILS NFR BLD AUTO: 90.3 % — HIGH (ref 43–77)
NRBC BLD AUTO-RTO: 0 /100 WBCS — SIGNIFICANT CHANGE UP (ref 0–0)
PLATELET # BLD AUTO: 70 K/UL — LOW (ref 150–400)
RBC # BLD: 3.76 M/UL — LOW (ref 4.2–5.8)
RBC # FLD: 14.7 % — HIGH (ref 10.3–14.5)
WBC # BLD: 3.61 K/UL — LOW (ref 3.8–10.5)
WBC # FLD AUTO: 3.61 K/UL — LOW (ref 3.8–10.5)

## 2025-03-10 ENCOUNTER — APPOINTMENT (OUTPATIENT)
Dept: HEMATOLOGY ONCOLOGY | Facility: CLINIC | Age: 70
End: 2025-03-10

## 2025-03-13 ENCOUNTER — RESULT REVIEW (OUTPATIENT)
Age: 70
End: 2025-03-13

## 2025-03-13 ENCOUNTER — APPOINTMENT (OUTPATIENT)
Dept: HEMATOLOGY ONCOLOGY | Facility: CLINIC | Age: 70
End: 2025-03-13
Payer: MEDICARE

## 2025-03-13 ENCOUNTER — APPOINTMENT (OUTPATIENT)
Dept: INFUSION THERAPY | Facility: HOSPITAL | Age: 70
End: 2025-03-13

## 2025-03-13 ENCOUNTER — APPOINTMENT (OUTPATIENT)
Dept: HEMATOLOGY ONCOLOGY | Facility: CLINIC | Age: 70
End: 2025-03-13

## 2025-03-13 DIAGNOSIS — C90.00 MULTIPLE MYELOMA NOT HAVING ACHIEVED REMISSION: ICD-10-CM

## 2025-03-13 DIAGNOSIS — Z94.84 STEM CELLS TRANSPLANT STATUS: ICD-10-CM

## 2025-03-13 LAB
BASOPHILS # BLD AUTO: 0.01 K/UL — SIGNIFICANT CHANGE UP (ref 0–0.2)
BASOPHILS NFR BLD AUTO: 0.3 % — SIGNIFICANT CHANGE UP (ref 0–2)
EOSINOPHIL # BLD AUTO: 0 K/UL — SIGNIFICANT CHANGE UP (ref 0–0.5)
EOSINOPHIL NFR BLD AUTO: 0 % — SIGNIFICANT CHANGE UP (ref 0–6)
HCT VFR BLD CALC: 34.3 % — LOW (ref 39–50)
HGB BLD-MCNC: 12.2 G/DL — LOW (ref 13–17)
IMM GRANULOCYTES NFR BLD AUTO: 0.3 % — SIGNIFICANT CHANGE UP (ref 0–0.9)
LYMPHOCYTES # BLD AUTO: 0.33 K/UL — LOW (ref 1–3.3)
LYMPHOCYTES # BLD AUTO: 8.3 % — LOW (ref 13–44)
MCHC RBC-ENTMCNC: 32.4 PG — SIGNIFICANT CHANGE UP (ref 27–34)
MCHC RBC-ENTMCNC: 35.6 G/DL — SIGNIFICANT CHANGE UP (ref 32–36)
MCV RBC AUTO: 91.2 FL — SIGNIFICANT CHANGE UP (ref 80–100)
MONOCYTES # BLD AUTO: 0.08 K/UL — SIGNIFICANT CHANGE UP (ref 0–0.9)
MONOCYTES NFR BLD AUTO: 2 % — SIGNIFICANT CHANGE UP (ref 2–14)
NEUTROPHILS # BLD AUTO: 3.56 K/UL — SIGNIFICANT CHANGE UP (ref 1.8–7.4)
NEUTROPHILS NFR BLD AUTO: 89.1 % — HIGH (ref 43–77)
NRBC BLD AUTO-RTO: 0 /100 WBCS — SIGNIFICANT CHANGE UP (ref 0–0)
PLATELET # BLD AUTO: 92 K/UL — LOW (ref 150–400)
RBC # BLD: 3.76 M/UL — LOW (ref 4.2–5.8)
RBC # FLD: 14.6 % — HIGH (ref 10.3–14.5)
WBC # BLD: 3.99 K/UL — SIGNIFICANT CHANGE UP (ref 3.8–10.5)
WBC # FLD AUTO: 3.99 K/UL — SIGNIFICANT CHANGE UP (ref 3.8–10.5)

## 2025-03-13 PROCEDURE — 99205 OFFICE O/P NEW HI 60 MIN: CPT

## 2025-03-20 ENCOUNTER — APPOINTMENT (OUTPATIENT)
Dept: HEMATOLOGY ONCOLOGY | Facility: CLINIC | Age: 70
End: 2025-03-20

## 2025-03-29 NOTE — PROCEDURE NOTE - NSURITECHNIQUE_GU_A_CORE
Proper hand hygiene was performed/Sterile gloves were worn for the duration of the procedure/A sterile drape was used to cover all adjacent areas/The site was cleaned with soap/water and sterile solution (betadine)/The catheter was appropriately lubricated/The catheter was secured with a securement device (e.g. StatLock)/The urinary drainage system is closed at the end of the procedure/The collection bag is below the level of the patient and urinary bladder/All applicable medical record documentation is completed
Proper hand hygiene was performed/Sterile gloves were worn for the duration of the procedure/A sterile drape was used to cover all adjacent areas/The catheter was appropriately lubricated/The catheter was secured with a securement device (e.g. StatLock)/The urinary drainage system is closed at the end of the procedure/The collection bag is below the level of the patient and urinary bladder
1. Continue Renal therapeutic diet restriction. Defer fluid restriction to medical team discretion   2. Continue Nepro oral supplement to provide 420 kcal, 19 grams protein per 8 oz   3. Recommend adding Nephrovite pending no medical contraindications, to promote wound healing   4. Monitor PO intake, PO diet tolerance, skin, weight, nutrition related labs, GI function, goals of care   5. Malnutrition Sticker Placed in Chart

## 2025-04-01 DIAGNOSIS — C90.00 MULTIPLE MYELOMA NOT HAVING ACHIEVED REMISSION: ICD-10-CM

## 2025-04-06 ENCOUNTER — OUTPATIENT (OUTPATIENT)
Dept: OUTPATIENT SERVICES | Facility: HOSPITAL | Age: 70
LOS: 1 days | Discharge: HOME | End: 2025-04-06

## 2025-04-06 DIAGNOSIS — R79.89 OTHER SPECIFIED ABNORMAL FINDINGS OF BLOOD CHEMISTRY: ICD-10-CM

## 2025-04-06 DIAGNOSIS — Z98.890 OTHER SPECIFIED POSTPROCEDURAL STATES: Chronic | ICD-10-CM

## 2025-04-10 ENCOUNTER — APPOINTMENT (OUTPATIENT)
Dept: HEMATOLOGY ONCOLOGY | Facility: CLINIC | Age: 70
End: 2025-04-10

## 2025-04-10 ENCOUNTER — RESULT REVIEW (OUTPATIENT)
Age: 70
End: 2025-04-10

## 2025-04-10 ENCOUNTER — NON-APPOINTMENT (OUTPATIENT)
Age: 70
End: 2025-04-10

## 2025-04-10 ENCOUNTER — APPOINTMENT (OUTPATIENT)
Dept: INFUSION THERAPY | Facility: HOSPITAL | Age: 70
End: 2025-04-10

## 2025-04-10 LAB
BASOPHILS # BLD AUTO: 0.01 K/UL — SIGNIFICANT CHANGE UP (ref 0–0.2)
BASOPHILS NFR BLD AUTO: 0.3 % — SIGNIFICANT CHANGE UP (ref 0–2)
EOSINOPHIL # BLD AUTO: 0 K/UL — SIGNIFICANT CHANGE UP (ref 0–0.5)
EOSINOPHIL NFR BLD AUTO: 0 % — SIGNIFICANT CHANGE UP (ref 0–6)
HCT VFR BLD CALC: 32.2 % — LOW (ref 39–50)
HGB BLD-MCNC: 11.5 G/DL — LOW (ref 13–17)
IMM GRANULOCYTES NFR BLD AUTO: 0.3 % — SIGNIFICANT CHANGE UP (ref 0–0.9)
LYMPHOCYTES # BLD AUTO: 0.32 K/UL — LOW (ref 1–3.3)
LYMPHOCYTES # BLD AUTO: 9 % — LOW (ref 13–44)
MCHC RBC-ENTMCNC: 33.3 PG — SIGNIFICANT CHANGE UP (ref 27–34)
MCHC RBC-ENTMCNC: 35.7 G/DL — SIGNIFICANT CHANGE UP (ref 32–36)
MCV RBC AUTO: 93.3 FL — SIGNIFICANT CHANGE UP (ref 80–100)
MONOCYTES # BLD AUTO: 0.17 K/UL — SIGNIFICANT CHANGE UP (ref 0–0.9)
MONOCYTES NFR BLD AUTO: 4.8 % — SIGNIFICANT CHANGE UP (ref 2–14)
NEUTROPHILS # BLD AUTO: 3.03 K/UL — SIGNIFICANT CHANGE UP (ref 1.8–7.4)
NEUTROPHILS NFR BLD AUTO: 85.6 % — HIGH (ref 43–77)
NRBC BLD AUTO-RTO: 0 /100 WBCS — SIGNIFICANT CHANGE UP (ref 0–0)
PLATELET # BLD AUTO: 112 K/UL — LOW (ref 150–400)
RBC # BLD: 3.45 M/UL — LOW (ref 4.2–5.8)
RBC # FLD: 14.6 % — HIGH (ref 10.3–14.5)
WBC # BLD: 3.54 K/UL — LOW (ref 3.8–10.5)
WBC # FLD AUTO: 3.54 K/UL — LOW (ref 3.8–10.5)

## 2025-04-11 DIAGNOSIS — C90.00 MULTIPLE MYELOMA NOT HAVING ACHIEVED REMISSION: ICD-10-CM

## 2025-04-11 DIAGNOSIS — C79.51 SECONDARY MALIGNANT NEOPLASM OF BONE: ICD-10-CM

## 2025-04-17 ENCOUNTER — RESULT REVIEW (OUTPATIENT)
Age: 70
End: 2025-04-17

## 2025-04-17 ENCOUNTER — APPOINTMENT (OUTPATIENT)
Dept: HEMATOLOGY ONCOLOGY | Facility: CLINIC | Age: 70
End: 2025-04-17

## 2025-04-17 ENCOUNTER — NON-APPOINTMENT (OUTPATIENT)
Age: 70
End: 2025-04-17

## 2025-04-17 ENCOUNTER — APPOINTMENT (OUTPATIENT)
Dept: HEMATOLOGY ONCOLOGY | Facility: CLINIC | Age: 70
End: 2025-04-17
Payer: MEDICARE

## 2025-04-17 VITALS
DIASTOLIC BLOOD PRESSURE: 76 MMHG | RESPIRATION RATE: 16 BRPM | HEART RATE: 52 BPM | TEMPERATURE: 97.5 F | OXYGEN SATURATION: 95 % | WEIGHT: 209.44 LBS | HEIGHT: 70.67 IN | BODY MASS INDEX: 29.32 KG/M2 | SYSTOLIC BLOOD PRESSURE: 127 MMHG

## 2025-04-17 DIAGNOSIS — C90.00 MULTIPLE MYELOMA NOT HAVING ACHIEVED REMISSION: ICD-10-CM

## 2025-04-17 DIAGNOSIS — Z94.84 STEM CELLS TRANSPLANT STATUS: ICD-10-CM

## 2025-04-17 LAB
BASOPHILS # BLD AUTO: 0.04 K/UL — SIGNIFICANT CHANGE UP (ref 0–0.2)
BASOPHILS NFR BLD AUTO: 1.4 % — SIGNIFICANT CHANGE UP (ref 0–2)
EOSINOPHIL # BLD AUTO: 0.18 K/UL — SIGNIFICANT CHANGE UP (ref 0–0.5)
EOSINOPHIL NFR BLD AUTO: 6.2 % — HIGH (ref 0–6)
HCT VFR BLD CALC: 34.4 % — LOW (ref 39–50)
HGB BLD-MCNC: 11.9 G/DL — LOW (ref 13–17)
IMM GRANULOCYTES NFR BLD AUTO: 0.3 % — SIGNIFICANT CHANGE UP (ref 0–0.9)
LYMPHOCYTES # BLD AUTO: 0.68 K/UL — LOW (ref 1–3.3)
LYMPHOCYTES # BLD AUTO: 23.4 % — SIGNIFICANT CHANGE UP (ref 13–44)
MCHC RBC-ENTMCNC: 33.6 PG — SIGNIFICANT CHANGE UP (ref 27–34)
MCHC RBC-ENTMCNC: 34.6 G/DL — SIGNIFICANT CHANGE UP (ref 32–36)
MCV RBC AUTO: 97.2 FL — SIGNIFICANT CHANGE UP (ref 80–100)
MONOCYTES # BLD AUTO: 0.24 K/UL — SIGNIFICANT CHANGE UP (ref 0–0.9)
MONOCYTES NFR BLD AUTO: 8.3 % — SIGNIFICANT CHANGE UP (ref 2–14)
NEUTROPHILS # BLD AUTO: 1.75 K/UL — LOW (ref 1.8–7.4)
NEUTROPHILS NFR BLD AUTO: 60.4 % — SIGNIFICANT CHANGE UP (ref 43–77)
PLATELET # BLD AUTO: 87 K/UL — LOW (ref 150–400)
RBC # BLD: 3.54 M/UL — LOW (ref 4.2–5.8)
RBC # FLD: 14.3 % — SIGNIFICANT CHANGE UP (ref 10.3–14.5)
WBC # BLD: 2.97 K/UL — LOW (ref 3.8–10.5)
WBC # FLD AUTO: 2.97 K/UL — LOW (ref 3.8–10.5)

## 2025-04-17 PROCEDURE — 99215 OFFICE O/P EST HI 40 MIN: CPT

## 2025-04-29 ENCOUNTER — APPOINTMENT (OUTPATIENT)
Dept: NUCLEAR MEDICINE | Facility: CLINIC | Age: 70
End: 2025-04-29
Payer: MEDICARE

## 2025-04-29 ENCOUNTER — OUTPATIENT (OUTPATIENT)
Dept: OUTPATIENT SERVICES | Facility: HOSPITAL | Age: 70
LOS: 1 days | End: 2025-04-29
Payer: MEDICARE

## 2025-04-29 DIAGNOSIS — C90.00 MULTIPLE MYELOMA NOT HAVING ACHIEVED REMISSION: ICD-10-CM

## 2025-04-29 PROCEDURE — 78816 PET IMAGE W/CT FULL BODY: CPT | Mod: 26,PI

## 2025-04-29 PROCEDURE — A9552: CPT

## 2025-04-29 PROCEDURE — 78816 PET IMAGE W/CT FULL BODY: CPT

## 2025-05-08 ENCOUNTER — RESULT REVIEW (OUTPATIENT)
Age: 70
End: 2025-05-08

## 2025-05-08 ENCOUNTER — APPOINTMENT (OUTPATIENT)
Dept: HEMATOLOGY ONCOLOGY | Facility: CLINIC | Age: 70
End: 2025-05-08

## 2025-05-08 ENCOUNTER — APPOINTMENT (OUTPATIENT)
Dept: INFUSION THERAPY | Facility: HOSPITAL | Age: 70
End: 2025-05-08

## 2025-05-08 DIAGNOSIS — R11.2 NAUSEA WITH VOMITING, UNSPECIFIED: ICD-10-CM

## 2025-05-08 DIAGNOSIS — Z51.11 ENCOUNTER FOR ANTINEOPLASTIC CHEMOTHERAPY: ICD-10-CM

## 2025-05-08 LAB
BASOPHILS # BLD AUTO: 0.02 K/UL — SIGNIFICANT CHANGE UP (ref 0–0.2)
BASOPHILS NFR BLD AUTO: 0.6 % — SIGNIFICANT CHANGE UP (ref 0–2)
EOSINOPHIL # BLD AUTO: 0 K/UL — SIGNIFICANT CHANGE UP (ref 0–0.5)
EOSINOPHIL NFR BLD AUTO: 0 % — SIGNIFICANT CHANGE UP (ref 0–6)
HCT VFR BLD CALC: 34.2 % — LOW (ref 39–50)
HGB BLD-MCNC: 11.9 G/DL — LOW (ref 13–17)
IMM GRANULOCYTES NFR BLD AUTO: 0.8 % — SIGNIFICANT CHANGE UP (ref 0–0.9)
LYMPHOCYTES # BLD AUTO: 0.31 K/UL — LOW (ref 1–3.3)
LYMPHOCYTES # BLD AUTO: 8.6 % — LOW (ref 13–44)
MCHC RBC-ENTMCNC: 32.9 PG — SIGNIFICANT CHANGE UP (ref 27–34)
MCHC RBC-ENTMCNC: 34.8 G/DL — SIGNIFICANT CHANGE UP (ref 32–36)
MCV RBC AUTO: 94.5 FL — SIGNIFICANT CHANGE UP (ref 80–100)
MONOCYTES # BLD AUTO: 0.35 K/UL — SIGNIFICANT CHANGE UP (ref 0–0.9)
MONOCYTES NFR BLD AUTO: 9.7 % — SIGNIFICANT CHANGE UP (ref 2–14)
NEUTROPHILS # BLD AUTO: 2.89 K/UL — SIGNIFICANT CHANGE UP (ref 1.8–7.4)
NEUTROPHILS NFR BLD AUTO: 80.3 % — HIGH (ref 43–77)
NRBC BLD AUTO-RTO: 0 /100 WBCS — SIGNIFICANT CHANGE UP (ref 0–0)
PLATELET # BLD AUTO: 116 K/UL — LOW (ref 150–400)
RBC # BLD: 3.62 M/UL — LOW (ref 4.2–5.8)
RBC # FLD: 13.9 % — SIGNIFICANT CHANGE UP (ref 10.3–14.5)
WBC # BLD: 3.6 K/UL — LOW (ref 3.8–10.5)
WBC # FLD AUTO: 3.6 K/UL — LOW (ref 3.8–10.5)

## 2025-05-28 ENCOUNTER — APPOINTMENT (OUTPATIENT)
Dept: ORTHOPEDIC SURGERY | Facility: CLINIC | Age: 70
End: 2025-05-28
Payer: MEDICARE

## 2025-05-28 DIAGNOSIS — M51.369: ICD-10-CM

## 2025-05-28 DIAGNOSIS — M51.26 OTHER INTERVERTEBRAL DISC DISPLACEMENT, LUMBAR REGION: ICD-10-CM

## 2025-05-28 PROCEDURE — 99214 OFFICE O/P EST MOD 30 MIN: CPT

## 2025-05-30 ENCOUNTER — NON-APPOINTMENT (OUTPATIENT)
Age: 70
End: 2025-05-30

## 2025-06-10 ENCOUNTER — OUTPATIENT (OUTPATIENT)
Dept: OUTPATIENT SERVICES | Facility: HOSPITAL | Age: 70
LOS: 1 days | Discharge: HOME | End: 2025-06-10

## 2025-06-10 DIAGNOSIS — Z98.890 OTHER SPECIFIED POSTPROCEDURAL STATES: Chronic | ICD-10-CM

## 2025-06-10 DIAGNOSIS — R79.89 OTHER SPECIFIED ABNORMAL FINDINGS OF BLOOD CHEMISTRY: ICD-10-CM

## 2025-06-11 ENCOUNTER — APPOINTMENT (OUTPATIENT)
Dept: ORTHOPEDIC SURGERY | Facility: CLINIC | Age: 70
End: 2025-06-11
Payer: MEDICARE

## 2025-06-11 PROCEDURE — 99214 OFFICE O/P EST MOD 30 MIN: CPT

## 2025-06-11 RX ORDER — MELOXICAM 15 MG/1
15 TABLET ORAL DAILY
Qty: 90 | Refills: 1 | Status: ACTIVE | COMMUNITY
Start: 2025-06-11 | End: 1900-01-01

## 2025-06-12 ENCOUNTER — RESULT REVIEW (OUTPATIENT)
Age: 70
End: 2025-06-12

## 2025-06-12 ENCOUNTER — RX RENEWAL (OUTPATIENT)
Age: 70
End: 2025-06-12

## 2025-06-12 ENCOUNTER — APPOINTMENT (OUTPATIENT)
Dept: HEMATOLOGY ONCOLOGY | Facility: CLINIC | Age: 70
End: 2025-06-12
Payer: MEDICARE

## 2025-06-12 ENCOUNTER — APPOINTMENT (OUTPATIENT)
Dept: INFUSION THERAPY | Facility: HOSPITAL | Age: 70
End: 2025-06-12

## 2025-06-12 DIAGNOSIS — R11.2 NAUSEA WITH VOMITING, UNSPECIFIED: ICD-10-CM

## 2025-06-12 DIAGNOSIS — Z51.11 ENCOUNTER FOR ANTINEOPLASTIC CHEMOTHERAPY: ICD-10-CM

## 2025-06-12 DIAGNOSIS — C90.00 MULTIPLE MYELOMA NOT HAVING ACHIEVED REMISSION: ICD-10-CM

## 2025-06-12 LAB
BASOPHILS # BLD AUTO: 0.01 K/UL — SIGNIFICANT CHANGE UP (ref 0–0.2)
BASOPHILS NFR BLD AUTO: 0.2 % — SIGNIFICANT CHANGE UP (ref 0–2)
EOSINOPHIL # BLD AUTO: 0 K/UL — SIGNIFICANT CHANGE UP (ref 0–0.5)
EOSINOPHIL NFR BLD AUTO: 0 % — SIGNIFICANT CHANGE UP (ref 0–6)
HCT VFR BLD CALC: 35.6 % — LOW (ref 39–50)
HGB BLD-MCNC: 12.3 G/DL — LOW (ref 13–17)
IMM GRANULOCYTES NFR BLD AUTO: 0.7 % — SIGNIFICANT CHANGE UP (ref 0–0.9)
LYMPHOCYTES # BLD AUTO: 0.26 K/UL — LOW (ref 1–3.3)
LYMPHOCYTES # BLD AUTO: 4.3 % — LOW (ref 13–44)
MCHC RBC-ENTMCNC: 32.8 PG — SIGNIFICANT CHANGE UP (ref 27–34)
MCHC RBC-ENTMCNC: 34.6 G/DL — SIGNIFICANT CHANGE UP (ref 32–36)
MCV RBC AUTO: 94.9 FL — SIGNIFICANT CHANGE UP (ref 80–100)
MONOCYTES # BLD AUTO: 0.08 K/UL — SIGNIFICANT CHANGE UP (ref 0–0.9)
MONOCYTES NFR BLD AUTO: 1.3 % — LOW (ref 2–14)
NEUTROPHILS # BLD AUTO: 5.71 K/UL — SIGNIFICANT CHANGE UP (ref 1.8–7.4)
NEUTROPHILS NFR BLD AUTO: 93.5 % — HIGH (ref 43–77)
NRBC BLD AUTO-RTO: 0 /100 WBCS — SIGNIFICANT CHANGE UP (ref 0–0)
PLATELET # BLD AUTO: 114 K/UL — LOW (ref 150–400)
RBC # BLD: 3.75 M/UL — LOW (ref 4.2–5.8)
RBC # FLD: 14.4 % — SIGNIFICANT CHANGE UP (ref 10.3–14.5)
WBC # BLD: 6.1 K/UL — SIGNIFICANT CHANGE UP (ref 3.8–10.5)
WBC # FLD AUTO: 6.1 K/UL — SIGNIFICANT CHANGE UP (ref 3.8–10.5)

## 2025-06-12 PROCEDURE — 99214 OFFICE O/P EST MOD 30 MIN: CPT

## 2025-07-03 ENCOUNTER — NON-APPOINTMENT (OUTPATIENT)
Age: 70
End: 2025-07-03

## 2025-07-03 ENCOUNTER — APPOINTMENT (OUTPATIENT)
Dept: HEMATOLOGY ONCOLOGY | Facility: CLINIC | Age: 70
End: 2025-07-03

## 2025-07-10 ENCOUNTER — LABORATORY RESULT (OUTPATIENT)
Age: 70
End: 2025-07-10

## 2025-07-10 ENCOUNTER — RESULT REVIEW (OUTPATIENT)
Age: 70
End: 2025-07-10

## 2025-07-10 ENCOUNTER — APPOINTMENT (OUTPATIENT)
Dept: INFUSION THERAPY | Facility: HOSPITAL | Age: 70
End: 2025-07-10

## 2025-07-10 ENCOUNTER — APPOINTMENT (OUTPATIENT)
Dept: HEMATOLOGY ONCOLOGY | Facility: CLINIC | Age: 70
End: 2025-07-10

## 2025-07-10 LAB
BASOPHILS # BLD AUTO: 0 K/UL — SIGNIFICANT CHANGE UP (ref 0–0.2)
BASOPHILS NFR BLD AUTO: 0 % — SIGNIFICANT CHANGE UP (ref 0–2)
EOSINOPHIL # BLD AUTO: 0 K/UL — SIGNIFICANT CHANGE UP (ref 0–0.5)
EOSINOPHIL NFR BLD AUTO: 0 % — SIGNIFICANT CHANGE UP (ref 0–6)
HCT VFR BLD CALC: 36.8 % — LOW (ref 39–50)
HGB BLD-MCNC: 12.4 G/DL — LOW (ref 13–17)
LYMPHOCYTES # BLD AUTO: 0.23 K/UL — LOW (ref 1–3.3)
LYMPHOCYTES # BLD AUTO: 5 % — LOW (ref 13–44)
MCHC RBC-ENTMCNC: 32.5 PG — SIGNIFICANT CHANGE UP (ref 27–34)
MCHC RBC-ENTMCNC: 33.7 G/DL — SIGNIFICANT CHANGE UP (ref 32–36)
MCV RBC AUTO: 96.3 FL — SIGNIFICANT CHANGE UP (ref 80–100)
MONOCYTES # BLD AUTO: 0.05 K/UL — SIGNIFICANT CHANGE UP (ref 0–0.9)
MONOCYTES NFR BLD AUTO: 1 % — LOW (ref 2–14)
MYELOCYTES NFR BLD: 1 % — HIGH (ref 0–0)
NEUTROPHILS # BLD AUTO: 4.19 K/UL — SIGNIFICANT CHANGE UP (ref 1.8–7.4)
NEUTROPHILS NFR BLD AUTO: 93 % — HIGH (ref 43–77)
NRBC # BLD: 0 /100 WBCS — SIGNIFICANT CHANGE UP (ref 0–0)
NRBC BLD AUTO-RTO: SIGNIFICANT CHANGE UP /100 WBCS (ref 0–0)
NRBC BLD-RTO: 0 /100 WBCS — SIGNIFICANT CHANGE UP (ref 0–0)
PLAT MORPH BLD: NORMAL — SIGNIFICANT CHANGE UP
PLATELET # BLD AUTO: 148 K/UL — LOW (ref 150–400)
RBC # BLD: 3.82 M/UL — LOW (ref 4.2–5.8)
RBC # FLD: 15.1 % — HIGH (ref 10.3–14.5)
RBC BLD AUTO: SIGNIFICANT CHANGE UP
WBC # BLD: 4.5 K/UL — SIGNIFICANT CHANGE UP (ref 3.8–10.5)
WBC # FLD AUTO: 4.5 K/UL — SIGNIFICANT CHANGE UP (ref 3.8–10.5)

## 2025-07-11 DIAGNOSIS — C79.51 SECONDARY MALIGNANT NEOPLASM OF BONE: ICD-10-CM

## 2025-07-14 NOTE — OCCUPATIONAL THERAPY INITIAL EVALUATION ADULT - MANUAL MUSCLE TESTING RESULTS, REHAB EVAL
Name: Jama Kramer  YOB: 1959  Gender: male  MRN: [id number]    CC:   Chief Complaint   Patient presents with    Injections     Bilateral knee orthovisc #1   BILATERAL knee pain, OA    Patient is here for #1 of 3 visco injections.  All questions answered.     PROCEDURE NOTE:  After discussion of risks and benefits including but not limited to pain, infection, skin discoloration, and injury to blood vessels or nerves the patient verbally consented to proceed with injection of the affected knee.  The affected right and left knee was sterilely prepped in standard fashion and injected with 2 cc of Orthovisc. The patient tolerated the injection well.  Will follow up as scheduled.    Disposition: The patient is to restrict their activity for 48 hours.     Review of Systems  NC      ICD-10-CM    1. Bilateral primary osteoarthritis of knee  M17.0 hyaluronan (ORTHOVISC) 30 MG/2ML injection 30 mg     DRAIN/INJECT LARGE JOINT/BURSA     hyaluronan (ORTHOVISC) 30 MG/2ML injection 30 mg          No follow-ups on file.      LAYLA Calhoun  07/14/25    5/5/no strength deficits were identified

## 2025-07-24 ENCOUNTER — NON-APPOINTMENT (OUTPATIENT)
Age: 70
End: 2025-07-24

## 2025-08-03 ENCOUNTER — NON-APPOINTMENT (OUTPATIENT)
Age: 70
End: 2025-08-03

## 2025-08-05 ENCOUNTER — NON-APPOINTMENT (OUTPATIENT)
Age: 70
End: 2025-08-05

## 2025-08-07 ENCOUNTER — RESULT REVIEW (OUTPATIENT)
Age: 70
End: 2025-08-07

## 2025-08-07 ENCOUNTER — APPOINTMENT (OUTPATIENT)
Dept: INFUSION THERAPY | Facility: HOSPITAL | Age: 70
End: 2025-08-07

## 2025-08-07 ENCOUNTER — APPOINTMENT (OUTPATIENT)
Dept: HEMATOLOGY ONCOLOGY | Facility: CLINIC | Age: 70
End: 2025-08-07

## 2025-08-07 ENCOUNTER — LABORATORY RESULT (OUTPATIENT)
Age: 70
End: 2025-08-07

## 2025-08-07 LAB
BASOPHILS # BLD AUTO: 0.01 K/UL — SIGNIFICANT CHANGE UP (ref 0–0.2)
BASOPHILS NFR BLD AUTO: 0.2 % — SIGNIFICANT CHANGE UP (ref 0–2)
EOSINOPHIL # BLD AUTO: 0 K/UL — SIGNIFICANT CHANGE UP (ref 0–0.5)
EOSINOPHIL NFR BLD AUTO: 0 % — SIGNIFICANT CHANGE UP (ref 0–6)
HCT VFR BLD CALC: 37.7 % — LOW (ref 39–50)
HGB BLD-MCNC: 12.7 G/DL — LOW (ref 13–17)
IMM GRANULOCYTES NFR BLD AUTO: 0.5 % — SIGNIFICANT CHANGE UP (ref 0–0.9)
LYMPHOCYTES # BLD AUTO: 0.33 K/UL — LOW (ref 1–3.3)
LYMPHOCYTES # BLD AUTO: 7.6 % — LOW (ref 13–44)
MCHC RBC-ENTMCNC: 32 PG — SIGNIFICANT CHANGE UP (ref 27–34)
MCHC RBC-ENTMCNC: 33.7 G/DL — SIGNIFICANT CHANGE UP (ref 32–36)
MCV RBC AUTO: 95 FL — SIGNIFICANT CHANGE UP (ref 80–100)
MONOCYTES # BLD AUTO: 0.12 K/UL — SIGNIFICANT CHANGE UP (ref 0–0.9)
MONOCYTES NFR BLD AUTO: 2.8 % — SIGNIFICANT CHANGE UP (ref 2–14)
NEUTROPHILS # BLD AUTO: 3.86 K/UL — SIGNIFICANT CHANGE UP (ref 1.8–7.4)
NEUTROPHILS NFR BLD AUTO: 88.9 % — HIGH (ref 43–77)
NRBC BLD AUTO-RTO: 0 /100 WBCS — SIGNIFICANT CHANGE UP (ref 0–0)
PLATELET # BLD AUTO: 205 K/UL — SIGNIFICANT CHANGE UP (ref 150–400)
RBC # BLD: 3.97 M/UL — LOW (ref 4.2–5.8)
RBC # FLD: 15 % — HIGH (ref 10.3–14.5)
WBC # BLD: 4.34 K/UL — SIGNIFICANT CHANGE UP (ref 3.8–10.5)
WBC # FLD AUTO: 4.34 K/UL — SIGNIFICANT CHANGE UP (ref 3.8–10.5)

## 2025-08-08 ENCOUNTER — NON-APPOINTMENT (OUTPATIENT)
Age: 70
End: 2025-08-08

## 2025-08-11 ENCOUNTER — APPOINTMENT (OUTPATIENT)
Dept: PULMONOLOGY | Facility: CLINIC | Age: 70
End: 2025-08-11
Payer: MEDICARE

## 2025-08-11 VITALS
DIASTOLIC BLOOD PRESSURE: 54 MMHG | BODY MASS INDEX: 27.63 KG/M2 | OXYGEN SATURATION: 96 % | WEIGHT: 204 LBS | HEART RATE: 57 BPM | RESPIRATION RATE: 16 BRPM | HEIGHT: 72 IN | TEMPERATURE: 96.3 F | SYSTOLIC BLOOD PRESSURE: 86 MMHG

## 2025-08-11 VITALS — SYSTOLIC BLOOD PRESSURE: 118 MMHG | DIASTOLIC BLOOD PRESSURE: 70 MMHG

## 2025-08-11 DIAGNOSIS — Z87.01 PERSONAL HISTORY OF PNEUMONIA (RECURRENT): ICD-10-CM

## 2025-08-11 DIAGNOSIS — G47.33 OBSTRUCTIVE SLEEP APNEA (ADULT) (PEDIATRIC): ICD-10-CM

## 2025-08-11 DIAGNOSIS — R05.3 CHRONIC COUGH: ICD-10-CM

## 2025-08-11 DIAGNOSIS — J45.50 SEVERE PERSISTENT ASTHMA, UNCOMPLICATED: ICD-10-CM

## 2025-08-11 PROCEDURE — 99214 OFFICE O/P EST MOD 30 MIN: CPT

## 2025-08-11 PROCEDURE — G2211 COMPLEX E/M VISIT ADD ON: CPT

## 2025-08-13 RX ORDER — PREDNISONE 10 MG/1
10 TABLET ORAL
Qty: 21 | Refills: 0 | Status: ACTIVE | COMMUNITY
Start: 2025-08-13 | End: 1900-01-01

## 2025-08-22 DIAGNOSIS — C90.00 MULTIPLE MYELOMA NOT HAVING ACHIEVED REMISSION: ICD-10-CM

## 2025-08-26 ENCOUNTER — APPOINTMENT (OUTPATIENT)
Dept: PULMONOLOGY | Facility: CLINIC | Age: 70
End: 2025-08-26
Payer: MEDICARE

## 2025-08-26 VITALS
BODY MASS INDEX: 26.68 KG/M2 | HEIGHT: 72 IN | SYSTOLIC BLOOD PRESSURE: 100 MMHG | OXYGEN SATURATION: 98 % | TEMPERATURE: 97.2 F | RESPIRATION RATE: 16 BRPM | DIASTOLIC BLOOD PRESSURE: 60 MMHG | WEIGHT: 197 LBS | HEART RATE: 55 BPM

## 2025-08-26 DIAGNOSIS — R06.83 SNORING: ICD-10-CM

## 2025-08-26 DIAGNOSIS — R06.02 SHORTNESS OF BREATH: ICD-10-CM

## 2025-08-26 DIAGNOSIS — J45.50 SEVERE PERSISTENT ASTHMA, UNCOMPLICATED: ICD-10-CM

## 2025-08-26 DIAGNOSIS — J30.89 OTHER ALLERGIC RHINITIS: ICD-10-CM

## 2025-08-26 PROCEDURE — 94729 DIFFUSING CAPACITY: CPT

## 2025-08-26 PROCEDURE — 94010 BREATHING CAPACITY TEST: CPT

## 2025-08-26 PROCEDURE — 99214 OFFICE O/P EST MOD 30 MIN: CPT | Mod: 25

## 2025-08-26 PROCEDURE — 94727 GAS DIL/WSHOT DETER LNG VOL: CPT

## 2025-08-26 PROCEDURE — 95012 NITRIC OXIDE EXP GAS DETER: CPT

## 2025-08-27 RX ORDER — POLYETHYLENE GLYCOL-3350 AND ELECTROLYTES 236; 6.74; 5.86; 2.97; 22.74 G/274.31G; G/274.31G; G/274.31G; G/274.31G; G/274.31G
236 POWDER, FOR SOLUTION ORAL
Qty: 1 | Refills: 0 | Status: ACTIVE | COMMUNITY
Start: 2025-08-27 | End: 1900-01-01

## 2025-09-04 ENCOUNTER — APPOINTMENT (OUTPATIENT)
Dept: HEMATOLOGY ONCOLOGY | Facility: CLINIC | Age: 70
End: 2025-09-04
Payer: MEDICARE

## 2025-09-04 ENCOUNTER — RESULT REVIEW (OUTPATIENT)
Age: 70
End: 2025-09-04

## 2025-09-04 ENCOUNTER — APPOINTMENT (OUTPATIENT)
Dept: INFUSION THERAPY | Facility: HOSPITAL | Age: 70
End: 2025-09-04

## 2025-09-04 VITALS
RESPIRATION RATE: 17 BRPM | HEART RATE: 55 BPM | WEIGHT: 199 LBS | SYSTOLIC BLOOD PRESSURE: 131 MMHG | BODY MASS INDEX: 26.99 KG/M2 | TEMPERATURE: 98 F | OXYGEN SATURATION: 98 % | DIASTOLIC BLOOD PRESSURE: 74 MMHG

## 2025-09-04 DIAGNOSIS — Z94.84 STEM CELLS TRANSPLANT STATUS: ICD-10-CM

## 2025-09-04 DIAGNOSIS — C90.00 MULTIPLE MYELOMA NOT HAVING ACHIEVED REMISSION: ICD-10-CM

## 2025-09-04 PROCEDURE — 99214 OFFICE O/P EST MOD 30 MIN: CPT

## 2025-09-05 LAB
ALBUMIN SERPL ELPH-MCNC: 4.1 G/DL
ALP BLD-CCNC: 75 U/L
ALT SERPL-CCNC: 27 U/L
ANION GAP SERPL CALC-SCNC: 13 MMOL/L
AST SERPL-CCNC: 22 U/L
B2 MICROGLOB SERPL-MCNC: 1.5 MG/L
BILIRUB SERPL-MCNC: 0.4 MG/DL
BUN SERPL-MCNC: 21 MG/DL
CALCIUM SERPL-MCNC: 9 MG/DL
CHLORIDE SERPL-SCNC: 107 MMOL/L
CO2 SERPL-SCNC: 20 MMOL/L
CREAT SERPL-MCNC: 0.89 MG/DL
EGFRCR SERPLBLD CKD-EPI 2021: 93 ML/MIN/1.73M2
FREE KAPPA URINE: 5.2 MG/L
FREE KAPPA/LAMDA RATIO: 0.34 RATIO
FREE LAMDA URINE: 15.4 MG/L
GLUCOSE SERPL-MCNC: 163 MG/DL
LDH SERPL-CCNC: 180 U/L
POTASSIUM SERPL-SCNC: 5.1 MMOL/L
PROT SERPL-MCNC: 5.6 G/DL
SODIUM SERPL-SCNC: 140 MMOL/L

## 2025-09-08 LAB
ALBUPE: 8.5 %
ALPHA1UPE: 38.2 %
ALPHA2UPE: 19.1 %
BETAUPE: 26.6 %
DEPRECATED KAPPA LC FREE/LAMBDA SER: 0.15 RATIO
GAMMAUPE: 7.6 %
IGA 24H UR QL IFE: NORMAL
IGA SERPL-MCNC: 50 MG/DL
IGG SERPL-MCNC: 284 MG/DL
IGM SERPL-MCNC: 22 MG/DL
KAPPA LC 24H UR QL: PRESENT
KAPPA LC CSF-MCNC: 1.35 MG/DL
KAPPA LC SERPL-MCNC: 0.2 MG/DL
M SPIKE RU: 17.3 %
PROT PATTERN 24H UR ELPH-IMP: NORMAL
PROT UR-MCNC: 6 MG/DL
PROT UR-MCNC: 6 MG/DL

## 2025-09-11 LAB
ALBUMIN MFR SERPL ELPH: 65.1 %
ALBUMIN SERPL-MCNC: 3.6 G/DL
ALBUMIN/GLOB SERPL: 1.8 RATIO
ALPHA1 GLOB MFR SERPL ELPH: 5.2 %
ALPHA1 GLOB SERPL ELPH-MCNC: 0.3 G/DL
ALPHA2 GLOB MFR SERPL ELPH: 10.8 %
ALPHA2 GLOB SERPL ELPH-MCNC: 0.6 G/DL
B-GLOBULIN MFR SERPL ELPH: 12.9 %
B-GLOBULIN SERPL ELPH-MCNC: 0.7 G/DL
GAMMA GLOB FLD ELPH-MCNC: 0.3 G/DL
GAMMA GLOB MFR SERPL ELPH: 6 %
INTERPRETATION SERPL IEP-IMP: NORMAL
M PROTEIN MFR SERPL ELPH: NORMAL
M PROTEIN SPEC IFE-MCNC: NORMAL
MONOCLON BAND OBS SERPL: NORMAL
PROT SERPL-MCNC: 5.6 G/DL
PROT SERPL-MCNC: 5.6 G/DL